# Patient Record
Sex: MALE | Race: BLACK OR AFRICAN AMERICAN | Employment: FULL TIME | URBAN - METROPOLITAN AREA
[De-identification: names, ages, dates, MRNs, and addresses within clinical notes are randomized per-mention and may not be internally consistent; named-entity substitution may affect disease eponyms.]

---

## 2018-01-18 ENCOUNTER — OFFICE VISIT (OUTPATIENT)
Dept: INTERNAL MEDICINE CLINIC | Age: 63
End: 2018-01-18

## 2018-01-18 VITALS
BODY MASS INDEX: 23.57 KG/M2 | WEIGHT: 150.2 LBS | HEART RATE: 78 BPM | HEIGHT: 67 IN | TEMPERATURE: 98.1 F | OXYGEN SATURATION: 95 % | DIASTOLIC BLOOD PRESSURE: 70 MMHG | SYSTOLIC BLOOD PRESSURE: 109 MMHG | RESPIRATION RATE: 18 BRPM

## 2018-01-18 DIAGNOSIS — E11.42 TYPE 2 DIABETES MELLITUS WITH DIABETIC POLYNEUROPATHY, WITH LONG-TERM CURRENT USE OF INSULIN (HCC): Primary | ICD-10-CM

## 2018-01-18 DIAGNOSIS — E78.5 DYSLIPIDEMIA: ICD-10-CM

## 2018-01-18 DIAGNOSIS — Z79.4 TYPE 2 DIABETES MELLITUS WITH DIABETIC POLYNEUROPATHY, WITH LONG-TERM CURRENT USE OF INSULIN (HCC): Primary | ICD-10-CM

## 2018-01-18 DIAGNOSIS — I34.1 MITRAL VALVE PROLAPSE: ICD-10-CM

## 2018-01-18 DIAGNOSIS — K42.9 UMBILICAL HERNIA WITHOUT OBSTRUCTION AND WITHOUT GANGRENE: ICD-10-CM

## 2018-01-18 RX ORDER — CLOTRIMAZOLE AND BETAMETHASONE DIPROPIONATE 10; .64 MG/G; MG/G
CREAM TOPICAL 2 TIMES DAILY
Qty: 45 G | Refills: 11 | Status: SHIPPED | OUTPATIENT
Start: 2018-01-18 | End: 2019-01-02

## 2018-01-18 RX ORDER — ATORVASTATIN CALCIUM 20 MG/1
20 TABLET, FILM COATED ORAL DAILY
Qty: 30 TAB | Refills: 10 | Status: SHIPPED | OUTPATIENT
Start: 2018-01-18 | End: 2018-02-15 | Stop reason: SDUPTHER

## 2018-01-18 RX ORDER — GLIPIZIDE 10 MG/1
10 TABLET ORAL 2 TIMES DAILY
Qty: 120 TAB | Refills: 10 | Status: SHIPPED | OUTPATIENT
Start: 2018-01-18 | End: 2018-02-15 | Stop reason: SDUPTHER

## 2018-01-18 RX ORDER — METFORMIN HYDROCHLORIDE 1000 MG/1
1000 TABLET ORAL 2 TIMES DAILY WITH MEALS
Qty: 60 TAB | Refills: 10 | Status: SHIPPED | OUTPATIENT
Start: 2018-01-18 | End: 2018-02-15 | Stop reason: SDUPTHER

## 2018-01-18 NOTE — PROGRESS NOTES
SPORTS MEDICINE AND PRIMARY CARE  Jose Manuel Almanza MD, [address] [address]  Phone:  [phone]  Fax: [phone]       Chief Complaint   Patient presents with    Diabetes     f/u   . SUBJECTIVE:    Edwar Durant is a 57 y.o. male   The patient returns today after a long absence from Dr. Brenden Cisneros. He has a known history of diabetes and hypertension. He ran out of his Levemir months ago and has not been checking his blood sugar. He continues to take Metformin and Glipizide, however. He complains of numbness of his feet, particularly the plantar surface and is seen for evaluation. Current Outpatient Prescriptions   Medication Sig Dispense Refill    insulin detemir (LEVEMIR FLEXTOUCH) 100 unit/mL (3 mL) inpn 30 Units by SubCUTAneous route nightly. 3 Pen 11    glipiZIDE (GLUCOTROL) 10 mg tablet Take 1 Tab by mouth two (2) times a day. 120 Tab 10    metFORMIN (GLUCOPHAGE) 1,000 mg tablet Take 1 Tab by mouth two (2) times daily (with meals). 60 Tab 10    atorvastatin (LIPITOR) 20 mg tablet Take 1 Tab by mouth daily. 30 Tab 10    clotrimazole-betamethasone (LOTRISONE) topical cream Apply  to affected area two (2) times a day.  45 g 11    CLICKFINE 31 gauge x 4/06\" ndle USE UP TO 3 TIMES PER DAY AS DIRECTED BY DOCTOR 100 Pen Needle 11    SOLUS V2 TEST STRIPS strip       LITE TOUCH LANCETS 33 gauge misc       LITE TOUCH LANCING DEVICE misc       GAVILYTE-N 420 gram solution        Past Medical History:   Diagnosis Date    Diabetes ([address]     Hypertension      Past Surgical History:   Procedure Laterality Date    COLONOSCOPY  1/2/2015         HX HEENT      l cararact removal     No Known Allergies      REVIEW OF SYSTEMS:  General: negative for - chills or fever  ENT: negative for - headaches, nasal congestion or tinnitus  Respiratory: negative for - cough, hemoptysis, shortness of breath or wheezing  Cardiovascular : negative for - chest pain, edema, palpitations or shortness of breath  Gastrointestinal: negative for - abdominal pain, blood in stools, heartburn or nausea/vomiting  Genito-Urinary: no dysuria, trouble voiding, or hematuria  Musculoskeletal: negative for - gait disturbance, joint pain, joint stiffness or joint swelling  Neurological: no TIA or stroke symptoms  Hematologic: no bruises, no bleeding, no swollen glands  Integument: no lumps, mole changes, nail changes or rash  Endocrine: no malaise/lethargy or unexpected weight changes      Social History     Social History    Marital status: SINGLE     Spouse name: N/A    Number of children: 0    Years of education: N/A     Occupational History    [employer]      Social History Main Topics    Smoking status: Never Smoker    Smokeless tobacco: Never Used    Alcohol use No    Drug use: No    Sexual activity: Yes     Partners: Female     Birth control/ protection: None     Other Topics Concern    None     Social History Narrative     Family History   Problem Relation Age of Onset    Diabetes Mother     Hypertension Mother        OBJECTIVE:    Visit Vitals    /70    Pulse 78    Temp 98.1 °F (36.7 °C) (Oral)    Resp 18    Ht 5' 7\" (1.702 m)    Wt 150 lb 3.2 oz (68.1 kg)    SpO2 95%    BMI 23.52 kg/m2     CONSTITUTIONAL: well , well nourished, appears age appropriate  EYES: perrla, eom intact  ENMT:moist mucous membranes, pharynx clear  NECK: supple. Thyroid normal  RESPIRATORY: Chest: clear bilaterally   CARDIOVASCULAR: Heart: regular rate and rhythm  GASTROINTESTINAL: Abdomen: soft, bowel sounds active  HEMATOLOGIC: no pathological lymph nodes palpated  MUSCULOSKELETAL: Extremities: no edema, pulse 1+   Foot exam reveals no lesions. There are white plaques in the 4th/5th webspace. Sensation is intact to fine filament. Pulses are intact. INTEGUMENT: No unusual rashes or suspicious skin lesions noted.  Nails appear normal.  NEUROLOGIC: non-focal exam   MENTAL STATUS: alert and oriented, appropriate affect           ASSESSMENT:  1. Type 2 diabetes mellitus with diabetic polyneuropathy, with long-term current use of insulin ([address]    2. Dyslipidemia    3. Umbilical hernia without obstruction and without gangrene    4. Mitral valve prolapse        The patient is completely noncompliant with regard to medical follow up and medication management. He tells me he is going to \"get back on the bandwagon\" and follow Dr. Goodwin Slider recommendations. We will re-institute the Levemir and continue his other medications. Appropriate laboratory studies have been requested. We asked him to check his blood sugar on a regular basis and bring those readings when he sees Dr. Brenden Cisneros within the next 2-3 weeks so that he can help him adjust his medications to return his blood sugars to normal. We will also ask him to see the ophthalmologist.     Appropriate laboratory studies have been requested and will be sent to him in the mail. Review of his BMI indicates he is at his ideal body weight but we encourage a heart-healthy diabetic diet and physical activity for 30 minutes 5 days a week. MedDATA/gwo         PLAN:  .  Orders Placed This Encounter    URINALYSIS W/ RFLX MICROSCOPIC    CBC WITH AUTOMATED DIFF    METABOLIC PANEL, COMPREHENSIVE    LIPID PANEL    PROSTATE SPECIFIC AG    HEMOGLOBIN A1C WITH EAG    MICROALBUMIN, UR, RAND W/ MICROALBUMIN/CREA RATIO    REFERRAL TO OPHTHALMOLOGY    insulin detemir (LEVEMIR FLEXTOUCH) 100 unit/mL (3 mL) inpn    glipiZIDE (GLUCOTROL) 10 mg tablet    metFORMIN (GLUCOPHAGE) 1,000 mg tablet    atorvastatin (LIPITOR) 20 mg tablet    clotrimazole-betamethasone (LOTRISONE) topical cream       Follow-up Disposition:  Return in about 4 weeks (around 2/15/2018). ATTENTION:   This medical record was transcribed using an electronic medical records system. Although proofread, it may and can contain electronic and spelling errors.   Other human spelling and other errors may be present. Corrections may be executed at a later time. Please feel free to contact us for any clarifications as needed.

## 2018-01-18 NOTE — MR AVS SNAPSHOT
Rachael Ingram 
 
 
 [address] [address] 
[phone] Patient: Haris Roche MRN: [id number] SGY:07/[birth year]72 Visit Information Date & Time Provider Department Dept. Phone Encounter #  
 1/18/2018 10:30 AM MD Bishop Guerrero [facility] Medicine and Primary Care [phone] [id number] Follow-up Instructions Return in about 4 weeks (around 2/15/2018). Follow-up and Disposition History Upcoming Health Maintenance Date Due Hepatitis C Screening 1955 Pneumococcal 19-64 Medium Risk (1 of 1 - PPSV23) 10/7/1972 DTaP/Tdap/Td series (1 - Tdap) 10/7/1974 MICROALBUMIN Q1 1/6/2016 FOOT EXAM Q1 3/17/2016 EYE EXAM RETINAL OR DILATED Q1 6/2/2016 HEMOGLOBIN A1C Q6M 11/24/2016 LIPID PANEL Q1 5/24/2017 Influenza Age 5 to Adult 8/1/2017 COLONOSCOPY 1/2/2025 Allergies as of 1/18/2018  Review Complete On: 1/18/2018 By: Maria Fernanda Bear MD  
 No Known Allergies Current Immunizations  Never Reviewed No immunizations on file. Not reviewed this visit You Were Diagnosed With   
  
 Codes Comments Type 2 diabetes mellitus with diabetic polyneuropathy, with long-term current use of insulin (HCC)    -  Primary ICD-10-CM: E11.42, Z79.4 ICD-9-CM: 250.60, 357.2, V58.67 Dyslipidemia     ICD-10-CM: E78.5 ICD-9-CM: 272.4 Umbilical hernia without obstruction and without gangrene     ICD-10-CM: K42.9 ICD-9-CM: 553.1 Mitral valve prolapse     ICD-10-CM: I34.1 ICD-9-CM: 424.0 Vitals BP Pulse Temp Resp Height(growth percentile) Weight(growth percentile) 109/70 78 98.1 °F (36.7 °C) (Oral) 18 5' 7\" (1.702 m) 150 lb 3.2 oz (68.1 kg) SpO2 BMI Smoking Status 95% 23.52 kg/m2 Never Smoker Vitals History BMI and BSA Data Body Mass Index Body Surface Area  
 23.52 kg/m 2 1.79 m 2 Preferred Pharmacy Pharmacy Name Phone [facility] [phone] [facility] [phone] Your Updated Medication List  
  
   
This list is accurate as of: 18 12:46 PM.  Always use your most recent med list.  
  
  
  
  
 atorvastatin 20 mg tablet Commonly known as:  LIPITOR Take 1 Tab by mouth daily. CLICKFINE 31 gauge x 3/27\" Ndle Generic drug:  Insulin Needles (Disposable) USE UP TO 3 TIMES PER DAY AS DIRECTED BY DOCTOR  
  
 clotrimazole-betamethasone topical cream  
Commonly known as:  Kenia Pilling Apply  to affected area two (2) times a day. GAVILYTE-N 420 gram solution Generic drug:  peg-electrolyte soln  
  
 glipiZIDE 10 mg tablet Commonly known as:  Denette Frock Take 1 Tab by mouth two (2) times a day. insulin detemir 100 unit/mL (3 mL) Inpn Commonly known as:  LEVEMIR FLEXTOUCH  
30 Units by SubCUTAneous route nightly. LITE TOUCH LANCETS 33 gauge Misc Generic drug:  lancets LITE TOUCH LANCING DEVICE Misc Generic drug:  Lancing Device  
  
 metFORMIN 1,000 mg tablet Commonly known as:  GLUCOPHAGE Take 1 Tab by mouth two (2) times daily (with meals). SOLUS V2 TEST STRIPS strip Generic drug:  glucose blood VI test strips Prescriptions Sent to Pharmacy Refills  
 insulin detemir (LEVEMIR FLEXTOUCH) 100 unit/mL (3 mL) inpn 11 Si Units by SubCUTAneous route nightly. Class: Normal  
 Pharmacy: [facility] [phone] [facility] Ph #: [phone] Route: SubCUTAneous  
 glipiZIDE (GLUCOTROL) 10 mg tablet 10 Sig: Take 1 Tab by mouth two (2) times a day. Class: Normal  
 Pharmacy: [facility] [phone] [facility] Ph #: [phone] Route: Oral  
 metFORMIN (GLUCOPHAGE) 1,000 mg tablet 10 Sig: Take 1 Tab by mouth two (2) times daily (with meals).   
 Class: Normal  
 [facility] [address] Ph #: [phone] Route: Oral  
 atorvastatin (LIPITOR) 20 mg tablet 10 Sig: Take 1 Tab by mouth daily. Class: Normal  
 Pharmacy: [address] Ph #: [phone] Route: Oral  
 clotrimazole-betamethasone (LOTRISONE) topical cream 11 Sig: Apply  to affected area two (2) times a day. Class: Normal  
 Pharmacy: [address] Ph #: [phone] Route: Topical  
  
We Performed the Following CBC WITH AUTOMATED DIFF [06455 CPT(R)] HEMOGLOBIN A1C WITH EAG [70851 CPT(R)] LIPID PANEL [14519 CPT(R)] METABOLIC PANEL, COMPREHENSIVE [46794 CPT(R)] MICROALBUMIN, UR, RAND W/ MICROALBUMIN/CREA RATIO [id number] CPT(R)] NC COLLECTION VENOUS BLOOD,VENIPUNCTURE [id number] CPT(R)] PSA, DIAGNOSTIC (PROSTATE SPECIFIC AG) [id number] CPT(R)] REFERRAL TO OPHTHALMOLOGY [REF57 Custom] Comments:  
 Please evaluate patient for dm. URINALYSIS W/ RFLX MICROSCOPIC [22686 CPT(R)] Follow-up Instructions Return in about 4 weeks (around 2/15/2018). To-Do List   
 01/18/2018 ECHO:  2D ECHO COMPLETE ADULT (TTE) W OR WO CONTR Referral Information Referral ID Referred By Referred To  
  
 [id number] Noah BOWIE Not Available Visits Status Start Date End Date 1 New Request 1/18/18 1/18/19 If your referral has a status of pending review or denied, additional information will be sent to support the outcome of this decision. Introducing [facility]! [facility] introduces [employer] patient portal. Now you can access parts of your medical record, email your doctor's office, and request medication refills online. 1. In your internet browser, go to [email or web address] 2. Click on the First Time User? Click Here link in the Sign In box. You will see the New Member Sign Up page. 3. Enter your [employer] [place] Access Code exactly as it appears below. You will not need to use this code after youve completed the sign-up process. If you do not sign up before the expiration date, you must request a new code. · [employer] [place] Access Code: [facility] Expires: 4/18/2018 12:46 PM 
 
4. Enter the last four digits of your Social Security Number (xxxx) and Date of Birth (mm/dd/yyyy) as indicated and click Submit. You will be taken to the next sign-up page. 5. Create a [employer] [place] ID. This will be your [employer] [place] login ID and cannot be changed, so think of one that is secure and easy to remember. 6. Create a [employer] password. You can change your password at any time. 7. Enter your Password Reset Question and Answer. This can be used at a later time if you forget your password. 8. Enter your e-mail address. You will receive e-mail notification when new information is available in [address] 9. Click Sign Up. You can now view and download portions of your medical record. 10. Click the Download Summary menu link to download a portable copy of your medical information. If you have questions, please visit the Frequently Asked Questions section of the [employer] [place] website. Remember, [employer] [place] is NOT to be used for urgent needs. For medical emergencies, dial 911. Now available from your iPhone and Android! Please provide this summary of care documentation to your next provider. Your primary care clinician is listed as Nathaly Armando. If you have any questions after today's visit, please call [phone].

## 2018-01-18 NOTE — PROGRESS NOTES
1. Have you been to the ER, urgent care clinic since your last visit? Hospitalized since your last visit? No    2. Have you seen or consulted any other health care providers outside of the [address] [place] [address] since your last visit? Include any pap smears or colon screening. No     Complaints of tingling in feet.  Needs medication refill

## 2018-01-19 LAB
ALBUMIN SERPL-MCNC: 4 G/DL (ref 3.6–4.8)
ALBUMIN/CREAT UR: 80.9 MG/G CREAT (ref 0–30)
ALBUMIN/GLOB SERPL: 1.5 {RATIO} (ref 1.2–2.2)
ALP SERPL-CCNC: 75 IU/L (ref 39–117)
ALT SERPL-CCNC: 19 IU/L (ref 0–44)
APPEARANCE UR: CLEAR
AST SERPL-CCNC: 21 IU/L (ref 0–40)
BASOPHILS # BLD AUTO: 0 X10E3/UL (ref 0–0.2)
BASOPHILS NFR BLD AUTO: 0 %
BILIRUB SERPL-MCNC: 0.3 MG/DL (ref 0–1.2)
BILIRUB UR QL STRIP: NEGATIVE
BUN SERPL-MCNC: 22 MG/DL (ref 8–27)
BUN/CREAT SERPL: 23 (ref 10–24)
CALCIUM SERPL-MCNC: 9.3 MG/DL (ref 8.6–10.2)
CHLORIDE SERPL-SCNC: 94 MMOL/L (ref 96–106)
CHOLEST SERPL-MCNC: 192 MG/DL (ref 100–199)
CO2 SERPL-SCNC: 26 MMOL/L (ref 18–29)
COLOR UR: YELLOW
CREAT SERPL-MCNC: 0.94 MG/DL (ref 0.76–1.27)
CREAT UR-MCNC: 50.9 MG/DL
EOSINOPHIL # BLD AUTO: 0 X10E3/UL (ref 0–0.4)
EOSINOPHIL NFR BLD AUTO: 1 %
ERYTHROCYTE [DISTWIDTH] IN BLOOD BY AUTOMATED COUNT: 14 % (ref 12.3–15.4)
EST. AVERAGE GLUCOSE BLD GHB EST-MCNC: >398 MG/DL
GLOBULIN SER CALC-MCNC: 2.6 G/DL (ref 1.5–4.5)
GLUCOSE SERPL-MCNC: 348 MG/DL (ref 65–99)
GLUCOSE UR QL: ABNORMAL
HBA1C MFR BLD: >15.5 % (ref 4.8–5.6)
HCT VFR BLD AUTO: 38.9 % (ref 37.5–51)
HDLC SERPL-MCNC: 63 MG/DL
HGB BLD-MCNC: 13 G/DL (ref 13–17.7)
HGB UR QL STRIP: NEGATIVE
IMM GRANULOCYTES # BLD: 0 X10E3/UL (ref 0–0.1)
IMM GRANULOCYTES NFR BLD: 0 %
KETONES UR QL STRIP: NEGATIVE
LDLC SERPL CALC-MCNC: 106 MG/DL (ref 0–99)
LEUKOCYTE ESTERASE UR QL STRIP: NEGATIVE
LYMPHOCYTES # BLD AUTO: 0.9 X10E3/UL (ref 0.7–3.1)
LYMPHOCYTES NFR BLD AUTO: 23 %
MCH RBC QN AUTO: 28.6 PG (ref 26.6–33)
MCHC RBC AUTO-ENTMCNC: 33.4 G/DL (ref 31.5–35.7)
MCV RBC AUTO: 86 FL (ref 79–97)
MICRO URNS: ABNORMAL
MICROALBUMIN UR-MCNC: 41.2 UG/ML
MONOCYTES # BLD AUTO: 0.3 X10E3/UL (ref 0.1–0.9)
MONOCYTES NFR BLD AUTO: 7 %
NEUTROPHILS # BLD AUTO: 2.6 X10E3/UL (ref 1.4–7)
NEUTROPHILS NFR BLD AUTO: 69 %
NITRITE UR QL STRIP: NEGATIVE
PH UR STRIP: 5.5 [PH] (ref 5–7.5)
PLATELET # BLD AUTO: 185 X10E3/UL (ref 150–379)
POTASSIUM SERPL-SCNC: 4.3 MMOL/L (ref 3.5–5.2)
PROT SERPL-MCNC: 6.6 G/DL (ref 6–8.5)
PROT UR QL STRIP: NEGATIVE
PSA SERPL-MCNC: 1 NG/ML (ref 0–4)
RBC # BLD AUTO: 4.55 X10E6/UL (ref 4.14–5.8)
SODIUM SERPL-SCNC: 137 MMOL/L (ref 134–144)
SP GR UR: >=1.03 (ref 1–1.03)
TRIGL SERPL-MCNC: 117 MG/DL (ref 0–149)
UROBILINOGEN UR STRIP-MCNC: 0.2 MG/DL (ref 0.2–1)
VLDLC SERPL CALC-MCNC: 23 MG/DL (ref 5–40)
WBC # BLD AUTO: 3.7 X10E3/UL (ref 3.4–10.8)

## 2018-02-06 ENCOUNTER — HOSPITAL ENCOUNTER (OUTPATIENT)
Dept: NON INVASIVE DIAGNOSTICS | Age: 63
Discharge: HOME | End: 2018-02-06
Attending: INTERNAL MEDICINE
Payer: COMMERCIAL

## 2018-02-06 DIAGNOSIS — I34.1 MITRAL VALVE PROLAPSE: ICD-10-CM

## 2018-02-06 PROCEDURE — 93306 TTE W/DOPPLER COMPLETE: CPT

## 2018-02-15 ENCOUNTER — OFFICE VISIT (OUTPATIENT)
Dept: INTERNAL MEDICINE CLINIC | Age: 63
End: 2018-02-15

## 2018-02-15 VITALS
HEART RATE: 79 BPM | RESPIRATION RATE: 18 BRPM | BODY MASS INDEX: 24.58 KG/M2 | DIASTOLIC BLOOD PRESSURE: 68 MMHG | OXYGEN SATURATION: 96 % | HEIGHT: 67 IN | WEIGHT: 156.6 LBS | TEMPERATURE: 97.7 F | SYSTOLIC BLOOD PRESSURE: 109 MMHG

## 2018-02-15 DIAGNOSIS — E11.42 DIABETIC POLYNEUROPATHY ASSOCIATED WITH TYPE 2 DIABETES MELLITUS (HCC): ICD-10-CM

## 2018-02-15 DIAGNOSIS — E78.5 DYSLIPIDEMIA: ICD-10-CM

## 2018-02-15 DIAGNOSIS — Z00.00 PHYSICAL EXAM: ICD-10-CM

## 2018-02-15 DIAGNOSIS — I34.1 MITRAL VALVE PROLAPSE: ICD-10-CM

## 2018-02-15 DIAGNOSIS — E13.319 RETINOPATHY DUE TO SECONDARY DIABETES MELLITUS (HCC): ICD-10-CM

## 2018-02-15 RX ORDER — GLIPIZIDE 10 MG/1
10 TABLET ORAL 2 TIMES DAILY
Qty: 120 TAB | Refills: 10 | Status: SHIPPED | OUTPATIENT
Start: 2018-02-15 | End: 2018-11-22

## 2018-02-15 RX ORDER — METFORMIN HYDROCHLORIDE 1000 MG/1
1000 TABLET ORAL 2 TIMES DAILY WITH MEALS
Qty: 60 TAB | Refills: 10 | Status: SHIPPED | OUTPATIENT
Start: 2018-02-15 | End: 2019-05-24 | Stop reason: SDUPTHER

## 2018-02-15 RX ORDER — ATORVASTATIN CALCIUM 20 MG/1
20 TABLET, FILM COATED ORAL DAILY
Qty: 30 TAB | Refills: 10 | Status: SHIPPED | OUTPATIENT
Start: 2018-02-15 | End: 2019-03-27 | Stop reason: SDUPTHER

## 2018-02-15 NOTE — MR AVS SNAPSHOT
[address] [place] [address]. [address] [address] 
[phone] Patient: An Crowley MRN: [id number] KWM:[birth year] Visit Information Date & Time Provider Department Dept. Phone Encounter #  
 2/15/2018  9:00 AM Oscar Canavan, MD [facility] [address] [id number] Follow-up Instructions Return in about 2 weeks (around 3/1/2018). Upcoming Health Maintenance Date Due Hepatitis C Screening 1955 FOOT EXAM Q1 3/17/2016 EYE EXAM RETINAL OR DILATED Q1 6/2/2016 HEMOGLOBIN A1C Q6M 7/18/2018 MICROALBUMIN Q1 1/18/2019 LIPID PANEL Q1 1/18/2019 COLONOSCOPY 1/2/2025 DTaP/Tdap/Td series (2 - Td) 2/15/2028 Allergies as of 2/15/2018  Review Complete On: 2/15/2018 By: Nicholas Corey No Known Allergies Current Immunizations  Never Reviewed No immunizations on file. Not reviewed this visit You Were Diagnosed With   
  
 Codes Comments Type 2 diabetes mellitus with diabetic polyneuropathy, with long-term current use of insulin (HCC)    -  Primary ICD-10-CM: E11.42, Z79.4 ICD-9-CM: 250.60, 357.2, V58.67 Dyslipidemia     ICD-10-CM: E78.5 ICD-9-CM: 272.4 Vitals BP Pulse Temp Resp Height(growth percentile) Weight(growth percentile) 109/68 (BP 1 Location: Right arm, BP Patient Position: Sitting) 79 97.7 °F (36.5 °C) (Oral) 18 5' 7\" (1.702 m) 156 lb 9.6 oz (71 kg) SpO2 BMI Smoking Status 96% 24.53 kg/m2 Never Smoker Vitals History BMI and BSA Data Body Mass Index Body Surface Area 24.53 kg/m 2 1.83 m 2 Preferred Pharmacy Pharmacy Name Phone [facility] [address] [place] [address] [phone] Your Updated Medication List  
  
   
This list is accurate as of: 2/15/18 11:04 AM.  Always use your most recent med list.  
  
  
  
  
 atorvastatin 20 mg tablet Commonly known as:  LIPITOR Take 1 Tab by mouth daily. CLICKFINE 31 gauge x 7/28\" Ndle Generic drug:  Insulin Needles (Disposable) USE UP TO 3 TIMES PER DAY AS DIRECTED BY DOCTOR  
  
 clotrimazole-betamethasone topical cream  
Commonly known as:  Jackelyn Scherer Apply  to affected area two (2) times a day. GAVILYTE-N 420 gram solution Generic drug:  peg-electrolyte soln  
  
 glipiZIDE 10 mg tablet Commonly known as:  Martine Pruitt Take 1 Tab by mouth two (2) times a day. insulin detemir U-100 100 unit/mL (3 mL) Inpn Commonly known as:  LEVEMIR FLEXTOUCH U-100 INSULN  
30 Units by SubCUTAneous route nightly. LITE TOUCH LANCETS 33 gauge Misc Generic drug:  lancets LITE TOUCH LANCING DEVICE Misc Generic drug:  Lancing Device  
  
 metFORMIN 1,000 mg tablet Commonly known as:  GLUCOPHAGE Take 1 Tab by mouth two (2) times daily (with meals). SOLUS V2 TEST STRIPS strip Generic drug:  glucose blood VI test strips Prescriptions Sent to Pharmacy Refills  
 metFORMIN (GLUCOPHAGE) 1,000 mg tablet 10 Sig: Take 1 Tab by mouth two (2) times daily (with meals). Class: Normal  
 Pharmacy: [address] Ph #: [phone] Route: Oral  
 atorvastatin (LIPITOR) 20 mg tablet 10 Sig: Take 1 Tab by mouth daily. Class: Normal  
 Pharmacy: [address] Ph #: [phone] Route: Oral  
 glipiZIDE (GLUCOTROL) 10 mg tablet 10 Sig: Take 1 Tab by mouth two (2) times a day. Class: Normal  
 Pharmacy: [address] Ph #: [phone] Route: Oral  
  
Follow-up Instructions Return in about 2 weeks (around 3/1/2018). Please provide this summary of care documentation to your next provider. Your primary care clinician is listed as Nathaly Armando. If you have any questions after today's visit, please call [phone].

## 2018-02-15 NOTE — PROGRESS NOTES
1. Have you been to the ER, urgent care clinic since your last visit? Hospitalized since your last visit? No    2. Have you seen or consulted any other health care providers outside of the [address] [place] [address] since your last visit? Include any pap smears or colon screening.  No

## 2018-02-18 PROBLEM — E11.42 TYPE 2 DIABETES MELLITUS WITH DIABETIC POLYNEUROPATHY, WITH LONG-TERM CURRENT USE OF INSULIN (HCC): Status: ACTIVE | Noted: 2018-02-18

## 2018-02-18 PROBLEM — E11.42 DIABETIC POLYNEUROPATHY ASSOCIATED WITH TYPE 2 DIABETES MELLITUS (HCC): Status: RESOLVED | Noted: 2018-02-18 | Resolved: 2018-02-18

## 2018-02-18 PROBLEM — E13.319 RETINOPATHY DUE TO SECONDARY DIABETES MELLITUS (HCC): Status: ACTIVE | Noted: 2018-02-18

## 2018-02-18 PROBLEM — Z79.4 TYPE 2 DIABETES MELLITUS WITH DIABETIC POLYNEUROPATHY, WITH LONG-TERM CURRENT USE OF INSULIN (HCC): Status: ACTIVE | Noted: 2018-02-18

## 2018-02-18 PROBLEM — E11.42 DIABETIC POLYNEUROPATHY ASSOCIATED WITH TYPE 2 DIABETES MELLITUS (HCC): Status: ACTIVE | Noted: 2018-02-18

## 2018-02-19 NOTE — ASSESSMENT & PLAN NOTE
This condition is managed by Specialist.  Key Antihyperglycemic Medications             metFORMIN (GLUCOPHAGE) 1,000 mg tablet  (Taking) Take 1 Tab by mouth two (2) times daily (with meals). glipiZIDE (GLUCOTROL) 10 mg tablet  (Taking) Take 1 Tab by mouth two (2) times a day. insulin detemir (LEVEMIR FLEXTOUCH) 100 unit/mL (3 mL) inpn  (Taking) 30 Units by SubCUTAneous route nightly. Other Key Diabetic Medications             atorvastatin (LIPITOR) 20 mg tablet  (Taking) Take 1 Tab by mouth daily.         Lab Results   Component Value Date/Time    Hemoglobin A1c >15.5 01/18/2018 12:38 PM    Glucose 348 01/18/2018 12:38 PM    Creatinine 0.94 01/18/2018 12:38 PM    Microalb/Creat ratio (ug/mg creat.) 80.9 01/18/2018 12:38 PM    Cholesterol, total 192 01/18/2018 12:38 PM    HDL Cholesterol 63 01/18/2018 12:38 PM    LDL, calculated 106 01/18/2018 12:38 PM    Triglyceride 117 01/18/2018 12:38 PM     Diabetic Foot and Eye Exam HM Status   Topic Date Due    Diabetic Foot Care  03/17/2016    Eye Exam  06/02/2016

## 2018-02-19 NOTE — ASSESSMENT & PLAN NOTE
Uncontrolled, based on history, physical exam and review of pertinent labs, studies and medications; meds reconciled; lifestyle modifications recommended, medication compliance emphasized. Key Antihyperglycemic Medications             metFORMIN (GLUCOPHAGE) 1,000 mg tablet  (Taking) Take 1 Tab by mouth two (2) times daily (with meals). glipiZIDE (GLUCOTROL) 10 mg tablet  (Taking) Take 1 Tab by mouth two (2) times a day. insulin detemir (LEVEMIR FLEXTOUCH) 100 unit/mL (3 mL) inpn  (Taking) 30 Units by SubCUTAneous route nightly. Other Key Diabetic Medications             atorvastatin (LIPITOR) 20 mg tablet  (Taking) Take 1 Tab by mouth daily.         Lab Results   Component Value Date/Time    Hemoglobin A1c >15.5 01/18/2018 12:38 PM    Glucose 348 01/18/2018 12:38 PM    Creatinine 0.94 01/18/2018 12:38 PM    Microalb/Creat ratio (ug/mg creat.) 80.9 01/18/2018 12:38 PM    Cholesterol, total 192 01/18/2018 12:38 PM    HDL Cholesterol 63 01/18/2018 12:38 PM    LDL, calculated 106 01/18/2018 12:38 PM    Triglyceride 117 01/18/2018 12:38 PM     Diabetic Foot and Eye Exam HM Status   Topic Date Due    Diabetic Foot Care  03/17/2016    Eye Exam  06/02/2016

## 2018-02-19 NOTE — ASSESSMENT & PLAN NOTE
Stable, based on history, physical exam and review of pertinent labs, studies and medications; meds reconciled; continue current treatment plan. Key Antihyperglycemic Medications             metFORMIN (GLUCOPHAGE) 1,000 mg tablet  (Taking) Take 1 Tab by mouth two (2) times daily (with meals). glipiZIDE (GLUCOTROL) 10 mg tablet  (Taking) Take 1 Tab by mouth two (2) times a day. insulin detemir (LEVEMIR FLEXTOUCH) 100 unit/mL (3 mL) inpn  (Taking) 30 Units by SubCUTAneous route nightly. Other Key Diabetic Medications             atorvastatin (LIPITOR) 20 mg tablet  (Taking) Take 1 Tab by mouth daily.         Lab Results   Component Value Date/Time    Hemoglobin A1c >15.5 01/18/2018 12:38 PM    Glucose 348 01/18/2018 12:38 PM    Creatinine 0.94 01/18/2018 12:38 PM    Microalb/Creat ratio (ug/mg creat.) 80.9 01/18/2018 12:38 PM    Cholesterol, total 192 01/18/2018 12:38 PM    HDL Cholesterol 63 01/18/2018 12:38 PM    LDL, calculated 106 01/18/2018 12:38 PM    Triglyceride 117 01/18/2018 12:38 PM     Diabetic Foot and Eye Exam HM Status   Topic Date Due    Diabetic Foot Care  03/17/2016    Eye Exam  06/02/2016

## 2018-02-19 NOTE — PROGRESS NOTES
[address] [facility]  [place] [address]  [address] [facility]  Phone:  [phone]  Fax: [phone]       Chief Complaint   Patient presents with    Hypertension    Diabetes   . SUBJECTIVE:    Siobhan Romero is a 57 y.o. male   Comes in after a long absence. He has been totally out of control the entire time I have been following him. He never follows up with a return appointments and efforts to adjust his medications have been futile. The futility comes from his poor follow up. He was called previously and unfortunately his significant other was a bit uncooperative on the phone and I do not think contact was ever made with the patient. In any event, he has diabetic retinopathy and is actively seeing an ophthalmologist and will be seeing a retinal specialist soon and he has polyarthritis of the lower extremity manifesting itself as paresthesias and numbness. He now has had diabetes for at least 14+ years and has been totally out of control the entire time. He is working at the [employer] where he has worked for the last decade plus with his work hours being approximately 11:00 AM to 7:00 PM. He eats his breakfast at about 10:00 AM, again at 1:30 and 4:30 and a snack at [employer] on his way home from work at about 9:00 PM. He has been taking Levemir, but no prandial insulin. He does not get much information from his blood sugars because he checks them sporadically. He assumes that because he does not feel bad that his diabetes is well controlled, but this is obviously not the case in view of the microvascular complications developing. I explained this to him at length. He is taking his statin on a regular basis. To date, he has not developed hypertension. Physically, he feels well and continues to work as I stated on a regular basis.      MedDATA/gwo            Current Outpatient Prescriptions   Medication Sig Dispense Refill    metFORMIN (GLUCOPHAGE) 1,000 mg tablet Take 1 Tab by mouth two (2) times daily (with meals). 60 Tab 10    atorvastatin (LIPITOR) 20 mg tablet Take 1 Tab by mouth daily. 30 Tab 10    glipiZIDE (GLUCOTROL) 10 mg tablet Take 1 Tab by mouth two (2) times a day. 120 Tab 10    insulin detemir (LEVEMIR FLEXTOUCH) 100 unit/mL (3 mL) inpn 30 Units by SubCUTAneous route nightly. 3 Pen 11    clotrimazole-betamethasone (LOTRISONE) topical cream Apply  to affected area two (2) times a day.  45 g 11    CLICKFINE 31 gauge x 8/71\" ndle USE UP TO 3 TIMES PER DAY AS DIRECTED BY DOCTOR 100 Pen Needle 11    SOLUS V2 TEST STRIPS strip       LITE TOUCH LANCETS 33 gauge misc       LITE TOUCH LANCING DEVICE misc       GAVILYTE-N 420 gram solution        Past Medical History:   Diagnosis Date    Diabetes ([facility]     Hypertension      Past Surgical History:   Procedure Laterality Date    COLONOSCOPY  1/2/2015         HX HEENT      l cararact removal     No Known Allergies      REVIEW OF SYSTEMS:  General: negative for - chills or fever  ENT: negative for - headaches, nasal congestion or tinnitus  Respiratory: negative for - cough, hemoptysis, shortness of breath or wheezing  Cardiovascular : negative for - chest pain, edema, palpitations or shortness of breath  Gastrointestinal: negative for - abdominal pain, blood in stools, heartburn or nausea/vomiting  Genito-Urinary: no dysuria, trouble voiding, or hematuria  Musculoskeletal: negative for - gait disturbance, joint pain, joint stiffness or joint swelling  Neurological: no TIA or stroke symptoms  Hematologic: no bruises, no bleeding, no swollen glands  Integument: no lumps, mole changes, nail changes or rash  Endocrine: no malaise/lethargy or unexpected weight changes      Social History     Social History    Marital status: SINGLE     Spouse name: N/A    Number of children: 0    Years of education: N/A     Occupational History    [employer]      Social History Main Topics    Smoking status: Never Smoker    Smokeless tobacco: Never Used    Alcohol use No    Drug use: No    Sexual activity: Yes     Partners: Female     Birth control/ protection: None     Other Topics Concern    None     Social History Narrative     Family History   Problem Relation Age of Onset    Diabetes Mother     Hypertension Mother        OBJECTIVE:    Visit Vitals    /68 (BP 1 Location: Right arm, BP Patient Position: Sitting)    Pulse 79    Temp 97.7 °F (36.5 °C) (Oral)    Resp 18    Ht 5' 7\" (1.702 m)    Wt 156 lb 9.6 oz (71 kg)    SpO2 96%    BMI 24.53 kg/m2     CONSTITUTIONAL: well , well nourished, appears age appropriate  EYES: perrla, eom intact  ENMT:moist mucous membranes, pharynx clear  NECK: supple. Thyroid normal  RESPIRATORY: Chest: clear to ascultation and percussion   CARDIOVASCULAR: Heart: regular rate and rhythm  GASTROINTESTINAL: Abdomen: soft, bowel sounds active  HEMATOLOGIC: no pathological lymph nodes palpated  MUSCULOSKELETAL: Extremities: no edema, pulse 1+   INTEGUMENT: No unusual rashes or suspicious skin lesions noted. Nails appear normal.  NEUROLOGIC: non-focal exam   MENTAL STATUS: alert and oriented, appropriate affect      ASSESSMENT:  1. Uncontrolled type 2 diabetes mellitus with complication, with long-term current use of insulin ([address]    2. 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 Diabetic polyneuropathy associated with type 2 diabetes mellitus ([address]    4. Dyslipidemia    5. Mitral valve prolapse    6. Physical exam      Diagnoses and all orders for this visit:    1. Uncontrolled type 2 diabetes mellitus with complication, with long-term current use of insulin (HCC)  Assessment & Plan:  Uncontrolled, based on history, physical exam and review of pertinent labs, studies and medications; meds reconciled; lifestyle modifications recommended, medication compliance emphasized.   Key Antihyperglycemic Medications             metFORMIN (GLUCOPHAGE) 1,000 mg tablet  (Taking) Take 1 Tab by mouth two (2) times daily (with meals). glipiZIDE (GLUCOTROL) 10 mg tablet  (Taking) Take 1 Tab by mouth two (2) times a day. insulin detemir (LEVEMIR FLEXTOUCH) 100 unit/mL (3 mL) inpn  (Taking) 30 Units by SubCUTAneous route nightly. Other Key Diabetic Medications             atorvastatin (LIPITOR) 20 mg tablet  (Taking) Take 1 Tab by mouth daily. Lab Results   Component Value Date/Time    Hemoglobin A1c >15.5 01/18/2018 12:38 PM    Glucose 348 01/18/2018 12:38 PM    Creatinine 0.94 01/18/2018 12:38 PM    Microalb/Creat ratio (ug/mg creat.) 80.9 01/18/2018 12:38 PM    Cholesterol, total 192 01/18/2018 12:38 PM    HDL Cholesterol 63 01/18/2018 12:38 PM    LDL, calculated 106 01/18/2018 12:38 PM    Triglyceride 117 01/18/2018 12:38 PM     Diabetic Foot and Eye Exam HM Status   Topic Date Due    Diabetic Foot Care  03/17/2016    Eye Exam  06/02/2016         2. 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 [facility])  Assessment & Plan: This condition is managed by Specialist.  Key Antihyperglycemic Medications             metFORMIN (GLUCOPHAGE) 1,000 mg tablet  (Taking) Take 1 Tab by mouth two (2) times daily (with meals). glipiZIDE (GLUCOTROL) 10 mg tablet  (Taking) Take 1 Tab by mouth two (2) times a day. insulin detemir (LEVEMIR FLEXTOUCH) 100 unit/mL (3 mL) inpn  (Taking) 30 Units by SubCUTAneous route nightly. Other Key Diabetic Medications             atorvastatin (LIPITOR) 20 mg tablet  (Taking) Take 1 Tab by mouth daily.         Lab Results   Component Value Date/Time    Hemoglobin A1c >15.5 01/18/2018 12:38 PM    Glucose 348 01/18/2018 12:38 PM    Creatinine 0.94 01/18/2018 12:38 PM    Microalb/Creat ratio (ug/mg creat.) 80.9 01/18/2018 12:38 PM    Cholesterol, total 192 01/18/2018 12:38 PM    HDL Cholesterol 63 01/18/2018 12:38 PM    LDL, calculated 106 01/18/2018 12:38 PM    Triglyceride 117 01/18/2018 12:38 PM     Diabetic Foot and Eye Exam HM Status   Topic Date Due    Diabetic Foot Care  03/17/2016    Eye Exam  06/02/2016         3. Diabetic polyneuropathy associated with type 2 diabetes mellitus ([address]    4. Dyslipidemia    5. Mitral valve prolapse    6. Physical exam    Other orders  -     metFORMIN (GLUCOPHAGE) 1,000 mg tablet; Take 1 Tab by mouth two (2) times daily (with meals). -     atorvastatin (LIPITOR) 20 mg tablet; Take 1 Tab by mouth daily.  -     glipiZIDE (GLUCOTROL) 10 mg tablet; Take 1 Tab by mouth two (2) times a day. PLAN:    1. His diabetes is totally out of control. He increased his Levemir from 30 units to 50 units at night and 10 units every morning. I will attempt to get his blood sugars down to a reasonable range before adding a GLP1 agonist. This would be better than prandial insulin because of his erratic eating habits. This would also be safer for now. Also, informed the patient that he has to really change his behavior. Unless he changes, he is going to get worse as far as his microvascular complications are concerned as well as macrovascular complications. He has to call us at least twice a week with his blood sugars. I reminded him of the importance of eating at the same time every day as well as following up consisently which he has not done to allow adjustment in medication. He will, therefore, return to the office in two weeks and I will see if he keeps this appointment. 2. He will continue statin as prescribed. Efficacy and compliance will be assessed. 3. For his diabetic retinopathy, he will follow up with his ophthalmologist.  4. His diabetic polyneuropathy is mild at this point, but this means he has two microvascular complications. 5. He does have a history of mitral valve prolapse which has been asymptomatic. The significance of this was again reiterated to the patient. MedDATA/gwo     .   Orders Placed This Encounter    metFORMIN (GLUCOPHAGE) 1,000 mg tablet    atorvastatin (LIPITOR) 20 mg tablet  glipiZIDE (GLUCOTROL) 10 mg tablet         Follow-up Disposition:  Return in about 2 weeks (around 3/1/2018).       Michaela Muller MD

## 2018-02-20 ENCOUNTER — TELEPHONE (OUTPATIENT)
Dept: INTERNAL MEDICINE CLINIC | Age: 63
End: 2018-02-20

## 2018-02-26 NOTE — TELEPHONE ENCOUNTER
Patient called in bs readings  2/21 2/22 2/23                  75             81  219         280          270  194         370                   Taking 30 units levemir qam and 40 units qpm. Per Dr. Vania Cowden patient to increase levemir to 40 unit ac breakfast and dinner. Patient to also start humalog 6 units ac lunch and dinner. Call bs every three days.

## 2018-02-28 RX ORDER — INSULIN LISPRO 100 [IU]/ML
INJECTION, SOLUTION INTRAVENOUS; SUBCUTANEOUS
Qty: 1 PACKAGE | Refills: 11 | Status: ON HOLD | OUTPATIENT
Start: 2018-02-28 | End: 2018-11-22 | Stop reason: SDUPTHER

## 2018-03-01 RX ORDER — PEN NEEDLE, DIABETIC 30 GX3/16"
NEEDLE, DISPOSABLE MISCELLANEOUS
Qty: 100 PEN NEEDLE | Refills: 11 | Status: SHIPPED | OUTPATIENT
Start: 2018-03-01 | End: 2018-11-22

## 2018-03-09 ENCOUNTER — OFFICE VISIT (OUTPATIENT)
Dept: INTERNAL MEDICINE CLINIC | Age: 63
End: 2018-03-09

## 2018-03-09 ENCOUNTER — TELEPHONE (OUTPATIENT)
Dept: INTERNAL MEDICINE CLINIC | Age: 63
End: 2018-03-09

## 2018-03-09 VITALS
TEMPERATURE: 97.6 F | HEIGHT: 67 IN | RESPIRATION RATE: 16 BRPM | SYSTOLIC BLOOD PRESSURE: 130 MMHG | WEIGHT: 162.2 LBS | HEART RATE: 71 BPM | OXYGEN SATURATION: 94 % | DIASTOLIC BLOOD PRESSURE: 82 MMHG | BODY MASS INDEX: 25.46 KG/M2

## 2018-03-09 DIAGNOSIS — E78.5 DYSLIPIDEMIA: ICD-10-CM

## 2018-03-09 DIAGNOSIS — E13.319 RETINOPATHY DUE TO SECONDARY DIABETES MELLITUS (HCC): ICD-10-CM

## 2018-03-09 NOTE — MR AVS SNAPSHOT
[address] [facility] [place] [address] [id number] 
[phone] Patient: Britta aBugh MRN: [id number] CZC:[id number] Visit Information Date & Time Provider Department Dept. Phone Encounter #  
 3/9/2018  9:15 AM [facility] [id number] [id number] Upcoming Health Maintenance Date Due Hepatitis C Screening 1955 FOOT EXAM Q1 4/9/2018* EYE EXAM RETINAL OR DILATED Q1 4/9/2018* HEMOGLOBIN A1C Q6M 7/18/2018 MICROALBUMIN Q1 1/18/2019 LIPID PANEL Q1 1/18/2019 COLONOSCOPY 1/2/2025 DTaP/Tdap/Td series (2 - Td) 2/15/2028 *Topic was postponed. The date shown is not the original due date. Allergies as of 3/9/2018  Review Complete On: 3/9/2018 By: Anni Doran No Known Allergies Current Immunizations  Never Reviewed No immunizations on file. Not reviewed this visit You Were Diagnosed With   
  
 Codes Comments Dyslipidemia    -  Primary ICD-10-CM: E78.5 ICD-9-CM: 272.4 Uncontrolled type 2 diabetes mellitus with complication, with long-term current use of insulin (HCC)     ICD-10-CM: E11.8, E11.65, Z79.4 ICD-9-CM: 250.82, V58.67 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 ([facility]     ICD-10-CM: P55.990 ICD-9-CM: 249.50, 362.01 Vitals BP Pulse Temp Resp Height(growth percentile) Weight(growth percentile) 130/82 (BP 1 Location: Right arm, BP Patient Position: Sitting) 71 97.6 °F (36.4 °C) (Oral) 16 5' 7\" (1.702 m) 162 lb 3.2 oz (73.6 kg) SpO2 BMI Smoking Status 94% 25.4 kg/m2 Never Smoker Vitals History BMI and BSA Data Body Mass Index Body Surface Area  
 25.4 kg/m 2 1.87 m 2 Preferred Pharmacy Pharmacy Name Phone [facility] [id number] [facility] [phone] Your Updated Medication List  
  
   
 This list is accurate as of 3/9/18 10:57 AM.  Always use your most recent med list.  
  
  
  
  
 atorvastatin 20 mg tablet Commonly known as:  LIPITOR Take 1 Tab by mouth daily. clotrimazole-betamethasone topical cream  
Commonly known as:  Bridget Slay Apply  to affected area two (2) times a day. GAVILYTE-N 420 gram solution Generic drug:  peg-electrolyte soln  
  
 glipiZIDE 10 mg tablet Commonly known as:  Latrelle Lose Take 1 Tab by mouth two (2) times a day. insulin detemir U-100 100 unit/mL (3 mL) Inpn Commonly known as:  LEVEMIR FLEXTOUCH U-100 INSULN  
30 Units by SubCUTAneous route nightly. insulin lispro 100 unit/mL kwikpen Commonly known as:  HUMALOG Inject 6 units before lunch and dinner Insulin Needles (Disposable) 31 gauge x 5/16\" Ndle USE TO INJECT INSULIN UP TO THREE TIMES A DAY AS DIRECTED BY DOCTOR Liraglutide 0.6 mg/0.1 mL (18 mg/3 mL) Pnij Commonly known as:  VICTOZA  
1.8 mg by SubCUTAneous route daily. LITE TOUCH LANCETS 33 gauge Misc Generic drug:  lancets LITE TOUCH LANCING DEVICE Misc Generic drug:  Lancing Device  
  
 metFORMIN 1,000 mg tablet Commonly known as:  GLUCOPHAGE Take 1 Tab by mouth two (2) times daily (with meals). SOLUS V2 TEST STRIPS strip Generic drug:  glucose blood VI test strips Prescriptions Sent to Pharmacy Refills Liraglutide (VICTOZA) 0.6 mg/0.1 mL (18 mg/3 mL) pnij 11 Si.8 mg by SubCUTAneous route daily. Class: Normal  
 Pharmacy: [facility] [id number] [facility] #: [phone] Route: SubCUTAneous We Performed the Following FRUCTOSAMINE [79992 CPT(R)] Please provide this summary of care documentation to your next provider. Your primary care clinician is listed as Nathaly Armando. If you have any questions after today's visit, please call [phone].

## 2018-03-09 NOTE — TELEPHONE ENCOUNTER
Patient called in his BS radings  3/2      3/3       3/4      3/5        3/6      3/7               142    105       65       72         68       76  190  202       210     230       200  150   187     194        79       175  235   270     204      275       285    Taking levemir 40 units qam and qhs. humalog 6 units ac lunch and dinner . Per  patient to increase humalog to 10 units ac dinner. Call in 3 more days.

## 2018-03-09 NOTE — PROGRESS NOTES
Chief Complaint   Patient presents with    Diabetes     2 week follow up      1. Have you been to the ER, urgent care clinic since your last visit? Hospitalized since your last visit? No    2. Have you seen or consulted any other health care providers outside of the [address] [place] [address] since your last visit? Include any pap smears or colon screening.  No

## 2018-03-10 LAB — FRUCTOSAMINE SERPL-SCNC: 376 UMOL/L (ref 0–285)

## 2018-03-10 NOTE — PROGRESS NOTES
Chief Complaint   Patient presents with    Diabetes     2 week follow up    . SUBECTIVE:    Kimmy Borges is a 57 y.o. male He comes in for a return visit stating that he has done better. He is following all my directions, which is great. This is the first time he has been consistent. He is taking his Levemir, which I divided up into 40 and 40 because of his elevations in his blood sugar in the daytime. He is eating pretty much at the same time every day, specifically breakfast 9:00, the second meal being at 1:00 and the third meal being at 4:00 and another one at 9:00. 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 Fortunately, he has not had any interventional hypoglycemia. Looking at the pattern of his labs, his a.c. lunch sugars are consistently elevated. I have him now on prandial insulin for his one p.m. snack and his dinner, which is at 4:00 p.m. He has significant microvascular complications consisting of proliferative diabetic retinopathy, as well as neuropathy. Current Outpatient Prescriptions   Medication Sig Dispense Refill    Liraglutide (VICTOZA) 0.6 mg/0.1 mL (18 mg/3 mL) pnij 1.8 mg by SubCUTAneous route daily. 3 Adjustable Dose Pre-filled Pen Syringe 11    Insulin Needles, Disposable, 31 gauge x 5/16\" ndle USE TO INJECT INSULIN UP TO THREE TIMES A DAY AS DIRECTED BY DOCTOR 100 Pen Needle 11    insulin lispro (HUMALOG) 100 unit/mL kwikpen Inject 6 units before lunch and dinner (Patient taking differently: Inject 6 units before lunch and 10 units at dinner) 1 Package 11    metFORMIN (GLUCOPHAGE) 1,000 mg tablet Take 1 Tab by mouth two (2) times daily (with meals). 60 Tab 10    atorvastatin (LIPITOR) 20 mg tablet Take 1 Tab by mouth daily. 30 Tab 10    glipiZIDE (GLUCOTROL) 10 mg tablet Take 1 Tab by mouth two (2) times a day. 120 Tab 10    insulin detemir (LEVEMIR FLEXTOUCH) 100 unit/mL (3 mL) inpn 30 Units by SubCUTAneous route nightly.  (Patient taking differently: 40 Units by SubCUTAneous route two (2) times a day. Inject 40 units in the morning and 40 units at bedtime.) 3 Pen 11    clotrimazole-betamethasone (LOTRISONE) topical cream Apply  to affected area two (2) times a day.  45 g 11    SOLUS V2 TEST STRIPS strip       LITE TOUCH LANCETS 33 gauge misc       LITE TOUCH LANCING DEVICE misc       GAVILYTE-N 420 gram solution        Past Medical History:   Diagnosis Date    Diabetes ([address]     Hypertension      Past Surgical History:   Procedure Laterality Date    COLONOSCOPY  1/2/2015         HX HEENT      l cararact removal     No Known Allergies    REVIEW OF SYSTEMS:  Review of Systems - Negative except   ENT ROS: negative for - headaches, hearing change, nasal congestion, oral lesions, tinnitus, visual changes or   Respiratory ROS: no cough, shortness of breath, or wheezing  Cardiovascular ROS: no chest pain or dyspnea on exertion  Gastrointestinal ROS: no abdominal pain, change in bowel habits, or black or blood  Genito-Urinary ROS: no dysuria, trouble voiding, or hematuria  Musculoskeletal ROS: negative  Neurological ROS: no TIA or stroke symptoms      Social History     Social History    Marital status: SINGLE     Spouse name: N/A    Number of children: 0    Years of education: N/A     Occupational History    [employer]      Social History Main Topics    Smoking status: Never Smoker    Smokeless tobacco: Never Used    Alcohol use No    Drug use: No    Sexual activity: Yes     Partners: Female     Birth control/ protection: None     Other Topics Concern    None     Social History Narrative   r  Family History   Problem Relation Age of Onset    Diabetes Mother     Hypertension Mother        OBJECTIVE:  Visit Vitals    /82 (BP 1 Location: Right arm, BP Patient Position: Sitting)    Pulse 71    Temp 97.6 °F (36.4 °C) (Oral)    Resp 16    Ht 5' 7\" (1.702 m)    Wt 162 lb 3.2 oz (73.6 kg)    SpO2 94%    BMI 25.4 kg/m2     ENT: perrla,  eom intact  NECK: supple. Thyroid normal, no JVD  CHEST: clear to ascultation and percussion   HEART: regular rate and rhythm  ABD: soft, bowel sounds active,   EXTREMITIES: no edema, pulse 1+  INTEGUMENT: clear      ASSESSMENT:  1. Uncontrolled type 2 diabetes mellitus with complication, with long-term current use of insulin ([facility]    2. Dyslipidemia    3. 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 ([facility]        PLAN:  1. The most practical approach now should be the following:  He will reduce his h.s. dose of insulin, because his sugars are often times low in the morning, to 30 units, which is Levemir. He will continue the 40 units every morning. I will add Victoza. He will discontinue his Glipizide and continue his Metformin, as well as his prandial insulin for now. I will resume the prandial insulin depending on his response or lack of with the GLP1 agonist.    2. He remains on his statin in view of his increased cardiovascular risks rated by his age, as well as his longstanding poorly controlled diabetes. 3. He will follow up with his ophthalmologist.  He is getting laser treatments currently. Addendum:    Blood sugars will be called in twice a week, so adjustments can indeed be made. .  Orders Placed This Encounter    FRUCTOSAMINE    Liraglutide (VICTOZA) 0.6 mg/0.1 mL (18 mg/3 mL) pnij       Follow-up Disposition:  Return in about 4 weeks (around 4/6/2018).       Ynes Bonilla MD

## 2018-03-16 ENCOUNTER — TELEPHONE (OUTPATIENT)
Dept: INTERNAL MEDICINE CLINIC | Age: 63
End: 2018-03-16

## 2018-03-16 RX ORDER — PEN NEEDLE, DIABETIC 30 GX3/16"
NEEDLE, DISPOSABLE MISCELLANEOUS
Qty: 100 PEN NEEDLE | Refills: 11 | OUTPATIENT
Start: 2018-03-16

## 2018-03-26 ENCOUNTER — TELEPHONE (OUTPATIENT)
Dept: INTERNAL MEDICINE CLINIC | Age: 63
End: 2018-03-26

## 2018-03-26 NOTE — TELEPHONE ENCOUNTER
Patient called in BS readings  3/15     3/16      3/17     3/18      3/19   3/20      3/21          78        71        68         71         78       81        87  185       193       170     191      197     193        172  212       218       216     230       214    220       211  280       290       270     243      238     269     263    2/22      3/23  91           83 patient taking levemir 30 uqam and 24 qpm  187              6 unit humalog ac lunch and dinner and his   [facility]               Insurance will cover trulicity. Patient to   247               Increase dinner insulin to 12 units.

## 2018-03-28 RX ORDER — INSULIN PUMP SYRINGE, 3 ML
EACH MISCELLANEOUS
Qty: 1 KIT | Refills: 0 | Status: ON HOLD | OUTPATIENT
Start: 2018-03-28 | End: 2019-03-12

## 2018-03-29 ENCOUNTER — TELEPHONE (OUTPATIENT)
Dept: INTERNAL MEDICINE CLINIC | Age: 63
End: 2018-03-29

## 2018-03-29 NOTE — TELEPHONE ENCOUNTER
Patient called in [address] [place] [address]  3/24     3/25      3/26       3/27      3/28  81          76        73           70        70  170      176       169         146     147  207      219       198         189      163  240      247       163         195 ----------patient started new rx of trulicity, he held his levemir, took the humalog 6 ac lunch and 12 ac dinner. He also held the metformin and glipizide.

## 2018-04-06 ENCOUNTER — OFFICE VISIT (OUTPATIENT)
Dept: INTERNAL MEDICINE CLINIC | Age: 63
End: 2018-04-06

## 2018-04-06 VITALS
TEMPERATURE: 97.6 F | HEART RATE: 69 BPM | HEIGHT: 67 IN | RESPIRATION RATE: 14 BRPM | WEIGHT: 158.2 LBS | SYSTOLIC BLOOD PRESSURE: 118 MMHG | BODY MASS INDEX: 24.83 KG/M2 | OXYGEN SATURATION: 96 % | DIASTOLIC BLOOD PRESSURE: 80 MMHG

## 2018-04-06 DIAGNOSIS — E11.42 TYPE 2 DIABETES MELLITUS WITH DIABETIC POLYNEUROPATHY, WITH LONG-TERM CURRENT USE OF INSULIN (HCC): Primary | ICD-10-CM

## 2018-04-06 DIAGNOSIS — Z79.4 TYPE 2 DIABETES MELLITUS WITH DIABETIC POLYNEUROPATHY, WITH LONG-TERM CURRENT USE OF INSULIN (HCC): Primary | ICD-10-CM

## 2018-04-06 DIAGNOSIS — E78.5 DYSLIPIDEMIA: ICD-10-CM

## 2018-04-06 NOTE — MR AVS SNAPSHOT
[address] [place] [address]. [place] [address] [address] 
[phone] Patient: Liseth Mistry MRN: [id number]:[id number] Visit Information Date & Time Provider Department Dept. Phone Encounter #  
 4/6/2018  9:15 AM Jocelyn Okeefe MD [facility] Sports Medicine and [place] [id number] Upcoming Health Maintenance Date Due Hepatitis C Screening 1955 FOOT EXAM Q1 4/9/2018* HEMOGLOBIN A1C Q6M 7/18/2018 MICROALBUMIN Q1 1/18/2019 LIPID PANEL Q1 1/18/2019 EYE EXAM RETINAL OR DILATED Q1 2/14/2019 COLONOSCOPY 1/2/2025 DTaP/Tdap/Td series (2 - Td) 2/15/2028 *Topic was postponed. The date shown is not the original due date. Allergies as of 4/6/2018  Review Complete On: 4/6/2018 By: Lulú George No Known Allergies Current Immunizations  Never Reviewed No immunizations on file. Not reviewed this visit Vitals BP Pulse Temp Resp Height(growth percentile) Weight(growth percentile) 118/80 (BP 1 Location: Left arm, BP Patient Position: Sitting) 69 97.6 °F (36.4 °C) (Oral) 14 5' 7\" (1.702 m) 158 lb 3.2 oz (71.8 kg) SpO2 BMI Smoking Status 96% 24.78 kg/m2 Never Smoker BMI and BSA Data Body Mass Index Body Surface Area 24.78 kg/m 2 1.84 m 2 Preferred Pharmacy Pharmacy Name Phone [address] [phone] Your Updated Medication List  
  
   
This list is accurate as of 4/6/18 10:06 AM.  Always use your most recent med list.  
  
  
  
  
 atorvastatin 20 mg tablet Commonly known as:  LIPITOR Take 1 Tab by mouth daily. Blood-Glucose Meter monitoring kit Commonly known as:  Ynes Horton IQ METER Use to test blood sugar three times daily  
  
 clotrimazole-betamethasone topical cream  
Commonly known as:  Ibrahima [place] Apply  to affected area two (2) times a day. dulaglutide 1.5 mg/0.5 mL sub-q pen Commonly known as:  TRULICITY  
0.5 mL by SubCUTAneous route every seven (7) days. GAVILYTE-N 420 gram solution Generic drug:  peg-electrolyte soln  
  
 glipiZIDE 10 mg tablet Commonly known as:  Aldean Roberto Take 1 Tab by mouth two (2) times a day. insulin detemir U-100 100 unit/mL (3 mL) Inpn Commonly known as:  LEVEMIR FLEXTOUCH U-100 INSULN  
30 Units by SubCUTAneous route nightly. insulin lispro 100 unit/mL kwikpen Commonly known as:  HUMALOG Inject 6 units before lunch and dinner Insulin Needles (Disposable) 31 gauge x 5/16\" Ndle USE TO INJECT INSULIN UP TO THREE TIMES A DAY AS DIRECTED BY DOCTOR  
  
 LITE TOUCH LANCETS 35 gauge Misc Generic drug:  lancets LITE TOUCH LANCING DEVICE Misc Generic drug:  Lancing Device  
  
 metFORMIN 1,000 mg tablet Commonly known as:  GLUCOPHAGE Take 1 Tab by mouth two (2) times daily (with meals). * SOLUS V2 TEST STRIPS strip Generic drug:  glucose blood VI test strips * glucose blood VI test strips strip Commonly known as:  Lorna Matthews Use to test blood sugar four times daily. Dx.e11.9 * Notice: This list has 2 medication(s) that are the same as other medications prescribed for you. Read the directions carefully, and ask your doctor or other care provider to review them with you. Please provide this summary of care documentation to your next provider. Your primary care clinician is listed as Nathaly Armando. If you have any questions after today's visit, please call [phone].

## 2018-04-06 NOTE — PROGRESS NOTES
Chief Complaint   Patient presents with    Diabetes     1 month follow up      1. Have you been to the ER, urgent care clinic since your last visit? Hospitalized since your last visit? No    2. Have you seen or consulted any other health care providers outside of the [address] [place] [address] since your last visit? Include any pap smears or colon screening.  No

## 2018-04-06 NOTE — PROGRESS NOTES
Chief Complaint   Patient presents with    Diabetes     1 month follow up    . SUBECTIVE:    Mike Martines is a 57 y.o. male He comes in for a return visit stating that his blood sugars are better. He started Trulicity and he assumed that I did not want him to take the basal insulin, which is not true. He therefore has not taken it in the last week. He has been taking his Prandial insulin twice a day before lunch and dinner. He has not had any symptomatic hypoglycemia. He has a past history of dyslipidemia. Current Outpatient Prescriptions   Medication Sig Dispense Refill    Blood-Glucose Meter (ONETOUCH VERIO IQ METER) monitoring kit Use to test blood sugar three times daily 1 Kit 0    dulaglutide (TRULICITY) 1.5 VI/6.2 mL sub-q pen 0.5 mL by SubCUTAneous route every seven (7) days. 12 Pen 3    glucose blood VI test strips (ONETOUCH VERIO) strip Use to test blood sugar four times daily. Dx.e11.9 360 Strip 3    Insulin Needles, Disposable, 31 gauge x 5/16\" ndle USE TO INJECT INSULIN UP TO THREE TIMES A DAY AS DIRECTED BY DOCTOR 100 Pen Needle 11    insulin lispro (HUMALOG) 100 unit/mL kwikpen Inject 6 units before lunch and dinner (Patient taking differently: Inject 6 units before lunch and 12 units at dinner) 1 Package 11    metFORMIN (GLUCOPHAGE) 1,000 mg tablet Take 1 Tab by mouth two (2) times daily (with meals). 60 Tab 10    atorvastatin (LIPITOR) 20 mg tablet Take 1 Tab by mouth daily. 30 Tab 10    glipiZIDE (GLUCOTROL) 10 mg tablet Take 1 Tab by mouth two (2) times a day. 120 Tab 10    clotrimazole-betamethasone (LOTRISONE) topical cream Apply  to affected area two (2) times a day. 45 g 11    SOLUS V2 TEST STRIPS strip       LITE TOUCH LANCETS 33 gauge misc       LITE TOUCH LANCING DEVICE [facility]       GAVILYTE-N 420 gram solution       insulin detemir (LEVEMIR FLEXTOUCH) 100 unit/mL (3 mL) inpn 30 Units by SubCUTAneous route nightly.  (Patient taking differently: 40 Units by SubCUTAneous route two (2) times a day. Inject 40 units in the morning and 40 units at bedtime.) 3 Pen 11     Past Medical History:   Diagnosis Date    Diabetes ([facility]     Hypertension      Past Surgical History:   Procedure Laterality Date    COLONOSCOPY  1/2/2015         HX HEENT      l cararact removal     No Known Allergies    REVIEW OF SYSTEMS:  Review of Systems - Negative except   ENT ROS: negative for - headaches, hearing change, nasal congestion, oral lesions, tinnitus, visual changes or   Respiratory ROS: no cough, shortness of breath, or wheezing  Cardiovascular ROS: no chest pain or dyspnea on exertion  Gastrointestinal ROS: no abdominal pain, change in bowel habits, or black or blood  Genito-Urinary ROS: no dysuria, trouble voiding, or hematuria  Musculoskeletal ROS: negative  Neurological ROS: no TIA or stroke symptoms      Social History     Social History    Marital status: SINGLE     Spouse name: N/A    Number of children: 0    Years of education: N/A     Occupational History    [employer]      Social History Main Topics    Smoking status: Never Smoker    Smokeless tobacco: Never Used    Alcohol use No    Drug use: No    Sexual activity: Yes     Partners: Female     Birth control/ protection: None     Other Topics Concern    None     Social History Narrative   r  Family History   Problem Relation Age of Onset    Diabetes Mother     Hypertension Mother        OBJECTIVE:  Visit Vitals    /80 (BP 1 Location: Left arm, BP Patient Position: Sitting)    Pulse 69    Temp 97.6 °F (36.4 °C) (Oral)    Resp 14    Ht 5' 7\" (1.702 m)    Wt 158 lb 3.2 oz (71.8 kg)    SpO2 96%    BMI 24.78 kg/m2     ENT: perrla,  eom intact  NECK: supple. Thyroid normal, no JVD  CHEST: clear to ascultation and percussion   HEART: regular rate and rhythm  ABD: soft, bowel sounds active,   EXTREMITIES: no edema, pulse 1+  INTEGUMENT: clear      ASSESSMENT:  1.  Type 2 diabetes mellitus with diabetic polyneuropathy, with long-term current use of insulin ([address]    2. Dyslipidemia        PLAN:    1. The patient is told to resume his basal insulin. He will take Levemir 10 units every morning and 20 units in the evening. He will continue his prandial insulin and Trulicity for now. He will need to discontinue the Glipizide, but continue the Metformin. 2. He remains on a statin in view of his increased cardiovascular risks. 3. Diabetic complications to date include peripheral neuropathy, which is reasonably stable. 4. He will return to the office in two weeks. The reason I am seeing him this frequently is because fine tuning has to now occur with his diabetes. Follow-up Disposition:  Return in about 2 weeks (around 4/20/2018).       Rimma Walters MD

## 2018-04-09 ENCOUNTER — TELEPHONE (OUTPATIENT)
Dept: INTERNAL MEDICINE CLINIC | Age: 63
End: 2018-04-09

## 2018-04-12 RX ORDER — FENOFIBRATE 145 MG/1
145 TABLET, COATED ORAL DAILY
Qty: 30 TAB | Refills: 11 | Status: SHIPPED | OUTPATIENT
Start: 2018-04-12 | End: 2018-04-20 | Stop reason: SDUPTHER

## 2018-04-20 ENCOUNTER — OFFICE VISIT (OUTPATIENT)
Dept: INTERNAL MEDICINE CLINIC | Age: 63
End: 2018-04-20

## 2018-04-20 VITALS
DIASTOLIC BLOOD PRESSURE: 83 MMHG | BODY MASS INDEX: 25.18 KG/M2 | SYSTOLIC BLOOD PRESSURE: 133 MMHG | RESPIRATION RATE: 16 BRPM | OXYGEN SATURATION: 97 % | HEART RATE: 68 BPM | HEIGHT: 67 IN | WEIGHT: 160.4 LBS | TEMPERATURE: 97.8 F

## 2018-04-20 DIAGNOSIS — R09.89 FEMORAL BRUIT: ICD-10-CM

## 2018-04-20 DIAGNOSIS — E13.319 RETINOPATHY DUE TO SECONDARY DIABETES MELLITUS (HCC): ICD-10-CM

## 2018-04-20 DIAGNOSIS — E78.5 DYSLIPIDEMIA: ICD-10-CM

## 2018-04-20 NOTE — MR AVS SNAPSHOT
[facility] 
 
 
 [address] [address] 
[phone] Patient: Paul Porras MRN: [id number] Visit Information Date & Time Provider Department Dept. Phone Encounter #  
 4/20/2018  9:30 AM Jens Cedeño MD [facility] Sports Medicine and Primary Care [phone] Your Appointments 6/1/2018  9:30 AM  
Any with Jens Cedeño MD  
[address] [place] [address] and Primary Care [address] [place] [address]) Appt Note: Nina 605 FOLLOW UP  
 [address] [facility]  
  
   
 [address] [address] Upcoming Health Maintenance Date Due Hepatitis C Screening 1955 FOOT EXAM Q1 3/17/2016 HEMOGLOBIN A1C Q6M 7/18/2018 MICROALBUMIN Q1 1/18/2019 LIPID PANEL Q1 1/18/2019 EYE EXAM RETINAL OR DILATED Q1 2/14/2019 COLONOSCOPY 1/2/2025 DTaP/Tdap/Td series (2 - Td) 2/15/2028 Allergies as of 4/20/2018  Review Complete On: 4/20/2018 By: Ana Varela No Known Allergies Current Immunizations  Never Reviewed No immunizations on file. Not reviewed this visit You Were Diagnosed With   
  
 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 9 Vitals BP Pulse Temp Resp Height(growth percentile) Weight(growth percentile) 133/83 (BP 1 Location: Right arm, BP Patient Position: Sitting) 68 97.8 °F (36.6 °C) (Oral) 16 5' 7\" (1.702 m) 160 lb 6.4 oz (72.8 kg) SpO2 BMI Smoking Status 97% 25.12 kg/m2 Never Smoker Vitals History BMI and BSA Data Body Mass Index Body Surface Area  
 25.12 kg/m 2 1.86 m 2 Preferred Pharmacy Pharmacy Name Phone Brenda Harden [address] [place] [address] [address] [phone] Your Updated Medication List  
  
   
This list is accurate as of 4/20/18 11:35 AM.  Always use your most recent med list.  
  
  
  
  
 atorvastatin 20 mg tablet Commonly known as:  LIPITOR Take 1 Tab by mouth daily. Blood-Glucose Meter monitoring kit Commonly known as:  Pati Quiroz IQ METER Use to test blood sugar three times daily  
  
 clotrimazole-betamethasone topical cream  
Commonly known as:  Payal Maidens Apply  to affected area two (2) times a day. dulaglutide 1.5 mg/0.5 mL sub-q pen Commonly known as:  TRULICITY  
0.5 mL by SubCUTAneous route every seven (7) days. fenofibrate nanocrystallized 145 mg tablet Commonly known as:  [employer] Take 1 Tab by mouth daily. GAVILYTE-N 420 gram solution Generic drug:  peg-electrolyte soln  
  
 glipiZIDE 10 mg tablet Commonly known as:  Uriah Fuel Take 1 Tab by mouth two (2) times a day. insulin detemir U-100 100 unit/mL (3 mL) Inpn Commonly known as:  LEVEMIR FLEXTOUCH U-100 INSULN  
30 Units by SubCUTAneous route nightly. insulin lispro 100 unit/mL kwikpen Commonly known as:  HUMALOG Inject 6 units before lunch and dinner Insulin Needles (Disposable) 31 gauge x 5/16\" Ndle USE TO INJECT INSULIN UP TO THREE TIMES A DAY AS DIRECTED BY DOCTOR  
  
 LITE TOUCH LANCETS 35 gauge Misc Generic drug:  lancets LITE TOUCH LANCING DEVICE Misc Generic drug:  Lancing Device  
  
 metFORMIN 1,000 mg tablet Commonly known as:  GLUCOPHAGE Take 1 Tab by mouth two (2) times daily (with meals). * SOLUS V2 TEST STRIPS strip Generic drug:  glucose blood VI test strips * glucose blood VI test strips strip Commonly known as:  Pati Quiroz  
 Use to test blood sugar four times daily. Dx.e11.9 * Notice: This list has 2 medication(s) that are the same as other medications prescribed for you. Read the directions carefully, and ask your doctor or other care provider to review them with you. We Performed the Following APOLIPOPROTEIN B [id number] CPT(R)] HEMOGLOBIN A1C WITH EAG [51442 CPT(R)] Please provide this summary of care documentation to your next provider. Your primary care clinician is listed as Nathaly Armando. If you have any questions after today's visit, please call [phone].

## 2018-04-20 NOTE — PROGRESS NOTES
Chief Complaint   Patient presents with    Diabetes     2 week follow up      1. Have you been to the ER, urgent care clinic since your last visit? Hospitalized since your last visit? No    2. Have you seen or consulted any other health care providers outside of the [address] [place] [address] since your last visit? Include any pap smears or colon screening.  No

## 2018-04-21 LAB
APO B SERPL-MCNC: 74 MG/DL (ref 52–135)
EST. AVERAGE GLUCOSE BLD GHB EST-MCNC: 235 MG/DL
HBA1C MFR BLD: 9.8 % (ref 4.8–5.6)

## 2018-04-22 RX ORDER — PEN NEEDLE, DIABETIC 31 GX3/16"
NEEDLE, DISPOSABLE MISCELLANEOUS
Qty: 150 PEN NEEDLE | Refills: 11 | Status: ON HOLD | OUTPATIENT
Start: 2018-04-22 | End: 2019-03-12

## 2018-04-22 RX ORDER — FENOFIBRATE 145 MG/1
145 TABLET, COATED ORAL DAILY
Qty: 30 TAB | Refills: 11 | Status: SHIPPED | OUTPATIENT
Start: 2018-04-22 | End: 2018-12-21

## 2018-05-01 ENCOUNTER — TELEPHONE (OUTPATIENT)
Dept: INTERNAL MEDICINE CLINIC | Age: 63
End: 2018-05-01

## 2018-05-01 NOTE — TELEPHONE ENCOUNTER
nt notified by phone and ask to increase his levemir to 20 units qam and qhs per Dr. Iesha Valenzuela.

## 2018-06-01 ENCOUNTER — OFFICE VISIT (OUTPATIENT)
Dept: INTERNAL MEDICINE CLINIC | Age: 63
End: 2018-06-01

## 2018-06-01 VITALS
TEMPERATURE: 97.9 F | OXYGEN SATURATION: 96 % | BODY MASS INDEX: 25.21 KG/M2 | SYSTOLIC BLOOD PRESSURE: 119 MMHG | HEIGHT: 67 IN | DIASTOLIC BLOOD PRESSURE: 76 MMHG | HEART RATE: 69 BPM | WEIGHT: 160.6 LBS | RESPIRATION RATE: 16 BRPM

## 2018-06-01 DIAGNOSIS — Z79.4 TYPE 2 DIABETES MELLITUS WITH DIABETIC POLYNEUROPATHY, WITH LONG-TERM CURRENT USE OF INSULIN (HCC): Primary | ICD-10-CM

## 2018-06-01 DIAGNOSIS — E78.5 DYSLIPIDEMIA: ICD-10-CM

## 2018-06-01 DIAGNOSIS — E11.42 TYPE 2 DIABETES MELLITUS WITH DIABETIC POLYNEUROPATHY, WITH LONG-TERM CURRENT USE OF INSULIN (HCC): Primary | ICD-10-CM

## 2018-06-01 DIAGNOSIS — E13.319 RETINOPATHY DUE TO SECONDARY DIABETES MELLITUS (HCC): ICD-10-CM

## 2018-06-01 NOTE — PROGRESS NOTES
[facility] and Primary Care  [place] [address]  [address] [place] [address]  Phone:  [phone]  Fax: [phone]       Chief Complaint   Patient presents with    Diabetes     6 week follow up    . SUBJECTIVE:    Johnnie Beltran is a 57 y.o. male comes in for followup. He states his blood sugars have been doing quite well. Average fasting blood sugars in the 110-120 range. Before dinner, they are generally between 160 and 140. He has not had any interventional hypoglycemia. He also does not get hungry which is great. As a complication of his diabetes, he has polyneuropathy which is reasonably stable. Additional complication is proliferative diabetic retinopathy with macular edema. He is actively followed by ophthalmology. He continues with his statin in view of his increased cardiovascular risk. He remains quite active physically. Current Outpatient Prescriptions   Medication Sig Dispense Refill    fenofibrate nanocrystallized (TRICOR) 145 mg tablet Take 1 Tab by mouth daily. 30 Tab 11    Insulin Needles, Disposable, (PEN NEEDLE) 31 gauge x 3/16\" ndle 5 times a day 150 Pen Needle 11    Blood-Glucose Meter (ONETOUCH VERIO IQ METER) monitoring kit Use to test blood sugar three times daily 1 Kit 0    dulaglutide (TRULICITY) 1.5 EZ/8.7 mL sub-q pen 0.5 mL by SubCUTAneous route every seven (7) days. 12 Pen 3    glucose blood VI test strips (ONETOUCH VERIO) strip Use to test blood sugar four times daily. Dx.e11.9 360 Strip 3    Insulin Needles, Disposable, 31 gauge x 5/16\" ndle USE TO INJECT INSULIN UP TO THREE TIMES A DAY AS DIRECTED BY DOCTOR 100 Pen Needle 11    insulin lispro (HUMALOG) 100 unit/mL kwikpen Inject 6 units before lunch and dinner (Patient taking differently: Inject 6 units before lunch and 12 units at dinner) 1 Package 11    metFORMIN (GLUCOPHAGE) 1,000 mg tablet Take 1 Tab by mouth two (2) times daily (with meals).  60 Tab 10    atorvastatin (LIPITOR) 20 mg tablet Take 1 Tab by mouth daily. 30 Tab 10    glipiZIDE (GLUCOTROL) 10 mg tablet Take 1 Tab by mouth two (2) times a day. 120 Tab 10    insulin detemir (LEVEMIR FLEXTOUCH) 100 unit/mL (3 mL) inpn 30 Units by SubCUTAneous route nightly. (Patient taking differently: 40 Units by SubCUTAneous route two (2) times a day. Inject 20 units in the morning and 20 units at bedtime.) 3 Pen 11    clotrimazole-betamethasone (LOTRISONE) topical cream Apply  to affected area two (2) times a day.  45 g 11    SOLUS V2 TEST STRIPS strip       LITE TOUCH LANCETS 33 gauge misc       LITE TOUCH LANCING DEVICE [facility]       GAVILYTE-N 420 gram solution        Past Medical History:   Diagnosis Date    Diabetes ([address]     Hypertension      Past Surgical History:   Procedure Laterality Date    COLONOSCOPY  1/2/2015         HX HEENT      l cararact removal     No Known Allergies      REVIEW OF SYSTEMS:  General: negative for - chills or fever  ENT: negative for - headaches, nasal congestion or tinnitus  Respiratory: negative for - cough, hemoptysis, shortness of breath or wheezing  Cardiovascular : negative for - chest pain, edema, palpitations or shortness of breath  Gastrointestinal: negative for - abdominal pain, blood in stools, heartburn or nausea/vomiting  Genito-Urinary: no dysuria, trouble voiding, or hematuria  Musculoskeletal: negative for - gait disturbance, joint pain, joint stiffness or joint swelling  Neurological: no TIA or stroke symptoms  Hematologic: no bruises, no bleeding, no swollen glands  Integument: no lumps, mole changes, nail changes or rash  Endocrine: no malaise/lethargy or unexpected weight changes      Social History     Social History    Marital status: SINGLE     Spouse name: N/A    Number of children: 0    Years of education: N/A     Occupational History    [employer]      Social History Main Topics    Smoking status: Never Smoker    Smokeless tobacco: Never Used    Alcohol use No    Drug use: No    Sexual activity: Yes     Partners: Female     Birth control/ protection: None     Other Topics Concern    Not on file     Social History Narrative     Family History   Problem Relation Age of Onset    Diabetes Mother     Hypertension Mother        OBJECTIVE:    Visit Vitals    /76    Pulse 69    Temp 97.9 °F (36.6 °C) (Oral)    Resp 16    Ht 5' 7\" (1.702 m)    Wt 160 lb 9.6 oz (72.8 kg)    SpO2 96%    BMI 25.15 kg/m2     CONSTITUTIONAL: well , well nourished, appears age appropriate  EYES: perrla, eom intact  ENMT:moist mucous membranes, pharynx clear  NECK: supple. Thyroid normal  RESPIRATORY: Chest: clear to ascultation and percussion   CARDIOVASCULAR: Heart: regular rate and rhythm  GASTROINTESTINAL: Abdomen: soft, bowel sounds active  HEMATOLOGIC: no pathological lymph nodes palpated  MUSCULOSKELETAL: Extremities: no edema, pulse 1+   INTEGUMENT: No unusual rashes or suspicious skin lesions noted. Nails appear normal.  NEUROLOGIC: non-focal exam   MENTAL STATUS: alert and oriented, appropriate affect      ASSESSMENT:  1. Type 2 diabetes mellitus with diabetic polyneuropathy, with long-term current use of insulin ([address]    2. Uncontrolled type 2 diabetes mellitus with complication, with long-term current use of insulin ([address]    3. Dyslipidemia    4. 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 From a diabetic perspective, he remains on his current regimen of Levemir of 20 units twice a day q.a.m. and q.h.s. along with NovoLog 6 units before breakfast and 12 units before dinner and Trulicity once a week generally on Thursdays. I will check a fructosamine level. His last hemoglobin A1c was 9.8 representing a significant improvement as compared to previously. 2. His polyneuropathy is reasonably stable. 3. He continues to followup with his ophthalmologist and remains on fenofibrate.     4. He will continue his statin as prescribed in view of his increased cardiovascular risk. .  Orders Placed This Encounter    FRUCTOSAMINE         Follow-up Disposition:  Return in about 6 weeks (around 7/13/2018).       Iesha Hannah MD

## 2018-06-01 NOTE — MR AVS SNAPSHOT
[address] [place] [address] [address] [address] [address] 
[phone] Patient: Keshav Kay MRN: [id number]:[id number] Visit Information Date & Time Provider Department Dept. Phone Encounter #  
 6/1/2018  9:30 AM Katerine Lima MD [place] [facility] Sports Medicine and [facility] 34 [id number] Follow-up Instructions Return in about 6 weeks (around 7/13/2018). Upcoming Health Maintenance Date Due Hepatitis C Screening 1955 FOOT EXAM Q1 9/1/2018* Influenza Age 5 to Adult 8/1/2018 HEMOGLOBIN A1C Q6M 10/20/2018 MICROALBUMIN Q1 1/18/2019 LIPID PANEL Q1 1/18/2019 EYE EXAM RETINAL OR DILATED Q1 3/16/2019 COLONOSCOPY 1/2/2025 DTaP/Tdap/Td series (2 - Td) 2/15/2028 *Topic was postponed. The date shown is not the original due date. Allergies as of 6/1/2018  Review Complete On: 6/1/2018 By: Ambrocio Stoddard No Known Allergies Current Immunizations  Never Reviewed No immunizations on file. Not reviewed this visit You Were Diagnosed With   
  
 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  
  
   
This list is accurate as of 6/1/18 11:30 AM.  Always use your most recent med list.  
  
  
  
  
 atorvastatin 20 mg tablet Commonly known as:  LIPITOR Take 1 Tab by mouth daily. Blood-Glucose Meter monitoring kit Commonly known as:  Louise Abts IQ METER Use to test blood sugar three times daily  
  
 clotrimazole-betamethasone topical cream  
Commonly known as:  Karin Ronna Apply  to affected area two (2) times a day. dulaglutide 1.5 mg/0.5 mL sub-q pen Commonly known as:  TRULICITY  
0.5 mL by SubCUTAneous route every seven (7) days. fenofibrate nanocrystallized 145 mg tablet Commonly known as:  [employer] Take 1 Tab by mouth daily. GAVILYTE-N 420 gram solution Generic drug:  peg-electrolyte soln  
  
 glipiZIDE 10 mg tablet Commonly known as:  Lonnie Best Take 1 Tab by mouth two (2) times a day. insulin detemir U-100 100 unit/mL (3 mL) Inpn Commonly known as:  LEVEMIR FLEXTOUCH U-100 INSULN  
30 Units by SubCUTAneous route nightly. insulin lispro 100 unit/mL kwikpen Commonly known as:  HUMALOG Inject 6 units before lunch and dinner * Insulin Needles (Disposable) 31 gauge x 5/16\" Ndle USE TO INJECT INSULIN UP TO THREE TIMES A DAY AS DIRECTED BY DOCTOR * Insulin Needles (Disposable) 31 gauge x 3/16\" Ndle Commonly known as:  PEN NEEDLE  
5 times a day LITE TOUCH LANCETS 33 gauge Misc Generic drug:  lancets LITE TOUCH LANCING DEVICE Misc Generic drug:  Lancing Device  
  
 metFORMIN 1,000 mg tablet Commonly known as:  GLUCOPHAGE Take 1 Tab by mouth two (2) times daily (with meals). * SOLUS V2 TEST STRIPS strip Generic drug:  glucose blood VI test strips * glucose blood VI test strips strip Commonly known as:  Soila Greenfield Use to test blood sugar four times daily. Dx.e11.9 * Notice: This list has 4 medication(s) that are the same as other medications prescribed for you. Read the directions carefully, and ask your doctor or other care provider to review them with you. We Performed the Following FRUCTOSAMINE [76401 CPT(R)] Follow-up Instructions Return in about 6 weeks (around 7/13/2018). Please provide this summary of care documentation to your next provider. Your primary care clinician is listed as Nathaly Armando. If you have any questions after today's visit, please call [phone].

## 2018-06-01 NOTE — PROGRESS NOTES
Verified with patient insulin dosages for both Levemir and Humalog; made appropriate changes per patient . Chief Complaint   Patient presents with    Diabetes     6 week follow up      1. Have you been to the ER, urgent care clinic since your last visit? Hospitalized since your last visit? No    2. Have you seen or consulted any other health care providers outside of the [address] [place] [address] since your last visit? Include any pap smears or colon screening.  No

## 2018-06-02 LAB — FRUCTOSAMINE SERPL-SCNC: 348 UMOL/L (ref 0–285)

## 2018-06-04 ENCOUNTER — TELEPHONE (OUTPATIENT)
Dept: INTERNAL MEDICINE CLINIC | Age: 63
End: 2018-06-04

## 2018-07-24 ENCOUNTER — OFFICE VISIT (OUTPATIENT)
Dept: INTERNAL MEDICINE CLINIC | Age: 63
End: 2018-07-24

## 2018-07-24 VITALS
HEART RATE: 63 BPM | TEMPERATURE: 98.4 F | WEIGHT: 165.7 LBS | HEIGHT: 67 IN | DIASTOLIC BLOOD PRESSURE: 84 MMHG | RESPIRATION RATE: 16 BRPM | SYSTOLIC BLOOD PRESSURE: 135 MMHG | OXYGEN SATURATION: 95 % | BODY MASS INDEX: 26.01 KG/M2

## 2018-07-24 DIAGNOSIS — E78.5 DYSLIPIDEMIA: ICD-10-CM

## 2018-07-24 DIAGNOSIS — E13.319 RETINOPATHY DUE TO SECONDARY DIABETES MELLITUS (HCC): ICD-10-CM

## 2018-07-24 DIAGNOSIS — R09.89 FEMORAL BRUIT: ICD-10-CM

## 2018-07-24 NOTE — MR AVS SNAPSHOT
[address] [place] [address] 
 
 
 Son[address] Marks [address] [id number] 
[phone] Patient: Eliu Oconnor MRN: [id number] MIKAYLA:[id number] Visit Information Date & Time Provider Department Dept. Phone Encounter #  
 7/24/2018  4:30 PM Wilfred Ellington [facility] [phone] [id number] Your Appointments 8/31/2018  9:00 AM  
Any with Milton Dominguez MD  
[address] [place] [address] and Primary Care [facility]) Appt Note: 6 week follow up  
 Sonido Marks [address] 1 [facility]  
  
   
 Sonido Marks [address] [id number] Upcoming Health Maintenance Date Due Hepatitis C Screening 1955 FOOT EXAM Q1 9/1/2018* Influenza Age 5 to Adult 8/1/2018 HEMOGLOBIN A1C Q6M 10/20/2018 MICROALBUMIN Q1 1/18/2019 LIPID PANEL Q1 1/18/2019 EYE EXAM RETINAL OR DILATED Q1 3/16/2019 COLONOSCOPY 1/2/2025 DTaP/Tdap/Td series (2 - Td) 2/15/2028 *Topic was postponed. The date shown is not the original due date. Allergies as of 7/24/2018  Review Complete On: 7/24/2018 By: Althea Robin No Known Allergies Current Immunizations  Never Reviewed No immunizations on file. Not reviewed this visit You Were Diagnosed With   
  
 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 9 Vitals BP Pulse Temp Resp Height(growth percentile) Weight(growth percentile) 135/84 63 98.4 °F (36.9 °C) (Oral) 16 5' 7\" (1.702 m) 165 lb 11.2 oz (75.2 kg) SpO2 BMI Smoking Status 95% 25.95 kg/m2 Never Smoker Vitals History BMI and BSA Data Body Mass Index Body Surface Area  
 25.95 kg/m 2 1.89 m 2 Preferred Pharmacy Pharmacy Name Phone JAQUELINE KOCH [facility] [address] [address] [facility] [phone] Your Updated Medication List  
  
   
This list is accurate as of 7/24/18  5:52 PM.  Always use your most recent med list.  
  
  
  
  
 atorvastatin 20 mg tablet Commonly known as:  LIPITOR Take 1 Tab by mouth daily. Blood-Glucose Meter monitoring kit Commonly known as:  CiiNOWp IQ METER Use to test blood sugar three times daily  
  
 clotrimazole-betamethasone topical cream  
Commonly known as:  Alphonsa Spies Apply  to affected area two (2) times a day. dulaglutide 1.5 mg/0.5 mL sub-q pen Commonly known as:  TRULICITY  
0.5 mL by SubCUTAneous route every seven (7) days. fenofibrate nanocrystallized 145 mg tablet Commonly known as:  [employer] Take 1 Tab by mouth daily. GAVILYTE-N 420 gram solution Generic drug:  peg-electrolyte soln  
  
 glipiZIDE 10 mg tablet Commonly known as:  Gurney Cadogan Take 1 Tab by mouth two (2) times a day. insulin detemir U-100 100 unit/mL (3 mL) Inpn Commonly known as:  LEVEMIR FLEXTOUCH U-100 INSULN  
30 Units by SubCUTAneous route nightly. insulin lispro 100 unit/mL kwikpen Commonly known as:  HUMALOG Inject 6 units before lunch and dinner * Insulin Needles (Disposable) 31 gauge x 5/16\" Ndle USE TO INJECT INSULIN UP TO THREE TIMES A DAY AS DIRECTED BY DOCTOR * Insulin Needles (Disposable) 31 gauge x 3/16\" Ndle Commonly known as:  PEN NEEDLE  
5 times a day LITE TOUCH LANCETS 33 gauge Misc Generic drug:  lancets LITE TOUCH LANCING DEVICE Misc Generic drug:  Lancing Device  
  
 metFORMIN 1,000 mg tablet Commonly known as:  GLUCOPHAGE Take 1 Tab by mouth two (2) times daily (with meals). * SOLUS V2 TEST STRIPS strip Generic drug:  glucose blood VI test strips * glucose blood VI test strips strip Commonly known as:  Payton Sky Use to test blood sugar four times daily. Dx.e11.9 * Notice: This list has 4 medication(s) that are the same as other medications prescribed for you. Read the directions carefully, and ask your doctor or other care provider to review them with you. We Performed the Following HEMOGLOBIN A1C WITH EAG [18798 CPT(R)] Please provide this summary of care documentation to your next provider. Your primary care clinician is listed as Nathaly Armando. If you have any questions after today's visit, please call [phone].

## 2018-07-24 NOTE — PROGRESS NOTES
Chief Complaint   Patient presents with    Diabetes     6 week follow up      1. Have you been to the ER, urgent care clinic since your last visit? Hospitalized since your last visit? No    2. Have you seen or consulted any other health care providers outside of the [address] [place] [address] since your last visit? Include any pap smears or colon screening.  No

## 2018-07-26 LAB
EST. AVERAGE GLUCOSE BLD GHB EST-MCNC: 183 MG/DL
HBA1C MFR BLD: 8 % (ref 4.8–5.6)

## 2018-07-29 NOTE — PROGRESS NOTES
[address] [facility]  [place] [address]  [address] [place] [address]  Phone:  [phone]  Fax: [phone]       Chief Complaint   Patient presents with    Diabetes     6 week follow up    . SUBJECTIVE:    David Hendrickson is a 57 y.o. male   Comes in for a return visit stating that blood sugar is doing very well. The numbers he gives me are excellent. Hopefully these are representative of presumed diabetic control. He has not had any interventional hypoglycemia. He is currently taking his basal insulin which is Levemir 26 units qam breakfast and  20 units qhs along with prandial insulin 6 units ac breakfast and 12 units ac dinner. He remains on his GLP1 agonist also. He is actively following up with his ophthalmologist for his diabetic retinopathy for which he has macular edema and getting injections for this. He has not had any laser treatment of late. He is currently not taking a statin although he should be due to increased cardiovascular risk created by his longstanding poorly controlled diabetes complicated by insulin resistance. Additionally, he has a femoral bruit indicating plaquing. He remains quite active working on a regular basis at the [employer]. Current Outpatient Prescriptions   Medication Sig Dispense Refill    fenofibrate nanocrystallized (TRICOR) 145 mg tablet Take 1 Tab by mouth daily. 30 Tab 11    Insulin Needles, Disposable, (PEN NEEDLE) 31 gauge x 3/16\" ndle 5 times a day 150 Pen Needle 11    Blood-Glucose Meter (ONETOUCH VERIO IQ METER) monitoring kit Use to test blood sugar three times daily 1 Kit 0    dulaglutide (TRULICITY) 1.5 RM/4.0 mL sub-q pen 0.5 mL by SubCUTAneous route every seven (7) days. 12 Pen 3    glucose blood VI test strips (ONETOUCH VERIO) strip Use to test blood sugar four times daily.  Dx.e11.9 360 Strip 3    Insulin Needles, Disposable, 31 gauge x 5/16\" ndle USE TO INJECT INSULIN UP TO THREE TIMES A DAY AS DIRECTED BY DOCTOR 100 Pen Needle 11    insulin lispro (HUMALOG) 100 unit/mL kwikpen Inject 6 units before lunch and dinner (Patient taking differently: Inject 6 units before lunch and 12 units at dinner) 1 Package 11    metFORMIN (GLUCOPHAGE) 1,000 mg tablet Take 1 Tab by mouth two (2) times daily (with meals). 60 Tab 10    atorvastatin (LIPITOR) 20 mg tablet Take 1 Tab by mouth daily. 30 Tab 10    glipiZIDE (GLUCOTROL) 10 mg tablet Take 1 Tab by mouth two (2) times a day. 120 Tab 10    insulin detemir (LEVEMIR FLEXTOUCH) 100 unit/mL (3 mL) inpn 30 Units by SubCUTAneous route nightly. (Patient taking differently: 40 Units by SubCUTAneous route two (2) times a day. Inject 20 units in the morning and 20 units at bedtime.) 3 Pen 11    clotrimazole-betamethasone (LOTRISONE) topical cream Apply  to affected area two (2) times a day.  45 g 11    SOLUS V2 TEST STRIPS strip       LITE TOUCH LANCETS 33 gauge misc       LITE TOUCH LANCING DEVICE misc       GAVILYTE-N 420 gram solution        Past Medical History:   Diagnosis Date    Diabetes ([facility]     Hypertension      Past Surgical History:   Procedure Laterality Date    COLONOSCOPY  1/2/2015         HX HEENT      l cararact removal     No Known Allergies      REVIEW OF SYSTEMS:  General: negative for - chills or fever  ENT: negative for - headaches, nasal congestion or tinnitus  Respiratory: negative for - cough, hemoptysis, shortness of breath or wheezing  Cardiovascular : negative for - chest pain, edema, palpitations or shortness of breath  Gastrointestinal: negative for - abdominal pain, blood in stools, heartburn or nausea/vomiting  Genito-Urinary: no dysuria, trouble voiding, or hematuria  Musculoskeletal: negative for - gait disturbance, joint pain, joint stiffness or joint swelling  Neurological: no TIA or stroke symptoms  Hematologic: no bruises, no bleeding, no swollen glands  Integument: no lumps, mole changes, nail changes or rash  Endocrine: no malaise/lethargy or unexpected weight changes      Social History     Social History    Marital status: SINGLE     Spouse name: N/A    Number of children: 0    Years of education: N/A     Occupational History    [employer]      Social History Main Topics    Smoking status: Never Smoker    Smokeless tobacco: Never Used    Alcohol use No    Drug use: No    Sexual activity: Yes     Partners: Female     Birth control/ protection: None     Other Topics Concern    None     Social History Narrative     Family History   Problem Relation Age of Onset    Diabetes Mother     Hypertension Mother        OBJECTIVE:    Visit Vitals    /84    Pulse 63    Temp 98.4 °F (36.9 °C) (Oral)    Resp 16    Ht 5' 7\" (1.702 m)    Wt 165 lb 11.2 oz (75.2 kg)    SpO2 95%    BMI 25.95 kg/m2     CONSTITUTIONAL: well , well nourished, appears age appropriate  EYES: perrla, eom intact  ENMT:moist mucous membranes, pharynx clear  NECK: supple. Thyroid normal  RESPIRATORY: Chest: clear to ascultation and percussion   CARDIOVASCULAR: Heart: regular rate and rhythm  GASTROINTESTINAL: Abdomen: soft, bowel sounds active  HEMATOLOGIC: no pathological lymph nodes palpated  MUSCULOSKELETAL: Extremities: no edema, pulse 1+   INTEGUMENT: No unusual rashes or suspicious skin lesions noted. Nails appear normal.  NEUROLOGIC: non-focal exam   MENTAL STATUS: alert and oriented, appropriate affect      ASSESSMENT:  1. Uncontrolled type 2 diabetes mellitus with complication, with long-term current use of insulin ([address]    2. 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 Dyslipidemia    4. Femoral bruit        PLAN:    1. His diabetes hopefully is doing well but I will await the results of his HbA1c.  2. He will follow up with his ophthalmologist in review of his diabetic retinopathy accompanied by macular edema.  The most important thing in this regard above and beyond treatment down the line is his condition being improved by diabetic control. 3. He needs to be on a statin. I will re-address this on the next visit. It makes no difference what his lipid values are. Support for this is the fact that he has subclinical disease manifesting itself as a femoral bruit indicating some degree of plaquing. 4. I encouraged him to remain as physically active as possible. His physical activity is very important. .  Orders Placed This Encounter    HEMOGLOBIN A1C WITH EAG         Follow-up Disposition:  Return in about 6 weeks (around 9/4/2018).       Jed Donaldson MD

## 2018-11-18 ENCOUNTER — APPOINTMENT (OUTPATIENT)
Dept: GENERAL RADIOLOGY | Age: 63
DRG: 225 | End: 2018-11-18
Attending: EMERGENCY MEDICINE
Payer: COMMERCIAL

## 2018-11-18 ENCOUNTER — HOSPITAL ENCOUNTER (INPATIENT)
Age: 63
LOS: 4 days | Discharge: HOME | DRG: 225 | End: 2018-11-22
Attending: EMERGENCY MEDICINE | Admitting: INTERNAL MEDICINE
Payer: COMMERCIAL

## 2018-11-18 DIAGNOSIS — R55 SYNCOPE AND COLLAPSE: Primary | ICD-10-CM

## 2018-11-18 DIAGNOSIS — R94.31 ABNORMAL EKG: ICD-10-CM

## 2018-11-18 DIAGNOSIS — Z79.4 TYPE 2 DIABETES MELLITUS WITH HYPERGLYCEMIA, WITH LONG-TERM CURRENT USE OF INSULIN (HCC): ICD-10-CM

## 2018-11-18 DIAGNOSIS — E11.65 TYPE 2 DIABETES MELLITUS WITH HYPERGLYCEMIA, WITH LONG-TERM CURRENT USE OF INSULIN (HCC): ICD-10-CM

## 2018-11-18 PROBLEM — R07.9 CHEST PAIN, CARDIAC: Status: ACTIVE | Noted: 2018-11-18

## 2018-11-18 PROBLEM — I49.8 BRUGADA SYNDROME: Status: ACTIVE | Noted: 2018-11-18

## 2018-11-18 LAB
ALBUMIN SERPL-MCNC: 3.6 G/DL (ref 3.5–5)
ALBUMIN/GLOB SERPL: 0.9 {RATIO} (ref 1.1–2.2)
ALP SERPL-CCNC: 92 U/L (ref 45–117)
ALT SERPL-CCNC: 34 U/L (ref 12–78)
ANION GAP SERPL CALC-SCNC: 9 MMOL/L (ref 5–15)
APTT PPP: 21.6 SEC (ref 22.1–32)
AST SERPL-CCNC: 26 U/L (ref 15–37)
BASOPHILS # BLD: 0 K/UL (ref 0–0.1)
BASOPHILS NFR BLD: 0 % (ref 0–1)
BILIRUB SERPL-MCNC: 0.5 MG/DL (ref 0.2–1)
BUN SERPL-MCNC: 24 MG/DL (ref 6–20)
BUN/CREAT SERPL: 20 (ref 12–20)
CALCIUM SERPL-MCNC: 9.3 MG/DL (ref 8.5–10.1)
CHLORIDE SERPL-SCNC: 99 MMOL/L (ref 97–108)
CK MB CFR SERPL CALC: 1.6 % (ref 0–2.5)
CK MB SERPL-MCNC: 5.2 NG/ML (ref 5–25)
CK SERPL-CCNC: 327 U/L (ref 39–308)
CO2 SERPL-SCNC: 27 MMOL/L (ref 21–32)
CREAT SERPL-MCNC: 1.22 MG/DL (ref 0.7–1.3)
DIFFERENTIAL METHOD BLD: ABNORMAL
EOSINOPHIL # BLD: 0 K/UL (ref 0–0.4)
EOSINOPHIL NFR BLD: 1 % (ref 0–7)
ERYTHROCYTE [DISTWIDTH] IN BLOOD BY AUTOMATED COUNT: 13 % (ref 11.5–14.5)
GLOBULIN SER CALC-MCNC: 4.2 G/DL (ref 2–4)
GLUCOSE BLD STRIP.AUTO-MCNC: 248 MG/DL (ref 65–100)
GLUCOSE BLD STRIP.AUTO-MCNC: 327 MG/DL (ref 65–100)
GLUCOSE BLD STRIP.AUTO-MCNC: 408 MG/DL (ref 65–100)
GLUCOSE BLD STRIP.AUTO-MCNC: 474 MG/DL (ref 65–100)
GLUCOSE SERPL-MCNC: 410 MG/DL (ref 65–100)
HCT VFR BLD AUTO: 41.1 % (ref 36.6–50.3)
HGB BLD-MCNC: 14 G/DL (ref 12.1–17)
IMM GRANULOCYTES # BLD: 0 K/UL (ref 0–0.04)
IMM GRANULOCYTES NFR BLD AUTO: 0 % (ref 0–0.5)
INR PPP: 1 (ref 0.9–1.1)
LYMPHOCYTES # BLD: 0.9 K/UL (ref 0.8–3.5)
LYMPHOCYTES NFR BLD: 15 % (ref 12–49)
MCH RBC QN AUTO: 28.2 PG (ref 26–34)
MCHC RBC AUTO-ENTMCNC: 34.1 G/DL (ref 30–36.5)
MCV RBC AUTO: 82.7 FL (ref 80–99)
MONOCYTES # BLD: 0.4 K/UL (ref 0–1)
MONOCYTES NFR BLD: 6 % (ref 5–13)
NEUTS SEG # BLD: 4.6 K/UL (ref 1.8–8)
NEUTS SEG NFR BLD: 78 % (ref 32–75)
NRBC # BLD: 0 K/UL (ref 0–0.01)
NRBC BLD-RTO: 0 PER 100 WBC
PLATELET # BLD AUTO: 179 K/UL (ref 150–400)
PMV BLD AUTO: 10.8 FL (ref 8.9–12.9)
POTASSIUM SERPL-SCNC: 3.6 MMOL/L (ref 3.5–5.1)
PROT SERPL-MCNC: 7.8 G/DL (ref 6.4–8.2)
PROTHROMBIN TIME: 9.8 SEC (ref 9–11.1)
RBC # BLD AUTO: 4.97 M/UL (ref 4.1–5.7)
SERVICE CMNT-IMP: ABNORMAL
SODIUM SERPL-SCNC: 135 MMOL/L (ref 136–145)
THERAPEUTIC RANGE,PTTT: ABNORMAL SECS (ref 58–77)
TROPONIN I BLD-MCNC: <0.04 NG/ML (ref 0–0.08)
TROPONIN I SERPL-MCNC: <0.05 NG/ML
WBC # BLD AUTO: 5.9 K/UL (ref 4.1–11.1)

## 2018-11-18 PROCEDURE — 77030004521 HC CATH ANGI DX COOK -B

## 2018-11-18 PROCEDURE — 82550 ASSAY OF CK (CPK): CPT

## 2018-11-18 PROCEDURE — 82962 GLUCOSE BLOOD TEST: CPT

## 2018-11-18 PROCEDURE — 77030015766

## 2018-11-18 PROCEDURE — 77030019698 HC SYR ANGI MDLON MRTM -A

## 2018-11-18 PROCEDURE — C1894 INTRO/SHEATH, NON-LASER: HCPCS

## 2018-11-18 PROCEDURE — 84484 ASSAY OF TROPONIN QUANT: CPT

## 2018-11-18 PROCEDURE — 74011636320 HC RX REV CODE- 636/320

## 2018-11-18 PROCEDURE — 74011250637 HC RX REV CODE- 250/637: Performed by: INTERNAL MEDICINE

## 2018-11-18 PROCEDURE — B2151ZZ FLUOROSCOPY OF LEFT HEART USING LOW OSMOLAR CONTRAST: ICD-10-PCS | Performed by: INTERNAL MEDICINE

## 2018-11-18 PROCEDURE — 74011250636 HC RX REV CODE- 250/636: Performed by: EMERGENCY MEDICINE

## 2018-11-18 PROCEDURE — 85610 PROTHROMBIN TIME: CPT

## 2018-11-18 PROCEDURE — 65660000000 HC RM CCU STEPDOWN

## 2018-11-18 PROCEDURE — 74011636637 HC RX REV CODE- 636/637: Performed by: EMERGENCY MEDICINE

## 2018-11-18 PROCEDURE — 77030019697 HC SYR ANGI INFL MRTM -B

## 2018-11-18 PROCEDURE — 77030010221 HC SPLNT WR POS TELE -B

## 2018-11-18 PROCEDURE — 80053 COMPREHEN METABOLIC PANEL: CPT

## 2018-11-18 PROCEDURE — 74011000250 HC RX REV CODE- 250

## 2018-11-18 PROCEDURE — 85025 COMPLETE CBC W/AUTO DIFF WBC: CPT

## 2018-11-18 PROCEDURE — 36415 COLL VENOUS BLD VENIPUNCTURE: CPT

## 2018-11-18 PROCEDURE — 96374 THER/PROPH/DIAG INJ IV PUSH: CPT

## 2018-11-18 PROCEDURE — 77030004549 HC CATH ANGI DX PRF MRTM -A

## 2018-11-18 PROCEDURE — 74011250636 HC RX REV CODE- 250/636: Performed by: INTERNAL MEDICINE

## 2018-11-18 PROCEDURE — 77030030195 HC CATH ANGI DX PRF4 MRTM -A

## 2018-11-18 PROCEDURE — 74011636637 HC RX REV CODE- 636/637: Performed by: INTERNAL MEDICINE

## 2018-11-18 PROCEDURE — 93458 L HRT ARTERY/VENTRICLE ANGIO: CPT

## 2018-11-18 PROCEDURE — 99285 EMERGENCY DEPT VISIT HI MDM: CPT

## 2018-11-18 PROCEDURE — 93005 ELECTROCARDIOGRAM TRACING: CPT

## 2018-11-18 PROCEDURE — 74011250636 HC RX REV CODE- 250/636

## 2018-11-18 PROCEDURE — 77030019569 HC BND COMPR RAD TERU -B

## 2018-11-18 PROCEDURE — 77030008543 HC TBNG MON PRSS MRTM -A

## 2018-11-18 PROCEDURE — C1769 GUIDE WIRE: HCPCS

## 2018-11-18 PROCEDURE — 77030028837 HC SYR ANGI PWR INJ COEU -A

## 2018-11-18 PROCEDURE — B2111ZZ FLUOROSCOPY OF MULTIPLE CORONARY ARTERIES USING LOW OSMOLAR CONTRAST: ICD-10-PCS | Performed by: INTERNAL MEDICINE

## 2018-11-18 PROCEDURE — 85730 THROMBOPLASTIN TIME PARTIAL: CPT

## 2018-11-18 PROCEDURE — 82553 CREATINE MB FRACTION: CPT

## 2018-11-18 PROCEDURE — 71045 X-RAY EXAM CHEST 1 VIEW: CPT

## 2018-11-18 PROCEDURE — 4A023N7 MEASUREMENT OF CARDIAC SAMPLING AND PRESSURE, LEFT HEART, PERCUTANEOUS APPROACH: ICD-10-PCS | Performed by: INTERNAL MEDICINE

## 2018-11-18 PROCEDURE — C1887 CATHETER, GUIDING: HCPCS

## 2018-11-18 RX ORDER — INSULIN LISPRO 100 [IU]/ML
INJECTION, SOLUTION INTRAVENOUS; SUBCUTANEOUS
Status: DISCONTINUED | OUTPATIENT
Start: 2018-11-18 | End: 2018-11-20

## 2018-11-18 RX ORDER — SODIUM CHLORIDE 900 MG/100ML
INJECTION INTRAVENOUS
Status: DISCONTINUED
Start: 2018-11-18 | End: 2018-11-22 | Stop reason: HOSPADM

## 2018-11-18 RX ORDER — FENTANYL CITRATE 50 UG/ML
25-50 INJECTION, SOLUTION INTRAMUSCULAR; INTRAVENOUS
Status: DISCONTINUED | OUTPATIENT
Start: 2018-11-18 | End: 2018-11-18

## 2018-11-18 RX ORDER — FENTANYL CITRATE 50 UG/ML
INJECTION, SOLUTION INTRAMUSCULAR; INTRAVENOUS
Status: COMPLETED
Start: 2018-11-18 | End: 2018-11-18

## 2018-11-18 RX ORDER — HEPARIN SODIUM 200 [USP'U]/100ML
INJECTION, SOLUTION INTRAVENOUS
Status: COMPLETED
Start: 2018-11-18 | End: 2018-11-18

## 2018-11-18 RX ORDER — INSULIN GLARGINE 100 [IU]/ML
20 INJECTION, SOLUTION SUBCUTANEOUS
Status: DISCONTINUED | OUTPATIENT
Start: 2018-11-18 | End: 2018-11-19

## 2018-11-18 RX ORDER — INSULIN LISPRO 100 [IU]/ML
9 INJECTION, SOLUTION INTRAVENOUS; SUBCUTANEOUS ONCE
Status: COMPLETED | OUTPATIENT
Start: 2018-11-18 | End: 2018-11-18

## 2018-11-18 RX ORDER — MIDAZOLAM HYDROCHLORIDE 1 MG/ML
INJECTION, SOLUTION INTRAMUSCULAR; INTRAVENOUS
Status: COMPLETED
Start: 2018-11-18 | End: 2018-11-18

## 2018-11-18 RX ORDER — HEPARIN SODIUM 200 [USP'U]/100ML
500 INJECTION, SOLUTION INTRAVENOUS ONCE
Status: COMPLETED | OUTPATIENT
Start: 2018-11-18 | End: 2018-11-18

## 2018-11-18 RX ORDER — BIVALIRUDIN 250 MG/5ML
INJECTION, POWDER, LYOPHILIZED, FOR SOLUTION INTRAVENOUS
Status: DISCONTINUED
Start: 2018-11-18 | End: 2018-11-18

## 2018-11-18 RX ORDER — ASPIRIN 81 MG/1
81 TABLET ORAL DAILY
Status: DISCONTINUED | OUTPATIENT
Start: 2018-11-19 | End: 2018-11-22 | Stop reason: HOSPADM

## 2018-11-18 RX ORDER — SODIUM CHLORIDE 9 MG/ML
25 INJECTION, SOLUTION INTRAVENOUS CONTINUOUS
Status: DISPENSED | OUTPATIENT
Start: 2018-11-18 | End: 2018-11-19

## 2018-11-18 RX ORDER — ATORVASTATIN CALCIUM 20 MG/1
20 TABLET, FILM COATED ORAL DAILY
Status: DISCONTINUED | OUTPATIENT
Start: 2018-11-18 | End: 2018-11-22 | Stop reason: HOSPADM

## 2018-11-18 RX ORDER — HEPARIN SODIUM 1000 [USP'U]/ML
2500 INJECTION, SOLUTION INTRAVENOUS; SUBCUTANEOUS ONCE
Status: COMPLETED | OUTPATIENT
Start: 2018-11-18 | End: 2018-11-18

## 2018-11-18 RX ORDER — SODIUM CHLORIDE 0.9 % (FLUSH) 0.9 %
5-10 SYRINGE (ML) INJECTION EVERY 8 HOURS
Status: DISCONTINUED | OUTPATIENT
Start: 2018-11-18 | End: 2018-11-22 | Stop reason: HOSPADM

## 2018-11-18 RX ORDER — ONDANSETRON 2 MG/ML
4 INJECTION INTRAMUSCULAR; INTRAVENOUS
Status: COMPLETED | OUTPATIENT
Start: 2018-11-18 | End: 2018-11-18

## 2018-11-18 RX ORDER — LIDOCAINE HYDROCHLORIDE 10 MG/ML
INJECTION, SOLUTION EPIDURAL; INFILTRATION; INTRACAUDAL; PERINEURAL
Status: COMPLETED
Start: 2018-11-18 | End: 2018-11-18

## 2018-11-18 RX ORDER — SODIUM CHLORIDE 9 MG/ML
INJECTION, SOLUTION INTRAVENOUS
Status: DISCONTINUED
Start: 2018-11-18 | End: 2018-11-22 | Stop reason: HOSPADM

## 2018-11-18 RX ORDER — SODIUM CHLORIDE 0.9 % (FLUSH) 0.9 %
5-10 SYRINGE (ML) INJECTION AS NEEDED
Status: DISCONTINUED | OUTPATIENT
Start: 2018-11-18 | End: 2018-11-22 | Stop reason: HOSPADM

## 2018-11-18 RX ORDER — MAGNESIUM SULFATE 100 %
4 CRYSTALS MISCELLANEOUS AS NEEDED
Status: DISCONTINUED | OUTPATIENT
Start: 2018-11-18 | End: 2018-11-20 | Stop reason: SDUPTHER

## 2018-11-18 RX ORDER — SODIUM CHLORIDE 9 MG/ML
100 INJECTION, SOLUTION INTRAVENOUS CONTINUOUS
Status: DISPENSED | OUTPATIENT
Start: 2018-11-18 | End: 2018-11-18

## 2018-11-18 RX ORDER — FENOFIBRATE 145 MG/1
145 TABLET, COATED ORAL DAILY
Status: DISCONTINUED | OUTPATIENT
Start: 2018-11-18 | End: 2018-11-22 | Stop reason: HOSPADM

## 2018-11-18 RX ORDER — ONDANSETRON 2 MG/ML
4 INJECTION INTRAMUSCULAR; INTRAVENOUS
Status: DISCONTINUED | OUTPATIENT
Start: 2018-11-18 | End: 2018-11-22 | Stop reason: HOSPADM

## 2018-11-18 RX ORDER — PHENYLEPHRINE HYDROCHLORIDE 10 MG/ML
INJECTION INTRAVENOUS
Status: DISCONTINUED
Start: 2018-11-18 | End: 2018-11-22 | Stop reason: HOSPADM

## 2018-11-18 RX ORDER — HEPARIN SODIUM 1000 [USP'U]/ML
INJECTION, SOLUTION INTRAVENOUS; SUBCUTANEOUS
Status: COMPLETED
Start: 2018-11-18 | End: 2018-11-18

## 2018-11-18 RX ORDER — INSULIN LISPRO 100 [IU]/ML
6 INJECTION, SOLUTION INTRAVENOUS; SUBCUTANEOUS 2 TIMES DAILY WITH MEALS
Status: DISCONTINUED | OUTPATIENT
Start: 2018-11-18 | End: 2018-11-20

## 2018-11-18 RX ORDER — VERAPAMIL HYDROCHLORIDE 2.5 MG/ML
INJECTION, SOLUTION INTRAVENOUS
Status: COMPLETED
Start: 2018-11-18 | End: 2018-11-18

## 2018-11-18 RX ORDER — MIDAZOLAM HYDROCHLORIDE 1 MG/ML
.5-2 INJECTION, SOLUTION INTRAMUSCULAR; INTRAVENOUS
Status: DISCONTINUED | OUTPATIENT
Start: 2018-11-18 | End: 2018-11-18

## 2018-11-18 RX ORDER — ACETAMINOPHEN 325 MG/1
650 TABLET ORAL
Status: DISCONTINUED | OUTPATIENT
Start: 2018-11-18 | End: 2018-11-22 | Stop reason: HOSPADM

## 2018-11-18 RX ORDER — VERAPAMIL HYDROCHLORIDE 2.5 MG/ML
2.5 INJECTION, SOLUTION INTRAVENOUS ONCE
Status: COMPLETED | OUTPATIENT
Start: 2018-11-18 | End: 2018-11-18

## 2018-11-18 RX ORDER — LIDOCAINE HYDROCHLORIDE 10 MG/ML
1-30 INJECTION, SOLUTION EPIDURAL; INFILTRATION; INTRACAUDAL; PERINEURAL
Status: DISCONTINUED | OUTPATIENT
Start: 2018-11-18 | End: 2018-11-18

## 2018-11-18 RX ORDER — DOCUSATE SODIUM 100 MG/1
100 CAPSULE, LIQUID FILLED ORAL
Status: DISCONTINUED | OUTPATIENT
Start: 2018-11-18 | End: 2018-11-22 | Stop reason: HOSPADM

## 2018-11-18 RX ORDER — DEXTROSE 50 % IN WATER (D50W) INTRAVENOUS SYRINGE
12.5-25 AS NEEDED
Status: DISCONTINUED | OUTPATIENT
Start: 2018-11-18 | End: 2018-11-20 | Stop reason: SDUPTHER

## 2018-11-18 RX ADMIN — ATORVASTATIN CALCIUM 20 MG: 20 TABLET, FILM COATED ORAL at 14:42

## 2018-11-18 RX ADMIN — FENTANYL CITRATE 25 MCG: 50 INJECTION, SOLUTION INTRAMUSCULAR; INTRAVENOUS at 08:28

## 2018-11-18 RX ADMIN — HEPARIN SODIUM IN SODIUM CHLORIDE 1000 UNITS: 200 INJECTION INTRAVENOUS at 08:51

## 2018-11-18 RX ADMIN — SODIUM CHLORIDE 1000 ML: 900 INJECTION, SOLUTION INTRAVENOUS at 08:04

## 2018-11-18 RX ADMIN — Medication 10 ML: at 22:08

## 2018-11-18 RX ADMIN — INSULIN LISPRO 3 UNITS: 100 INJECTION, SOLUTION INTRAVENOUS; SUBCUTANEOUS at 14:43

## 2018-11-18 RX ADMIN — INSULIN LISPRO 6 UNITS: 100 INJECTION, SOLUTION INTRAVENOUS; SUBCUTANEOUS at 17:51

## 2018-11-18 RX ADMIN — HEPARIN SODIUM 2500 UNITS: 1000 INJECTION, SOLUTION INTRAVENOUS; SUBCUTANEOUS at 08:50

## 2018-11-18 RX ADMIN — HEPARIN SODIUM 2500 UNITS: 1000 INJECTION INTRAVENOUS; SUBCUTANEOUS at 08:50

## 2018-11-18 RX ADMIN — MIDAZOLAM HYDROCHLORIDE 1 MG: 1 INJECTION, SOLUTION INTRAMUSCULAR; INTRAVENOUS at 08:49

## 2018-11-18 RX ADMIN — INSULIN HUMAN 10 UNITS: 100 INJECTION, SOLUTION PARENTERAL at 08:16

## 2018-11-18 RX ADMIN — LIDOCAINE HYDROCHLORIDE 2 ML: 10 INJECTION, SOLUTION EPIDURAL; INFILTRATION; INTRACAUDAL; PERINEURAL at 08:49

## 2018-11-18 RX ADMIN — MIDAZOLAM HYDROCHLORIDE 0.5 MG: 1 INJECTION, SOLUTION INTRAMUSCULAR; INTRAVENOUS at 09:46

## 2018-11-18 RX ADMIN — IOPAMIDOL 100 ML: 755 INJECTION, SOLUTION INTRAVENOUS at 09:55

## 2018-11-18 RX ADMIN — MIDAZOLAM HYDROCHLORIDE 1 MG: 1 INJECTION, SOLUTION INTRAMUSCULAR; INTRAVENOUS at 08:28

## 2018-11-18 RX ADMIN — ONDANSETRON 4 MG: 2 INJECTION INTRAMUSCULAR; INTRAVENOUS at 08:16

## 2018-11-18 RX ADMIN — HEPARIN SODIUM 1000 UNITS: 200 INJECTION, SOLUTION INTRAVENOUS at 08:50

## 2018-11-18 RX ADMIN — Medication 10 ML: at 14:43

## 2018-11-18 RX ADMIN — Medication 10 ML: at 14:44

## 2018-11-18 RX ADMIN — INSULIN GLARGINE 20 UNITS: 100 INJECTION, SOLUTION SUBCUTANEOUS at 22:07

## 2018-11-18 RX ADMIN — VERAPAMIL HYDROCHLORIDE 2.5 MG: 2.5 INJECTION, SOLUTION INTRAVENOUS at 08:51

## 2018-11-18 RX ADMIN — VERAPAMIL HYDROCHLORIDE 2.5 MG: 2.5 INJECTION INTRAVENOUS at 08:51

## 2018-11-18 RX ADMIN — SODIUM CHLORIDE 100 ML/HR: 900 INJECTION, SOLUTION INTRAVENOUS at 14:44

## 2018-11-18 RX ADMIN — SODIUM CHLORIDE 250 ML: 900 INJECTION, SOLUTION INTRAVENOUS at 08:53

## 2018-11-18 RX ADMIN — INSULIN LISPRO 9 UNITS: 100 INJECTION, SOLUTION INTRAVENOUS; SUBCUTANEOUS at 17:52

## 2018-11-18 RX ADMIN — HEPARIN SODIUM IN SODIUM CHLORIDE 1000 UNITS: 200 INJECTION INTRAVENOUS at 08:50

## 2018-11-18 RX ADMIN — NITROGLYCERIN 200 MCG: 5 INJECTION, SOLUTION INTRAVENOUS at 08:51

## 2018-11-18 RX ADMIN — FENOFIBRATE 145 MG: 145 TABLET ORAL at 14:42

## 2018-11-18 RX ADMIN — INSULIN LISPRO 7 UNITS: 100 INJECTION, SOLUTION INTRAVENOUS; SUBCUTANEOUS at 22:07

## 2018-11-18 RX ADMIN — IOPAMIDOL 24 ML: 755 INJECTION, SOLUTION INTRAVENOUS at 09:56

## 2018-11-18 NOTE — CONSULTS
CARDIOLOGY CONSULT       Date of  Admission: 11/18/2018  7:16 AM     Admission type:Emergency    Consult for:  Syncope with abnormal EKG  Consulted by:  Dr. Uribe Bur:     Demarco Armenta is a 70 y.o. male with PMHx significant for HTN, DM, presents via EMS to the ED with cc of of a sudden onset, transient syncopal episode at 56 AM with associated fatigue. Pt states he was walking out of the bathroom following urination when he experienced the onset of a warm sensation followed by LOC, resulting in him hitting the floor. Of note, pt also noted some hyperventilation prior to passing out. Upon waking he called for EMS transport. EMS reports that upon taking his EKG he had ST elevation in V2, prompting them to call to pre-alert for a possible STEMI. His blood sugar in the ED is 402. EKG reveals atypical ST elevation in one lead only, not diagnostic of STEMI.       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 ([address] 02/18/2018    Type 2 diabetes mellitus with diabetic polyneuropathy, with long-term current use of insulin ([address] 02/18/2018    Uncontrolled type 2 diabetes mellitus with complication, with long-term current use of insulin ([address] 02/18/2018    Dyslipidemia 09/25/2014    Dermatitis 85/36/2269    Umbilical hernia 57/64/8022    Femoral bruit 09/25/2014    Mitral valve prolapse 09/25/2014      Martine Sherman MD  Past Medical History:   Diagnosis Date    Diabetes ([address]     Hypertension        Social History     Socioeconomic History    Marital status: SINGLE     Spouse name: Not on file    Number of children: 0    Years of education: Not on file    Highest education level: Not on file   Social Needs    Financial resource strain: Not on file    Food insecurity - worry: Not on file    Food insecurity - inability: Not on file   [employer] needs - medical: Not on file    Transportation needs - non-medical: Not on file   Occupational History    Occupation: [employer]   Tobacco Use    Smoking status: Never Smoker    Smokeless tobacco: Never Used   Substance and Sexual Activity    Alcohol use: No    Drug use: No    Sexual activity: Yes     Partners: Female     Birth control/protection: None   Other Topics Concern    Not on file   Social History Narrative    Not on file     No Known Allergies   Family History   Problem Relation Age of Onset    Diabetes Mother     Hypertension Mother       Current Facility-Administered Medications   Medication Dose Route Frequency    fentaNYL citrate (PF) injection 25-50 mcg  25-50 mcg IntraVENous Multiple    iopamidol (ISOVUE-370) 76 % injection 0-150 mL  0-150 mL IntraVENous Multiple    iopamidol (ISOVUE-370) 76 % injection 10-50 mL  10-50 mL IntraVENous Multiple    lidocaine (PF) (XYLOCAINE) 10 mg/mL (1 %) injection 1-30 mL  1-30 mL IntraDERMal Multiple    midazolam (VERSED) injection 0.5-2 mg  0.5-2 mg IntraVENous Multiple    bivalirudin (ANGIOMAX) 250 mg injection        0.9% sodium chloride (MBP/ADV) infusion        ADDaptor        iopamidol (ISOVUE-370) 76 % injection        iopamidol (ISOVUE-370) 76 % injection             Review of Symptoms:  11 systems reviewed, negative other than as stated in HPI     Subjective:        Visit Vitals  /64   Pulse 77   Temp 97.8 °F (36.6 °C)   Resp 14   Ht 5' 7\" (1.702 m)   Wt 152 lb 1.9 oz (69 kg)   SpO2 96%   BMI 23.82 kg/m²       Physical:  Gen:  NAD  Heart: regular rhythm, no murmur, gallop or rub  Lungs: clear to auscultation bilaterally  Neck: supple, symmetrical, trachea midline, no adenopathy, thyroid: not enlarged, symmetric, no tenderness/mass/nodules, no carotid bruit and no JVD  Abdomen: soft, non-tender.  Bowel sounds normal. No masses,  no organomegaly  Extremities: extremities normal, atraumatic, no cyanosis or edema, positive femorals without bruits, normal dp pulses  Neurologic: CN, motor and speech grossly normal    Data Review:   Recent Labs     11/18/18  0728   WBC 5.9   HGB 14.0   HCT 41.1        Recent Labs     11/18/18  0728   *   K 3.6   CL 99   CO2 27   *   BUN 24*   CREA 1.22   CA 9.3   ALB 3.6   TBILI 0.5   SGOT 26   ALT 34   INR 1.0       Recent Labs     11/18/18  0728   TROIQ <0.05   CKMB 5.2*       Lab Results   Component Value Date/Time    Cholesterol, total 192 01/18/2018 12:38 PM    HDL Cholesterol 63 01/18/2018 12:38 PM    LDL, calculated 106 (H) 01/18/2018 12:38 PM    VLDL, calculated 23 01/18/2018 12:38 PM    Triglyceride 117 01/18/2018 12:38 PM         No intake or output data in the 24 hours ending 11/18/18 0931     Cardiographics    Telemetry: normal sinus rhythm    EKG:  Atypical ST elevation V2 only that could be consistent with Brugada Syndrome     Assessment:       Active Problems:    Dyslipidemia (9/25/2014)      Mitral valve prolapse (9/25/2014)      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 ([address] (2/18/2018)      Type 2 diabetes mellitus with diabetic polyneuropathy, with long-term current use of insulin ([address] (2/18/2018)      Uncontrolled type 2 diabetes mellitus with complication, with long-term current use of insulin ([address] (2/18/2018)      Syncope and collapse (11/18/2018)      Abnormal EKG (11/18/2018)         Plan:     Syncope with abnormal EKG:  · No chest pain, findings not diagnostic of STEMI  · Concern for Brugada syndrome or variant  · I have discussed the risks and benefits of cardiac cath +/- PCI- the patient expressed understanding and wishes to proceed. · Check echo  · If no explanatory cath findings, consult EP for possible Brugada syndrome, EPS, possible genetic testing    Uncontrolled type II DM, out of medications for financial reasons:  · Per internal medicine     Thanks for the consult. I appreciate the opportunity to participate in this patient's care.

## 2018-11-18 NOTE — ED NOTES
States a few seconds ago he felt that nausea feeling but it came and went.    Cath lab team here and left as pt to get stat ECho  Ed charge called for STAT echo  Portable chest xray done  Pt is still drowsy trying to text and falls asleep quickly

## 2018-11-18 NOTE — PROGRESS NOTES
TRANSFER - IN REPORT:    Verbal report received from Nel Cho (name) on Radha Power  being received from Cath Lab(unit) for routine post - op      Report consisted of patients Situation, Background, Assessment and   Recommendations(SBAR). Information from the following report(s) SBAR, Procedure Summary, Recent Results and Cardiac Rhythm NSR  was reviewed with the receiving nurse. Opportunity for questions and clarification was provided. Assessment completed upon patients arrival to unit and care assumed.

## 2018-11-18 NOTE — ED PROVIDER NOTES
EMERGENCY DEPARTMENT HISTORY AND PHYSICAL EXAM      Date: 11/18/2018  Patient Name: Alesia Arshad    History of Presenting Illness     Chief Complaint   Patient presents with    Syncope     STEMI alert, Patient had syncopal episode at home this morning. History Provided By: Patient    HPI: Alesia Arshad, 70 y.o. male with PMHx significant for HTN, DM, presents via EMS to the ED with cc of of a sudden onset, transient syncopal episode at 56 AM with associated fatigue. Pt states he was walking out of the bathroom following urination when he experienced the onset of a warm sensation followed by LOC, resulting in him hitting the floor. Of note, pt also noted some hyperventilation prior to passing out. Upon waking he called for EMS transport. EMS reports that upon taking his EKG he had ST elevation in V2, prompting them to call to pre-alert for a possible STEMI. His blood sugar in the ED is 402. Pt specifically denies CP, weakness in the extremities, a fever or chills. There are no other complaints, changes, or physical findings at this time.     PCP: Eddie León MD    Current Facility-Administered Medications   Medication Dose Route Frequency Provider Last Rate Last Dose    0.9% sodium chloride (MBP/ADV) infusion             0.9% sodium chloride infusion             PHENYLephrine (CHI-SYNEPHRINE) 10 mg/mL injection             atorvastatin (LIPITOR) tablet 20 mg  20 mg Oral DAILY Jose Mendoza MD        fenofibrate nanocrystallized (TRICOR) tablet 145 mg  145 mg Oral DAILY Jose Mendoza MD        insulin glargine (LANTUS) injection 20 Units  20 Units SubCUTAneous QHS Jose Mendoza MD        insulin lispro (HUMALOG) injection 6 Units  6 Units SubCUTAneous BID WITH MEALS Jose Mendoza MD        sodium chloride (NS) flush 5-10 mL  5-10 mL IntraVENous Q8H Jose Mendoza MD        sodium chloride (NS) flush 5-10 mL  5-10 mL IntraVENous PRN Catrina Bello MD        acetaminophen (TYLENOL) tablet 650 mg  650 mg Oral Q6H PRN Valentín Josue MD        ondansetron [facility]) injection 4 mg  4 mg IntraVENous Q6H PRN Valentín Josue MD        docusate sodium (COLACE) capsule 100 mg  100 mg Oral DAILY PRN Valentín Josue MD        0.9% sodium chloride infusion  50 mL/hr IntraVENous CONTINUOUS Genet Mendoza MD        insulin lispro (HUMALOG) injection   SubCUTAneous AC&HS Valentín Josue MD        glucose chewable tablet 16 g  4 Tab Oral PRN Valentín Josue MD        dextrose (D50W) injection syrg 12.5-25 g  12.5-25 g IntraVENous PRN Valentín Josue MD        glucagon (GLUCAGEN) injection 1 mg  1 mg IntraMUSCular PRN Valentín Josue MD        [START ON 11/19/2018] aspirin delayed-release tablet 81 mg  81 mg Oral DAILY Ken Chaney MD           Past History     Past Medical History:  Past Medical History:   Diagnosis Date    Diabetes ([facility] [address]     Hypertension        Past Surgical History:  Past Surgical History:   Procedure Laterality Date    COLONOSCOPY  1/2/2015         HX HEENT      l cararact removal       Family History:  Family History   Problem Relation Age of Onset    Diabetes Mother     Hypertension Mother        Social History:  Social History     Tobacco Use    Smoking status: Never Smoker    Smokeless tobacco: Never Used   Substance Use Topics    Alcohol use: No    Drug use: No       Allergies:  No Known Allergies      Review of Systems   Review of Systems   Constitutional: Positive for fatigue. Negative for activity change, chills and fever. HENT: Negative for congestion and sore throat. Eyes: Negative for pain and redness. Respiratory: Negative for cough, chest tightness and shortness of breath. Cardiovascular: Negative for chest pain and palpitations. Gastrointestinal: Negative for abdominal pain, diarrhea, nausea and vomiting. Genitourinary: Negative for dysuria, frequency and urgency. Musculoskeletal: Negative for back pain and neck pain. Skin: Negative for rash. Neurological: Positive for syncope. Negative for weakness, light-headedness and headaches. Psychiatric/Behavioral: Negative for confusion. All other systems reviewed and are negative. Physical Exam   Physical Exam   Constitutional: He is oriented to person, place, and time. He appears well-developed and well-nourished. No distress. HENT:   Head: Normocephalic and atraumatic. Nose: Nose normal.   Mouth/Throat: Oropharynx is clear and moist.   Eyes: Conjunctivae and EOM are normal. Pupils are equal, round, and reactive to light. No scleral icterus. Conjunctiva clear bilaterally; Neck: Normal range of motion. Neck supple. No JVD present. No tracheal deviation present. No thyromegaly present. Cardiovascular: Normal rate, regular rhythm and intact distal pulses. Exam reveals no gallop and no friction rub. No murmur heard. Pulmonary/Chest: Effort normal and breath sounds normal. No stridor. No respiratory distress. He has no wheezes. He has no rales. He exhibits no tenderness. Abdominal: Soft. Bowel sounds are normal. He exhibits no distension. There is no tenderness. There is no rebound and no guarding. Musculoskeletal: Normal range of motion. He exhibits no edema or tenderness. Neurological: He is alert and oriented to person, place, and time. He displays normal reflexes. No cranial nerve deficit. He exhibits normal muscle tone. Coordination normal.   5/5 strength throughout; no past pointing; good heel to shin; negative romberg; holds both arms extended in front of them for 10 seconds without difficulty or drift; lifts legs off of bed without difficulty; no pronator drift; good equal  strength bilaterally; motor and sensory grossly intact; no facial asymmetry;      Skin: Skin is warm and dry. No rash noted. He is not diaphoretic. No erythema. Psychiatric: He has a normal mood and affect.  His behavior is normal.   Nursing note and vitals reviewed. Diagnostic Study Results     Labs -     Recent Results (from the past 12 hour(s))   EKG, 12 LEAD, INITIAL    Collection Time: 11/18/18  7:18 AM   Result Value Ref Range    Ventricular Rate 70 BPM    Atrial Rate 70 BPM    P-R Interval 138 ms    QRS Duration 104 ms    Q-T Interval 386 ms    QTC Calculation (Bezet) 416 ms    Calculated P Axis 44 degrees    Calculated R Axis -41 degrees    Calculated T Axis 25 degrees    Diagnosis       Normal sinus rhythm  Possible Left atrial enlargement  Left axis deviation  Incomplete right bundle branch block  ST elevation, consider anterior injury or acute infarct  ** ** ACUTE MI / STEMI ** **  When compared with ECG of 12-FEB-2007 05:49,  Incomplete right bundle branch block is now present     GLUCOSE, POC    Collection Time: 11/18/18  7:23 AM   Result Value Ref Range    Glucose (POC) 408 (H) 65 - 100 mg/dL    Performed by Quinton Leventhal (PCT)    CBC WITH AUTOMATED DIFF    Collection Time: 11/18/18  7:28 AM   Result Value Ref Range    WBC 5.9 4.1 - 11.1 K/uL    RBC 4.97 4.10 - 5.70 M/uL    HGB 14.0 12.1 - 17.0 g/dL    HCT 41.1 36.6 - 50.3 %    MCV 82.7 80.0 - 99.0 FL    MCH 28.2 26.0 - 34.0 PG    MCHC 34.1 30.0 - 36.5 g/dL    RDW 13.0 11.5 - 14.5 %    PLATELET 774 698 - 364 K/uL    MPV 10.8 8.9 - 12.9 FL    NRBC 0.0 0  WBC    ABSOLUTE NRBC 0.00 0.00 - 0.01 K/uL    NEUTROPHILS 78 (H) 32 - 75 %    LYMPHOCYTES 15 12 - 49 %    MONOCYTES 6 5 - 13 %    EOSINOPHILS 1 0 - 7 %    BASOPHILS 0 0 - 1 %    IMMATURE GRANULOCYTES 0 0.0 - 0.5 %    ABS. NEUTROPHILS 4.6 1.8 - 8.0 K/UL    ABS. LYMPHOCYTES 0.9 0.8 - 3.5 K/UL    ABS. MONOCYTES 0.4 0.0 - 1.0 K/UL    ABS. EOSINOPHILS 0.0 0.0 - 0.4 K/UL    ABS. BASOPHILS 0.0 0.0 - 0.1 K/UL    ABS. IMM.  GRANS. 0.0 0.00 - 0.04 K/UL    DF AUTOMATED     METABOLIC PANEL, COMPREHENSIVE    Collection Time: 11/18/18  7:28 AM   Result Value Ref Range    Sodium 135 (L) 136 - 145 mmol/L    Potassium 3.6 3.5 - 5.1 mmol/L    Chloride 99 97 - 108 mmol/L    CO2 27 21 - 32 mmol/L    Anion gap 9 5 - 15 mmol/L    Glucose 410 (H) 65 - 100 mg/dL    BUN 24 (H) 6 - 20 MG/DL    Creatinine 1.22 0.70 - 1.30 MG/DL    BUN/Creatinine ratio 20 12 - 20      GFR est AA >60 >60 ml/min/1.73m2    GFR est non-AA 60 (L) >60 ml/min/1.73m2    Calcium 9.3 8.5 - 10.1 MG/DL    Bilirubin, total 0.5 0.2 - 1.0 MG/DL    ALT (SGPT) 34 12 - 78 U/L    AST (SGOT) 26 15 - 37 U/L    Alk. phosphatase 92 45 - 117 U/L    Protein, total 7.8 6.4 - 8.2 g/dL    Albumin 3.6 3.5 - 5.0 g/dL    Globulin 4.2 (H) 2.0 - 4.0 g/dL    A-G Ratio 0.9 (L) 1.1 - 2.2     CK W/ REFLX CKMB    Collection Time: 11/18/18  7:28 AM   Result Value Ref Range     (H) 39 - 308 U/L   TROPONIN I    Collection Time: 11/18/18  7:28 AM   Result Value Ref Range    Troponin-I, Qt. <0.05 <0.05 ng/mL   PROTHROMBIN TIME + INR    Collection Time: 11/18/18  7:28 AM   Result Value Ref Range    INR 1.0 0.9 - 1.1      Prothrombin time 9.8 9.0 - 11.1 sec   PTT    Collection Time: 11/18/18  7:28 AM   Result Value Ref Range    aPTT 21.6 (L) 22.1 - 32.0 sec    aPTT, therapeutic range     58.0 - 77.0 SECS   CK-MB,QUANT.     Collection Time: 11/18/18  7:28 AM   Result Value Ref Range    CK - MB 5.2 (H) <3.6 NG/ML    CK-MB Index 1.6 0 - 2.5     POC TROPONIN-I    Collection Time: 11/18/18  7:31 AM   Result Value Ref Range    Troponin-I (POC) <0.04 0.00 - 0.08 ng/mL   EKG, 12 LEAD, INITIAL    Collection Time: 11/18/18 10:55 AM   Result Value Ref Range    Ventricular Rate 62 BPM    Atrial Rate 62 BPM    P-R Interval 140 ms    QRS Duration 90 ms    Q-T Interval 408 ms    QTC Calculation (Bezet) 414 ms    Calculated P Axis 60 degrees    Calculated R Axis -37 degrees    Calculated T Axis -7 degrees    Diagnosis       Normal sinus rhythm  Possible Left atrial enlargement  Left axis deviation  Inferior infarct , age undetermined  When compared with ECG of 18-NOV-2018 07:18,  MANUAL COMPARISON REQUIRED, DATA IS UNCONFIRMED     GLUCOSE, POC Collection Time: 11/18/18 11:12 AM   Result Value Ref Range    Glucose (POC) 248 (H) 65 - 100 mg/dL    Performed by [facility]        Radiologic Studies -   CXR Results  (Last 48 hours)               11/18/18 0749  XR CHEST PORT Final result    Impression:  IMPRESSION: No evidence of acute cardiopulmonary process. Narrative:  INDICATION:  Chest Pain with syncope       COMPARISON: 2/10/2007       FINDINGS: Single AP portable view of the chest obtained at 742 demonstrates a   stable cardiomediastinal silhouette. The lungs are clear bilaterally. No osseous   abnormalities are seen. Medical Decision Making   I am the first provider for this patient. I reviewed the vital signs, available nursing notes, past medical history, past surgical history, family history and social history. Vital Signs-Reviewed the patient's vital signs. Patient Vitals for the past 12 hrs:   Temp Pulse Resp BP SpO2   11/18/18 1043 (P) 97.6 °F (36.4 °C) (P) 61 (P) 16 (P) 128/77 (P) 93 %   11/18/18 0815 -- 77 14 110/64 96 %   11/18/18 0745 -- 80 14 126/84 95 %   11/18/18 0730 -- 80 22 (!) 142/98 95 %   11/18/18 0717 97.8 °F (36.6 °C) 73 16 125/76 96 %       Pulse Oximetry Analysis - 96% on RA    Cardiac Monitor:   Rate: 73 bpm  Rhythm: Normal Sinus Rhythm     ED EKG interpretation: 07:18  Rhythm: normal sinus rhythm; and regular . Rate (approx.): 70; Axis: left axis deviation; NM Interval: normal; QRS interval: incomplete RBBB; ST/T wave: st elevation in v2, 4mm; no other ethel; q waves in III and AVF; possible Brugada pattern V1/V2 ; When compared to ekg on  10/2016, only V2 has changed; . This EKG was interpreted by Aida Coles MD,ED Provider. Records Reviewed: Nursing Notes and Old Medical Records    Provider Notes (Medical Decision Making):   DDx: ACS, DKA, vasovagal episode, dysrhythmia    ED Course:   Initial assessment performed.  The patients presenting problems have been discussed, and they are in agreement with the care plan formulated and outlined with them. I have encouraged them to ask questions as they arise throughout their visit. Consult Note:  7:28 AM  Hermann Johnson MD spoke with Dr. Matthew Butler,  Specialty: cardiology  Discussed pt's hx, disposition, and available diagnostic and imaging results. Reviewed care plans. Consultant agrees with plans as outlined. Dr. Matthew Butler recommend stat troponin and stat ECHO. He does not feel this is a STEMI as there is only ST elevation in 1 lead in V2. He states to call him back when istat troponin is resulted. 7:44 AM   Istat troponin is negative. Spoke with Dr. Matthew Butler who recommends to continue plan for stat ECHO and admit to hospitalist. He will see the pt later today. 8:45 AM   Sydneyjeffery called back and will take pt to cath lab; he suspects possible brugada syndrome. Also spoke with the hospitalist who will admit the pt. CONSULT NOTE:   8:44 AM  Hermann Johnson MD spoke with Dr. oRbert Figueredo,   Specialty: Hospitalist  Discussed pt's hx, disposition, and available diagnostic and imaging results. Reviewed care plans. Consultant will evaluate pt for admission. Written by Kennedi Garcia ED Scribe, as dictated by Hermann Johnson MD .    Critical Care Time:   0    Disposition:  Admit Note:  8:45 AM  Pt is being admitted by Dr. Robert Figueredo. The results of their tests and reason(s) for their admission have been discussed with pt and/or available family. They convey agreement and understanding for the need to be admitted and for admission diagnosis. PLAN:  1. Admit to hospitalist    Diagnosis     Clinical Impression:   1. Syncope and collapse    2. Abnormal EKG    3. Type 2 diabetes mellitus with hyperglycemia, with long-term current use of insulin ([facility] [address]        Attestations:     This note is prepared by Violeta Mckeon, acting as Scribe for Hermann Johnson MD.    Hermann Johnson MD: The scribe's documentation has been prepared under my direction and personally reviewed by me in its entirety. I confirm that the note above accurately reflects all work, treatment, procedures, and medical decision making performed by me.

## 2018-11-18 NOTE — H&P
Hospitalist Admission Note    NAME: Mandy Noble   :  1955   MRN:  [id number]     Date/Time:  2018 10:31 AM    Patient PCP: April Shea MD  ________________________________________________________________________    My assessment of this patient's clinical condition and my plan of care is as follows.     Assessment / Plan:  Syncope likely related to cardiac arrhythmia from suspected Brugada syndrome  Acute ST elevation in lead V2 concerning for Brugada syndrome  Management per cardiology  Patient underwent a coronary angiogram which showed no obstructive disease  Discussed plan with Dr. Ade Rankin who recommended observation in IVCU defibrillation pads and no antiarrhythmics at this time  Troponins x1 is 0.05  Cards planning on EP study tomorrow    Diabetes mellitus uncontrolled  Last hemoglobin A1c is 8  Currently sugars are around 400  He does not remember his exact dose of insulin  Based on his medication list we will start him on Lantus 20 units at bedtime and lispro 6 units twice daily along with insulin sliding scale and adjust on an as-needed basis  He will be kept n.p.o. so dose of his lispro and Lantus has been slightly decreased  Hold his home glipizide and metformin    Hypertension controlled  Does not remember his home medication  Currently blood pressure is controlled  Consult pharmacy for medication reconciliation        Code Status: full   Surrogate Decision Maker:    DVT Prophylaxis: start from tomorrow, as ep procedure is planned for tomorrow  GI Prophylaxis: not indicated    Baseline: from home tomorrow        Subjective:   CHIEF COMPLAINT: syncope    HISTORY OF PRESENT ILLNESS:     This is a 69-year-old male with past medical history of diabetes mellitus hypertension was brought to the hospital with chief complaint of syncopal episode this a.m.  Patient is currently drowsy from coronary angiogram and is not able to provide reliable information so information is obtained by talking to his friend Mrs. Jero Rodriguez to them patient had an episode of passing out about 630 this a.m. along with some generalized weakness.  He was walking out of the bathroom suddenly had a sensation of a warm feeling followed by loss of consciousness and hit the floor.  There was no associated jerking movements.  It was not a mechanical fall.  When EMS arrived patient blood pressure was normal and he was waking up. Paul Escamilla was noted to have a blood sugar of 402.  He had an EKG which showed ST elevation in lead V2 for which a code STEMI was alerted and when patient arrived to the hospital he was taken to Cath Lab and underwent a coronary angiogram which was within normal limits per Dr. Gabe Barnes But there was concern for possible Brugada syndrome for which admission was requested. We were asked to admit for work up and evaluation of the above problems. Past Medical History:   Diagnosis Date    Diabetes ([address]     Hypertension         Past Surgical History:   Procedure Laterality Date    COLONOSCOPY  1/2/2015         HX HEENT      l cararact removal       Social History     Tobacco Use    Smoking status: Never Smoker    Smokeless tobacco: Never Used   Substance Use Topics    Alcohol use: No        Family History   Problem Relation Age of Onset    Diabetes Mother     Hypertension Mother      No Known Allergies     Prior to Admission medications    Medication Sig Start Date End Date Taking? Authorizing Provider   Insulin Needles, Disposable, (PEN NEEDLE) 31 gauge x 3/16\" ndle 5 times a day 4/22/18  Yes Carlos Manley MD   Blood-Glucose Meter (ONETOUCH VERIO IQ METER) monitoring kit Use to test blood sugar three times daily 3/28/18  Yes Carlos Manley MD   glucose blood VI test strips (ONETOUCH VERIO) strip Use to test blood sugar four times daily.  Dx.e11.9 3/25/18  Yes Carlos Manley MD   Insulin Needles, Disposable, 31 gauge x 5/16\" ndle USE TO INJECT INSULIN UP TO THREE TIMES A DAY AS DIRECTED BY DOCTOR 3/1/18  Yes Darvin Zuniga MD   insulin lispro (HUMALOG) 100 unit/mL kwikpen Inject 6 units before lunch and dinner  Patient taking differently: Inject 6 units before lunch and 12 units at dinner 2/28/18  Yes French Peralta MD   metFORMIN (GLUCOPHAGE) 1,000 mg tablet Take 1 Tab by mouth two (2) times daily (with meals). 2/15/18  Yes French Peralta MD   atorvastatin (LIPITOR) 20 mg tablet Take 1 Tab by mouth daily. 2/15/18  Yes French Peralta MD   glipiZIDE (GLUCOTROL) 10 mg tablet Take 1 Tab by mouth two (2) times a day. 2/15/18  Yes French Peralta MD   insulin detemir (LEVEMIR FLEXTOUCH) 100 unit/mL (3 mL) inpn 30 Units by SubCUTAneous route nightly. Patient taking differently: 40 Units by SubCUTAneous route two (2) times a day. Inject 12 units in the morning and 30 units at bedtime. 1/18/18  Yes Darvin Zuniga MD   fenofibrate nanocrystallized (TRICOR) 145 mg tablet Take 1 Tab by mouth daily. 4/22/18   French Peralta MD   dulaglutide (TRULICITY) 1.5 CHRISTOPHER/4.7 mL sub-q pen 0.5 mL by SubCUTAneous route every seven (7) days. 3/25/18   French Peralta MD   clotrimazole-betamethasone (LOTRISONE) topical cream Apply  to affected area two (2) times a day. 1/18/18   Saturnino Echols MD   SOLUS V2 TEST STRIPS strip  12/31/14   Provider, Historical   LITE TOUCH LANCETS 33 gauge [facility]  12/31/14   Provider, Historical   LITE TOUCH LANCING DEVICE [facility]  12/31/14   Provider, Historical   GAVILYTE-N 420 gram solution  12/31/14   Provider, Historical       REVIEW OF SYSTEMS:     I am not able to complete the review of systems because:    The patient is intubated and sedated   y The patient has altered mental status due to his acute medical problems    The patient has baseline aphasia from prior stroke(s)    The patient has baseline dementia and is not reliable historian    The patient is in acute medical distress and unable to provide information           Total of 12 systems reviewed as follows:       POSITIVE= underlined text  Negative = text not underlined  General:  fever, chills, sweats, generalized weakness, weight loss/gain,      loss of appetite   Eyes:    blurred vision, eye pain, loss of vision, double vision  ENT:    rhinorrhea, pharyngitis   Respiratory:   cough, sputum production, SOB, RESENDIZ, wheezing, pleuritic pain   Cardiology:   chest pain, palpitations, orthopnea, PND, edema, syncope   Gastrointestinal:  abdominal pain , N/V, diarrhea, dysphagia, constipation, bleeding   Genitourinary:  frequency, urgency, dysuria, hematuria, incontinence   Muskuloskeletal :  arthralgia, myalgia, back pain  Hematology:  easy bruising, nose or gum bleeding, lymphadenopathy   Dermatological: rash, ulceration, pruritis, color change / jaundice  Endocrine:   hot flashes or polydipsia   Neurological:  headache, dizziness, confusion, focal weakness, paresthesia,     Speech difficulties, memory loss, gait difficulty  Psychological: Feelings of anxiety, depression, agitation    Objective:   VITALS:    Visit Vitals  /64   Pulse 77   Temp 97.8 °F (36.6 °C)   Resp 14   Ht 5' 7\" (1.702 m)   Wt 69 kg (152 lb 1.9 oz)   SpO2 96%   BMI 23.82 kg/m²       PHYSICAL EXAM:    General:    no distress, appears stated age. HEENT: Atraumatic, anicteric sclerae, pink conjunctivae     No oral ulcers, mucosa moist  Neck:  Supple, symmetrical,  thyroid: non tender  Lungs:   Clear to auscultation bilaterally. No Wheezing or Rhonchi. No rales. Chest wall:  No tenderness  No Accessory muscle use. Heart:   Regular  rhythm,  No  murmur   No edema  Abdomen:   Soft, non-tender. Not distended. Bowel sounds normal  Extremities: No cyanosis. No clubbing,      Skin turgor normal, dressing over radius + from cath   Skin:     Not pale. Not Jaundiced  No rashes   Psych:  Not anxious or agitated.   Neurologic: Drowsy but wakes to commands, confused from procedure, moving all four extremeties    _______________________________________________________________________  Care Plan discussed with:    Comments   Patient y    Family      RN y    Care Manager                    Consultant:      _______________________________________________________________________  Expected  Disposition:   Home with Family y   HH/PT/OT/RN    SNF/LTC    LEONIE    ________________________________________________________________________  TOTAL TIME:    61 Minutes    Critical Care Provided     Minutes non procedure based      Comments    y Reviewed previous records   >50% of visit spent in counseling and coordination of care y Discussion with patient and/or family and questions answered       ________________________________________________________________________  Signed: Padma Douglas MD    Procedures: see electronic medical records for all procedures/Xrays and details which were not copied into this note but were reviewed prior to creation of Plan.     LAB DATA REVIEWED:    Recent Results (from the past 24 hour(s))   EKG, 12 LEAD, INITIAL    Collection Time: 11/18/18  7:18 AM   Result Value Ref Range    Ventricular Rate 70 BPM    Atrial Rate 70 BPM    P-R Interval 138 ms    QRS Duration 104 ms    Q-T Interval 386 ms    QTC Calculation (Bezet) 416 ms    Calculated P Axis 44 degrees    Calculated R Axis -41 degrees    Calculated T Axis 25 degrees    Diagnosis       Normal sinus rhythm  Possible Left atrial enlargement  Left axis deviation  Incomplete right bundle branch block  ST elevation, consider anterior injury or acute infarct  ** ** ACUTE MI / STEMI ** **  When compared with ECG of 12-FEB-2007 05:49,  Incomplete right bundle branch block is now present     GLUCOSE, POC    Collection Time: 11/18/18  7:23 AM   Result Value Ref Range    Glucose (POC) 408 (H) 65 - 100 mg/dL    Performed by Mickie Hollingsworth (PCT)    CBC WITH AUTOMATED DIFF    Collection Time: 11/18/18  7:28 AM   Result Value Ref Range    WBC 5.9 4.1 - 11.1 K/uL    RBC 4.97 4.10 - 5.70 M/uL    HGB 14.0 12.1 - 17.0 g/dL    HCT 41.1 36.6 - 50.3 %    MCV 82.7 80.0 - 99.0 FL    MCH 28.2 26.0 - 34.0 PG    MCHC 34.1 30.0 - 36.5 g/dL    RDW 13.0 11.5 - 14.5 %    PLATELET 476 871 - 035 K/uL    MPV 10.8 8.9 - 12.9 FL    NRBC 0.0 0  WBC    ABSOLUTE NRBC 0.00 0.00 - 0.01 K/uL    NEUTROPHILS 78 (H) 32 - 75 %    LYMPHOCYTES 15 12 - 49 %    MONOCYTES 6 5 - 13 %    EOSINOPHILS 1 0 - 7 %    BASOPHILS 0 0 - 1 %    IMMATURE GRANULOCYTES 0 0.0 - 0.5 %    ABS. NEUTROPHILS 4.6 1.8 - 8.0 K/UL    ABS. LYMPHOCYTES 0.9 0.8 - 3.5 K/UL    ABS. MONOCYTES 0.4 0.0 - 1.0 K/UL    ABS. EOSINOPHILS 0.0 0.0 - 0.4 K/UL    ABS. BASOPHILS 0.0 0.0 - 0.1 K/UL    ABS. IMM. GRANS. 0.0 0.00 - 0.04 K/UL    DF AUTOMATED     METABOLIC PANEL, COMPREHENSIVE    Collection Time: 11/18/18  7:28 AM   Result Value Ref Range    Sodium 135 (L) 136 - 145 mmol/L    Potassium 3.6 3.5 - 5.1 mmol/L    Chloride 99 97 - 108 mmol/L    CO2 27 21 - 32 mmol/L    Anion gap 9 5 - 15 mmol/L    Glucose 410 (H) 65 - 100 mg/dL    BUN 24 (H) 6 - 20 MG/DL    Creatinine 1.22 0.70 - 1.30 MG/DL    BUN/Creatinine ratio 20 12 - 20      GFR est AA >60 >60 ml/min/1.73m2    GFR est non-AA 60 (L) >60 ml/min/1.73m2    Calcium 9.3 8.5 - 10.1 MG/DL    Bilirubin, total 0.5 0.2 - 1.0 MG/DL    ALT (SGPT) 34 12 - 78 U/L    AST (SGOT) 26 15 - 37 U/L    Alk.  phosphatase 92 45 - 117 U/L    Protein, total 7.8 6.4 - 8.2 g/dL    Albumin 3.6 3.5 - 5.0 g/dL    Globulin 4.2 (H) 2.0 - 4.0 g/dL    A-G Ratio 0.9 (L) 1.1 - 2.2     CK W/ REFLX CKMB    Collection Time: 11/18/18  7:28 AM   Result Value Ref Range     (H) 39 - 308 U/L   TROPONIN I    Collection Time: 11/18/18  7:28 AM   Result Value Ref Range    Troponin-I, Qt. <0.05 <0.05 ng/mL   PROTHROMBIN TIME + INR    Collection Time: 11/18/18  7:28 AM   Result Value Ref Range    INR 1.0 0.9 - 1.1      Prothrombin time 9.8 9.0 - 11.1 sec   PTT    Collection Time: 11/18/18  7:28 AM   Result Value Ref Range aPTT 21.6 (L) 22.1 - 32.0 sec    aPTT, therapeutic range     58.0 - 77.0 SECS   CK-MB,QUANT.     Collection Time: 11/18/18  7:28 AM   Result Value Ref Range    CK - MB 5.2 (H) <3.6 NG/ML    CK-MB Index 1.6 0 - 2.5     POC TROPONIN-I    Collection Time: 11/18/18  7:31 AM   Result Value Ref Range    Troponin-I (POC) <0.04 0.00 - 0.08 ng/mL

## 2018-11-18 NOTE — ACP (ADVANCE CARE PLANNING)
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 [facility]) ICD-10-CM: E13.319  ICD-9-CM: 249.50, 362.01  2/18/2018 Yes        Type 2 diabetes mellitus with diabetic polyneuropathy, with long-term current use of insulin (HCC) ICD-10-CM: E11.42, Z79.4  ICD-9-CM: 250.60, 357.2, V58.67  2/18/2018 Yes        Uncontrolled type 2 diabetes mellitus with complication, with long-term current use of insulin ([facility] [address] ICD-10-CM: E11.8, E11.65, Z79.4  ICD-9-CM: 250.82, V58.67  2/18/2018 Yes        Dyslipidemia ICD-10-CM: E78.5  ICD-9-CM: 272.4  9/25/2014 Yes        Mitral valve prolapse ICD-10-CM: I34.1  ICD-9-CM: 424.0  9/25/2014 Yes               These active diagnoses are of sufficient risk that focused discussion on advance care planning is indicated in order to allow the patient to thoughtfully consider personal goals of care, and if situations arise that prevent the ability to personally give input, to ensure appropriate representation of their personal desires for different levels and aggressiveness of care. Discussion:     Persons present and participating in discussion: Gregory Henao MD, Lauren Alvarenga. Discussion: Code status addressed and wants to be a full code for now. Time Spent:     Total time spent face-to-face in education and discussion: 16  minutes.       Rut Zhang Shabnam Craig MD  11/18/2018  10:54 AM

## 2018-11-19 ENCOUNTER — APPOINTMENT (OUTPATIENT)
Dept: MRI IMAGING | Age: 63
DRG: 225 | End: 2018-11-19
Attending: INTERNAL MEDICINE
Payer: COMMERCIAL

## 2018-11-19 LAB
ANION GAP SERPL CALC-SCNC: 5 MMOL/L (ref 5–15)
ATRIAL RATE: 62 BPM
ATRIAL RATE: 70 BPM
BASOPHILS # BLD: 0 K/UL (ref 0–0.1)
BASOPHILS NFR BLD: 0 % (ref 0–1)
BUN SERPL-MCNC: 21 MG/DL (ref 6–20)
BUN/CREAT SERPL: 23 (ref 12–20)
CALCIUM SERPL-MCNC: 8.5 MG/DL (ref 8.5–10.1)
CALCULATED P AXIS, ECG09: 44 DEGREES
CALCULATED P AXIS, ECG09: 60 DEGREES
CALCULATED R AXIS, ECG10: -37 DEGREES
CALCULATED R AXIS, ECG10: -41 DEGREES
CALCULATED T AXIS, ECG11: -7 DEGREES
CALCULATED T AXIS, ECG11: 25 DEGREES
CHLORIDE SERPL-SCNC: 106 MMOL/L (ref 97–108)
CO2 SERPL-SCNC: 29 MMOL/L (ref 21–32)
CREAT SERPL-MCNC: 0.93 MG/DL (ref 0.7–1.3)
DIAGNOSIS, 93000: NORMAL
DIAGNOSIS, 93000: NORMAL
DIFFERENTIAL METHOD BLD: ABNORMAL
EOSINOPHIL # BLD: 0.1 K/UL (ref 0–0.4)
EOSINOPHIL NFR BLD: 1 % (ref 0–7)
ERYTHROCYTE [DISTWIDTH] IN BLOOD BY AUTOMATED COUNT: 13.3 % (ref 11.5–14.5)
GLUCOSE BLD STRIP.AUTO-MCNC: 200 MG/DL (ref 65–100)
GLUCOSE BLD STRIP.AUTO-MCNC: 248 MG/DL (ref 65–100)
GLUCOSE BLD STRIP.AUTO-MCNC: 327 MG/DL (ref 65–100)
GLUCOSE BLD STRIP.AUTO-MCNC: 385 MG/DL (ref 65–100)
GLUCOSE SERPL-MCNC: 200 MG/DL (ref 65–100)
HCT VFR BLD AUTO: 35.8 % (ref 36.6–50.3)
HGB BLD-MCNC: 11.8 G/DL (ref 12.1–17)
IMM GRANULOCYTES # BLD: 0 K/UL (ref 0–0.04)
IMM GRANULOCYTES NFR BLD AUTO: 0 % (ref 0–0.5)
LYMPHOCYTES # BLD: 0.8 K/UL (ref 0.8–3.5)
LYMPHOCYTES NFR BLD: 17 % (ref 12–49)
MCH RBC QN AUTO: 28 PG (ref 26–34)
MCHC RBC AUTO-ENTMCNC: 33 G/DL (ref 30–36.5)
MCV RBC AUTO: 85 FL (ref 80–99)
MONOCYTES # BLD: 0.4 K/UL (ref 0–1)
MONOCYTES NFR BLD: 7 % (ref 5–13)
NEUTS SEG # BLD: 3.7 K/UL (ref 1.8–8)
NEUTS SEG NFR BLD: 75 % (ref 32–75)
NRBC # BLD: 0 K/UL (ref 0–0.01)
NRBC BLD-RTO: 0 PER 100 WBC
P-R INTERVAL, ECG05: 138 MS
P-R INTERVAL, ECG05: 140 MS
PLATELET # BLD AUTO: 170 K/UL (ref 150–400)
PMV BLD AUTO: 11 FL (ref 8.9–12.9)
POTASSIUM SERPL-SCNC: 4 MMOL/L (ref 3.5–5.1)
Q-T INTERVAL, ECG07: 386 MS
Q-T INTERVAL, ECG07: 408 MS
QRS DURATION, ECG06: 104 MS
QRS DURATION, ECG06: 90 MS
QTC CALCULATION (BEZET), ECG08: 414 MS
QTC CALCULATION (BEZET), ECG08: 416 MS
RBC # BLD AUTO: 4.21 M/UL (ref 4.1–5.7)
SERVICE CMNT-IMP: ABNORMAL
SODIUM SERPL-SCNC: 140 MMOL/L (ref 136–145)
VENTRICULAR RATE, ECG03: 62 BPM
VENTRICULAR RATE, ECG03: 70 BPM
WBC # BLD AUTO: 5 K/UL (ref 4.1–11.1)

## 2018-11-19 PROCEDURE — 65660000000 HC RM CCU STEPDOWN

## 2018-11-19 PROCEDURE — 80048 BASIC METABOLIC PNL TOTAL CA: CPT

## 2018-11-19 PROCEDURE — 74011250637 HC RX REV CODE- 250/637: Performed by: INTERNAL MEDICINE

## 2018-11-19 PROCEDURE — 93306 TTE W/DOPPLER COMPLETE: CPT

## 2018-11-19 PROCEDURE — 36415 COLL VENOUS BLD VENIPUNCTURE: CPT

## 2018-11-19 PROCEDURE — 82962 GLUCOSE BLOOD TEST: CPT

## 2018-11-19 PROCEDURE — 74011636637 HC RX REV CODE- 636/637: Performed by: INTERNAL MEDICINE

## 2018-11-19 PROCEDURE — 85025 COMPLETE CBC W/AUTO DIFF WBC: CPT

## 2018-11-19 RX ORDER — INSULIN GLARGINE 100 [IU]/ML
30 INJECTION, SOLUTION SUBCUTANEOUS
Status: DISCONTINUED | OUTPATIENT
Start: 2018-11-19 | End: 2018-11-21

## 2018-11-19 RX ORDER — LORAZEPAM 1 MG/1
1 TABLET ORAL
Status: DISCONTINUED | OUTPATIENT
Start: 2018-11-20 | End: 2018-11-19

## 2018-11-19 RX ORDER — METFORMIN HYDROCHLORIDE 500 MG/1
1000 TABLET, EXTENDED RELEASE ORAL 2 TIMES DAILY WITH MEALS
Status: DISCONTINUED | OUTPATIENT
Start: 2018-11-20 | End: 2018-11-22 | Stop reason: HOSPADM

## 2018-11-19 RX ORDER — INSULIN GLARGINE 100 [IU]/ML
10 INJECTION, SOLUTION SUBCUTANEOUS DAILY
Status: DISCONTINUED | OUTPATIENT
Start: 2018-11-19 | End: 2018-11-20

## 2018-11-19 RX ORDER — LORAZEPAM 2 MG/ML
1 INJECTION INTRAMUSCULAR
Status: COMPLETED | OUTPATIENT
Start: 2018-11-20 | End: 2018-11-20

## 2018-11-19 RX ADMIN — Medication 10 ML: at 21:54

## 2018-11-19 RX ADMIN — ASPIRIN 81 MG: 81 TABLET, COATED ORAL at 08:18

## 2018-11-19 RX ADMIN — INSULIN LISPRO 6 UNITS: 100 INJECTION, SOLUTION INTRAVENOUS; SUBCUTANEOUS at 17:43

## 2018-11-19 RX ADMIN — INSULIN LISPRO 7 UNITS: 100 INJECTION, SOLUTION INTRAVENOUS; SUBCUTANEOUS at 11:52

## 2018-11-19 RX ADMIN — INSULIN LISPRO 7 UNITS: 100 INJECTION, SOLUTION INTRAVENOUS; SUBCUTANEOUS at 17:43

## 2018-11-19 RX ADMIN — INSULIN GLARGINE 30 UNITS: 100 INJECTION, SOLUTION SUBCUTANEOUS at 21:55

## 2018-11-19 RX ADMIN — INSULIN GLARGINE 10 UNITS: 100 INJECTION, SOLUTION SUBCUTANEOUS at 11:22

## 2018-11-19 RX ADMIN — FENOFIBRATE 145 MG: 145 TABLET ORAL at 08:18

## 2018-11-19 RX ADMIN — INSULIN LISPRO 2 UNITS: 100 INJECTION, SOLUTION INTRAVENOUS; SUBCUTANEOUS at 21:54

## 2018-11-19 RX ADMIN — ATORVASTATIN CALCIUM 20 MG: 20 TABLET, FILM COATED ORAL at 08:18

## 2018-11-19 NOTE — CONSULTS
[address] [place] [address]  [phone]        Date of  Admission: 11/18/2018  7:16 AM     Admission type:Emergency    Consult for:  Syncope, ? Brugada Syndrome  Consult by: Dr Mery Rowan NP     Subjective:     Leigha Villa is a 70 y.o. male admitted for Chest pain, cardiac  Brugada syndrome. Pt reports a single syncopal episode post micturating yesterday morning. He began to feel warm, diaphoretic and lightheaded prior to syncope. He was brought to [facility],noted to have abnormal EKG with subsequent diagnostic cardiac cath. At this time patient denies any chest pain,pressure, tightness, dyspnea or edema.      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 ([address] 02/18/2018    Type 2 diabetes mellitus with diabetic polyneuropathy, with long-term current use of insulin ([address] 02/18/2018    Uncontrolled type 2 diabetes mellitus with complication, with long-term current use of insulin ([address] 02/18/2018    Dyslipidemia 09/25/2014    Dermatitis 18/03/5106    Umbilical hernia 55/71/5405    Femoral bruit 09/25/2014    Mitral valve prolapse 09/25/2014      French Peralta MD  Past Medical History:   Diagnosis Date    Diabetes ([address]     Hypertension       Social History     Socioeconomic History    Marital status: SINGLE     Spouse name: Not on file    Number of children: 0    Years of education: Not on file    Highest education level: Not on file   Social Needs    Financial resource strain: Not on file    Food insecurity - worry: Not on file    Food insecurity - inability: Not on file   [employer] needs - medical: Not on file   [employer] needs - non-medical: Not on file   Occupational History    Occupation: [employer]   Tobacco Use    Smoking status: Never Smoker  Smokeless tobacco: Never Used   Substance and Sexual Activity    Alcohol use: No    Drug use: No    Sexual activity: Yes     Partners: Female     Birth control/protection: None   Other Topics Concern    Not on file   Social History Narrative    Not on file     No Known Allergies   Family History   Problem Relation Age of Onset    Diabetes Mother     Hypertension Mother       Current Facility-Administered Medications   Medication Dose Route Frequency    0.9% sodium chloride (MBP/ADV) infusion        0.9% sodium chloride infusion        PHENYLephrine (CHI-SYNEPHRINE) 10 mg/mL injection        atorvastatin (LIPITOR) tablet 20 mg  20 mg Oral DAILY    fenofibrate nanocrystallized (TRICOR) tablet 145 mg  145 mg Oral DAILY    insulin glargine (LANTUS) injection 20 Units  20 Units SubCUTAneous QHS    insulin lispro (HUMALOG) injection 6 Units  6 Units SubCUTAneous BID WITH MEALS    sodium chloride (NS) flush 5-10 mL  5-10 mL IntraVENous Q8H    sodium chloride (NS) flush 5-10 mL  5-10 mL IntraVENous PRN    acetaminophen (TYLENOL) tablet 650 mg  650 mg Oral Q6H PRN    ondansetron (ZOFRAN) injection 4 mg  4 mg IntraVENous Q6H PRN    docusate sodium (COLACE) capsule 100 mg  100 mg Oral DAILY PRN    0.9% sodium chloride infusion  50 mL/hr IntraVENous CONTINUOUS    insulin lispro (HUMALOG) injection   SubCUTAneous AC&HS    glucose chewable tablet 16 g  4 Tab Oral PRN    dextrose (D50W) injection syrg 12.5-25 g  12.5-25 g IntraVENous PRN    glucagon (GLUCAGEN) injection 1 mg  1 mg IntraMUSCular PRN    sodium chloride (NS) flush 5-10 mL  5-10 mL IntraVENous Q8H    sodium chloride (NS) flush 5-10 mL  5-10 mL IntraVENous PRN    aspirin delayed-release tablet 81 mg  81 mg Oral DAILY         Review of Symptoms:  Constitutional: negative  Eyes: negative  Ears, nose, mouth, throat, and face: negative  Respiratory: negative  Cardiovascular: negative  Gastrointestinal: negative  Genitourinary:negative  Musculoskeletal:negative  Neurological: negative  Endocrine: negative     Subjective:      Visit Vitals  /79   Pulse 63   Temp 98 °F (36.7 °C)   Resp 16   Ht 5' 7\" (1.702 m)   Wt 152 lb 1.9 oz (69 kg)   SpO2 99%   BMI 23.82 kg/m²       Physical:  Heart: Regular, S1 WNL and S2 WNL. No S3 or S4, no m/S3/JVD, no carotid bruits   Lungs: clear   Abdomen: Soft, +BS, NTND   Extremities: LE jojo +DP/PT, no edema   Neurologic: Grossly normal    Data Review:   Recent Labs     11/19/18  0306 11/18/18  0728   WBC 5.0 5.9   HGB 11.8* 14.0   HCT 35.8* 41.1    179     Recent Labs     11/19/18  0306 11/18/18  0728    135*   K 4.0 3.6    99   CO2 29 27   * 410*   BUN 21* 24*   CREA 0.93 1.22   CA 8.5 9.3   ALB  --  3.6   TBILI  --  0.5   SGOT  --  26   ALT  --  34   INR  --  1.0       Recent Labs     11/18/18  0728   TROIQ <0.05   CKMB 5.2*         Intake/Output Summary (Last 24 hours) at 11/19/2018 0850  Last data filed at 11/18/2018 1500  Gross per 24 hour   Intake 876.67 ml   Output 350 ml   Net 526.67 ml        Cardiographics    Telemetry: sinus rhythm, ventricular rate 65  ECG: Sinus, old inf MI        Assessment:            Active Problems:    Dyslipidemia (9/25/2014)      Mitral valve prolapse (9/25/2014)      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 We were consulted to evaulate for Brugada Syndrome.   EKG is not typical pattern for Brugada's Syndrome. No family hx of SCD. Will get cardiac MRI today to assess for scarring. If scarring is noted, will schedule for EPS. Thank you for this interesting consultation. Analilia Hankins ANP    Patient seen and examined by me with nurse practitioner. I personally performed all components of the history, physical, and medical decision making and agree with the assessment and plan with minor modifications as noted. Micturition syncope with atypical ekg and inferior infarct. Cardiac mri schedule for 2pm. Will discuss possible eps post mri. Thank you for this consultation.     Ynes Meza MD, Therese Forbes

## 2018-11-19 NOTE — PROGRESS NOTES
Bedside and Verbal shift change report given to Liam Vail RN (oncoming nurse) by Irina Donaldson RN (offgoing nurse). Report included the following information SBAR, Kardex, ED Summary, Procedure Summary, Intake/Output, MAR, Accordion, Recent Results and Cardiac Rhythm NSR.

## 2018-11-19 NOTE — CARDIO/PULMONARY
CP Rehab Note: chart review    Admit: syncope/collapse    Mhx: DM, HTN, Brugada syndrome    Former smoker. Cardiology note today:  Luda Quiñonez is a pleasant 70year old male with syncopal episode x1, normal coronaries, normal EF with abnormal EKG with old inf infarct. We were consulted to evaulate for Brugada Syndrome. EKG is not typical pattern for Brugada's Syndrome. No family hx of SCD. Will get cardiac MRI today to assess for scarring. If scarring is noted, will schedule for EPS.

## 2018-11-19 NOTE — PROGRESS NOTES
[address] [place] [address], [address] [place] [address] Ne, [address] [facility]  [phone]      Cardiology Progress Note      11/19/2018 5:12 PM    Admit Date: 11/18/2018    Admit Diagnosis:   Chest pain, cardiac  Brugada syndrome    Subjective:     Alesia Arshad is a 70 y.o. male who was admitted with syncope/abnormal EKG    Overnight events:  -cardiac cath negative for significant CAD  -attempted cardiac MRI, but was claustrophobic  -VSS  -labs steady  -Mr. Jarad East is feeling well today. He feels embarrassed about being unable to tolerate the MRI. He denies chest pain, SOB, palpitations, dizziness/lightheadedness.       Visit Vitals  /75   Pulse 76   Temp 98.2 °F (36.8 °C) Comment: vitals taken late b/c pt was off floor   Resp 18   Ht 5' 7\" (1.702 m)   Wt 69 kg (152 lb 1.9 oz)   SpO2 95%   BMI 23.82 kg/m²       Current Facility-Administered Medications   Medication Dose Route Frequency    insulin glargine (LANTUS) injection 30 Units  30 Units SubCUTAneous QHS    insulin glargine (LANTUS) injection 10 Units  10 Units SubCUTAneous DAILY    [START ON 11/20/2018] metFORMIN ER (GLUCOPHAGE XR) tablet 1,000 mg  1,000 mg Oral BID WITH MEALS    [START ON 11/20/2018] LORazepam (ATIVAN) tablet 1 mg  1 mg Oral ONCE PRN    0.9% sodium chloride (MBP/ADV) infusion        0.9% sodium chloride infusion        PHENYLephrine (CHI-SYNEPHRINE) 10 mg/mL injection        atorvastatin (LIPITOR) tablet 20 mg  20 mg Oral DAILY    fenofibrate nanocrystallized (TRICOR) tablet 145 mg  145 mg Oral DAILY    insulin lispro (HUMALOG) injection 6 Units  6 Units SubCUTAneous BID WITH MEALS    sodium chloride (NS) flush 5-10 mL  5-10 mL IntraVENous Q8H    sodium chloride (NS) flush 5-10 mL  5-10 mL IntraVENous PRN    acetaminophen (TYLENOL) tablet 650 mg  650 mg Oral Q6H PRN    ondansetron (ZOFRAN) injection 4 mg  4 mg IntraVENous Q6H PRN    docusate sodium (COLACE) capsule 100 mg  100 mg Oral DAILY PRN    insulin lispro (HUMALOG) injection SubCUTAneous AC&HS    glucose chewable tablet 16 g  4 Tab Oral PRN    dextrose (D50W) injection syrg 12.5-25 g  12.5-25 g IntraVENous PRN    glucagon (GLUCAGEN) injection 1 mg  1 mg IntraMUSCular PRN    sodium chloride (NS) flush 5-10 mL  5-10 mL IntraVENous Q8H    sodium chloride (NS) flush 5-10 mL  5-10 mL IntraVENous PRN    aspirin delayed-release tablet 81 mg  81 mg Oral DAILY       Objective:      Physical Exam:  General Appearance:  pleasant adult AAM resting in bed in NAD  Chest:   Clear  Cardiovascular:  Regular rate and rhythm, no murmur.   Abdomen:   Soft, non-tender, bowel sounds are active.   Extremities: palpable distal pulses; no edema   Skin:  Warm and dry.     Data Review:   Recent Labs     11/19/18  0306 11/18/18  0728   WBC 5.0 5.9   HGB 11.8* 14.0   HCT 35.8* 41.1    179     Recent Labs     11/19/18 0306 11/18/18  0728    135*   K 4.0 3.6    99   CO2 29 27   * 410*   BUN 21* 24*   CREA 0.93 1.22   CA 8.5 9.3   ALB  --  3.6   TBILI  --  0.5   SGOT  --  26   ALT  --  34   INR  --  1.0       Recent Labs     11/18/18  0728   TROIQ <0.05   CKMB 5.2*       No intake or output data in the 24 hours ending 11/19/18 1712     Telemetry: SR  EKG: NSR. 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  Cardiac cath negative for obstructive CAD. · Patient attempted cardiac MRI today, but was claustrophobic. He states that he was told they will try again tomorrow with sedation. · Tele without significant ectopy. Consider event monitor at discharge pending whether patient able to tolerate MRI. · ECHO without new concerns. EF 55-60%. · EP following. Jake Sheldon NP  DNP, RN, [facility]    Patient seen and examined by me with nurse practitioner  Jake Sheldon. I personally performed all components of the history, physical, and medical decision making and agree with the assessment and plan with minor modifications as noted. Will proceed with EP evaluation as recommended, plan for outpatient event monitor when complete, refer for genetic testing for possible Brugada syndrome. I discussed with Dr. Jose M Jesus who has facilitated appointment in 1 week in her office to arrange for genetic testing. Added to discharge instructions.

## 2018-11-19 NOTE — PROGRESS NOTES
Patient is claustrophobic. He attempted to go into MRI machine twice but did not like being in it. Refused MRI. Informed Petty and she will talk to Dr. Laura Drew about getting the patient back with medications to relieve the claustrophobia.  Message also sent to Dr. Jerryl Runner Cardiologist.

## 2018-11-19 NOTE — PROGRESS NOTES
General Daily Progress Note    Admit Date: 11/18/2018    Subjective:     Patient has no complaint . Current Facility-Administered Medications   Medication Dose Route Frequency    insulin glargine (LANTUS) injection 30 Units  30 Units SubCUTAneous QHS    insulin glargine (LANTUS) injection 10 Units  10 Units SubCUTAneous DAILY    metFORMIN ER (GLUCOPHAGE XR) tablet 1,000 mg  1,000 mg Oral BID WITH MEALS    0.9% sodium chloride (MBP/ADV) infusion        0.9% sodium chloride infusion        PHENYLephrine (CHI-SYNEPHRINE) 10 mg/mL injection        atorvastatin (LIPITOR) tablet 20 mg  20 mg Oral DAILY    fenofibrate nanocrystallized (TRICOR) tablet 145 mg  145 mg Oral DAILY    insulin lispro (HUMALOG) injection 6 Units  6 Units SubCUTAneous BID WITH MEALS    sodium chloride (NS) flush 5-10 mL  5-10 mL IntraVENous Q8H    sodium chloride (NS) flush 5-10 mL  5-10 mL IntraVENous PRN    acetaminophen (TYLENOL) tablet 650 mg  650 mg Oral Q6H PRN    ondansetron (ZOFRAN) injection 4 mg  4 mg IntraVENous Q6H PRN    docusate sodium (COLACE) capsule 100 mg  100 mg Oral DAILY PRN    0.9% sodium chloride infusion  25 mL/hr IntraVENous CONTINUOUS    insulin lispro (HUMALOG) injection   SubCUTAneous AC&HS    glucose chewable tablet 16 g  4 Tab Oral PRN    dextrose (D50W) injection syrg 12.5-25 g  12.5-25 g IntraVENous PRN    glucagon (GLUCAGEN) injection 1 mg  1 mg IntraMUSCular PRN    sodium chloride (NS) flush 5-10 mL  5-10 mL IntraVENous Q8H    sodium chloride (NS) flush 5-10 mL  5-10 mL IntraVENous PRN    aspirin delayed-release tablet 81 mg  81 mg Oral DAILY        Review of Systems  A comprehensive review of systems was negative.     Objective:     Patient Vitals for the past 24 hrs:   BP Temp Pulse Resp SpO2   11/19/18 0733 132/79 98 °F (36.7 °C) 63 16 99 %   11/19/18 0300 110/67 98.7 °F (37.1 °C) (!) 58 16 96 %   11/18/18 2210 114/69 98.9 °F (37.2 °C) 62 16 95 %   11/18/18 1800 104/72 -- (!) 59 -- 94 % 11/18/18 1700 113/63 -- 60 -- 92 %   11/18/18 1600 98/65 -- 66 -- 93 %   11/18/18 1500 115/62 97.2 °F (36.2 °C) 71 16 97 %   11/18/18 1438 120/68 -- 74 -- 96 %   11/18/18 1400 120/68 -- 71 -- 95 %   11/18/18 1330 111/65 -- 73 -- 97 %   11/18/18 1315 117/73 -- 73 -- 98 %   11/18/18 1300 121/72 -- 64 -- 98 %   11/18/18 1255 121/74 -- 63 14 99 %   11/18/18 1250 130/77 -- 64 20 99 %   11/18/18 1245 126/81 -- 67 13 99 %   11/18/18 1240 132/83 -- 62 14 98 %   11/18/18 1236 (!) 135/92 -- 64 13 98 %   11/18/18 1230 143/82 -- 73 24 96 %   11/18/18 1215 140/80 -- 77 17 98 %   11/18/18 1200 161/81 -- 65 15 100 %   11/18/18 1145 (!) 151/131 -- 69 16 100 %   11/18/18 1130 171/89 -- 67 15 100 %   11/18/18 1115 (!) 156/100 -- 76 22 98 %   11/18/18 1100 123/83 -- 73 19 97 %   11/18/18 1043 128/77 97.6 °F (36.4 °C) 61 16 93 %     No intake/output data recorded. 11/17 1901 - 11/19 0700  In: 876.7 [P.O.:840; I.V.:36.7]  Out: 350 [Urine:350]    Physical Exam:   Visit Vitals  /79   Pulse 63   Temp 98 °F (36.7 °C)   Resp 16   Ht 5' 7\" (1.702 m)   Wt 152 lb 1.9 oz (69 kg)   SpO2 99%   BMI 23.82 kg/m²     General appearance: alert, cooperative, no distress, appears stated age  Neck: supple, symmetrical, trachea midline, no adenopathy, thyroid: not enlarged, symmetric, no tenderness/mass/nodules, no carotid bruit and no JVD  Lungs: clear to auscultation bilaterally  Heart: regular rate and rhythm, S1, S2 normal, no murmur, click, rub or gallop  Abdomen: soft, non-tender.  Bowel sounds normal. No masses,  no organomegaly  Extremities: extremities normal, atraumatic, no cyanosis or edema        Data Review   Recent Results (from the past 24 hour(s))   EKG, 12 LEAD, INITIAL    Collection Time: 11/18/18 10:55 AM   Result Value Ref Range    Ventricular Rate 62 BPM    Atrial Rate 62 BPM    P-R Interval 140 ms    QRS Duration 90 ms    Q-T Interval 408 ms    QTC Calculation (Bezet) 414 ms    Calculated P Axis 60 degrees    Calculated R Axis -37 degrees    Calculated T Axis -7 degrees    Diagnosis       Normal sinus rhythm  Possible Left atrial enlargement  Left axis deviation  Inferior infarct , age undetermined  When compared with ECG of 18-NOV-2018 07:18,  MANUAL COMPARISON REQUIRED, DATA IS UNCONFIRMED     GLUCOSE, POC    Collection Time: 11/18/18 11:12 AM   Result Value Ref Range    Glucose (POC) 248 (H) 65 - 100 mg/dL    Performed by Vish Russell    GLUCOSE, POC    Collection Time: 11/18/18  4:34 PM   Result Value Ref Range    Glucose (POC) 474 (H) 65 - 100 mg/dL    Performed by Vicky Galo (PCT)    GLUCOSE, POC    Collection Time: 11/18/18  9:28 PM   Result Value Ref Range    Glucose (POC) 327 (H) 65 - 100 mg/dL    Performed by Blanch Lennox [employer]    METABOLIC PANEL, BASIC    Collection Time: 11/19/18  3:06 AM   Result Value Ref Range    Sodium 140 136 - 145 mmol/L    Potassium 4.0 3.5 - 5.1 mmol/L    Chloride 106 97 - 108 mmol/L    CO2 29 21 - 32 mmol/L    Anion gap 5 5 - 15 mmol/L    Glucose 200 (H) 65 - 100 mg/dL    BUN 21 (H) 6 - 20 MG/DL    Creatinine 0.93 0.70 - 1.30 MG/DL    BUN/Creatinine ratio 23 (H) 12 - 20      GFR est AA >60 >60 ml/min/1.73m2    GFR est non-AA >60 >60 ml/min/1.73m2    Calcium 8.5 8.5 - 10.1 MG/DL   CBC WITH AUTOMATED DIFF    Collection Time: 11/19/18  3:06 AM   Result Value Ref Range    WBC 5.0 4.1 - 11.1 K/uL    RBC 4.21 4.10 - 5.70 M/uL    HGB 11.8 (L) 12.1 - 17.0 g/dL    HCT 35.8 (L) 36.6 - 50.3 %    MCV 85.0 80.0 - 99.0 FL    MCH 28.0 26.0 - 34.0 PG    MCHC 33.0 30.0 - 36.5 g/dL    RDW 13.3 11.5 - 14.5 %    PLATELET 876 209 - 679 K/uL    MPV 11.0 8.9 - 12.9 FL    NRBC 0.0 0  WBC    ABSOLUTE NRBC 0.00 0.00 - 0.01 K/uL    NEUTROPHILS 75 32 - 75 %    LYMPHOCYTES 17 12 - 49 %    MONOCYTES 7 5 - 13 %    EOSINOPHILS 1 0 - 7 %    BASOPHILS 0 0 - 1 %    IMMATURE GRANULOCYTES 0 0.0 - 0.5 %    ABS. NEUTROPHILS 3.7 1.8 - 8.0 K/UL    ABS. LYMPHOCYTES 0.8 0.8 - 3.5 K/UL    ABS.  MONOCYTES 0.4 0.0 - 1.0 K/UL ABS. EOSINOPHILS 0.1 0.0 - 0.4 K/UL    ABS. BASOPHILS 0.0 0.0 - 0.1 K/UL    ABS. IMM. GRANS. 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 Events noted regarding syncopal episode. The patient became weak and diaphoretic post voiding early in the morning. This culminated in a syncopal episode. No abnormal movements were noted and he rapidly regained consciousness. Upon presenting to the emergency room his EKG was abnormal prompting an aggressive cardiac workup. Cardiac cath revealed nonocclusive disease and presumably further EP studies might occur. 2.  Continue diabetic control. Patient never came back for follow-up after his visit in July. Since then diabetic control has been poor.

## 2018-11-19 NOTE — PROGRESS NOTES
Cm acknowledged consult for discharge planning. Failed attempt to meet with patient. Patient is providing self care.     Eden Bowers RN CM  Ext [phone]

## 2018-11-19 NOTE — PROGRESS NOTES
Problem: Falls - Risk of  Goal: *Absence of Falls  Document Trav Fall Risk and appropriate interventions in the flowsheet.   Outcome: Progressing Towards Goal  Fall Risk Interventions:            Medication Interventions: Assess postural VS orthostatic hypotension, Evaluate medications/consider consulting pharmacy, Patient to call before getting OOB, Teach patient to arise slowly

## 2018-11-19 NOTE — PROGRESS NOTES
Please have patient sent down on 11/20/2018 to MRI as close to 8:30am as possible per Dr. Luis Felipe Valentine with MRI at [phone]. Patient will need medication for claustrophobia ordered and ready per Dr. Rowe Boxer.

## 2018-11-20 ENCOUNTER — APPOINTMENT (OUTPATIENT)
Dept: MRI IMAGING | Age: 63
DRG: 225 | End: 2018-11-20
Attending: INTERNAL MEDICINE
Payer: COMMERCIAL

## 2018-11-20 LAB
GLUCOSE BLD STRIP.AUTO-MCNC: 182 MG/DL (ref 65–100)
GLUCOSE BLD STRIP.AUTO-MCNC: 245 MG/DL (ref 65–100)
GLUCOSE BLD STRIP.AUTO-MCNC: 357 MG/DL (ref 65–100)
GLUCOSE BLD STRIP.AUTO-MCNC: 479 MG/DL (ref 65–100)
SERVICE CMNT-IMP: ABNORMAL

## 2018-11-20 PROCEDURE — 82962 GLUCOSE BLOOD TEST: CPT

## 2018-11-20 PROCEDURE — 94760 N-INVAS EAR/PLS OXIMETRY 1: CPT

## 2018-11-20 PROCEDURE — 75561 CARDIAC MRI FOR MORPH W/DYE: CPT

## 2018-11-20 PROCEDURE — 74011636637 HC RX REV CODE- 636/637: Performed by: HOSPITALIST

## 2018-11-20 PROCEDURE — 74011636637 HC RX REV CODE- 636/637: Performed by: INTERNAL MEDICINE

## 2018-11-20 PROCEDURE — A9579 GAD-BASE MR CONTRAST NOS,1ML: HCPCS | Performed by: INTERNAL MEDICINE

## 2018-11-20 PROCEDURE — 74011250637 HC RX REV CODE- 250/637: Performed by: INTERNAL MEDICINE

## 2018-11-20 PROCEDURE — 74011636320 HC RX REV CODE- 636/320: Performed by: INTERNAL MEDICINE

## 2018-11-20 PROCEDURE — 74011250636 HC RX REV CODE- 250/636: Performed by: INTERNAL MEDICINE

## 2018-11-20 PROCEDURE — 65660000000 HC RM CCU STEPDOWN

## 2018-11-20 RX ORDER — INSULIN GLARGINE 100 [IU]/ML
20 INJECTION, SOLUTION SUBCUTANEOUS DAILY
Status: DISCONTINUED | OUTPATIENT
Start: 2018-11-20 | End: 2018-11-21

## 2018-11-20 RX ORDER — INSULIN LISPRO 100 [IU]/ML
12 INJECTION, SOLUTION INTRAVENOUS; SUBCUTANEOUS
Status: DISCONTINUED | OUTPATIENT
Start: 2018-11-20 | End: 2018-11-20

## 2018-11-20 RX ORDER — INSULIN LISPRO 100 [IU]/ML
INJECTION, SOLUTION INTRAVENOUS; SUBCUTANEOUS
Status: DISCONTINUED | OUTPATIENT
Start: 2018-11-21 | End: 2018-11-22 | Stop reason: HOSPADM

## 2018-11-20 RX ORDER — INSULIN LISPRO 100 [IU]/ML
10 INJECTION, SOLUTION INTRAVENOUS; SUBCUTANEOUS
Status: COMPLETED | OUTPATIENT
Start: 2018-11-20 | End: 2018-11-20

## 2018-11-20 RX ORDER — INSULIN LISPRO 100 [IU]/ML
10 INJECTION, SOLUTION INTRAVENOUS; SUBCUTANEOUS
Status: DISCONTINUED | OUTPATIENT
Start: 2018-11-20 | End: 2018-11-22 | Stop reason: HOSPADM

## 2018-11-20 RX ORDER — DEXTROSE 50 % IN WATER (D50W) INTRAVENOUS SYRINGE
12.5-25 AS NEEDED
Status: DISCONTINUED | OUTPATIENT
Start: 2018-11-20 | End: 2018-11-22 | Stop reason: HOSPADM

## 2018-11-20 RX ORDER — MAGNESIUM SULFATE 100 %
4 CRYSTALS MISCELLANEOUS AS NEEDED
Status: DISCONTINUED | OUTPATIENT
Start: 2018-11-20 | End: 2018-11-22 | Stop reason: HOSPADM

## 2018-11-20 RX ADMIN — FENOFIBRATE 145 MG: 145 TABLET ORAL at 10:50

## 2018-11-20 RX ADMIN — INSULIN LISPRO 2 UNITS: 100 INJECTION, SOLUTION INTRAVENOUS; SUBCUTANEOUS at 07:39

## 2018-11-20 RX ADMIN — ASPIRIN 81 MG: 81 TABLET, COATED ORAL at 10:50

## 2018-11-20 RX ADMIN — LORAZEPAM 1 MG: 2 INJECTION INTRAMUSCULAR; INTRAVENOUS at 07:39

## 2018-11-20 RX ADMIN — ATORVASTATIN CALCIUM 20 MG: 20 TABLET, FILM COATED ORAL at 10:50

## 2018-11-20 RX ADMIN — INSULIN LISPRO 10 UNITS: 100 INJECTION, SOLUTION INTRAVENOUS; SUBCUTANEOUS at 17:51

## 2018-11-20 RX ADMIN — INSULIN LISPRO 10 UNITS: 100 INJECTION, SOLUTION INTRAVENOUS; SUBCUTANEOUS at 13:24

## 2018-11-20 RX ADMIN — INSULIN GLARGINE 30 UNITS: 100 INJECTION, SOLUTION SUBCUTANEOUS at 22:51

## 2018-11-20 RX ADMIN — Medication 10 ML: at 22:52

## 2018-11-20 RX ADMIN — INSULIN GLARGINE 20 UNITS: 100 INJECTION, SOLUTION SUBCUTANEOUS at 09:00

## 2018-11-20 RX ADMIN — GADOPENTETATE DIMEGLUMINE 27 ML: 469.01 INJECTION INTRAVENOUS at 10:00

## 2018-11-20 RX ADMIN — INSULIN LISPRO 10 UNITS: 100 INJECTION, SOLUTION INTRAVENOUS; SUBCUTANEOUS at 22:50

## 2018-11-20 NOTE — DIABETES MGMT
DTC Progress Note    Recommendations/ Comments: Nothing at this time- insulin adjusted and blood sugars improving at this time     Attempted to see patient to assessment of home management consult- patient currently out of room. Will follow up with patient at a later time. Chart reviewed on Radha Vance. Patient is a 70 y.o. male with known diabetes. Per PTA list, patient is taking Lispro 6 units acl and acd, Trulicity, Levemir 12 units am and 30 units pm, glipizide 10 mg BID, Metformin 500 mg BID at home. A1c:   Lab Results   Component Value Date/Time    Hemoglobin A1c 8.0 (H) 07/24/2018 05:40 PM    Hemoglobin A1c 9.8 (H) 04/20/2018 11:14 AM       Recent Glucose Results:   Lab Results   Component Value Date/Time    GLUCPOC 182 (H) 11/20/2018 07:16 AM    GLUCPOC 248 (H) 11/19/2018 09:26 PM    GLUCPOC 327 (H) 11/19/2018 04:41 PM        Lab Results   Component Value Date/Time    Creatinine 0.93 11/19/2018 03:06 AM     Estimated Creatinine Clearance: 74 mL/min (based on SCr of 0.93 mg/dL). Active Orders   Diet    DIET DIABETIC CONSISTENT CARB Regular        PO intake:   Patient Vitals for the past 72 hrs:   % Diet Eaten   11/18/18 1500 100 %   11/18/18 1438 100 %       Current hospital DM medication: Lantus 20 units daily, Lantus 30 units daily, Lispro 10 units ac meals, metformin ER 1000mg BID    Will continue to follow as needed. Thank you.   Joanna Mistry, [address] [place] [address], [address]  Office:  [phone]

## 2018-11-20 NOTE — PROGRESS NOTES
General Daily Progress Note    Admit Date: 11/18/2018    Subjective:     Patient has no complaint . Current Facility-Administered Medications   Medication Dose Route Frequency    insulin glargine (LANTUS) injection 20 Units  20 Units SubCUTAneous DAILY    insulin lispro (HUMALOG) injection 10 Units  10 Units SubCUTAneous TIDAC    insulin glargine (LANTUS) injection 30 Units  30 Units SubCUTAneous QHS    metFORMIN ER (GLUCOPHAGE XR) tablet 1,000 mg  1,000 mg Oral BID WITH MEALS    0.9% sodium chloride (MBP/ADV) infusion        0.9% sodium chloride infusion        PHENYLephrine (CHI-SYNEPHRINE) 10 mg/mL injection        atorvastatin (LIPITOR) tablet 20 mg  20 mg Oral DAILY    fenofibrate nanocrystallized (TRICOR) tablet 145 mg  145 mg Oral DAILY    sodium chloride (NS) flush 5-10 mL  5-10 mL IntraVENous Q8H    sodium chloride (NS) flush 5-10 mL  5-10 mL IntraVENous PRN    acetaminophen (TYLENOL) tablet 650 mg  650 mg Oral Q6H PRN    ondansetron (ZOFRAN) injection 4 mg  4 mg IntraVENous Q6H PRN    docusate sodium (COLACE) capsule 100 mg  100 mg Oral DAILY PRN    glucose chewable tablet 16 g  4 Tab Oral PRN    dextrose (D50W) injection syrg 12.5-25 g  12.5-25 g IntraVENous PRN    glucagon (GLUCAGEN) injection 1 mg  1 mg IntraMUSCular PRN    sodium chloride (NS) flush 5-10 mL  5-10 mL IntraVENous Q8H    sodium chloride (NS) flush 5-10 mL  5-10 mL IntraVENous PRN    aspirin delayed-release tablet 81 mg  81 mg Oral DAILY        Review of Systems  A comprehensive review of systems was negative.     Objective:     Patient Vitals for the past 24 hrs:   BP Temp Pulse Resp SpO2   11/20/18 0715 123/74 97.5 °F (36.4 °C) 60 16 98 %   11/20/18 0317 105/73 97.5 °F (36.4 °C) (!) 57 15 98 %   11/19/18 2328 110/63 98.1 °F (36.7 °C) 62 14 94 %   11/19/18 1926 (!) 140/91 98.2 °F (36.8 °C) 75 18 96 %   11/19/18 1624 130/75 98.2 °F (36.8 °C) 76 18 95 %   11/19/18 1116 101/66 98 °F (36.7 °C) 65 16 93 %     No intake/output data recorded. No intake/output data recorded. Physical Exam:   Visit Vitals  /74 (BP 1 Location: Right arm, BP Patient Position: At rest)   Pulse 60   Temp 97.5 °F (36.4 °C)   Resp 16   Ht 5' 7\" (1.702 m)   Wt 152 lb 1.9 oz (69 kg)   SpO2 98%   BMI 23.82 kg/m²     General appearance: alert, cooperative, no distress, appears stated age  Neck: supple, symmetrical, trachea midline, no adenopathy, thyroid: not enlarged, symmetric, no tenderness/mass/nodules, no carotid bruit and no JVD  Lungs: clear to auscultation bilaterally  Heart: regular rate and rhythm, S1, S2 normal, no murmur, click, rub or gallop  Abdomen: soft, non-tender.  Bowel sounds normal. No masses,  no organomegaly  Extremities: extremities normal, atraumatic, no cyanosis or edema        Data Review   Recent Results (from the past 24 hour(s))   GLUCOSE, POC    Collection Time: 11/19/18 11:42 AM   Result Value Ref Range    Glucose (POC) 385 (H) 65 - 100 mg/dL    Performed by Kalpesh Luna    GLUCOSE, POC    Collection Time: 11/19/18  4:41 PM   Result Value Ref Range    Glucose (POC) 327 (H) 65 - 100 mg/dL    Performed by Kalpesh Luna    GLUCOSE, POC    Collection Time: 11/19/18  9:26 PM   Result Value Ref Range    Glucose (POC) 248 (H) 65 - 100 mg/dL    Performed by 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 Continue cardiology workup for syncope although the episode sounds vasovagal.  2.  Continue diabetic control.

## 2018-11-20 NOTE — PROGRESS NOTES
Reason for Admission:   brugada syndrome, cardiology following                  RRAT Score:     20             Do you (patient/family) have any concerns for transition/discharge? None voiced. Discussed with pt at bedside. Discussed and educated pt on AMD, he does want to complete while here and list friend Lillie Carmona. Will refer to pastoral care for completion of AMD.  He does drive but doesn't have a car. He utilizes [facility] and prefers follow up if possible on bus line. Good relationship with PCP. Plan for utilizing home health:     None at this time. Likelihood of readmission? Low, pending resolution of current issue            Transition of Care Plan:      Likely home with cardiology and PCP follow up. Preference is cardiology location on bus line, possibly close to [facility]?    JUAN Perez    Care Management Interventions  PCP Verified by CM: Yes  Last Visit to PCP: 11/20/18  Palliative Care Criteria Met (RRAT>21 & CHF Dx)?: No  Mode of Transport at Discharge: Other (see comment)(pt has friends, doesn't have a car, prefers follow up closer to [facility].   uses [facility], may need roundtrip uber for home if he can't reach friend.)  Transition of Care Consult (CM Consult): Discharge Planning  MyChart Signup: No  Physical Therapy Consult: No  Occupational Therapy Consult: No  Speech Therapy Consult: No  Current Support Network: Lives Alone  Confirm Follow Up Transport: Family  Plan discussed with Pt/Family/Caregiver: Yes  Freedom of Choice Offered: Yes  Discharge Location  Discharge Placement: Home

## 2018-11-20 NOTE — PROGRESS NOTES
[address] [place] [address]  [phone]      Cardiology Progress Note      11/20/2018 2 PM    Admit Date: 11/18/2018    Admit Diagnosis:   Chest pain, cardiac  Brugada syndrome    Subjective:     Juanis Baron is a 70 y.o. male who was admitted with syncope/abnormal EKG    Overnight events:  -cardiac MRI today without scarring   -VSS  -blood sugars uncontrolled   -Mr. Hanane Vazquez is feeling well today. He feels at his baseline. He denies chest pain, SOB, palpitations, dizziness/lightheadedness.       Visit Vitals  /81   Pulse 74   Temp 99 °F (37.2 °C)   Resp 16   Ht 5' 7\" (1.702 m)   Wt 69 kg (152 lb 1.9 oz)   SpO2 99%   BMI 23.82 kg/m²       Current Facility-Administered Medications   Medication Dose Route Frequency    insulin glargine (LANTUS) injection 20 Units  20 Units SubCUTAneous DAILY    insulin lispro (HUMALOG) injection 10 Units  10 Units SubCUTAneous TIDAC    insulin glargine (LANTUS) injection 30 Units  30 Units SubCUTAneous QHS    metFORMIN ER (GLUCOPHAGE XR) tablet 1,000 mg  1,000 mg Oral BID WITH MEALS    0.9% sodium chloride (MBP/ADV) infusion        0.9% sodium chloride infusion        PHENYLephrine (CHI-SYNEPHRINE) 10 mg/mL injection        atorvastatin (LIPITOR) tablet 20 mg  20 mg Oral DAILY    fenofibrate nanocrystallized (TRICOR) tablet 145 mg  145 mg Oral DAILY    sodium chloride (NS) flush 5-10 mL  5-10 mL IntraVENous Q8H    sodium chloride (NS) flush 5-10 mL  5-10 mL IntraVENous PRN    acetaminophen (TYLENOL) tablet 650 mg  650 mg Oral Q6H PRN    ondansetron (ZOFRAN) injection 4 mg  4 mg IntraVENous Q6H PRN    docusate sodium (COLACE) capsule 100 mg  100 mg Oral DAILY PRN    glucose chewable tablet 16 g  4 Tab Oral PRN    dextrose (D50W) injection syrg 12.5-25 g  12.5-25 g IntraVENous PRN    glucagon (GLUCAGEN) injection 1 mg  1 mg IntraMUSCular PRN    sodium chloride (NS) flush 5-10 mL  5-10 mL IntraVENous Q8H    sodium chloride (NS) flush 5-10 mL  5-10 mL IntraVENous PRN    aspirin delayed-release tablet 81 mg  81 mg Oral DAILY       Objective:      Physical Exam:  General Appearance:  pleasant adult AAM resting in bed in NAD  Chest:   Clear  Cardiovascular:  Regular rate and rhythm, no murmur.   Abdomen:   Soft, non-tender, bowel sounds are active.   Extremities: palpable distal pulses; no edema   Skin:  Warm and dry.     Data Review:   Recent Labs     11/19/18  0306 11/18/18  0728   WBC 5.0 5.9   HGB 11.8* 14.0   HCT 35.8* 41.1    179     Recent Labs     11/19/18  0306 11/18/18  0728    135*   K 4.0 3.6    99   CO2 29 27   * 410*   BUN 21* 24*   CREA 0.93 1.22   CA 8.5 9.3   ALB  --  3.6   TBILI  --  0.5   SGOT  --  26   ALT  --  34   INR  --  1.0       Recent Labs     11/18/18  0728   TROIQ <0.05   CKMB 5.2*       No intake or output data in the 24 hours ending 11/20/18 1420     Telemetry: SR  EKG: NSR. 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 Cardiac cath negative for obstructive CAD. · Cardiac MRI without signs of scarring, infarction, or infiltrative morphology. · EP proceeding with study tomorrow   · Tele without significant ectopy.     · Patient scheduled for out patient genetic testing for possible Brugada syndrome with Dr. Elsy James Alvaro Licona NP  DNP, RN, [facility]    Patient seen and examined by me with nurse practitioner Alvaro Licona. I personally performed all components of the history, physical, and medical decision making and agree with the assessment and plan with minor modifications as noted. Noted plans to proceed with EP study. Genetic testing for Brugada syndrome set up with Dr. Mago Martinez.

## 2018-11-20 NOTE — PROGRESS NOTES
Cardiology Progress Note            [id number] [address]  [phone]    11/20/2018 12:57 PM    Admit Date: 11/18/2018    Admit Diagnosis: Chest pain, cardiac;Brugada syndrome    Subjective:     Fabrizio Lincoln   denies chest pain.     Visit Vitals  /81   Pulse 74   Temp 99 °F (37.2 °C)   Resp 16   Ht 5' 7\" (1.702 m)   Wt 152 lb 1.9 oz (69 kg)   SpO2 99%   BMI 23.82 kg/m²     Current Facility-Administered Medications   Medication Dose Route Frequency    insulin glargine (LANTUS) injection 20 Units  20 Units SubCUTAneous DAILY    insulin lispro (HUMALOG) injection 10 Units  10 Units SubCUTAneous TIDAC    insulin glargine (LANTUS) injection 30 Units  30 Units SubCUTAneous QHS    metFORMIN ER (GLUCOPHAGE XR) tablet 1,000 mg  1,000 mg Oral BID WITH MEALS    0.9% sodium chloride (MBP/ADV) infusion        0.9% sodium chloride infusion        PHENYLephrine (CHI-SYNEPHRINE) 10 mg/mL injection        atorvastatin (LIPITOR) tablet 20 mg  20 mg Oral DAILY    fenofibrate nanocrystallized (TRICOR) tablet 145 mg  145 mg Oral DAILY    sodium chloride (NS) flush 5-10 mL  5-10 mL IntraVENous Q8H    sodium chloride (NS) flush 5-10 mL  5-10 mL IntraVENous PRN    acetaminophen (TYLENOL) tablet 650 mg  650 mg Oral Q6H PRN    ondansetron (ZOFRAN) injection 4 mg  4 mg IntraVENous Q6H PRN    docusate sodium (COLACE) capsule 100 mg  100 mg Oral DAILY PRN    glucose chewable tablet 16 g  4 Tab Oral PRN    dextrose (D50W) injection syrg 12.5-25 g  12.5-25 g IntraVENous PRN    glucagon (GLUCAGEN) injection 1 mg  1 mg IntraMUSCular PRN    sodium chloride (NS) flush 5-10 mL  5-10 mL IntraVENous Q8H    sodium chloride (NS) flush 5-10 mL  5-10 mL IntraVENous PRN    aspirin delayed-release tablet 81 mg  81 mg Oral DAILY         Objective:      Visit Vitals  /81   Pulse 74   Temp 99 °F (37.2 °C)   Resp 16   Ht 5' 7\" (1.702 m)   Wt 152 lb 1.9 oz (69 kg)   SpO2 99%   BMI 23.82 kg/m² Physical Exam:  Abdomen: soft, non-tender  Extremities: extremities normal  Heart: regular rate and rhythm  Lungs: clear to auscultation bilaterally  Pulses: 2+ and symmetric    Data Review:   Labs:    Recent Labs     11/19/18  0306 11/18/18  0728   WBC 5.0 5.9   HGB 11.8* 14.0   HCT 35.8* 41.1    179     Recent Labs     11/19/18  0306 11/18/18  0728    135*   K 4.0 3.6    99   CO2 29 27   * 410*   BUN 21* 24*   CREA 0.93 1.22   CA 8.5 9.3   ALB  --  3.6   TBILI  --  0.5   SGOT  --  26   ALT  --  34   INR  --  1.0       Recent Labs     11/18/18  0728   TROIQ <0.05   CKMB 5.2*       No intake or output data in the 24 hours ending 11/20/18 1257     Telemetry: nsr    Cardiac mri - no scar, lvef 60-65    Assessment:     Active Problems:    Dyslipidemia (9/25/2014)      Mitral valve prolapse (9/25/2014)      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 Cardiac mri wnl - will proceed with eps tmrw to r/o VTVF as a cause of syncope. I discussed the risks/benefits/alternatives of the procedure with the patient. Risks include (but are not limited to) bleeding, heart block, infection, cva/mi/tamponade/death. The patient understands and agrees to proceed.    Mary Cruz MD, [facility]    11/20/2018

## 2018-11-20 NOTE — PROGRESS NOTES
Problem: Falls - Risk of  Goal: *Absence of Falls  Document Trav Fall Risk and appropriate interventions in the flowsheet.   Outcome: Progressing Towards Goal  Fall Risk Interventions:            Medication Interventions: Patient to call before getting OOB

## 2018-11-20 NOTE — PROGRESS NOTES
Bedside and Verbal shift change report given to Monique Solorzano RN (oncoming nurse) by Tomi Blue RN (offgoing nurse). Report included the following information SBAR, Kardex, Procedure Summary, Intake/Output, MAR, Accordion, Recent Results and Cardiac Rhythm NSR.

## 2018-11-21 ENCOUNTER — APPOINTMENT (OUTPATIENT)
Dept: GENERAL RADIOLOGY | Age: 63
DRG: 225 | End: 2018-11-21
Attending: INTERNAL MEDICINE
Payer: COMMERCIAL

## 2018-11-21 PROBLEM — I47.29 NSVT (NONSUSTAINED VENTRICULAR TACHYCARDIA): Status: ACTIVE | Noted: 2018-11-21

## 2018-11-21 LAB
GLUCOSE BLD STRIP.AUTO-MCNC: 106 MG/DL (ref 65–100)
GLUCOSE BLD STRIP.AUTO-MCNC: 145 MG/DL (ref 65–100)
GLUCOSE BLD STRIP.AUTO-MCNC: 163 MG/DL (ref 65–100)
GLUCOSE BLD STRIP.AUTO-MCNC: 272 MG/DL (ref 65–100)
GLUCOSE BLD STRIP.AUTO-MCNC: 273 MG/DL (ref 65–100)
GLUCOSE BLD STRIP.AUTO-MCNC: 62 MG/DL (ref 65–100)
GLUCOSE BLD STRIP.AUTO-MCNC: 70 MG/DL (ref 65–100)
GLUCOSE BLD STRIP.AUTO-MCNC: 75 MG/DL (ref 65–100)
GLUCOSE BLD STRIP.AUTO-MCNC: 79 MG/DL (ref 65–100)
GLUCOSE BLD STRIP.AUTO-MCNC: 91 MG/DL (ref 65–100)
SERVICE CMNT-IMP: ABNORMAL
SERVICE CMNT-IMP: NORMAL

## 2018-11-21 PROCEDURE — 4A0234Z MEASUREMENT OF CARDIAC ELECTRICAL ACTIVITY, PERCUTANEOUS APPROACH: ICD-10-PCS | Performed by: INTERNAL MEDICINE

## 2018-11-21 PROCEDURE — 77030037713 HC CLOSR DEV INCIS ZIP STRY -B

## 2018-11-21 PROCEDURE — 02K83ZZ MAP CONDUCTION MECHANISM, PERCUTANEOUS APPROACH: ICD-10-PCS | Performed by: INTERNAL MEDICINE

## 2018-11-21 PROCEDURE — 74011000250 HC RX REV CODE- 250

## 2018-11-21 PROCEDURE — 77030018673

## 2018-11-21 PROCEDURE — C1893 INTRO/SHEATH, FIXED,NON-PEEL: HCPCS

## 2018-11-21 PROCEDURE — C1894 INTRO/SHEATH, NON-LASER: HCPCS

## 2018-11-21 PROCEDURE — 74011636637 HC RX REV CODE- 636/637: Performed by: HOSPITALIST

## 2018-11-21 PROCEDURE — 74011250637 HC RX REV CODE- 250/637: Performed by: INTERNAL MEDICINE

## 2018-11-21 PROCEDURE — 74011000250 HC RX REV CODE- 250: Performed by: HOSPITALIST

## 2018-11-21 PROCEDURE — C1777 LEAD, AICD, ENDO SINGLE COIL: HCPCS

## 2018-11-21 PROCEDURE — 99153 MOD SED SAME PHYS/QHP EA: CPT

## 2018-11-21 PROCEDURE — 74011636637 HC RX REV CODE- 636/637: Performed by: INTERNAL MEDICINE

## 2018-11-21 PROCEDURE — 74011250636 HC RX REV CODE- 250/636

## 2018-11-21 PROCEDURE — 77030018836 HC SOL IRR NACL ICUM -A

## 2018-11-21 PROCEDURE — 93622 COMP EP EVAL L VENTR PAC&REC: CPT

## 2018-11-21 PROCEDURE — 77030010880 HC CBL EP SUPRME STJU -C

## 2018-11-21 PROCEDURE — 02HK3KZ INSERTION OF DEFIBRILLATOR LEAD INTO RIGHT VENTRICLE, PERCUTANEOUS APPROACH: ICD-10-PCS | Performed by: INTERNAL MEDICINE

## 2018-11-21 PROCEDURE — C1730 CATH, EP, 19 OR FEW ELECT: HCPCS

## 2018-11-21 PROCEDURE — 71045 X-RAY EXAM CHEST 1 VIEW: CPT

## 2018-11-21 PROCEDURE — 77030015398 HC CBL EP EXT STJU -C

## 2018-11-21 PROCEDURE — 77030031139 HC SUT VCRL2 J&J -A

## 2018-11-21 PROCEDURE — 77030016894 HC CBL EP DX CATH3 STJU -B

## 2018-11-21 PROCEDURE — 5A2204Z RESTORATION OF CARDIAC RHYTHM, SINGLE: ICD-10-PCS | Performed by: INTERNAL MEDICINE

## 2018-11-21 PROCEDURE — 93620 COMP EP EVL R AT VEN PAC&REC: CPT

## 2018-11-21 PROCEDURE — C1722 AICD, SINGLE CHAMBER: HCPCS

## 2018-11-21 PROCEDURE — 77030018729 HC ELECTRD DEFIB PAD CARD -B

## 2018-11-21 PROCEDURE — 0JH608Z INSERTION OF DEFIBRILLATOR GENERATOR INTO CHEST SUBCUTANEOUS TISSUE AND FASCIA, OPEN APPROACH: ICD-10-PCS | Performed by: INTERNAL MEDICINE

## 2018-11-21 PROCEDURE — 82962 GLUCOSE BLOOD TEST: CPT

## 2018-11-21 PROCEDURE — 77030002996 HC SUT SLK J&J -A

## 2018-11-21 PROCEDURE — 65660000000 HC RM CCU STEPDOWN

## 2018-11-21 PROCEDURE — A4565 SLINGS: HCPCS

## 2018-11-21 RX ORDER — BACITRACIN 50000 [IU]/1
50000 INJECTION, POWDER, FOR SOLUTION INTRAMUSCULAR ONCE
Status: CANCELLED | OUTPATIENT
Start: 2018-11-21 | End: 2018-11-21

## 2018-11-21 RX ORDER — MIDAZOLAM HYDROCHLORIDE 1 MG/ML
INJECTION, SOLUTION INTRAMUSCULAR; INTRAVENOUS
Status: COMPLETED
Start: 2018-11-21 | End: 2018-11-21

## 2018-11-21 RX ORDER — INSULIN GLARGINE 100 [IU]/ML
50 INJECTION, SOLUTION SUBCUTANEOUS
Status: DISCONTINUED | OUTPATIENT
Start: 2018-11-21 | End: 2018-11-21

## 2018-11-21 RX ORDER — NALOXONE HYDROCHLORIDE 0.4 MG/ML
0.4 INJECTION, SOLUTION INTRAMUSCULAR; INTRAVENOUS; SUBCUTANEOUS AS NEEDED
Status: DISCONTINUED | OUTPATIENT
Start: 2018-11-21 | End: 2018-11-22 | Stop reason: HOSPADM

## 2018-11-21 RX ORDER — DOBUTAMINE HYDROCHLORIDE 200 MG/100ML
0-10 INJECTION INTRAVENOUS
Status: DISCONTINUED | OUTPATIENT
Start: 2018-11-21 | End: 2018-11-21 | Stop reason: HOSPADM

## 2018-11-21 RX ORDER — DOBUTAMINE HYDROCHLORIDE 200 MG/100ML
INJECTION INTRAVENOUS
Status: DISPENSED
Start: 2018-11-21 | End: 2018-11-21

## 2018-11-21 RX ORDER — HEPARIN SODIUM 200 [USP'U]/100ML
INJECTION, SOLUTION INTRAVENOUS
Status: COMPLETED
Start: 2018-11-21 | End: 2018-11-21

## 2018-11-21 RX ORDER — LIDOCAINE HYDROCHLORIDE 10 MG/ML
1-40 INJECTION INFILTRATION; PERINEURAL
Status: CANCELLED | OUTPATIENT
Start: 2018-11-21

## 2018-11-21 RX ORDER — FENTANYL CITRATE 50 UG/ML
12.5-5 INJECTION, SOLUTION INTRAMUSCULAR; INTRAVENOUS
Status: CANCELLED | OUTPATIENT
Start: 2018-11-21

## 2018-11-21 RX ORDER — SODIUM CHLORIDE 900 MG/100ML
INJECTION INTRAVENOUS
Status: DISCONTINUED
Start: 2018-11-21 | End: 2018-11-22 | Stop reason: HOSPADM

## 2018-11-21 RX ORDER — LIDOCAINE HYDROCHLORIDE 10 MG/ML
1-40 INJECTION INFILTRATION; PERINEURAL
Status: DISCONTINUED | OUTPATIENT
Start: 2018-11-21 | End: 2018-11-21 | Stop reason: HOSPADM

## 2018-11-21 RX ORDER — FENTANYL CITRATE 50 UG/ML
12.5-5 INJECTION, SOLUTION INTRAMUSCULAR; INTRAVENOUS
Status: DISCONTINUED | OUTPATIENT
Start: 2018-11-21 | End: 2018-11-22 | Stop reason: HOSPADM

## 2018-11-21 RX ORDER — LIDOCAINE HYDROCHLORIDE 10 MG/ML
INJECTION INFILTRATION; PERINEURAL
Status: COMPLETED
Start: 2018-11-21 | End: 2018-11-21

## 2018-11-21 RX ORDER — LIDOCAINE HYDROCHLORIDE 10 MG/ML
INJECTION, SOLUTION EPIDURAL; INFILTRATION; INTRACAUDAL; PERINEURAL
Status: COMPLETED
Start: 2018-11-21 | End: 2018-11-21

## 2018-11-21 RX ORDER — MIDAZOLAM HYDROCHLORIDE 1 MG/ML
1-5 INJECTION, SOLUTION INTRAMUSCULAR; INTRAVENOUS
Status: CANCELLED | OUTPATIENT
Start: 2018-11-21

## 2018-11-21 RX ORDER — INSULIN GLARGINE 100 [IU]/ML
30 INJECTION, SOLUTION SUBCUTANEOUS DAILY
Status: DISCONTINUED | OUTPATIENT
Start: 2018-11-21 | End: 2018-11-22 | Stop reason: HOSPADM

## 2018-11-21 RX ORDER — SODIUM CHLORIDE 0.9 % (FLUSH) 0.9 %
5-10 SYRINGE (ML) INJECTION AS NEEDED
Status: DISCONTINUED | OUTPATIENT
Start: 2018-11-21 | End: 2018-11-22 | Stop reason: HOSPADM

## 2018-11-21 RX ORDER — MIDAZOLAM HYDROCHLORIDE 1 MG/ML
1-5 INJECTION, SOLUTION INTRAMUSCULAR; INTRAVENOUS
Status: DISCONTINUED | OUTPATIENT
Start: 2018-11-21 | End: 2018-11-21 | Stop reason: HOSPADM

## 2018-11-21 RX ORDER — SODIUM CHLORIDE 0.9 % (FLUSH) 0.9 %
5-10 SYRINGE (ML) INJECTION EVERY 8 HOURS
Status: DISCONTINUED | OUTPATIENT
Start: 2018-11-21 | End: 2018-11-22 | Stop reason: HOSPADM

## 2018-11-21 RX ORDER — FENTANYL CITRATE 50 UG/ML
INJECTION, SOLUTION INTRAMUSCULAR; INTRAVENOUS
Status: COMPLETED
Start: 2018-11-21 | End: 2018-11-21

## 2018-11-21 RX ORDER — INSULIN GLARGINE 100 [IU]/ML
40 INJECTION, SOLUTION SUBCUTANEOUS
Status: DISCONTINUED | OUTPATIENT
Start: 2018-11-21 | End: 2018-11-22 | Stop reason: HOSPADM

## 2018-11-21 RX ORDER — INSULIN GLARGINE 100 [IU]/ML
40 INJECTION, SOLUTION SUBCUTANEOUS DAILY
Status: DISCONTINUED | OUTPATIENT
Start: 2018-11-21 | End: 2018-11-21

## 2018-11-21 RX ORDER — HEPARIN SODIUM 200 [USP'U]/100ML
500 INJECTION, SOLUTION INTRAVENOUS ONCE
Status: CANCELLED | OUTPATIENT
Start: 2018-11-21 | End: 2018-11-21

## 2018-11-21 RX ORDER — DOBUTAMINE HYDROCHLORIDE 200 MG/100ML
INJECTION INTRAVENOUS
Status: COMPLETED
Start: 2018-11-21 | End: 2018-11-21

## 2018-11-21 RX ORDER — BACITRACIN 50000 [IU]/1
INJECTION, POWDER, FOR SOLUTION INTRAMUSCULAR
Status: COMPLETED
Start: 2018-11-21 | End: 2018-11-21

## 2018-11-21 RX ORDER — VANCOMYCIN HYDROCHLORIDE 1 G/20ML
INJECTION, POWDER, LYOPHILIZED, FOR SOLUTION INTRAVENOUS
Status: COMPLETED
Start: 2018-11-21 | End: 2018-11-21

## 2018-11-21 RX ADMIN — FENTANYL CITRATE 50 MCG: 50 INJECTION, SOLUTION INTRAMUSCULAR; INTRAVENOUS at 11:35

## 2018-11-21 RX ADMIN — DEXTROSE MONOHYDRATE 12.5 G: 25 INJECTION, SOLUTION INTRAVENOUS at 12:23

## 2018-11-21 RX ADMIN — MIDAZOLAM HYDROCHLORIDE 2 MG: 1 INJECTION, SOLUTION INTRAMUSCULAR; INTRAVENOUS at 11:20

## 2018-11-21 RX ADMIN — ATORVASTATIN CALCIUM 20 MG: 20 TABLET, FILM COATED ORAL at 16:29

## 2018-11-21 RX ADMIN — VANCOMYCIN HYDROCHLORIDE 1000 MG: 1 INJECTION, POWDER, LYOPHILIZED, FOR SOLUTION INTRAVENOUS at 11:20

## 2018-11-21 RX ADMIN — MIDAZOLAM HYDROCHLORIDE 2 MG: 1 INJECTION, SOLUTION INTRAMUSCULAR; INTRAVENOUS at 11:30

## 2018-11-21 RX ADMIN — MIDAZOLAM HYDROCHLORIDE 2 MG: 1 INJECTION, SOLUTION INTRAMUSCULAR; INTRAVENOUS at 08:10

## 2018-11-21 RX ADMIN — MIDAZOLAM HYDROCHLORIDE 2 MG: 1 INJECTION, SOLUTION INTRAMUSCULAR; INTRAVENOUS at 08:15

## 2018-11-21 RX ADMIN — INSULIN LISPRO 3 UNITS: 100 INJECTION, SOLUTION INTRAVENOUS; SUBCUTANEOUS at 22:06

## 2018-11-21 RX ADMIN — MIDAZOLAM HYDROCHLORIDE 2 MG: 1 INJECTION, SOLUTION INTRAMUSCULAR; INTRAVENOUS at 08:00

## 2018-11-21 RX ADMIN — HEPARIN SODIUM 1000 UNITS: 200 INJECTION, SOLUTION INTRAVENOUS at 08:00

## 2018-11-21 RX ADMIN — INSULIN LISPRO 10 UNITS: 100 INJECTION, SOLUTION INTRAVENOUS; SUBCUTANEOUS at 18:17

## 2018-11-21 RX ADMIN — MIDAZOLAM HYDROCHLORIDE 2 MG: 1 INJECTION, SOLUTION INTRAMUSCULAR; INTRAVENOUS at 11:35

## 2018-11-21 RX ADMIN — LIDOCAINE HYDROCHLORIDE 10 ML: 10 INJECTION, SOLUTION EPIDURAL; INFILTRATION; INTRACAUDAL; PERINEURAL at 08:12

## 2018-11-21 RX ADMIN — ASPIRIN 81 MG: 81 TABLET, COATED ORAL at 10:21

## 2018-11-21 RX ADMIN — MIDAZOLAM HYDROCHLORIDE 1 MG: 1 INJECTION, SOLUTION INTRAMUSCULAR; INTRAVENOUS at 08:22

## 2018-11-21 RX ADMIN — INSULIN GLARGINE 40 UNITS: 100 INJECTION, SOLUTION SUBCUTANEOUS at 22:06

## 2018-11-21 RX ADMIN — ACETAMINOPHEN 650 MG: 325 TABLET ORAL at 22:07

## 2018-11-21 RX ADMIN — HEPARIN SODIUM 1000 UNITS: 200 INJECTION, SOLUTION INTRAVENOUS at 11:30

## 2018-11-21 RX ADMIN — DOBUTAMINE HYDROCHLORIDE 5 MCG/KG/MIN: 200 INJECTION INTRAVENOUS at 08:21

## 2018-11-21 RX ADMIN — Medication 10 ML: at 22:00

## 2018-11-21 RX ADMIN — LIDOCAINE HYDROCHLORIDE 30 ML: 10 INJECTION, SOLUTION INFILTRATION; PERINEURAL at 11:39

## 2018-11-21 RX ADMIN — LIDOCAINE HYDROCHLORIDE 30 ML: 10 INJECTION INFILTRATION; PERINEURAL at 11:39

## 2018-11-21 RX ADMIN — FENTANYL CITRATE 50 MCG: 50 INJECTION, SOLUTION INTRAMUSCULAR; INTRAVENOUS at 11:20

## 2018-11-21 RX ADMIN — INSULIN LISPRO 3 UNITS: 100 INJECTION, SOLUTION INTRAVENOUS; SUBCUTANEOUS at 18:18

## 2018-11-21 RX ADMIN — FENOFIBRATE 145 MG: 145 TABLET ORAL at 16:30

## 2018-11-21 RX ADMIN — BACITRACIN 50000 UNITS: 5000 INJECTION, POWDER, FOR SOLUTION INTRAMUSCULAR at 11:53

## 2018-11-21 RX ADMIN — FENTANYL CITRATE 50 MCG: 50 INJECTION, SOLUTION INTRAMUSCULAR; INTRAVENOUS at 08:00

## 2018-11-21 RX ADMIN — DOBUTAMINE IN DEXTROSE 5 MCG/KG/MIN: 200 INJECTION, SOLUTION INTRAVENOUS at 08:21

## 2018-11-21 RX ADMIN — DEXTROSE MONOHYDRATE 12.5 G: 25 INJECTION, SOLUTION INTRAVENOUS at 10:15

## 2018-11-21 RX ADMIN — MIDAZOLAM HYDROCHLORIDE 1 MG: 1 INJECTION, SOLUTION INTRAMUSCULAR; INTRAVENOUS at 11:50

## 2018-11-21 RX ADMIN — FENTANYL CITRATE 50 MCG: 50 INJECTION, SOLUTION INTRAMUSCULAR; INTRAVENOUS at 08:10

## 2018-11-21 NOTE — PROCEDURES
[address] [place] [address]  [phone]    Indications and Pre-Procedure Diagnosis:  Jessica Haji is a 70 y.o. male with syncope, brugada type ekg and positive EPS for VF is referred for single chamber defibrillator. The left ventricular ejection fraction is 60% and the patient is NYHA Class I. Post Procedure Diagnosis:  Syncope  brugada  VF    ICD Implant Procedure and Findings:  Informed consent was obtained and the patient was premedicated with vancomycin. The procedure was performed under local anesthesia. Continuous pulse oximetry and cuff pressure were monitored. During the procedure, the patient received Versed, Fentanyl and Propofol for sedation per anesthesia personnel. The left deltopectoral area was prepped and draped in the usual sterile fashion and was liberally infiltrated with 1% lidocaine. An incision was made over the left subpectoral area and a generator pocket was manually dissected. Access was achieved in the left axillary vein under fluoroscopic guidance and using the seldinger technique. Through the left axillary vein, pacing/defibrillation leads were positioned in appropriate regions in the right heart chambers where satisfactory pacing and sensing parameters were measured. Stability of the leads was assessed with deep breathing and there was no diaphragmatic pacing at 10V output. The leads were anchored using the sleeves and a pulse generator pocket fashioned using blunt dissection. The leads were then connected to the pulse generator. The pulse generator pocket was then liberally infiltrated with bacitracin solution, and the device implanted with a single silk fixation suture in the header to prevent migration. The wound was closed in layers using continuous 2-0 Vicryl and 4-0 Vicryl ending with a sub-cuticular closure. A bio-occlusive dressing were applied to the skin. Fluoroscopy and total procedure times were 3 and 30 minutes respectively.  Estimated blood loss  <10 ml. Sharp count: correct. Specimen(s) collected: none. The following procedure related complication occurred: none. The following problems were encountered: none. Findings: successful ICD placement.     Device Data Measurements:  Lead Sensing (mV) Threshold (V)Pulse Width (ms) Impedance (Ohms)      RV 6.1  0.7  0.5   447      Defibrillator Function Testing  Induction Rhythm Energy (J) Impedance Polarity Success        (Ohms)  On T  VF  30  65  RV-  Yes         Final Programmed Parameters  Bradycardia pacing rate  40 bpm  Pacing Mode    VVI  Pacing Output    3.5 V@ 0.5 ms  Fibrillation Detect Interval  210 bpm  First Shock Energy   40 J  Electrode Configuration  RV -  ATP Status    On      Supplies Summary available in the chart    Gladys Zheng MD, [facility]

## 2018-11-21 NOTE — PROGRESS NOTES
General Daily Progress Note    Admit Date: 11/18/2018    Subjective:     Patient has no complaint . Current Facility-Administered Medications   Medication Dose Route Frequency    fentaNYL citrate (PF) injection 12.5-50 mcg  12.5-50 mcg IntraVENous Multiple    lidocaine (XYLOCAINE) 10 mg/mL (1 %) injection 1-40 mL  1-40 mL SubCUTAneous Multiple    midazolam (VERSED) injection 1-5 mg  1-5 mg IntraVENous Multiple    DOBUTamine (DOBUTREX) 2000 mcg/ml 50 ml  0-10 mcg/kg/min IntraVENous TITRATE    insulin glargine (LANTUS) injection 30 Units  30 Units SubCUTAneous DAILY    insulin glargine (LANTUS) injection 40 Units  40 Units SubCUTAneous QHS    insulin lispro (HUMALOG) injection 10 Units  10 Units SubCUTAneous TIDAC    insulin lispro (HUMALOG) injection   SubCUTAneous AC&HS    glucose chewable tablet 16 g  4 Tab Oral PRN    dextrose (D50W) injection syrg 12.5-25 g  12.5-25 g IntraVENous PRN    glucagon (GLUCAGEN) injection 1 mg  1 mg IntraMUSCular PRN    metFORMIN ER (GLUCOPHAGE XR) tablet 1,000 mg  1,000 mg Oral BID WITH MEALS    0.9% sodium chloride (MBP/ADV) infusion        0.9% sodium chloride infusion        PHENYLephrine (CHI-SYNEPHRINE) 10 mg/mL injection        atorvastatin (LIPITOR) tablet 20 mg  20 mg Oral DAILY    fenofibrate nanocrystallized (TRICOR) tablet 145 mg  145 mg Oral DAILY    sodium chloride (NS) flush 5-10 mL  5-10 mL IntraVENous Q8H    sodium chloride (NS) flush 5-10 mL  5-10 mL IntraVENous PRN    acetaminophen (TYLENOL) tablet 650 mg  650 mg Oral Q6H PRN    ondansetron (ZOFRAN) injection 4 mg  4 mg IntraVENous Q6H PRN    docusate sodium (COLACE) capsule 100 mg  100 mg Oral DAILY PRN    sodium chloride (NS) flush 5-10 mL  5-10 mL IntraVENous Q8H    sodium chloride (NS) flush 5-10 mL  5-10 mL IntraVENous PRN    aspirin delayed-release tablet 81 mg  81 mg Oral DAILY        Review of Systems  A comprehensive review of systems was negative.     Objective:     Patient Vitals for the past 24 hrs:   BP Temp Pulse Resp SpO2   11/21/18 0838 104/60 97.5 °F (36.4 °C) 69 16 98 %   11/21/18 0321 135/75 98.1 °F (36.7 °C) 61 16 97 %   11/20/18 2232 104/67 97.8 °F (36.6 °C) 65 14 99 %   11/20/18 1933 115/70 98.4 °F (36.9 °C) 78 16 96 %   11/20/18 1518 116/63 98.1 °F (36.7 °C) 83 16 99 %   11/20/18 1138 128/81 99 °F (37.2 °C) 74 16 99 %     No intake/output data recorded. No intake/output data recorded. Physical Exam:   Visit Vitals  /60 (BP 1 Location: Left arm, BP Patient Position: At rest)   Pulse 69   Temp 97.5 °F (36.4 °C)   Resp 16   Ht 5' 7\" (1.702 m)   Wt 152 lb 1.9 oz (69 kg)   SpO2 98%   BMI 23.82 kg/m²     General appearance: alert, cooperative, no distress, appears stated age  Neck: supple, symmetrical, trachea midline, no adenopathy, thyroid: not enlarged, symmetric, no tenderness/mass/nodules, no carotid bruit and no JVD  Lungs: clear to auscultation bilaterally  Heart: regular rate and rhythm, S1, S2 normal, no murmur, click, rub or gallop  Abdomen: soft, non-tender.  Bowel sounds normal. No masses,  no organomegaly  Extremities: extremities normal, atraumatic, no cyanosis or edema        Data Review   Recent Results (from the past 24 hour(s))   GLUCOSE, POC    Collection Time: 11/20/18 11:37 AM   Result Value Ref Range    Glucose (POC) 245 (H) 65 - 100 mg/dL    Performed by Courtney, POC    Collection Time: 11/20/18  5:15 PM   Result Value Ref Range    Glucose (POC) 357 (H) 65 - 100 mg/dL    Performed by Corey Shelley    GLUCOSE, POC    Collection Time: 11/20/18  8:57 PM   Result Value Ref Range    Glucose (POC) 479 (H) 65 - 100 mg/dL    Performed by 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  EP studies today. 2.  Significant hyperglycemia yesterday. Not sure of reason but will accommodate. 3.  Discharge this afternoon if okay with cardiology.

## 2018-11-21 NOTE — PROGRESS NOTES
Cardiology Progress Note            [address], [address] [place] [address]  [phone]    11/21/2018 10:46 AM    Admit Date: 11/18/2018    Admit Diagnosis: Chest pain, cardiac;Brugada syndrome    Subjective:     Mandy Noble  Surprisingly had positive EPS.  Easily inducible VT/VF    Visit Vitals  /70   Pulse 74   Temp 97.5 °F (36.4 °C)   Resp 16   Ht 5' 7\" (1.702 m)   Wt 152 lb 1.9 oz (69 kg)   SpO2 97%   BMI 23.82 kg/m²     Current Facility-Administered Medications   Medication Dose Route Frequency    fentaNYL citrate (PF) injection 12.5-50 mcg  12.5-50 mcg IntraVENous Multiple    lidocaine (XYLOCAINE) 10 mg/mL (1 %) injection 1-40 mL  1-40 mL SubCUTAneous Multiple    midazolam (VERSED) injection 1-5 mg  1-5 mg IntraVENous Multiple    DOBUTamine (DOBUTREX) 2000 mcg/ml 50 ml  0-10 mcg/kg/min IntraVENous TITRATE    insulin glargine (LANTUS) injection 30 Units  30 Units SubCUTAneous DAILY    insulin glargine (LANTUS) injection 40 Units  40 Units SubCUTAneous QHS    fentaNYL citrate (PF) 50 mcg/mL injection        midazolam (VERSED) 1 mg/mL injection        iopamidol (ISOVUE-370) 76 % injection        heparinized saline 2 units/mL 1,000 unit/500 mL infusion        insulin lispro (HUMALOG) injection 10 Units  10 Units SubCUTAneous TIDAC    insulin lispro (HUMALOG) injection   SubCUTAneous AC&HS    glucose chewable tablet 16 g  4 Tab Oral PRN    dextrose (D50W) injection syrg 12.5-25 g  12.5-25 g IntraVENous PRN    glucagon (GLUCAGEN) injection 1 mg  1 mg IntraMUSCular PRN    metFORMIN ER (GLUCOPHAGE XR) tablet 1,000 mg  1,000 mg Oral BID WITH MEALS    0.9% sodium chloride (MBP/ADV) infusion        0.9% sodium chloride infusion        PHENYLephrine (CHI-SYNEPHRINE) 10 mg/mL injection        atorvastatin (LIPITOR) tablet 20 mg  20 mg Oral DAILY    fenofibrate nanocrystallized (TRICOR) tablet 145 mg  145 mg Oral DAILY    sodium chloride (NS) flush 5-10 mL  5-10 mL IntraVENous Q8H  sodium chloride (NS) flush 5-10 mL  5-10 mL IntraVENous PRN    acetaminophen (TYLENOL) tablet 650 mg  650 mg Oral Q6H PRN    ondansetron (ZOFRAN) injection 4 mg  4 mg IntraVENous Q6H PRN    docusate sodium (COLACE) capsule 100 mg  100 mg Oral DAILY PRN    sodium chloride (NS) flush 5-10 mL  5-10 mL IntraVENous Q8H    sodium chloride (NS) flush 5-10 mL  5-10 mL IntraVENous PRN    aspirin delayed-release tablet 81 mg  81 mg Oral DAILY         Objective:      Visit Vitals  /70   Pulse 74   Temp 97.5 °F (36.4 °C)   Resp 16   Ht 5' 7\" (1.702 m)   Wt 152 lb 1.9 oz (69 kg)   SpO2 97%   BMI 23.82 kg/m²       Physical Exam:  Abdomen: soft, non-tender  Extremities: extremities normal  Heart: regular rate and rhythm  Lungs: clear to auscultation bilaterally  Pulses: 2+ and symmetric    Data Review:   Labs:    Recent Labs     11/19/18  0306   WBC 5.0   HGB 11.8*   HCT 35.8*        Recent Labs     11/19/18  0306      K 4.0      CO2 29   *   BUN 21*   CREA 0.93   CA 8.5       No results for input(s): TROIQ, CPK, CKMB in the last 72 hours. 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 In light of syncope, brugada like pattern on ekg and positive eps - is a candidate for an ICD.  I discussed the risks/benefits/alternatives of the procedure with the patient and his POA (brother). Risks include (but are not limited to) bleeding, heart block, infection, cva/mi/tamponade/death. The patient and his brother [facility]) understands and agrees to proceed.        Salvador Neville MD, [facility]    11/21/2018

## 2018-11-21 NOTE — PROGRESS NOTES
Bedside and Verbal shift change report given to Joselito Bennett RN (oncoming nurse) by Nasreen Funez RN (offgoing nurse). Report included the following information SBAR, Kardex and Cardiac Rhythm NSR.

## 2018-11-21 NOTE — PROCEDURES
[address]  [phone]    Indications and Pre-Procedure Diagnosis:  Osmin Strauss is a 70 y.o. male with syncope and Brugada type pattern on ekg is referred for electr-physiologic evaluation and intervention. The left ventricular ejection fraction is 60% and the patient is NYHA Class I. Post Procedure Diagnosis  syncope  brugada  VT/VF    Electrophysiology Study Procedure  Informed consent was obtained. All vascular access sites were prepped and draped in the usual sterile fashion and the Seldinger technique was used to catherize the RFV with multi-polar electrode catheters, which were placed in the appropriate intra-cardiac sites under fluoroscopic guidance (see catheter list). Right and left atrial pacing and recording, His bundle recording, and right ventricular pacing and recording were performed. Continuous pulse oximetry and cuff BP monitoring were performed. During the procedure, the patient received Versed and Fentanyl for sedation. Straight ventricular pacing at 220 msec induced VF that was successfully resuscitated with 300J synchronized shock. Autonomic manipulation with dobutamine @ 5 mcg/min was performed during this procedure. Rapid atrial pacing, on and off dobutamine, failed to induce any arrhythmias. At the end of the procedure all catheters were removed and vascular hemostasis achieved. The patient left the laboratory in a stable condition. Fluoroscopic and total procedure times were 2 and 20 minutes respectively. Estimated blood loss: <10 ml. Sharp counts: correct. Specimen (s) collected: none. The procedure related complication occurred: none. The following problems were encountered: none. Conduction intervals (ms)    A-A A-H H-V P-R QRS Q-T R-R V-V  862 72 47 139 98 [phone]    AV beau conduction    VA Block when pacing at 470 ms    AV Wenckebach when pacing at 330 ms    Findings and Summary    This study demonstrates:  1.  Easily inducible VT/VF  2. No inducible atrial arrhythmias on dobutamine with rapid atrial pacing    Recommendation:  1. ICD    Thank you for allowing me to participate in this patients care.     Estrellita Rice MD, Avelina Solano

## 2018-11-21 NOTE — DIABETES MGMT
DTC Consult Note    Recommendations/ Comments: See medication recommendations from previous note. Current hospital DM medication:  Lispro correction - normal sensitivity  Lantus 30 units am, 40 units pm, lispro with meals 10 units. Consult received for:  [x]             Assessment of home management                []      Medication Recommendations                []             Meter/monitoring     []             Insulin instruction     []             New diagnosis     []             Outpatient education     []             Insulin pump patient     []             Insulin infusion     []             DKA/HHS    Chart reviewed and initial evaluation complete on Jayshree Hunt. Patient is a 70 y.o. male with known DM on levemir 30 units pm, 12 units am, Trulicity q 7 days (pt not sure of dose), glipizide BID (pt unsure of dose), metformin 1000 mg BID at home. Pt somewhat unclear of doses for his medications and if he takes meal-time insulin or not as pt reported \" I don't take insulin before meals\" but then shared he takes humalog (shared that he thinks he takes 12 units at night - but 12 units is also his pm Levemir dose?). Pt checks BG 2x/daily, fasting BG of 90 - 95 mg/dL and bedtime BG of 180 - 220 mg/dL at home. Pt shared that he avoids sugary beverages, may have oatmeal or egg sandwich for breakfast. Lunch or Dinner may include tuna sandwich with or without chips.      Assessed and instructed patient on the following:    - blood sugar goals, complications of diabetes mellitus, hypoglycemia prevention and treatment, illness management, nutrition and referred to Diabetes Educator  - Strongly encouraged pt to discuss medications with his MD if BG are above target, discussed that pt may require increase in meal-time insulin if BG continue to trend upward throughout the day, discussed importance of MD with DM management and medication adjustment  - Strongly encouraged pt to attend DM educt ation class, pt declined for me to schedule classes   - discussed treating low BG with CHO source such as glucose tablets or juice vs banana       Encouraged the following:   · dietary modifications: Discussed aiming for 3 CHO servings at each meal, protein with each meal, discussed examples of CHO portions of CHO containing foods, regular blood sugar monitoring: discussed checking pre-lunch BG on occasion to see how BG trends throughout the day    Provided patient with the following: [x]             Survival skills education materials               []             Insulin education materials               []             CHO counting education materials               [x]             Outpatient DTC contact number               []             Glucometer                   Discussed with patient and/or family need for follow up appointment for diabetes management after discharge. A1c:   Lab Results   Component Value Date/Time    Hemoglobin A1c 8.0 (H) 07/24/2018 05:40 PM       Recent Glucose Results:   Lab Results   Component Value Date/Time    GLUCPOC 62 (L) 11/21/2018 03:38 PM    GLUCPOC 145 (H) 11/21/2018 12:42 PM    GLUCPOC 75 11/21/2018 12:21 PM        Lab Results   Component Value Date/Time    Creatinine 0.93 11/19/2018 03:06 AM     Estimated Creatinine Clearance: 74 mL/min (based on SCr of 0.93 mg/dL). Active Orders   Diet    DIET CARDIAC Regular        PO intake: No data found. Will continue to follow as needed. Thank you.     Sofie Cruz, [address] [place] [address]    Office: [phone]

## 2018-11-21 NOTE — DIABETES MGMT
DTC Progress Note    Recommendations/ Comments: Noted pt with low BG this am. Noted increase in pm dose of Lantus from 30 untis to 40 units. Noted am lantus dose held. If appropriate,   1. Please consider decreasing pm Lantus to 20 units given pt with low BG this am. Noted increase in am dose of Lantus. Pt may require increase in prandial insulin if BG continues to trend upward throughout the day. 2. Adding CHO consistent to current diet. Sent tigertext to Dr. Stephanie Gregory regarding the above. Attempted to see Pt this afternoon for consult; however, pt extremely drowsy and unable to wake up enough for visit to discuss DM management. DTC will attempt to f/u with pt at later time to complete consult. Chart reviewed on Delvin Walls. Patient is a 70 y.o. male with known diabetes. Per PTA list, patient is taking Lispro 6 units acl and acd, Trulicity, Levemir 12 units am and 30 units pm, glipizide 10 mg BID, Metformin 500 mg BID at home. A1c:   Lab Results   Component Value Date/Time    Hemoglobin A1c 8.0 (H) 07/24/2018 05:40 PM    Hemoglobin A1c 9.8 (H) 04/20/2018 11:14 AM       Recent Glucose Results:   Lab Results   Component Value Date/Time    GLUCPOC 145 (H) 11/21/2018 12:42 PM    GLUCPOC 75 11/21/2018 12:21 PM    GLUCPOC 106 (H) 11/21/2018 10:30 AM        Lab Results   Component Value Date/Time    Creatinine 0.93 11/19/2018 03:06 AM     Estimated Creatinine Clearance: 74 mL/min (based on SCr of 0.93 mg/dL). Active Orders   Diet    DIET CARDIAC Regular        PO intake:   Patient Vitals for the past 72 hrs:   % Diet Eaten   11/18/18 1500 100 %   11/18/18 1438 100 %       Current hospital DM medication: Lantus 20 units daily, Lantus 30 units daily, Lispro 10 units ac meals, metformin ER 1000mg BID    Will continue to follow as needed. Thank you.   Ian Beltran, [address] [place] [address]  Office:  [phone]

## 2018-11-21 NOTE — PROGRESS NOTES
Patient returned from EP study, BG=70, Andreas Zuleta MD paged via [facility] primary care and sports medicine line. On hold for approx 45 minutes before being connected to Andreas Zuleta MD. Once connected Andreas Zuleta MD promptly responded and ordered to give 1/2 amp D50 and monitor at this time.

## 2018-11-22 VITALS
SYSTOLIC BLOOD PRESSURE: 111 MMHG | OXYGEN SATURATION: 99 % | TEMPERATURE: 98.3 F | RESPIRATION RATE: 16 BRPM | HEIGHT: 67 IN | WEIGHT: 152.12 LBS | HEART RATE: 75 BPM | DIASTOLIC BLOOD PRESSURE: 73 MMHG | BODY MASS INDEX: 23.88 KG/M2

## 2018-11-22 LAB
ATRIAL RATE: 66 BPM
CALCULATED P AXIS, ECG09: 53 DEGREES
CALCULATED R AXIS, ECG10: -27 DEGREES
CALCULATED T AXIS, ECG11: 12 DEGREES
DIAGNOSIS, 93000: NORMAL
GLUCOSE BLD STRIP.AUTO-MCNC: 144 MG/DL (ref 65–100)
P-R INTERVAL, ECG05: 144 MS
Q-T INTERVAL, ECG07: 386 MS
QRS DURATION, ECG06: 90 MS
QTC CALCULATION (BEZET), ECG08: 404 MS
SERVICE CMNT-IMP: ABNORMAL
VENTRICULAR RATE, ECG03: 66 BPM

## 2018-11-22 PROCEDURE — 74011636637 HC RX REV CODE- 636/637: Performed by: INTERNAL MEDICINE

## 2018-11-22 PROCEDURE — 82962 GLUCOSE BLOOD TEST: CPT

## 2018-11-22 PROCEDURE — 74011250637 HC RX REV CODE- 250/637: Performed by: INTERNAL MEDICINE

## 2018-11-22 PROCEDURE — 94760 N-INVAS EAR/PLS OXIMETRY 1: CPT

## 2018-11-22 PROCEDURE — 74011636637 HC RX REV CODE- 636/637: Performed by: HOSPITALIST

## 2018-11-22 PROCEDURE — 93005 ELECTROCARDIOGRAM TRACING: CPT

## 2018-11-22 RX ORDER — ACETAMINOPHEN 325 MG/1
650 TABLET ORAL
Qty: 60 TAB | Refills: 1 | Status: ON HOLD
Start: 2018-11-22 | End: 2019-03-12

## 2018-11-22 RX ORDER — INSULIN LISPRO 100 [IU]/ML
INJECTION, SOLUTION INTRAVENOUS; SUBCUTANEOUS
Qty: 1 ADJUSTABLE DOSE PRE-FILLED PEN SYRINGE | Refills: 11 | Status: SHIPPED
Start: 2018-11-22 | End: 2019-01-02

## 2018-11-22 RX ADMIN — METFORMIN HYDROCHLORIDE 1000 MG: 500 TABLET, EXTENDED RELEASE ORAL at 09:01

## 2018-11-22 RX ADMIN — Medication 10 ML: at 06:00

## 2018-11-22 RX ADMIN — ATORVASTATIN CALCIUM 20 MG: 20 TABLET, FILM COATED ORAL at 08:58

## 2018-11-22 RX ADMIN — INSULIN GLARGINE 30 UNITS: 100 INJECTION, SOLUTION SUBCUTANEOUS at 08:58

## 2018-11-22 RX ADMIN — INSULIN LISPRO 2 UNITS: 100 INJECTION, SOLUTION INTRAVENOUS; SUBCUTANEOUS at 08:58

## 2018-11-22 RX ADMIN — FENOFIBRATE 145 MG: 145 TABLET ORAL at 08:57

## 2018-11-22 RX ADMIN — INSULIN LISPRO 10 UNITS: 100 INJECTION, SOLUTION INTRAVENOUS; SUBCUTANEOUS at 08:59

## 2018-11-22 RX ADMIN — ASPIRIN 81 MG: 81 TABLET, COATED ORAL at 08:58

## 2018-11-22 NOTE — DISCHARGE SUMMARY
Darwin Steinberg  MR#: [id number]  : 1955  ACCOUNT #: [de-identified]   ADMIT DATE: 2018  DISCHARGE DATE: 2018    HISTORY OF PRESENT ILLNESS:  The patient is a 58-year-old gentleman who was doing well up until the morning of admission when he had a syncopal episode. He states that he went to the bathroom, voided and after voiding he began to perspire. This was followed by dizziness and blacking out. His lady friend was there and no seizure activity was noted. He rapidly regained consciousness, but was brought to the emergency room. Here, he was evaluated and his EKG suggested the potential possibility of a Brugada syndrome. Because of this, the patient was admitted. He has had a similar episode 10 years ago, which was uneventful also occurring post-voiding in the early morning. PAST MEDICAL HISTORY, SOCIAL HISTORY, REVIEW OF SYSTEMS, FAMILY HISTORY,   PHYSICAL EXAMINATION:  Is as in admitting H and P.    LABORATORY VALUES:  Initial hemoglobin 14.0, white count 5.9, MCV was 82.7, platelet count 704M with the following differential, 78 segs, 15 lymphocytes, 6 monocytes and 1 eosinophil. Urinalysis revealed 3+ glycosuria. Abnormalities on the comprehensive profile on admission with a blood sugar of 410. His cardiac enzymes showed slight elevation of the CK-MB but troponin level was negative. RADIOGRAPHS:  Initial chest x-ray negative. Cardiac MRI revealing left ventricular ejection fraction of 60% with left ventricular hypertrophy noted. Echocardiogram revealing ejection fraction of 55-60% with a comment that the wall thickness was mildly increased, contrast including the MRI suggesting it was severely increased. No valvular abnormalities were noted. CONSULTATIONS:   Two consultations obtained with cardiology with Dr. Jacques Tyler, and Dr. Stephany Sparks:  The patient was admitted and placed on telemetry.   No rhythm disturbances occurred. He, however, was taken to the cardiac cath lab in view of the abnormalities noted on his EKG. The cardiac cath report revealed a mildly calcified left main. LAD was mildly calcified with minor luminal irregularities. Circumflex tortuous with minimal luminal irregularity. Right coronary was moderately tortuous as was the PDA. Patient tolerated the procedure well. Post cath, the patient was then seen in consultation by Dr. Debbe Denver who felt that the EP studies were indeed indicated given the syncopal episode as well as the questionable existence of Brugada syndrome. He was taken to the EP studies where he had easily inducible tach/fib. Because of this, he had a defibrillator placed. Diabetic control was horrible and I adjusted his insulin accordingly. Fine tuning of his diabetes will occur at home. Hospital course was otherwise uneventful. FINAL DIAGNOSES:  1. Syncopal episode secondary to either vasovagal and/or ventricular arrhythmia. 2.  Possible Brugada syndrome. 3.  Diabetes mellitus type 2, poorly controlled. 4.  Primary hypertension. 5.  Dyslipidemia. 6.  Status post cardiac catheterization with trivial disease. 7.  Left ventricular hypertrophy. PROCEDURE THIS ADMISSION:    1. Cardiac catheterization. 2.  EP studies. 3.  Insertion of an AICD device. DISPOSITION:    1. The patient was discharged home ambulatory on an ADA diet with bedtime snack. 2.  DISCHARGE MEDICATIONS:  Include the following:  Atorvastatin 20 mg every day, Lotrisone cream p.r.n., Trulicity 1.5 mg weekly, fenofibrate 145 mg every day, Levemir 30 units every morning and 40 units at bedtime. Humalog 6 units a.c. breakfast and lunch and 10 units a.c. dinner, metformin 1000 mg b.i.d.,  acetaminophen 650 q.4 hours p.r.n.  3.  CODE STATUS:  THE PATIENT REMAINS FULL CODE. 4.  He will follow up with Dr. Debbe Denver in 1 week.   5.  I reminded the patient the importance of eating meals his meals at same time every day. 6.  He will return to the office on Tuesday. JONO Sahni MD       LAB/BN  D: 11/22/2018 10:49     T: 11/22/2018 11:18  JOB #: [id number]  CC: Dusty Rosario MD  CC: Myrtha Bernheim MD Patient

## 2018-11-22 NOTE — PROGRESS NOTES
Cardiology Progress Note            [address], [place], [address] [facility]  [phone]    11/22/2018 7:13 AM    Admit Date: 11/18/2018    Admit Diagnosis: Chest pain, cardiac;Brugada syndrome    Subjective:     Radha Vance   denies chest pain.     Visit Vitals  /73   Pulse 73   Temp 98.2 °F (36.8 °C)   Resp 16   Ht 5' 7\" (1.702 m)   Wt 152 lb 1.9 oz (69 kg)   SpO2 99%   BMI 23.82 kg/m²     Current Facility-Administered Medications   Medication Dose Route Frequency    fentaNYL citrate (PF) injection 12.5-50 mcg  12.5-50 mcg IntraVENous Multiple    insulin glargine (LANTUS) injection 30 Units  30 Units SubCUTAneous DAILY    insulin glargine (LANTUS) injection 40 Units  40 Units SubCUTAneous QHS    ADDaptor        0.9% sodium chloride (MBP/ADV) infusion        0.9% sodium chloride (MBP/ADV) infusion        sodium chloride (NS) flush 5-10 mL  5-10 mL IntraVENous Q8H    sodium chloride (NS) flush 5-10 mL  5-10 mL IntraVENous PRN    naloxone (NARCAN) injection 0.4 mg  0.4 mg IntraVENous PRN    insulin lispro (HUMALOG) injection 10 Units  10 Units SubCUTAneous TIDAC    insulin lispro (HUMALOG) injection   SubCUTAneous AC&HS    glucose chewable tablet 16 g  4 Tab Oral PRN    dextrose (D50W) injection syrg 12.5-25 g  12.5-25 g IntraVENous PRN    glucagon (GLUCAGEN) injection 1 mg  1 mg IntraMUSCular PRN    metFORMIN ER (GLUCOPHAGE XR) tablet 1,000 mg  1,000 mg Oral BID WITH MEALS    0.9% sodium chloride (MBP/ADV) infusion        0.9% sodium chloride infusion        PHENYLephrine (CHI-SYNEPHRINE) 10 mg/mL injection        atorvastatin (LIPITOR) tablet 20 mg  20 mg Oral DAILY    fenofibrate nanocrystallized (TRICOR) tablet 145 mg  145 mg Oral DAILY    sodium chloride (NS) flush 5-10 mL  5-10 mL IntraVENous Q8H    sodium chloride (NS) flush 5-10 mL  5-10 mL IntraVENous PRN    acetaminophen (TYLENOL) tablet 650 mg  650 mg Oral Q6H PRN    ondansetron (ZOFRAN) injection 4 mg  4 mg IntraVENous Q6H PRN    docusate sodium (COLACE) capsule 100 mg  100 mg Oral DAILY PRN    sodium chloride (NS) flush 5-10 mL  5-10 mL IntraVENous Q8H    sodium chloride (NS) flush 5-10 mL  5-10 mL IntraVENous PRN    aspirin delayed-release tablet 81 mg  81 mg Oral DAILY         Objective:      Visit Vitals  /73   Pulse 73   Temp 98.2 °F (36.8 °C)   Resp 16   Ht 5' 7\" (1.702 m)   Wt 152 lb 1.9 oz (69 kg)   SpO2 99%   BMI 23.82 kg/m²       Physical Exam:  Abdomen: soft, non-tender  Extremities: extremities normal  Heart: regular rate and rhythm  Lungs: clear to auscultation bilaterally  Pulses: 2+ and symmetric    Data Review:   Labs:    No results for input(s): WBC, HGB, HCT, PLT, HGBEXT, HCTEXT, PLTEXT in the last 72 hours. No results for input(s): NA, K, CL, CO2, GLU, BUN, CREA, CA, MG, PHOS, ALB, TBIL, TBILI, SGOT, ALT, INR in the last 72 hours. No lab exists for component:  BNP, INREXT    No results for input(s): TROIQ, CPK, CKMB in the last 72 hours. 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 cxr and interrogation wnl this am. [id number] Maria Luisa Leung for dc.  F/u in 1-2 weeks as outpatient    Chano Hannon MD, [facility]    11/22/2018

## 2018-11-22 NOTE — PROGRESS NOTES
Discharge instructions reviewed with patient. Allowed adequate time to ask questions, all questions answered. Printed copy of AVS given to patient. All belongings gathered, IV and tele discontinued. Transported via wheelchair to main entrance and into care of family. ICD booklet & downloader given to pt as well.

## 2018-11-22 NOTE — PROGRESS NOTES
11/21/2018 9:53 PM    Admit Date: 11/18/2018    Admit Diagnosis:   Chest pain, cardiac;Brugada syndrome    Subjective:     Jacey Hernandez denies chest pain or shortness of breath.      Current Facility-Administered Medications   Medication Dose Route Frequency    fentaNYL citrate (PF) injection 12.5-50 mcg  12.5-50 mcg IntraVENous Multiple    insulin glargine (LANTUS) injection 30 Units  30 Units SubCUTAneous DAILY    insulin glargine (LANTUS) injection 40 Units  40 Units SubCUTAneous QHS    iopamidol (ISOVUE-370) 76 % injection        ADDaptor        0.9% sodium chloride (MBP/ADV) infusion        0.9% sodium chloride (MBP/ADV) infusion        sodium chloride (NS) flush 5-10 mL  5-10 mL IntraVENous Q8H    sodium chloride (NS) flush 5-10 mL  5-10 mL IntraVENous PRN    naloxone (NARCAN) injection 0.4 mg  0.4 mg IntraVENous PRN    insulin lispro (HUMALOG) injection 10 Units  10 Units SubCUTAneous TIDAC    insulin lispro (HUMALOG) injection   SubCUTAneous AC&HS    glucose chewable tablet 16 g  4 Tab Oral PRN    dextrose (D50W) injection syrg 12.5-25 g  12.5-25 g IntraVENous PRN    glucagon (GLUCAGEN) injection 1 mg  1 mg IntraMUSCular PRN    metFORMIN ER (GLUCOPHAGE XR) tablet 1,000 mg  1,000 mg Oral BID WITH MEALS    0.9% sodium chloride (MBP/ADV) infusion        0.9% sodium chloride infusion        PHENYLephrine (CHI-SYNEPHRINE) 10 mg/mL injection        atorvastatin (LIPITOR) tablet 20 mg  20 mg Oral DAILY    fenofibrate nanocrystallized (TRICOR) tablet 145 mg  145 mg Oral DAILY    sodium chloride (NS) flush 5-10 mL  5-10 mL IntraVENous Q8H    sodium chloride (NS) flush 5-10 mL  5-10 mL IntraVENous PRN    acetaminophen (TYLENOL) tablet 650 mg  650 mg Oral Q6H PRN    ondansetron (ZOFRAN) injection 4 mg  4 mg IntraVENous Q6H PRN    docusate sodium (COLACE) capsule 100 mg  100 mg Oral DAILY PRN    sodium chloride (NS) flush 5-10 mL  5-10 mL IntraVENous Q8H    sodium chloride (NS) flush 5-10 mL  5-10 mL IntraVENous PRN    aspirin delayed-release tablet 81 mg  81 mg Oral DAILY         Objective:      Physical Exam:    Visit Vitals  /83   Pulse 76   Temp 97.6 °F (36.4 °C)   Resp 16   Ht 5' 7\" (1.702 m)   Wt 152 lb 1.9 oz (69 kg)   SpO2 99%   BMI 23.82 kg/m²     Gen:  NAD  Mental Status - Alert. General Appearance - Not in acute distress. Chest and Lung Exam   Inspection: Accessory muscles - No use of accessory muscles in breathing. Auscultation:   Breath sounds: - Normal.   Cardiovascular   Inspection: Jugular vein - Bilateral - Inspection Normal.   Palpation/Percussion:   Apical Impulse: - Normal.   Auscultation: Rhythm - Regular. Heart Sounds - S1 WNL and S2 WNL. No S3 or S4. Murmurs & Other Heart Sounds: Auscultation of the heart reveals - No Murmurs. Peripheral Vascular   Upper Extremity: Inspection - Bilateral - No Cyanotic nailbeds or Digital clubbing. Lower Extremity:   Palpation: Edema - Bilateral - No edema. Abdomen:   Soft, non-tender, bowel sounds are active. Neuro: A&O times 3, CN and motor grossly WNL    Data Review:   Recent Labs     11/19/18  0306   WBC 5.0   HGB 11.8*   HCT 35.8*        Recent Labs     11/19/18  0306      K 4.0      CO2 29   *   BUN 21*   CREA 0.93   CA 8.5       No results for input(s): TROIQ, CPK, CKMB in the last 72 hours.     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 Cardiac cath negative for obstructive CAD. · Cardiac MRI without signs of scarring, infarction, or infiltrative morphology. · EP study positive for easily inducible VT/VF. Status post ICD. · Patient scheduled for out patient genetic testing for possible Brugada syndrome with Dr. Juanito Parish. Even though the patient already has received an ICD, this testing is still important as Brugada syndrome is autosomal dominant with variable penetrance and could affect genetic testing for his family members. Follow-up with Dr. Cosmo Campbell in about 2 weeks, with me in about a month to discuss results of genetic testing. Added to discharge instructions.

## 2018-11-22 NOTE — PROGRESS NOTES
Drsg to left chest wall saturated with old dry drainage, no current bleeding or hematoma. Ok to change drsg per . Telfa & tegaderm applied.

## 2018-11-22 NOTE — DISCHARGE INSTRUCTIONS
General Discharge Instructions    Patient ID:  Stephanie Jenkins  [id number]  70 y.o.  1955    Patient Instructions:1. Eat at the same time every day which means breakfast at 8 AM, lunch at 12 noon, dinner at 5 PM, bedtime snack at 9 PM.  2.  Take Levemir 20 units every morning and 40 units at bedtime. 3.  Take Humalog 6 units before breakfast and lunch and 10 units before dinner. 4.  Diabetic medication will be Levemir, Humalog, Trulicity, and metformin. The following personal items were collected during your admission and were returned to you. Take Home Medications           What to do at Home    Recommended diet: Diabetic Diet    Recommended activity: Activity as tolerated    Follow-up with Yodit Jose MD  in 5 days. Information obtained by :  I understand that if any problems occur once I am at home I am to contact my physician. I understand and acknowledge receipt of the instructions indicated above. Physician's or R.N.'s Signature                                                                  Date/Time                                                                                                                                              Patient or Representative Signature                                                          Date/Time                                                                                      [address] [id number]        ICD INSERTION DISCHARGE INSTRUCTIONS    Patient ID:  Stephanie Jenkins  [id number]  70 y.o.  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 Disposition: home    Reference discharge instructions provided by nursing for diet and activity. Follow-up with ICD/PM clinic in 2 weeks. Call [phone] to make an appointment. Signed:  NORY Lyman  11/21/2018  11:44 AM      DISCHARGE INSTRUCTIONS FOR PATIENTS WITH ICD'S    1. Remember to call for an appointment in 2-4 weeks [phone]. 2. Medic Alert Bracelets are available from your pharmacist to wear at all times if you choose to wear one. 3. Carry your ID card for your ICD with you at all times. This card will be given to you in the hospital or mailed to you. 4. The ICD will bulge slightly under your skin. The bulge will decrease in size over the next few weeks. Please notify the doctor's office if you notice any of the following around your ICD site:   A.  A bruise that does not go away. B.  Soreness or yellow, green, or brown drainage from the site. C. Any swelling from the site. D. If you have a fever of 100 degrees or higher that lasts for a few days. INCISION CARE       1.  Leave the dressing over your site until it dissolves on its own, usually in a few weeks. 2.  You may shower after 3 days as long as your incision isnt submerged or directly sprayed upon until well healed. 3.  For comfort, wear loose fitting clothing.   4.  Report any signs of infection, fever, pain, swelling, redness, oozing, or heat at site especially if these symptoms increase after the first 3 to 4 days. ACTIVITY PRECAUTIONS     1. Avoid rough contact with the implant site. 2. No driving for 14 days. 3. Avoid lifting your arm over your head, carrying anything on the affected side, or lifting over 10 pounds for 30 days. For the first 2 days only bend your arm at the elbow. 4. Any extreme activity such as golf, weight lifting or exercise biking should be restricted for 60 days. 5. Do not carry objects by holding them against your implant site. 6.  No shooting rifles or any type of gun with the affected shoulder permanently. 7.  Welding and chainsaws are prohibited. SPECIAL PRECAUTIONS     1. You should avoid all strong magnetic fields, such as arc welding, large transformers, large motors. Some ICD devices will beep if it detects a strong magnet. If this occurs, move out of the area. 2.  You may or may not have an MRI which uses a strong magnet to take pictures. 3.  Treatments or surgery that requires diathermy or electrocautery should be discussed with your doctor before scheduled. 4.  Avoid radio frequency transmitters, including radar. 5.  Advise dentist or other medical personnel you see that you have an ICD. 6.  Cell phones and microwave oven use is okay. 7.  If you plan to move or take a trip to a new area, the doctor's office will give you a name of a doctor to contact for any problems. SPECIAL INSTRUCTIONS ON SHOCKS     1. Notify your doctor for any of the following:       A. Anytime a shock is received in a 24 hour period. An office visit is not usually required for a single shock. B.  Two or more shocks in a row. If you do not feel well, call the [facility], otherwise call your doctor. This may require an office visit. C. Two or more shocks spaced apart by several hours. This may require an office visit. 2.  Keep a record of events. Include date, time, symptoms and activity at that time. ANTIBIOTIC THERAPY    During the first 8 weeks after your ICD insertion, you may need antibiotics before any dental work or certain tests or operations. Let the dentist or doctor who is caring for you know that you have had an implanted device.

## 2018-11-26 ENCOUNTER — PATIENT OUTREACH (OUTPATIENT)
Dept: INTERNAL MEDICINE CLINIC | Age: 63
End: 2018-11-26

## 2018-11-27 NOTE — PROGRESS NOTES
[facility] -2018:  Syncope & Collapse   Hospital Discharge Follow-Up      Date/Time:  2018 1:42 PM    Patient was admitted to [facility] on  and discharged on 2018 for Syncope & Collapse. The physician discharge summary was available at the time of outreach. Patient was contacted within 4 business days of discharge. Top Challenges reviewed with the provider   DTC attending          Method of communication with provider :none  Inpatient RRAT score: 21  Was this a readmission? no   Patient stated reason for the readmission: n/a     Nurse Navigator (NN) contacted the patient by telephone to perform post hospital discharge assessment. Verified name and  with patient as identifiers. Provided introduction to self, and explanation of the Nurse Navigator role. Reviewed discharge instructions and red flags with patient who verbalized understanding. Patient given an opportunity to ask questions and does not have any further questions or concerns at this time. The patient agrees to contact the PCP office for questions related to their healthcare. NN provided contact information for future reference. Disease Specific:   Diabetes    Summary of patient's top problems:  1. Diabetes--attends DTC   2. Needs continued dietary teachings      Home Health orders at discharge: [address]: none  Date of initial visit: [facility] ordered/company: none  Durable Medical Equipment received: n/a    Barriers to care? Lack of know about the disease--however, he is attemding DTC   Advance Care Planning:   Does patient have an Advance Directive:  not on file; education provided     Medication(s):   New Medications at Discharge: none  Changed Medications at Discharge: none  Discontinued Medications at Discharge: none    Medication reconciliation was performed with patient, who verbalizes understanding of administration of home medications. There were no barriers to obtaining medications identified at this time. Referral to Pharm D needed: no     Current Outpatient Medications   Medication Sig    insulin detemir U-100 (LEVEMIR FLEXTOUCH U-100 INSULN) 100 unit/mL (3 mL) inpn 20 units every morning and 40 units at bedtime    acetaminophen (TYLENOL) 325 mg tablet Take 2 Tabs by mouth every six (6) hours as needed.  fenofibrate nanocrystallized (TRICOR) 145 mg tablet Take 1 Tab by mouth daily.  Blood-Glucose Meter (ONETOUCH VERIO IQ METER) monitoring kit Use to test blood sugar three times daily    dulaglutide (TRULICITY) 1.5 NO/2.3 mL sub-q pen 0.5 mL by SubCUTAneous route every seven (7) days.  glucose blood VI test strips (ONETOUCH VERIO) strip Use to test blood sugar four times daily. Dx.e11.9    atorvastatin (LIPITOR) 20 mg tablet Take 1 Tab by mouth daily.  clotrimazole-betamethasone (LOTRISONE) topical cream Apply  to affected area two (2) times a day.  LITE TOUCH LANCETS 33 gauge misc     LITE TOUCH LANCING DEVICE misc     insulin lispro (HUMALOG) 100 unit/mL kwikpen Inject 6 units before breakfast and lunch and 10 units before dinner    Insulin Needles, Disposable, (PEN NEEDLE) 31 gauge x 3/16\" ndle 5 times a day    metFORMIN (GLUCOPHAGE) 1,000 mg tablet Take 1 Tab by mouth two (2) times daily (with meals).  GAVILYTE-N 420 gram solution      No current facility-administered medications for this visit. There are no discontinued medications.     [facility] follow up appointment(s):   Future Appointments   Date Time Provider Chalino Alaniz   11/29/2018 11:30 AM Salazar Cervantes MD [facility]      Non-[facility] follow up appointment(s): none  [facility]:  n/a     Goals Addressed                 This Visit's Progress     Attends follow up appointments on schedule   On track     11/27/2018:  Patient states he is scheduled for DTC MWF; Patient states already seen Cardiology and is scheduled today to f/u w/ PCP ITZEL 11/29/2018. EW        Patient verbalizes understanding of self -management goals of living with Diabetes. On track     11/27/2018:  NN completed review of DTC education according to patient. Review done on avoiding heavy carbs, how to train taste buds, and what to do if glucose is elevated. Patient agreed to begin walking again--states he has not started back yet due to cardiac catherization procedure during hosp.   EW           Goal completion date:  11/29/2018

## 2018-11-29 ENCOUNTER — OFFICE VISIT (OUTPATIENT)
Dept: INTERNAL MEDICINE CLINIC | Age: 63
End: 2018-11-29

## 2018-11-29 VITALS
WEIGHT: 157 LBS | SYSTOLIC BLOOD PRESSURE: 137 MMHG | BODY MASS INDEX: 24.64 KG/M2 | DIASTOLIC BLOOD PRESSURE: 73 MMHG | TEMPERATURE: 98 F | OXYGEN SATURATION: 98 % | RESPIRATION RATE: 16 BRPM | HEART RATE: 89 BPM | HEIGHT: 67 IN

## 2018-11-29 DIAGNOSIS — E78.5 DYSLIPIDEMIA: ICD-10-CM

## 2018-11-29 DIAGNOSIS — I47.29 NSVT (NONSUSTAINED VENTRICULAR TACHYCARDIA): ICD-10-CM

## 2018-11-29 DIAGNOSIS — R11.2 NON-INTRACTABLE VOMITING WITH NAUSEA, UNSPECIFIED VOMITING TYPE: Primary | ICD-10-CM

## 2018-11-29 RX ORDER — PROCHLORPERAZINE MALEATE 10 MG
10 TABLET ORAL
Qty: 20 TAB | Refills: 0 | Status: SHIPPED | OUTPATIENT
Start: 2018-11-29 | End: 2018-12-06

## 2018-11-29 NOTE — PROGRESS NOTES
Chief Complaint   Patient presents with    Transitions Of Care     Patient admitted to [facility] on 11/18/18 for syncope. 1. Have you been to the ER, urgent care clinic since your last visit? Hospitalized since your last visit? Yes When: 11/18/18 Where: [facility]\"" Reason for visit: syncope    2. Have you seen or consulted any other health care providers outside of the [address] [place] [address] since your last visit? Include any pap smears or colon screening.  No

## 2018-11-30 LAB
BUN SERPL-MCNC: 15 MG/DL (ref 8–27)
BUN/CREAT SERPL: 16 (ref 10–24)
CALCIUM SERPL-MCNC: 10 MG/DL (ref 8.6–10.2)
CHLORIDE SERPL-SCNC: 101 MMOL/L (ref 96–106)
CO2 SERPL-SCNC: 25 MMOL/L (ref 20–29)
CREAT SERPL-MCNC: 0.95 MG/DL (ref 0.76–1.27)
GLUCOSE SERPL-MCNC: 34 MG/DL (ref 65–99)
POTASSIUM SERPL-SCNC: 4.9 MMOL/L (ref 3.5–5.2)
SODIUM SERPL-SCNC: 145 MMOL/L (ref 134–144)

## 2018-11-30 NOTE — PROGRESS NOTES
[facility]  [address] [address]  [address] [place] [address]  Phone:  [phone]  Fax: [phone]       Chief Complaint   Patient presents with    Transitions Of Care     [facility] for syncope. .      SUBJECTIVE:    Radha Vance is a 70 y.o. male who comes in for a return visit stating that in the last two days he has had intermittent nausea and vomiting. He was doing well from a GI perspective prior to this. He was most recently hospitalized for a syncopal episode, which strongly appeared to be vasovagal.  In any event, he had an abnormal EKG suggesting the possibility of Brugada syndrome. He underwent a cardiac catheterization with trivial disease. He then had studies to attempt to induce ventricular tachycardia, which were strongly positive. As a result, a defibrillator was placed. He is recuperating from this currently. His diabetes prior to admission was poorly controlled because he was lost to followup for approximately three or four months. In any event, upon being discharged home, suggested taking Levemir 40 units q.h.s. and 30 units q.a.m. along with prandial insulin. The prandial insulin he has not been taking, but has indeed been taking his Trulicity along with Metformin and basal insulin. Blood sugars have been generally on the low side, specifically in the 70s. He remains on a statin in view of his increased cardiovascular risks. Current Outpatient Medications   Medication Sig Dispense Refill    prochlorperazine (COMPAZINE) 10 mg tablet Take 1 Tab by mouth every six (6) hours as needed for Nausea for up to 7 days.  20 Tab 0    insulin detemir U-100 (LEVEMIR FLEXTOUCH U-100 INSULN) 100 unit/mL (3 mL) inpn 20 units every morning and 40 units at bedtime 3 Pen 11    insulin lispro (HUMALOG) 100 unit/mL kwikpen Inject 6 units before breakfast and lunch and 10 units before dinner 1 Adjustable Dose Pre-filled Pen Syringe 11    acetaminophen (TYLENOL) 325 mg tablet Take 2 Tabs by mouth every six (6) hours as needed. 60 Tab 1    fenofibrate nanocrystallized (TRICOR) 145 mg tablet Take 1 Tab by mouth daily. 30 Tab 11    Insulin Needles, Disposable, (PEN NEEDLE) 31 gauge x 3/16\" ndle 5 times a day 150 Pen Needle 11    Blood-Glucose Meter (ONETOUCH VERIO IQ METER) monitoring kit Use to test blood sugar three times daily 1 Kit 0    dulaglutide (TRULICITY) 1.5 XO/4.2 mL sub-q pen 0.5 mL by SubCUTAneous route every seven (7) days. 12 Pen 3    glucose blood VI test strips (ONETOUCH VERIO) strip Use to test blood sugar four times daily. Dx.e11.9 360 Strip 3    metFORMIN (GLUCOPHAGE) 1,000 mg tablet Take 1 Tab by mouth two (2) times daily (with meals). 60 Tab 10    atorvastatin (LIPITOR) 20 mg tablet Take 1 Tab by mouth daily. 30 Tab 10    clotrimazole-betamethasone (LOTRISONE) topical cream Apply  to affected area two (2) times a day.  45 g 11    LITE TOUCH LANCETS 33 gauge misc       LITE TOUCH LANCING DEVICE misc       GAVILYTE-N 420 gram solution        Past Medical History:   Diagnosis Date    Diabetes ([facility]     Hypertension     NSVT (nonsustained ventricular tachycardia) ([facility] 11/21/2018    EPS 11/21/2018 VT/VF      Past Surgical History:   Procedure Laterality Date    COLONOSCOPY  1/2/2015         HX HEENT      l cararact removal     No Known Allergies      REVIEW OF SYSTEMS:  General: negative for - chills or fever  ENT: negative for - headaches, nasal congestion or tinnitus  Respiratory: negative for - cough, hemoptysis, shortness of breath or wheezing  Cardiovascular : negative for - chest pain, edema, palpitations or shortness of breath  Gastrointestinal: negative for - abdominal pain, blood in stools, heartburn or nausea/vomiting  Genito-Urinary: no dysuria, trouble voiding, or hematuria  Musculoskeletal: negative for - gait disturbance, joint pain, joint stiffness or joint swelling  Neurological: no TIA or stroke symptoms  Hematologic: no bruises, no bleeding, no swollen glands  Integument: no lumps, mole changes, nail changes or rash  Endocrine: no malaise/lethargy or unexpected weight changes      Social History     Socioeconomic History    Marital status: SINGLE     Spouse name: Not on file    Number of children: 0    Years of education: Not on file    Highest education level: Not on file   Occupational History    Occupation: [employer]   Tobacco Use    Smoking status: Never Smoker    Smokeless tobacco: Never Used   Substance and Sexual Activity    Alcohol use: No    Drug use: No    Sexual activity: Yes     Partners: Female     Birth control/protection: None     Family History   Problem Relation Age of Onset    Diabetes Mother     Hypertension Mother        OBJECTIVE:    Visit Vitals  /73   Pulse 89   Temp 98 °F (36.7 °C) (Oral)   Resp 16   Ht 5' 7\" (1.702 m)   Wt 157 lb (71.2 kg)   SpO2 98%   BMI 24.59 kg/m²     CONSTITUTIONAL: well , well nourished, appears age appropriate  EYES: perrla, eom intact  ENMT:moist mucous membranes, pharynx clear  NECK: supple. Thyroid normal  RESPIRATORY: Chest: clear to ascultation and percussion   CARDIOVASCULAR: Heart: regular rate and rhythm  GASTROINTESTINAL: Abdomen: soft, bowel sounds active  HEMATOLOGIC: no pathological lymph nodes palpated  MUSCULOSKELETAL: Extremities: no edema, pulse 1+   INTEGUMENT: No unusual rashes or suspicious skin lesions noted. Nails appear normal.  NEUROLOGIC: non-focal exam   MENTAL STATUS: alert and oriented, appropriate affect      ASSESSMENT:  1. Non-intractable vomiting with nausea, unspecified vomiting type    2. NSVT (nonsustained ventricular tachycardia) ([address]    3. Uncontrolled type 2 diabetes mellitus with complication, with long-term current use of insulin ([address]    4.  Dyslipidemia        PLAN:    1. I am not entirely sure of the nausea and vomiting, but it could be related to the GLP-1 agonist.  This is somewhat delayed. Symptomatic treatment for now. If this persists, he will have to see a gastroenterologist.    2. He has defibrillator for his nonsustained ventricular tachycardia. He is in the process of being worked up for Brugada syndrome. 3. From a diabetic perspective, I will reduce his Levemir to 10 units in the morning and 30 units q.h.s. All other agents will be continued. 4. He will continue a statin as prescribed. 5. He will return to see me in one week and follow up going back to work the following week. .  Orders Placed This Encounter    METABOLIC PANEL, BASIC    prochlorperazine (COMPAZINE) 10 mg tablet         Follow-up Disposition:  Return in about 1 week (around 12/6/2018).       Kumar Spears MD

## 2018-12-04 ENCOUNTER — DOCUMENTATION ONLY (OUTPATIENT)
Dept: CARDIOLOGY CLINIC | Age: 63
End: 2018-12-04

## 2018-12-04 NOTE — PROGRESS NOTES
Per [facility] heart clinic, patient has no-showed for the heart clinic and has not returned their calls. Thanks!

## 2018-12-05 NOTE — PROGRESS NOTES
Kaelyn- please notify the patient that his testing at the advanced heart failure clinic is important not only for himself, but for family members because his EKG has an abnormal pattern that could be consistent with a problem that could be inherited and could cause fatal heart rhythms in other people in his family. Please encourage him to contact Dr. Jefferson Pay office to reschedule appointment for testing for Brugada syndrome. Please confirm he follows up with me at some point as well.

## 2018-12-06 ENCOUNTER — OFFICE VISIT (OUTPATIENT)
Dept: INTERNAL MEDICINE CLINIC | Age: 63
End: 2018-12-06

## 2018-12-06 VITALS
WEIGHT: 155.1 LBS | HEART RATE: 76 BPM | RESPIRATION RATE: 16 BRPM | DIASTOLIC BLOOD PRESSURE: 74 MMHG | HEIGHT: 67 IN | BODY MASS INDEX: 24.34 KG/M2 | SYSTOLIC BLOOD PRESSURE: 142 MMHG | TEMPERATURE: 97.5 F | OXYGEN SATURATION: 96 %

## 2018-12-06 DIAGNOSIS — I47.29 NSVT (NONSUSTAINED VENTRICULAR TACHYCARDIA): ICD-10-CM

## 2018-12-06 DIAGNOSIS — K29.00 OTHER ACUTE GASTRITIS WITHOUT HEMORRHAGE: Primary | ICD-10-CM

## 2018-12-06 DIAGNOSIS — E78.5 DYSLIPIDEMIA: ICD-10-CM

## 2018-12-06 PROBLEM — K29.70 GASTRITIS: Status: ACTIVE | Noted: 2018-12-06

## 2018-12-06 RX ORDER — PANTOPRAZOLE SODIUM 40 MG/1
40 TABLET, DELAYED RELEASE ORAL DAILY
Qty: 30 TAB | Refills: 11 | Status: SHIPPED | OUTPATIENT
Start: 2018-12-06 | End: 2019-01-02

## 2018-12-06 NOTE — PROGRESS NOTES
Chief Complaint   Patient presents with    Vomiting     1 week follow up    Nausea     1 week follow up      1. Have you been to the ER, urgent care clinic since your last visit? Hospitalized since your last visit? No    2. Have you seen or consulted any other health care providers outside of the [address] [place] [address] since your last visit? Include any pap smears or colon screening.  No

## 2018-12-07 NOTE — PROGRESS NOTES
[address] [facility]  [place] [address]  [address] [place] [address]  Phone:  [phone]  Fax: [phone]       Chief Complaint   Patient presents with    Vomiting     1 week follow up    Nausea     1 week follow up    . SUBJECTIVE:    Andre Gomez is a 70 y.o. male Comes in for return visit, having had one additional episode of nausea and vomiting. I gave him medication for this, but he never used it. I did not give him a PPI, however. I thought the episodes would hilario. I do not think this is related to his GLP1 agonist.     Blood sugars have been excellent and have not been as low as they previously were. He is taking Levemir 20 units in the morning and 30 units in the evening. He remains on his other antidiabetic medication with the exception of his NATH, which I discontinued. He has not had any problems with the surgical site for his defibrillator placement for his inducible V-tach. He has a past history of dyslipidemia. Current Outpatient Medications   Medication Sig Dispense Refill    pantoprazole (PROTONIX) 40 mg tablet Take 1 Tab by mouth daily. 30 Tab 11    prochlorperazine (COMPAZINE) 10 mg tablet Take 1 Tab by mouth every six (6) hours as needed for Nausea for up to 7 days. 20 Tab 0    insulin detemir U-100 (LEVEMIR FLEXTOUCH U-100 INSULN) 100 unit/mL (3 mL) inpn 20 units every morning and 40 units at bedtime 3 Pen 11    insulin lispro (HUMALOG) 100 unit/mL kwikpen Inject 6 units before breakfast and lunch and 10 units before dinner 1 Adjustable Dose Pre-filled Pen Syringe 11    acetaminophen (TYLENOL) 325 mg tablet Take 2 Tabs by mouth every six (6) hours as needed. 60 Tab 1    fenofibrate nanocrystallized (TRICOR) 145 mg tablet Take 1 Tab by mouth daily.  30 Tab 11    Insulin Needles, Disposable, (PEN NEEDLE) 31 gauge x 3/16\" ndle 5 times a day 150 Pen Needle 11    Blood-Glucose Meter (ONETOUCH VERIO IQ METER) monitoring kit Use to test blood sugar three times daily 1 Kit 0    dulaglutide (TRULICITY) 1.5 PH/4.6 mL sub-q pen 0.5 mL by SubCUTAneous route every seven (7) days. 12 Pen 3    glucose blood VI test strips (ONETOUCH VERIO) strip Use to test blood sugar four times daily. Dx.e11.9 360 Strip 3    metFORMIN (GLUCOPHAGE) 1,000 mg tablet Take 1 Tab by mouth two (2) times daily (with meals). 60 Tab 10    atorvastatin (LIPITOR) 20 mg tablet Take 1 Tab by mouth daily. 30 Tab 10    clotrimazole-betamethasone (LOTRISONE) topical cream Apply  to affected area two (2) times a day.  45 g 11    LITE TOUCH LANCETS 33 gauge misc       LITE TOUCH LANCING DEVICE misc       GAVILYTE-N 420 gram solution        Past Medical History:   Diagnosis Date    Diabetes [facility]     Hypertension     [facility]ust[facility] 11/21/2018    EPS 11/21/2018 VT/VF      Past Surgical History:   Procedure Laterality Date    COLONOSCOPY  1/2/2015         HX HEENT      l cararact removal     No Known Allergies      REVIEW OF SYSTEMS:  General: negative for - chills or fever  ENT: negative for - headaches, nasal congestion or tinnitus  Respiratory: negative for - cough, hemoptysis, shortness of breath or wheezing  Cardiovascular : negative for - chest pain, edema, palpitations or shortness of breath  Gastrointestinal: negative for - abdominal pain, blood in stools, heartburn or nausea/vomiting  Genito-Urinary: no dysuria, trouble voiding, or hematuria  Musculoskeletal: negative for - gait disturbance, joint pain, joint stiffness or joint swelling  Neurological: no TIA or stroke symptoms  Hematologic: no bruises, no bleeding, no swollen glands  Integument: no lumps, mole changes, nail changes or rash  Endocrine: no malaise/lethargy or unexpected weight changes      Social History     Socioeconomic History    Marital status: SINGLE     Spouse name: Not on file    Number of children: 0    Years of education: Not on file    Highest education level: Not on file   Occupational History    Occupation: [employer]   Tobacco Use    Smoking status: Never Smoker    Smokeless tobacco: Never Used   Substance and Sexual Activity    Alcohol use: No    Drug use: No    Sexual activity: Yes     Partners: Female     Birth control/protection: None     Family History   Problem Relation Age of Onset    Diabetes Mother     Hypertension Mother        OBJECTIVE:    Visit Vitals  /74   Pulse 76   Temp 97.5 °F (36.4 °C) (Oral)   Resp 16   Ht 5' 7\" (1.702 m)   Wt 155 lb 1.6 oz (70.4 kg)   SpO2 96%   BMI 24.29 kg/m²     CONSTITUTIONAL: well , well nourished, appears age appropriate  EYES: perrla, eom intact  ENMT:moist mucous membranes, pharynx clear  NECK: supple. Thyroid normal  RESPIRATORY: Chest: clear to ascultation and percussion   CARDIOVASCULAR: Heart: regular rate and rhythm  GASTROINTESTINAL: Abdomen: soft, bowel sounds active  HEMATOLOGIC: no pathological lymph nodes palpated  MUSCULOSKELETAL: Extremities: no edema, pulse 1+   INTEGUMENT: No unusual rashes or suspicious skin lesions noted. Nails appear normal.  NEUROLOGIC: non-focal exam   MENTAL STATUS: alert and oriented, appropriate affect      ASSESSMENT:  1. Other acute gastritis without hemorrhage    2. Uncontrolled type 2 diabetes mellitus with complication, with long-term current use of insulin ([address]    3. NSVT (nonsustained ventricular tachycardia) ([address]    4. Dyslipidemia        PLAN:    1. I will treat the patient with a PPI. I suspect he may have a gastritis. If he continues to have these episodes in spite of taking the Protonix he will have to see a gastroenterologist.  There is a remote possibility this could represent gastroparesis, but it is strange that it would happen at this point in time so abruptly. 2. From a diabetic perspective he appears to be relatively euglycemic, assuming I could believe his current values. He is not having any interventional hypoglycemia currently.   3. His inducible ventricular tachycardia is currently being protected with his AICD. He will follow up with cardiology in the next several weeks. 4. He will continue statin as prescribed in view of his increased cardiovascular risk. Follow-up Disposition:  Return in about 2 weeks (around 12/20/2018).       Tanesha Duron MD

## 2018-12-20 ENCOUNTER — OFFICE VISIT (OUTPATIENT)
Dept: INTERNAL MEDICINE CLINIC | Age: 63
End: 2018-12-20

## 2018-12-20 VITALS
RESPIRATION RATE: 16 BRPM | DIASTOLIC BLOOD PRESSURE: 91 MMHG | TEMPERATURE: 98.1 F | SYSTOLIC BLOOD PRESSURE: 151 MMHG | WEIGHT: 159.3 LBS | BODY MASS INDEX: 25 KG/M2 | HEART RATE: 79 BPM | HEIGHT: 67 IN | OXYGEN SATURATION: 97 %

## 2018-12-20 DIAGNOSIS — I49.8 BRUGADA SYNDROME: ICD-10-CM

## 2018-12-20 DIAGNOSIS — K29.00 OTHER ACUTE GASTRITIS WITHOUT HEMORRHAGE: ICD-10-CM

## 2018-12-20 DIAGNOSIS — E78.5 DYSLIPIDEMIA: ICD-10-CM

## 2018-12-20 NOTE — PROGRESS NOTES
Chief Complaint   Patient presents with    Abdominal Pain     2 week follow up      1. Have you been to the ER, urgent care clinic since your last visit? Hospitalized since your last visit? No    2. Have you seen or consulted any other health care providers outside of the [address] [place] [address] since your last visit? Include any pap smears or colon screening.  No

## 2018-12-21 ENCOUNTER — TELEPHONE (OUTPATIENT)
Dept: CARDIOLOGY CLINIC | Age: 63
End: 2018-12-21

## 2018-12-21 ENCOUNTER — DOCUMENTATION ONLY (OUTPATIENT)
Dept: CARDIOLOGY CLINIC | Age: 63
End: 2018-12-21

## 2018-12-21 ENCOUNTER — OFFICE VISIT (OUTPATIENT)
Dept: CARDIOLOGY CLINIC | Age: 63
End: 2018-12-21

## 2018-12-21 VITALS
TEMPERATURE: 98.4 F | SYSTOLIC BLOOD PRESSURE: 136 MMHG | BODY MASS INDEX: 25.11 KG/M2 | HEIGHT: 67 IN | WEIGHT: 160 LBS | RESPIRATION RATE: 16 BRPM | DIASTOLIC BLOOD PRESSURE: 90 MMHG | HEART RATE: 81 BPM | OXYGEN SATURATION: 94 %

## 2018-12-21 DIAGNOSIS — E11.42 TYPE 2 DIABETES MELLITUS WITH DIABETIC POLYNEUROPATHY, WITH LONG-TERM CURRENT USE OF INSULIN (HCC): ICD-10-CM

## 2018-12-21 DIAGNOSIS — I49.8 BRUGADA SYNDROME: ICD-10-CM

## 2018-12-21 DIAGNOSIS — I10 ESSENTIAL HYPERTENSION: ICD-10-CM

## 2018-12-21 DIAGNOSIS — Z79.4 TYPE 2 DIABETES MELLITUS WITH DIABETIC POLYNEUROPATHY, WITH LONG-TERM CURRENT USE OF INSULIN (HCC): ICD-10-CM

## 2018-12-21 DIAGNOSIS — I47.29 NSVT (NONSUSTAINED VENTRICULAR TACHYCARDIA): ICD-10-CM

## 2018-12-21 DIAGNOSIS — Z95.810 ICD (IMPLANTABLE CARDIOVERTER-DEFIBRILLATOR) IN PLACE: ICD-10-CM

## 2018-12-21 DIAGNOSIS — R06.83 SNORING: Primary | ICD-10-CM

## 2018-12-21 NOTE — PROGRESS NOTES
[facility] Consult  Note      DOS:  12/21/2018  REFERRING PROVIDER: Jignesh Pizarro MD  PRIMARY CARE PHYSICIAN: Salazar Cervantes MD  PRIMARY CARDIOLOGIST:  Jignesh Pizarro MD   EP: Dr. Beth Ricketts       IMPRESSION/PLAN  Syncope  In the setting of Brugada syndrome, inducible VT/VF s/p single lead AICD   Genetic testing through [facility]- follow up by phone    His brother has hx of syncope. If Mr. Kaelyn Schneider genetic testing is (+) family will be able to get tested at no fee    Brugada Syndrome s/p AICD   Dr. Beth Ricketts following     T2DM   Dr. Marly Cuevas     HTN- BP elevated today recheck 142/86   Follow BP at home see Dr. Lonnie Ambriz - he may want to start BP meds     STOP BANG  Score 4   Refer for MAHESH eval         Thank you for allowing me to participate in the care of this delightful  man. Please do not hesitate to call with any questions. We will see him back as needed. Rosalva Kennedy RN, [facility], [facility]         Chief Complaint   Patient presents with   [facility] follow up -no complaints    Labs         HPI: Radha Power is a 70y.o. year old male with a history of  Syncope in the setting of Brugada type EKG ( Saddle back RAMONA V2)  complicated B5SX by who presents for  [facility] genetic testing. He reports prior unexplained syncope ~ 15 years ago. He was recently hospitalized following a syncopal episode, underwent cardiac cath that showed no epicardial disease. Echo showed normal EF, grade 1DD, mild LAE, mild MR, RVSP 40 mmHG. He underwent an EP study with inducible VF/VT and had a subsequent single lead AICD. He is generally active,  denies exertional sx or HF sx. He has been observed snoring, denies daytime somnolence, and had never been evaluated for MAHESH  He has never been treated for HTN or follow his BP at home.  His BP is elevated today         CARDIAC EVALUATION  ECHO 2/6/18: EF normal, G1DD, mild LAE, RVSP 45 mmHg  ECHO 11/9/18: EF 55-60%,mild LVH, mild LAE, mild MR, RVSP 40 mmHg      CATH 18: no epicardial disease    CARDIAC MRI 18: severe LVH, EF 60%, RVEF 50%. No   areas of myocardial enhancement.      EPS 2018: Brugada; induced VT/VF  AICD 18: single lead [facility]    Social: lives with his lady friend, he is a cook at [employer], life long nonsmoker, no etoh     Family Hx: brother has unexplained syncope, father dies with Alzheimers age 76, mother 80 hx DM and bone marrow CA,  with CVA    EK/22/18; NSR saddle back RAMONA in V2     Review of Systems:     General:  Denies fatigue, fever,  chills   HEENT: Denies angioedema  Pulmonary: Denies cough, wheeze, hemoptysis   Cardiac: Denies exertional chest pain, palpitations, orthopnea, PND, edema, further syncope, AICD shocks   GI:  Denies abdominal pain, N/V, bleeding   Musculo: Denies back pain, myalgias, arthritis  Neuro: Denies TIA/CVA sx  Skin:  Denies rash or lesions   Allergies: Reviewed   Psych: Denies depression or anxiety       History:  Past Medical History:   Diagnosis Date    Diabetes ([facility]     Hypertension     ICD (implantable cardioverter-defibrillator) in place     NSVT (nonsustained ventricular tachycardia) ([facility] 2018    EPS 2018 VT/VF      Past Surgical History:   Procedure Laterality Date    COLONOSCOPY  2015         HX HEART CATHETERIZATION      HX HEENT      l cararact removal     Social History     Socioeconomic History    Marital status: SINGLE     Spouse name: Not on file    Number of children: 0    Years of education: Not on file    Highest education level: Not on file   Social Needs    Financial resource strain: Not on file    Food insecurity - worry: Not on file    Food insecurity - inability: Not on file   [employer] needs - medical: Not on file   [employer] needs - non-medical: Not on file   Occupational History    Occupation: [employer]   Tobacco Use    Smoking status: Never Smoker    Smokeless tobacco: Never Used   Substance and Sexual Activity    Alcohol use: No    Drug use: No    Sexual activity: Yes     Partners: Female     Birth control/protection: None   Other Topics Concern    Not on file   Social History Narrative    Not on file     Family History   Problem Relation Age of Onset    Diabetes Mother     Hypertension Mother     Stroke Mother        Current Medications:   Current Outpatient Medications   Medication Sig Dispense Refill    pantoprazole (PROTONIX) 40 mg tablet Take 1 Tab by mouth daily. 30 Tab 11    insulin detemir U-100 (LEVEMIR FLEXTOUCH U-100 INSULN) 100 unit/mL (3 mL) inpn 20 units every morning and 40 units at bedtime 3 Pen 11    acetaminophen (TYLENOL) 325 mg tablet Take 2 Tabs by mouth every six (6) hours as needed. 60 Tab 1    Insulin Needles, Disposable, (PEN NEEDLE) 31 gauge x 3/16\" ndle 5 times a day 150 Pen Needle 11    Blood-Glucose Meter (ONETOUCH VERIO IQ METER) monitoring kit Use to test blood sugar three times daily 1 Kit 0    dulaglutide (TRULICITY) 1.5 JX/2.8 mL sub-q pen 0.5 mL by SubCUTAneous route every seven (7) days. 12 Pen 3    glucose blood VI test strips (ONETOUCH VERIO) strip Use to test blood sugar four times daily. Dx.e11.9 360 Strip 3    metFORMIN (GLUCOPHAGE) 1,000 mg tablet Take 1 Tab by mouth two (2) times daily (with meals). 60 Tab 10    atorvastatin (LIPITOR) 20 mg tablet Take 1 Tab by mouth daily. 30 Tab 10    clotrimazole-betamethasone (LOTRISONE) topical cream Apply  to affected area two (2) times a day.  45 g 11    LITE TOUCH LANCETS 33 gauge misc       LITE TOUCH LANCING DEVICE misc       insulin lispro (HUMALOG) 100 unit/mL kwikpen Inject 6 units before breakfast and lunch and 10 units before dinner 1 Adjustable Dose Pre-filled Pen Syringe 11       Allergies: No Known Allergies        Physical Exam:       Visit Vitals  /90 (BP 1 Location: Left arm, BP Patient Position: Sitting)   Pulse 81   Temp 98.4 °F (36.9 °C) (Oral)   Resp 16   Ht 5' 7\" (1.702 m)   Wt 160 lb (72.6 kg)   SpO2 94%   BMI 25.06 kg/m²     BP recheck 142/86    General:  Well developed AA male, pleasant, cooperative, AAOx3   no acute distress. HEENT:  Atraumatic. Pink and moist.  Anicteric sclerae. Neck:   Supple, no adenopoathy. Lungs:  CTA bilaterally. No wheezing/rhonchi/rales. Heart:   PMI: nondisplaced,   AICD: :Left infraclavicular space,   Regular rhythm, S1, S2 present, no murmur, no rubs, no gallops. JVD 6 cm (-) HJR . No carotid bruits. Abdomen:  Soft, non-distended, non-tender. + Bowel sounds. No bruits. Extremities:  No edema, no clubbing, no cyanosis. No calf tenderness  Neurologic:  Grossly intact. Alert and oriented X 3. No acute neurological distress. Skin:   intact, no wounds, ecchymosis, bruising, rashes   Psych:  Good insight. Not anxious nor agitated.     Recent Labs:     Lab Results   Component Value Date/Time    WBC 5.0 11/19/2018 03:06 AM    HGB 11.8 (L) 11/19/2018 03:06 AM    HCT 35.8 (L) 11/19/2018 03:06 AM    PLATELET 101 67/82/0529 03:06 AM    MCV 85.0 11/19/2018 03:06 AM     Lab Results   Component Value Date/Time    GFR est non-AA 84 11/29/2018 04:11 PM    GFR est AA 97 11/29/2018 04:11 PM    Creatinine 0.95 11/29/2018 04:11 PM    BUN 15 11/29/2018 04:11 PM    Sodium 145 (H) 11/29/2018 04:11 PM    Potassium 4.9 11/29/2018 04:11 PM    Chloride 101 11/29/2018 04:11 PM    CO2 25 11/29/2018 04:11 PM        Lab Results   Component Value Date/Time    Sodium 145 (H) 11/29/2018 04:11 PM    Potassium 4.9 11/29/2018 04:11 PM    Chloride 101 11/29/2018 04:11 PM    CO2 25 11/29/2018 04:11 PM    Anion gap 5 11/19/2018 03:06 AM    Glucose 34 (<) 11/29/2018 04:11 PM    BUN 15 11/29/2018 04:11 PM    Creatinine 0.95 11/29/2018 04:11 PM    BUN/Creatinine ratio 16 11/29/2018 04:11 PM    GFR est AA 97 11/29/2018 04:11 PM    GFR est non-AA 84 11/29/2018 04:11 PM    Calcium 10.0 11/29/2018 04:11 PM    Bilirubin, total 0.5 11/18/2018 07:28 AM    ALT (SGPT) 34 11/18/2018 07:28 AM    AST (SGOT) 26 11/18/2018 07:28 AM    Alk. phosphatase 92 11/18/2018 07:28 AM    Protein, total 7.8 11/18/2018 07:28 AM    Albumin 3.6 11/18/2018 07:28 AM    Globulin 4.2 (H) 11/18/2018 07:28 AM    A-G Ratio 0.9 (L) 11/18/2018 07:28 AM      Lab Results   Component Value Date/Time    Hemoglobin A1c 8.0 (H) 07/24/2018 05:40 PM        I have discussed the diagnosis with  Bethany John  and the intended plan as seen in the above orders. Questions were answered concerning future plans. I have discussed medication side effects and warnings with the patient as well. Thank you for allowing me to participate in the care of this delightfully pleasant gentleman. Please do not hesitate to call with any questions. Rosalva Schneider  RN, ACNP-BC, [address] [place] [address], [address] [place] [address]  [phone]  24 hour VAD/HF Pager: [phone]

## 2018-12-21 NOTE — PROGRESS NOTES
New referral for patient faxed to Sleep Medicine at #[phone]    Office will contact patient to schedule an appointment.

## 2018-12-21 NOTE — PATIENT INSTRUCTIONS
You are doing well   Check BP twice a day and take record to see Dr. Amos Cortez- you may need BP med        CONTINUE CURRENT medications    Take twice a day medications 12 hours apart with food    Labs today [facility]      Referral for MAHESH eval     Remain active     Keep a positive attitude

## 2018-12-21 NOTE — LETTER
12/21/2018 11:33 AM 
 
Patient:  Nathaniel Kapoor YOB: 1955 Date of Visit: 12/21/2018 Dear Yaritza Leone MD 
[facility] [address] [facility] [address] VIA In Basket Yue Santana MD 
[address] [address] VIA In Basket Harry Vásquez MD 
[address] [address] VIA In Basket 
 : Thank you for referring Mr. Farzana Campoverde to me for evaluation/treatment. Below are the relevant portions of my assessment and plan of care. If you have questions, please do not hesitate to call me. I look forward to following Mr. Janet Longo along with you. Sincerely, MIREYA FreireP

## 2018-12-21 NOTE — PROGRESS NOTES
[address] [facility]  [place] [address]  [address] [address] [facility] [address] [address]  Phone:  [phone]  Fax: [phone]       Chief Complaint   Patient presents with    Abdominal Pain     2 week follow up    . SUBJECTIVE:    Jessica Haji is a 70 y.o. male who comes in for a return visit stating that his diabetes has indeed been doing quite well. He has not had any interventional hypoglycemic reactions. He is taking his Trulicity and his Lantus insulin, as well as Metformin. His Lantus dose is 2 units every morning and 30 units q.h.s. He has had no problems with his defibrillator. He has had no further episodes of vomiting, but occasional dyspeptic symptoms. He has not gotten the Protonix filled. He needs to take this for at least three months. He has a past history of dyslipidemia. He is in a primary risk prevention mode. Current Outpatient Medications   Medication Sig Dispense Refill    pantoprazole (PROTONIX) 40 mg tablet Take 1 Tab by mouth daily. 30 Tab 11    insulin detemir U-100 (LEVEMIR FLEXTOUCH U-100 INSULN) 100 unit/mL (3 mL) inpn 20 units every morning and 40 units at bedtime 3 Pen 11    insulin lispro (HUMALOG) 100 unit/mL kwikpen Inject 6 units before breakfast and lunch and 10 units before dinner 1 Adjustable Dose Pre-filled Pen Syringe 11    acetaminophen (TYLENOL) 325 mg tablet Take 2 Tabs by mouth every six (6) hours as needed. 60 Tab 1    fenofibrate nanocrystallized (TRICOR) 145 mg tablet Take 1 Tab by mouth daily. 30 Tab 11    Insulin Needles, Disposable, (PEN NEEDLE) 31 gauge x 3/16\" ndle 5 times a day 150 Pen Needle 11    Blood-Glucose Meter (ONETOUCH VERIO IQ METER) monitoring kit Use to test blood sugar three times daily 1 Kit 0    dulaglutide (TRULICITY) 1.5 BX/8.9 mL sub-q pen 0.5 mL by SubCUTAneous route every seven (7) days.  12 Pen 3    glucose blood VI test strips (ONETOUCH VERIO) strip Use to test blood sugar four times daily. Dx.e11.9 360 Strip 3    metFORMIN (GLUCOPHAGE) 1,000 mg tablet Take 1 Tab by mouth two (2) times daily (with meals). 60 Tab 10    atorvastatin (LIPITOR) 20 mg tablet Take 1 Tab by mouth daily. 30 Tab 10    clotrimazole-betamethasone (LOTRISONE) topical cream Apply  to affected area two (2) times a day.  45 g 11    LITE TOUCH LANCETS 33 gauge misc       LITE TOUCH LANCING DEVICE misc       GAVILYTE-N 420 gram solution        Past Medical History:   Diagnosis Date    [facility]     Hypertension     NSVT (nonsustained ventricular tachycardia) ([facility] 11/21/2018    EPS 11/21/2018 VT/VF      Past Surgical History:   Procedure Laterality Date    COLONOSCOPY  1/2/2015         HX HEENT      l cararact removal     No Known Allergies      REVIEW OF SYSTEMS:  General: negative for - chills or fever  ENT: negative for - headaches, nasal congestion or tinnitus  Respiratory: negative for - cough, hemoptysis, shortness of breath or wheezing  Cardiovascular : negative for - chest pain, edema, palpitations or shortness of breath  Gastrointestinal: negative for - abdominal pain, blood in stools, heartburn or nausea/vomiting  Genito-Urinary: no dysuria, trouble voiding, or hematuria  Musculoskeletal: negative for - gait disturbance, joint pain, joint stiffness or joint swelling  Neurological: no TIA or stroke symptoms  Hematologic: no bruises, no bleeding, no swollen glands  Integument: no lumps, mole changes, nail changes or rash  Endocrine: no malaise/lethargy or unexpected weight changes      Social History     Socioeconomic History    Marital status: SINGLE     Spouse name: Not on file    Number of children: 0    Years of education: Not on file    Highest education level: Not on file   Occupational History    Occupation: [employer]   Tobacco Use    Smoking status: Never Smoker    Smokeless tobacco: Never Used   Substance and Sexual Activity    Alcohol use: No    Drug use: No    Sexual activity: Yes Partners: Female     Birth control/protection: None     Family History   Problem Relation Age of Onset    Diabetes Mother     Hypertension Mother        OBJECTIVE:    Visit Vitals  BP (!) 151/91   Pulse 79   Temp 98.1 °F (36.7 °C) (Oral)   Resp 16   Ht 5' 7\" (1.702 m)   Wt 159 lb 4.8 oz (72.3 kg)   SpO2 97%   BMI 24.95 kg/m²     CONSTITUTIONAL: well , well nourished, appears age appropriate  EYES: perrla, eom intact  ENMT:moist mucous membranes, pharynx clear  NECK: supple. Thyroid normal  RESPIRATORY: Chest: clear to ascultation and percussion   CARDIOVASCULAR: Heart: regular rate and rhythm  GASTROINTESTINAL: Abdomen: soft, bowel sounds active  HEMATOLOGIC: no pathological lymph nodes palpated  MUSCULOSKELETAL: Extremities: no edema, pulse 1+   INTEGUMENT: No unusual rashes or suspicious skin lesions noted. Nails appear normal.  NEUROLOGIC: non-focal exam   MENTAL STATUS: alert and oriented, appropriate affect      ASSESSMENT:  1. Uncontrolled type 2 diabetes mellitus with complication, with long-term current use of insulin ([address]    2. Dyslipidemia    3. Other acute gastritis without hemorrhage    4. Brugada syndrome        PLAN:    1. He has a very good knowledge base for his diabetes. His blood sugar is up. He realizes why based on an item of food that he consumed that he shouldn't. I remind him of the importance of eating at the same time every day. No adjustment will be made in his insulin at this point. 2. He will continue his statin as prescribed. 3. As far as his gastritis is concerned, I strongly advise that he take his Protonix for at least three months. 4. His Brugada syndrome has been protected against with the defibrillator. He does follow up with his cardiologist.              Follow-up Disposition:  Return in about 6 weeks (around 1/31/2019).       Radha Starr MD

## 2018-12-21 NOTE — TELEPHONE ENCOUNTER
Blood sent to [facility] for Arrhythmia panel, with request for benefits investigation prior to testing.

## 2018-12-24 ENCOUNTER — TELEPHONE (OUTPATIENT)
Dept: INTERNAL MEDICINE CLINIC | Age: 63
End: 2018-12-24

## 2018-12-24 NOTE — TELEPHONE ENCOUNTER
Patient called in bp readings   12/22 12/23        141/85     166/105     166/102  167/107 and   177/107. Per Dr. Hernando Villarreal patient started on Amlodipine 5mg 1 tab daily. Olaf Ware

## 2018-12-28 NOTE — TELEPHONE ENCOUNTER
Patient called in bp readings  Sat         Nancy goes          Wed        thchad  141/85  162/105 177/107  150/104    ---------155/84  161/103 167/107  148/98   160/106 taking amlodipine 5mg 1 every day, patient ask to increase amlodipine 10mg 1 tab daily

## 2018-12-29 RX ORDER — AMLODIPINE BESYLATE 10 MG/1
10 TABLET ORAL DAILY
Qty: 30 TAB | Refills: 11 | Status: ON HOLD | OUTPATIENT
Start: 2018-12-29 | End: 2019-03-13 | Stop reason: SDUPTHER

## 2019-01-02 ENCOUNTER — OFFICE VISIT (OUTPATIENT)
Dept: CARDIOLOGY CLINIC | Age: 64
End: 2019-01-02

## 2019-01-02 VITALS
OXYGEN SATURATION: 98 % | SYSTOLIC BLOOD PRESSURE: 120 MMHG | BODY MASS INDEX: 25.72 KG/M2 | WEIGHT: 163.9 LBS | HEART RATE: 82 BPM | DIASTOLIC BLOOD PRESSURE: 70 MMHG | RESPIRATION RATE: 16 BRPM | HEIGHT: 67 IN

## 2019-01-02 DIAGNOSIS — Z95.810 ICD (IMPLANTABLE CARDIOVERTER-DEFIBRILLATOR) IN PLACE: ICD-10-CM

## 2019-01-02 DIAGNOSIS — I47.29 NSVT (NONSUSTAINED VENTRICULAR TACHYCARDIA): ICD-10-CM

## 2019-01-02 DIAGNOSIS — E11.42 TYPE 2 DIABETES MELLITUS WITH DIABETIC POLYNEUROPATHY, WITH LONG-TERM CURRENT USE OF INSULIN (HCC): ICD-10-CM

## 2019-01-02 DIAGNOSIS — Z79.4 TYPE 2 DIABETES MELLITUS WITH DIABETIC POLYNEUROPATHY, WITH LONG-TERM CURRENT USE OF INSULIN (HCC): ICD-10-CM

## 2019-01-02 DIAGNOSIS — I10 ESSENTIAL HYPERTENSION: ICD-10-CM

## 2019-01-02 DIAGNOSIS — I49.8 BRUGADA SYNDROME: Primary | ICD-10-CM

## 2019-01-02 NOTE — PROGRESS NOTES
1. Have you been to the ER, urgent care clinic since your last visit? Hospitalized since your last visit? Yes When: [address] [place] y 11/2018     2. Have you seen or consulted any other health care providers outside of the [address] [place] [address] since your last visit? Include any pap smears or colon screening. No     Chief Complaint   Patient presents with   [facility] Follow Up     s/p defibrillator  Brugada syndrome     Pt has no cardiac complaints, pt states his blood pressure had been high recently.  Normal BP values today

## 2019-01-02 NOTE — PROGRESS NOTES
[place] [address] [facility]  [phone]     Subjective:      Tasha Pinto is a 70 y.o. male is here for routine f/u. The patient denies chest pain/ shortness of breath, orthopnea, PND, LE edema, palpitations, syncope, or presyncope.        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 ([address] 02/18/2018    Type 2 diabetes mellitus with diabetic polyneuropathy, with long-term current use of insulin ([address] 02/18/2018    Uncontrolled type 2 diabetes mellitus with complication, with long-term current use of insulin ([address] 02/18/2018    Dyslipidemia 09/25/2014    Dermatitis 20/57/1090    Umbilical hernia 84/10/7988    Femoral bruit 09/25/2014    Mitral valve prolapse 09/25/2014      Lawyer Kerri MD  Past Medical History:   Diagnosis Date    Diabetes ([address]     Hypertension     ICD (implantable cardioverter-defibrillator) in place     NSVT (nonsustained ventricular tachycardia) ([address] 11/21/2018    EPS 11/21/2018 VT/VF       Past Surgical History:   Procedure Laterality Date    COLONOSCOPY  1/2/2015         HX HEART CATHETERIZATION      HX HEENT      l cararact removal     No Known Allergies   Family History   Problem Relation Age of Onset    Diabetes Mother     Hypertension Mother     Stroke Mother       Social History     Socioeconomic History    Marital status: SINGLE     Spouse name: Not on file    Number of children: 0    Years of education: Not on file    Highest education level: Not on file   Social Needs    Financial resource strain: Not on file  Food insecurity - worry: Not on file    Food insecurity - inability: Not on file    Transportation needs - medical: Not on file   [place] needs - non-medical: Not on file   Occupational History    Occupation: [employer]   Tobacco Use    Smoking status: Never Smoker    Smokeless tobacco: Never Used   Substance and Sexual Activity    Alcohol use: No    Drug use: No    Sexual activity: Yes     Partners: Female     Birth control/protection: None   Other Topics Concern    Not on file   Social History Narrative    Not on file      Current Outpatient Medications   Medication Sig    amLODIPine (NORVASC) 10 mg tablet Take 1 Tab by mouth daily.  insulin detemir U-100 (LEVEMIR FLEXTOUCH U-100 INSULN) 100 unit/mL (3 mL) inpn 20 units every morning and 40 units at bedtime (Patient taking differently: 10 units every morning and 30 units at bedtime)    acetaminophen (TYLENOL) 325 mg tablet Take 2 Tabs by mouth every six (6) hours as needed.  Insulin Needles, Disposable, (PEN NEEDLE) 31 gauge x 3/16\" ndle 5 times a day    Blood-Glucose Meter (ONETOUCH VERIO IQ METER) monitoring kit Use to test blood sugar three times daily    dulaglutide (TRULICITY) 1.5 FS/4.4 mL sub-q pen 0.5 mL by SubCUTAneous route every seven (7) days.  glucose blood VI test strips (ONETOUCH VERIO) strip Use to test blood sugar four times daily. Dx.e11.9    metFORMIN (GLUCOPHAGE) 1,000 mg tablet Take 1 Tab by mouth two (2) times daily (with meals).  atorvastatin (LIPITOR) 20 mg tablet Take 1 Tab by mouth daily.  LITE TOUCH LANCETS 33 gauge misc     LITE TOUCH LANCING DEVICE [facility]      No current facility-administered medications for this visit.           Review of Symptoms:  11 systems reviewed, negative other than as stated in the HPI    Physical ExamPhysical Exam:    Vitals:    01/02/19 1435 01/02/19 1436   BP: 130/70 120/70   Pulse: 82    Resp: 16    SpO2: 98%    Weight: 163 lb 14.4 oz (74.3 kg)    Height: 5' 7\" (1.702 m) Body mass index is 25.67 kg/m². General PE   Gen:  NAD  Mental Status - Alert. General Appearance - Not in acute distress. Chest and Lung Exam   Inspection: Accessory muscles - No use of accessory muscles in breathing. Auscultation:   Breath sounds: - Normal.   Cardiovascular   Inspection: Jugular vein - Bilateral - Inspection Normal.   Palpation/Percussion:   Apical Impulse: - Normal.   Auscultation: Rhythm - Regular. Heart Sounds - S1 WNL and S2 WNL. No S3 or S4. Murmurs & Other Heart Sounds: Auscultation of the heart reveals - No Murmurs. Peripheral Vascular   Upper Extremity: Inspection - Bilateral - No Cyanotic nailbeds or Digital clubbing. Lower Extremity:   Palpation: Edema - Bilateral - No edema. Abdomen:   Soft, non-tender, bowel sounds are active.   Neuro: A&O times 3, CN and motor grossly WNL    Labs:   Lab Results   Component Value Date/Time    Cholesterol, total 192 01/18/2018 12:38 PM    Cholesterol, total 212 (H) 05/24/2016 05:29 PM    Cholesterol, total 145 01/06/2015 04:45 PM    Cholesterol, total 258 (H) 09/25/2014 11:46 AM    HDL Cholesterol 63 01/18/2018 12:38 PM    HDL Cholesterol 62 05/24/2016 05:29 PM    HDL Cholesterol 62 01/06/2015 04:45 PM    HDL Cholesterol 68 09/25/2014 11:46 AM    LDL, calculated 106 (H) 01/18/2018 12:38 PM    LDL, calculated 120 (H) 05/24/2016 05:29 PM    LDL, calculated 70 01/06/2015 04:45 PM    LDL, calculated 154 (H) 09/25/2014 11:46 AM    Triglyceride 117 01/18/2018 12:38 PM    Triglyceride 150 (H) 05/24/2016 05:29 PM    Triglyceride 64 01/06/2015 04:45 PM    Triglyceride 178 (H) 09/25/2014 11:46 AM     No results found for: CPK, CPKX, CPX  Lab Results   Component Value Date/Time    Sodium 145 (H) 11/29/2018 04:11 PM    Potassium 4.9 11/29/2018 04:11 PM    Chloride 101 11/29/2018 04:11 PM    CO2 25 11/29/2018 04:11 PM    Anion gap 5 11/19/2018 03:06 AM    Glucose 34 (<) 11/29/2018 04:11 PM    BUN 15 11/29/2018 04:11 PM    Creatinine 0.95 11/29/2018 04:11 PM    BUN/Creatinine ratio 16 11/29/2018 04:11 PM    GFR est AA 97 11/29/2018 04:11 PM    GFR est non-AA 84 11/29/2018 04:11 PM    Calcium 10.0 11/29/2018 04:11 PM    Bilirubin, total 0.5 11/18/2018 07:28 AM    AST (SGOT) 26 11/18/2018 07:28 AM    Alk. phosphatase 92 11/18/2018 07:28 AM    Protein, total 7.8 11/18/2018 07:28 AM    Albumin 3.6 11/18/2018 07:28 AM    Globulin 4.2 (H) 11/18/2018 07:28 AM    A-G Ratio 0.9 (L) 11/18/2018 07:28 AM    ALT (SGPT) 34 11/18/2018 07:28 AM       EKG:  SR     Assessment:     Assessment:      1. Brugada syndrome    2. Essential hypertension    3. NSVT (nonsustained ventricular tachycardia) ([facility]    4. Type 2 diabetes mellitus with diabetic polyneuropathy, with long-term current use of insulin ([facility]    5. ICD (implantable cardioverter-defibrillator) in place        Orders Placed This Encounter    AMB POC EKG ROUTINE W/ 12 LEADS, INTER & REP     Order Specific Question:   Reason for Exam:     Answer:   routine        Plan:     Patient presents    Syncope  In the setting of possible Brugada syndrome, inducible VT/VF s/p single lead AICD 11/18  ECHO 11/9/18: EF 55-60%,mild LVH, mild LAE, mild MR, RVSP 40 mmHg  CATH 11/18/18: showed some mild calcification LAD, L Main  CARDIAC MRI 11/19/18: severe LVH, EF 60%, RVEF 50%. No areas of myocardial enhancement  He has been evaluated/out patient genetic testing for possible Brugada syndrome with Dr. Sis March lab result      HLD  1/18 LDL at 106. On statin. Lipids and labs followed by PCP. HTN  Controlled with Amlodipine (recently initiated by PCP)    DM  On insulin therapy    Inducible ventricular tachycardia/ventricular fibrillation:  Schedule follow-up with Dr. Samy Rocha.  Depending on results of Brugada testing and ICD checks, discuss with Dr. Samy Rocha whether more aggressive exercise is advisable. Counseled on diet and light to moderate exercise- eventual goal of 30-60 minutes 5-7 times a week as per AHA guidelines. Depending on results of Brugada testing and ICD checks, discuss with Dr. Grace Lamar whether more aggressive exercise is advisable. Continue current care and f/u in 1 year with me.     Agnela Hunt MD

## 2019-01-29 ENCOUNTER — TELEPHONE (OUTPATIENT)
Dept: CARDIOLOGY CLINIC | Age: 64
End: 2019-01-29

## 2019-01-29 NOTE — TELEPHONE ENCOUNTER
Results of [facility] noted to be POSITIVE. 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 The SCN5A gene is associated with autosomal dominant Brugada Syndrome, long QT, dilated cardiomyopathy, and afib. See scanned results in Media,   Pt cell called, no identifier on the voice mail, so no message left. He needs to notify his family for genetic testing.

## 2019-02-27 ENCOUNTER — OFFICE VISIT (OUTPATIENT)
Dept: INTERNAL MEDICINE CLINIC | Age: 64
End: 2019-02-27

## 2019-02-27 VITALS
TEMPERATURE: 100.9 F | WEIGHT: 163.8 LBS | HEART RATE: 67 BPM | HEIGHT: 67 IN | RESPIRATION RATE: 16 BRPM | DIASTOLIC BLOOD PRESSURE: 63 MMHG | OXYGEN SATURATION: 95 % | BODY MASS INDEX: 25.71 KG/M2 | SYSTOLIC BLOOD PRESSURE: 114 MMHG

## 2019-02-27 DIAGNOSIS — E78.5 DYSLIPIDEMIA: ICD-10-CM

## 2019-02-27 DIAGNOSIS — Z79.4 TYPE 2 DIABETES MELLITUS WITH DIABETIC POLYNEUROPATHY, WITH LONG-TERM CURRENT USE OF INSULIN (HCC): ICD-10-CM

## 2019-02-27 DIAGNOSIS — J06.9 UPPER RESPIRATORY TRACT INFECTION, UNSPECIFIED TYPE: Primary | ICD-10-CM

## 2019-02-27 DIAGNOSIS — I49.8 BRUGADA SYNDROME: ICD-10-CM

## 2019-02-27 DIAGNOSIS — I10 ESSENTIAL HYPERTENSION: ICD-10-CM

## 2019-02-27 DIAGNOSIS — E11.42 TYPE 2 DIABETES MELLITUS WITH DIABETIC POLYNEUROPATHY, WITH LONG-TERM CURRENT USE OF INSULIN (HCC): ICD-10-CM

## 2019-02-27 NOTE — PROGRESS NOTES
Chief Complaint   Patient presents with    Other     Patient states that he is having some issues with his tongue. He states that he feels ridges on his tongue on the right side, and that when he was eating over the weekend his food to do not tastes the was it should have. Patient also states that he has a little cough. 1. Have you been to the ER, urgent care clinic since your last visit? Hospitalized since your last visit? No    2. Have you seen or consulted any other health care providers outside of the [address] [place] [address] since your last visit? Include any pap smears or colon screening.  No

## 2019-02-28 LAB
ALBUMIN/CREAT UR: 51.1 MG/G CREAT (ref 0–30)
APO B SERPL-MCNC: 70 MG/DL
BASOPHILS # BLD AUTO: 0 X10E3/UL (ref 0–0.2)
BASOPHILS NFR BLD AUTO: 0 %
BUN SERPL-MCNC: 19 MG/DL (ref 8–27)
BUN/CREAT SERPL: 19 (ref 10–24)
CALCIUM SERPL-MCNC: 8.8 MG/DL (ref 8.6–10.2)
CHLORIDE SERPL-SCNC: 103 MMOL/L (ref 96–106)
CHOLEST SERPL-MCNC: 135 MG/DL (ref 100–199)
CO2 SERPL-SCNC: 29 MMOL/L (ref 20–29)
CREAT SERPL-MCNC: 0.98 MG/DL (ref 0.76–1.27)
CREAT UR-MCNC: 136.9 MG/DL
EOSINOPHIL # BLD AUTO: 0 X10E3/UL (ref 0–0.4)
EOSINOPHIL NFR BLD AUTO: 1 %
ERYTHROCYTE [DISTWIDTH] IN BLOOD BY AUTOMATED COUNT: 15.1 % (ref 12.3–15.4)
EST. AVERAGE GLUCOSE BLD GHB EST-MCNC: 214 MG/DL
GLUCOSE SERPL-MCNC: 95 MG/DL (ref 65–99)
HBA1C MFR BLD: 9.1 % (ref 4.8–5.6)
HCT VFR BLD AUTO: 34.1 % (ref 37.5–51)
HDLC SERPL-MCNC: 59 MG/DL
HGB BLD-MCNC: 11.3 G/DL (ref 13–17.7)
IMM GRANULOCYTES # BLD AUTO: 0 X10E3/UL (ref 0–0.1)
IMM GRANULOCYTES NFR BLD AUTO: 0 %
LDLC SERPL CALC-MCNC: 66 MG/DL (ref 0–99)
LYMPHOCYTES # BLD AUTO: 0.8 X10E3/UL (ref 0.7–3.1)
LYMPHOCYTES NFR BLD AUTO: 17 %
MCH RBC QN AUTO: 28.2 PG (ref 26.6–33)
MCHC RBC AUTO-ENTMCNC: 33.1 G/DL (ref 31.5–35.7)
MCV RBC AUTO: 85 FL (ref 79–97)
MICROALBUMIN UR-MCNC: 70 UG/ML
MONOCYTES # BLD AUTO: 0.4 X10E3/UL (ref 0.1–0.9)
MONOCYTES NFR BLD AUTO: 8 %
NEUTROPHILS # BLD AUTO: 3.8 X10E3/UL (ref 1.4–7)
NEUTROPHILS NFR BLD AUTO: 74 %
PLATELET # BLD AUTO: 191 X10E3/UL (ref 150–379)
POTASSIUM SERPL-SCNC: 4.2 MMOL/L (ref 3.5–5.2)
RBC # BLD AUTO: 4.01 X10E6/UL (ref 4.14–5.8)
SODIUM SERPL-SCNC: 144 MMOL/L (ref 134–144)
TRIGL SERPL-MCNC: 48 MG/DL (ref 0–149)
VLDLC SERPL CALC-MCNC: 10 MG/DL (ref 5–40)
WBC # BLD AUTO: 5 X10E3/UL (ref 3.4–10.8)

## 2019-02-28 NOTE — PROGRESS NOTES
[facility]  [place] [address]  [address] [place] [address]  Phone:  [phone]  Fax: [phone]       Chief Complaint   Patient presents with    Other     Patient states that he is having some issues with his tongue. He states that he feels ridges on his tongue on the right side, and that when he was eating over the weekend his food to do not tastes the was it should have. Patient also states that he has a little cough. .      SUBJECTIVE:    Raisa Fernandez is a 70 y.o. male Comes in for return visit complaining of upper respiratory symptoms for the last 3-4 days. He started out with a sore throat, which he has had for the last two days, but this is improving. This gave way to nasal congestion and coughing. The cough is nonproductive. Accompanying this has been generalized malaise and fatigue. From a diabetic perspective he is becoming noncompliant again, having missed his last appointment. The average fasting sugar is in the 130-140 range and in the late evening it is in the 180-200 range. This is much too high. He is currently taking Levemir 10 units q.a.m. and 40 units q.h.s. Additionally he is taking his GLP1 agonist along with Metformin. He has not had any interventional hypoglycemia. He has a past history of primary hypertension and dyslipidemia. Additionally, he has a defibrillator for Brugada syndrome. Fortunately there has been no activation of the defibrillator. Current Outpatient Medications   Medication Sig Dispense Refill    amLODIPine (NORVASC) 10 mg tablet Take 1 Tab by mouth daily. 30 Tab 11    insulin detemir U-100 (LEVEMIR FLEXTOUCH U-100 INSULN) 100 unit/mL (3 mL) inpn 20 units every morning and 40 units at bedtime (Patient taking differently: 10 units every morning and 30 units at bedtime) 3 Pen 11    acetaminophen (TYLENOL) 325 mg tablet Take 2 Tabs by mouth every six (6) hours as needed.  60 Tab 1    Insulin Needles, Disposable, (PEN NEEDLE) 31 gauge x 3/16\" ndle 5 times a day 150 Pen Needle 11    Blood-Glucose Meter (ONETOUCH VERIO IQ METER) monitoring kit Use to test blood sugar three times daily 1 Kit 0    dulaglutide (TRULICITY) 1.5 GT/3.0 mL sub-q pen 0.5 mL by SubCUTAneous route every seven (7) days. 12 Pen 3    glucose blood VI test strips (ONETOUCH VERIO) strip Use to test blood sugar four times daily. Dx.e11.9 360 Strip 3    metFORMIN (GLUCOPHAGE) 1,000 mg tablet Take 1 Tab by mouth two (2) times daily (with meals). 60 Tab 10    atorvastatin (LIPITOR) 20 mg tablet Take 1 Tab by mouth daily.  27 Tab 10    LITE TOUCH LANCETS 35 gauge misc       LITE TOUCH LANCING DEVICE misc        Past Medical History:   Diagnosis Date    Diabetes ([facility]     Hypertension     ICD (implantable cardioverter-defibrillator) in place     NSVT (nonsustained ventricular tachycardia) ([facility] 11/21/2018    EPS 11/21/2018 VT/VF      Past Surgical History:   Procedure Laterality Date    COLONOSCOPY  1/2/2015         HX HEART CATHETERIZATION      HX HEENT      l cararact removal     No Known Allergies      REVIEW OF SYSTEMS:  General: negative for - chills or fever  ENT: negative for - headaches, nasal congestion or tinnitus  Respiratory: negative for - cough, hemoptysis, shortness of breath or wheezing  Cardiovascular : negative for - chest pain, edema, palpitations or shortness of breath  Gastrointestinal: negative for - abdominal pain, blood in stools, heartburn or nausea/vomiting  Genito-Urinary: no dysuria, trouble voiding, or hematuria  Musculoskeletal: negative for - gait disturbance, joint pain, joint stiffness or joint swelling  Neurological: no TIA or stroke symptoms  Hematologic: no bruises, no bleeding, no swollen glands  Integument: no lumps, mole changes, nail changes or rash  Endocrine: no malaise/lethargy or unexpected weight changes      Social History     Socioeconomic History    Marital status: SINGLE     Spouse name: Not on file    Number of children: 0    Years of education: Not on file    Highest education level: Not on file   Occupational History    Occupation: [employer]   Tobacco Use    Smoking status: Never Smoker    Smokeless tobacco: Never Used   Substance and Sexual Activity    Alcohol use: No    Drug use: No    Sexual activity: Yes     Partners: Female     Birth control/protection: None     Family History   Problem Relation Age of Onset    Diabetes Mother     Hypertension Mother     Stroke Mother        OBJECTIVE:    Visit Vitals  /63   Pulse 67   Temp (!) 100.9 °F (38.3 °C) (Oral)   Resp 16   Ht 5' 7\" (1.702 m)   Wt 163 lb 12.8 oz (74.3 kg)   SpO2 95%   BMI 25.65 kg/m²     CONSTITUTIONAL: well , well nourished, appears age appropriate  EYES: perrla, eom intact  ENMT:moist mucous membranes, pharynx clear  NECK: supple. Thyroid normal  RESPIRATORY: Chest: clear to ascultation and percussion   CARDIOVASCULAR: Heart: regular rate and rhythm  GASTROINTESTINAL: Abdomen: soft, bowel sounds active  HEMATOLOGIC: no pathological lymph nodes palpated  MUSCULOSKELETAL: Extremities: no edema, pulse 1+   INTEGUMENT: No unusual rashes or suspicious skin lesions noted. Nails appear normal.  NEUROLOGIC: non-focal exam   MENTAL STATUS: alert and oriented, appropriate affect      ASSESSMENT:  1. Upper respiratory tract infection, unspecified type    2. Type 2 diabetes mellitus with diabetic polyneuropathy, with long-term current use of insulin ([address]    3. Dyslipidemia    4. Essential hypertension    5. Brugada syndrome        PLAN:    1. The patient has a URI. I do not think he has strep throat at this point. Symptomatic treatment with acetaminophen. I explained to him that his symptoms might get worse before they get better, but the sore throat should continue to improve. If he does not follow this course over the next 48 hours he will give me a call.   2. His diabetes hopefully is doing well, but I doubt this is indeed the case. I will await the results of his hemoglobin A1c. I remind him of the importance of minimizing his consumption of processed carbohydrates. 3. He will continue statin as prescribed and efficacy and compliance will be assessed. 4. His blood pressure is excellent today, no adjustments are made. 5. He has had no adverse effects from a cardiac perspective. He has Brugada syndrome. There has been no activation of the defibrillator thus far. Basically he had inducible V-tach during EP studies, prompting the defibrillator being placed. He had other stigma suggestive of this particular syndrome. .  Orders Placed This Encounter    LIPID PANEL    MICROALBUMIN, UR, RAND    HEMOGLOBIN A1C WITH EAG    CBC WITH AUTOMATED DIFF    METABOLIC PANEL, BASIC    APOLIPOPROTEIN B         Follow-up Disposition:  Return in about 3 months (around 5/27/2019).       Gladis Bailey MD

## 2019-03-01 ENCOUNTER — OFFICE VISIT (OUTPATIENT)
Dept: INTERNAL MEDICINE CLINIC | Age: 64
End: 2019-03-01

## 2019-03-01 VITALS
DIASTOLIC BLOOD PRESSURE: 82 MMHG | HEART RATE: 75 BPM | HEIGHT: 67 IN | BODY MASS INDEX: 25.03 KG/M2 | SYSTOLIC BLOOD PRESSURE: 144 MMHG | TEMPERATURE: 99.8 F | OXYGEN SATURATION: 95 % | RESPIRATION RATE: 16 BRPM | WEIGHT: 159.5 LBS

## 2019-03-01 DIAGNOSIS — E11.42 TYPE 2 DIABETES MELLITUS WITH DIABETIC POLYNEUROPATHY, WITH LONG-TERM CURRENT USE OF INSULIN (HCC): ICD-10-CM

## 2019-03-01 DIAGNOSIS — Z79.4 TYPE 2 DIABETES MELLITUS WITH DIABETIC POLYNEUROPATHY, WITH LONG-TERM CURRENT USE OF INSULIN (HCC): ICD-10-CM

## 2019-03-01 DIAGNOSIS — G51.0 BELL'S PALSY: Primary | ICD-10-CM

## 2019-03-01 NOTE — PROGRESS NOTES
Chief Complaint   Patient presents with    Swelling     Patient states that his hands and face were \"puffy\". 1. Have you been to the ER, urgent care clinic since your last visit? Hospitalized since your last visit? No    2. Have you seen or consulted any other health care providers outside of the [address] [place] [address] since your last visit? Include any pap smears or colon screening.  No

## 2019-03-03 NOTE — PROGRESS NOTES
Tell patient to increase Levemir/Lantus 10 units in the morning to 20 units in the morning. No adjustment in bedtime dose.

## 2019-03-06 ENCOUNTER — TELEPHONE (OUTPATIENT)
Dept: INTERNAL MEDICINE CLINIC | Age: 64
End: 2019-03-06

## 2019-03-12 ENCOUNTER — APPOINTMENT (OUTPATIENT)
Dept: CT IMAGING | Age: 64
End: 2019-03-12
Attending: EMERGENCY MEDICINE
Payer: COMMERCIAL

## 2019-03-12 ENCOUNTER — HOSPITAL ENCOUNTER (OUTPATIENT)
Age: 64
Setting detail: OBSERVATION
Discharge: HOME | End: 2019-03-13
Attending: INTERNAL MEDICINE | Admitting: INTERNAL MEDICINE
Payer: COMMERCIAL

## 2019-03-12 ENCOUNTER — HOSPITAL ENCOUNTER (EMERGENCY)
Age: 64
Discharge: ANOTHER HOSPITAL | End: 2019-03-12
Attending: EMERGENCY MEDICINE
Payer: COMMERCIAL

## 2019-03-12 ENCOUNTER — APPOINTMENT (OUTPATIENT)
Dept: GENERAL RADIOLOGY | Age: 64
End: 2019-03-12
Attending: EMERGENCY MEDICINE
Payer: COMMERCIAL

## 2019-03-12 VITALS
TEMPERATURE: 98.5 F | OXYGEN SATURATION: 94 % | DIASTOLIC BLOOD PRESSURE: 99 MMHG | HEART RATE: 80 BPM | RESPIRATION RATE: 18 BRPM | SYSTOLIC BLOOD PRESSURE: 172 MMHG | BODY MASS INDEX: 24.96 KG/M2 | WEIGHT: 159.39 LBS

## 2019-03-12 DIAGNOSIS — R29.810 FACIAL DROOP: Primary | ICD-10-CM

## 2019-03-12 DIAGNOSIS — G51.0 BELL'S PALSY: ICD-10-CM

## 2019-03-12 DIAGNOSIS — R29.810 FACIAL WEAKNESS: ICD-10-CM

## 2019-03-12 PROBLEM — I10 ESSENTIAL HYPERTENSION: Chronic | Status: ACTIVE | Noted: 2019-01-02

## 2019-03-12 PROBLEM — Z95.810 ICD (IMPLANTABLE CARDIOVERTER-DEFIBRILLATOR) IN PLACE: Chronic | Status: ACTIVE | Noted: 2018-12-21

## 2019-03-12 PROBLEM — I49.8 BRUGADA SYNDROME: Chronic | Status: ACTIVE | Noted: 2018-11-18

## 2019-03-12 PROBLEM — I10 HYPERTENSION: Chronic | Status: ACTIVE | Noted: 2018-12-21

## 2019-03-12 PROBLEM — I47.29 NSVT (NONSUSTAINED VENTRICULAR TACHYCARDIA): Chronic | Status: ACTIVE | Noted: 2018-11-21

## 2019-03-12 PROBLEM — E11.9 TYPE 2 DIABETES MELLITUS WITHOUT COMPLICATION (HCC): Chronic | Status: ACTIVE | Noted: 2019-03-12

## 2019-03-12 LAB
ALBUMIN SERPL-MCNC: 3.5 G/DL (ref 3.5–5)
ALBUMIN/GLOB SERPL: 0.9 {RATIO} (ref 1.1–2.2)
ALP SERPL-CCNC: 86 U/L (ref 45–117)
ALT SERPL-CCNC: 32 U/L (ref 12–78)
ANION GAP SERPL CALC-SCNC: 9 MMOL/L (ref 5–15)
APTT PPP: 23.6 SEC (ref 22.1–32)
AST SERPL-CCNC: 25 U/L (ref 15–37)
BASOPHILS # BLD: 0 K/UL (ref 0–0.1)
BASOPHILS NFR BLD: 1 % (ref 0–1)
BILIRUB SERPL-MCNC: 0.4 MG/DL (ref 0.2–1)
BUN SERPL-MCNC: 21 MG/DL (ref 6–20)
BUN/CREAT SERPL: 17 (ref 12–20)
CALCIUM SERPL-MCNC: 9 MG/DL (ref 8.5–10.1)
CHLORIDE SERPL-SCNC: 100 MMOL/L (ref 97–108)
CO2 SERPL-SCNC: 25 MMOL/L (ref 21–32)
COMMENT, HOLDF: NORMAL
CREAT SERPL-MCNC: 1.23 MG/DL (ref 0.7–1.3)
D DIMER PPP FEU-MCNC: 0.32 MG/L FEU (ref 0–0.65)
DIFFERENTIAL METHOD BLD: NORMAL
EOSINOPHIL # BLD: 0.1 K/UL (ref 0–0.4)
EOSINOPHIL NFR BLD: 2 % (ref 0–7)
ERYTHROCYTE [DISTWIDTH] IN BLOOD BY AUTOMATED COUNT: 13.1 % (ref 11.5–14.5)
GLOBULIN SER CALC-MCNC: 3.8 G/DL (ref 2–4)
GLUCOSE BLD STRIP.AUTO-MCNC: 407 MG/DL (ref 65–100)
GLUCOSE BLD STRIP.AUTO-MCNC: 90 MG/DL (ref 65–100)
GLUCOSE SERPL-MCNC: 427 MG/DL (ref 65–100)
HCT VFR BLD AUTO: 39 % (ref 36.6–50.3)
HGB BLD-MCNC: 12.6 G/DL (ref 12.1–17)
IMM GRANULOCYTES # BLD AUTO: 0 K/UL (ref 0–0.04)
IMM GRANULOCYTES NFR BLD AUTO: 0 % (ref 0–0.5)
INR PPP: 1 (ref 0.9–1.1)
LYMPHOCYTES # BLD: 1.3 K/UL (ref 0.8–3.5)
LYMPHOCYTES NFR BLD: 24 % (ref 12–49)
MCH RBC QN AUTO: 28 PG (ref 26–34)
MCHC RBC AUTO-ENTMCNC: 32.3 G/DL (ref 30–36.5)
MCV RBC AUTO: 86.7 FL (ref 80–99)
MONOCYTES # BLD: 0.4 K/UL (ref 0–1)
MONOCYTES NFR BLD: 8 % (ref 5–13)
NEUTS SEG # BLD: 3.4 K/UL (ref 1.8–8)
NEUTS SEG NFR BLD: 65 % (ref 32–75)
NRBC # BLD: 0 K/UL (ref 0–0.01)
NRBC BLD-RTO: 0 PER 100 WBC
PLATELET # BLD AUTO: 194 K/UL (ref 150–400)
PMV BLD AUTO: 9.8 FL (ref 8.9–12.9)
POTASSIUM SERPL-SCNC: 4.1 MMOL/L (ref 3.5–5.1)
PROT SERPL-MCNC: 7.3 G/DL (ref 6.4–8.2)
PROTHROMBIN TIME: 9.9 SEC (ref 9–11.1)
RBC # BLD AUTO: 4.5 M/UL (ref 4.1–5.7)
SAMPLES BEING HELD,HOLD: NORMAL
SERVICE CMNT-IMP: ABNORMAL
SERVICE CMNT-IMP: NORMAL
SODIUM SERPL-SCNC: 134 MMOL/L (ref 136–145)
THERAPEUTIC RANGE,PTTT: NORMAL SECS (ref 58–77)
TROPONIN I SERPL-MCNC: <0.05 NG/ML
WBC # BLD AUTO: 5.2 K/UL (ref 4.1–11.1)

## 2019-03-12 PROCEDURE — 96375 TX/PRO/DX INJ NEW DRUG ADDON: CPT

## 2019-03-12 PROCEDURE — 82962 GLUCOSE BLOOD TEST: CPT

## 2019-03-12 PROCEDURE — 85730 THROMBOPLASTIN TIME PARTIAL: CPT

## 2019-03-12 PROCEDURE — 36415 COLL VENOUS BLD VENIPUNCTURE: CPT

## 2019-03-12 PROCEDURE — 74011250636 HC RX REV CODE- 250/636: Performed by: EMERGENCY MEDICINE

## 2019-03-12 PROCEDURE — 84484 ASSAY OF TROPONIN QUANT: CPT

## 2019-03-12 PROCEDURE — 85379 FIBRIN DEGRADATION QUANT: CPT

## 2019-03-12 PROCEDURE — 85610 PROTHROMBIN TIME: CPT

## 2019-03-12 PROCEDURE — 99285 EMERGENCY DEPT VISIT HI MDM: CPT

## 2019-03-12 PROCEDURE — 96372 THER/PROPH/DIAG INJ SC/IM: CPT

## 2019-03-12 PROCEDURE — 96361 HYDRATE IV INFUSION ADD-ON: CPT

## 2019-03-12 PROCEDURE — 99218 HC RM OBSERVATION: CPT

## 2019-03-12 PROCEDURE — 74011636637 HC RX REV CODE- 636/637: Performed by: EMERGENCY MEDICINE

## 2019-03-12 PROCEDURE — 71045 X-RAY EXAM CHEST 1 VIEW: CPT

## 2019-03-12 PROCEDURE — 74011250636 HC RX REV CODE- 250/636: Performed by: INTERNAL MEDICINE

## 2019-03-12 PROCEDURE — 65660000000 HC RM CCU STEPDOWN

## 2019-03-12 PROCEDURE — 85025 COMPLETE CBC W/AUTO DIFF WBC: CPT

## 2019-03-12 PROCEDURE — 70450 CT HEAD/BRAIN W/O DYE: CPT

## 2019-03-12 PROCEDURE — 80053 COMPREHEN METABOLIC PANEL: CPT

## 2019-03-12 PROCEDURE — 96374 THER/PROPH/DIAG INJ IV PUSH: CPT

## 2019-03-12 RX ORDER — LABETALOL HCL 20 MG/4 ML
10 SYRINGE (ML) INTRAVENOUS
Status: DISCONTINUED | OUTPATIENT
Start: 2019-03-12 | End: 2019-03-13 | Stop reason: HOSPADM

## 2019-03-12 RX ORDER — ENOXAPARIN SODIUM 100 MG/ML
40 INJECTION SUBCUTANEOUS EVERY 24 HOURS
Status: DISCONTINUED | OUTPATIENT
Start: 2019-03-12 | End: 2019-03-13 | Stop reason: HOSPADM

## 2019-03-12 RX ORDER — SODIUM CHLORIDE 0.9 % (FLUSH) 0.9 %
5-40 SYRINGE (ML) INJECTION AS NEEDED
Status: DISCONTINUED | OUTPATIENT
Start: 2019-03-12 | End: 2019-03-13 | Stop reason: HOSPADM

## 2019-03-12 RX ORDER — LORAZEPAM 2 MG/ML
1 INJECTION INTRAMUSCULAR
Status: COMPLETED | OUTPATIENT
Start: 2019-03-12 | End: 2019-03-12

## 2019-03-12 RX ORDER — ACETAMINOPHEN 325 MG/1
650 TABLET ORAL
COMMUNITY
End: 2020-04-29 | Stop reason: ALTCHOICE

## 2019-03-12 RX ORDER — DEXTROSE 50 % IN WATER (D50W) INTRAVENOUS SYRINGE
25-50 AS NEEDED
Status: DISCONTINUED | OUTPATIENT
Start: 2019-03-12 | End: 2019-03-13 | Stop reason: HOSPADM

## 2019-03-12 RX ORDER — MAGNESIUM SULFATE 100 %
4 CRYSTALS MISCELLANEOUS AS NEEDED
Status: DISCONTINUED | OUTPATIENT
Start: 2019-03-12 | End: 2019-03-13 | Stop reason: HOSPADM

## 2019-03-12 RX ORDER — PROCHLORPERAZINE EDISYLATE 5 MG/ML
10 INJECTION INTRAMUSCULAR; INTRAVENOUS
Status: DISCONTINUED | OUTPATIENT
Start: 2019-03-12 | End: 2019-03-13 | Stop reason: HOSPADM

## 2019-03-12 RX ORDER — INSULIN LISPRO 100 [IU]/ML
INJECTION, SOLUTION INTRAVENOUS; SUBCUTANEOUS
Status: DISCONTINUED | OUTPATIENT
Start: 2019-03-12 | End: 2019-03-13 | Stop reason: HOSPADM

## 2019-03-12 RX ORDER — LORAZEPAM 2 MG/ML
1 INJECTION INTRAMUSCULAR
Status: COMPLETED | OUTPATIENT
Start: 2019-03-12 | End: 2019-03-13

## 2019-03-12 RX ORDER — SODIUM CHLORIDE 0.9 % (FLUSH) 0.9 %
5-40 SYRINGE (ML) INJECTION EVERY 8 HOURS
Status: DISCONTINUED | OUTPATIENT
Start: 2019-03-12 | End: 2019-03-13 | Stop reason: HOSPADM

## 2019-03-12 RX ORDER — OXYCODONE AND ACETAMINOPHEN 5; 325 MG/1; MG/1
1 TABLET ORAL
Status: DISCONTINUED | OUTPATIENT
Start: 2019-03-12 | End: 2019-03-13 | Stop reason: HOSPADM

## 2019-03-12 RX ORDER — ACETAMINOPHEN 325 MG/1
650 TABLET ORAL
Status: DISCONTINUED | OUTPATIENT
Start: 2019-03-12 | End: 2019-03-13 | Stop reason: HOSPADM

## 2019-03-12 RX ADMIN — HUMAN INSULIN 10 UNITS: 100 INJECTION, SOLUTION SUBCUTANEOUS at 13:21

## 2019-03-12 RX ADMIN — LORAZEPAM 1 MG: 2 INJECTION INTRAMUSCULAR; INTRAVENOUS at 13:21

## 2019-03-12 RX ADMIN — Medication 10 ML: at 23:39

## 2019-03-12 RX ADMIN — SODIUM CHLORIDE 1000 ML: 900 INJECTION, SOLUTION INTRAVENOUS at 13:22

## 2019-03-12 RX ADMIN — ENOXAPARIN SODIUM 40 MG: 40 INJECTION SUBCUTANEOUS at 21:37

## 2019-03-12 NOTE — ED TRIAGE NOTES
Triage Note: Patient arrives by EMS from work after noticing right facial droop and \"problems with my speech. \"  Patient reports last night at 1800 (3/11/19) he noticed trouble drinking from a straw on the right side of his mouth, he was able to drink from the left so he \"didn't think anything of it. \"  Patient reports today after getting up he got ready for work and went to work, but Nikko Norma thinking clearly,\" and forgot to take his Insulin, BGL: 512 by EMS. Patient arrives with profound facial droop on the right and minor sensory difference from right side of face to left.

## 2019-03-12 NOTE — PROGRESS NOTES
[facility]: MED RECONCILIATION    Comments: Patent reports last dose Amlodipine taken 9 days ago. Medications removed:  · Dulaglutide (Trulicity) 1.5 mg SC every 7 days - patient reported has not taken for 3 weeks    Information obtained from: Patient    Allergies: Patient has no known allergies. Prior to Admission Medications:     Medication Documentation Review Audit       Reviewed by Conor Aquino (Pharmacist) on 03/12/19 at 1927      Medication Sig Documenting Provider Last Dose Status Taking?   acetaminophen (TYLENOL) 325 mg tablet Take 650 mg by mouth two (2) times daily as needed (virus). Provider, Historical 3/11/2019 PM Active Yes   amLODIPine (NORVASC) 10 mg tablet Take 1 Tab by mouth daily. Cynthia Fierro MD 3/3/2019 AM Active Yes   atorvastatin (LIPITOR) 20 mg tablet Take 1 Tab by mouth daily. Cynthia Fierro MD 3/11/2019 AM Active Yes   insulin detemir U-100 (LEVEMIR U-100 INSULIN) 100 unit/mL injection 20 Units by SubCUTAneous route daily. Provider, Historical 3/11/2019 0730 Active Yes   insulin detemir U-100 (LEVEMIR U-100 INSULIN) 100 unit/mL injection 30 Units by SubCUTAneous route nightly. Provider, Historical 3/11/2019 PM Active Yes   metFORMIN (GLUCOPHAGE) 1,000 mg tablet Take 1 Tab by mouth two (2) times daily (with meals).  Cynthia Fierro MD 3/11/2019 PM Active Yes                  Nestor Barnes   Contact: [phone]

## 2019-03-12 NOTE — ED NOTES
SBAR report given to Jevon Matute at [address].   Patient going to room 503 transfer center to arrange transport

## 2019-03-12 NOTE — ED NOTES
Patient reports he stopped taking his BP medication a week ago because \"I felt great and my blood pressure was running 140's over 90's. \"  This RN provided education on importance of continuing BP medications as prescribed. Patient verbalized understanding.

## 2019-03-12 NOTE — PROGRESS NOTES
Spiritual Care Assessment/Progress Note  [facility]      NAME: Hyun Sharma      MRN: [id number]  AGE: 70 y.o. SEX: male  Oriental orthodox Affiliation: Denominational   Language: English     3/12/2019     Total Time (in minutes): 30     Spiritual Assessment begun in [address] through conversation with:         []Patient        [x] Family    [x] Friend(s)        Reason for Consult: Crisis, Stemi     Spiritual beliefs: (Please include comment if needed)     [x] Identifies with a ila tradition:         [] Supported by a ila community:            [] Claims no spiritual orientation:           [] Seeking spiritual identity:                [] Adheres to an individual form of spirituality:           [] Not able to assess:                           Identified resources for coping:      [x] Prayer                               [] Music                  [] Guided Imagery     [] Family/friends                 [] Pet visits     [] Devotional reading                         [] Unknown     [] Other:                                           Interventions offered during this visit: (See comments for more details)          Family/Friend(s):  Affirmation of emotions/emotional suffering, Coping skills reviewed/reinforced, Guided imagery, Normalization of emotional/spiritual concerns, Prayer (assurance of)     Plan of Care:     [x] Support spiritual and/or cultural needs    [] Support AMD and/or advance care planning process      [] Support grieving process   [] Coordinate Rites and/or Rituals    [] Coordination with community clergy   [] No spiritual needs identified at this time   [] Detailed Plan of Care below (See Comments)  [] Make referral to Music Therapy  [] Make referral to Pet Therapy     [] Make referral to Addiction services  [] Make referral to [facility]  [] Make referral to Spiritual Care Partner  [] No future visits requested        [x] Follow up visits as needed     Comments: Responded to Code Stemi in ED 34.  Consulted with Nurse and family, family stated that they found the patient in the restroom  unresponsive at the [place] and they are waiting to hear form the doctor at this point. Provided comfort and care to the family as patient/family shared about  Advised of  Availability. Chaplains will follow as able and/or needed. Rev. Derian Almanza.  Kev Marx Intern [address]

## 2019-03-12 NOTE — ED NOTES
[facility] at bedside for patient transfer to [place]. EMTALA signed, patient alert/oriented and VSS at time of transport. No further needs at this time.

## 2019-03-12 NOTE — H&P
[address], [place] [address]  [phone]    Admission History and Physical      NAME:  John Zhou   :   1955   MRN:  [id number]     PCP:  Jersey Ha MD     Date/Time:  3/12/2019         Subjective:     CHIEF COMPLAINT: facial droop     HISTORY OF PRESENT ILLNESS:     Mr. Julia Baker is a 70 y.o.  male who is admitted with probable Bell's palsy. Mr. [address] presented to the [facility] Emergency Department this AM complaining of facial droop, right sided, started last night, constant. In the ED at [facility], he was evaluated by Teleneurology who recommended outpatient MRI to evaluate for possible stroke, however, this could not be done at [facility] due to his ICD. I confirmed that it can be done with our lower powered MRI. History obtained from chart review and the patient. Previous records reviewed, admit November last year, diagnosed with Brugada syndrome and had ICD placed, denies recent ICD firing    Past Medical History:   Diagnosis Date    Diabetes ([address]     Hypertension     ICD (implantable cardioverter-defibrillator) in place     NSVT (nonsustained ventricular tachycardia) ([facility] [address] 2018    EPS 2018 VT/VF         Past Surgical History:   Procedure Laterality Date    COLONOSCOPY  2015         HX HEART CATHETERIZATION      HX HEENT      l cararact removal       Social History     Tobacco Use    Smoking status: Never Smoker    Smokeless tobacco: Never Used   Substance Use Topics    Alcohol use: No        Family History   Problem Relation Age of Onset    Diabetes Mother     Hypertension Mother     Stroke Mother         No Known Allergies     Prior to Admission medications    Medication Sig Start Date End Date Taking? Authorizing Provider   amLODIPine (NORVASC) 10 mg tablet Take 1 Tab by mouth daily.  18   Jersey Ha MD   insulin detemir U-100 (LEVEMIR FLEXTOUCH U-100 INSULN) 100 unit/mL (3 mL) inpn 20 units every morning and 40 units at bedtime  Patient taking differently: 10 units every morning and 30 units at bedtime 11/22/18   Eduardo Lyn MD   acetaminophen (TYLENOL) 325 mg tablet Take 2 Tabs by mouth every six (6) hours as needed. 11/22/18   Eduardo Lyn MD   Insulin Needles, Disposable, (PEN NEEDLE) 31 gauge x 3/16\" ndle 5 times a day 4/22/18   Eduardo Lyn MD   Blood-Glucose Meter [facility] VERIO IQ METER) monitoring kit Use to test blood sugar three times daily 3/28/18   Eduardo Lyn MD   dulaglutide (TRULICITY) 1.5 EU/4.5 mL sub-q pen 0.5 mL by SubCUTAneous route every seven (7) days. 3/25/18   Eduardo Lyn MD   glucose blood VI test strips (ONETOUCH VERIO) strip Use to test blood sugar four times daily. [id number] 3/25/18   Eduardo Lyn MD   metFORMIN (GLUCOPHAGE) 1,000 mg tablet Take 1 Tab by mouth two (2) times daily (with meals). 2/15/18   Eduardo Lyn MD   atorvastatin (LIPITOR) 20 mg tablet Take 1 Tab by mouth daily. 2/15/18   Eduardo Lyn MD   LITE TOUCH LANCETS 33 gauge [facility]  12/31/14   Provider, Historical   LITE TOUCH LANCING DEVICE [facility]  12/31/14   Provider, Historical         Review of Systems:  (bold if positive, if negative)    Gen:  Eyes:  ENT:  CVS:  Pulm:  GI:    :    MS:  Skin:  Psych:  Endo:    Hem:  Renal:    Neuro:  Numbnessweakness          Objective:      VITALS:    Vital signs reviewed; most recent are: There were no vitals taken for this visit.   SpO2 Readings from Last 6 Encounters:   03/12/19 97%   03/01/19 95%   02/27/19 95%   01/02/19 98%   12/21/18 94%   12/20/18 97%        No intake or output data in the 24 hours ending 03/12/19 1553         Exam:     Physical Exam:    Gen: Well-developed, well-nourished, in no acute distress  HEENT:  Pink conjunctivae, PERRL, hearing intact to voice, moist mucous membranes  Neck: Supple, without masses, thyroid non-tender  Resp: No accessory muscle use, clear breath sounds without wheezes rales or rhonchi  Card: No murmurs, normal S1, S2 without thrills, bruits or peripheral edema  Abd:  Soft, non-tender, non-distended, normoactive bowel sounds are present, no palpable organomegaly and no detectable hernias  Lymph:  No cervical or inguinal adenopathy  Musc: No cyanosis or clubbing  Skin: No rashes or ulcers, skin turgor is good  Neuro:  Cranial nerves are grossly intact except for right facial droop and decreased sensation on right, no focal motor weakness, follows commands appropriately  Psych:  Good insight, oriented to person, place and time, alert             Labs:    Recent Labs     03/12/19  1218   WBC 5.2   HGB 12.6   HCT 39.0        Recent Labs     03/12/19  1218   *   K 4.1      CO2 25   *   BUN 21*   CREA 1.23   CA 9.0   ALB 3.5   TBILI 0.4   SGOT 25   ALT 32     Lab Results   Component Value Date/Time    Glucose (POC) 407 (H) 03/12/2019 12:11 PM    Glucose (POC) 144 (H) 11/22/2018 08:00 AM     No results for input(s): PH, PCO2, PO2, HCO3, FIO2 in the last 72 hours. Recent Labs     03/12/19  1218   INR 1.0       Additional testing:  Chest X-ray: no acute process, visualized by me.   Results not reviewed with Radiologist.       Assessment/Plan:       Principal Problem:    Bell's palsy (3/12/2019)   - probable, will get MRI to make sure he has not had a stroke   - will hold off on starting steroids due to highly elevated BS   - Neurology to see    Active Problems:    Brugada syndrome (11/18/2018)/NSVT (nonsustained ventricular tachycardia) (HCC) (11/21/2018)/ICD (implantable cardioverter-defibrillator) in place (12/21/2018)   - monitor on telemetry      Hypertension (12/21/2018)   - BP okay, follow on home meds      Type 2 diabetes mellitus without complication ([facility] [address] (8/12/0458)   - BS elevated at [facility], not given any insulin there, recheck here and will give insulin if remains elevated   - follow on SSI and diet, resume home meds       Code status:   - patient is FULL CODE, no AMD on file      Total time spent on patient care: [address] discussed with: Patient, Nursing Staff and Dr. Aftab Gramajo    Discussed:  Code Status, Care Plan and D/C Planning    Prophylaxis:  Lovenox and SCD's    Probable Disposition:  Home w/Family           ___________________________________________________    Attending Physician: Desiderio Spatz, MD

## 2019-03-13 ENCOUNTER — APPOINTMENT (OUTPATIENT)
Dept: MRI IMAGING | Age: 64
End: 2019-03-13
Attending: INTERNAL MEDICINE
Payer: COMMERCIAL

## 2019-03-13 ENCOUNTER — APPOINTMENT (OUTPATIENT)
Dept: NON INVASIVE DIAGNOSTICS | Age: 64
End: 2019-03-13
Attending: NURSE PRACTITIONER
Payer: COMMERCIAL

## 2019-03-13 VITALS
WEIGHT: 159 LBS | HEART RATE: 82 BPM | HEIGHT: 67 IN | OXYGEN SATURATION: 100 % | SYSTOLIC BLOOD PRESSURE: 156 MMHG | DIASTOLIC BLOOD PRESSURE: 97 MMHG | TEMPERATURE: 97.5 F | BODY MASS INDEX: 24.96 KG/M2 | RESPIRATION RATE: 18 BRPM

## 2019-03-13 LAB
ANION GAP SERPL CALC-SCNC: 6 MMOL/L (ref 5–15)
BUN SERPL-MCNC: 16 MG/DL (ref 6–20)
BUN/CREAT SERPL: 17 (ref 12–20)
CALCIUM SERPL-MCNC: 8.7 MG/DL (ref 8.5–10.1)
CHLORIDE SERPL-SCNC: 106 MMOL/L (ref 97–108)
CHOLEST SERPL-MCNC: 165 MG/DL
CO2 SERPL-SCNC: 28 MMOL/L (ref 21–32)
CREAT SERPL-MCNC: 0.93 MG/DL (ref 0.7–1.3)
ECHO AO ASC DIAM: 3.49 CM
ECHO AO ROOT DIAM: 3.63 CM
ECHO EST RA PRESSURE: 3 MMHG
ECHO IVC SNIFF: 1.42 CM
ECHO LA AREA 4C: 11.9 CM2
ECHO LA MAJOR AXIS: 2.94 CM
ECHO LA TO AORTIC ROOT RATIO: 0.81
ECHO LA VOL 2C: 35.21 ML (ref 18–58)
ECHO LA VOL 4C: 23.01 ML (ref 18–58)
ECHO LA VOL BP: 29.6 ML (ref 18–58)
ECHO LA VOL/BSA BIPLANE: 16.14 ML/M2 (ref 16–28)
ECHO LA VOLUME INDEX A2C: 19.2 ML/M2 (ref 16–28)
ECHO LA VOLUME INDEX A4C: 12.54 ML/M2 (ref 16–28)
ECHO LV E' LATERAL VELOCITY: 5.46 CENTIMETER/SECOND
ECHO LV E' SEPTAL VELOCITY: 3.21 CENTIMETER/SECOND
ECHO LV INTERNAL DIMENSION DIASTOLIC: 4.72 CM (ref 4.2–5.9)
ECHO LV INTERNAL DIMENSION SYSTOLIC: 3.29 CM
ECHO LV IVSD: 0.93 CM (ref 0.6–1)
ECHO LV MASS 2D: 167 G (ref 88–224)
ECHO LV MASS INDEX 2D: 91 G/M2 (ref 49–115)
ECHO LV POSTERIOR WALL DIASTOLIC: 0.89 CM (ref 0.6–1)
ECHO LVOT DIAM: 1.98 CM
ECHO LVOT PEAK GRADIENT: 2.5 MMHG
ECHO LVOT PEAK VELOCITY: 79.51 CM/S
ECHO LVOT SV: 48.3 ML
ECHO LVOT VTI: 15.61 CM
ECHO MV A VELOCITY: 63.23 CM/S
ECHO MV AREA PHT: 4 CM2
ECHO MV E DECELERATION TIME (DT): 191.3 MS
ECHO MV E VELOCITY: 49.76 CM/S
ECHO MV E/A RATIO: 0.79
ECHO MV PRESSURE HALF TIME (PHT): 55.5 MS
ECHO PULMONARY ARTERY SYSTOLIC PRESSURE (PASP): 25.6 MMHG
ECHO PV MAX VELOCITY: 80.74 CM/S
ECHO PV PEAK GRADIENT: 2.6 MMHG
ECHO RIGHT VENTRICULAR SYSTOLIC PRESSURE (RVSP): 25.6 MMHG
ECHO RV INTERNAL DIMENSION: 4.39 CM
ECHO RV TAPSE: 1.65 CM (ref 1.5–2)
ECHO TV REGURGITANT MAX VELOCITY: 237.73 CM/S
ECHO TV REGURGITANT PEAK GRADIENT: 22.6 MMHG
ERYTHROCYTE [DISTWIDTH] IN BLOOD BY AUTOMATED COUNT: 13.3 % (ref 11.5–14.5)
GLUCOSE BLD STRIP.AUTO-MCNC: 109 MG/DL (ref 65–100)
GLUCOSE BLD STRIP.AUTO-MCNC: 240 MG/DL (ref 65–100)
GLUCOSE BLD STRIP.AUTO-MCNC: 457 MG/DL (ref 65–100)
GLUCOSE SERPL-MCNC: 157 MG/DL (ref 65–100)
HCT VFR BLD AUTO: 38.4 % (ref 36.6–50.3)
HDLC SERPL-MCNC: 67 MG/DL
HDLC SERPL: 2.5 {RATIO} (ref 0–5)
HGB BLD-MCNC: 12.5 G/DL (ref 12.1–17)
LDLC SERPL CALC-MCNC: 82.6 MG/DL (ref 0–100)
LIPID PROFILE,FLP: NORMAL
MAGNESIUM SERPL-MCNC: 2.4 MG/DL (ref 1.6–2.4)
MCH RBC QN AUTO: 28.3 PG (ref 26–34)
MCHC RBC AUTO-ENTMCNC: 32.6 G/DL (ref 30–36.5)
MCV RBC AUTO: 87.1 FL (ref 80–99)
NRBC # BLD: 0 K/UL (ref 0–0.01)
NRBC BLD-RTO: 0 PER 100 WBC
PHOSPHATE SERPL-MCNC: 3.8 MG/DL (ref 2.6–4.7)
PLATELET # BLD AUTO: 208 K/UL (ref 150–400)
PMV BLD AUTO: 9.6 FL (ref 8.9–12.9)
POTASSIUM SERPL-SCNC: 4 MMOL/L (ref 3.5–5.1)
RBC # BLD AUTO: 4.41 M/UL (ref 4.1–5.7)
SERVICE CMNT-IMP: ABNORMAL
SODIUM SERPL-SCNC: 140 MMOL/L (ref 136–145)
TRIGL SERPL-MCNC: 77 MG/DL (ref ?–150)
VLDLC SERPL CALC-MCNC: 15.4 MG/DL
WBC # BLD AUTO: 4.8 K/UL (ref 4.1–11.1)

## 2019-03-13 PROCEDURE — 84100 ASSAY OF PHOSPHORUS: CPT

## 2019-03-13 PROCEDURE — 93306 TTE W/DOPPLER COMPLETE: CPT

## 2019-03-13 PROCEDURE — 74011636637 HC RX REV CODE- 636/637: Performed by: NURSE PRACTITIONER

## 2019-03-13 PROCEDURE — 70544 MR ANGIOGRAPHY HEAD W/O DYE: CPT

## 2019-03-13 PROCEDURE — 80048 BASIC METABOLIC PNL TOTAL CA: CPT

## 2019-03-13 PROCEDURE — 82962 GLUCOSE BLOOD TEST: CPT

## 2019-03-13 PROCEDURE — 74011636637 HC RX REV CODE- 636/637: Performed by: INTERNAL MEDICINE

## 2019-03-13 PROCEDURE — 83735 ASSAY OF MAGNESIUM: CPT

## 2019-03-13 PROCEDURE — 70551 MRI BRAIN STEM W/O DYE: CPT

## 2019-03-13 PROCEDURE — 80061 LIPID PANEL: CPT

## 2019-03-13 PROCEDURE — 74011250636 HC RX REV CODE- 250/636: Performed by: INTERNAL MEDICINE

## 2019-03-13 PROCEDURE — 36415 COLL VENOUS BLD VENIPUNCTURE: CPT

## 2019-03-13 PROCEDURE — 85027 COMPLETE CBC AUTOMATED: CPT

## 2019-03-13 PROCEDURE — 74011250637 HC RX REV CODE- 250/637: Performed by: NURSE PRACTITIONER

## 2019-03-13 PROCEDURE — 99218 HC RM OBSERVATION: CPT

## 2019-03-13 RX ORDER — PREDNISONE 20 MG/1
60 TABLET ORAL
Status: DISCONTINUED | OUTPATIENT
Start: 2019-03-13 | End: 2019-03-13 | Stop reason: HOSPADM

## 2019-03-13 RX ORDER — METFORMIN HYDROCHLORIDE 500 MG/1
1000 TABLET ORAL 2 TIMES DAILY WITH MEALS
Status: DISCONTINUED | OUTPATIENT
Start: 2019-03-13 | End: 2019-03-13 | Stop reason: HOSPADM

## 2019-03-13 RX ORDER — ACYCLOVIR 400 MG/1
400 TABLET ORAL DAILY
Qty: 10 TAB | Refills: 0 | Status: SHIPPED | OUTPATIENT
Start: 2019-03-14 | End: 2019-03-24

## 2019-03-13 RX ORDER — INSULIN LISPRO 100 [IU]/ML
10 INJECTION, SOLUTION INTRAVENOUS; SUBCUTANEOUS ONCE
Status: COMPLETED | OUTPATIENT
Start: 2019-03-13 | End: 2019-03-13

## 2019-03-13 RX ORDER — PREDNISONE 20 MG/1
60 TABLET ORAL
Qty: 21 TAB | Refills: 0 | Status: SHIPPED | OUTPATIENT
Start: 2019-03-14 | End: 2019-03-18 | Stop reason: ALTCHOICE

## 2019-03-13 RX ORDER — GUAIFENESIN 100 MG/5ML
81 LIQUID (ML) ORAL DAILY
Status: DISCONTINUED | OUTPATIENT
Start: 2019-03-13 | End: 2019-03-13 | Stop reason: HOSPADM

## 2019-03-13 RX ORDER — ATORVASTATIN CALCIUM 20 MG/1
40 TABLET, FILM COATED ORAL
Status: DISCONTINUED | OUTPATIENT
Start: 2019-03-13 | End: 2019-03-13

## 2019-03-13 RX ORDER — ACYCLOVIR 800 MG/1
400 TABLET ORAL DAILY
Status: DISCONTINUED | OUTPATIENT
Start: 2019-03-13 | End: 2019-03-13 | Stop reason: HOSPADM

## 2019-03-13 RX ORDER — METHYLPREDNISOLONE 4 MG/1
TABLET ORAL
Qty: 1 DOSE PACK | Refills: 0 | Status: SHIPPED | OUTPATIENT
Start: 2019-03-13 | End: 2019-03-13

## 2019-03-13 RX ORDER — ATORVASTATIN CALCIUM 20 MG/1
20 TABLET, FILM COATED ORAL DAILY
Status: DISCONTINUED | OUTPATIENT
Start: 2019-03-14 | End: 2019-03-13 | Stop reason: HOSPADM

## 2019-03-13 RX ORDER — INSULIN GLARGINE 100 [IU]/ML
30 INJECTION, SOLUTION SUBCUTANEOUS
Status: DISCONTINUED | OUTPATIENT
Start: 2019-03-13 | End: 2019-03-13 | Stop reason: HOSPADM

## 2019-03-13 RX ORDER — AMLODIPINE BESYLATE 10 MG/1
10 TABLET ORAL DAILY
Qty: 30 TAB | Refills: 0 | Status: SHIPPED | OUTPATIENT
Start: 2019-03-13 | End: 2020-01-01

## 2019-03-13 RX ORDER — AMLODIPINE BESYLATE 5 MG/1
10 TABLET ORAL DAILY
Status: DISCONTINUED | OUTPATIENT
Start: 2019-03-14 | End: 2019-03-13 | Stop reason: HOSPADM

## 2019-03-13 RX ORDER — INSULIN GLARGINE 100 [IU]/ML
20 INJECTION, SOLUTION SUBCUTANEOUS DAILY
Status: DISCONTINUED | OUTPATIENT
Start: 2019-03-14 | End: 2019-03-13 | Stop reason: HOSPADM

## 2019-03-13 RX ORDER — LORAZEPAM 2 MG/ML
2 INJECTION, SOLUTION INTRAMUSCULAR; INTRAVENOUS ONCE
Status: DISCONTINUED | OUTPATIENT
Start: 2019-03-13 | End: 2019-03-13

## 2019-03-13 RX ADMIN — LORAZEPAM 1 MG: 2 INJECTION INTRAMUSCULAR; INTRAVENOUS at 11:48

## 2019-03-13 RX ADMIN — DEXTRAN 70, AND HYPROMELLOSE 2910 2 DROP: 1; 3 SOLUTION/ DROPS OPHTHALMIC at 11:04

## 2019-03-13 RX ADMIN — ACYCLOVIR 400 MG: 800 TABLET ORAL at 11:04

## 2019-03-13 RX ADMIN — INSULIN LISPRO 10 UNITS: 100 INJECTION, SOLUTION INTRAVENOUS; SUBCUTANEOUS at 17:25

## 2019-03-13 RX ADMIN — Medication 10 ML: at 06:08

## 2019-03-13 RX ADMIN — INSULIN LISPRO 3 UNITS: 100 INJECTION, SOLUTION INTRAVENOUS; SUBCUTANEOUS at 13:38

## 2019-03-13 RX ADMIN — PREDNISONE 60 MG: 20 TABLET ORAL at 11:04

## 2019-03-13 RX ADMIN — ASPIRIN 81 MG: 81 TABLET, CHEWABLE ORAL at 11:04

## 2019-03-13 RX ADMIN — DEXTRAN 70, AND HYPROMELLOSE 2910 2 DROP: 1; 3 SOLUTION/ DROPS OPHTHALMIC at 16:00

## 2019-03-13 NOTE — DISCHARGE INSTRUCTIONS
Patient Education     HOSPITALIST DISCHARGE INSTRUCTIONS  NAME: Paul Porras   :  1955   MRN:  [id number]     Date/Time:  3/13/2019 1:58 PM    ADMIT DATE: 3/12/2019     DISCHARGE DATE: 3/13/2019     DISCHARGE DIAGNOSIS:  Bell's palsy    MEDICATIONS:  · It is important that you take the medication exactly as they are prescribed. · Keep your medication in the bottles provided by the pharmacist and keep a list of the medication names, dosages, and times to be taken in your wallet. · Do not take other medications without consulting your doctor. · Check your BS 4 times a day while on prednisone. If your BS runs up, call your PCP to adjust your insulin    Pain Management: per above medications    What to do at Home    Recommended diet:  Cardiac/diabetic Diet    Recommended activity: Activity as tolerated    If you experience any of the following symptoms then please call your primary care physician or return to the emergency room if you cannot get hold of your doctor:  Fever, chills, nausea, vomiting, diarrhea, change in mentation, falling, bleeding, shortness of breath    Follow Up: Follow-up Information     Follow up With Specialties Details Why Contact Info    Joe Frye NP Nurse Practitioner In 2 days [facility] Follow up [facility]  [address]  [address] [facility]  [phone]              Information obtained by :  I understand that if any problems occur once I am at home I am to contact my physician. I understand and acknowledge receipt of the instructions indicated above.                                                                                                                                            Physician's or R.N.'s Signature                                                                  Date/Time                                                                                                                                              Patient or Representative Signature                                                          Date/Time         Bell's Palsy: Care Instructions  Your Care Instructions    Bell's palsy is paralysis or weakness of the muscles on one side of the face. Often people with Bell's palsy have a droop on one side of the mouth and have trouble completely shutting the eye on the same side. Bell's palsy can also interfere with the sense of taste. These things happen when a nerve in your face becomes inflamed. Bell's palsy is not caused by a stroke. The cause of the nerve inflammation is not known. But some experts think that a virus may cause it. Because of this, doctors sometimes prescribe antiviral medicine to treat it. You also may get medicine to reduce swelling. Bell's palsy usually gets better on its own in a few weeks or months. Follow-up care is a key part of your treatment and safety. Be sure to make and go to all appointments, and call your doctor if you are having problems. It's also a good idea to know your test results and keep a list of the medicines you take. How can you care for yourself at home? · Take your medicines exactly as prescribed. Call your doctor if you think you are having a problem with your medicine. You will get more details on the specific medicines your doctor prescribes. · Use artificial tears or ointment if your eyes are too dry. Bell's palsy can make your lower eyelid droop, causing a dry eye. · If you cannot completely close your eye, consider using an eye patch while you sleep. · Help yourself blink by using your finger to close and open your eyelid. This may help keep your eye moist.  · Wear glasses or goggles to keep dust and dirt out of your eye. · As feeling comes back to your face, massage your forehead, cheeks, and lips. Massage may make the muscles in your face stronger. · Brush and floss your teeth often to help prevent tooth decay.  Bell's palsy can dry up the spit on one side of your mouth. This increases the risk of tooth decay. When should you call for help? Call 911 anytime you think you may need emergency care. For example, call if:    · You have symptoms of a stroke. These may include:  ? Sudden numbness, tingling, weakness, or loss of movement in your face, arm, or leg, especially on only one side of your body. ? Sudden vision changes. ? Sudden trouble speaking. ? Sudden confusion or trouble understanding simple statements. ? Sudden problems with walking or balance. ? A sudden, severe headache that is different from past headaches.    Call your doctor now or seek immediate medical care if:    · You have numbness or weakness that spreads beyond one side of your face.     · You have a skin rash or eye pain or redness, or light bothers your eyes.     · You have a new or worse headache.    Watch closely for changes in your health, and be sure to contact your doctor if:    · You do not get better as expected. Where can you learn more? Go to http://delgado-bar.info/. Enter P168 in the search box to learn more about \"Bell's Palsy: Care Instructions. \"  Current as of: Geetha 3, 2018  Content Version: 11.9  © 0479-3653 [employer]. Care instructions adapted under license by [employer] (which disclaims liability or warranty for this information). If you have questions about a medical condition or this instruction, always ask your healthcare professional. [place] [address] any warranty or liability for your use of this information.

## 2019-03-13 NOTE — PROGRESS NOTES
CAMERON Note:  Reason for Admission:   Bell's palsy                  RRAT Score:     19             Do you (patient/family) have any concerns for transition/discharge? no               Plan for utilizing home health:   none      Likelihood of readmission?   low            Transition of Care Plan:        Awaiting test results. When medically stable to be transported to home by his friend. He gets his medications from [facility] on [address].   CHELSEA Obrien

## 2019-03-13 NOTE — PROGRESS NOTES
I have reviewed discharge instructions with the patient. The patient verbalized understanding. Patient wheeled out to car. Patient stable.

## 2019-03-13 NOTE — PROGRESS NOTES
Bedside and Verbal shift change report given to Li Carranza RN (oncoming nurse) by Angelia Caruso RN (offgoing nurse). Report included the following information SBAR, Kardex, Intake/Output, MAR, Recent Results and Med Rec Status.

## 2019-03-13 NOTE — PROGRESS NOTES
4480  Primary Nurse Graciela Duron RN and Mc Cordoba RN, RN performed a dual skin assessment on this patient No impairment noted  Aditya score is 21      TRANSFER - IN REPORT:    Verbal report received from Mc Cordoba RN (name) on Herminia Muñoz  being received from [facility] (unit) for routine progression of care      Report consisted of patients Situation, Background, Assessment and   Recommendations(SBAR). Information from the following report(s) SBAR, Kardex, Intake/Output, MAR, Recent Results and Med Rec Status was reviewed with the receiving nurse. Opportunity for questions and clarification was provided. Assessment completed upon patients arrival to unit and care assumed.

## 2019-03-13 NOTE — PROGRESS NOTES
Stroke Education provided to patient and the following topics were discussed    1. Patients personal risk factors for stroke are hypertension    2. Warning signs of Stroke:        * Sudden numbness or weakness of the face, arm or leg, especially on one side of          The body            * Sudden confusion, trouble speaking or understanding        * Sudden trouble seeing in one or both eyes        * Sudden trouble walking, dizziness, loss of balance or coordination        * Sudden severe headache with no known cause      3. Importance of activation Emergency Medical Services ( 9-1-1 ) immediately if experience any warning signs of stroke. 4. Be sure and schedule a follow-up appointment with your primary care doctor or any specialists as instructed. 5. You must take medicine every day to treat your risk factors for stroke. Be sure to take your medicines exactly as your doctor tells you: no more, no less. Know what your medicines are for , what they do. Anti-thrombotics /anticoagulants can help prevent strokes. You are taking the following medicine(s)  lovenox     6. Smoking and second-hand smoke greatly increase your risk of stroke, cardiovascular disease and death. Smoking history cigarettes none    7. Information provided was [facility] Stroke Education Costa Redman or Verbal Education    8. Documentation of teaching completed in Patient Education Activity and on Care Plan with teaching response noted?   yes

## 2019-03-13 NOTE — PROGRESS NOTES
[address]  [address] [facility], [address] [place] [address]  [phone]      Medical Progress Note      NAME: Giancarlo Lake   :  1955  MRM:  [id number]    Date/Time: 3/13/2019  12:32 PM         Subjective:     Chief Complaint:  Facial droop: right side, no change    ROS:  (bold if positive, if negative)                        Tolerating Diet          Objective:       Vitals:          Last 24hrs VS reviewed since prior progress note.  Most recent are:    Visit Vitals  BP (!) 156/97 (BP 1 Location: Left arm, BP Patient Position: At rest)   Pulse 67   Temp 97.5 °F (36.4 °C)   Resp 18   Ht 5' 7\" (1.702 m)   Wt 72.1 kg (159 lb)   SpO2 100%   BMI 24.90 kg/m²     SpO2 Readings from Last 6 Encounters:   19 100%   19 94%   19 95%   19 95%   19 98%   18 94%        No intake or output data in the 24 hours ending 19 1232       Exam:     Physical Exam:    Gen:  Well-developed, well-nourished, in no acute distress  HEENT:  Pink conjunctivae, PERRL, hearing intact to voice, moist mucous membranes  Neck:  Supple, without masses, thyroid non-tender  Resp:  No accessory muscle use, clear breath sounds without wheezes rales or rhonchi  Card:  No murmurs, normal S1, S2 without thrills, bruits or peripheral edema  Abd:  Soft, non-tender, non-distended, normoactive bowel sounds are present, no palpable organomegaly and no detectable hernias  Lymph:  No cervical or inguinal adenopathy  Musc:  No cyanosis or clubbing  Skin:  No rashes or ulcers, skin turgor is good  Neuro:  No change to right facial droop, no focal motor weakness, follows commands appropriately  Psych:  Good insight, oriented to person, place and time, alert    Medications Reviewed: (see below)    Lab Data Reviewed: (see below)    ______________________________________________________________________    Medications:     Current Facility-Administered Medications   Medication Dose Route Frequency  atorvastatin (LIPITOR) tablet 40 mg  40 mg Oral QHS    predniSONE (DELTASONE) tablet 60 mg  60 mg Oral DAILY WITH BREAKFAST    aspirin chewable tablet 81 mg  81 mg Oral DAILY    acyclovir (ZOVIRAX) tablet 400 mg  400 mg Oral DAILY    artificial tears (dextran 70-hypromellose) (NATURAL BALANCE) 0.1-0.3 % ophthalmic solution 2 Drop  2 Drop Both Eyes TID    labetalol (NORMODYNE;TRANDATE) 20 mg/4 mL (5 mg/mL) injection 10 mg  10 mg IntraVENous Q4H PRN    sodium chloride (NS) flush 5-40 mL  5-40 mL IntraVENous Q8H    sodium chloride (NS) flush 5-40 mL  5-40 mL IntraVENous PRN    acetaminophen (TYLENOL) tablet 650 mg  650 mg Oral Q4H PRN    oxyCODONE-acetaminophen (PERCOCET) 5-325 mg per tablet 1 Tab  1 Tab Oral Q4H PRN    prochlorperazine (COMPAZINE) injection 10 mg  10 mg IntraVENous Q6H PRN    enoxaparin (LOVENOX) injection 40 mg  40 mg SubCUTAneous Q24H    insulin lispro (HUMALOG) injection   SubCUTAneous QID WITH MEALS    glucose chewable tablet 16 g  4 Tab Oral PRN    dextrose (D50W) injection syrg 12.5-25 g  25-50 mL IntraVENous PRN    glucagon (GLUCAGEN) injection 1 mg  1 mg IntraMUSCular PRN            Lab Review:     Recent Labs     03/13/19  0323 03/12/19  1218   WBC 4.8 5.2   HGB 12.5 12.6   HCT 38.4 39.0    194     Recent Labs     03/13/19  0323 03/12/19  1218    134*   K 4.0 4.1    100   CO2 28 25   * 427*   BUN 16 21*   CREA 0.93 1.23   CA 8.7 9.0   MG 2.4  --    PHOS 3.8  --    ALB  --  3.5   TBILI  --  0.4   SGOT  --  25   ALT  --  32   INR  --  1.0     Lab Results   Component Value Date/Time    Glucose (POC) 240 (H) 03/13/2019 11:18 AM    Glucose (POC) 109 (H) 03/13/2019 07:01 AM    Glucose (POC) 90 03/12/2019 09:03 PM    Glucose (POC) 407 (H) 03/12/2019 12:11 PM    Glucose (POC) 144 (H) 11/22/2018 08:00 AM     No results for input(s): PH, PCO2, PO2, HCO3, FIO2 in the last 72 hours.   Recent Labs     03/12/19  1218   INR 1.0     No results found for: SDES  No results found for: CULT         Assessment:     Principal Problem:    Bell's palsy (3/12/2019)    Active Problems:    Brugada syndrome (11/18/2018)      NSVT (nonsustained ventricular tachycardia) ([address] (11/21/2018)      Overview: EPS 11/21/2018 VT/VF            ICD (implantable cardioverter-defibrillator) in place (12/21/2018)      Hypertension (12/21/2018)      Type 2 diabetes mellitus without complication ([address] (2/58/6368)           Plan:     Principal Problem:    Bell's palsy (3/12/2019)   - awaiting MRI, if negative, home on treatment for Bell's palsy as per Neurology   - does not need increased dose of Lipitor unless he has evidence of stroke on MRI    Active Problems:    Brugada syndrome (11/18/2018)NSVT (nonsustained ventricular tachycardia) ([address] (11/21/2018)/ICD (implantable cardioverter-defibrillator) in place (12/21/2018)   - on telemetry without events      Hypertension (12/21/2018)   - BP up some, resume amlodipine      Type 2 diabetes mellitus without complication ([address] (1/86/3110)   - BS was highly elevated yesterday at [facility] but had fallen by the time he arrived here   - will likely increase on prednisone   - patient instructed to check BS QID at home and call PCP if it rises      Total time spent in patient care: 35 minutes                  Care Plan discussed with: Patient, Care Manager and Nursing Staff    Discussed:  Code Status, Care Plan and D/C Planning    Prophylaxis:  Lovenox and SCD's    Disposition:  Home w/Family           ___________________________________________________    Attending Physician: Haydee Cohn MD

## 2019-03-13 NOTE — DIABETES MGMT
DTC Consult Note    Recommendations/ Comments:  mg/dL on arrival to hospital yesterday. Since then results have been 90 - 157 mg/dL  Noted Prednisone 60 mg each Am begun today    If appropriate  Would like more time to observe BG responses to po intake and addition of steroids    Current hospital DM medication:   Lispro normal sensitivity correction scale    Consult received for:  [x]             Assessment of home management                []      Medication Recommendations                []             Meter/monitoring     []             Insulin instruction     []             New diagnosis     []             Outpatient education     []             Insulin pump patient     []             Insulin infusion     []             DKA/HHS    Chart reviewed and initial evaluation complete on [address]. Patient is a 70 y.o. male with known DM. Pt states he has been taking Metformin 100 mg BID and Levemir 20 units each Am and 30 units each PM. In addition, he was started on Trulicity. He started taking his but then stopped due to a large deductible. He can now afford it and plans to pick it up from the pharmacy. Known to DTC from a previous admission - last seen 11/21/2018   Admitted for Bell'shawna damian    BG monitoring at home  - he reports testing BG's and that the results have been higher recently    Assessed and instructed patient on the following:   · interpretation of lab results, A1c 9.1%  ·  blood sugar goals,   · complications of diabetes mellitus,   · hypoglycemia prevention and treatment, notify MD if < 80 mg/dL  · Medication - take as prescribed.  Notify MD if unable to obtain medicaiton,   · Effect of steroid, illness on BG control,   · SMBG skills, he hs meter and supplies  · nutrition - guidelines reveiwed   · referred to Diabetes Educator    Encouraged the following:   · Take medication as prescribed  · Test BG 2x/day  · Report results to MD if < 80 mg/dL or > 200 mg/dL  · Follow meal plan  · F/U with Dr Rashad Garcia      Provided patient with the following: [x]             Survival skills education materials               []             Insulin education materials               []             CHO counting education materials               [x]             Outpatient DTC contact number               []             Glucometer               Discussed with patient and/or family need for follow up appointment for diabetes management after discharge. A1c:   Lab Results   Component Value Date/Time    Hemoglobin A1c 9.1 (H) 02/27/2019 12:14 PM       Recent Glucose Results:   Lab Results   Component Value Date/Time     (H) 03/13/2019 03:23 AM    GLUCPOC 109 (H) 03/13/2019 07:01 AM    GLUCPOC 90 03/12/2019 09:03 PM        Lab Results   Component Value Date/Time    Creatinine 0.93 03/13/2019 03:23 AM     Estimated Creatinine Clearance: 74 mL/min (based on SCr of 0.93 mg/dL). Active Orders   Diet    DIET DIABETIC WITH OPTIONS Consistent Carb 2000kcal; Regular        PO intake:   Patient Vitals for the past 72 hrs:   % Diet Eaten   03/13/19 0806 90 %       Will continue to follow as needed. Thank you.   Evgeny Russell RN, CDE  Pager [phone]      Time spent: 20 min

## 2019-03-13 NOTE — CONSULTS
[place] [facility]: [id number] [address] Neurology  [place] [address]  [phone]        Name:   Lucas Cohen record #: [id number]  Admission Date: 3/12/2019   Who Consulted: Dr. Liza Downey  Reason for Consult: Facial Droop    HISTORY OF PRESENT ILLNESS   This is a 70 y.o. male with  has a past medical history of Diabetes ([facility] [address] Hypertension, ICD (implantable cardioverter-defibrillator) in place, and NSVT (nonsustained ventricular tachycardia) ([facility] [address] (11/21/2018). He also has no past medical history of Pacemaker. who is admitted for facial droop. The Neurology Service is asked to evaluate for TIA versus stroke. Mr. Fanny Nieto presented to the [facility] Emergency Department this AM complaining of facial droop, right sided, started Monday night, he noticed that he was drooling from the right sie of his mouth. In the ED at [facility], he was evaluated by Teleneurology who recommended outpatient MRI to evaluate for possible stroke, however, this could not be done at [facility] due to his ICD. Presenting NIH stroke score:      Clinical Data  Imaging review:     CT head:  No acute process process    Current rhythm:  SR    Assessment/Plan:   1. Seymour Palsy:    · Prednisone 60mgday for seven days  · Acyclovir 400 mg daily for 10 days  · Eye droops prn    Stroke work up  · A1C:  9.1  · LDL:  82.6, atorvastatin started  · TTE:    · Follow up MRI:  · Carotid vascular imaging:    Risk factors for stroke include:  DM, HTN, CAD, HLD, Excessive ETOH, physical inactivity  · Discussed with patient    · Discussed signs/symptoms of stroke and when to call 911    3. Mobility:   · Has been OOB. · PT/OT to eval for rehab    4. Diet:    · Does not need SLP     5. VTE Prophylaxes:   · Per Primary team      Thank you for allowing the Neurology Service the pleasure of participating in the care of your patient.   This patient will be discussed with my collaborating care team physician Dr. Murel Cockayne and he may have further recommendations regarding this patient's care. Attending Attestation:   NEUROLOGY NOTE ADDENDUM:    3/13/2019      I have reviewed the documentation provided by the nurse practitioner, discussed her findings, clinical impression, and the proposed management plans with regards to this patient's encounter. I have personally performed a face to face diagnostic evaluation on this patient. My findings are as follows:      Eunice Main is a 70 y.o. male who  has a past medical history of Diabetes ([facility] [address] Hypertension, ICD (implantable cardioverter-defibrillator) in place, and NSVT (nonsustained ventricular tachycardia) ([facility] [address] (11/21/2018). He also has no past medical history of Pacemaker. who presents for evaluation of complete R facial weakness. Yesterday, noted sudden R facial weakness with tenderness behind R ear and change of taste on R lateral tongue. Patient apparently was seen at [facility] but was transferred to [facility] for MRI brain due to ICD  (+) uncontrolled DM      Exam:  Visit Vitals  /82 (BP 1 Location: Right arm, BP Patient Position: At rest)   Pulse 77   Temp 97.7 °F (36.5 °C)   Resp 17   SpO2 98%     Gen: Awake, alert, follows commands  Appropriate appearance, normal speech. Oriented to all spheres. No visual field defect on confrontation exam.  Full eyes movement, with no nystagmus, no diplopia, no ptosis. Normal gag and swallow. (+) complete R facial weakness  All remaining cranial nerves were normal  Motor function: 5/5 in all extremities  Sensory: intact to LT, PP and JPS  DTRs ++ in all extremities, (-) Babinski  Good FTN and HTS   Gait: Deferred      Assessment  R complete facial weakness ; more likely Bell's palsy    Plan  1) Awaiting MRI brain to evaluate for stroke  2) Prednisone taper  3) Acyclovir  4) Eye drops  5) Discussed needed to wear R eye patch at night    Follow up with neurology      Thank you for the consultation.       Eunice Main MD  Diplomate, American Board of Psychiatry and Neurology  Diplomate, Neuromuscular Medicine  Diplomate, American Board of Electrodiagnostic Medicine                       Review of Systems: 10 point ROS was performed. Pertinent positives listed in HPI. Negative ROS is as follows. Pt denies: angina, palpitations, paresthesias, weakness, vision loss, slurred speech, aphasia, confusion, fever, chills, falls, headache, diplopia, back pain, neck pain, prior episodes of vertigo, hallucinations, new medications or dosage changes. PHYSICAL EXAM     Visit Vitals  /82 (BP 1 Location: Right arm, BP Patient Position: At rest)   Pulse 77   Temp 97.7 °F (36.5 °C)   Resp 17   SpO2 98%      O2 Device: Room air    Temp (24hrs), Av.1 °F (36.7 °C), Min:97.5 °F (36.4 °C), Max:98.7 °F (37.1 °C)    No intake/output data recorded. No intake/output data recorded. General:  Alert, cooperative, no acute distress. Lungs:   Clear to auscultation bilaterally. No crackles/wheezes. Heart:  Abdominal:  Regular rate and rhythm, No murmur, click, rub or gallop. Soft and nondistended   Skin: Skin color, texture, turgor normal.    NEUROLOGICAL EXAM    Appearance:  Well developed, well nourished,  and is in no acute distress. Mental Status: Oriented to time, place and person. Fully attentive. No aphasia. Full fund of knowledge. Normal recent and remote memory. Cranial Nerves:   Intact visual fields. PERRL, EOM's full, no nystagmus, no ptosis. Facial sensation is normal. Facial movement is symmetric. Palate is midline. Normal sternocleidomastoid strength. Tongue is midline. Reflexes:   Deep tendon reflexes 2+/4 and symmetrical.   Sensory:   Normal to temperature and vibration. Gait:  Normal gait. Tremor:   No tremor noted. Cerebellar:  No cerebellar signs present. Neurovascular:  Normal heart sounds and regular rhythm. No carotid bruits. Motor: No pronator drift of either outstretched arm.          Deltoid Biceps Triceps Wrist Extension Finger Abduction   L 5 5 5 5 5   R 5 5 5 5 5      Hip Flexion Hip Extension Knee Flexion Knee Extension Ankle Dorsiflexion Ankle Plantarflexion   L 5 5 5 5 5 5   R 5 5 5 5 5 5        Reflexes:     Biceps Triceps Plantar Patellar Achilles   L 2 2 2 2 2   R 2 2 2 2 2      History  Past Medical History:   Diagnosis Date    Diabetes ([facility] [address]     Hypertension     ICD (implantable cardioverter-defibrillator) in place     NSVT (nonsustained ventricular tachycardia) ([facility] [address] 11/21/2018    EPS 11/21/2018 VT/VF      Past Surgical History:   Procedure Laterality Date    COLONOSCOPY  1/2/2015         HX HEART CATHETERIZATION      HX HEENT      l cararact removal     Family History   Problem Relation Age of Onset    Diabetes Mother     Hypertension Mother     Stroke Mother      Social History     Socioeconomic History    Marital status: SINGLE     Spouse name: Not on file    Number of children: 0    Years of education: Not on file    Highest education level: Not on file   Social Needs    Financial resource strain: Not on file    Food insecurity - worry: Not on file    Food insecurity - inability: Not on file   [employer] needs - medical: Not on file   [employer] needs - non-medical: Not on file   Occupational History    Occupation: [employer]   Tobacco Use    Smoking status: Never Smoker    Smokeless tobacco: Never Used   Substance and Sexual Activity    Alcohol use: No    Drug use: No    Sexual activity: Yes     Partners: Female     Birth control/protection: None   Other Topics Concern    Not on file   Social History Narrative    Not on file       Allergies   No Known Allergies    Outpatient Meds  Current Facility-Administered Medications on File Prior to Encounter   Medication Dose Route Frequency Provider Last Rate Last Dose    [COMPLETED] LORazepam (ATIVAN) injection 1 mg  1 mg IntraVENous NOW Cristiano Cramer MD   1 mg at 03/12/19 1321    [COMPLETED] sodium chloride 0.9 % bolus infusion 1,000 mL  1,000 mL IntraVENous NOW Lien Baker MD   Stopped at 03/12/19 1515    [COMPLETED] insulin regular (NOVOLIN R, HUMULIN R) injection 10 Units  10 Units IntraVENous NOW Merly Cramer MD   10 Units at 03/12/19 1321     Current Outpatient Medications on File Prior to Encounter   Medication Sig Dispense Refill    acetaminophen (TYLENOL) 325 mg tablet Take 650 mg by mouth two (2) times daily as needed (virus).  insulin detemir U-100 (LEVEMIR U-100 INSULIN) 100 unit/mL injection 20 Units by SubCUTAneous route daily.  insulin detemir U-100 (LEVEMIR U-100 INSULIN) 100 unit/mL injection 30 Units by SubCUTAneous route nightly.  amLODIPine (NORVASC) 10 mg tablet Take 1 Tab by mouth daily. 30 Tab 11    metFORMIN (GLUCOPHAGE) 1,000 mg tablet Take 1 Tab by mouth two (2) times daily (with meals). 60 Tab 10    atorvastatin (LIPITOR) 20 mg tablet Take 1 Tab by mouth daily.  30 Tab 10       Inpatient Meds    Current Facility-Administered Medications:     labetalol (NORMODYNE;TRANDATE) 20 mg/4 mL (5 mg/mL) injection 10 mg, 10 mg, IntraVENous, Q4H PRN, Jamil Alva MD    sodium chloride (NS) flush 5-40 mL, 5-40 mL, IntraVENous, Q8H, Jamil Alva MD, 10 mL at 03/13/19 0608    sodium chloride (NS) flush 5-40 mL, 5-40 mL, IntraVENous, PRN, Jamil Alva MD    acetaminophen (TYLENOL) tablet 650 mg, 650 mg, Oral, Q4H PRN, Jamil Alva MD    oxyCODONE-acetaminophen (PERCOCET) 5-325 mg per tablet 1 Tab, 1 Tab, Oral, Q4H PRN, Jamil Alva MD    prochlorperazine (COMPAZINE) injection 10 mg, 10 mg, IntraVENous, Q6H PRN, Jamil Alva MD    enoxaparin (LOVENOX) injection 40 mg, 40 mg, SubCUTAneous, Q24H, Jamil Alva MD, 40 mg at 03/12/19 6127    insulin lispro (HUMALOG) injection, , SubCUTAneous, QID WITH MEALS, Jamil Nida, MD, Stopped at 03/12/19 2200    glucose chewable tablet 16 g, 4 Tab, Oral, PRN, Jamil Alva MD    dextrose (D50W) injection syrg 12.5-25 g, 25-50 mL, IntraVENous, PRN, Kimberly Pittman MD    glucagon [facility]) injection 1 mg, 1 mg, IntraMUSCular, PRN, Kimberly Pittman MD    LORazepam (ATIVAN) injection 1 mg, 1 mg, IntraVENous, ONCE PRN, Piter Matos MD    Recent Results (from the past 24 hour(s))   GLUCOSE, POC    Collection Time: 03/12/19 12:11 PM   Result Value Ref Range    Glucose (POC) 407 (H) 65 - 100 mg/dL    Performed by Arvin Barone    Collection Time: 03/12/19 12:18 PM   Result Value Ref Range    SAMPLES BEING HELD 1RED     COMMENT        Add-on orders for these samples will be processed based on acceptable specimen integrity and analyte stability, which may vary by analyte. CBC WITH AUTOMATED DIFF    Collection Time: 03/12/19 12:18 PM   Result Value Ref Range    WBC 5.2 4.1 - 11.1 K/uL    RBC 4.50 4. 10 - 5.70 M/uL    HGB 12.6 12.1 - 17.0 g/dL    HCT 39.0 36.6 - 50.3 %    MCV 86.7 80.0 - 99.0 FL    MCH 28.0 26.0 - 34.0 PG    MCHC 32.3 30.0 - 36.5 g/dL    RDW 13.1 11.5 - 14.5 %    PLATELET 851 616 - 686 K/uL    MPV 9.8 8.9 - 12.9 FL    NRBC 0.0 0  WBC    ABSOLUTE NRBC 0.00 0.00 - 0.01 K/uL    NEUTROPHILS 65 32 - 75 %    LYMPHOCYTES 24 12 - 49 %    MONOCYTES 8 5 - 13 %    EOSINOPHILS 2 0 - 7 %    BASOPHILS 1 0 - 1 %    IMMATURE GRANULOCYTES 0 0.0 - 0.5 %    ABS. NEUTROPHILS 3.4 1.8 - 8.0 K/UL    ABS. LYMPHOCYTES 1.3 0.8 - 3.5 K/UL    ABS. MONOCYTES 0.4 0.0 - 1.0 K/UL    ABS. EOSINOPHILS 0.1 0.0 - 0.4 K/UL    ABS. BASOPHILS 0.0 0.0 - 0.1 K/UL    ABS. IMM.  GRANS. 0.0 0.00 - 0.04 K/UL    DF AUTOMATED     METABOLIC PANEL, COMPREHENSIVE    Collection Time: 03/12/19 12:18 PM   Result Value Ref Range    Sodium 134 (L) 136 - 145 mmol/L    Potassium 4.1 3.5 - 5.1 mmol/L    Chloride 100 97 - 108 mmol/L    CO2 25 21 - 32 mmol/L    Anion gap 9 5 - 15 mmol/L    Glucose 427 (H) 65 - 100 mg/dL    BUN 21 (H) 6 - 20 MG/DL    Creatinine 1.23 0.70 - 1.30 MG/DL    BUN/Creatinine ratio 17 12 - 20      GFR est AA >60 >60 ml/min/1.73m2    GFR est non-AA 59 (L) >60 ml/min/1.73m2    Calcium 9.0 8.5 - 10.1 MG/DL    Bilirubin, total 0.4 0.2 - 1.0 MG/DL    ALT (SGPT) 32 12 - 78 U/L    AST (SGOT) 25 15 - 37 U/L    Alk.  phosphatase 86 45 - 117 U/L    Protein, total 7.3 6.4 - 8.2 g/dL    Albumin 3.5 3.5 - 5.0 g/dL    Globulin 3.8 2.0 - 4.0 g/dL    A-G Ratio 0.9 (L) 1.1 - 2.2     TROPONIN I    Collection Time: 03/12/19 12:18 PM   Result Value Ref Range    Troponin-I, Qt. <0.05 <0.05 ng/mL   D DIMER    Collection Time: 03/12/19 12:18 PM   Result Value Ref Range    D-dimer 0.32 0.00 - 0.65 mg/L FEU   PTT    Collection Time: 03/12/19 12:18 PM   Result Value Ref Range    aPTT 23.6 22.1 - 32.0 sec    aPTT, therapeutic range     58.0 - 77.0 SECS   PROTHROMBIN TIME + INR    Collection Time: 03/12/19 12:18 PM   Result Value Ref Range    INR 1.0 0.9 - 1.1      Prothrombin time 9.9 9.0 - 11.1 sec   GLUCOSE, POC    Collection Time: 03/12/19  9:03 PM   Result Value Ref Range    Glucose (POC) 90 65 - 100 mg/dL    Performed by Beryle Hazard    CBC W/O DIFF    Collection Time: 03/13/19  3:23 AM   Result Value Ref Range    WBC 4.8 4.1 - 11.1 K/uL    RBC 4.41 4.10 - 5.70 M/uL    HGB 12.5 12.1 - 17.0 g/dL    HCT 38.4 36.6 - 50.3 %    MCV 87.1 80.0 - 99.0 FL    MCH 28.3 26.0 - 34.0 PG    MCHC 32.6 30.0 - 36.5 g/dL    RDW 13.3 11.5 - 14.5 %    PLATELET 676 013 - 326 K/uL    MPV 9.6 8.9 - 12.9 FL    NRBC 0.0 0  WBC    ABSOLUTE NRBC 0.00 0.00 - 0.01 K/uL   MAGNESIUM    Collection Time: 03/13/19  3:23 AM   Result Value Ref Range    Magnesium 2.4 1.6 - 2.4 mg/dL   METABOLIC PANEL, BASIC    Collection Time: 03/13/19  3:23 AM   Result Value Ref Range    Sodium 140 136 - 145 mmol/L    Potassium 4.0 3.5 - 5.1 mmol/L    Chloride 106 97 - 108 mmol/L    CO2 28 21 - 32 mmol/L    Anion gap 6 5 - 15 mmol/L    Glucose 157 (H) 65 - 100 mg/dL    BUN 16 6 - 20 MG/DL    Creatinine 0.93 0.70 - 1.30 MG/DL    BUN/Creatinine ratio 17 12 - 20      GFR est AA >60 >60 ml/min/1.73m2    GFR est non-AA >60 >60 ml/min/1.73m2    Calcium 8.7 8.5 - 10.1 MG/DL   PHOSPHORUS    Collection Time: 03/13/19  3:23 AM   Result Value Ref Range    Phosphorus 3.8 2.6 - 4.7 MG/DL   GLUCOSE, POC    Collection Time: 03/13/19  7:01 AM   Result Value Ref Range    Glucose (POC) 109 (H) 65 - 100 mg/dL    Performed by Sonido Smart 53 discussed with:  Patient x   Family    RN    Care Manager    Consultant/Specialist:       Kaylen Martinez, MIREYAP-BC

## 2019-03-14 ENCOUNTER — PATIENT OUTREACH (OUTPATIENT)
Dept: INTERNAL MEDICINE CLINIC | Age: 64
End: 2019-03-14

## 2019-03-14 NOTE — LETTER
3/14/2019 10:50 AM 
 
[phone] Dear Mr. Michelle Lantigua am the R.NISSA Nurse Navigator working with Dr. Donavan Bowser. We are glad to hear you are home and hope you are feeling much better. Part of my job is to follow up with patients who have been to the hospital to see how they are feeling, answer any questions they may have about their visit and also make sure they have a follow-up appointment to see their primary care doctor. I have been unable to reach you by telephone and wanted to make sure that you scheduled a follow-up appointment to come in and talk to Dr. Donavan Bowser about your recent visit to the hospital.  Please call our office to schedule your appointment at the number above. In the meantime, if you have any questions or concerns, please feel free to call me on my direct line at [phone]. Thank you for allowing us to provide you with excellent care. Our goal is to address all of your concerns and needs. We strive to provide excellent quality care at our Medical Practice here at [facility]. We strive to be rated as excellent, as anything less than excellent does not meet our expectations. Thank you again for choosing us for your continued health care needs. Sincerely, Cain Underwood RN  
RN Nurse Navigator

## 2019-03-15 ENCOUNTER — TELEPHONE (OUTPATIENT)
Dept: INTERNAL MEDICINE CLINIC | Age: 64
End: 2019-03-15

## 2019-03-15 NOTE — TELEPHONE ENCOUNTER
Patient called stating that his sugar is elevated because of prednisone given to him by the er for alfredo damian. Per Dr. Bharathi Jones patient ask to D/C THE PREDNISONE AND COME IN.

## 2019-03-18 ENCOUNTER — OFFICE VISIT (OUTPATIENT)
Dept: INTERNAL MEDICINE CLINIC | Age: 64
End: 2019-03-18

## 2019-03-18 VITALS
OXYGEN SATURATION: 95 % | BODY MASS INDEX: 24.25 KG/M2 | RESPIRATION RATE: 16 BRPM | TEMPERATURE: 98.3 F | DIASTOLIC BLOOD PRESSURE: 84 MMHG | HEIGHT: 67 IN | HEART RATE: 91 BPM | SYSTOLIC BLOOD PRESSURE: 140 MMHG | WEIGHT: 154.5 LBS

## 2019-03-18 DIAGNOSIS — E11.42 TYPE 2 DIABETES MELLITUS WITH DIABETIC POLYNEUROPATHY, WITH LONG-TERM CURRENT USE OF INSULIN (HCC): ICD-10-CM

## 2019-03-18 DIAGNOSIS — I49.8 BRUGADA SYNDROME: Chronic | ICD-10-CM

## 2019-03-18 DIAGNOSIS — E78.5 DYSLIPIDEMIA: ICD-10-CM

## 2019-03-18 DIAGNOSIS — I10 ESSENTIAL HYPERTENSION: Chronic | ICD-10-CM

## 2019-03-18 DIAGNOSIS — G51.0 RIGHT-SIDED BELL'S PALSY: Primary | ICD-10-CM

## 2019-03-18 DIAGNOSIS — Z79.4 TYPE 2 DIABETES MELLITUS WITH DIABETIC POLYNEUROPATHY, WITH LONG-TERM CURRENT USE OF INSULIN (HCC): ICD-10-CM

## 2019-03-18 NOTE — PROGRESS NOTES
[address] [place] [address] and Primary Care  [place] [address]  [address] [place] [address]  Phone:  [phone]  Fax: [phone]       Chief Complaint   Patient presents with    Transitions Of Care     Patient admitted to [facility] on 3/12/19 for Bell's Palsy. .      SUBJECTIVE:    Flaquito Rincon is a 70 y.o. male Comes in for return visit, having gone to the ER because of right facial weakness. He has a Bell's palsy and was given prednisone and Acyclovir. The prednisone was discontinued at my request primarily because of the severe exacerbation of his diabetes with this. He has had the Bell's palsy less than a week and is reasonably stable. His diabetes has indeed been doing reasonably well with his Levemir 30 units at h.s., and 20 units q.a.m., along with Trulicity. He has a past history of primary hypertension and dyslipidemia. Current Outpatient Medications   Medication Sig Dispense Refill    amLODIPine (NORVASC) 10 mg tablet Take 1 Tab by mouth daily. 30 Tab 0    acyclovir (ZOVIRAX) 400 mg tablet Take 1 Tab by mouth daily for 10 days. 10 Tab 0    acetaminophen (TYLENOL) 325 mg tablet Take 650 mg by mouth two (2) times daily as needed (virus).  insulin detemir U-100 (LEVEMIR U-100 INSULIN) 100 unit/mL injection 20 Units by SubCUTAneous route daily.  insulin detemir U-100 (LEVEMIR U-100 INSULIN) 100 unit/mL injection 30 Units by SubCUTAneous route nightly.  metFORMIN (GLUCOPHAGE) 1,000 mg tablet Take 1 Tab by mouth two (2) times daily (with meals). 60 Tab 10    atorvastatin (LIPITOR) 20 mg tablet Take 1 Tab by mouth daily.  27 Tab 10     Past Medical History:   Diagnosis Date    Diabetes ([address]     Hypertension     ICD (implantable cardioverter-defibrillator) in place     NSVT (nonsustained ventricular tachycardia) ([facility] [address] 11/21/2018    EPS 11/21/2018 VT/VF      Past Surgical History:   Procedure Laterality Date    COLONOSCOPY  1/2/2015         HX HEART CATHETERIZATION  HX HEENT      l cararact removal     No Known Allergies      REVIEW OF SYSTEMS:  General: negative for - chills or fever  ENT: negative for - headaches, nasal congestion or tinnitus  Respiratory: negative for - cough, hemoptysis, shortness of breath or wheezing  Cardiovascular : negative for - chest pain, edema, palpitations or shortness of breath  Gastrointestinal: negative for - abdominal pain, blood in stools, heartburn or nausea/vomiting  Genito-Urinary: no dysuria, trouble voiding, or hematuria  Musculoskeletal: negative for - gait disturbance, joint pain, joint stiffness or joint swelling  Neurological: See HPI  Hematologic: no bruises, no bleeding, no swollen glands  Integument: no lumps, mole changes, nail changes or rash  Endocrine: no malaise/lethargy or unexpected weight changes      Social History     Socioeconomic History    Marital status: SINGLE     Spouse name: Not on file    Number of children: 0    Years of education: Not on file    Highest education level: Not on file   Occupational History    Occupation: [employer]   Tobacco Use    Smoking status: Never Smoker    Smokeless tobacco: Never Used   Substance and Sexual Activity    Alcohol use: No    Drug use: No    Sexual activity: Yes     Partners: Female     Birth control/protection: None     Family History   Problem Relation Age of Onset    Diabetes Mother     Hypertension Mother     Stroke Mother        OBJECTIVE:    Visit Vitals  /84   Pulse 91   Temp 98.3 °F (36.8 °C) (Oral)   Resp 16   Ht 5' 7\" (1.702 m)   Wt 154 lb 8 oz (70.1 kg)   SpO2 95%   BMI 24.20 kg/m²     CONSTITUTIONAL: well , well nourished, appears age appropriate  EYES: perrla, eom intact  ENMT:moist mucous membranes, pharynx clear  NECK: supple.  Thyroid normal  RESPIRATORY: Chest: clear to ascultation and percussion   CARDIOVASCULAR: Heart: regular rate and rhythm  GASTROINTESTINAL: Abdomen: soft, bowel sounds active  HEMATOLOGIC: no pathological lymph nodes palpated  MUSCULOSKELETAL: Extremities: no edema, pulse 1+   INTEGUMENT: No unusual rashes or suspicious skin lesions noted. Nails appear normal.  NEUROLOGIC: Right side facial weakness  MENTAL STATUS: alert and oriented, appropriate affect      ASSESSMENT:  1. Right-sided Bell's palsy    2. Type 2 diabetes mellitus with diabetic polyneuropathy, with long-term current use of insulin ([address]    3. Essential hypertension    4. Dyslipidemia    5. Brugada syndrome        PLAN:    1. His Bell's palsy appears to be reasonably stable, but I explained to him that it may take up to two to three months for this to completely resolve. He will continue the Acyclovir for now. I suggested using tape to close his eye nocturnally in view of the inability to close his lids currently. 2. His diabetes appears to be doing reasonably well and no adjustments will be made for now. 3. His blood pressure is excellent, no adjustments are made. 4. He will continue statin as prescribed in view of his increased cardiovascular risk. 5. His FMLA form regarding his Bell's palsy will be completed. Follow-up and Dispositions    · Return in about 3 weeks (around 4/8/2019).            Nasim Cole MD

## 2019-03-18 NOTE — PROGRESS NOTES
Chief Complaint   Patient presents with    Transitions Of Care     Patient admitted to [facility] on 3/12/19 for Bell's Palsy. 1. Have you been to the ER, urgent care clinic since your last visit? Hospitalized since your last visit? Yes When: 3/12/19 Where: [facility] Reason for visit: Bell's palsy    2. Have you seen or consulted any other health care providers outside of the [address] [place] [address] since your last visit? Include any pap smears or colon screening.  No

## 2019-04-15 ENCOUNTER — OFFICE VISIT (OUTPATIENT)
Dept: INTERNAL MEDICINE CLINIC | Age: 64
End: 2019-04-15

## 2019-04-15 VITALS
BODY MASS INDEX: 25 KG/M2 | HEIGHT: 67 IN | HEART RATE: 72 BPM | WEIGHT: 159.3 LBS | RESPIRATION RATE: 16 BRPM | TEMPERATURE: 98.4 F | DIASTOLIC BLOOD PRESSURE: 84 MMHG | OXYGEN SATURATION: 97 % | SYSTOLIC BLOOD PRESSURE: 125 MMHG

## 2019-04-15 DIAGNOSIS — E11.42 TYPE 2 DIABETES MELLITUS WITH DIABETIC POLYNEUROPATHY, WITH LONG-TERM CURRENT USE OF INSULIN (HCC): ICD-10-CM

## 2019-04-15 DIAGNOSIS — E78.5 DYSLIPIDEMIA: ICD-10-CM

## 2019-04-15 DIAGNOSIS — I10 ESSENTIAL HYPERTENSION: Chronic | ICD-10-CM

## 2019-04-15 DIAGNOSIS — G51.0 BELL'S PALSY: Primary | ICD-10-CM

## 2019-04-15 DIAGNOSIS — Z79.4 TYPE 2 DIABETES MELLITUS WITH DIABETIC POLYNEUROPATHY, WITH LONG-TERM CURRENT USE OF INSULIN (HCC): ICD-10-CM

## 2019-04-15 NOTE — LETTER
NOTIFICATION RETURN TO WORK / SCHOOL 
 
4/15/2019 11:07 AM 
 
Mr. Cecil [facility] [phone] To Whom It May Concern: 
 
Cecil Mendez is currently under the care of [facility]. 04- to 04- He will return to work/school on: 04- Patient must wear protective eye glasses while working. If there are questions or concerns please have the patient contact our office. Sincerely, Jatinder Deng MD

## 2019-04-15 NOTE — PROGRESS NOTES
Chief Complaint   Patient presents with    Facial Droop     1 month follow up      1. Have you been to the ER, urgent care clinic since your last visit? Hospitalized since your last visit? No    2. Have you seen or consulted any other health care providers outside of the [address] [place] [address] since your last visit? Include any pap smears or colon screening.  No

## 2019-04-18 ENCOUNTER — CLINICAL SUPPORT (OUTPATIENT)
Dept: CARDIOLOGY CLINIC | Age: 64
End: 2019-04-18

## 2019-04-18 ENCOUNTER — OFFICE VISIT (OUTPATIENT)
Dept: CARDIOLOGY CLINIC | Age: 64
End: 2019-04-18

## 2019-04-18 VITALS
HEIGHT: 67 IN | DIASTOLIC BLOOD PRESSURE: 70 MMHG | SYSTOLIC BLOOD PRESSURE: 116 MMHG | HEART RATE: 70 BPM | RESPIRATION RATE: 18 BRPM | BODY MASS INDEX: 24.92 KG/M2 | OXYGEN SATURATION: 96 % | WEIGHT: 158.8 LBS

## 2019-04-18 DIAGNOSIS — Z95.810 PRESENCE OF AUTOMATIC CARDIOVERTER/DEFIBRILLATOR (AICD): Primary | ICD-10-CM

## 2019-04-18 DIAGNOSIS — Z95.810 ICD (IMPLANTABLE CARDIOVERTER-DEFIBRILLATOR) IN PLACE: ICD-10-CM

## 2019-04-18 DIAGNOSIS — E11.9 TYPE 2 DIABETES MELLITUS WITHOUT COMPLICATION, UNSPECIFIED WHETHER LONG TERM INSULIN USE (HCC): ICD-10-CM

## 2019-04-18 DIAGNOSIS — I47.29 NSVT (NONSUSTAINED VENTRICULAR TACHYCARDIA): ICD-10-CM

## 2019-04-18 DIAGNOSIS — Z95.0 PACEMAKER: Primary | ICD-10-CM

## 2019-04-18 DIAGNOSIS — I47.29 NSVT (NONSUSTAINED VENTRICULAR TACHYCARDIA): Chronic | ICD-10-CM

## 2019-04-18 DIAGNOSIS — I49.8 BRUGADA SYNDROME: ICD-10-CM

## 2019-04-18 NOTE — PROGRESS NOTES
1. Have you been to the ER, urgent care clinic since your last visit? Hospitalized since your last visit? ED [facility] admission 3-12-19, [place] Palsy. 2. Have you seen or consulted any other health care providers outside of the [address] [place] [address] since your last visit? Include any pap smears or colon screening. No    Chief Complaint   Patient presents with    Pacemaker Check     3 mo appt. Denied cardiac symptoms.

## 2019-04-18 NOTE — PROGRESS NOTES
Subjective:      Alden Bauer is a 70 y.o. male is here for follow up s/p ICD placement in 11/2018 for Brugada syndrome. The patient denies chest pain/ shortness of breath, orthopnea, PND, LE edema, palpitations, syncope, presyncope or fatigue.        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 ([address] 02/18/2018    Type 2 diabetes mellitus with diabetic polyneuropathy, with long-term current use of insulin ([address] 02/18/2018    Uncontrolled type 2 diabetes mellitus with complication, with long-term current use of insulin ([address] 02/18/2018    Dyslipidemia 09/25/2014    Dermatitis 88/39/1923    Umbilical hernia 84/00/5328    Femoral bruit 09/25/2014    Mitral valve prolapse 09/25/2014      Alber Adan MD  Past Medical History:   Diagnosis Date    Diabetes [facility])     Hypertension     ICD (implantable cardioverter-defibrillator) in place     NSVT (nonsustained ventricular tachycardia) ([address] 11/21/2018    EPS 11/21/2018 VT/VF       Past Surgical History:   Procedure Laterality Date    COLONOSCOPY  1/2/2015         HX HEART CATHETERIZATION      HX HEENT      l cararact removal     No Known Allergies   Family History   Problem Relation Age of Onset    Diabetes Mother     Hypertension Mother     Stroke Mother     negative for cardiac disease  Social History     Socioeconomic History    Marital status: SINGLE     Spouse name: Not on file    Number of children: 0    Years of education: Not on file  Highest education level: Not on file   Occupational History    Occupation: [employer]   Tobacco Use    Smoking status: Never Smoker    Smokeless tobacco: Never Used   Substance and Sexual Activity    Alcohol use: No    Drug use: No    Sexual activity: Yes     Partners: Female     Birth control/protection: None     Current Outpatient Medications   Medication Sig    atorvastatin (LIPITOR) 20 mg tablet TAKE ONE TABLET BY MOUTH DAILY    amLODIPine (NORVASC) 10 mg tablet Take 1 Tab by mouth daily.  acetaminophen (TYLENOL) 325 mg tablet Take 650 mg by mouth two (2) times daily as needed (virus).  insulin detemir U-100 (LEVEMIR U-100 INSULIN) 100 unit/mL injection 20 Units by SubCUTAneous route daily.  insulin detemir U-100 (LEVEMIR U-100 INSULIN) 100 unit/mL injection 30 Units by SubCUTAneous route nightly.  metFORMIN (GLUCOPHAGE) 1,000 mg tablet Take 1 Tab by mouth two (2) times daily (with meals). No current facility-administered medications for this visit. Vitals:    04/18/19 0845   BP: 116/70   Pulse: 70   Resp: 18   SpO2: 96%   Weight: 158 lb 12.8 oz (72 kg)   Height: 5' 7\" (1.702 m)       I have reviewed the nurses notes, vitals, problem list, allergy list, medical history, family, social history and medications. Review of Symptoms:    General: Pt denies excessive weight gain or loss. Pt is able to conduct ADL's  HEENT: Denies blurred vision, headaches, epistaxis and difficulty swallowing. Respiratory: Denies shortness of breath, RESENDIZ, wheezing or stridor. Cardiovascular: Denies precordial pain, palpitations, edema or PND  Gastrointestinal: Denies poor appetite, indigestion, abdominal pain or blood in stool  Urinary: Denies dysuria, pyuria  Musculoskeletal: Denies pain or swelling from muscles or joints  Neurologic: Denies tremor, paresthesias, or sensory motor disturbance  Skin: Denies rash, itching or texture change.   Psych: Denies depression      Physical Exam:      General: Well developed, in no acute distress. HEENT: Eyes - PERRL, no jvd  Heart:  Normal S1/S2 negative S3 or S4. Regular, no murmur, gallop or rub.   Respiratory: Clear bilaterally x 4, no wheezing or rales  Abdomen:   Soft, non-tender, bowel sounds are active.   Extremities:  No edema, normal cap refill, no cyanosis. Musculoskeletal: No clubbing  Neuro: A&Ox3, speech clear, gait stable. Skin: Skin color is normal. No rashes or lesions.  Non diaphoretic  Vascular: 2+ pulses symmetric in all extremities    Cardiographics    Ekg: sinus rhythm     Results for orders placed or performed during the hospital encounter of 11/18/18   EKG, 12 LEAD, INITIAL   Result Value Ref Range    Ventricular Rate 66 BPM    Atrial Rate 66 BPM    P-R Interval 144 ms    QRS Duration 90 ms    Q-T Interval 386 ms    QTC Calculation (Bezet) 404 ms    Calculated P Axis 53 degrees    Calculated R Axis -27 degrees    Calculated T Axis 12 degrees    Diagnosis       Normal sinus rhythm  Inferior infarct (cited on or before 18-NOV-2018)  When compared with ECG of 18-NOV-2018 10:55,  ST no longer elevated in Anterior leads  T wave amplitude has decreased in Anterolateral leads  Confirmed by Aren Sarkar ([id number]) on 11/22/2018 3:09:04 PM           Lab Results   Component Value Date/Time    WBC 4.8 03/13/2019 03:23 AM    HGB 12.5 03/13/2019 03:23 AM    HCT 38.4 03/13/2019 03:23 AM    PLATELET 011 11/71/2017 03:23 AM    MCV 87.1 03/13/2019 03:23 AM      Lab Results   Component Value Date/Time    Sodium 140 03/13/2019 03:23 AM    Potassium 4.0 03/13/2019 03:23 AM    Chloride 106 03/13/2019 03:23 AM    CO2 28 03/13/2019 03:23 AM    Anion gap 6 03/13/2019 03:23 AM    Glucose 157 (H) 03/13/2019 03:23 AM    BUN 16 03/13/2019 03:23 AM    Creatinine 0.93 03/13/2019 03:23 AM    BUN/Creatinine ratio 17 03/13/2019 03:23 AM    GFR est AA >60 03/13/2019 03:23 AM    GFR est non-AA >60 03/13/2019 03:23 AM    Calcium 8.7 03/13/2019 03:23 AM    Bilirubin, total 0.4 03/12/2019 12:18 PM    AST (SGOT) 25 03/12/2019 12:18 PM    Alk. phosphatase 86 03/12/2019 12:18 PM    Protein, total 7.3 03/12/2019 12:18 PM    Albumin 3.5 03/12/2019 12:18 PM    Globulin 3.8 03/12/2019 12:18 PM    A-G Ratio 0.9 (L) 03/12/2019 12:18 PM    ALT (SGPT) 32 03/12/2019 12:18 PM         Assessment:     Assessment:        ICD-10-CM ICD-9-CM    1. Pacemaker Z95.0 V45.01 AMB POC EKG ROUTINE W/ 12 LEADS, INTER & REP   2. ICD (implantable cardioverter-defibrillator) in place Z95.810 V45.02    3. Brugada syndrome I49.8 746.89    4. NSVT (nonsustained ventricular tachycardia) ([facility]) I47.2 427.1      Orders Placed This Encounter    AMB POC EKG ROUTINE W/ 12 LEADS, INTER & REP     Order Specific Question:   Reason for Exam:     Answer:   routine        Plan:   Mr. Oneyda Burdick is here for long follow up s/p ICD 11/2018 for Brugada syndrome. His incision has healed well and he has no cardiac complaints. ICD interrogation demonstrates normal functioning with <1% RVP. Continue current medical therapy and follow up in one year. Continue medical management for DM, Brugada. Thank you for allowing me to participate in Shona Healy 's care.     Sadie Grimaldo NP

## 2019-04-24 ENCOUNTER — TELEPHONE (OUTPATIENT)
Dept: CARDIOLOGY CLINIC | Age: 64
End: 2019-04-24

## 2019-04-25 ENCOUNTER — TELEPHONE (OUTPATIENT)
Dept: CARDIOLOGY CLINIC | Age: 64
End: 2019-04-25

## 2019-04-25 NOTE — TELEPHONE ENCOUNTER
Called patient to make an appointment. Pt said he just went back to work so he would have to call back to see when he can take off again.

## 2019-04-26 NOTE — TELEPHONE ENCOUNTER
I called patient, advised him why we need to make appt to review lab results that were drawn in December, he is agreeable to coming in-will have him make appt

## 2019-04-28 PROBLEM — G51.0 BELL'S PALSY: Status: ACTIVE | Noted: 2019-04-28

## 2019-04-28 PROBLEM — E11.9 TYPE 2 DIABETES MELLITUS WITHOUT COMPLICATION (HCC): Chronic | Status: RESOLVED | Noted: 2019-03-12 | Resolved: 2019-04-28

## 2019-04-28 PROBLEM — G51.0 RIGHT-SIDED BELL'S PALSY: Status: RESOLVED | Noted: 2019-03-12 | Resolved: 2019-04-28

## 2019-04-28 NOTE — PROGRESS NOTES
Chief Complaint   Patient presents with    Swelling     Patient states that his hands and face were \"puffy\". .      SUBECTIVE:    Tyshawn Francis is a 70 y.o. male Comes in concerned because of asymmetry of his face. He has developed Bell's palsy. He went to the ER and they gave him an antifungal medication, as well as systemic steroids. I explained to him he needs to discontinue steroids because it is going to lead to significant worsening of his diabetes. The efficacy of this in the context of Bell's palsy does not warrant this exacerbation. I talked to him at length about disease entity. He has a tendency to water out of the right side of his mouth when he is attempting to drink something, particularly with a straw. His right lid does not close, particularly when he sleeps or even during the day. From a diabetic perspective his diabetes has been doing quite well. He has a past history of primary hypertension and dyslipidemia. Current Outpatient Medications   Medication Sig Dispense Refill    metFORMIN (GLUCOPHAGE) 1,000 mg tablet Take 1 Tab by mouth two (2) times daily (with meals). 60 Tab 10    atorvastatin (LIPITOR) 20 mg tablet TAKE ONE TABLET BY MOUTH DAILY 90 Tab 3    amLODIPine (NORVASC) 10 mg tablet Take 1 Tab by mouth daily. 30 Tab 0    acetaminophen (TYLENOL) 325 mg tablet Take 650 mg by mouth two (2) times daily as needed (virus).  insulin detemir U-100 (LEVEMIR U-100 INSULIN) 100 unit/mL injection 20 Units by SubCUTAneous route daily.  insulin detemir U-100 (LEVEMIR U-100 INSULIN) 100 unit/mL injection 30 Units by SubCUTAneous route nightly.        Past Medical History:   Diagnosis Date    Diabetes ([address]     Hypertension     ICD (implantable cardioverter-defibrillator) in place     NSVT (nonsustained ventricular tachycardia) ([address] 11/21/2018    EPS 11/21/2018 VT/VF      Past Surgical History:   Procedure Laterality Date    COLONOSCOPY  1/2/2015         HX HEART CATHETERIZATION      HX HEENT      l cararact removal     No Known Allergies    REVIEW OF SYSTEMS:  Review of Systems - Negative except   ENT ROS: negative for - headaches, hearing change, nasal congestion, oral lesions, tinnitus, visual changes or   Respiratory ROS: no cough, shortness of breath, or wheezing  Cardiovascular ROS: no chest pain or dyspnea on exertion  Gastrointestinal ROS: no abdominal pain, change in bowel habits, or black or blood  Genito-Urinary ROS: no dysuria, trouble voiding, or hematuria  Musculoskeletal ROS: negative  Neurological ROS: no TIA or stroke symptoms      Social History     Socioeconomic History    Marital status: SINGLE     Spouse name: Not on file    Number of children: 0    Years of education: Not on file    Highest education level: Not on file   Occupational History    Occupation: [employer]   Tobacco Use    Smoking status: Never Smoker    Smokeless tobacco: Never Used   Substance and Sexual Activity    Alcohol use: No    Drug use: No    Sexual activity: Yes     Partners: Female     Birth control/protection: None   r  Family History   Problem Relation Age of Onset    Diabetes Mother     Hypertension Mother     Stroke Mother        OBJECTIVE:  Visit Vitals  /82   Pulse 75   Temp 99.8 °F (37.7 °C) (Oral)   Resp 16   Ht 5' 7\" (1.702 m)   Wt 159 lb 8 oz (72.3 kg)   SpO2 95%   BMI 24.98 kg/m²     ENT: perrla,  eom intact  NECK: supple. Thyroid normal, no JVD  CHEST: clear to ascultation and percussion   HEART: regular rate and rhythm  ABD: soft, bowel sounds active,   EXTREMITIES: no edema, pulse 1+  INTEGUMENT: clear      ASSESSMENT:  1. Bell's palsy    2. Type 2 diabetes mellitus with diabetic polyneuropathy, with long-term current use of insulin ([facility]        PLAN:    1. The patient has rather severe Bell's palsy. I suggest using tape on his lid at night to allow the eye to remain moisturized. He may take this off during the day.   He will refrain from work for now. 2. His diabetes hopefully is doing well and I remind him of the importance of following all of the recommendations that I have previously made to him. Follow-up and Dispositions    · Return in about 2 weeks (around 3/15/2019).            Dontrell Al MD

## 2019-04-28 NOTE — PROGRESS NOTES
[address] [place] [address] and Primary Care  [place] [address]  [address] [place] [address]  Phone:  [phone]  Fax: [phone]       Chief Complaint   Patient presents with    Facial Droop     1 month follow up    . SUBJECTIVE:    Payton Jensen is a 70 y.o. male Comes in for return visit for follow up of his Bell's palsy. This is gradually improving. He continues to have a significant physical defect. He has not returned to work yet, but plans to do so within the next week or so. He continues to close the eye with tape at night while sleeping, in view of his inability to close this at sleep time. His diabetes appears to be doing quite well. Blood sugars are reasonable based on his comments. He has a past history of primary hypertension and dyslipidemia. Current Outpatient Medications   Medication Sig Dispense Refill    atorvastatin (LIPITOR) 20 mg tablet TAKE ONE TABLET BY MOUTH DAILY 90 Tab 3    amLODIPine (NORVASC) 10 mg tablet Take 1 Tab by mouth daily. 30 Tab 0    acetaminophen (TYLENOL) 325 mg tablet Take 650 mg by mouth two (2) times daily as needed (virus).  insulin detemir U-100 (LEVEMIR U-100 INSULIN) 100 unit/mL injection 20 Units by SubCUTAneous route daily.  insulin detemir U-100 (LEVEMIR U-100 INSULIN) 100 unit/mL injection 30 Units by SubCUTAneous route nightly.  metFORMIN (GLUCOPHAGE) 1,000 mg tablet Take 1 Tab by mouth two (2) times daily (with meals).  61 Tab 10     Past Medical History:   Diagnosis Date    [facility]     Hypertension     ICD (implantable cardioverter-defibrillator) in place     NSVT (nonsustained ventricular tachycardia) ([facility] 11/21/2018    EPS 11/21/2018 VT/VF      Past Surgical History:   Procedure Laterality Date    COLONOSCOPY  1/2/2015         HX HEART CATHETERIZATION      HX HEENT      l cararact removal     No Known Allergies      REVIEW OF SYSTEMS:  General: negative for - chills or fever  ENT: negative for - headaches, nasal congestion or tinnitus  Respiratory: negative for - cough, hemoptysis, shortness of breath or wheezing  Cardiovascular : negative for - chest pain, edema, palpitations or shortness of breath  Gastrointestinal: negative for - abdominal pain, blood in stools, heartburn or nausea/vomiting  Genito-Urinary: no dysuria, trouble voiding, or hematuria  Musculoskeletal: negative for - gait disturbance, joint pain, joint stiffness or joint swelling  Neurological: no TIA or stroke symptoms  Hematologic: no bruises, no bleeding, no swollen glands  Integument: no lumps, mole changes, nail changes or rash  Endocrine: no malaise/lethargy or unexpected weight changes      Social History     Socioeconomic History    Marital status: SINGLE     Spouse name: Not on file    Number of children: 0    Years of education: Not on file    Highest education level: Not on file   Occupational History    Occupation: [employer]   Tobacco Use    Smoking status: Never Smoker    Smokeless tobacco: Never Used   Substance and Sexual Activity    Alcohol use: No    Drug use: No    Sexual activity: Yes     Partners: Female     Birth control/protection: None     Family History   Problem Relation Age of Onset    Diabetes Mother     Hypertension Mother     Stroke Mother        OBJECTIVE:    Visit Vitals  /84   Pulse 72   Temp 98.4 °F (36.9 °C) (Oral)   Resp 16   Ht 5' 7\" (1.702 m)   Wt 159 lb 4.8 oz (72.3 kg)   SpO2 97%   BMI 24.95 kg/m²     CONSTITUTIONAL: well , well nourished, appears age appropriate  EYES: perrla, eom intact  ENMT:moist mucous membranes, pharynx clear  NECK: supple. Thyroid normal  RESPIRATORY: Chest: clear to ascultation and percussion   CARDIOVASCULAR: Heart: regular rate and rhythm  GASTROINTESTINAL: Abdomen: soft, bowel sounds active  HEMATOLOGIC: no pathological lymph nodes palpated  MUSCULOSKELETAL: Extremities: no edema, pulse 1+   INTEGUMENT: No unusual rashes or suspicious skin lesions noted.  Nails appear normal.  NEUROLOGIC: Peripheral right-sided facial palsy  MENTAL STATUS: alert and oriented, appropriate affect      ASSESSMENT:  1. Bell's palsy    2. Type 2 diabetes mellitus with diabetic polyneuropathy, with long-term current use of insulin ([address]    3. Essential hypertension    4. Dyslipidemia        PLAN:    1. The patient's Bell's palsy is gradually improving. Hopefully this will indeed continue. He will return to work in the next week or so. 2. From a diabetic perspective, I remind him of the importance of adhering to his diet and eating in a timely fashion. He also has to follow up on a consistent basis. The patient is taking all of his diabetic medications, including Metformin 500 mg b.i.d. His dose of basal insulin is 20 units q.a.m., 30 units q.h.s.  3. Blood pressure is excellent today, no adjustments are made. 4. He will continue statin as prescribed. Follow-up and Dispositions    · Return in about 1 month (around 5/13/2019).            Judy Hoang MD

## 2019-04-29 ENCOUNTER — TELEPHONE (OUTPATIENT)
Dept: CARDIOLOGY CLINIC | Age: 64
End: 2019-04-29

## 2019-06-21 RX ORDER — PEN NEEDLE, DIABETIC 30 GX3/16"
NEEDLE, DISPOSABLE MISCELLANEOUS
Qty: 100 PEN NEEDLE | Refills: 11 | Status: SHIPPED | OUTPATIENT
Start: 2019-06-21 | End: 2020-07-29 | Stop reason: SDUPTHER

## 2019-07-19 ENCOUNTER — OFFICE VISIT (OUTPATIENT)
Dept: CARDIOLOGY CLINIC | Age: 64
End: 2019-07-19

## 2019-07-19 DIAGNOSIS — Z95.810 ICD (IMPLANTABLE CARDIOVERTER-DEFIBRILLATOR) IN PLACE: Primary | ICD-10-CM

## 2019-07-19 DIAGNOSIS — I47.29 NSVT (NONSUSTAINED VENTRICULAR TACHYCARDIA): ICD-10-CM

## 2019-09-01 ENCOUNTER — PATIENT OUTREACH (OUTPATIENT)
Dept: INTERNAL MEDICINE CLINIC | Age: 64
End: 2019-09-01

## 2019-09-02 NOTE — PROGRESS NOTES
[facility] 3/12-3/13/2019:  Bell's Palsy -case closed    Patient listed on [facility] Case Load Report. Patient has seen Cardiology & PCP as scheduled; Patient/family has the ability to self-manage-ICD implanted. .    No further nurse navigator follow up scheduled.   Episode Resolved  Name from Team List  Goals closed/completed  Case closed

## 2019-10-18 ENCOUNTER — OFFICE VISIT (OUTPATIENT)
Dept: CARDIOLOGY CLINIC | Age: 64
End: 2019-10-18

## 2019-10-18 DIAGNOSIS — I47.29 NSVT (NONSUSTAINED VENTRICULAR TACHYCARDIA): ICD-10-CM

## 2019-10-18 DIAGNOSIS — Z95.810 ICD (IMPLANTABLE CARDIOVERTER-DEFIBRILLATOR) IN PLACE: Primary | ICD-10-CM

## 2020-01-03 NOTE — PROGRESS NOTES
[facility] and Primary Care  [place] [address]  [address] [place] [address]  Phone:  [phone]  Fax: [phone]       Chief Complaint   Patient presents with    Diabetes     2 week follow up    . SUBJECTIVE:    Herminia Muñoz is a 57 y.o. male comes in for return visit stating that he is doing well. He states that his average fasting sugars are in the 100 range and ac dinner is 150 but he eats his dinner three hours after eating lunch. He has been using his prandial insulin 6 units ac lunch and 12 units ac dinner along with the doses of Trulicity and his basal insulin of 20 qhs and 10 units q am.  He has not had any symptomatic hypoglycemia. He also has proliferative diabetic retinopathy and I started him on fenofibrate for this. He remains on his statin in view of his increased cardiovascular risk. He has subclinical disease with carotid plaquing. His weight has been reasonably stable. Current Outpatient Prescriptions   Medication Sig Dispense Refill    fenofibrate nanocrystallized (TRICOR) 145 mg tablet Take 1 Tab by mouth daily. 30 Tab 11    Blood-Glucose Meter (ONETOUCH VERIO IQ METER) monitoring kit Use to test blood sugar three times daily 1 Kit 0    dulaglutide (TRULICITY) 1.5 QL/5.8 mL sub-q pen 0.5 mL by SubCUTAneous route every seven (7) days. 12 Pen 3    glucose blood VI test strips (ONETOUCH VERIO) strip Use to test blood sugar four times daily. Dx.e11.9 360 Strip 3    Insulin Needles, Disposable, 31 gauge x 5/16\" ndle USE TO INJECT INSULIN UP TO THREE TIMES A DAY AS DIRECTED BY DOCTOR 100 Pen Needle 11    insulin lispro (HUMALOG) 100 unit/mL kwikpen Inject 6 units before lunch and dinner (Patient taking differently: Inject 6 units before lunch and 12 units at dinner) 1 Package 11    metFORMIN (GLUCOPHAGE) 1,000 mg tablet Take 1 Tab by mouth two (2) times daily (with meals).  60 Tab 10    atorvastatin (LIPITOR) 20 mg tablet Take 1 Tab by mouth daily. 30 Tab 10    glipiZIDE (GLUCOTROL) 10 mg tablet Take 1 Tab by mouth two (2) times a day. 120 Tab 10    insulin detemir (LEVEMIR FLEXTOUCH) 100 unit/mL (3 mL) inpn 30 Units by SubCUTAneous route nightly. (Patient taking differently: 40 Units by SubCUTAneous route two (2) times a day. Inject 40 units in the morning and 40 units at bedtime.) 3 Pen 11    clotrimazole-betamethasone (LOTRISONE) topical cream Apply  to affected area two (2) times a day.  45 g 11    SOLUS V2 TEST STRIPS strip       LITE TOUCH LANCETS 33 gauge misc       LITE TOUCH LANCING DEVICE misc       GAVILYTE-N 420 gram solution        Past Medical History:   Diagnosis Date    Diabetes ([facility]     Hypertension      Past Surgical History:   Procedure Laterality Date    COLONOSCOPY  1/2/2015         HX HEENT      l cararact removal     No Known Allergies      REVIEW OF SYSTEMS:  General: negative for - chills or fever  ENT: negative for - headaches, nasal congestion or tinnitus  Respiratory: negative for - cough, hemoptysis, shortness of breath or wheezing  Cardiovascular : negative for - chest pain, edema, palpitations or shortness of breath  Gastrointestinal: negative for - abdominal pain, blood in stools, heartburn or nausea/vomiting  Genito-Urinary: no dysuria, trouble voiding, or hematuria  Musculoskeletal: negative for - gait disturbance, joint pain, joint stiffness or joint swelling  Neurological: no TIA or stroke symptoms  Hematologic: no bruises, no bleeding, no swollen glands  Integument: no lumps, mole changes, nail changes or rash  Endocrine: no malaise/lethargy or unexpected weight changes      Social History     Social History    Marital status: SINGLE     Spouse name: N/A    Number of children: 0    Years of education: N/A     Occupational History    [employer]      Social History Main Topics    Smoking status: Never Smoker    Smokeless tobacco: Never Used    Alcohol use No    Drug use: No    Sexual activity: Yes     Partners: Female     Birth control/ protection: None     Other Topics Concern    None     Social History Narrative     Family History   Problem Relation Age of Onset    Diabetes Mother     Hypertension Mother        OBJECTIVE:    Visit Vitals    /83 (BP 1 Location: Right arm, BP Patient Position: Sitting)    Pulse 68    Temp 97.8 °F (36.6 °C) (Oral)    Resp 16    Ht 5' 7\" (1.702 m)    Wt 160 lb 6.4 oz (72.8 kg)    SpO2 97%    BMI 25.12 kg/m2     CONSTITUTIONAL: well , well nourished, appears age appropriate  EYES: perrla, eom intact  ENMT:moist mucous membranes, pharynx clear  NECK: supple. Thyroid normal  RESPIRATORY: Chest: clear to ascultation and percussion   CARDIOVASCULAR: Heart: regular rate and rhythm  GASTROINTESTINAL: Abdomen: soft, bowel sounds active  HEMATOLOGIC: no pathological lymph nodes palpated  MUSCULOSKELETAL: Extremities: no edema, pulse 1+   INTEGUMENT: No unusual rashes or suspicious skin lesions noted. Nails appear normal.  NEUROLOGIC: non-focal exam   MENTAL STATUS: alert and oriented, appropriate affect      ASSESSMENT:  1. Uncontrolled type 2 diabetes mellitus with complication, with long-term current use of insulin ([address]    2. 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 Dyslipidemia    4. Femoral bruit        PLAN:    1. Hopefully his diabetes is well controlled but I will await the results of the hemoglobin A1c. I remind him to eat in the same fashion he is currently eating and take all his medications as prescribed. 2. For his diabetic retinopathy, I will add fenofibrate. 3. He will continue statin as prescribed. 4. His weight is excellent. I congratulated him on this and reminded him of the importance of eating processed carbohydrates and eating meals. .  Orders Placed This Encounter    HEMOGLOBIN A1C WITH EAG    APOLIPOPROTEIN B         Follow-up Disposition:  Return in about 6 weeks (around 6/1/2018).       Elisha Moritz, MD Ketoconazole Pregnancy And Lactation Text: This medication is Pregnancy Category C and it isn't know if it is safe during pregnancy. It is also excreted in breast milk and breast feeding isn't recommended.

## 2020-01-11 NOTE — TELEPHONE ENCOUNTER
Called patient to schedule a follow up appointment to discuss labs from December.     Patient has been scheduled with Dr. Malaika Monk on May 14th at 9:15am.
3

## 2020-01-17 ENCOUNTER — OFFICE VISIT (OUTPATIENT)
Dept: CARDIOLOGY CLINIC | Age: 65
End: 2020-01-17

## 2020-01-17 DIAGNOSIS — I47.29 NSVT (NONSUSTAINED VENTRICULAR TACHYCARDIA): ICD-10-CM

## 2020-01-17 DIAGNOSIS — Z95.810 ICD (IMPLANTABLE CARDIOVERTER-DEFIBRILLATOR) IN PLACE: Primary | ICD-10-CM

## 2020-04-20 ENCOUNTER — OFFICE VISIT (OUTPATIENT)
Dept: CARDIOLOGY CLINIC | Age: 65
End: 2020-04-20

## 2020-04-20 DIAGNOSIS — Z95.810 ICD (IMPLANTABLE CARDIOVERTER-DEFIBRILLATOR) IN PLACE: Primary | ICD-10-CM

## 2020-04-20 DIAGNOSIS — I47.29 NSVT (NONSUSTAINED VENTRICULAR TACHYCARDIA): ICD-10-CM

## 2020-04-29 ENCOUNTER — VIRTUAL VISIT (OUTPATIENT)
Dept: INTERNAL MEDICINE CLINIC | Age: 65
End: 2020-04-29

## 2020-04-29 DIAGNOSIS — I10 ESSENTIAL HYPERTENSION: Chronic | ICD-10-CM

## 2020-04-29 DIAGNOSIS — I47.29 NSVT (NONSUSTAINED VENTRICULAR TACHYCARDIA): Chronic | ICD-10-CM

## 2020-04-29 DIAGNOSIS — G51.0 BELL'S PALSY: ICD-10-CM

## 2020-04-29 DIAGNOSIS — E13.319 RETINOPATHY DUE TO SECONDARY DIABETES MELLITUS (HCC): ICD-10-CM

## 2020-04-29 DIAGNOSIS — E78.5 DYSLIPIDEMIA: ICD-10-CM

## 2020-04-29 RX ORDER — DULAGLUTIDE 1.5 MG/.5ML
1.5 INJECTION, SOLUTION SUBCUTANEOUS
Qty: 4 SYRINGE | Refills: 11 | Status: SHIPPED | OUTPATIENT
Start: 2020-04-29 | End: 2021-01-15 | Stop reason: SDUPTHER

## 2020-04-29 RX ORDER — ATORVASTATIN CALCIUM 20 MG/1
TABLET, FILM COATED ORAL
Qty: 90 TAB | Refills: 3 | Status: ON HOLD | OUTPATIENT
Start: 2020-04-29 | End: 2021-01-06 | Stop reason: SDUPTHER

## 2020-04-29 RX ORDER — ASPIRIN 81 MG/1
81 TABLET ORAL DAILY
COMMUNITY
End: 2022-03-20

## 2020-04-29 NOTE — PROGRESS NOTES
Nino Short is a 75 y.o. male who was seen by synchronous (real-time) audio-video technology on 4/29/2020. Consent: Nino Short, who was seen by synchronous (real-time) audio-video technology, and/or his healthcare decision maker, is aware that this patient-initiated, Telehealth encounter on 4/29/2020 is a billable service, with coverage as determined by his insurance carrier. He is aware that he may receive a bill and has provided verbal consent to proceed: Yes. Assessment & Plan:   Diagnoses and all orders for this visit:    1. Uncontrolled type 2 diabetes mellitus with complication, with long-term current use of insulin ([facility])  -     insulin detemir U-100 (Levemir U-100 Insulin) 100 unit/mL injection; 30 units every morning and at bedtime  -     glucose blood VI test strips (ASCENSIA AUTODISC VI, ONE TOUCH ULTRA TEST VI) strip; 3 times daily before meals  -     dulaglutide (Trulicity) 1.5 WL/4.6 mL sub-q pen; 0.5 mL by SubCUTAneous route every seven (7) days. 2. 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 ([facility] [address]    3. Dyslipidemia  -     atorvastatin (LIPITOR) 20 mg tablet; TAKE ONE TABLET BY MOUTH DAILY    4. NSVT (nonsustained ventricular tachycardia) ([facility])    5. Bell's palsy    6. Essential hypertension    1. The patient's diabetes is totally out of control. He is only taking 20 units of his insulin twice a day when in reality he should have been on 20 units in the morning and 30 units at bedtime but he never return for further titration. He stopped taking his Trulicity because of cost reasons but never followed up. 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 He most recently had an intraocular bleed in the left eye is gradually improving. I explained to him this is directly related to his noncompliance.   This is evident because last time he was in the office was well over a year ago. 2.  He has a significant increased cardiovascular risk and remains on a statin. 3.  For his nonsustained V. tach he has an AICD. This is been doing quite well fortunately he has kept his appointments for pacemaker assessment. 4.  His Bell's palsy is completely resolved. 5.  Finally he is taking his antihypertensive medication as prescribed. I cannot coarberate this because I cannot check his blood pressure and he has not had his pressure checked since I last saw him. I spent at least 40 minutes on this visit with this established patient. ([id number])    Subjective:   Curt Barnett is a 75 y.o. male who was seen for Annual Exam  Comes in stating that he has had eye problems with an apparent intraocular bleed. He has proliferative diabetic retinopathy complicated by macular edema. He has been getting macular injections. His visual acuity gradually improving. I explained to him that this is directly related to his diabetic noncompliance taken well over a decade to get to this point. He is not followed up for his medical problems specifically diabetes hypertension and dyslipidemia and well over a year. He is taken only a fraction of the insulin that I suggested never return for further titration. He is not taking his GLP-1 agonist for cost reasons. There is a tremendous disconnect between his understanding of the potential for microvascular complications from his diabetes specifically retinopathy and poorly controlled diabetes. He has a past history of primary hypertension and dyslipidemia. He follows up with his cardiologist for his defibrillator placement. Prior to Admission medications    Medication Sig Start Date End Date Taking? Authorizing Provider   aspirin delayed-release 81 mg tablet Take 81 mg by mouth daily.    Yes Provider, Historical   insulin detemir U-100 (Levemir U-100 Insulin) 100 unit/mL injection 30 units every morning and at bedtime 4/29/20  Yes Eris Delcid MD   atorvastatin (LIPITOR) 20 mg tablet TAKE ONE TABLET BY MOUTH DAILY 4/29/20  Yes Abbie Hudson MD   glucose blood VI test strips (ASCENSIA AUTODISC VI, ONE TOUCH ULTRA TEST VI) strip 3 times daily before meals 4/29/20  Yes Abbie Hudson MD   dulaglutide (Trulicity) 1.5 IM/4.8 mL sub-q pen 0.5 mL by SubCUTAneous route every seven (7) days. 4/29/20  Yes Abbie Hudson MD   amLODIPine (NORVASC) 10 mg tablet TAKE ONE TABLET BY MOUTH DAILY 1/1/20  Yes Abbie Hudson MD   Insulin Needles, Disposable, 31 gauge x 1/4\" ndle USE WITH INSULIN PENS 6/21/19  Yes Abbie Hudson MD   Insulin Needles, Disposable, 31 gauge x 5/16\" ndle USE TO INJECT INSULIN TWICE A DAY. DX.E11.9 6/21/19  Yes Abbie Hudson MD   metFORMIN (GLUCOPHAGE) 1,000 mg tablet TAKE ONE TABLET BY MOUTH TWICE A DAY WITH MEALS 5/25/19  Yes Abbie Hudson MD     No Known Allergies    Current Outpatient Medications   Medication Sig Dispense Refill    aspirin delayed-release 81 mg tablet Take 81 mg by mouth daily.  insulin detemir U-100 (Levemir U-100 Insulin) 100 unit/mL injection 30 units every morning and at bedtime 6 Vial 3    atorvastatin (LIPITOR) 20 mg tablet TAKE ONE TABLET BY MOUTH DAILY 90 Tab 3    glucose blood VI test strips (ASCENSIA AUTODISC VI, ONE TOUCH ULTRA TEST VI) strip 3 times daily before meals 300 Strip 3    dulaglutide (Trulicity) 1.5 QX/6.4 mL sub-q pen 0.5 mL by SubCUTAneous route every seven (7) days. 4 Syringe 11    amLODIPine (NORVASC) 10 mg tablet TAKE ONE TABLET BY MOUTH DAILY 90 Tab 3    Insulin Needles, Disposable, 31 gauge x 1/4\" ndle USE WITH INSULIN PENS 100 Pen Needle 11    Insulin Needles, Disposable, 31 gauge x 5/16\" ndle USE TO INJECT INSULIN TWICE A DAY.  DX.E11.9 100 Pen Needle 11    metFORMIN (GLUCOPHAGE) 1,000 mg tablet TAKE ONE TABLET BY MOUTH TWICE A DAY WITH MEALS 180 Tab 3     No Known Allergies  Past Medical History:   Diagnosis Date    Hypertension     ICD (implantable cardioverter-defibrillator) in place     NSVT (nonsustained ventricular tachycardia) ([facility] [address] 11/21/2018    EPS 11/21/2018 VT/VF      Past Surgical History:   Procedure Laterality Date    COLONOSCOPY  1/2/2015         HX HEART CATHETERIZATION      HX HEENT      l cararact removal     Family History   Problem Relation Age of Onset    Diabetes Mother     Hypertension Mother     Stroke Mother        ROS:14 point ROS neg. Objective:   Vital Signs: (As obtained by patient/caregiver at home)  There were no vitals taken for this visit.      [INSTRUCTIONS:  \"[x]\" Indicates a positive item  \"[]\" Indicates a negative item  -- DELETE ALL ITEMS NOT EXAMINED]    Constitutional: [x] Appears well-developed and well-nourished [x] No apparent distress      [] Abnormal -     Mental status: [x] Alert and awake  [x] Oriented to person/place/time [x] Able to follow commands    [] Abnormal -     Eyes:   EOM    [x]  Normal    [] Abnormal -   Sclera  [x]  Normal    [] Abnormal -          Discharge [x]  None visible   [] Abnormal -     HENT: [x] Normocephalic, atraumatic  [] Abnormal -   [x] Mouth/Throat: Mucous membranes are moist    External Ears [x] Normal  [] Abnormal -    Neck: [x] No visualized mass [] Abnormal -     Pulmonary/Chest: [x] Respiratory effort normal   [x] No visualized signs of difficulty breathing or respiratory distress        [] Abnormal -      Musculoskeletal:   [x] Normal gait with no signs of ataxia         [x] Normal range of motion of neck        [] Abnormal -     Neurological:        [x] No Facial Asymmetry (Cranial nerve 7 motor function) (limited exam due to video visit)          [x] No gaze palsy        [] Abnormal -          Skin:        [x] No significant exanthematous lesions or discoloration noted on facial skin         [] Abnormal -            Psychiatric:       [x] Normal Affect [] Abnormal -        [x] No Hallucinations    Other pertinent observable physical exam findings:-        We discussed the expected course, resolution and complications of the diagnosis(es) in detail. Medication risks, benefits, costs, interactions, and alternatives were discussed as indicated. I advised him to contact the office if his condition worsens, changes or fails to improve as anticipated. He expressed understanding with the diagnosis(es) and plan. Adrienne Mcgovern is a 75 y.o. male who was evaluated by a video visit encounter for concerns as above. Patient identification was verified prior to start of the visit. A caregiver was present when appropriate. Due to this being a TeleHealth encounter (During [facility] public [facility] emergency), evaluation of the following organ systems was limited: Vitals/Constitutional/EENT/Resp/CV/GI//MS/Neuro/Skin/Heme-Lymph-Imm. Pursuant to the emergency declaration under the [facility], [facility] waiver authority and the [employer] Act, this Virtual  Visit was conducted, with patient's (and/or legal guardian's) consent, to reduce the patient's risk of exposure to COVID-19 and provide necessary medical care. Services were provided through a video synchronous discussion virtually to substitute for in-person clinic visit. Patient and provider were located at their individual homes. Please note that this dictation was completed with [employer], the computer voice recognition software. Quite often unanticipated grammatical, syntax, homophones, and other interpretive errors are inadvertently transcribed by the computer software. Please disregard these errors. Please excuse any errors that have escaped final proofreading. Thank you.     Andre Estrada MD

## 2020-04-29 NOTE — PROGRESS NOTES
Chief Complaint   Patient presents with    Annual Exam       1. Have you been to the ER, urgent care clinic since your last visit? Hospitalized since your last visit? No    2. Have you seen or consulted any other health care providers outside of the [address] [place] [address] since your last visit? Include any pap smears or colon screening.  No

## 2020-05-14 ENCOUNTER — VIRTUAL VISIT (OUTPATIENT)
Dept: CARDIOLOGY CLINIC | Age: 65
End: 2020-05-14

## 2020-05-14 VITALS — SYSTOLIC BLOOD PRESSURE: 140 MMHG | BODY MASS INDEX: 23.81 KG/M2 | WEIGHT: 152 LBS | DIASTOLIC BLOOD PRESSURE: 110 MMHG

## 2020-05-14 DIAGNOSIS — I49.8 BRUGADA SYNDROME: ICD-10-CM

## 2020-05-14 DIAGNOSIS — I10 ESSENTIAL HYPERTENSION: ICD-10-CM

## 2020-05-14 DIAGNOSIS — I47.29 NSVT (NONSUSTAINED VENTRICULAR TACHYCARDIA): Primary | ICD-10-CM

## 2020-05-14 DIAGNOSIS — Z95.810 ICD (IMPLANTABLE CARDIOVERTER-DEFIBRILLATOR) IN PLACE: ICD-10-CM

## 2020-05-14 NOTE — PROGRESS NOTES
[facility]                                                                                  Analilia Yeager [facility]    Cardiology Telephone Encounter                                                         Pursuant to the emergency declaration under the [facility], [facility] waiver authority and the [employer] Act, this phone visit was conducted, with patient's consent, to reduce the patient's risk of exposure to COVID-19 and provide continuity of care for an established patient. He and/or health care decision maker is aware that that he may receive a bill for this telephone service, depending on his insurance coverage, and has provided verbal consent to proceed. This is a Patient Initiated Episode with an Established Patient who has not had a related appointment within my department in the past 7 days or scheduled within the next 24 hours. HISTORY OF PRESENTING ILLNESS      Jonny Delgado is a 75 y.o. male evaluated via telephone on 5/14/2020 due to COVID 19 restrictions. Patient unable to participate in Virtual Visit with synchronous audio/visual technology. Jonny Delgado  denies dyspnea, lower extremity edema, palpitations or syncope. One episode of tightness (a mo ago) in his chest during this pandemic. Rested and sx resolved within 10 min. He was unloading groceries at the time.       PCP Provider  Cielo Finnegan MD  Past Medical History:   Diagnosis Date    Hypertension     ICD (implantable cardioverter-defibrillator) in place     NSVT (nonsustained ventricular tachycardia) ([facility] [address] 11/21/2018    EPS 11/21/2018 VT/VF       Past Surgical History:   Procedure Laterality Date    COLONOSCOPY  1/2/2015         HX HEART CATHETERIZATION      HX HEENT      l cararact removal     No Known Allergies   Family History   Problem Relation Age of Onset    Diabetes Mother     Hypertension Mother     Stroke Mother       Current Outpatient Medications   Medication Sig    aspirin delayed-release 81 mg tablet Take 81 mg by mouth daily.  insulin detemir U-100 (Levemir U-100 Insulin) 100 unit/mL injection 30 units every morning and at bedtime    atorvastatin (LIPITOR) 20 mg tablet TAKE ONE TABLET BY MOUTH DAILY    glucose blood VI test strips (ASCENSIA AUTODISC VI, ONE TOUCH ULTRA TEST VI) strip 3 times daily before meals    dulaglutide (Trulicity) 1.5 ZV/9.5 mL sub-q pen 0.5 mL by SubCUTAneous route every seven (7) days.  amLODIPine (NORVASC) 10 mg tablet TAKE ONE TABLET BY MOUTH DAILY    Insulin Needles, Disposable, 31 gauge x 1/4\" ndle USE WITH INSULIN PENS    Insulin Needles, Disposable, 31 gauge x 5/16\" ndle USE TO INJECT INSULIN TWICE A DAY. DX.E11.9    metFORMIN (GLUCOPHAGE) 1,000 mg tablet TAKE ONE TABLET BY MOUTH TWICE A DAY WITH MEALS     No current facility-administered medications for this visit.        Vitals:    05/14/20 0912   BP: (!) 140/110   Weight: 152 lb (68.9 kg)     Social History     Socioeconomic History    Marital status: SINGLE     Spouse name: Not on file    Number of children: 0    Years of education: Not on file    Highest education level: Not on file   Occupational History    Occupation: [employer]   Social Needs    Financial resource strain: Not on file    Food insecurity     Worry: Not on file     Inability: Not on file   [employer] needs     Medical: Not on file     Non-medical: Not on file   Tobacco Use    Smoking status: Never Smoker    Smokeless tobacco: Never Used   Substance and Sexual Activity    Alcohol use: No    Drug use: No    Sexual activity: Yes     Partners: Female     Birth control/protection: None   Lifestyle    Physical activity     Days per week: Not on file     Minutes per session: Not on file    Stress: Not on file   Relationships    Social connections     Talks on phone: Not on file     Gets together: Not on file     Attends [employer] service: Not on file     Active member of club or organization: Not on file     Attends meetings of clubs or organizations: Not on file     Relationship status: Not on file    Intimate partner violence     Fear of current or ex partner: Not on file     Emotionally abused: Not on file     Physically abused: Not on file     Forced sexual activity: Not on file   Other Topics Concern    Not on file   Social History Narrative    Not on file       MEDICATIONS     Current Outpatient Medications   Medication Sig    aspirin delayed-release 81 mg tablet Take 81 mg by mouth daily.  insulin detemir U-100 (Levemir U-100 Insulin) 100 unit/mL injection 30 units every morning and at bedtime    atorvastatin (LIPITOR) 20 mg tablet TAKE ONE TABLET BY MOUTH DAILY    glucose blood VI test strips (ASCENSIA AUTODISC VI, ONE TOUCH ULTRA TEST VI) strip 3 times daily before meals    dulaglutide (Trulicity) 1.5 OY/4.2 mL sub-q pen 0.5 mL by SubCUTAneous route every seven (7) days.  amLODIPine (NORVASC) 10 mg tablet TAKE ONE TABLET BY MOUTH DAILY    Insulin Needles, Disposable, 31 gauge x 1/4\" ndle USE WITH INSULIN PENS    Insulin Needles, Disposable, 31 gauge x 5/16\" ndle USE TO INJECT INSULIN TWICE A DAY. DX.E11.9    metFORMIN (GLUCOPHAGE) 1,000 mg tablet TAKE ONE TABLET BY MOUTH TWICE A DAY WITH MEALS     No current facility-administered medications for this visit. I have reviewed the nurses notes, vitals, problem list, allergy list, medical history, family, social history and medications. REVIEW OF SYMPTOMS     General: Pt denies excessive weight gain or loss. Pt is able to conduct ADL's  HEENT: Denies blurred vision, headaches, hearing loss, epistaxis and difficulty swallowing. Respiratory: Denies cough, congestion, shortness of breath, RESENDIZ, wheezing or stridor.   Cardiovascular: Denies precordial pain, palpitations, edema or PND  Gastrointestinal: Denies poor appetite, indigestion, abdominal pain or blood in stool  Genitourinary: Denies hematuria, dysuria, increased urinary frequency  Musculoskeletal: Denies joint pain or swelling from muscles or joints  Neurologic: Denies tremor, paresthesias, headache, or sensory motor disturbance  Psychiatric: Denies confusion, insomnia, depression  Integumentray: Denies rash, itching or ulcers. Hematologic: Denies easy bruising, bleeding       PHYSICAL EXAM       Due to this being a telephone encounter a very limited exam was performed  Neurological: A&Ox3, no slurred speech, answering questions appropriately  Respiratory: Non labored, talking in complete sentences, no audible wheeze over the phone        ASSESSMENT        Diagnoses and all orders for this visit:    1. NSVT (nonsustained ventricular tachycardia) ([facility] [address]    2. Essential hypertension    3. Brugada syndrome    4. ICD (implantable cardioverter-defibrillator) in place        ICD-10-CM ICD-9-CM    1. NSVT (nonsustained ventricular tachycardia) ([facility]) I47.2 427.1    2. Essential hypertension I10 401.9    3. Brugada syndrome I49.8 746.89    4. ICD (implantable cardioverter-defibrillator) in place Z95.810 V45.02      No orders of the defined types were placed in this encounter. PLAN     Mr Kindra Wilson is seen in follow up s/p ICD 11/2018 for Brugada syndrome. He has no cardiac complaints. ICD remote interrogation demonstrates normal functioning with <1% RVP; no events.    One episode of tightness (a mo ago) in his chest during this pandemic. Rested and sx resolved within 10 min. He was unloading groceries at the time. No episodes prior or after. Encouraged to call back if he experiences any other episodes with known calcification of LAD. Ate watermelon with salt last night. Will keep an eye on his sodium/bp now. Continue medical management for DM, Brugada. TIME (15 Minutes) SPENT RELATED TO THIS PHONE ENCOUNTER    We discussed the expected course, resolution and complications of the diagnosis(es) in detail. Medication risks, benefits, costs, interactions, and alternatives were discussed as indicated. I advised him to contact the office if his condition worsens, changes or fails to improve as anticipated. He expressed understanding with the diagnosis(es) and plan     FOLLOW-UP       Patient was made aware and verbalized understanding that an appointment will be scheduled for them for a virtual visit and/or office visit within the above time frame. Patient understanding his/her responsibility to call and change time/date if he/she so chooses. Thank you, German Bridges MD for allowing me to participate in the care of this extraordinarily pleasant male. Please do not hesitate to contact me for further questions/concerns.      Analilia Johnson, ANP

## 2020-06-09 ENCOUNTER — OFFICE VISIT (OUTPATIENT)
Dept: INTERNAL MEDICINE CLINIC | Age: 65
End: 2020-06-09

## 2020-06-09 VITALS
OXYGEN SATURATION: 94 % | RESPIRATION RATE: 16 BRPM | TEMPERATURE: 98.9 F | DIASTOLIC BLOOD PRESSURE: 81 MMHG | HEIGHT: 67 IN | SYSTOLIC BLOOD PRESSURE: 120 MMHG | WEIGHT: 158.6 LBS | HEART RATE: 91 BPM | BODY MASS INDEX: 24.89 KG/M2

## 2020-06-09 DIAGNOSIS — L30.9 ECZEMA, UNSPECIFIED TYPE: ICD-10-CM

## 2020-06-09 DIAGNOSIS — I10 ESSENTIAL HYPERTENSION: Chronic | ICD-10-CM

## 2020-06-09 DIAGNOSIS — E78.5 DYSLIPIDEMIA: ICD-10-CM

## 2020-06-09 DIAGNOSIS — I47.29 NSVT (NONSUSTAINED VENTRICULAR TACHYCARDIA): Chronic | ICD-10-CM

## 2020-06-09 NOTE — PROGRESS NOTES
Chief Complaint   Patient presents with    Diabetes     6 week follow up      1. Have you been to the ER, urgent care clinic since your last visit? Hospitalized since your last visit? No    2. Have you seen or consulted any other health care providers outside of the [address] [place] [address] since your last visit? Include any pap smears or colon screening.  No

## 2020-06-09 NOTE — PROGRESS NOTES
[address] [facility]  [place] [address]  [address] [place] [address]  Phone:  [phone]  Fax: [phone]       Chief Complaint   Patient presents with    Diabetes     6 week follow up    . SUBJECTIVE:    Mora Stringer is a 75 y.o. male Comes in for return visit stating that he is doing reasonably well. Blood sugars have been much improved compared to previously. Average fasting blood sugar is in the 100 range, but a.c. dinner sugars are generally in the 150-180 range. He is currently taking Levemir 30 units a.c. q.a.m. and q.h.s., along with Trulicity, which is once a week. He has not had any interventional hypoglycemia. He is getting his proliferative diabetic retinopathy addressed with injections into the macula. He has a past history of primary hypertension and dyslipidemia. He is now much more compliant as compared to before. Current Outpatient Medications   Medication Sig Dispense Refill    aspirin delayed-release 81 mg tablet Take 81 mg by mouth daily.  insulin detemir U-100 (Levemir U-100 Insulin) 100 unit/mL injection 30 units every morning and at bedtime 6 Vial 3    atorvastatin (LIPITOR) 20 mg tablet TAKE ONE TABLET BY MOUTH DAILY 90 Tab 3    glucose blood VI test strips (ASCENSIA AUTODISC VI, ONE TOUCH ULTRA TEST VI) strip 3 times daily before meals 300 Strip 3    dulaglutide (Trulicity) 1.5 TW/6.9 mL sub-q pen 0.5 mL by SubCUTAneous route every seven (7) days. 4 Syringe 11    amLODIPine (NORVASC) 10 mg tablet TAKE ONE TABLET BY MOUTH DAILY 90 Tab 3    Insulin Needles, Disposable, 31 gauge x 1/4\" ndle USE WITH INSULIN PENS 100 Pen Needle 11    Insulin Needles, Disposable, 31 gauge x 5/16\" ndle USE TO INJECT INSULIN TWICE A DAY.  DX.E11.9 100 Pen Needle 11    metFORMIN (GLUCOPHAGE) 1,000 mg tablet TAKE ONE TABLET BY MOUTH TWICE A DAY WITH MEALS 180 Tab 3     Past Medical History:   Diagnosis Date    Hypertension     ICD (implantable cardioverter-defibrillator) in place     NSVT (nonsustained ventricular tachycardia) ([facility] [address] 11/21/2018    EPS 11/21/2018 VT/VF      Past Surgical History:   Procedure Laterality Date    COLONOSCOPY  1/2/2015         HX HEART CATHETERIZATION      HX HEENT      l cararact removal     No Known Allergies      REVIEW OF SYSTEMS:  General: negative for - chills or fever  ENT: negative for - headaches, nasal congestion or tinnitus  Respiratory: negative for - cough, hemoptysis, shortness of breath or wheezing  Cardiovascular : negative for - chest pain, edema, palpitations or shortness of breath  Gastrointestinal: negative for - abdominal pain, blood in stools, heartburn or nausea/vomiting  Genito-Urinary: no dysuria, trouble voiding, or hematuria  Musculoskeletal: negative for - gait disturbance, joint pain, joint stiffness or joint swelling  Neurological: no TIA or stroke symptoms  Hematologic: no bruises, no bleeding, no swollen glands  Integument: no lumps, mole changes, nail changes or rash  Endocrine: no malaise/lethargy or unexpected weight changes      Social History     Socioeconomic History    Marital status: SINGLE     Spouse name: Not on file    Number of children: 0    Years of education: Not on file    Highest education level: Not on file   Occupational History    Occupation: [employer]   Tobacco Use    Smoking status: Never Smoker    Smokeless tobacco: Never Used   Substance and Sexual Activity    Alcohol use: No    Drug use: No    Sexual activity: Yes     Partners: Female     Birth control/protection: None     Family History   Problem Relation Age of Onset    Diabetes Mother     Hypertension Mother     Stroke Mother        OBJECTIVE:    Visit Vitals  /81   Pulse 91   Temp 98.9 °F (37.2 °C) (Oral)   Resp 16   Ht 5' 7\" (1.702 m)   Wt 158 lb 9.6 oz (71.9 kg)   SpO2 94%   BMI 24.84 kg/m²     CONSTITUTIONAL: well , well nourished, appears age appropriate  EYES: perrla, eom intact  ENMT:moist mucous membranes, pharynx clear  NECK: supple. Thyroid normal  RESPIRATORY: Chest: clear to ascultation and percussion   CARDIOVASCULAR: Heart: regular rate and rhythm  GASTROINTESTINAL: Abdomen: soft, bowel sounds active  HEMATOLOGIC: no pathological lymph nodes palpated  MUSCULOSKELETAL: Extremities: no edema, pulse 1+   INTEGUMENT: No unusual rashes or suspicious skin lesions noted. Nails appear normal.  NEUROLOGIC: non-focal exam   MENTAL STATUS: alert and oriented, appropriate affect      ASSESSMENT:  1. Uncontrolled type 2 diabetes mellitus with complication, with long-term current use of insulin ([address]    2. Dyslipidemia    3. Eczema, unspecified type    4. Essential hypertension    5. NSVT (nonsustained ventricular tachycardia) ([facility])        PLAN:    1. Hopefully his diabetes is doing well. I will await the results of his hemoglobin A1c. I remind him of the importance of eating at the same time every day and eliminating his consumption of processed carbohydrates, which he really does not do based on maintenance of his weight. 2. He will continue statin as prescribed and efficacy and compliance will be assessed. 3. He does have eczema and nothing need be done at this point. I suspect this is just the time of the year where it is not a major problem as compared to winter months. 4. BP is excellent, no adjustments are made. 5. He does have his AICD placement for his non sustained V-tach. Follow-up and Dispositions    · Return in about 3 months (around 9/9/2020).            Augustus Denny MD

## 2020-06-11 LAB
ALBUMIN/CREAT UR: 68 MG/G CREAT (ref 0–29)
CHOLEST SERPL-MCNC: 151 MG/DL (ref 100–199)
CREAT UR-MCNC: 226.5 MG/DL
EST. AVERAGE GLUCOSE BLD GHB EST-MCNC: 278 MG/DL
HBA1C MFR BLD: 11.3 % (ref 4.8–5.6)
HDLC SERPL-MCNC: 63 MG/DL
LDLC SERPL CALC-MCNC: 74 MG/DL (ref 0–99)
MICROALBUMIN UR-MCNC: 153.8 UG/ML
TRIGL SERPL-MCNC: 70 MG/DL (ref 0–149)
VLDLC SERPL CALC-MCNC: 14 MG/DL (ref 5–40)

## 2020-06-14 NOTE — PROGRESS NOTES
See me ASAP regarding this patient's diabetes-----find out exactly how much insulin he is taking daily.

## 2020-06-16 NOTE — PROGRESS NOTES
Patient advised to to change Levemir -40 units q am and 30 units q pm per Dr. Gadiel Pro. Pt verbalized understanding.

## 2020-07-14 ENCOUNTER — APPOINTMENT (OUTPATIENT)
Dept: GENERAL RADIOLOGY | Age: 65
End: 2020-07-14
Attending: EMERGENCY MEDICINE
Payer: COMMERCIAL

## 2020-07-14 ENCOUNTER — HOSPITAL ENCOUNTER (EMERGENCY)
Age: 65
Discharge: HOME | End: 2020-07-14
Attending: EMERGENCY MEDICINE
Payer: COMMERCIAL

## 2020-07-14 VITALS
DIASTOLIC BLOOD PRESSURE: 70 MMHG | HEIGHT: 67 IN | WEIGHT: 153 LBS | TEMPERATURE: 98.3 F | OXYGEN SATURATION: 98 % | HEART RATE: 68 BPM | SYSTOLIC BLOOD PRESSURE: 128 MMHG | RESPIRATION RATE: 12 BRPM | BODY MASS INDEX: 24.01 KG/M2

## 2020-07-14 DIAGNOSIS — R55 SYNCOPE AND COLLAPSE: Primary | ICD-10-CM

## 2020-07-14 DIAGNOSIS — R11.0 NAUSEA WITHOUT VOMITING: ICD-10-CM

## 2020-07-14 LAB
ALBUMIN SERPL-MCNC: 3.7 G/DL (ref 3.5–5)
ALBUMIN/GLOB SERPL: 1 {RATIO} (ref 1.1–2.2)
ALP SERPL-CCNC: 74 U/L (ref 45–117)
ALT SERPL-CCNC: 36 U/L (ref 12–78)
ANION GAP SERPL CALC-SCNC: 6 MMOL/L (ref 5–15)
APPEARANCE UR: CLEAR
AST SERPL-CCNC: 28 U/L (ref 15–37)
BACTERIA URNS QL MICRO: NEGATIVE /HPF
BASOPHILS # BLD: 0 K/UL (ref 0–0.1)
BASOPHILS NFR BLD: 1 % (ref 0–1)
BILIRUB SERPL-MCNC: 0.6 MG/DL (ref 0.2–1)
BILIRUB UR QL: NEGATIVE
BUN SERPL-MCNC: 29 MG/DL (ref 6–20)
BUN/CREAT SERPL: 26 (ref 12–20)
CALCIUM SERPL-MCNC: 8.9 MG/DL (ref 8.5–10.1)
CHLORIDE SERPL-SCNC: 106 MMOL/L (ref 97–108)
CK MB CFR SERPL CALC: 1.1 % (ref 0–2.5)
CK MB SERPL-MCNC: 4.4 NG/ML (ref 5–25)
CK SERPL-CCNC: 386 U/L (ref 39–308)
CO2 SERPL-SCNC: 29 MMOL/L (ref 21–32)
COLOR UR: ABNORMAL
CREAT SERPL-MCNC: 1.12 MG/DL (ref 0.7–1.3)
DIFFERENTIAL METHOD BLD: ABNORMAL
EOSINOPHIL # BLD: 0.2 K/UL (ref 0–0.4)
EOSINOPHIL NFR BLD: 3 % (ref 0–7)
EPITH CASTS URNS QL MICRO: ABNORMAL /LPF
ERYTHROCYTE [DISTWIDTH] IN BLOOD BY AUTOMATED COUNT: 13.2 % (ref 11.5–14.5)
GLOBULIN SER CALC-MCNC: 3.7 G/DL (ref 2–4)
GLUCOSE SERPL-MCNC: 146 MG/DL (ref 65–100)
GLUCOSE UR STRIP.AUTO-MCNC: NEGATIVE MG/DL
HCT VFR BLD AUTO: 36.4 % (ref 36.6–50.3)
HGB BLD-MCNC: 12.2 G/DL (ref 12.1–17)
HGB UR QL STRIP: NEGATIVE
HYALINE CASTS URNS QL MICRO: ABNORMAL /LPF (ref 0–5)
IMM GRANULOCYTES # BLD AUTO: 0 K/UL (ref 0–0.04)
IMM GRANULOCYTES NFR BLD AUTO: 0 % (ref 0–0.5)
KETONES UR QL STRIP.AUTO: NEGATIVE MG/DL
LEUKOCYTE ESTERASE UR QL STRIP.AUTO: NEGATIVE
LYMPHOCYTES # BLD: 1.2 K/UL (ref 0.8–3.5)
LYMPHOCYTES NFR BLD: 20 % (ref 12–49)
MAGNESIUM SERPL-MCNC: 1.8 MG/DL (ref 1.6–2.4)
MCH RBC QN AUTO: 27.6 PG (ref 26–34)
MCHC RBC AUTO-ENTMCNC: 33.5 G/DL (ref 30–36.5)
MCV RBC AUTO: 82.4 FL (ref 80–99)
MONOCYTES # BLD: 0.4 K/UL (ref 0–1)
MONOCYTES NFR BLD: 8 % (ref 5–13)
NEUTS SEG # BLD: 4 K/UL (ref 1.8–8)
NEUTS SEG NFR BLD: 68 % (ref 32–75)
NITRITE UR QL STRIP.AUTO: NEGATIVE
NRBC # BLD: 0 K/UL (ref 0–0.01)
NRBC BLD-RTO: 0 PER 100 WBC
PH UR STRIP: 6 [PH] (ref 5–8)
PLATELET # BLD AUTO: 208 K/UL (ref 150–400)
PMV BLD AUTO: 10.1 FL (ref 8.9–12.9)
POTASSIUM SERPL-SCNC: 3.9 MMOL/L (ref 3.5–5.1)
PROT SERPL-MCNC: 7.4 G/DL (ref 6.4–8.2)
PROT UR STRIP-MCNC: 30 MG/DL
RBC # BLD AUTO: 4.42 M/UL (ref 4.1–5.7)
RBC #/AREA URNS HPF: ABNORMAL /HPF (ref 0–5)
SODIUM SERPL-SCNC: 141 MMOL/L (ref 136–145)
SP GR UR REFRACTOMETRY: 1.01 (ref 1–1.03)
TROPONIN I SERPL-MCNC: <0.05 NG/ML
UROBILINOGEN UR QL STRIP.AUTO: 0.2 EU/DL (ref 0.2–1)
WBC # BLD AUTO: 5.9 K/UL (ref 4.1–11.1)
WBC URNS QL MICRO: ABNORMAL /HPF (ref 0–4)

## 2020-07-14 PROCEDURE — 84484 ASSAY OF TROPONIN QUANT: CPT

## 2020-07-14 PROCEDURE — 85025 COMPLETE CBC W/AUTO DIFF WBC: CPT

## 2020-07-14 PROCEDURE — 99284 EMERGENCY DEPT VISIT MOD MDM: CPT

## 2020-07-14 PROCEDURE — 71045 X-RAY EXAM CHEST 1 VIEW: CPT

## 2020-07-14 PROCEDURE — 83735 ASSAY OF MAGNESIUM: CPT

## 2020-07-14 PROCEDURE — 36415 COLL VENOUS BLD VENIPUNCTURE: CPT

## 2020-07-14 PROCEDURE — 96374 THER/PROPH/DIAG INJ IV PUSH: CPT

## 2020-07-14 PROCEDURE — 82553 CREATINE MB FRACTION: CPT

## 2020-07-14 PROCEDURE — 74011250636 HC RX REV CODE- 250/636: Performed by: EMERGENCY MEDICINE

## 2020-07-14 PROCEDURE — 80053 COMPREHEN METABOLIC PANEL: CPT

## 2020-07-14 PROCEDURE — 82550 ASSAY OF CK (CPK): CPT

## 2020-07-14 PROCEDURE — 93005 ELECTROCARDIOGRAM TRACING: CPT

## 2020-07-14 PROCEDURE — 81001 URINALYSIS AUTO W/SCOPE: CPT

## 2020-07-14 RX ORDER — METOCLOPRAMIDE HYDROCHLORIDE 5 MG/ML
5 INJECTION INTRAMUSCULAR; INTRAVENOUS
Status: COMPLETED | OUTPATIENT
Start: 2020-07-14 | End: 2020-07-14

## 2020-07-14 RX ORDER — METOCLOPRAMIDE 5 MG/1
5 TABLET ORAL
Qty: 12 TAB | Refills: 0 | Status: SHIPPED | OUTPATIENT
Start: 2020-07-14 | End: 2020-07-24

## 2020-07-14 RX ADMIN — SODIUM CHLORIDE 500 ML: 900 INJECTION, SOLUTION INTRAVENOUS at 09:38

## 2020-07-14 RX ADMIN — METOCLOPRAMIDE 5 MG: 5 INJECTION, SOLUTION INTRAMUSCULAR; INTRAVENOUS at 08:13

## 2020-07-14 RX ADMIN — METOCLOPRAMIDE 5 MG: 5 INJECTION, SOLUTION INTRAMUSCULAR; INTRAVENOUS at 09:38

## 2020-07-14 NOTE — ED PROVIDER NOTES
EMERGENCY DEPARTMENT HISTORY AND PHYSICAL EXAM      Date: 7/14/2020  Patient Name: Taz Daniels    History of Presenting Illness     Chief Complaint   Patient presents with    Syncope       History Provided By: Patient and EMS    HPI: Taz Daniels, 75 y.o. male presents to the ED with cc of syncope. Patient states that he went to the bathroom this morning. Initially, he had to urinate, and then he noticed that he needed to have a bowel movement. He does not recall passing out. His girlfriend called for him and he did not respond. She is the one who told him that he had passed out. He did not injure himself. He did strain when he had his bowel movement but not significantly. The last time he passed out he had to have an ICD placed. He states that he has been eating sufficiently, but maybe not drinking as much as he needs to. He denies vomiting, but states he had nausea at least once a week over the last 3 weeks. His nausea had subsided, but he states it started to come back. He denies chest pain, shortness of breath, cough, fever or chills. He also denies abdominal pain or headache. There are no other complaints, changes, or physical findings at this time. PCP: Ericka Prescott MD    No current facility-administered medications on file prior to encounter. Current Outpatient Medications on File Prior to Encounter   Medication Sig Dispense Refill    aspirin delayed-release 81 mg tablet Take 81 mg by mouth daily.  insulin detemir U-100 (Levemir U-100 Insulin) 100 unit/mL injection 30 units every morning and at bedtime 6 Vial 3    atorvastatin (LIPITOR) 20 mg tablet TAKE ONE TABLET BY MOUTH DAILY 90 Tab 3    glucose blood VI test strips (ASCENSIA AUTODISC VI, ONE TOUCH ULTRA TEST VI) strip 3 times daily before meals 300 Strip 3    dulaglutide (Trulicity) 1.5 CE/5.5 mL sub-q pen 0.5 mL by SubCUTAneous route every seven (7) days.  4 Syringe 11    amLODIPine (NORVASC) 10 mg tablet TAKE ONE TABLET BY MOUTH DAILY 90 Tab 3    Insulin Needles, Disposable, 31 gauge x 1/4\" ndle USE WITH INSULIN PENS 100 Pen Needle 11    Insulin Needles, Disposable, 31 gauge x 5/16\" ndle USE TO INJECT INSULIN TWICE A DAY. DX.E11.9 100 Pen Needle 11    metFORMIN (GLUCOPHAGE) 1,000 mg tablet TAKE ONE TABLET BY MOUTH TWICE A DAY WITH MEALS 180 Tab 3       Past History     Past Medical History:  Past Medical History:   Diagnosis Date    Hypertension     ICD (implantable cardioverter-defibrillator) in place     NSVT (nonsustained ventricular tachycardia) ([facility] [address] 11/21/2018    EPS 11/21/2018 VT/VF        Past Surgical History:  Past Surgical History:   Procedure Laterality Date    COLONOSCOPY  1/2/2015         HX HEART CATHETERIZATION      HX HEENT      l cararact removal       Family History:  Family History   Problem Relation Age of Onset    Diabetes Mother     Hypertension Mother     Stroke Mother        Social History:  Social History     Tobacco Use    Smoking status: Never Smoker    Smokeless tobacco: Never Used   Substance Use Topics    Alcohol use: No    Drug use: No       Allergies:  No Known Allergies      Review of Systems   Review of Systems   Constitutional: Negative for fever. HENT: Negative for congestion. Eyes: Negative. Respiratory: Negative for shortness of breath. Cardiovascular: Negative for chest pain. Gastrointestinal: Positive for nausea. Negative for abdominal pain. Endocrine: Negative for heat intolerance. Genitourinary: Negative for dysuria. Musculoskeletal: Negative for back pain. Skin: Negative for rash. Allergic/Immunologic: Negative for immunocompromised state. Neurological: Positive for syncope. Hematological: Does not bruise/bleed easily. Psychiatric/Behavioral: Negative. All other systems reviewed and are negative. Physical Exam   Physical Exam  Vitals signs and nursing note reviewed.    Constitutional:       General: He is not in acute distress. Appearance: He is well-developed. Comments: Drowsy   HENT:      Head: Normocephalic and atraumatic. Neck:      Musculoskeletal: Normal range of motion. Cardiovascular:      Rate and Rhythm: Normal rate and regular rhythm. Heart sounds: Normal heart sounds. Pulmonary:      Effort: Pulmonary effort is normal.      Breath sounds: Normal breath sounds. Abdominal:      General: Bowel sounds are normal.      Palpations: Abdomen is soft. Musculoskeletal: Normal range of motion. Skin:     General: Skin is warm and dry. Neurological:      General: No focal deficit present. Mental Status: He is alert and oriented to person, place, and time. Coordination: Coordination normal.   Psychiatric:         Mood and Affect: Mood normal.         Behavior: Behavior normal.         Diagnostic Study Results     Labs -     Recent Results (from the past 12 hour(s))   CBC WITH AUTOMATED DIFF    Collection Time: 07/14/20  7:51 AM   Result Value Ref Range    WBC 5.9 4.1 - 11.1 K/uL    RBC 4.42 4.10 - 5.70 M/uL    HGB 12.2 12.1 - 17.0 g/dL    HCT 36.4 (L) 36.6 - 50.3 %    MCV 82.4 80.0 - 99.0 FL    MCH 27.6 26.0 - 34.0 PG    MCHC 33.5 30.0 - 36.5 g/dL    RDW 13.2 11.5 - 14.5 %    PLATELET 734 401 - 055 K/uL    MPV 10.1 8.9 - 12.9 FL    NRBC 0.0 0  WBC    ABSOLUTE NRBC 0.00 0.00 - 0.01 K/uL    NEUTROPHILS 68 32 - 75 %    LYMPHOCYTES 20 12 - 49 %    MONOCYTES 8 5 - 13 %    EOSINOPHILS 3 0 - 7 %    BASOPHILS 1 0 - 1 %    IMMATURE GRANULOCYTES 0 0.0 - 0.5 %    ABS. NEUTROPHILS 4.0 1.8 - 8.0 K/UL    ABS. LYMPHOCYTES 1.2 0.8 - 3.5 K/UL    ABS. MONOCYTES 0.4 0.0 - 1.0 K/UL    ABS. EOSINOPHILS 0.2 0.0 - 0.4 K/UL    ABS. BASOPHILS 0.0 0.0 - 0.1 K/UL    ABS. IMM.  GRANS. 0.0 0.00 - 0.04 K/UL    DF AUTOMATED     METABOLIC PANEL, COMPREHENSIVE    Collection Time: 07/14/20  7:51 AM   Result Value Ref Range    Sodium 141 136 - 145 mmol/L    Potassium 3.9 3.5 - 5.1 mmol/L    Chloride 106 97 - 108 mmol/L CO2 29 21 - 32 mmol/L    Anion gap 6 5 - 15 mmol/L    Glucose 146 (H) 65 - 100 mg/dL    BUN 29 (H) 6 - 20 MG/DL    Creatinine 1.12 0.70 - 1.30 MG/DL    BUN/Creatinine ratio 26 (H) 12 - 20      GFR est AA >60 >60 ml/min/1.73m2    GFR est non-AA >60 >60 ml/min/1.73m2    Calcium 8.9 8.5 - 10.1 MG/DL    Bilirubin, total 0.6 0.2 - 1.0 MG/DL    ALT (SGPT) 36 12 - 78 U/L    AST (SGOT) 28 15 - 37 U/L    Alk. phosphatase 74 45 - 117 U/L    Protein, total 7.4 6.4 - 8.2 g/dL    Albumin 3.7 3.5 - 5.0 g/dL    Globulin 3.7 2.0 - 4.0 g/dL    A-G Ratio 1.0 (L) 1.1 - 2.2     CK W/ REFLX CKMB    Collection Time: 07/14/20  7:51 AM   Result Value Ref Range     (H) 39 - 308 U/L   TROPONIN I    Collection Time: 07/14/20  7:51 AM   Result Value Ref Range    Troponin-I, Qt. <0.05 <0.05 ng/mL   MAGNESIUM    Collection Time: 07/14/20  7:51 AM   Result Value Ref Range    Magnesium 1.8 1.6 - 2.4 mg/dL   CK-MB,QUANT. Collection Time: 07/14/20  7:51 AM   Result Value Ref Range    CK - MB 4.4 (H) <3.6 NG/ML    CK-MB Index 1.1 0.0 - 2.5     URINALYSIS W/ RFLX MICROSCOPIC    Collection Time: 07/14/20 10:27 AM   Result Value Ref Range    Color YELLOW/STRAW      Appearance CLEAR CLEAR      Specific gravity 1.014 1.003 - 1.030      pH (UA) 6.0 5.0 - 8.0      Protein 30 (A) NEG mg/dL    Glucose Negative NEG mg/dL    Ketone Negative NEG mg/dL    Bilirubin Negative NEG      Blood Negative NEG      Urobilinogen 0.2 0.2 - 1.0 EU/dL    Nitrites Negative NEG      Leukocyte Esterase Negative NEG      WBC 0-4 0 - 4 /hpf    RBC 0-5 0 - 5 /hpf    Epithelial cells FEW FEW /lpf    Bacteria Negative NEG /hpf    Hyaline cast 0-2 0 - 5 /lpf       Radiologic Studies -   XR CHEST PORT   Final Result   IMPRESSION:   1. No acute process        CT Results  (Last 48 hours)    None        CXR Results  (Last 48 hours)    None          Medical Decision Making   I am the first provider for this patient.     I reviewed the vital signs, available nursing notes, past medical history, past surgical history, family history and social history. Vital Signs-Reviewed the patient's vital signs. Patient Vitals for the past 12 hrs:   Temp Pulse BP SpO2   07/14/20 0744 98.3 °F (36.8 °C) 75 126/80 97 %       EKG interpretation: (Preliminary)  Rhythm: normal sinus rhythm; and regular . Rate (approx.): 76; Axis: left axis deviation; MO interval: normal; QRS interval: normal ; ST/T wave: non-specific changes; Other findings: unchanged from previous ekg. Records Reviewed: Nursing Notes, Old Medical Records, Previous electrocardiograms, Ambulance Run Sheet, Previous Radiology Studies and Previous Laboratory Studies    Provider Notes (Medical Decision Making):   Arrhythmia, syncope, anemia, electrolyte abnormality, dehydration,    ED Course:   Initial assessment performed. The patients presenting problems have been discussed, and they are in agreement with the care plan formulated and outlined with them. I have encouraged them to ask questions as they arise throughout their visit. Progress note:    Patient's interrogation of his ICD was unremarkable. The patient is feeling better. He is advised to follow-up and return to ER if worse       Critical Care Time:   0    Disposition:  home    DISCHARGE PLAN:  1. Discharge Medication List as of 7/14/2020 11:07 AM      START taking these medications    Details   metoclopramide HCl (REGLAN) 5 mg tablet Take 1 Tab by mouth every six (6) hours as needed for Nausea for up to 10 days. , Normal, Disp-12 Tab,R-0         CONTINUE these medications which have NOT CHANGED    Details   aspirin delayed-release 81 mg tablet Take 81 mg by mouth daily. , Historical Med      insulin detemir U-100 (Levemir U-100 Insulin) 100 unit/mL injection 30 units every morning and at bedtime, Normal, Disp-6 Vial, R-3      atorvastatin (LIPITOR) 20 mg tablet TAKE ONE TABLET BY MOUTH DAILY, Normal, Disp-90 Tab, R-3      glucose blood VI test strips (ASCENSIA AUTODISC VI, ONE TOUCH ULTRA TEST VI) strip 3 times daily before meals, Normal, Disp-300 Strip, R-3      dulaglutide (Trulicity) 1.5 BB/6.9 mL sub-q pen 0.5 mL by SubCUTAneous route every seven (7) days. , Normal, Disp-4 Syringe, R-11      amLODIPine (NORVASC) 10 mg tablet TAKE ONE TABLET BY MOUTH DAILY, Normal, Disp-90 Tab, R-3      Insulin Needles, Disposable, 31 gauge x 1/4\" ndle USE WITH INSULIN PENS, Normal, Disp-100 Pen Needle, R-11      Insulin Needles, Disposable, 31 gauge x 5/16\" ndle USE TO INJECT INSULIN TWICE A DAY. DX.E11.9, Normal, Disp-100 Pen Needle, R-11      metFORMIN (GLUCOPHAGE) 1,000 mg tablet TAKE ONE TABLET BY MOUTH TWICE A DAY WITH MEALS, Normal, Disp-180 Tab, R-3           2. Follow-up Information     Follow up With Specialties Details Why Contact Info    Harlin Habermann, MD Internal Medicine In 2 days As needed [address] [place] [address] [address]  [place] [address] [phone]      [facility] EMERGENCY DEPT Emergency Medicine  If symptoms worsen [address] [facility] [address]   [address] [address] [phone]        3. Return to ED if worse     Diagnosis     Clinical Impression:   1. Syncope and collapse    2. Nausea without vomiting        Attestations:    Salvador Fan MD    Please note that this dictation was completed with [employer], the computer voice recognition software. Quite often unanticipated grammatical, syntax, homophones, and other interpretive errors are inadvertently transcribed by the computer software. Please disregard these errors. Please excuse any errors that have escaped final proofreading. Thank you.

## 2020-07-14 NOTE — ED NOTES
Discharge instruction reviewed by provider. Patient verbalized understanding. Opportunity for questions provided. Patient discharged with his brother via wheelchair.

## 2020-07-14 NOTE — ED NOTES
Issue with [facility]. On the phone with Panchito Marx from [facility] who is investigating the issue. Waiting for a call back.

## 2020-07-14 NOTE — ED TRIAGE NOTES
Arrived by EMS for syncopal episode on the toilet. Denies chest pain. Associated with nausea. [id number]   Pacemaker check normal per [facility]. Dr. Nain Camejo notified.

## 2020-07-14 NOTE — DISCHARGE INSTRUCTIONS
Patient Education        Fainting: Care Instructions  Your Care Instructions     When you faint, or pass out, you lose consciousness for a short time. A brief drop in blood flow to the brain often causes it. When you fall or lie down, more blood flows to your brain and you regain consciousness. Emotional stress, pain, or overheating--especially if you have been standing--can make you faint. In these cases, fainting is usually not serious. But fainting can be a sign of a more serious problem. Your doctor may want you to have more tests to rule out other causes. The treatment you need depends on the reason why you fainted. The doctor has checked you carefully, but problems can develop later. If you notice any problems or new symptoms, get medical treatment right away. Follow-up care is a key part of your treatment and safety. Be sure to make and go to all appointments, and call your doctor if you are having problems. It's also a good idea to know your test results and keep a list of the medicines you take. How can you care for yourself at home? · Drink plenty of fluids to prevent dehydration. If you have kidney, heart, or liver disease and have to limit fluids, talk with your doctor before you increase your fluid intake. When should you call for help? [id number] anytime you think you may need emergency care. For example, call if:  · You have symptoms of a heart problem. These may include:  ? Chest pain or pressure. ? Severe trouble breathing. ? A fast or irregular heartbeat. ? Lightheadedness or sudden weakness. ? Coughing up pink, foamy mucus. ? Passing out. After you call 911, the  may tell you to chew 1 adult-strength or 2 to 4 low-dose aspirin. Wait for an ambulance. Do not try to drive yourself. · You have symptoms of a stroke. These may include:  ? Sudden numbness, tingling, weakness, or loss of movement in your face, arm, or leg, especially on only one side of your body.   ? Sudden vision changes. ? Sudden trouble speaking. ? Sudden confusion or trouble understanding simple statements. ? Sudden problems with walking or balance. ? A sudden, severe headache that is different from past headaches. · You passed out (lost consciousness) again. Watch closely for changes in your health, and be sure to contact your doctor if:  · You do not get better as expected. Where can you learn more? Go to http://delgado-bar.info/  Enter A848 in the search box to learn more about \"Fainting: Care Instructions. \"  Current as of: June 26, 2019               Content Version: 12.5  © 8480-5131 [employer]. Care instructions adapted under license by [employer] (which disclaims liability or warranty for this information). If you have questions about a medical condition or this instruction, always ask your healthcare professional. [address] any warranty or liability for your use of this information. Patient Education        Nausea and Vomiting: Care Instructions  Your Care Instructions     When you are nauseated, you may feel weak and sweaty and notice a lot of saliva in your mouth. Nausea often leads to vomiting. Most of the time you do not need to worry about nausea and vomiting, but they can be signs of other illnesses. Two common causes of nausea and vomiting are stomach flu and food poisoning. Nausea and vomiting from viral stomach flu will usually start to improve within 24 hours. Nausea and vomiting from food poisoning may last from 12 to 48 hours. The doctor has checked you carefully, but problems can develop later. If you notice any problems or new symptoms, get medical treatment right away. Follow-up care is a key part of your treatment and safety. Be sure to make and go to all appointments, and call your doctor if you are having problems.  It's also a good idea to know your test results and keep a list of the medicines you take.  How can you care for yourself at home? · To prevent dehydration, drink plenty of fluids, enough so that your urine is light yellow or clear like water. Choose water and other caffeine-free clear liquids until you feel better. If you have kidney, heart, or liver disease and have to limit fluids, talk with your doctor before you increase the amount of fluids you drink. · Rest in bed until you feel better. · When you are able to eat, try clear soups, mild foods, and liquids until all symptoms are gone for 12 to 48 hours. Other good choices include dry toast, crackers, cooked cereal, and gelatin dessert, such as Jell-O. When should you call for help? [id number] anytime you think you may need emergency care. For example, call if:  · You passed out (lost consciousness). Call your doctor now or seek immediate medical care if:  · You have symptoms of dehydration, such as:  ? Dry eyes and a dry mouth. ? Passing only a little dark urine. ? Feeling thirstier than usual.  · You have new or worsening belly pain. · You have a new or higher fever. · You vomit blood or what looks like coffee grounds. Watch closely for changes in your health, and be sure to contact your doctor if:  · You have ongoing nausea and vomiting. · Your vomiting is getting worse. · Your vomiting lasts longer than 2 days. · You are not getting better as expected. Where can you learn more? Go to http://delgado-bar.info/  Enter H591 in the search box to learn more about \"Nausea and Vomiting: Care Instructions. \"  Current as of: June 26, 2019               Content Version: 12.5  © 7268-9789 [employer]. Care instructions adapted under license by [employer] (which disclaims liability or warranty for this information).  If you have questions about a medical condition or this instruction, always ask your healthcare professional. [place] [address] any warranty or liability for your use of this information.

## 2020-07-15 ENCOUNTER — PATIENT OUTREACH (OUTPATIENT)
Dept: CASE MANAGEMENT | Age: 65
End: 2020-07-15

## 2020-07-15 LAB
ATRIAL RATE: 76 BPM
CALCULATED P AXIS, ECG09: 44 DEGREES
CALCULATED R AXIS, ECG10: -34 DEGREES
CALCULATED T AXIS, ECG11: 11 DEGREES
DIAGNOSIS, 93000: NORMAL
P-R INTERVAL, ECG05: 144 MS
Q-T INTERVAL, ECG07: 354 MS
QRS DURATION, ECG06: 94 MS
QTC CALCULATION (BEZET), ECG08: 398 MS
VENTRICULAR RATE, ECG03: 76 BPM

## 2020-07-15 NOTE — PROGRESS NOTES
Patient contacted regarding recent discharge and COVID-19 risk. Discussed COVID-19 related testing which was not done at this time. Test results were not done. Patient informed of results, if available? no    Care Transition Nurse/ Ambulatory Care Manager contacted the patient by telephone to perform post discharge assessment. Verified name and  with patient as identifiers. Patient has following risk factors of: heart failure. CTN/ACM reviewed discharge instructions, medical action plan and red flags related to discharge diagnosis. Reviewed and educated them on any new and changed medications related to discharge diagnosis. Advised obtaining a 90-day supply of all daily and as-needed medications. Education provided regarding infection prevention, and signs and symptoms of COVID-19 and when to seek medical attention with patient who verbalized understanding. Discussed exposure protocols and quarantine from [address] you at higher risk for severe illness  and given an opportunity for questions and concerns. The patient agrees to contact the COVID-19 hotline [phone] or PCP office for questions related to their healthcare. CTN/ACM provided contact information for future reference. From CDC: Are you at higher risk for severe illness?  Wash your hands often.  Avoid close contact (6 feet, which is about two arm lengths) with people who are sick.  Put distance between yourself and other people if COVID-19 is spreading in your community.  Clean and disinfect frequently touched surfaces.  Avoid all cruise travel and non-essential air travel.  Call your healthcare professional if you have concerns about COVID-19 and your underlying condition or if you are sick.     For more information on steps you can take to protect yourself, see CDC's How to Protect Yourself      Patient/family/caregiver given information for [facility] and agrees to enroll no  Patient's preferred e-mail: n/a  Patient's preferred phone number: n/a  Based on Loop alert triggers, patient will be contacted by nurse care manager for worsening symptoms. Plan for follow-up call in 7-14 days based on severity of symptoms and risk factors.

## 2020-07-21 ENCOUNTER — OFFICE VISIT (OUTPATIENT)
Dept: INTERNAL MEDICINE CLINIC | Age: 65
End: 2020-07-21

## 2020-07-21 VITALS
DIASTOLIC BLOOD PRESSURE: 78 MMHG | BODY MASS INDEX: 25.22 KG/M2 | RESPIRATION RATE: 16 BRPM | OXYGEN SATURATION: 96 % | HEIGHT: 67 IN | TEMPERATURE: 98.2 F | HEART RATE: 75 BPM | SYSTOLIC BLOOD PRESSURE: 120 MMHG | WEIGHT: 160.7 LBS

## 2020-07-21 DIAGNOSIS — I49.8 BRUGADA SYNDROME: Chronic | ICD-10-CM

## 2020-07-21 DIAGNOSIS — E11.42 TYPE 2 DIABETES MELLITUS WITH DIABETIC POLYNEUROPATHY, WITH LONG-TERM CURRENT USE OF INSULIN (HCC): Primary | ICD-10-CM

## 2020-07-21 DIAGNOSIS — E78.5 DYSLIPIDEMIA: ICD-10-CM

## 2020-07-21 DIAGNOSIS — I10 ESSENTIAL HYPERTENSION: Chronic | ICD-10-CM

## 2020-07-21 DIAGNOSIS — Z79.4 TYPE 2 DIABETES MELLITUS WITH DIABETIC POLYNEUROPATHY, WITH LONG-TERM CURRENT USE OF INSULIN (HCC): Primary | ICD-10-CM

## 2020-07-21 DIAGNOSIS — R55 VASOVAGAL SYNCOPE: ICD-10-CM

## 2020-07-21 NOTE — PROGRESS NOTES
[address] [facility]  [place] [address]  [address] [place] [address]  Phone:  [phone]  Fax: [phone]       Chief Complaint   Patient presents with   [facility] ED Follow-up     Patient seen at [facility] ED on 7/14/2020 for syncope. .      SUBJECTIVE:    Patricia Vicente is a 75 y.o. male Subjective:  Comes in for return visit stating that he was seen in the emergency room several weeks ago after having an apparent syncopal episode. He states that he was at home in the bathroom, voided, but went back to the bathroom to have a bowel movement. While sitting on the stool, he began to sweat profusely and the next thing he remembers is waking up on the floor. Apparently, he vomited and his lady friend called the rescue squad. By the time the rescue squad came, he was back to baseline. He went to the emergency room, where workup was essentially negative. He had a pacemaker placed and they analyzed the device and no rhythm disturbances occurred. He denies any similar symptoms in the past.    From a diabetic standpoint, he remains on his Levemir 30 units in the evening, 40 units in the morning, along with his Trulicity once a week, presumably 1.5 mg. Average fasting blood sugar is in the 130 range, a.c. dinner sugars are in the 70 range and h.s. sugars in the 190 range. He has a past history of primary hypertension and dyslipidemia. 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 Current Outpatient Medications   Medication Sig Dispense Refill    metoclopramide HCl (REGLAN) 5 mg tablet Take 1 Tab by mouth every six (6) hours as needed for Nausea for up to 10 days. 12 Tab 0    aspirin delayed-release 81 mg tablet Take 81 mg by mouth daily.       insulin detemir U-100 (Levemir U-100 Insulin) 100 unit/mL injection 30 units every morning and at bedtime 6 Vial 3    atorvastatin (LIPITOR) 20 mg tablet TAKE ONE TABLET BY MOUTH DAILY 90 Tab 3    glucose blood VI test strips (ASCENSIA AUTODISC VI, ONE TOUCH ULTRA TEST VI) strip 3 times daily before meals 300 Strip 3    dulaglutide (Trulicity) 1.5 ZC/2.0 mL sub-q pen 0.5 mL by SubCUTAneous route every seven (7) days. 4 Syringe 11    amLODIPine (NORVASC) 10 mg tablet TAKE ONE TABLET BY MOUTH DAILY 90 Tab 3    Insulin Needles, Disposable, 31 gauge x 1/4\" ndle USE WITH INSULIN PENS 100 Pen Needle 11    Insulin Needles, Disposable, 31 gauge x 5/16\" ndle USE TO INJECT INSULIN TWICE A DAY.  DX.E11.9 100 Pen Needle 11    metFORMIN (GLUCOPHAGE) 1,000 mg tablet TAKE ONE TABLET BY MOUTH TWICE A DAY WITH MEALS 180 Tab 3     Past Medical History:   Diagnosis Date    Hypertension     ICD (implantable cardioverter-defibrillator) in place     NSVT (nonsustained ventricular tachycardia) ([facility] [address] 11/21/2018    EPS 11/21/2018 VT/VF      Past Surgical History:   Procedure Laterality Date    COLONOSCOPY  1/2/2015         HX HEART CATHETERIZATION      HX HEENT      l cararact removal     No Known Allergies      REVIEW OF SYSTEMS:  General: negative for - chills or fever  ENT: negative for - headaches, nasal congestion or tinnitus  Respiratory: negative for - cough, hemoptysis, shortness of breath or wheezing  Cardiovascular : negative for - chest pain, edema, palpitations or shortness of breath  Gastrointestinal: negative for - abdominal pain, blood in stools, heartburn or nausea/vomiting  Genito-Urinary: no dysuria, trouble voiding, or hematuria  Musculoskeletal: negative for - gait disturbance, joint pain, joint stiffness or joint swelling  Neurological: no TIA or stroke symptoms  Hematologic: no bruises, no bleeding, no swollen glands  Integument: no lumps, mole changes, nail changes or rash  Endocrine: no malaise/lethargy or unexpected weight changes      Social History     Socioeconomic History    Marital status: SINGLE     Spouse name: Not on file    Number of children: 0    Years of education: Not on file    Highest education level: Not on file   Occupational History    Occupation: [employer]   Tobacco Use    Smoking status: Never Smoker    Smokeless tobacco: Never Used   Substance and Sexual Activity    Alcohol use: No    Drug use: No    Sexual activity: Yes     Partners: Female     Birth control/protection: None     Family History   Problem Relation Age of Onset    Diabetes Mother     Hypertension Mother     Stroke Mother        OBJECTIVE:    Visit Vitals  /78   Pulse 75   Temp 98.2 °F (36.8 °C) (Oral)   Resp 16   Ht 5' 7\" (1.702 m)   Wt 160 lb 11.2 oz (72.9 kg)   SpO2 96%   BMI 25.17 kg/m²     CONSTITUTIONAL: well , well nourished, appears age appropriate  EYES: perrla, eom intact  ENMT:moist mucous membranes, pharynx clear  NECK: supple. Thyroid normal  RESPIRATORY: Chest: clear to ascultation and percussion   CARDIOVASCULAR: Heart: regular rate and rhythm  GASTROINTESTINAL: Abdomen: soft, bowel sounds active  HEMATOLOGIC: no pathological lymph nodes palpated  MUSCULOSKELETAL: Extremities: no edema, pulse 1+   INTEGUMENT: No unusual rashes or suspicious skin lesions noted. Nails appear normal.  NEUROLOGIC: non-focal exam   MENTAL STATUS: alert and oriented, appropriate affect      ASSESSMENT:  1. Type 2 diabetes mellitus with diabetic polyneuropathy, with long-term current use of insulin ([address]    2. Dyslipidemia    3. Essential hypertension    4. Vasovagal syncope    5. Brugada syndrome        PLAN:    1. From a diabetic perspective, hopefully he is doing reasonably well. I will await the results of his fructosamine level. On his return visit in six weeks, he will have a hemoglobin A1c.  2. He will continue a statin in view of his increased cardiovascular risk. He is in a primary risk prevention mode. 3. Blood pressure is excellent today and there are no orthostatic changes noted.   Systolic while sitting is 130 and when he stands the systolic drops to 396, which is obviously not hemodynamically significant. 4. I think the patient had a vasovagal syncope. This was uncomplicated and with no sequelae. I explained the entity to the patient. 5. His Brugada syndrome is not an issue as far as his syncopal episode was concerned. This was well documented via analyzing his defibrillator. .  Orders Placed This Encounter    FRUCTOSAMINE         Follow-up and Dispositions    · Return in about 6 weeks (around 9/1/2020).            Riya Cha MD

## 2020-07-21 NOTE — PROGRESS NOTES
Chief Complaint   Patient presents with   [facility] ED Follow-up     Patient seen at [facility] ED on 7/14/2020 for syncope. 1. Have you been to the ER, urgent care clinic since your last visit? Hospitalized since your last visit? Yes When: 7/14/2020 Where: [facility] ED Reason for visit: syncope    2. Have you seen or consulted any other health care providers outside of the [address] [place] [address] since your last visit? Include any pap smears or colon screening.  No V-Y Plasty Text: The defect edges were debeveled with a #15 scalpel blade.  Given the location of the defect, shape of the defect and the proximity to free margins an V-Y advancement flap was deemed most appropriate.  Using a sterile surgical marker, an appropriate advancement flap was drawn incorporating the defect and placing the expected incisions within the relaxed skin tension lines where possible.    The area thus outlined was incised deep to adipose tissue with a #15 scalpel blade.  The skin margins were undermined to an appropriate distance in all directions utilizing iris scissors.

## 2020-07-22 LAB — FRUCTOSAMINE SERPL-SCNC: 347 UMOL/L (ref 0–285)

## 2020-07-24 RX ORDER — METFORMIN HYDROCHLORIDE 1000 MG/1
1000 TABLET ORAL 2 TIMES DAILY WITH MEALS
Qty: 60 TAB | Refills: 0 | Status: SHIPPED | OUTPATIENT
Start: 2020-07-24 | End: 2020-08-31 | Stop reason: SDUPTHER

## 2020-07-29 ENCOUNTER — PATIENT OUTREACH (OUTPATIENT)
Dept: CASE MANAGEMENT | Age: 65
End: 2020-07-29

## 2020-07-29 RX ORDER — PEN NEEDLE, DIABETIC 30 GX3/16"
NEEDLE, DISPOSABLE MISCELLANEOUS
Qty: 100 PEN NEEDLE | Refills: 11 | Status: SHIPPED | OUTPATIENT
Start: 2020-07-29 | End: 2021-01-11

## 2020-07-29 NOTE — PROGRESS NOTES
Patient resolved from Transition of Care episode on 7/29/20  Discussed COVID-19 related testing which was not done at this time. Test results were not done. Patient informed of results, if available? no     Patient/family has been provided the following resources and education related to COVID-19:                         Signs, symptoms and red flags related to COVID-19            CDC exposure and quarantine guidelines            [facility] exposure contact - [phone]            Contact for their local Department of Health                 Patient currently reports that the following symptoms have improved:  no new symptoms. No further outreach scheduled with this CTN/ACM/LPN/HC/ MA. Episode of Care resolved. Patient has this CTN/ACM/LPN/HC/MA contact information if future needs arise.

## 2020-07-31 ENCOUNTER — DOCUMENTATION ONLY (OUTPATIENT)
Dept: INTERNAL MEDICINE CLINIC | Age: 65
End: 2020-07-31

## 2020-08-31 RX ORDER — METFORMIN HYDROCHLORIDE 1000 MG/1
1000 TABLET ORAL 2 TIMES DAILY WITH MEALS
Qty: 180 TAB | Refills: 3 | Status: SHIPPED | OUTPATIENT
Start: 2020-08-31 | End: 2020-08-31 | Stop reason: SDUPTHER

## 2020-08-31 RX ORDER — METFORMIN HYDROCHLORIDE 1000 MG/1
1000 TABLET ORAL 2 TIMES DAILY WITH MEALS
Qty: 180 TAB | Refills: 3 | Status: ON HOLD | OUTPATIENT
Start: 2020-08-31 | End: 2021-01-04 | Stop reason: SDUPTHER

## 2020-10-26 ENCOUNTER — OFFICE VISIT (OUTPATIENT)
Dept: CARDIOLOGY CLINIC | Age: 65
End: 2020-10-26
Payer: COMMERCIAL

## 2020-10-26 DIAGNOSIS — I47.29 NSVT (NONSUSTAINED VENTRICULAR TACHYCARDIA): ICD-10-CM

## 2020-10-26 DIAGNOSIS — Z95.810 ICD (IMPLANTABLE CARDIOVERTER-DEFIBRILLATOR) IN PLACE: Primary | ICD-10-CM

## 2020-10-26 PROCEDURE — 93295 DEV INTERROG REMOTE 1/2/MLT: CPT | Performed by: INTERNAL MEDICINE

## 2020-10-26 PROCEDURE — 93296 REM INTERROG EVL PM/IDS: CPT | Performed by: INTERNAL MEDICINE

## 2020-12-03 ENCOUNTER — OFFICE VISIT (OUTPATIENT)
Dept: INTERNAL MEDICINE CLINIC | Age: 65
End: 2020-12-03
Payer: COMMERCIAL

## 2020-12-03 VITALS
RESPIRATION RATE: 16 BRPM | HEART RATE: 80 BPM | BODY MASS INDEX: 24.89 KG/M2 | WEIGHT: 158.6 LBS | SYSTOLIC BLOOD PRESSURE: 168 MMHG | DIASTOLIC BLOOD PRESSURE: 91 MMHG | TEMPERATURE: 99.1 F | HEIGHT: 67 IN | OXYGEN SATURATION: 95 %

## 2020-12-03 DIAGNOSIS — I10 ESSENTIAL HYPERTENSION: Chronic | ICD-10-CM

## 2020-12-03 DIAGNOSIS — K40.30 NON-RECURRENT UNILATERAL INGUINAL HERNIA WITH OBSTRUCTION WITHOUT GANGRENE: Primary | ICD-10-CM

## 2020-12-03 DIAGNOSIS — E78.5 DYSLIPIDEMIA: ICD-10-CM

## 2020-12-03 PROCEDURE — 99214 OFFICE O/P EST MOD 30 MIN: CPT | Performed by: INTERNAL MEDICINE

## 2020-12-03 RX ORDER — INSULIN DETEMIR 100 [IU]/ML
INJECTION, SOLUTION SUBCUTANEOUS
Qty: 8 ADJUSTABLE DOSE PRE-FILLED PEN SYRINGE | Refills: 11 | Status: ON HOLD | OUTPATIENT
Start: 2020-12-03 | End: 2021-01-04 | Stop reason: SDUPTHER

## 2020-12-03 NOTE — PROGRESS NOTES
Chief Complaint   Patient presents with   [facility]     Patient states that he noticed a \"lump\" in the groin area (right side). He states that he can really feel it when sitting down, he states that it feels \"tight\". 1. Have you been to the ER, urgent care clinic since your last visit? Hospitalized since your last visit? No    2. Have you seen or consulted any other health care providers outside of the [address] [place] [address] since your last visit? Include any pap smears or colon screening.  No

## 2020-12-04 LAB
APO B SERPL-MCNC: 85 MG/DL
BASOPHILS # BLD AUTO: 0 X10E3/UL (ref 0–0.2)
BASOPHILS NFR BLD AUTO: 0 %
BUN SERPL-MCNC: 25 MG/DL (ref 8–27)
BUN/CREAT SERPL: 26 (ref 10–24)
CALCIUM SERPL-MCNC: 9.2 MG/DL (ref 8.6–10.2)
CHLORIDE SERPL-SCNC: 100 MMOL/L (ref 96–106)
CO2 SERPL-SCNC: 23 MMOL/L (ref 20–29)
CREAT SERPL-MCNC: 0.97 MG/DL (ref 0.76–1.27)
EOSINOPHIL # BLD AUTO: 0.1 X10E3/UL (ref 0–0.4)
EOSINOPHIL NFR BLD AUTO: 1 %
ERYTHROCYTE [DISTWIDTH] IN BLOOD BY AUTOMATED COUNT: 12.8 % (ref 11.6–15.4)
EST. AVERAGE GLUCOSE BLD GHB EST-MCNC: 355 MG/DL
GLUCOSE SERPL-MCNC: 270 MG/DL (ref 65–99)
HBA1C MFR BLD: 14 % (ref 4.8–5.6)
HCT VFR BLD AUTO: 36.5 % (ref 37.5–51)
HGB BLD-MCNC: 11.9 G/DL (ref 13–17.7)
IMM GRANULOCYTES # BLD AUTO: 0 X10E3/UL (ref 0–0.1)
IMM GRANULOCYTES NFR BLD AUTO: 0 %
LYMPHOCYTES # BLD AUTO: 0.8 X10E3/UL (ref 0.7–3.1)
LYMPHOCYTES NFR BLD AUTO: 8 %
MCH RBC QN AUTO: 27.7 PG (ref 26.6–33)
MCHC RBC AUTO-ENTMCNC: 32.6 G/DL (ref 31.5–35.7)
MCV RBC AUTO: 85 FL (ref 79–97)
MONOCYTES # BLD AUTO: 0.8 X10E3/UL (ref 0.1–0.9)
MONOCYTES NFR BLD AUTO: 8 %
NEUTROPHILS # BLD AUTO: 8.2 X10E3/UL (ref 1.4–7)
NEUTROPHILS NFR BLD AUTO: 83 %
PLATELET # BLD AUTO: 200 X10E3/UL (ref 150–450)
POTASSIUM SERPL-SCNC: 4.6 MMOL/L (ref 3.5–5.2)
RBC # BLD AUTO: 4.29 X10E6/UL (ref 4.14–5.8)
SODIUM SERPL-SCNC: 140 MMOL/L (ref 134–144)
WBC # BLD AUTO: 9.9 X10E3/UL (ref 3.4–10.8)

## 2020-12-05 NOTE — PROGRESS NOTES
[address] [facility]  [facility]  [address] [place] [address]  Phone:  [phone]  Fax: [phone]       Chief Complaint   Patient presents with   [facility]     Patient states that he noticed a \"lump\" in the groin area (right side). He states that he can really feel it when sitting down, he states that it feels \"tight\". .      SUBJECTIVE:    Jose Cantu is a 77 y.o. male Comes in stating that he was doing well up until yesterday, when he inadvertently lifted or pushed a heavy object. He did not feel anything at that particular time, but the following morning he began having pain in his right inguinal area, which has continued. He has had no nausea or vomiting or similar pain in the past.  He has noted a slight protrusion in that area also. He states that his diabetes has been doing reasonably well, although he is running out of his insulin and needs a refill as soon as possible. As a result, he has reduced the amount of insulin he is taking inappropriately. He has a past history of primary hypertension and dyslipidemia. He continues to work on a regular basis. Current Outpatient Medications   Medication Sig Dispense Refill    insulin detemir U-100 (Levemir FlexTouch U-100 Insuln) 100 unit/mL (3 mL) inpn INJECT 30 UNITS AT BEDTIME AND 40 UNITS BEFORE BREAKFAST 8 Adjustable Dose Pre-filled Pen Syringe 11    metFORMIN (GLUCOPHAGE) 1,000 mg tablet Take 1 Tab by mouth two (2) times daily (with meals). 180 Tab 3    Insulin Needles, Disposable, 31 gauge x 1/4\" ndle USE WITH INSULIN PENS THREE TIMES PER  Pen Needle 11    Insulin Needles, Disposable, 31 gauge x 5/16\" ndle USE TO INJECT INSULIN TWICE A DAY. DX.E11.9 100 Pen Needle 11    aspirin delayed-release 81 mg tablet Take 81 mg by mouth daily.       atorvastatin (LIPITOR) 20 mg tablet TAKE ONE TABLET BY MOUTH DAILY 90 Tab 3    glucose blood VI test strips (ASCENSIA AUTODISC VI, ONE TOUCH ULTRA TEST VI) strip 3 times daily before meals 300 Strip 3    dulaglutide (Trulicity) 1.5 HH/7.7 mL sub-q pen 0.5 mL by SubCUTAneous route every seven (7) days.  4 Syringe 11    amLODIPine (NORVASC) 10 mg tablet TAKE ONE TABLET BY MOUTH DAILY 90 Tab 3     Past Medical History:   Diagnosis Date    Hypertension     ICD (implantable cardioverter-defibrillator) in place     NSVT (nonsustained ventricular tachycardia) ([facility] [address] 11/21/2018    EPS 11/21/2018 VT/VF      Past Surgical History:   Procedure Laterality Date    COLONOSCOPY  1/2/2015         HX HEART CATHETERIZATION      HX HEENT      l cararact removal     No Known Allergies      REVIEW OF SYSTEMS:  General: negative for - chills or fever  ENT: negative for - headaches, nasal congestion or tinnitus  Respiratory: negative for - cough, hemoptysis, shortness of breath or wheezing  Cardiovascular : negative for - chest pain, edema, palpitations or shortness of breath  Gastrointestinal: negative for - abdominal pain, blood in stools, heartburn or nausea/vomiting  Genito-Urinary: no dysuria, trouble voiding, or hematuria  Musculoskeletal: negative for - gait disturbance, joint pain, joint stiffness or joint swelling  Neurological: no TIA or stroke symptoms  Hematologic: no bruises, no bleeding, no swollen glands  Integument: no lumps, mole changes, nail changes or rash  Endocrine: no malaise/lethargy or unexpected weight changes      Social History     Socioeconomic History    Marital status: SINGLE     Spouse name: Not on file    Number of children: 0    Years of education: Not on file    Highest education level: Not on file   Occupational History    Occupation: [employer]   Tobacco Use    Smoking status: Never Smoker    Smokeless tobacco: Never Used   Substance and Sexual Activity    Alcohol use: No    Drug use: No    Sexual activity: Yes     Partners: Female     Birth control/protection: None     Family History   Problem Relation Age of Onset    Diabetes Mother  Hypertension Mother     Stroke Mother        OBJECTIVE:    Visit Vitals  BP (!) 168/91   Pulse 80   Temp 99.1 °F (37.3 °C) (Oral)   Resp 16   Ht 5' 7\" (1.702 m)   Wt 158 lb 9.6 oz (71.9 kg)   SpO2 95%   BMI 24.84 kg/m²     CONSTITUTIONAL: well , well nourished, appears age appropriate  EYES: perrla, eom intact  ENMT:moist mucous membranes, pharynx clear  NECK: supple. Thyroid normal  RESPIRATORY: Chest: clear to ascultation and percussion   CARDIOVASCULAR: Heart: regular rate and rhythm  GASTROINTESTINAL: Abdomen: soft, bowel sounds active  HEMATOLOGIC: no pathological lymph nodes palpated  MUSCULOSKELETAL: Extremities: no edema, pulse 1+   INTEGUMENT: No unusual rashes or suspicious skin lesions noted. Nails appear normal.  NEUROLOGIC: non-focal exam   MENTAL STATUS: alert and oriented, appropriate affect  Genitalia: Protruding right inguinal hernia which is not reducible      ASSESSMENT:  1. Non-recurrent unilateral inguinal hernia with obstruction without gangrene    2. Uncontrolled type 2 diabetes mellitus with complication, with long-term current use of insulin ([address]    3. Dyslipidemia    4. Essential hypertension        PLAN:    1. The patient appears to have an incarcerated right inguinal hernia and needs to be seen immediately. He is otherwise asymptomatic. He will go home and if he develops any acute symptoms worse than he has now, he will go to the emergency room immediately. He will see general surgery in the next several days. Interestingly, the lesion that is protruding is extending a little more to the lateral aspect than I would expect and has had the suggestion of a mass effect. The onset given the activity that he engaged in prior to this strongly suggests that this is an inguinal hernia. 2. His diabetes is not as well controlled, but his Levemir will be sent in.   He will continue the 40 units in the morning and 30 units in the evening for now, along with his other antidiabetic medication. 3. He will continue statin as prescribed. He is in a primary risk prevention mode. 4. His blood pressure is reasonable and no adjustment is made for now. It is slightly elevated, but I think that is related more to the anxiety created by his current problem. .  Orders Placed This Encounter    METABOLIC PANEL, BASIC    HEMOGLOBIN A1C WITH EAG    APOLIPOPROTEIN B    CBC WITH AUTOMATED DIFF    [facility]    insulin detemir U-100 (Levemir FlexTouch U-100 Insuln) 100 unit/mL (3 mL) inpn         Follow-up and Dispositions    · Return in about 6 weeks (around 1/14/2021).            Jessika Jama MD

## 2020-12-07 ENCOUNTER — OFFICE VISIT (OUTPATIENT)
Dept: SURGERY | Age: 65
End: 2020-12-07
Payer: COMMERCIAL

## 2020-12-07 DIAGNOSIS — R10.31 RIGHT GROIN PAIN: Primary | ICD-10-CM

## 2020-12-07 PROCEDURE — 99203 OFFICE O/P NEW LOW 30 MIN: CPT | Performed by: SURGERY

## 2020-12-07 NOTE — PROGRESS NOTES
Becca Rivas is a 77 y.o. male who is referred by Dr. Anny Samson for further evaluation of right groin pain and a possible right inguinal hernia. Mr. Walter Epstein tells me that he recently began experiencing discomfort in his right groin. The pain is worse with coughing, sneezing or exertion. Furthermore, he has noted a bulge in his right groin. No left groin pain or an associated left groin bulge. Found to have a right inguinal hernia. He has otherwise been in his usual state of health. Past Medical History:   Diagnosis Date    Hypertension     ICD (implantable cardioverter-defibrillator) in place     NSVT (nonsustained ventricular tachycardia) ([facility] [address] 11/21/2018    EPS 11/21/2018 VT/VF     Right groin pain 12/7/2020     Past Surgical History:   Procedure Laterality Date    COLONOSCOPY  1/2/2015         HX HEART CATHETERIZATION      HX HEENT      l cararact removal     Family History   Problem Relation Age of Onset    Diabetes Mother     Hypertension Mother     Stroke Mother      Social History     Socioeconomic History    Marital status: SINGLE     Spouse name: Not on file    Number of children: 0    Years of education: Not on file    Highest education level: Not on file   Occupational History    Occupation: [employer]   Tobacco Use    Smoking status: Never Smoker    Smokeless tobacco: Never Used   Substance and Sexual Activity    Alcohol use: No    Drug use: No    Sexual activity: Yes     Partners: Female     Birth control/protection: None     Review of systems negative except as noted. Review of Systems   Constitutional: Negative for chills and fever. Gastrointestinal: Positive for abdominal pain (Right groin. ). Negative for constipation, nausea and vomiting. Physical Exam  Vitals signs reviewed. Constitutional:       General: He is not in acute distress. Appearance: Normal appearance. He is normal weight. HENT:      Head: Normocephalic and atraumatic.    Eyes:      General: No scleral icterus. Neck:      Musculoskeletal: Neck supple. Cardiovascular:      Rate and Rhythm: Normal rate and regular rhythm. Pulmonary:      Effort: Pulmonary effort is normal.      Breath sounds: Normal breath sounds. Abdominal:      General: There is no distension. Palpations: Abdomen is soft. Tenderness: There is no abdominal tenderness. There is no guarding or rebound. Hernia: No hernia is present. There is no hernia in the left inguinal area or right inguinal area. Comments: There is a mass in the right groin - right inguinal hernia versus enlarged lymph nodes. Musculoskeletal: Normal range of motion. Lymphadenopathy:      Cervical: No cervical adenopathy. Neurological:      General: No focal deficit present. Mental Status: He is alert. ASSESSMENT and PLAN  Explained to Mr. Miles Godinez that it is not clear whether or not he has a right inguinal hernia versus enlarged lymph nodes. Will check a right groin ultrasound and will see him following that to review findings with him. Activity as tolerated for now. Tylenol as needed for pain. Told Mr. Miles Godinez that if his pain becomes severe or if he develops nausea or vomitting of if he has any concerns to please go to the ER for evaluation. Follow up with Dr. Mariela Serrano as scheduled. He is agreeable to this plan and is most certainly free to contact the office should any questions or concerns arise.        CC: Juan Puga MD

## 2020-12-09 ENCOUNTER — HOSPITAL ENCOUNTER (OUTPATIENT)
Dept: CT IMAGING | Age: 65
Discharge: HOME | End: 2020-12-09
Attending: SURGERY
Payer: COMMERCIAL

## 2020-12-09 ENCOUNTER — TELEPHONE (OUTPATIENT)
Dept: SURGERY | Age: 65
End: 2020-12-09

## 2020-12-09 ENCOUNTER — HOSPITAL ENCOUNTER (OUTPATIENT)
Dept: ULTRASOUND IMAGING | Age: 65
Discharge: HOME | End: 2020-12-09
Attending: SURGERY
Payer: COMMERCIAL

## 2020-12-09 DIAGNOSIS — R10.31 RIGHT GROIN PAIN: ICD-10-CM

## 2020-12-09 DIAGNOSIS — R10.31 RIGHT GROIN PAIN: Primary | ICD-10-CM

## 2020-12-09 PROCEDURE — 74011000636 HC RX REV CODE- 636: Performed by: SURGERY

## 2020-12-09 PROCEDURE — 74177 CT ABD & PELVIS W/CONTRAST: CPT

## 2020-12-09 PROCEDURE — 76705 ECHO EXAM OF ABDOMEN: CPT

## 2020-12-09 RX ORDER — SODIUM CHLORIDE 0.9 % (FLUSH) 0.9 %
10 SYRINGE (ML) INJECTION
Status: DISPENSED | OUTPATIENT
Start: 2020-12-09 | End: 2020-12-09

## 2020-12-09 RX ADMIN — IOHEXOL 50 ML: 240 INJECTION, SOLUTION INTRATHECAL; INTRAVASCULAR; INTRAVENOUS; ORAL at 13:28

## 2020-12-09 RX ADMIN — IOPAMIDOL 100 ML: 755 INJECTION, SOLUTION INTRAVENOUS at 13:29

## 2020-12-09 NOTE — TELEPHONE ENCOUNTER
Dr Alto Nissen called Maude Navarro back and spoke with her and he has ordered a ct scan to be done today and has also made Dr Carrie Avila aware as well should he need to be seen today.

## 2020-12-10 ENCOUNTER — TELEPHONE (OUTPATIENT)
Dept: SURGERY | Age: 65
End: 2020-12-10

## 2020-12-10 ENCOUNTER — APPOINTMENT (OUTPATIENT)
Dept: VASCULAR SURGERY | Age: 65
DRG: 857 | End: 2020-12-10
Attending: EMERGENCY MEDICINE
Payer: COMMERCIAL

## 2020-12-10 ENCOUNTER — HOSPITAL ENCOUNTER (INPATIENT)
Age: 65
LOS: 32 days | Discharge: HOME HEALTH | DRG: 857 | End: 2021-01-11
Attending: EMERGENCY MEDICINE | Admitting: HOSPITALIST
Payer: COMMERCIAL

## 2020-12-10 DIAGNOSIS — R10.813 RIGHT LOWER QUADRANT ABDOMINAL TENDERNESS WITHOUT REBOUND TENDERNESS: Primary | ICD-10-CM

## 2020-12-10 DIAGNOSIS — I49.8 BRUGADA SYNDROME: Chronic | ICD-10-CM

## 2020-12-10 DIAGNOSIS — E78.5 DYSLIPIDEMIA: ICD-10-CM

## 2020-12-10 DIAGNOSIS — I10 ESSENTIAL HYPERTENSION: Chronic | ICD-10-CM

## 2020-12-10 DIAGNOSIS — L03.311 CELLULITIS OF ABDOMINAL WALL: ICD-10-CM

## 2020-12-10 DIAGNOSIS — R10.31 RIGHT LOWER QUADRANT ABDOMINAL PAIN: ICD-10-CM

## 2020-12-10 PROBLEM — R10.9 ABDOMINAL PAIN: Status: ACTIVE | Noted: 2020-12-10

## 2020-12-10 LAB
ALBUMIN SERPL-MCNC: 2.3 G/DL (ref 3.5–5)
ALBUMIN/GLOB SERPL: 0.4 {RATIO} (ref 1.1–2.2)
ALP SERPL-CCNC: 108 U/L (ref 45–117)
ALT SERPL-CCNC: 16 U/L (ref 12–78)
ANION GAP SERPL CALC-SCNC: 5 MMOL/L (ref 5–15)
AST SERPL-CCNC: 16 U/L (ref 15–37)
BASOPHILS # BLD: 0 K/UL (ref 0–0.1)
BASOPHILS NFR BLD: 0 % (ref 0–1)
BILIRUB SERPL-MCNC: 0.4 MG/DL (ref 0.2–1)
BUN SERPL-MCNC: 25 MG/DL (ref 6–20)
BUN/CREAT SERPL: 19 (ref 12–20)
CALCIUM SERPL-MCNC: 9.6 MG/DL (ref 8.5–10.1)
CHLORIDE SERPL-SCNC: 98 MMOL/L (ref 97–108)
CO2 SERPL-SCNC: 30 MMOL/L (ref 21–32)
COMMENT, HOLDF: NORMAL
CREAT SERPL-MCNC: 1.31 MG/DL (ref 0.7–1.3)
DIFFERENTIAL METHOD BLD: ABNORMAL
EOSINOPHIL # BLD: 0 K/UL (ref 0–0.4)
EOSINOPHIL NFR BLD: 0 % (ref 0–7)
ERYTHROCYTE [DISTWIDTH] IN BLOOD BY AUTOMATED COUNT: 13.1 % (ref 11.5–14.5)
GLOBULIN SER CALC-MCNC: 5.7 G/DL (ref 2–4)
GLUCOSE BLD STRIP.AUTO-MCNC: 181 MG/DL (ref 65–100)
GLUCOSE BLD STRIP.AUTO-MCNC: 183 MG/DL (ref 65–100)
GLUCOSE SERPL-MCNC: 197 MG/DL (ref 65–100)
HCT VFR BLD AUTO: 34 % (ref 36.6–50.3)
HGB BLD-MCNC: 10.9 G/DL (ref 12.1–17)
IMM GRANULOCYTES # BLD AUTO: 0.1 K/UL (ref 0–0.04)
IMM GRANULOCYTES NFR BLD AUTO: 1 % (ref 0–0.5)
LACTATE SERPL-SCNC: 0.9 MMOL/L (ref 0.4–2)
LYMPHOCYTES # BLD: 0.6 K/UL (ref 0.8–3.5)
LYMPHOCYTES NFR BLD: 4 % (ref 12–49)
MCH RBC QN AUTO: 27.7 PG (ref 26–34)
MCHC RBC AUTO-ENTMCNC: 32.1 G/DL (ref 30–36.5)
MCV RBC AUTO: 86.3 FL (ref 80–99)
MONOCYTES # BLD: 0.9 K/UL (ref 0–1)
MONOCYTES NFR BLD: 6 % (ref 5–13)
NEUTS SEG # BLD: 13.1 K/UL (ref 1.8–8)
NEUTS SEG NFR BLD: 89 % (ref 32–75)
NRBC # BLD: 0 K/UL (ref 0–0.01)
NRBC BLD-RTO: 0 PER 100 WBC
PLATELET # BLD AUTO: 305 K/UL (ref 150–400)
PMV BLD AUTO: 9.7 FL (ref 8.9–12.9)
POTASSIUM SERPL-SCNC: 4.2 MMOL/L (ref 3.5–5.1)
PROT SERPL-MCNC: 8 G/DL (ref 6.4–8.2)
RBC # BLD AUTO: 3.94 M/UL (ref 4.1–5.7)
RBC MORPH BLD: ABNORMAL
SAMPLES BEING HELD,HOLD: NORMAL
SERVICE CMNT-IMP: ABNORMAL
SERVICE CMNT-IMP: ABNORMAL
SODIUM SERPL-SCNC: 133 MMOL/L (ref 136–145)
WBC # BLD AUTO: 14.7 K/UL (ref 4.1–11.1)

## 2020-12-10 PROCEDURE — 74011000258 HC RX REV CODE- 258: Performed by: HOSPITALIST

## 2020-12-10 PROCEDURE — 80053 COMPREHEN METABOLIC PANEL: CPT

## 2020-12-10 PROCEDURE — 99284 EMERGENCY DEPT VISIT MOD MDM: CPT

## 2020-12-10 PROCEDURE — 96366 THER/PROPH/DIAG IV INF ADDON: CPT

## 2020-12-10 PROCEDURE — 74011250637 HC RX REV CODE- 250/637: Performed by: HOSPITALIST

## 2020-12-10 PROCEDURE — 74011250636 HC RX REV CODE- 250/636: Performed by: HOSPITALIST

## 2020-12-10 PROCEDURE — 87040 BLOOD CULTURE FOR BACTERIA: CPT

## 2020-12-10 PROCEDURE — 83605 ASSAY OF LACTIC ACID: CPT

## 2020-12-10 PROCEDURE — 82962 GLUCOSE BLOOD TEST: CPT

## 2020-12-10 PROCEDURE — 85025 COMPLETE CBC W/AUTO DIFF WBC: CPT

## 2020-12-10 PROCEDURE — 93971 EXTREMITY STUDY: CPT

## 2020-12-10 PROCEDURE — 96365 THER/PROPH/DIAG IV INF INIT: CPT

## 2020-12-10 PROCEDURE — 74011250636 HC RX REV CODE- 250/636: Performed by: PHYSICIAN ASSISTANT

## 2020-12-10 PROCEDURE — 65270000032 HC RM SEMIPRIVATE

## 2020-12-10 PROCEDURE — 74011636637 HC RX REV CODE- 636/637: Performed by: HOSPITALIST

## 2020-12-10 PROCEDURE — 36415 COLL VENOUS BLD VENIPUNCTURE: CPT

## 2020-12-10 RX ORDER — ONDANSETRON 2 MG/ML
4 INJECTION INTRAMUSCULAR; INTRAVENOUS
Status: DISCONTINUED | OUTPATIENT
Start: 2020-12-10 | End: 2021-01-11 | Stop reason: HOSPADM

## 2020-12-10 RX ORDER — HYDRALAZINE HYDROCHLORIDE 20 MG/ML
20 INJECTION INTRAMUSCULAR; INTRAVENOUS
Status: DISCONTINUED | OUTPATIENT
Start: 2020-12-10 | End: 2020-12-14

## 2020-12-10 RX ORDER — SODIUM CHLORIDE 0.9 % (FLUSH) 0.9 %
5-40 SYRINGE (ML) INJECTION AS NEEDED
Status: DISCONTINUED | OUTPATIENT
Start: 2020-12-10 | End: 2021-01-11 | Stop reason: HOSPADM

## 2020-12-10 RX ORDER — POLYETHYLENE GLYCOL 3350 17 G/17G
17 POWDER, FOR SOLUTION ORAL DAILY PRN
Status: DISCONTINUED | OUTPATIENT
Start: 2020-12-10 | End: 2021-01-11 | Stop reason: HOSPADM

## 2020-12-10 RX ORDER — DEXTROSE 50 % IN WATER (D50W) INTRAVENOUS SYRINGE
12.5-25 AS NEEDED
Status: DISCONTINUED | OUTPATIENT
Start: 2020-12-10 | End: 2020-12-10

## 2020-12-10 RX ORDER — INSULIN LISPRO 100 [IU]/ML
INJECTION, SOLUTION INTRAVENOUS; SUBCUTANEOUS
Status: DISCONTINUED | OUTPATIENT
Start: 2020-12-10 | End: 2020-12-16

## 2020-12-10 RX ORDER — AMLODIPINE BESYLATE 5 MG/1
10 TABLET ORAL DAILY
Status: DISCONTINUED | OUTPATIENT
Start: 2020-12-11 | End: 2021-01-11 | Stop reason: HOSPADM

## 2020-12-10 RX ORDER — ACETAMINOPHEN 650 MG/1
650 SUPPOSITORY RECTAL
Status: DISCONTINUED | OUTPATIENT
Start: 2020-12-10 | End: 2021-01-11 | Stop reason: HOSPADM

## 2020-12-10 RX ORDER — ACETAMINOPHEN 325 MG/1
650 TABLET ORAL
Status: DISCONTINUED | OUTPATIENT
Start: 2020-12-10 | End: 2021-01-11 | Stop reason: HOSPADM

## 2020-12-10 RX ORDER — SODIUM CHLORIDE 9 MG/ML
75 INJECTION, SOLUTION INTRAVENOUS CONTINUOUS
Status: DISPENSED | OUTPATIENT
Start: 2020-12-10 | End: 2020-12-11

## 2020-12-10 RX ORDER — MAGNESIUM SULFATE 100 %
4 CRYSTALS MISCELLANEOUS AS NEEDED
Status: DISCONTINUED | OUTPATIENT
Start: 2020-12-10 | End: 2021-01-11 | Stop reason: HOSPADM

## 2020-12-10 RX ORDER — SODIUM CHLORIDE 0.9 % (FLUSH) 0.9 %
5-40 SYRINGE (ML) INJECTION EVERY 8 HOURS
Status: DISCONTINUED | OUTPATIENT
Start: 2020-12-10 | End: 2021-01-11 | Stop reason: HOSPADM

## 2020-12-10 RX ORDER — ATORVASTATIN CALCIUM 10 MG/1
20 TABLET, FILM COATED ORAL
Status: DISCONTINUED | OUTPATIENT
Start: 2020-12-10 | End: 2020-12-14

## 2020-12-10 RX ORDER — ASPIRIN 81 MG/1
81 TABLET ORAL DAILY
Status: DISCONTINUED | OUTPATIENT
Start: 2020-12-11 | End: 2021-01-11 | Stop reason: HOSPADM

## 2020-12-10 RX ORDER — CLINDAMYCIN PHOSPHATE 600 MG/50ML
600 INJECTION INTRAVENOUS
Status: COMPLETED | OUTPATIENT
Start: 2020-12-10 | End: 2020-12-10

## 2020-12-10 RX ORDER — INSULIN GLARGINE 100 [IU]/ML
30 INJECTION, SOLUTION SUBCUTANEOUS 2 TIMES DAILY
Status: DISCONTINUED | OUTPATIENT
Start: 2020-12-10 | End: 2020-12-11

## 2020-12-10 RX ORDER — VANCOMYCIN/0.9 % SOD CHLORIDE 1.5G/250ML
1500 PLASTIC BAG, INJECTION (ML) INTRAVENOUS ONCE
Status: COMPLETED | OUTPATIENT
Start: 2020-12-10 | End: 2020-12-10

## 2020-12-10 RX ORDER — PROMETHAZINE HYDROCHLORIDE 25 MG/1
12.5 TABLET ORAL
Status: DISCONTINUED | OUTPATIENT
Start: 2020-12-10 | End: 2021-01-11 | Stop reason: HOSPADM

## 2020-12-10 RX ORDER — VANCOMYCIN HYDROCHLORIDE
1250 EVERY 24 HOURS
Status: DISCONTINUED | OUTPATIENT
Start: 2020-12-10 | End: 2020-12-10 | Stop reason: DRUGHIGH

## 2020-12-10 RX ADMIN — ATORVASTATIN CALCIUM 20 MG: 10 TABLET, FILM COATED ORAL at 21:56

## 2020-12-10 RX ADMIN — VANCOMYCIN HYDROCHLORIDE 1500 MG: 10 INJECTION, POWDER, LYOPHILIZED, FOR SOLUTION INTRAVENOUS at 16:42

## 2020-12-10 RX ADMIN — ACETAMINOPHEN 650 MG: 325 TABLET ORAL at 17:46

## 2020-12-10 RX ADMIN — Medication 10 ML: at 21:57

## 2020-12-10 RX ADMIN — INSULIN LISPRO 2 UNITS: 100 INJECTION, SOLUTION INTRAVENOUS; SUBCUTANEOUS at 16:54

## 2020-12-10 RX ADMIN — SODIUM CHLORIDE 75 ML/HR: 900 INJECTION, SOLUTION INTRAVENOUS at 16:54

## 2020-12-10 RX ADMIN — PIPERACILLIN AND TAZOBACTAM 3.38 G: 3; .375 INJECTION, POWDER, LYOPHILIZED, FOR SOLUTION INTRAVENOUS at 23:00

## 2020-12-10 RX ADMIN — INSULIN GLARGINE 30 UNITS: 100 INJECTION, SOLUTION SUBCUTANEOUS at 21:55

## 2020-12-10 RX ADMIN — PIPERACILLIN AND TAZOBACTAM 3.38 G: 3; .375 INJECTION, POWDER, LYOPHILIZED, FOR SOLUTION INTRAVENOUS at 14:58

## 2020-12-10 RX ADMIN — Medication 10 ML: at 16:47

## 2020-12-10 RX ADMIN — CLINDAMYCIN PHOSPHATE 600 MG: 600 INJECTION, SOLUTION INTRAVENOUS at 12:17

## 2020-12-10 NOTE — ROUTINE PROCESS
TRANSFER - OUT REPORT:    Verbal report given to Selene Martinez RN(name) on Mitra Quiros  being transferred to [facility](unit) for routine progression of care       Report consisted of patients Situation, Background, Assessment and   Recommendations(SBAR). Information from the following report(s) SBAR and Kardex was reviewed with the receiving nurse. Lines:   Peripheral IV 12/10/20 Right Forearm (Active)        Opportunity for questions and clarification was provided.       Patient transported with:   [employer]

## 2020-12-10 NOTE — H&P
History & Physical    Primary Care Provider: Amie Rodas MD  Source of Information: Patient    History of Presenting Illness:   Champ Gudino is a 77 y.o. male who presents with right lower quadrant abdominal pain which started 1 week back worsen since Monday radiating towards the groin denies any fever chills nausea vomiting diarrhea or any other associated symptoms. States this started about 1 week ago, initially saw PCP Dr. Bella Whitaker  with worsening since Friday. Saw Dr. Dion Mercado on Monday who ordered outpatient CT scan and abdominal ultrasound    The patient denies any fever, chills, chest pain, cough, congestion, recent illness, palpitations, or dysuria. Review of Systems:  Pertinent items are noted in the History of Present Illness. Past Medical History:   Diagnosis Date    Hypertension     ICD (implantable cardioverter-defibrillator) in place     NSVT (nonsustained ventricular tachycardia) ([facility] [address] 11/21/2018    EPS 11/21/2018 VT/VF     Right groin pain 12/7/2020      Past Surgical History:   Procedure Laterality Date    COLONOSCOPY  1/2/2015         HX HEART CATHETERIZATION      HX HEENT      l cararact removal     Prior to Admission medications    Medication Sig Start Date End Date Taking? Authorizing Provider   insulin detemir U-100 (Levemir FlexTouch U-100 Insuln) 100 unit/mL (3 mL) inpn INJECT 30 UNITS AT BEDTIME AND 40 UNITS BEFORE BREAKFAST 12/3/20   Amie Rodas MD   metFORMIN (GLUCOPHAGE) 1,000 mg tablet Take 1 Tab by mouth two (2) times daily (with meals). 8/31/20   Amie Rodas MD   Insulin Needles, Disposable, 31 gauge x 1/4\" ndle USE WITH INSULIN PENS THREE TIMES PER DAY 7/30/20   Amie Rodas MD   Insulin Needles, Disposable, 31 gauge x 5/16\" ndle USE TO INJECT INSULIN TWICE A DAY. DX.[id number] 7/29/20   Amie Rodas MD   aspirin delayed-release 81 mg tablet Take 81 mg by mouth daily.     Provider, Historical   atorvastatin (LIPITOR) 20 mg tablet TAKE ONE TABLET BY MOUTH DAILY 4/29/20   Bel Sanders MD   glucose blood VI test strips (ASCENSIA AUTODISC VI, ONE TOUCH ULTRA TEST VI) strip 3 times daily before meals 4/29/20   Bel Sanders MD   dulaglutide (Trulicity) 1.5 ZN/7.5 mL sub-q pen 0.5 mL by SubCUTAneous route every seven (7) days. 4/29/20   Bel Sanders MD   amLODIPine (NORVASC) 10 mg tablet TAKE ONE TABLET BY MOUTH DAILY 1/1/20   Bel Sanders MD     No Known Allergies   Family History   Problem Relation Age of Onset    Diabetes Mother     Hypertension Mother     Stroke Mother         SOCIAL HISTORY:  Patient resides:  Independently    Assisted Living    SNF    With family care x      Smoking history:   None x   Former    Chronic      Alcohol history:   None x   Social    Chronic      Ambulates:   Independently x   w/cane    w/walker    w/wc    CODE STATUS:  DNR    Full x   Other      Objective:     Physical Exam:     Visit Vitals  BP (!) 164/83 (BP 1 Location: Right arm)   Pulse 93   Temp 100 °F (37.8 °C)   Resp 16   Wt 71.7 kg (158 lb) Comment: as of last week per chart review   SpO2 96%   BMI 24.75 kg/m²           General:  Alert, cooperative, no distress, appears stated age. Head:  Normocephalic, without obvious abnormality, atraumatic. Eyes:  Conjunctivae/corneas clear. PERRL, EOMs intact. Nose: Nares normal. Septum midline. Mucosa normal. No drainage or sinus tenderness. Throat: Lips, mucosa, and tongue normal. Teeth and gums normal.   Neck: Supple, symmetrical, trachea midline, no adenopathy, thyroid: no enlargement/tenderness/nodules, no carotid bruit and no JVD. Lungs:   Clear to auscultation bilaterally. Heart:  Regular rate and rhythm, S1, S2 normal, no murmur, click, rub or gallop.    Abdomen:    Abdomen soft bowel sounds positive right lower quadrant there is a hardened masslike swelling surface is red no swelling in the groin   Extremities: Extremities normal, atraumatic, no cyanosis or edema.   Pulses: 2+ and symmetric all extremities. Skin: Skin color, texture, turgor normal. No rashes or lesions   Neurologic: CNII-XII intact. EKG:  normal sinus rhythm. Data Review:     Recent Days:  Recent Labs     12/10/20  1016   WBC 14.7*   HGB 10.9*   HCT 34.0*        Recent Labs     12/10/20  1016   *   K 4.2   CL 98   CO2 30   *   BUN 25*   CREA 1.31*   CA 9.6   ALB 2.3*   TBILI 0.4   ALT 16     No results for input(s): PH, PCO2, PO2, HCO3, FIO2 in the last 72 hours. 24 Hour Results:  Recent Results (from the past 24 hour(s))   CBC WITH AUTOMATED DIFF    Collection Time: 12/10/20 10:16 AM   Result Value Ref Range    WBC 14.7 (H) 4.1 - 11.1 K/uL    RBC 3.94 (L) 4.10 - 5.70 M/uL    HGB 10.9 (L) 12.1 - 17.0 g/dL    HCT 34.0 (L) 36.6 - 50.3 %    MCV 86.3 80.0 - 99.0 FL    MCH 27.7 26.0 - 34.0 PG    MCHC 32.1 30.0 - 36.5 g/dL    RDW 13.1 11.5 - 14.5 %    PLATELET 243 386 - 524 K/uL    MPV 9.7 8.9 - 12.9 FL    NRBC 0.0 0  WBC    ABSOLUTE NRBC 0.00 0.00 - 0.01 K/uL    NEUTROPHILS 89 (H) 32 - 75 %    LYMPHOCYTES 4 (L) 12 - 49 %    MONOCYTES 6 5 - 13 %    EOSINOPHILS 0 0 - 7 %    BASOPHILS 0 0 - 1 %    IMMATURE GRANULOCYTES 1 (H) 0.0 - 0.5 %    ABS. NEUTROPHILS 13.1 (H) 1.8 - 8.0 K/UL    ABS. LYMPHOCYTES 0.6 (L) 0.8 - 3.5 K/UL    ABS. MONOCYTES 0.9 0.0 - 1.0 K/UL    ABS. EOSINOPHILS 0.0 0.0 - 0.4 K/UL    ABS. BASOPHILS 0.0 0.0 - 0.1 K/UL    ABS. IMM.  GRANS. 0.1 (H) 0.00 - 0.04 K/UL    DF SMEAR SCANNED      RBC COMMENTS NORMOCYTIC, NORMOCHROMIC     METABOLIC PANEL, COMPREHENSIVE    Collection Time: 12/10/20 10:16 AM   Result Value Ref Range    Sodium 133 (L) 136 - 145 mmol/L    Potassium 4.2 3.5 - 5.1 mmol/L    Chloride 98 97 - 108 mmol/L    CO2 30 21 - 32 mmol/L    Anion gap 5 5 - 15 mmol/L    Glucose 197 (H) 65 - 100 mg/dL    BUN 25 (H) 6 - 20 MG/DL    Creatinine 1.31 (H) 0.70 - 1.30 MG/DL    BUN/Creatinine ratio 19 12 - 20      GFR est AA >60 >60 ml/min/1.73m2    GFR est non-AA 55 (L) >60 ml/min/1.73m2    Calcium 9.6 8.5 - 10.1 MG/DL    Bilirubin, total 0.4 0.2 - 1.0 MG/DL    ALT (SGPT) 16 12 - 78 U/L    AST (SGOT) 16 15 - 37 U/L    Alk. phosphatase 108 45 - 117 U/L    Protein, total 8.0 6.4 - 8.2 g/dL    Albumin 2.3 (L) 3.5 - 5.0 g/dL    Globulin 5.7 (H) 2.0 - 4.0 g/dL    A-G Ratio 0.4 (L) 1.1 - 2.2     SAMPLES BEING HELD    Collection Time: 12/10/20 10:16 AM   Result Value Ref Range    SAMPLES BEING HELD 1LAV,1RED,1BLU     COMMENT        Add-on orders for these samples will be processed based on acceptable specimen integrity and analyte stability, which may vary by analyte. LACTIC ACID    Collection Time: 12/10/20 10:16 AM   Result Value Ref Range    Lactic acid 0.9 0.4 - 2.0 MMOL/L         Imaging:     CT abdomen with contrast done on 12/9/2020  IMPRESSION:     No evidence for right inguinal hernia. A few prominent right inguinal lymph  nodes may be reactive.     There is inflammatory change surrounding the right common femoral artery and  vein. Recommend duplex vascular ultrasound to evaluate for arterial  pseudoaneurysm and/or focal deep venous thrombosis.      There is enlargement and edema involving the right abdominal oblique muscles  representing nonspecific inflammation/myositis. No evidence for any abscess. There is overlying subcutaneous edema which extends down to involve the  subcutaneous fat overlying the right hip and thigh. USG abdomen  12/9/20    IMPRESSION: Poorly visualized large abnormality with shadowing in the area of  clinical concern in the right lower abdomen concerning for a large incarcerated  ventral hernia    Doppler LE: No evidence of right lower extremity vein thrombosis.             Assessment:     Active Problems:    Abdominal pain (12/10/2020)           Plan:     1.   Abdominal pain with swelling  -Possible abdominal wall cellulitis with abscess unlikely hernia  -CT abdomen from yesterday noted consult surgery  -Blood culture and start antibiotics vancomycin and Zosyn  -Duplex vascular ultrasound to evaluate for arterial pseudoaneurysm or deep vein thrombosis as recommended    2. Uncontrolled diabetes type 2 last A1c 14  -Patient is on Trulicity q. 7 days  -Patient is on long-acting insulin twice daily will restart 30 units twice daily with sliding scale insulin  -Get A1c    3. Uncontrolled hypertension  -Blood pressure uncontrolled probably did not have medication this morning  -Resume home medication  -Patient is not on ACE inhibitor consider adding ACE inhibitor in a diabetic patient    4. Hyperlipidemia  -Continue statin get lipid panel    5. APARNA on ? CKD   -Baseline unknown   -We will continue with gentle IV fluid antibiotic dosing as per renal clearance    6. Ambulates at baseline lives with family    9.   DVT prophylaxis SCD for now if I&D is required       Signed By: Coreen Cary MD     December 10, 2020

## 2020-12-10 NOTE — PROGRESS NOTES
Pharmacist Note - Vancomycin Dosing    Consult provided for this 77 y.o. male for indication of possible abdominal wall cellulitis with abscess. Antibiotic regimen(s): vanc/pip-tazo  Patient on vancomycin PTA? NO     Recent Labs     12/10/20  1016   WBC 14.7*   CREA 1.31*   BUN 25*     Frequency of BMP: daily  Height: 170 cm  Weight: 71.7 kg  Est CrCl: 50-55 ml/min; UO: -- ml/kg/hr  Temp (24hrs), Av.1 °F (37.8 °C), Min:100 °F (37.8 °C), Max:100.1 °F (37.8 °C)    Cultures:  12/10 blood: in process    Goal trough = 10 - 15 mcg/mL    Therapy will be initiated with a loading dose of 1.5g IV x 1 to be followed by a maintenance dose of 1g IV every 18 hours. Pharmacy to follow patient daily and order levels / make dose adjustments as appropriate.

## 2020-12-10 NOTE — ED PROVIDER NOTES
Date of Service:  12/10/2020    Patient:  Irina Arango    Chief Complaint:  Groin Pain and Flank Pain       HPI:  Irina Arango is a 77 y.o.  male who presents for evaluation of right sided abdominal pain/groin pain, with worsening swelling and tightness, tender to palpation. States this started about 1 week ago, initially saw PCP Dr. Mahin Jimenez  with worsening since Friday. Saw Dr. Lissa Wooten on Monday who ordered outpatient CT scan and abdominal ultrasound. Denies fever/chills, chest pain, sob, n/v/d, dysuria, numbness/tingling/weakness.      PMH: DM, HTN, HC                 Past Medical History:   Diagnosis Date    Hypertension     ICD (implantable cardioverter-defibrillator) in place     NSVT (nonsustained ventricular tachycardia) ([facility] [address] 11/21/2018    EPS 11/21/2018 VT/VF     Right groin pain 12/7/2020       Past Surgical History:   Procedure Laterality Date    COLONOSCOPY  1/2/2015         HX HEART CATHETERIZATION      HX HEENT      l cararact removal         Family History:   Problem Relation Age of Onset    Diabetes Mother     Hypertension Mother     Stroke Mother        Social History     Socioeconomic History    Marital status: SINGLE     Spouse name: Not on file    Number of children: 0    Years of education: Not on file    Highest education level: Not on file   Occupational History    Occupation: [employer]   Social Needs    Financial resource strain: Not on file    Food insecurity     Worry: Not on file     Inability: Not on file   [employer] needs     Medical: Not on file     Non-medical: Not on file   Tobacco Use    Smoking status: Never Smoker    Smokeless tobacco: Never Used   Substance and Sexual Activity    Alcohol use: No    Drug use: No    Sexual activity: Yes     Partners: Female     Birth control/protection: None   Lifestyle    Physical activity     Days per week: Not on file     Minutes per session: Not on file    Stress: Not on file   Relationships    Social connections Talks on phone: Not on file     Gets together: Not on file     Attends [employer] service: Not on file     Active member of club or organization: Not on file     Attends meetings of clubs or organizations: Not on file     Relationship status: Not on file    Intimate partner violence     Fear of current or ex partner: Not on file     Emotionally abused: Not on file     Physically abused: Not on file     Forced sexual activity: Not on file   Other Topics Concern    Not on file   Social History Narrative    Not on file         ALLERGIES: Patient has no known allergies. Review of Systems   Constitutional: Negative for chills and fever. HENT: Negative for trouble swallowing. Eyes: Negative for redness. Respiratory: Negative for shortness of breath. Cardiovascular: Negative for chest pain. Gastrointestinal: Positive for abdominal pain. Negative for diarrhea, nausea and vomiting. Genitourinary: Negative for dysuria. Musculoskeletal: Negative for gait problem. Skin: Positive for color change. Negative for rash and wound. Neurological: Negative for syncope. Vitals:    12/10/20 0942   BP: (!) 164/83   Pulse: 93   Resp: 16   Temp: 100 °F (37.8 °C)   SpO2: 96%            Physical Exam  Vitals signs and nursing note reviewed. Constitutional:       Appearance: Normal appearance. HENT:      Head: Normocephalic and atraumatic. Nose: Nose normal.   Eyes:      Extraocular Movements: Extraocular movements intact. Conjunctiva/sclera: Conjunctivae normal.      Pupils: Pupils are equal, round, and reactive to light. Neck:      Musculoskeletal: Normal range of motion and neck supple. Cardiovascular:      Rate and Rhythm: Normal rate and regular rhythm. Pulses: Normal pulses. Radial pulses are 2+ on the right side and 2+ on the left side. Posterior tibial pulses are 2+ on the right side and 2+ on the left side. Heart sounds: Normal heart sounds.    Pulmonary: Effort: Pulmonary effort is normal.      Breath sounds: Normal breath sounds. Abdominal:      General: Abdomen is flat. Bowel sounds are normal.      Palpations: Abdomen is soft. Tenderness: There is abdominal tenderness. There is no guarding or rebound. Comments: Right lower abdominal TTP, swelling, Tenderness, warmth, right inguinal lymphadenopathy noted   Musculoskeletal: Normal range of motion. Skin:     General: Skin is warm and dry. Neurological:      General: No focal deficit present. Mental Status: He is alert and oriented to person, place, and time. Mental status is at baseline. Psychiatric:         Mood and Affect: Mood normal.         Behavior: Behavior normal.         Thought Content: Thought content normal.          MDM  Number of Diagnoses or Management Options  Right lower quadrant abdominal tenderness without rebound tenderness:   Diagnosis management comments: 3:30 PM  Patient is being admitted to the hospital.  The results of their tests and reasons for their admission have been discussed with them and/or available family. They convey agreement and understanding for the need to be admitted and for their admission diagnosis. Consultation has been made with the inpatient physician specialist for hospitalization.     LABORATORY TESTS:  Recent Results (from the past 12 hour(s))  -CBC WITH AUTOMATED DIFF  Collection Time: 12/10/20 10:16 AM       Result                      Value             Ref Range           WBC                         14.7 (H)          4.1 - 11.1 K*       RBC                         3.94 (L)          4.10 - 5.70 *       HGB                         10.9 (L)          12.1 - 17.0 *       HCT                         34.0 (L)          36.6 - 50.3 %       MCV                         86.3              80.0 - 99.0 *       MCH                         27.7              26.0 - 34.0 *       MCHC                        32.1              30.0 - 36.5 *       RDW 13.1              11.5 - 14.5 %       PLATELET                    305               150 - 400 K/*       MPV                         9.7               8.9 - 12.9 FL       NRBC                        0.0               0  WBC       ABSOLUTE NRBC               0.00              0.00 - 0.01 *       NEUTROPHILS                 89 (H)            32 - 75 %           LYMPHOCYTES                 4 (L)             12 - 49 %           MONOCYTES                   6                 5 - 13 %            EOSINOPHILS                 0                 0 - 7 %             BASOPHILS                   0                 0 - 1 %             IMMATURE GRANULOCYTES       1 (H)             0.0 - 0.5 %         ABS. NEUTROPHILS            13.1 (H)          1.8 - 8.0 K/*       ABS. LYMPHOCYTES            0.6 (L)           0.8 - 3.5 K/*       ABS. MONOCYTES              0.9               0.0 - 1.0 K/*       ABS. EOSINOPHILS            0.0               0.0 - 0.4 K/*       ABS. BASOPHILS              0.0               0.0 - 0.1 K/*       ABS. IMM.  GRANS.            0.1 (H)           0.00 - 0.04 *       DF                          SMEAR SCANNED                         RBC COMMENTS                                                  NORMOCYTIC, NORMOCHROMIC  -METABOLIC PANEL, COMPREHENSIVE  Collection Time: 12/10/20 10:16 AM       Result                      Value             Ref Range           Sodium                      133 (L)           136 - 145 mm*       Potassium                   4.2               3.5 - 5.1 mm*       Chloride                    98                97 - 108 mmo*       CO2                         30                21 - 32 mmol*       Anion gap                   5                 5 - 15 mmol/L       Glucose                     197 (H)           65 - 100 mg/*       BUN                         25 (H)            6 - 20 MG/DL        Creatinine                  1.31 (H)          0.70 - 1.30 *       BUN/Creatinine ratio 19                12 - 20             GFR est AA                  >60               >60 ml/min/1*       GFR est non-AA              55 (L)            >60 ml/min/1*       Calcium                     9.6               8.5 - 10.1 M*       Bilirubin, total            0.4               0.2 - 1.0 MG*       ALT (SGPT)                  16                12 - 78 U/L         AST (SGOT)                  16                15 - 37 U/L         Alk. phosphatase            108               45 - 117 U/L        Protein, total              8.0               6.4 - 8.2 g/*       Albumin                     2.3 (L)           3.5 - 5.0 g/*       Globulin                    5.7 (H)           2.0 - 4.0 g/*       A-G Ratio                   0.4 (L)           1.1 - 2.2      -SAMPLES BEING HELD  Collection Time: 12/10/20 10:16 AM       Result                      Value             Ref Range           SAMPLES BEING HELD                                            1LAV,1RED,1BLU       COMMENT                                                       Add-on orders for these samples will be processed based on acceptable specimen integrity and analyte stability, which may vary by analyte.   -LACTIC ACID  Collection Time: 12/10/20 10:16 AM       Result                      Value             Ref Range           Lactic acid                 0.9               0.4 - 2.0 MM*    MEDICATIONS GIVEN:  Medications  sodium chloride (NS) flush 5-40 mL (has no administration in time range)  sodium chloride (NS) flush 5-40 mL (has no administration in time range)  acetaminophen (TYLENOL) tablet 650 mg (has no administration in time range)    Or  acetaminophen (TYLENOL) suppository 650 mg (has no administration in time range)  polyethylene glycol (MIRALAX) packet 17 g (has no administration in time range)  promethazine (PHENERGAN) tablet 12.5 mg (has no administration in time range)    Or  ondansetron (ZOFRAN) injection 4 mg (has no administration in time range)  piperacillin-tazobactam (ZOSYN) 3.375 g in 0.9% sodium chloride (MBP/ADV) 100 mL MBP (3.375 g IntraVENous New Bag 12/10/20 1458)  amLODIPine (NORVASC) tablet 10 mg (has no administration in time range)  aspirin delayed-release tablet 81 mg (has no administration in time range)  atorvastatin (LIPITOR) tablet 20 mg (has no administration in time range)  insulin glargine (LANTUS) injection 30 Units (has no administration in time range)  insulin lispro (HUMALOG) injection (has no administration in time range)  glucose chewable tablet 16 g (has no administration in time range)  glucagon (GLUCAGEN) injection 1 mg (has no administration in time range)  hydrALAZINE (APRESOLINE) 20 mg/mL injection 20 mg (has no administration in time range)  0.9% sodium chloride infusion (has no administration in time range)  vancomycin (VANCOCIN) 1500 mg in  ml infusion (has no administration in time range)  vancomycin - pharmacy to dose (has no administration in time range)  vancomycin (VANCOCIN) 1,000 mg in 0.9% sodium chloride 250 mL (VIAL-MATE) (has no administration in time range)  dextrose 10 % infusion 125-250 mL (has no administration in time range)  clindamycin (CLEOCIN) 600mg D5W 50mL IVPB (premix) (600 mg IntraVENous New Bag 12/10/20 1217)    IMPRESSION:  Right lower quadrant abdominal tenderness without rebound tenderness  (primary encounter diagnosis)    PLAN:  1. Admit to hospitalist         Amount and/or Complexity of Data Reviewed  Decide to obtain previous medical records or to obtain history from someone other than the patient: yes      ED Course as of Dec 10 1248   Thu Dec 10, 2020   1242 Spoke to Dr. Robin Lee, who stated this was not an incarcerated ventral hernia,     [EK]      ED Course User Index  [EK] Nurys Love PA-C       Procedures      Perfect Serve Consult for Admission  12:54 PM    ED Room Number: R32/R32  Patient Name and age:  Janelle Hopkins 77 y.o.  male  Working Diagnosis:   1.  Right lower quadrant abdominal tenderness without rebound tenderness        COVID-19 Suspicion:  no  Sepsis present:  no  Reassessment needed: no  Code Status:  Full Code  Readmission: no  Isolation Requirements:  no  Recommended Level of Care:  med/surg  Department:[facility] Adult ED - 21   Other: See 7year-old male with past medical history of hypertension, hypercholesterol, diabetes presents today with 1 week of worsening abdominal/groin tenderness, swelling, tightness. Initially saw PCP, Dr. Gadiel Pro who referred him to surgery, Dr. Thomas Adams. Dr. Thomas Adams ordered ultrasound and CT of the abdomen. Ultrasound showed poorly visualized large abnormality in the right lower abdomen concerning for incarcerated ventral hernia. CT showed edema of right abdominal oblique muscles representing nonspecific inflammation, myositis. No evidence for abscess. No evidence for right inguinal hernia. Prominent right inguinal lymph nodes. Spoke to surgery, Dr. Pebbles Gamboa who stated no evidence of incarcerated ventral hernia. Temp: 100 F, WBC: 14.7. Right lower abdomen erythematous, swollen, warm, TTP, with inguinal lymphadenopathy noted. Patient has been seen and evaluated by attending physician as well.

## 2020-12-10 NOTE — TELEPHONE ENCOUNTER
Returned call to patient. Two patient identifiers used. Patient stated he is having 9/10 pain in his groin/abdominal area. Told patient per my conversation with Jacey Morocho NP that he is to come to [facility] ED for work up. Patient stated he was waiting for a ride as he was coming to the ED because he is in a lot of pain.

## 2020-12-10 NOTE — ED NOTES
C/o right flank pain and right groin pain that's progressed over night. \"I can't even touch my side with a feather. \"  Pt had outpatient CT scan and US per Dr. Jose Morrissey yesterday. Please see result review for results.

## 2020-12-11 PROBLEM — L03.90 CELLULITIS: Status: ACTIVE | Noted: 2020-12-11

## 2020-12-11 LAB
ANION GAP SERPL CALC-SCNC: 7 MMOL/L (ref 5–15)
BUN SERPL-MCNC: 20 MG/DL (ref 6–20)
BUN/CREAT SERPL: 19 (ref 12–20)
CALCIUM SERPL-MCNC: 8.4 MG/DL (ref 8.5–10.1)
CHLORIDE SERPL-SCNC: 102 MMOL/L (ref 97–108)
CO2 SERPL-SCNC: 29 MMOL/L (ref 21–32)
CREAT SERPL-MCNC: 1.08 MG/DL (ref 0.7–1.3)
ERYTHROCYTE [DISTWIDTH] IN BLOOD BY AUTOMATED COUNT: 13.1 % (ref 11.5–14.5)
GLUCOSE BLD STRIP.AUTO-MCNC: 226 MG/DL (ref 65–100)
GLUCOSE BLD STRIP.AUTO-MCNC: 249 MG/DL (ref 65–100)
GLUCOSE BLD STRIP.AUTO-MCNC: 282 MG/DL (ref 65–100)
GLUCOSE BLD STRIP.AUTO-MCNC: 80 MG/DL (ref 65–100)
GLUCOSE SERPL-MCNC: 81 MG/DL (ref 65–100)
HCT VFR BLD AUTO: 29.9 % (ref 36.6–50.3)
HGB BLD-MCNC: 9.6 G/DL (ref 12.1–17)
LACTATE SERPL-SCNC: 0.7 MMOL/L (ref 0.4–2)
MCH RBC QN AUTO: 27.1 PG (ref 26–34)
MCHC RBC AUTO-ENTMCNC: 32.1 G/DL (ref 30–36.5)
MCV RBC AUTO: 84.5 FL (ref 80–99)
NRBC # BLD: 0 K/UL (ref 0–0.01)
NRBC BLD-RTO: 0 PER 100 WBC
PLATELET # BLD AUTO: 277 K/UL (ref 150–400)
PMV BLD AUTO: 9.6 FL (ref 8.9–12.9)
POTASSIUM SERPL-SCNC: 3.8 MMOL/L (ref 3.5–5.1)
RBC # BLD AUTO: 3.54 M/UL (ref 4.1–5.7)
SERVICE CMNT-IMP: ABNORMAL
SERVICE CMNT-IMP: NORMAL
SODIUM SERPL-SCNC: 138 MMOL/L (ref 136–145)
WBC # BLD AUTO: 12.7 K/UL (ref 4.1–11.1)

## 2020-12-11 PROCEDURE — 82962 GLUCOSE BLOOD TEST: CPT

## 2020-12-11 PROCEDURE — 74011000258 HC RX REV CODE- 258: Performed by: HOSPITALIST

## 2020-12-11 PROCEDURE — 85027 COMPLETE CBC AUTOMATED: CPT

## 2020-12-11 PROCEDURE — 74011636637 HC RX REV CODE- 636/637: Performed by: HOSPITALIST

## 2020-12-11 PROCEDURE — 74011250637 HC RX REV CODE- 250/637: Performed by: HOSPITALIST

## 2020-12-11 PROCEDURE — 74011250636 HC RX REV CODE- 250/636: Performed by: HOSPITALIST

## 2020-12-11 PROCEDURE — 80048 BASIC METABOLIC PNL TOTAL CA: CPT

## 2020-12-11 PROCEDURE — 74011636637 HC RX REV CODE- 636/637: Performed by: INTERNAL MEDICINE

## 2020-12-11 PROCEDURE — 83605 ASSAY OF LACTIC ACID: CPT

## 2020-12-11 PROCEDURE — 36415 COLL VENOUS BLD VENIPUNCTURE: CPT

## 2020-12-11 PROCEDURE — 65270000032 HC RM SEMIPRIVATE

## 2020-12-11 PROCEDURE — 51798 US URINE CAPACITY MEASURE: CPT

## 2020-12-11 PROCEDURE — 99232 SBSQ HOSP IP/OBS MODERATE 35: CPT | Performed by: INTERNAL MEDICINE

## 2020-12-11 RX ORDER — INSULIN GLARGINE 100 [IU]/ML
30 INJECTION, SOLUTION SUBCUTANEOUS
Status: DISCONTINUED | OUTPATIENT
Start: 2020-12-11 | End: 2020-12-14

## 2020-12-11 RX ADMIN — SODIUM CHLORIDE 75 ML/HR: 900 INJECTION, SOLUTION INTRAVENOUS at 14:13

## 2020-12-11 RX ADMIN — INSULIN LISPRO 3 UNITS: 100 INJECTION, SOLUTION INTRAVENOUS; SUBCUTANEOUS at 11:57

## 2020-12-11 RX ADMIN — PIPERACILLIN AND TAZOBACTAM 3.38 G: 3; .375 INJECTION, POWDER, LYOPHILIZED, FOR SOLUTION INTRAVENOUS at 17:28

## 2020-12-11 RX ADMIN — INSULIN LISPRO 2 UNITS: 100 INJECTION, SOLUTION INTRAVENOUS; SUBCUTANEOUS at 22:04

## 2020-12-11 RX ADMIN — Medication 10 ML: at 07:10

## 2020-12-11 RX ADMIN — ACETAMINOPHEN 650 MG: 325 TABLET ORAL at 14:16

## 2020-12-11 RX ADMIN — ACETAMINOPHEN 650 MG: 325 TABLET ORAL at 01:25

## 2020-12-11 RX ADMIN — INSULIN GLARGINE 30 UNITS: 100 INJECTION, SOLUTION SUBCUTANEOUS at 22:03

## 2020-12-11 RX ADMIN — INSULIN LISPRO 5 UNITS: 100 INJECTION, SOLUTION INTRAVENOUS; SUBCUTANEOUS at 17:25

## 2020-12-11 RX ADMIN — ACETAMINOPHEN 650 MG: 325 TABLET ORAL at 19:09

## 2020-12-11 RX ADMIN — AMLODIPINE BESYLATE 10 MG: 5 TABLET ORAL at 10:27

## 2020-12-11 RX ADMIN — PIPERACILLIN AND TAZOBACTAM 3.38 G: 3; .375 INJECTION, POWDER, LYOPHILIZED, FOR SOLUTION INTRAVENOUS at 07:11

## 2020-12-11 RX ADMIN — PIPERACILLIN AND TAZOBACTAM 3.38 G: 3; .375 INJECTION, POWDER, LYOPHILIZED, FOR SOLUTION INTRAVENOUS at 22:06

## 2020-12-11 RX ADMIN — ASPIRIN 81 MG: 81 TABLET, COATED ORAL at 10:27

## 2020-12-11 RX ADMIN — ATORVASTATIN CALCIUM 20 MG: 10 TABLET, FILM COATED ORAL at 22:05

## 2020-12-11 NOTE — PROGRESS NOTES
Hospital Progress Note    NAME:  Florinda Garcia   :   1955   MRN:  [id number]     Date/Time:  2020 9:19 AM    Plan:   1. Continue Zosyn and vancomycin pending cultures  2. Warm compresses abdominal wall  3. Controlled diabetes  Risk of Deterioration: Low  []           Moderate  [x]           High  []                 Assessment:   Principal Problem:    Cellulitis (2020)      Large abdominal wall abscess encompassing the right lower quadrant and extending to the upper thigh on the right. Continue IV antibiotics with vancomycin and Zosyn    Active Problems:    Dyslipidemia (2014)      Continue Lipitor      Uncontrolled type 2 diabetes mellitus with complication, with long-term current use of insulin ([address] (2018)     Titrate insulin for goal control      ICD (implantable cardioverter-defibrillator) in place (2018)      Hypertension (2018)     Home meds      Abdominal pain (12/10/2020)       Admitting notes: 77 y.o. male who presents with right lower quadrant abdominal pain which started 1 week back worsen since Monday radiating towards the groin denies any fever chills nausea vomiting diarrhea or any other associated symptoms. States this started about 1 week ago, initially saw PCP Dr. Jonh Denney worsening since Friday.  Saw Dr. Sanjiv Espinoza on Monday who ordered outpatient CT scan and abdominal ultrasound    Subjective:     Feeling better still notes swelling and discomfort in the right lower quadrant  11 Point Review of Systems:   Negative except no chills or fever    []            Unable to obtain ROS due to:       []            mental status change []            sedated []            intubated     Social History     Tobacco Use    Smoking status: Never Smoker    Smokeless tobacco: Never Used   Substance Use Topics    Alcohol use: No     Medications reviewed:  Current Facility-Administered Medications   Medication Dose Route Frequency    sodium chloride (NS) flush 5-40 mL 5-40 mL IntraVENous Q8H    sodium chloride (NS) flush 5-40 mL  5-40 mL IntraVENous PRN    acetaminophen (TYLENOL) tablet 650 mg  650 mg Oral Q6H PRN    Or    acetaminophen (TYLENOL) suppository 650 mg  650 mg Rectal Q6H PRN    polyethylene glycol (MIRALAX) packet 17 g  17 g Oral DAILY PRN    promethazine (PHENERGAN) tablet 12.5 mg  12.5 mg Oral Q6H PRN    Or    ondansetron (ZOFRAN) injection 4 mg  4 mg IntraVENous Q6H PRN    piperacillin-tazobactam (ZOSYN) 3.375 g in 0.9% sodium chloride (MBP/ADV) 100 mL MBP  3.375 g IntraVENous Q8H    amLODIPine (NORVASC) tablet 10 mg  10 mg Oral DAILY    aspirin delayed-release tablet 81 mg  81 mg Oral DAILY    atorvastatin (LIPITOR) tablet 20 mg  20 mg Oral QHS    insulin glargine (LANTUS) injection 30 Units  30 Units SubCUTAneous BID    insulin lispro (HUMALOG) injection   SubCUTAneous AC&HS    glucose chewable tablet 16 g  4 Tab Oral PRN    glucagon (GLUCAGEN) injection 1 mg  1 mg IntraMUSCular PRN    hydrALAZINE (APRESOLINE) 20 mg/mL injection 20 mg  20 mg IntraVENous Q6H PRN    0.9% sodium chloride infusion  75 mL/hr IntraVENous CONTINUOUS    vancomycin - pharmacy to dose   Other Rx Dosing/Monitoring    vancomycin (VANCOCIN) 1,000 mg in 0.9% sodium chloride 250 mL (VIAL-MATE)  1,000 mg IntraVENous Q18H    dextrose 10 % infusion 125-250 mL  125-250 mL IntraVENous PRN        Objective:   Vitals:  Visit Vitals  BP (!) 147/83 (BP 1 Location: Left arm, BP Patient Position: At rest)   Pulse 88   Temp 98.7 °F (37.1 °C)   Resp 16   Wt 158 lb (71.7 kg) Comment: as of last week per chart review   SpO2 94%   BMI 24.75 kg/m²     Temp (24hrs), Av.2 °F (37.3 °C), Min:98.1 °F (36.7 °C), Max:100.1 °F (37.8 °C)      O2 Device: Room air    Last 24hr Input/Output:    Intake/Output Summary (Last 24 hours) at 2020 09  Last data filed at 2020 0915  Gross per 24 hour   Intake 1057.5 ml   Output 1875 ml   Net -817.5 ml        PHYSICAL EXAM:  General:    Alert, cooperative, no distress, appears stated age. Head:   Normocephalic, without obvious abnormality, atraumatic. Eyes:   Conjunctivae/corneas clear. PERRLA  Nose:  Nares normal. No drainage or sinus tenderness. Throat:    Lips, mucosa, and tongue normal.  No Thrush  Neck:  Supple, symmetrical,  no adenopathy, thyroid: non tender    no carotid bruit and no JVD. Back:    Symmetric,  No CVA tenderness. Lungs:   Clear to auscultation bilaterally. No Wheezing or Rhonchi. No rales. Chest wall:  No tenderness or deformity. No Accessory muscle use. Heart:   Regular rate and rhythm,  no murmur, rub or gallop. Abdomen:   Soft, non-tender. Not distended. Bowel sounds normal.  Large area of induration with tenderness encompassing the right lower quadrant         Lab Data Reviewed:    Recent Labs     12/11/20  0507 12/10/20  1016   WBC 12.7* 14.7*   HGB 9.6* 10.9*   HCT 29.9* 34.0*    305     Recent Labs     12/11/20  0507 12/10/20  1016    133*   K 3.8 4.2    98   CO2 29 30   GLU 81 197*   BUN 20 25*   CREA 1.08 1.31*   CA 8.4* 9.6   ALB  --  2.3*   TBILI  --  0.4   ALT  --  16     Lab Results   Component Value Date/Time    Glucose (POC) 80 12/11/2020 06:22 AM    Glucose (POC) 183 (H) 12/10/2020 09:29 PM    Glucose (POC) 181 (H) 12/10/2020 04:46 PM    Glucose (POC) 457 (H) 03/13/2019 04:40 PM    Glucose (POC) 240 (H) 03/13/2019 11:18 AM     No results for input(s): PH, PCO2, PO2, HCO3, FIO2 in the last 72 hours. No results for input(s): INR, INREXT in the last 72 hours.   ___________________________________________________  ___________________________________________________    Attending Physician: Ghazala Menjivar MD

## 2020-12-11 NOTE — PROGRESS NOTES
Day #2 of Vancomycin  Indication: Large abdominal wall abscess encompassing the right lower quadrant and extending to the upper thigh on the right  Current regimen:  1000 mg Q 18hrs  Abx regimen:  Vancomycin and Zosyn  ID Following ?: NO  Concomitant nephrotoxic drugs (requires more frequent monitoring): None  Frequency of BMP?: Daily through   Recent Labs     20  0507 12/10/20  1016   WBC 12.7* 14.7*   CREA 1.08 1.31*   BUN 20 25*   Est CrCl: 60-65 ml/min; UO: >1 ml/kg/hr  Temp (24hrs), Av.1 °F (37.3 °C), Min:98.1 °F (36.7 °C), Max:100.1 °F (37.8 °C)    Cultures:   12/10 Blood, paired: - NGTD - pending    Goal trough = 10 - 15 mcg/mL    Recent trough history (date/time/level/dose/action taken):  None thus far    Plan:  Significant improvement in renal function; will adjust maintenane regimen. Change to 1000 mg IV Q 16hrs. Pharmacy will continue to monitor this patient daily for changes in clinical status andv renal function.     Irvin Mario, PharmD  Clinical Pharmacist, Orthopedics and Med/Surg () [address] [facility] ()

## 2020-12-11 NOTE — PROGRESS NOTES
ITZEL:    RUR 12%    IV abx    Patient lives at home with his girlfriend, Petra Coppola. His brother will take him home at discharge. Care Management Interventions  PCP Verified by CM: Yes  Mode of Transport at Discharge: (Brother will transport home.)  MyChart Signup: Yes  Discharge Durable Medical Equipment: No  Physical Therapy Consult: No  Occupational Therapy Consult: No  Speech Therapy Consult: No  Current Support Network: Other(Lives with girlfriend, Petra Coppola.)  Confirm Follow Up Transport: Family  Discharge Location  Discharge Placement: Home with family assistance     Reason for Admission:right lower quadrant abdominal pain                      RUR Score:   12%                  Plan for utilizing home health:     No orders     PCP: First and Last name:  Ashley Roberts   Name of Practice:    Are you a current patient: Yes/No:  Yes   Approximate date of last visit: 12/3   Can you participate in a virtual visit with your PCP: Yes                    Current Advanced Directive/Advance Care Plan:                     none     Transition of Care Plan:                    CM met with patient to introduce CM role, verify demographics and begin discharge planning. Patient lives at home with his girlfriend, Petra Coppola. He uses public transportation to go to work and to appointments as necessary. Patient's brother also provides support. Patient does not use any DME. He has never used HH and has never gone to rehab. Patient gets prescriptions filled at [facility] on [facility] [address]. Insurance verified: [facility] primary, [place] Medicare secondary.     Carl Kern RN/CRM

## 2020-12-11 NOTE — PROGRESS NOTES
Bedside and Verbal shift change report given to Rico Anderson (oncoming nurse) by Saturnino Morales (offgoing nurse). Report included the following information SBAR.

## 2020-12-11 NOTE — CONSULTS
Chart reviewed  Pt has had Rght side flank and groin pain. Suspicion was for right inguinal hernia prior to CT scan. CT scan shows that the patient has myositis and no right inguinal hernia. The ultrasound that he has is irrelevant as the CT scan shows definitively that there is no right inguinal hernia.   No need for operative intervention at this time  Call if needed

## 2020-12-12 PROBLEM — N17.9 ARF (ACUTE RENAL FAILURE) (HCC): Status: ACTIVE | Noted: 2020-12-12

## 2020-12-12 LAB
ANION GAP SERPL CALC-SCNC: 6 MMOL/L (ref 5–15)
BUN SERPL-MCNC: 18 MG/DL (ref 6–20)
BUN/CREAT SERPL: 15 (ref 12–20)
CALCIUM SERPL-MCNC: 8 MG/DL (ref 8.5–10.1)
CHLORIDE SERPL-SCNC: 104 MMOL/L (ref 97–108)
CO2 SERPL-SCNC: 28 MMOL/L (ref 21–32)
COMMENT, HOLDF: NORMAL
CREAT SERPL-MCNC: 1.17 MG/DL (ref 0.7–1.3)
DATE LAST DOSE: ABNORMAL
GLUCOSE BLD STRIP.AUTO-MCNC: 124 MG/DL (ref 65–100)
GLUCOSE BLD STRIP.AUTO-MCNC: 132 MG/DL (ref 65–100)
GLUCOSE BLD STRIP.AUTO-MCNC: 207 MG/DL (ref 65–100)
GLUCOSE BLD STRIP.AUTO-MCNC: 309 MG/DL (ref 65–100)
GLUCOSE BLD STRIP.AUTO-MCNC: 56 MG/DL (ref 65–100)
GLUCOSE BLD STRIP.AUTO-MCNC: 59 MG/DL (ref 65–100)
GLUCOSE SERPL-MCNC: 104 MG/DL (ref 65–100)
POTASSIUM SERPL-SCNC: 3.8 MMOL/L (ref 3.5–5.1)
REPORTED DOSE,DOSE: ABNORMAL UNITS
REPORTED DOSE/TIME,TMG: ABNORMAL
SAMPLES BEING HELD,HOLD: NORMAL
SERVICE CMNT-IMP: ABNORMAL
SODIUM SERPL-SCNC: 138 MMOL/L (ref 136–145)
VANCOMYCIN TROUGH SERPL-MCNC: 12.6 UG/ML (ref 5–10)

## 2020-12-12 PROCEDURE — 65270000032 HC RM SEMIPRIVATE

## 2020-12-12 PROCEDURE — 80048 BASIC METABOLIC PNL TOTAL CA: CPT

## 2020-12-12 PROCEDURE — 82962 GLUCOSE BLOOD TEST: CPT

## 2020-12-12 PROCEDURE — 74011000258 HC RX REV CODE- 258: Performed by: HOSPITALIST

## 2020-12-12 PROCEDURE — 74011250637 HC RX REV CODE- 250/637: Performed by: HOSPITALIST

## 2020-12-12 PROCEDURE — 74011250636 HC RX REV CODE- 250/636: Performed by: INTERNAL MEDICINE

## 2020-12-12 PROCEDURE — 74011250637 HC RX REV CODE- 250/637: Performed by: INTERNAL MEDICINE

## 2020-12-12 PROCEDURE — 99232 SBSQ HOSP IP/OBS MODERATE 35: CPT | Performed by: INTERNAL MEDICINE

## 2020-12-12 PROCEDURE — 74011636637 HC RX REV CODE- 636/637: Performed by: HOSPITALIST

## 2020-12-12 PROCEDURE — 74011636637 HC RX REV CODE- 636/637: Performed by: INTERNAL MEDICINE

## 2020-12-12 PROCEDURE — 80202 ASSAY OF VANCOMYCIN: CPT

## 2020-12-12 PROCEDURE — 36415 COLL VENOUS BLD VENIPUNCTURE: CPT

## 2020-12-12 PROCEDURE — 74011250636 HC RX REV CODE- 250/636: Performed by: HOSPITALIST

## 2020-12-12 RX ORDER — ENOXAPARIN SODIUM 100 MG/ML
40 INJECTION SUBCUTANEOUS EVERY 24 HOURS
Status: DISCONTINUED | OUTPATIENT
Start: 2020-12-12 | End: 2021-01-11 | Stop reason: HOSPADM

## 2020-12-12 RX ORDER — BISACODYL 5 MG
10 TABLET, DELAYED RELEASE (ENTERIC COATED) ORAL DAILY PRN
Status: DISCONTINUED | OUTPATIENT
Start: 2020-12-12 | End: 2021-01-11 | Stop reason: HOSPADM

## 2020-12-12 RX ORDER — POLYETHYLENE GLYCOL 3350 17 G/17G
17 POWDER, FOR SOLUTION ORAL 2 TIMES DAILY
Status: DISCONTINUED | OUTPATIENT
Start: 2020-12-12 | End: 2021-01-11 | Stop reason: HOSPADM

## 2020-12-12 RX ADMIN — INSULIN LISPRO 3 UNITS: 100 INJECTION, SOLUTION INTRAVENOUS; SUBCUTANEOUS at 16:49

## 2020-12-12 RX ADMIN — ACETAMINOPHEN 650 MG: 325 TABLET ORAL at 18:30

## 2020-12-12 RX ADMIN — ATORVASTATIN CALCIUM 20 MG: 10 TABLET, FILM COATED ORAL at 22:19

## 2020-12-12 RX ADMIN — ASPIRIN 81 MG: 81 TABLET, COATED ORAL at 08:25

## 2020-12-12 RX ADMIN — POLYETHYLENE GLYCOL 3350 17 G: 17 POWDER, FOR SOLUTION ORAL at 22:18

## 2020-12-12 RX ADMIN — PIPERACILLIN AND TAZOBACTAM 3.38 G: 3; .375 INJECTION, POWDER, LYOPHILIZED, FOR SOLUTION INTRAVENOUS at 16:52

## 2020-12-12 RX ADMIN — INSULIN LISPRO 7 UNITS: 100 INJECTION, SOLUTION INTRAVENOUS; SUBCUTANEOUS at 13:07

## 2020-12-12 RX ADMIN — ACETAMINOPHEN 650 MG: 325 TABLET ORAL at 00:49

## 2020-12-12 RX ADMIN — POLYETHYLENE GLYCOL 3350 17 G: 17 POWDER, FOR SOLUTION ORAL at 13:12

## 2020-12-12 RX ADMIN — PIPERACILLIN AND TAZOBACTAM 3.38 G: 3; .375 INJECTION, POWDER, LYOPHILIZED, FOR SOLUTION INTRAVENOUS at 22:19

## 2020-12-12 RX ADMIN — INSULIN GLARGINE 30 UNITS: 100 INJECTION, SOLUTION SUBCUTANEOUS at 22:19

## 2020-12-12 RX ADMIN — VANCOMYCIN HYDROCHLORIDE 1000 MG: 1 INJECTION, POWDER, LYOPHILIZED, FOR SOLUTION INTRAVENOUS at 16:41

## 2020-12-12 RX ADMIN — PIPERACILLIN AND TAZOBACTAM 3.38 G: 3; .375 INJECTION, POWDER, LYOPHILIZED, FOR SOLUTION INTRAVENOUS at 07:03

## 2020-12-12 RX ADMIN — VANCOMYCIN HYDROCHLORIDE 1000 MG: 1 INJECTION, POWDER, LYOPHILIZED, FOR SOLUTION INTRAVENOUS at 02:54

## 2020-12-12 RX ADMIN — Medication 10 ML: at 18:21

## 2020-12-12 RX ADMIN — ENOXAPARIN SODIUM 40 MG: 40 INJECTION SUBCUTANEOUS at 18:19

## 2020-12-12 RX ADMIN — AMLODIPINE BESYLATE 10 MG: 5 TABLET ORAL at 08:25

## 2020-12-12 NOTE — PROGRESS NOTES
Bedside shift change report given to Leonardo Newsome RN (oncoming nurse) by Saeed Mosher (offgoing nurse). Report included the following information SBAR and Kardex.

## 2020-12-12 NOTE — PROGRESS NOTES
Hospital Progress Note    NAME:  Adrienne Shaw   :   1955   MRN:  [id number]     Date/Time:  2020 9:19 AM    Plan:   1. Continue Zosyn and vancomycin pending cultures  2. Warm compresses abdominal wall  3. Controlled diabetes  Risk of Deterioration: Low  []           Moderate  [x]           High  []                 Assessment:   Principal Problem:    Cellulitis (2020)      Large abdominal wall area of enduration encompassing the right lower quadrant and extending      to the upper thigh on the right. Continue IV antibiotics with vancomycin and Zosyn     Due to nonsterile insulin administration     Due to uncontrolled diabetes    Active Problems:    Dyslipidemia (2014)      Continue Lipitor      Uncontrolled type 2 diabetes mellitus with complication, with long-term current use of insulin ([address] (2018)     Titrate insulin for goal control      ICD (implantable cardioverter-defibrillator) in place (2018)      Hypertension (2018)     Home meds       Acute renal failure (POA)      Resolved      Abdominal pain (12/10/2020)       Admitting notes: 77 y.o. male who presents with right lower quadrant abdominal pain which started 1 week back worsen since Monday radiating towards the groin denies any fever chills nausea vomiting diarrhea or any other associated symptoms. States this started about 1 week ago, initially saw PCP Dr. Alberto Atkins worsening since Friday. Saw Dr. Anu Dillon on Monday who ordered outpatient CT scan and abdominal ultrasound    Subjective:     Feeling better still notes swelling and discomfort in the right lower quadrant  11 Point Review of Systems:   Negative except no chills or fever    []            Unable to obtain ROS due to:       []            mental status change []            sedated []            intubated     Social History     Tobacco Use    Smoking status: Never Smoker    Smokeless tobacco: Never Used   Substance Use Topics    Alcohol use:  No Medications reviewed:  Current Facility-Administered Medications   Medication Dose Route Frequency    [START ON 2020] Vancomycin Trough before 11am dose   Other ONCE    insulin glargine (LANTUS) injection 30 Units  30 Units SubCUTAneous QHS    vancomycin (VANCOCIN) 1,000 mg in 0.9% sodium chloride 250 mL (VIAL-MATE)  1,000 mg IntraVENous Q16H    sodium chloride (NS) flush 5-40 mL  5-40 mL IntraVENous Q8H    sodium chloride (NS) flush 5-40 mL  5-40 mL IntraVENous PRN    acetaminophen (TYLENOL) tablet 650 mg  650 mg Oral Q6H PRN    Or    acetaminophen (TYLENOL) suppository 650 mg  650 mg Rectal Q6H PRN    polyethylene glycol (MIRALAX) packet 17 g  17 g Oral DAILY PRN    promethazine (PHENERGAN) tablet 12.5 mg  12.5 mg Oral Q6H PRN    Or    ondansetron (ZOFRAN) injection 4 mg  4 mg IntraVENous Q6H PRN    piperacillin-tazobactam (ZOSYN) 3.375 g in 0.9% sodium chloride (MBP/ADV) 100 mL MBP  3.375 g IntraVENous Q8H    amLODIPine (NORVASC) tablet 10 mg  10 mg Oral DAILY    aspirin delayed-release tablet 81 mg  81 mg Oral DAILY    atorvastatin (LIPITOR) tablet 20 mg  20 mg Oral QHS    insulin lispro (HUMALOG) injection   SubCUTAneous AC&HS    glucose chewable tablet 16 g  4 Tab Oral PRN    glucagon (GLUCAGEN) injection 1 mg  1 mg IntraMUSCular PRN    hydrALAZINE (APRESOLINE) 20 mg/mL injection 20 mg  20 mg IntraVENous Q6H PRN    vancomycin - pharmacy to dose   Other Rx Dosing/Monitoring    dextrose 10 % infusion 125-250 mL  125-250 mL IntraVENous PRN        Objective:   Vitals:  Visit Vitals  /68 (BP 1 Location: Left arm, BP Patient Position: At rest)   Pulse 83   Temp 99 °F (37.2 °C)   Resp 16   Wt 158 lb (71.7 kg) Comment: as of last week per chart review   SpO2 95%   BMI 24.75 kg/m²     Temp (24hrs), Av °F (37.2 °C), Min:98.4 °F (36.9 °C), Max:99.4 °F (37.4 °C)      O2 Device: Room air    Last 24hr Input/Output:    Intake/Output Summary (Last 24 hours) at 2020 1038  Last data filed at 12/12/2020 0710  Gross per 24 hour   Intake --   Output 1975 ml   Net -1975 ml        PHYSICAL EXAM:  General:    Alert, cooperative, no distress, appears stated age. Head:   Normocephalic, without obvious abnormality, atraumatic. Eyes:   Conjunctivae/corneas clear. PERRLA  Nose:  Nares normal. No drainage or sinus tenderness. Throat:    Lips, mucosa, and tongue normal.  No Thrush  Neck:  Supple, symmetrical,  no adenopathy, thyroid: non tender    no carotid bruit and no JVD. Back:    Symmetric,  No CVA tenderness. Lungs:   Clear to auscultation bilaterally. No Wheezing or Rhonchi. No rales. Chest wall:  No tenderness or deformity. No Accessory muscle use. Heart:   Regular rate and rhythm,  no murmur, rub or gallop. Abdomen:   Soft, non-tender. Not distended. Bowel sounds normal.  Large area of induration with tenderness encompassing the right lower quadrant         Lab Data Reviewed:    Recent Labs     12/11/20  0507 12/10/20  1016   WBC 12.7* 14.7*   HGB 9.6* 10.9*   HCT 29.9* 34.0*    305     Recent Labs     12/12/20  0315 12/11/20  0507 12/10/20  1016    138 133*   K 3.8 3.8 4.2    102 98   CO2 28 29 30   * 81 197*   BUN 18 20 25*   CREA 1.17 1.08 1.31*   CA 8.0* 8.4* 9.6   ALB  --   --  2.3*   TBILI  --   --  0.4   ALT  --   --  16     Lab Results   Component Value Date/Time    Glucose (POC) 124 (H) 12/12/2020 07:01 AM    Glucose (POC) 56 (L) 12/12/2020 06:27 AM    Glucose (POC) 59 (L) 12/12/2020 06:25 AM    Glucose (POC) 226 (H) 12/11/2020 09:40 PM    Glucose (POC) 282 (H) 12/11/2020 04:08 PM     No results for input(s): PH, PCO2, PO2, HCO3, FIO2 in the last 72 hours. No results for input(s): INR, INREXT, INREXT in the last 72 hours.   ___________________________________________________  ___________________________________________________    Attending Physician: Gabrielle Gutierrez MD

## 2020-12-13 LAB
ANION GAP SERPL CALC-SCNC: 4 MMOL/L (ref 5–15)
BUN SERPL-MCNC: 15 MG/DL (ref 6–20)
BUN/CREAT SERPL: 14 (ref 12–20)
CALCIUM SERPL-MCNC: 8.2 MG/DL (ref 8.5–10.1)
CHLORIDE SERPL-SCNC: 103 MMOL/L (ref 97–108)
CO2 SERPL-SCNC: 29 MMOL/L (ref 21–32)
COMMENT, HOLDF: NORMAL
CREAT SERPL-MCNC: 1.11 MG/DL (ref 0.7–1.3)
GLUCOSE BLD STRIP.AUTO-MCNC: 123 MG/DL (ref 65–100)
GLUCOSE BLD STRIP.AUTO-MCNC: 221 MG/DL (ref 65–100)
GLUCOSE BLD STRIP.AUTO-MCNC: 242 MG/DL (ref 65–100)
GLUCOSE BLD STRIP.AUTO-MCNC: 336 MG/DL (ref 65–100)
GLUCOSE SERPL-MCNC: 164 MG/DL (ref 65–100)
POTASSIUM SERPL-SCNC: 4.1 MMOL/L (ref 3.5–5.1)
SAMPLES BEING HELD,HOLD: NORMAL
SERVICE CMNT-IMP: ABNORMAL
SODIUM SERPL-SCNC: 136 MMOL/L (ref 136–145)

## 2020-12-13 PROCEDURE — 99232 SBSQ HOSP IP/OBS MODERATE 35: CPT | Performed by: INTERNAL MEDICINE

## 2020-12-13 PROCEDURE — 74011250637 HC RX REV CODE- 250/637: Performed by: INTERNAL MEDICINE

## 2020-12-13 PROCEDURE — 82962 GLUCOSE BLOOD TEST: CPT

## 2020-12-13 PROCEDURE — 65270000032 HC RM SEMIPRIVATE

## 2020-12-13 PROCEDURE — 80048 BASIC METABOLIC PNL TOTAL CA: CPT

## 2020-12-13 PROCEDURE — 74011250636 HC RX REV CODE- 250/636: Performed by: HOSPITALIST

## 2020-12-13 PROCEDURE — 36415 COLL VENOUS BLD VENIPUNCTURE: CPT

## 2020-12-13 PROCEDURE — 74011000258 HC RX REV CODE- 258: Performed by: HOSPITALIST

## 2020-12-13 PROCEDURE — 74011636637 HC RX REV CODE- 636/637: Performed by: INTERNAL MEDICINE

## 2020-12-13 PROCEDURE — 74011250637 HC RX REV CODE- 250/637: Performed by: HOSPITALIST

## 2020-12-13 PROCEDURE — 74011636637 HC RX REV CODE- 636/637: Performed by: HOSPITALIST

## 2020-12-13 PROCEDURE — 74011250636 HC RX REV CODE- 250/636: Performed by: INTERNAL MEDICINE

## 2020-12-13 RX ORDER — LOSARTAN POTASSIUM 50 MG/1
50 TABLET ORAL DAILY
Status: DISCONTINUED | OUTPATIENT
Start: 2020-12-13 | End: 2020-12-14

## 2020-12-13 RX ADMIN — ASPIRIN 81 MG: 81 TABLET, COATED ORAL at 10:40

## 2020-12-13 RX ADMIN — Medication 10 ML: at 13:39

## 2020-12-13 RX ADMIN — POLYETHYLENE GLYCOL 3350 17 G: 17 POWDER, FOR SOLUTION ORAL at 10:40

## 2020-12-13 RX ADMIN — POLYETHYLENE GLYCOL 3350 17 G: 17 POWDER, FOR SOLUTION ORAL at 21:17

## 2020-12-13 RX ADMIN — ACETAMINOPHEN 650 MG: 325 TABLET ORAL at 02:19

## 2020-12-13 RX ADMIN — INSULIN LISPRO 3 UNITS: 100 INJECTION, SOLUTION INTRAVENOUS; SUBCUTANEOUS at 13:38

## 2020-12-13 RX ADMIN — VANCOMYCIN HYDROCHLORIDE 1000 MG: 1 INJECTION, POWDER, LYOPHILIZED, FOR SOLUTION INTRAVENOUS at 02:34

## 2020-12-13 RX ADMIN — PIPERACILLIN AND TAZOBACTAM 3.38 G: 3; .375 INJECTION, POWDER, LYOPHILIZED, FOR SOLUTION INTRAVENOUS at 06:52

## 2020-12-13 RX ADMIN — VANCOMYCIN HYDROCHLORIDE 1000 MG: 1 INJECTION, POWDER, LYOPHILIZED, FOR SOLUTION INTRAVENOUS at 16:51

## 2020-12-13 RX ADMIN — INSULIN LISPRO 3 UNITS: 100 INJECTION, SOLUTION INTRAVENOUS; SUBCUTANEOUS at 17:40

## 2020-12-13 RX ADMIN — LOSARTAN POTASSIUM 50 MG: 50 TABLET, FILM COATED ORAL at 17:00

## 2020-12-13 RX ADMIN — PIPERACILLIN AND TAZOBACTAM 3.38 G: 3; .375 INJECTION, POWDER, LYOPHILIZED, FOR SOLUTION INTRAVENOUS at 16:51

## 2020-12-13 RX ADMIN — ACETAMINOPHEN 650 MG: 325 TABLET ORAL at 21:17

## 2020-12-13 RX ADMIN — ENOXAPARIN SODIUM 40 MG: 40 INJECTION SUBCUTANEOUS at 17:41

## 2020-12-13 RX ADMIN — INSULIN GLARGINE 30 UNITS: 100 INJECTION, SOLUTION SUBCUTANEOUS at 23:55

## 2020-12-13 RX ADMIN — ATORVASTATIN CALCIUM 20 MG: 10 TABLET, FILM COATED ORAL at 21:17

## 2020-12-13 RX ADMIN — AMLODIPINE BESYLATE 10 MG: 5 TABLET ORAL at 10:40

## 2020-12-13 RX ADMIN — INSULIN LISPRO 4 UNITS: 100 INJECTION, SOLUTION INTRAVENOUS; SUBCUTANEOUS at 23:55

## 2020-12-13 RX ADMIN — Medication 1 CAPSULE: at 10:40

## 2020-12-13 NOTE — PROGRESS NOTES
Hospital Progress Note    NAME:  Loly Sprague   :   1955   MRN:  [id number]     Date/Time:  2020 9:19 AM    Plan:   1. Continue Zosyn and vancomycin pending cultures  2. Warm compresses abdominal wall  3. Controlled diabetes  Risk of Deterioration: Low  []           Moderate  [x]           High  []                 Assessment:   Principal Problem:    Cellulitis (2020)      Large abdominal wall area of enduration encompassing the right lower quadrant and extending      to the upper thigh on the right. Continue IV antibiotics with vancomycin and Zosyn     Due to nonsterile insulin administration     Due to uncontrolled diabetes     Developed edema of the scrotal sac and right lower extremity yesterday afternoon when seen suggested elevation of the scrotum and right lower extremity with improvement of the scrotal edema and right lower extremity edema. Is felt to be related to the compression area of induration on venous return. Active Problems:    Dyslipidemia (2014)      Continue Lipitor      Uncontrolled type 2 diabetes mellitus with complication, with long-term current use of insulin ([address] (2018)     Titrate insulin for goal control      ICD (implantable cardioverter-defibrillator) in place (2018)      Hypertension (2018)     Home meds       Acute renal failure (POA)      Resolved      Abdominal pain (12/10/2020)       Admitting notes: 77 y.o. male who presents with right lower quadrant abdominal pain which started 1 week back worsen since Monday radiating towards the groin denies any fever chills nausea vomiting diarrhea or any other associated symptoms. States this started about 1 week ago, initially saw PCP Dr. Chere Kocher worsening since Friday.  Saw Dr. Edith Larkin on Monday who ordered outpatient CT scan and abdominal ultrasound    Subjective:     Feeling better still notes swelling and discomfort in the right lower quadrant,less scrotal swelling and rle edema  11 Point Review of Systems:   Negative except no chills or fever    []            Unable to obtain ROS due to:       []            mental status change []            sedated []            intubated     Social History     Tobacco Use    Smoking status: Never Smoker    Smokeless tobacco: Never Used   Substance Use Topics    Alcohol use: No     Medications reviewed:  Current Facility-Administered Medications   Medication Dose Route Frequency    polyethylene glycol (MIRALAX) packet 17 g  17 g Oral BID    bisacodyL (DULCOLAX) tablet 10 mg  10 mg Oral DAILY PRN    lactobac ac& pc-s.therm-b.anim (FESTUS Q/RISAQUAD)  1 Cap Oral DAILY    vancomycin (VANCOCIN) 1,000 mg in 0.9% sodium chloride 250 mL (VIAL-MATE)  1,000 mg IntraVENous Q12H    enoxaparin (LOVENOX) injection 40 mg  40 mg SubCUTAneous Q24H    insulin glargine (LANTUS) injection 30 Units  30 Units SubCUTAneous QHS    sodium chloride (NS) flush 5-40 mL  5-40 mL IntraVENous Q8H    sodium chloride (NS) flush 5-40 mL  5-40 mL IntraVENous PRN    acetaminophen (TYLENOL) tablet 650 mg  650 mg Oral Q6H PRN    Or    acetaminophen (TYLENOL) suppository 650 mg  650 mg Rectal Q6H PRN    polyethylene glycol (MIRALAX) packet 17 g  17 g Oral DAILY PRN    promethazine (PHENERGAN) tablet 12.5 mg  12.5 mg Oral Q6H PRN    Or    ondansetron (ZOFRAN) injection 4 mg  4 mg IntraVENous Q6H PRN    piperacillin-tazobactam (ZOSYN) 3.375 g in 0.9% sodium chloride (MBP/ADV) 100 mL MBP  3.375 g IntraVENous Q8H    amLODIPine (NORVASC) tablet 10 mg  10 mg Oral DAILY    aspirin delayed-release tablet 81 mg  81 mg Oral DAILY    atorvastatin (LIPITOR) tablet 20 mg  20 mg Oral QHS    insulin lispro (HUMALOG) injection   SubCUTAneous AC&HS    glucose chewable tablet 16 g  4 Tab Oral PRN    glucagon (GLUCAGEN) injection 1 mg  1 mg IntraMUSCular PRN    hydrALAZINE (APRESOLINE) 20 mg/mL injection 20 mg  20 mg IntraVENous Q6H PRN    vancomycin - pharmacy to dose   Other Rx Dosing/Monitoring    dextrose 10 % infusion 125-250 mL  125-250 mL IntraVENous PRN        Objective:   Vitals:  Visit Vitals  BP (!) 145/68   Pulse 83   Temp 99.4 °F (37.4 °C)   Resp 18   Wt 158 lb (71.7 kg) Comment: as of last week per chart review   SpO2 96%   BMI 24.75 kg/m²     Temp (24hrs), Av.4 °F (37.4 °C), Min:99 °F (37.2 °C), Max:99.7 °F (37.6 °C)      O2 Device: Room air    Last 24hr Input/Output:    Intake/Output Summary (Last 24 hours) at 2020 0650  Last data filed at 2020 2145  Gross per 24 hour   Intake --   Output 2400 ml   Net -2400 ml        PHYSICAL EXAM:  General:    Alert, cooperative, no distress, appears stated age. Head:   Normocephalic, without obvious abnormality, atraumatic. Eyes:   Conjunctivae/corneas clear. PERRLA  Nose:  Nares normal. No drainage or sinus tenderness. Throat:    Lips, mucosa, and tongue normal.  No Thrush  Neck:  Supple, symmetrical,  no adenopathy, thyroid: non tender    no carotid bruit and no JVD. Back:    Symmetric,  No CVA tenderness. Lungs:   Clear to auscultation bilaterally. No Wheezing or Rhonchi. No rales. Chest wall:  No tenderness or deformity. No Accessory muscle use. Heart:   Regular rate and rhythm,  no murmur, rub or gallop. Abdomen:   Soft, non-tender. Not distended.   Bowel sounds normal.  Large area of induration with tenderness encompassing the right lower quadrant         Lab Data Reviewed:    Recent Labs     20  0507 12/10/20  1016   WBC 12.7* 14.7*   HGB 9.6* 10.9*   HCT 29.9* 34.0*    305     Recent Labs     20  0238 20  0315 20  0507 12/10/20  1016    138 138 133*   K 4.1 3.8 3.8 4.2    104 102 98   CO2 29 28 29 30   * 104* 81 197*   BUN 15 18 20 25*   CREA 1.11 1.17 1.08 1.31*   CA 8.2* 8.0* 8.4* 9.6   ALB  --   --   --  2.3*   TBILI  --   --   --  0.4   ALT  --   --   --  16     Lab Results   Component Value Date/Time    Glucose (POC) 123 (H) 2020 06:41 AM Glucose (POC) 132 (H) 12/12/2020 10:23 PM    Glucose (POC) 207 (H) 12/12/2020 04:16 PM    Glucose (POC) 309 (H) 12/12/2020 12:59 PM    Glucose (POC) 124 (H) 12/12/2020 07:01 AM     No results for input(s): PH, PCO2, PO2, HCO3, FIO2 in the last 72 hours. No results for input(s): INR, INREXT, INREXT in the last 72 hours.   ___________________________________________________  ___________________________________________________    Attending Physician: Daryle Forester, MD

## 2020-12-13 NOTE — PROGRESS NOTES
Bedside shift change report given to Jabari Llanos RN (oncoming nurse) by Aguilar Cavazos (offgoing nurse). Report included the following information SBAR and Kardex.

## 2020-12-13 NOTE — PROGRESS NOTES
Pt does not normally take losartan at home and states that Dr. Nathalie Ramos usually tells him when he adds or changes his medications. Call placed to Dr Nathalie Ramos to clarify order.      0 -  Spoke with MD and he will speak with pt in the AM.

## 2020-12-13 NOTE — PROGRESS NOTES
Day # 4 of Vancomycin  Indication: Large abdominal wall abscess encompassing the right lower quadrant and extending to the upper thigh on the right  -2/2 nonsterile insulin administration  Current regimen:  1000 mg Q 12 hrs  Abx regimen:  Vancomycin and Zosyn  ID Following ?: NO  Concomitant nephrotoxic drugs (requires more frequent monitoring): None  Frequency of BMP?: Daily through     Recent Labs     20  0238 20  0315 20  0507   WBC  --   --  12.7*   CREA 1.11 1.17 1.08   BUN 15 18 20     Est CrCl: 60-65 ml/min; UO: >1 ml/kg/hr  Temp (24hrs), Av.4 °F (37.4 °C), Min:99.2 °F (37.3 °C), Max:99.7 °F (37.6 °C)    Cultures:   12/10 Blood, paired: - NG x3 days; prelim     Goal trough = 10 - 15 mcg/mL    Recent trough history (date/time/level/dose/action taken):  : vanc random = 8.5 mcg/ml; increased to 1g q12h    Plan: Continue current regimen. Will assess dosing with PM dose tomorrow. No cultures other than single blood culture to follow. No WBC to trend.      Reese Pinto, [facility]

## 2020-12-13 NOTE — ROUTINE PROCESS
Bedside shift change report given to 935 Tim Reardon and Leah RN (oncoming nurse) by Uyen Martínez RN (offgoing nurse). Report included the following information SBAR, Kardex, Intake/Output and MAR.

## 2020-12-14 LAB
ANION GAP SERPL CALC-SCNC: 6 MMOL/L (ref 5–15)
BASOPHILS # BLD: 0.1 K/UL (ref 0–0.1)
BASOPHILS NFR BLD: 0 % (ref 0–1)
BNP SERPL-MCNC: 164 PG/ML
BUN SERPL-MCNC: 17 MG/DL (ref 6–20)
BUN/CREAT SERPL: 13 (ref 12–20)
CALCIUM SERPL-MCNC: 8.4 MG/DL (ref 8.5–10.1)
CHLORIDE SERPL-SCNC: 103 MMOL/L (ref 97–108)
CK SERPL-CCNC: 75 U/L (ref 39–308)
CO2 SERPL-SCNC: 29 MMOL/L (ref 21–32)
COMMENT, HOLDF: NORMAL
CREAT SERPL-MCNC: 1.32 MG/DL (ref 0.7–1.3)
DIFFERENTIAL METHOD BLD: ABNORMAL
EOSINOPHIL # BLD: 0.1 K/UL (ref 0–0.4)
EOSINOPHIL NFR BLD: 1 % (ref 0–7)
ERYTHROCYTE [DISTWIDTH] IN BLOOD BY AUTOMATED COUNT: 13.2 % (ref 11.5–14.5)
GLUCOSE BLD STRIP.AUTO-MCNC: 128 MG/DL (ref 65–100)
GLUCOSE BLD STRIP.AUTO-MCNC: 157 MG/DL (ref 65–100)
GLUCOSE BLD STRIP.AUTO-MCNC: 157 MG/DL (ref 65–100)
GLUCOSE BLD STRIP.AUTO-MCNC: 192 MG/DL (ref 65–100)
GLUCOSE SERPL-MCNC: 174 MG/DL (ref 65–100)
HCT VFR BLD AUTO: 28.4 % (ref 36.6–50.3)
HGB BLD-MCNC: 9 G/DL (ref 12.1–17)
IMM GRANULOCYTES # BLD AUTO: 0.3 K/UL (ref 0–0.04)
IMM GRANULOCYTES NFR BLD AUTO: 2 % (ref 0–0.5)
LYMPHOCYTES # BLD: 1 K/UL (ref 0.8–3.5)
LYMPHOCYTES NFR BLD: 7 % (ref 12–49)
MCH RBC QN AUTO: 27.2 PG (ref 26–34)
MCHC RBC AUTO-ENTMCNC: 31.7 G/DL (ref 30–36.5)
MCV RBC AUTO: 85.8 FL (ref 80–99)
MONOCYTES # BLD: 0.9 K/UL (ref 0–1)
MONOCYTES NFR BLD: 6 % (ref 5–13)
NEUTS SEG # BLD: 12.8 K/UL (ref 1.8–8)
NEUTS SEG NFR BLD: 84 % (ref 32–75)
NRBC # BLD: 0 K/UL (ref 0–0.01)
NRBC BLD-RTO: 0 PER 100 WBC
PLATELET # BLD AUTO: 377 K/UL (ref 150–400)
PMV BLD AUTO: 9.3 FL (ref 8.9–12.9)
POTASSIUM SERPL-SCNC: 3.9 MMOL/L (ref 3.5–5.1)
RBC # BLD AUTO: 3.31 M/UL (ref 4.1–5.7)
SAMPLES BEING HELD,HOLD: NORMAL
SERVICE CMNT-IMP: ABNORMAL
SODIUM SERPL-SCNC: 138 MMOL/L (ref 136–145)
WBC # BLD AUTO: 15.2 K/UL (ref 4.1–11.1)

## 2020-12-14 PROCEDURE — 80048 BASIC METABOLIC PNL TOTAL CA: CPT

## 2020-12-14 PROCEDURE — 82550 ASSAY OF CK (CPK): CPT

## 2020-12-14 PROCEDURE — 83880 ASSAY OF NATRIURETIC PEPTIDE: CPT

## 2020-12-14 PROCEDURE — 74011636637 HC RX REV CODE- 636/637: Performed by: HOSPITALIST

## 2020-12-14 PROCEDURE — 85025 COMPLETE CBC W/AUTO DIFF WBC: CPT

## 2020-12-14 PROCEDURE — 74011250636 HC RX REV CODE- 250/636: Performed by: INTERNAL MEDICINE

## 2020-12-14 PROCEDURE — 74011636637 HC RX REV CODE- 636/637: Performed by: INTERNAL MEDICINE

## 2020-12-14 PROCEDURE — 74011250637 HC RX REV CODE- 250/637: Performed by: HOSPITALIST

## 2020-12-14 PROCEDURE — 74011000258 HC RX REV CODE- 258: Performed by: HOSPITALIST

## 2020-12-14 PROCEDURE — 82962 GLUCOSE BLOOD TEST: CPT

## 2020-12-14 PROCEDURE — 74011250636 HC RX REV CODE- 250/636: Performed by: HOSPITALIST

## 2020-12-14 PROCEDURE — 99232 SBSQ HOSP IP/OBS MODERATE 35: CPT | Performed by: INTERNAL MEDICINE

## 2020-12-14 PROCEDURE — 74011000258 HC RX REV CODE- 258: Performed by: INTERNAL MEDICINE

## 2020-12-14 PROCEDURE — 36415 COLL VENOUS BLD VENIPUNCTURE: CPT

## 2020-12-14 PROCEDURE — 65270000032 HC RM SEMIPRIVATE

## 2020-12-14 PROCEDURE — 74011250637 HC RX REV CODE- 250/637: Performed by: INTERNAL MEDICINE

## 2020-12-14 RX ORDER — INSULIN GLARGINE 100 [IU]/ML
34 INJECTION, SOLUTION SUBCUTANEOUS
Status: DISCONTINUED | OUTPATIENT
Start: 2020-12-14 | End: 2020-12-16

## 2020-12-14 RX ORDER — SODIUM CHLORIDE 9 MG/ML
60 INJECTION, SOLUTION INTRAVENOUS CONTINUOUS
Status: DISCONTINUED | OUTPATIENT
Start: 2020-12-14 | End: 2020-12-19

## 2020-12-14 RX ORDER — METFORMIN HYDROCHLORIDE 500 MG/1
1000 TABLET, EXTENDED RELEASE ORAL
Status: DISCONTINUED | OUTPATIENT
Start: 2020-12-14 | End: 2020-12-15

## 2020-12-14 RX ORDER — FACIAL-BODY WIPES
10 EACH TOPICAL DAILY PRN
Status: DISCONTINUED | OUTPATIENT
Start: 2020-12-14 | End: 2021-01-11 | Stop reason: HOSPADM

## 2020-12-14 RX ADMIN — PIPERACILLIN AND TAZOBACTAM 3.38 G: 3; .375 INJECTION, POWDER, LYOPHILIZED, FOR SOLUTION INTRAVENOUS at 00:19

## 2020-12-14 RX ADMIN — VANCOMYCIN HYDROCHLORIDE 1000 MG: 1 INJECTION, POWDER, LYOPHILIZED, FOR SOLUTION INTRAVENOUS at 03:40

## 2020-12-14 RX ADMIN — Medication 10 ML: at 13:25

## 2020-12-14 RX ADMIN — INSULIN LISPRO 2 UNITS: 100 INJECTION, SOLUTION INTRAVENOUS; SUBCUTANEOUS at 07:13

## 2020-12-14 RX ADMIN — VANCOMYCIN HYDROCHLORIDE 1000 MG: 1 INJECTION, POWDER, LYOPHILIZED, FOR SOLUTION INTRAVENOUS at 17:09

## 2020-12-14 RX ADMIN — METFORMIN HYDROCHLORIDE 1000 MG: 500 TABLET, EXTENDED RELEASE ORAL at 17:09

## 2020-12-14 RX ADMIN — INSULIN GLARGINE 34 UNITS: 100 INJECTION, SOLUTION SUBCUTANEOUS at 22:58

## 2020-12-14 RX ADMIN — PIPERACILLIN AND TAZOBACTAM 3.38 G: 3; .375 INJECTION, POWDER, LYOPHILIZED, FOR SOLUTION INTRAVENOUS at 15:27

## 2020-12-14 RX ADMIN — INSULIN LISPRO 2 UNITS: 100 INJECTION, SOLUTION INTRAVENOUS; SUBCUTANEOUS at 17:07

## 2020-12-14 RX ADMIN — POLYETHYLENE GLYCOL 3350 17 G: 17 POWDER, FOR SOLUTION ORAL at 08:17

## 2020-12-14 RX ADMIN — ENOXAPARIN SODIUM 40 MG: 40 INJECTION SUBCUTANEOUS at 17:08

## 2020-12-14 RX ADMIN — SODIUM CHLORIDE 75 ML/HR: 900 INJECTION, SOLUTION INTRAVENOUS at 16:00

## 2020-12-14 RX ADMIN — Medication 1 CAPSULE: at 08:17

## 2020-12-14 RX ADMIN — AMLODIPINE BESYLATE 10 MG: 5 TABLET ORAL at 08:17

## 2020-12-14 RX ADMIN — ASPIRIN 81 MG: 81 TABLET, COATED ORAL at 08:17

## 2020-12-14 RX ADMIN — DAPTOMYCIN 400 MG: 500 INJECTION, POWDER, LYOPHILIZED, FOR SOLUTION INTRAVENOUS at 17:00

## 2020-12-14 RX ADMIN — Medication 10 ML: at 00:21

## 2020-12-14 RX ADMIN — PIPERACILLIN AND TAZOBACTAM 3.38 G: 3; .375 INJECTION, POWDER, LYOPHILIZED, FOR SOLUTION INTRAVENOUS at 07:13

## 2020-12-14 RX ADMIN — PIPERACILLIN AND TAZOBACTAM 3.38 G: 3; .375 INJECTION, POWDER, LYOPHILIZED, FOR SOLUTION INTRAVENOUS at 23:00

## 2020-12-14 RX ADMIN — ACETAMINOPHEN 650 MG: 325 TABLET ORAL at 15:28

## 2020-12-14 RX ADMIN — LOSARTAN POTASSIUM 50 MG: 50 TABLET, FILM COATED ORAL at 08:17

## 2020-12-14 NOTE — ROUTINE PROCESS
Bedside and Verbal shift change report given to Nguyễn Rubio (oncoming nurse) by Padmini Adler (offgoing nurse). Report included the following information SBAR, Kardex, MAR and Recent Results.

## 2020-12-14 NOTE — CONSULTS
ID Consult Note  NAME:  Irina Arango   :   1955   MRN:   [id number]   Date/Time:  2020 3:10 PM  Subjective:   REASON FOR CONSULT: Abdominal pain  Anabella Boo is a 77 y.o. with a history of syncope and ICD placement. He also has a history of hypertension. About 10 days ago, he developed swelling in his abdominal area. 3 days later he developed pain in the right side of his abdomen. It was minor at first.  It radiated to his right groin. Over the next days, the pain became more severe. He did not have any history of trauma. He did not have any fevers or redness there. He did not have any nausea or vomiting. He did not have any diarrhea. He came to the hospital where a CT scan revealed enlarged, edematous appearance of the right oblique abdominal muscles with overlying soft tissue edema. The edema extends down to the the region of the right groin. There is no focal fluid collection. He has been given vancomycin and Zosyn. There has really been no improvement in his symptoms. White blood cell count is now around 15,000. We are being asked to see him in consult. Past Medical History:   Diagnosis Date    Hypertension     ICD (implantable cardioverter-defibrillator) in place     NSVT (nonsustained ventricular tachycardia) ([facility] [address] 2018    EPS 2018 VT/VF     Right groin pain 2020      Past Surgical History:   Procedure Laterality Date    COLONOSCOPY  2015         HX HEART CATHETERIZATION      HX HEENT      l cararact removal     Social History     Tobacco Use    Smoking status: Never Smoker    Smokeless tobacco: Never Used   Substance Use Topics    Alcohol use: No   No drug use    Family History   Problem Relation Age of Onset    Diabetes Mother     Hypertension Mother     Stroke Mother       No Known Allergies   Home Medications:  Prior to Admission Medications   Prescriptions Last Dose Informant Patient Reported? Taking?    Insulin Needles, Disposable, 31 gauge x 1/4\" ndle   No No   Sig: USE WITH INSULIN PENS THREE TIMES PER DAY   Insulin Needles, Disposable, 31 gauge x /\" ndle   No No   Sig: USE TO INJECT INSULIN TWICE A DAY. [id number]   amLODIPine (NORVASC) 10 mg tablet   No No   Sig: TAKE ONE TABLET BY MOUTH DAILY   aspirin delayed-release 81 mg tablet   Yes No   Sig: Take 81 mg by mouth daily. atorvastatin (LIPITOR) 20 mg tablet   No No   Sig: TAKE ONE TABLET BY MOUTH DAILY   dulaglutide (Trulicity) 1.5 YG/1.7 mL sub-q pen   No No   Si.5 mL by SubCUTAneous route every seven (7) days. glucose blood VI test strips (ASCENSIA AUTODISC VI, ONE TOUCH ULTRA TEST VI) strip   No No   Sig: 3 times daily before meals   insulin detemir U-100 (Levemir FlexTouch U-100 Insuln) 100 unit/mL (3 mL) inpn   No No   Sig: INJECT 30 UNITS AT BEDTIME AND 40 UNITS BEFORE BREAKFAST   metFORMIN (GLUCOPHAGE) 1,000 mg tablet   No No   Sig: Take 1 Tab by mouth two (2) times daily (with meals).       Facility-Administered Medications: None     Hospital medications:  Current Facility-Administered Medications   Medication Dose Route Frequency    bisacodyL (DULCOLAX) suppository 10 mg  10 mg Rectal DAILY PRN    metFORMIN ER (GLUCOPHAGE XR) tablet 1,000 mg  1,000 mg Oral DAILY WITH DINNER    insulin glargine (LANTUS) injection 34 Units  34 Units SubCUTAneous QHS    vancomycin trough level  @ 1500 prior to dose due at same time   Other ONCE    polyethylene glycol (MIRALAX) packet 17 g  17 g Oral BID    bisacodyL (DULCOLAX) tablet 10 mg  10 mg Oral DAILY PRN    lactobac ac& pc-s.therm-b.anim (FESTUS Q/RISAQUAD)  1 Cap Oral DAILY    vancomycin (VANCOCIN) 1,000 mg in 0.9% sodium chloride 250 mL (VIAL-MATE)  1,000 mg IntraVENous Q12H    enoxaparin (LOVENOX) injection 40 mg  40 mg SubCUTAneous Q24H    sodium chloride (NS) flush 5-40 mL  5-40 mL IntraVENous Q8H    sodium chloride (NS) flush 5-40 mL  5-40 mL IntraVENous PRN    acetaminophen (TYLENOL) tablet 650 mg  650 mg Oral Q6H PRN    Or    acetaminophen (TYLENOL) suppository 650 mg  650 mg Rectal Q6H PRN    polyethylene glycol (MIRALAX) packet 17 g  17 g Oral DAILY PRN    promethazine (PHENERGAN) tablet 12.5 mg  12.5 mg Oral Q6H PRN    Or    ondansetron (ZOFRAN) injection 4 mg  4 mg IntraVENous Q6H PRN    piperacillin-tazobactam (ZOSYN) 3.375 g in 0.9% sodium chloride (MBP/ADV) 100 mL MBP  3.375 g IntraVENous Q8H    amLODIPine (NORVASC) tablet 10 mg  10 mg Oral DAILY    aspirin delayed-release tablet 81 mg  81 mg Oral DAILY    atorvastatin (LIPITOR) tablet 20 mg  20 mg Oral QHS    insulin lispro (HUMALOG) injection   SubCUTAneous AC&HS    glucose chewable tablet 16 g  4 Tab Oral PRN    glucagon (GLUCAGEN) injection 1 mg  1 mg IntraMUSCular PRN    vancomycin - pharmacy to dose   Other Rx Dosing/Monitoring    dextrose 10 % infusion 125-250 mL  125-250 mL IntraVENous PRN     REVIEW OF SYSTEMS:        Const:   negative weight loss  Eyes:   negative diplopia or visual changes, negative eye pain  ENT:   negative coryza, negative sore throat  Resp:   negative cough, hemoptysis, dyspnea  Cards:  negative for chest pain, palpitations  :  negative for frequency, dysuria and hematuria  GI:   Negative for hematemesis and hematochezia  Skin:   negative for rash and pruritus  Heme:   negative for easy bruising and gum/nose bleeding  MS:  negative for myalgias, arthralgias, back pain and muscle weakness  Neurolo:  negative for headaches, dizziness, vertigo, memory problems   Psych:  negative for hallucinations        Objective:   VITALS:    Visit Vitals  /79 (BP 1 Location: Left arm, BP Patient Position: Sitting)   Pulse 91   Temp 100.1 °F (37.8 °C)   Resp 14   Wt 71.7 kg (158 lb) Comment: as of last week per chart review   SpO2 92%   BMI 24.75 kg/m²     Temp (24hrs), Av.1 °F (37.3 °C), Min:97.5 °F (36.4 °C), Max:100.1 °F (37.8 °C)    PHYSICAL EXAM:   General:    Alert, cooperative, no distress, appears stated age.      Head: Normocephalic, without obvious abnormality, atraumatic. Eyes:   Conjunctivae clear, anicteric sclerae. Nose:  Nares normal.   Throat:    Lips and tongue normal.  No Thrush  Neck:  Supple, symmetrical    no carotid bruit and no JVD. :    No CVA tenderness, no lang catheter. The scrotum is swollen but it is not tender or indurated. Lungs:   Clear to auscultation bilaterally. No Wheezing or Rhonchi. No rales. Heart:   Regular rate and rhythm,  no murmur, rub or gallop. AICD site is nontender. There is no erythema. Abdomen: The abdomen is distended. There is some induration on the right flank area. Some tenderness in the right flank  Extremities: Knees, ankles, not tender. No pedal edema  Skin:     No rashes or lesions.   Not Jaundiced  Lymph: Cervical normal  Neurologic: Full use of extraocular muscles, no facial asymmetry, tongue midline muscle strength 5 out of 5    LAB DATA REVIEWED:    Recent Results (from the past 48 hour(s))   GLUCOSE, POC    Collection Time: 12/12/20  4:16 PM   Result Value Ref Range    Glucose (POC) 207 (H) 65 - 100 mg/dL    Performed by Caryl Escobedo, POC    Collection Time: 12/12/20 10:23 PM   Result Value Ref Range    Glucose (POC) 132 (H) 65 - 100 mg/dL    Performed by Charmaine Altamirano    METABOLIC PANEL, BASIC    Collection Time: 12/13/20  2:38 AM   Result Value Ref Range    Sodium 136 136 - 145 mmol/L    Potassium 4.1 3.5 - 5.1 mmol/L    Chloride 103 97 - 108 mmol/L    CO2 29 21 - 32 mmol/L    Anion gap 4 (L) 5 - 15 mmol/L    Glucose 164 (H) 65 - 100 mg/dL    BUN 15 6 - 20 MG/DL    Creatinine 1.11 0.70 - 1.30 MG/DL    BUN/Creatinine ratio 14 12 - 20      GFR est AA >60 >60 ml/min/1.73m2    GFR est non-AA >60 >60 ml/min/1.73m2    Calcium 8.2 (L) 8.5 - 10.1 MG/DL   SAMPLES BEING HELD    Collection Time: 12/13/20  2:38 AM   Result Value Ref Range    SAMPLES BEING HELD 1LAV     COMMENT        Add-on orders for these samples will be processed based on acceptable specimen integrity and analyte stability, which may vary by analyte. GLUCOSE, POC    Collection Time: 12/13/20  6:41 AM   Result Value Ref Range    Glucose (POC) 123 (H) 65 - 100 mg/dL    Performed by YINA ALBERT [facility]  PCT    GLUCOSE, POC    Collection Time: 12/13/20 11:57 AM   Result Value Ref Range    Glucose (POC) 242 (H) 65 - 100 mg/dL    Performed by Caryl Escobedo, POC    Collection Time: 12/13/20  4:54 PM   Result Value Ref Range    Glucose (POC) 221 (H) 65 - 100 mg/dL    Performed by Lynda Mulligan    GLUCOSE, POC    Collection Time: 12/13/20 10:31 PM   Result Value Ref Range    Glucose (POC) 336 (H) 65 - 100 mg/dL    Performed by [address] [place] [address], [facility]    Collection Time: 12/14/20  4:20 AM   Result Value Ref Range    Sodium 138 136 - 145 mmol/L    Potassium 3.9 3.5 - 5.1 mmol/L    Chloride 103 97 - 108 mmol/L    CO2 29 21 - 32 mmol/L    Anion gap 6 5 - 15 mmol/L    Glucose 174 (H) 65 - 100 mg/dL    BUN 17 6 - 20 MG/DL    Creatinine 1.32 (H) 0.70 - 1.30 MG/DL    BUN/Creatinine ratio 13 12 - 20      GFR est AA >60 >60 ml/min/1.73m2    GFR est non-AA 54 (L) >60 ml/min/1.73m2    Calcium 8.4 (L) 8.5 - 10.1 MG/DL   CBC WITH AUTOMATED DIFF    Collection Time: 12/14/20  4:20 AM   Result Value Ref Range    WBC 15.2 (H) 4.1 - 11.1 K/uL    RBC 3.31 (L) 4.10 - 5.70 M/uL    HGB 9.0 (L) 12.1 - 17.0 g/dL    HCT 28.4 (L) 36.6 - 50.3 %    MCV 85.8 80.0 - 99.0 FL    MCH 27.2 26.0 - 34.0 PG    MCHC 31.7 30.0 - 36.5 g/dL    RDW 13.2 11.5 - 14.5 %    PLATELET 406 625 - 558 K/uL    MPV 9.3 8.9 - 12.9 FL    NRBC 0.0 0  WBC    ABSOLUTE NRBC 0.00 0.00 - 0.01 K/uL    NEUTROPHILS 84 (H) 32 - 75 %    LYMPHOCYTES 7 (L) 12 - 49 %    MONOCYTES 6 5 - 13 %    EOSINOPHILS 1 0 - 7 %    BASOPHILS 0 0 - 1 %    IMMATURE GRANULOCYTES 2 (H) 0.0 - 0.5 %    ABS. NEUTROPHILS 12.8 (H) 1.8 - 8.0 K/UL    ABS. LYMPHOCYTES 1.0 0.8 - 3.5 K/UL    ABS. MONOCYTES 0.9 0.0 - 1.0 K/UL    ABS.  EOSINOPHILS 0.1 0.0 - 0.4 K/UL    ABS. BASOPHILS 0.1 0.0 - 0.1 K/UL    ABS. IMM. GRANS. 0.3 (H) 0.00 - 0.04 K/UL    DF AUTOMATED     GLUCOSE, POC    Collection Time: 12/14/20  6:27 AM   Result Value Ref Range    Glucose (POC) 157 (H) 65 - 100 mg/dL    Performed by Robinson Parkinson    GLUCOSE, POC    Collection Time: 12/14/20 12:15 PM   Result Value Ref Range    Glucose (POC) 128 (H) 65 - 100 mg/dL    Performed by Wilbert Meade    #1 abdominal distention and right flank induration    #2 mild renal insufficiency    #3 syncope status post ICD placement (due to inducible VT/VF)      PLAN    I would like to repeat the CT scan to see if there is any development of a drainable collection. I would prefer for this to be with IV contrast.  He does have some mild renal insufficiency now. Based on his cardiology notes and my discussion with Dr. Barbara Blackman, he does not seem to have heart failure. His previous echoes have shown normal ejection fraction. I will thus hydrate him with 75 cc of saline an hour. We should repeat the creatinine tomorrow and then perform a CT of the abdomen and pelvis with IV and p.o. contrast.    I will change vancomycin to daptomycin because of the renal insufficiency. We should take him off atorvastatin temporarily.                    ___________________________________________________  ID: Colton Agosto MD

## 2020-12-14 NOTE — PROGRESS NOTES
Hospital Progress Note    NAME:  Adrienne Shaw   :   1955   MRN:  [id number]     Date/Time:  2020 9:19 AM    Plan:   1. Continue Zosyn and vancomycin pending cultures  2. Warm compresses abdominal wall  3. Controlled diabetes  4. ID opinion  Risk of Deterioration: Low  []           Moderate  [x]           High  []                 Assessment:   Principal Problem:    Cellulitis (2020)      Large abdominal wall area of enduration encompassing the right lower quadrant and extending      to the upper thigh on the right. Continue IV antibiotics with vancomycin and Zosyn     Due to nonsterile insulin administration     Due to uncontrolled diabetes     Developed edema of the scrotal sac and right lower extremity yesterday afternoon when seen suggested elevation of the scrotum and right lower extremity with improvement of the scrotal edema and right lower extremity edema. Is felt to be related to the compression area of induration on venous return. Active Problems:    Dyslipidemia (2014)      Continue Lipitor      Uncontrolled type 2 diabetes mellitus with complication, with long-term current use of insulin ([address] (2018)     Titrate insulin for goal control      ICD (implantable cardioverter-defibrillator) in place (2018)      Hypertension (2018)     Home meds       Acute renal failure (POA)      Resolved      Abdominal pain (12/10/2020)       Admitting notes: 77 y.o. male who presents with right lower quadrant abdominal pain which started 1 week back worsen since Monday radiating towards the groin denies any fever chills nausea vomiting diarrhea or any other associated symptoms. States this started about 1 week ago, initially saw PCP Dr. Alberto Atkins worsening since Friday.  Saw Dr. Anu Dillon on Monday who ordered outpatient CT scan and abdominal ultrasound    Subjective:     Feeling better complains of constipation  11 Point Review of Systems:   Negative except no chills or fever noted sweats last night    []            Unable to obtain ROS due to:       []            mental status change []            sedated []            intubated     Social History     Tobacco Use    Smoking status: Never Smoker    Smokeless tobacco: Never Used   Substance Use Topics    Alcohol use: No     Medications reviewed:  Current Facility-Administered Medications   Medication Dose Route Frequency    losartan (COZAAR) tablet 50 mg  50 mg Oral DAILY    vancomycin trough level 12/14 @ 1500 prior to dose due at same time   Other ONCE    polyethylene glycol (MIRALAX) packet 17 g  17 g Oral BID    bisacodyL (DULCOLAX) tablet 10 mg  10 mg Oral DAILY PRN    lactobac ac& pc-s.therm-b.anim (FESTUS Q/RISAQUAD)  1 Cap Oral DAILY    vancomycin (VANCOCIN) 1,000 mg in 0.9% sodium chloride 250 mL (VIAL-MATE)  1,000 mg IntraVENous Q12H    enoxaparin (LOVENOX) injection 40 mg  40 mg SubCUTAneous Q24H    insulin glargine (LANTUS) injection 30 Units  30 Units SubCUTAneous QHS    sodium chloride (NS) flush 5-40 mL  5-40 mL IntraVENous Q8H    sodium chloride (NS) flush 5-40 mL  5-40 mL IntraVENous PRN    acetaminophen (TYLENOL) tablet 650 mg  650 mg Oral Q6H PRN    Or    acetaminophen (TYLENOL) suppository 650 mg  650 mg Rectal Q6H PRN    polyethylene glycol (MIRALAX) packet 17 g  17 g Oral DAILY PRN    promethazine (PHENERGAN) tablet 12.5 mg  12.5 mg Oral Q6H PRN    Or    ondansetron (ZOFRAN) injection 4 mg  4 mg IntraVENous Q6H PRN    piperacillin-tazobactam (ZOSYN) 3.375 g in 0.9% sodium chloride (MBP/ADV) 100 mL MBP  3.375 g IntraVENous Q8H    amLODIPine (NORVASC) tablet 10 mg  10 mg Oral DAILY    aspirin delayed-release tablet 81 mg  81 mg Oral DAILY    atorvastatin (LIPITOR) tablet 20 mg  20 mg Oral QHS    insulin lispro (HUMALOG) injection   SubCUTAneous AC&HS    glucose chewable tablet 16 g  4 Tab Oral PRN    glucagon (GLUCAGEN) injection 1 mg  1 mg IntraMUSCular PRN    hydrALAZINE (APRESOLINE) 20 mg/mL injection 20 mg  20 mg IntraVENous Q6H PRN    vancomycin - pharmacy to dose   Other Rx Dosing/Monitoring    dextrose 10 % infusion 125-250 mL  125-250 mL IntraVENous PRN        Objective:   Vitals:  Visit Vitals  BP (!) 148/78   Pulse 83   Temp 99.2 °F (37.3 °C)   Resp 14   Wt 158 lb (71.7 kg) Comment: as of last week per chart review   SpO2 96%   BMI 24.75 kg/m²     Temp (24hrs), Av.9 °F (37.2 °C), Min:97.5 °F (36.4 °C), Max:100.1 °F (37.8 °C)      O2 Device: Room air    Last 24hr Input/Output:    Intake/Output Summary (Last 24 hours) at 2020 0900  Last data filed at 2020 0423  Gross per 24 hour   Intake 470 ml   Output 1175 ml   Net -705 ml        PHYSICAL EXAM:  General:    Alert, cooperative, no distress, appears stated age. Head:   Normocephalic, without obvious abnormality, atraumatic. Eyes:   Conjunctivae/corneas clear. PERRLA  Nose:  Nares normal. No drainage or sinus tenderness. Throat:    Lips, mucosa, and tongue normal.  No Thrush  Neck:  Supple, symmetrical,  no adenopathy, thyroid: non tender    no carotid bruit and no JVD. Back:    Symmetric,  No CVA tenderness. Lungs:   Clear to auscultation bilaterally. No Wheezing or Rhonchi. No rales. Chest wall:  No tenderness or deformity. No Accessory muscle use. Heart:   Regular rate and rhythm,  no murmur, rub or gallop. Abdomen:   Soft, non-tender. Not distended.   Bowel sounds normal.  Large area of induration with tenderness encompassing the right lower quadrant         Lab Data Reviewed:    Recent Labs     20   WBC 15.2*   HGB 9.0*   HCT 28.4*        Recent Labs     20  04220  0238 20  0315    136 138   K 3.9 4.1 3.8    103 104   CO2 29 29 28   * 164* 104*   BUN 17 15 18   CREA 1.32* 1.11 1.17   CA 8.4* 8.2* 8.0*     Lab Results   Component Value Date/Time    Glucose (POC) 157 (H) 2020 06:27 AM    Glucose (POC) 336 (H) 2020 10:31 PM    Glucose (POC) 221 (H) 12/13/2020 04:54 PM    Glucose (POC) 242 (H) 12/13/2020 11:57 AM    Glucose (POC) 123 (H) 12/13/2020 06:41 AM     No results for input(s): PH, PCO2, PO2, HCO3, FIO2 in the last 72 hours. No results for input(s): INR, INREXT, INREXT in the last 72 hours.   ___________________________________________________  ___________________________________________________    Attending Physician: Courtney Jaime MD

## 2020-12-14 NOTE — PROGRESS NOTES
Transitions of Care plan: Home with significant other when stable    -RUR: 16%  -CM continuing to follow for discharge planning needs.   -ID has been consulted at this time  -Discharge needs are unknown    HEATHER WOODRUFF, ACM-SW

## 2020-12-14 NOTE — PROGRESS NOTES
Bedside shift change report given to Kimberly Garza (oncoming nurse) by Mitchel Savage RN  (offgoing nurse). Report included the following information SBAR, Kardex, Intake/Output, MAR, Recent Results, Med Rec Status and Quality Measures.

## 2020-12-15 LAB
ANION GAP SERPL CALC-SCNC: 7 MMOL/L (ref 5–15)
BACTERIA SPEC CULT: NORMAL
BASOPHILS # BLD: 0 K/UL (ref 0–0.1)
BASOPHILS NFR BLD: 0 % (ref 0–1)
BUN SERPL-MCNC: 16 MG/DL (ref 6–20)
BUN/CREAT SERPL: 13 (ref 12–20)
CALCIUM SERPL-MCNC: 8.6 MG/DL (ref 8.5–10.1)
CHLORIDE SERPL-SCNC: 105 MMOL/L (ref 97–108)
CK SERPL-CCNC: 71 U/L (ref 39–308)
CO2 SERPL-SCNC: 29 MMOL/L (ref 21–32)
CREAT SERPL-MCNC: 1.19 MG/DL (ref 0.7–1.3)
DIFFERENTIAL METHOD BLD: ABNORMAL
EOSINOPHIL # BLD: 0.1 K/UL (ref 0–0.4)
EOSINOPHIL NFR BLD: 1 % (ref 0–7)
ERYTHROCYTE [DISTWIDTH] IN BLOOD BY AUTOMATED COUNT: 13.2 % (ref 11.5–14.5)
GLUCOSE BLD STRIP.AUTO-MCNC: 113 MG/DL (ref 65–100)
GLUCOSE BLD STRIP.AUTO-MCNC: 114 MG/DL (ref 65–100)
GLUCOSE BLD STRIP.AUTO-MCNC: 64 MG/DL (ref 65–100)
GLUCOSE BLD STRIP.AUTO-MCNC: 71 MG/DL (ref 65–100)
GLUCOSE BLD STRIP.AUTO-MCNC: 92 MG/DL (ref 65–100)
GLUCOSE SERPL-MCNC: 73 MG/DL (ref 65–100)
HCT VFR BLD AUTO: 29.9 % (ref 36.6–50.3)
HGB BLD-MCNC: 9.6 G/DL (ref 12.1–17)
IMM GRANULOCYTES # BLD AUTO: 0.1 K/UL (ref 0–0.04)
IMM GRANULOCYTES NFR BLD AUTO: 1 % (ref 0–0.5)
LYMPHOCYTES # BLD: 0.9 K/UL (ref 0.8–3.5)
LYMPHOCYTES NFR BLD: 7 % (ref 12–49)
MCH RBC QN AUTO: 27.4 PG (ref 26–34)
MCHC RBC AUTO-ENTMCNC: 32.1 G/DL (ref 30–36.5)
MCV RBC AUTO: 85.2 FL (ref 80–99)
MONOCYTES # BLD: 1 K/UL (ref 0–1)
MONOCYTES NFR BLD: 7 % (ref 5–13)
NEUTS SEG # BLD: 11.7 K/UL (ref 1.8–8)
NEUTS SEG NFR BLD: 84 % (ref 32–75)
NRBC # BLD: 0 K/UL (ref 0–0.01)
NRBC BLD-RTO: 0 PER 100 WBC
PLATELET # BLD AUTO: 381 K/UL (ref 150–400)
PMV BLD AUTO: 9.3 FL (ref 8.9–12.9)
POTASSIUM SERPL-SCNC: 3.6 MMOL/L (ref 3.5–5.1)
RBC # BLD AUTO: 3.51 M/UL (ref 4.1–5.7)
SERVICE CMNT-IMP: ABNORMAL
SERVICE CMNT-IMP: NORMAL
SODIUM SERPL-SCNC: 141 MMOL/L (ref 136–145)
WBC # BLD AUTO: 13.9 K/UL (ref 4.1–11.1)

## 2020-12-15 PROCEDURE — 74011636637 HC RX REV CODE- 636/637: Performed by: INTERNAL MEDICINE

## 2020-12-15 PROCEDURE — 82962 GLUCOSE BLOOD TEST: CPT

## 2020-12-15 PROCEDURE — 82550 ASSAY OF CK (CPK): CPT

## 2020-12-15 PROCEDURE — 74011250637 HC RX REV CODE- 250/637: Performed by: INTERNAL MEDICINE

## 2020-12-15 PROCEDURE — 74011250636 HC RX REV CODE- 250/636: Performed by: INTERNAL MEDICINE

## 2020-12-15 PROCEDURE — 85025 COMPLETE CBC W/AUTO DIFF WBC: CPT

## 2020-12-15 PROCEDURE — 74011250637 HC RX REV CODE- 250/637: Performed by: HOSPITALIST

## 2020-12-15 PROCEDURE — 74011000258 HC RX REV CODE- 258: Performed by: INTERNAL MEDICINE

## 2020-12-15 PROCEDURE — 65270000032 HC RM SEMIPRIVATE

## 2020-12-15 PROCEDURE — 74011000258 HC RX REV CODE- 258: Performed by: HOSPITALIST

## 2020-12-15 PROCEDURE — 74011250636 HC RX REV CODE- 250/636: Performed by: HOSPITALIST

## 2020-12-15 PROCEDURE — 80048 BASIC METABOLIC PNL TOTAL CA: CPT

## 2020-12-15 PROCEDURE — 36415 COLL VENOUS BLD VENIPUNCTURE: CPT

## 2020-12-15 PROCEDURE — 99232 SBSQ HOSP IP/OBS MODERATE 35: CPT | Performed by: INTERNAL MEDICINE

## 2020-12-15 RX ADMIN — PIPERACILLIN AND TAZOBACTAM 3.38 G: 3; .375 INJECTION, POWDER, LYOPHILIZED, FOR SOLUTION INTRAVENOUS at 23:07

## 2020-12-15 RX ADMIN — PIPERACILLIN AND TAZOBACTAM 3.38 G: 3; .375 INJECTION, POWDER, LYOPHILIZED, FOR SOLUTION INTRAVENOUS at 07:00

## 2020-12-15 RX ADMIN — INSULIN GLARGINE 34 UNITS: 100 INJECTION, SOLUTION SUBCUTANEOUS at 23:06

## 2020-12-15 RX ADMIN — ENOXAPARIN SODIUM 40 MG: 40 INJECTION SUBCUTANEOUS at 17:57

## 2020-12-15 RX ADMIN — DAPTOMYCIN 400 MG: 500 INJECTION, POWDER, LYOPHILIZED, FOR SOLUTION INTRAVENOUS at 16:00

## 2020-12-15 RX ADMIN — PIPERACILLIN AND TAZOBACTAM 3.38 G: 3; .375 INJECTION, POWDER, LYOPHILIZED, FOR SOLUTION INTRAVENOUS at 16:00

## 2020-12-15 RX ADMIN — ASPIRIN 81 MG: 81 TABLET, COATED ORAL at 09:42

## 2020-12-15 RX ADMIN — AMLODIPINE BESYLATE 10 MG: 5 TABLET ORAL at 09:42

## 2020-12-15 RX ADMIN — SODIUM CHLORIDE 75 ML/HR: 900 INJECTION, SOLUTION INTRAVENOUS at 10:12

## 2020-12-15 RX ADMIN — Medication 1 CAPSULE: at 09:42

## 2020-12-15 RX ADMIN — Medication 10 ML: at 23:08

## 2020-12-15 NOTE — ROUTINE PROCESS
Bedside shift change report given to Michael Baker and Richmond Starks RN (oncoming nurse) by Ebonie Cisneros (offgoing nurse). Report included the following information SBAR, Kardex, Intake/Output, MAR and Recent Results.

## 2020-12-15 NOTE — PROGRESS NOTES
Hospital Progress Note    NAME:  Mariaelena Jaimes   :   1955   MRN:  [id number]     Date/Time:  12/15/2020 9:19 AM    Plan:   1. Continue Zosyn and daptomycin  2. Warm compresses abdominal wall  3. Controlled diabetes  4. ID opinion appreciated. Risk of Deterioration: Low  []           Moderate  [x]           High  []                 Assessment:   Principal Problem:    Cellulitis (2020)      Large abdominal wall area of enduration encompassing the right lower quadrant and extending      to the upper thigh on the right. Continue IV antibiotics with vancomycin and Zosyn     Due to nonsterile insulin administration     Due to uncontrolled diabetes     Developed edema of the scrotal sac and right lower extremity yesterday afternoon when seen suggested elevation of the scrotum and right lower extremity with improvement of the scrotal edema and right lower extremity edema. Is felt to be related to the compression area of induration on venous return. Active Problems:    Dyslipidemia (2014)      Continue Lipitor      Uncontrolled type 2 diabetes mellitus with complication, with long-term current use of insulin ([address] (2018)     Titrate insulin for goal control      ICD (implantable cardioverter-defibrillator) in place (2018)      Hypertension (2018)     Home meds       Acute renal failure (POA)      Resolved      Abdominal pain (12/10/2020)       Admitting notes: 77 y.o. male who presents with right lower quadrant abdominal pain which started 1 week back worsen since Monday radiating towards the groin denies any fever chills nausea vomiting diarrhea or any other associated symptoms. States this started about 1 week ago, initially saw PCP Dr. Alyssa Mcdaniel worsening since Friday.  Saw Dr. Graham Wall on Monday who ordered outpatient CT scan and abdominal ultrasound    Subjective:     No new complaints  11 Point Review of Systems:   Negative except no chills or fever noted sweats last night    []            Unable to obtain ROS due to:       []            mental status change []            sedated []            intubated     Social History     Tobacco Use    Smoking status: Never Smoker    Smokeless tobacco: Never Used   Substance Use Topics    Alcohol use: No     Medications reviewed:  Current Facility-Administered Medications   Medication Dose Route Frequency    bisacodyL (DULCOLAX) suppository 10 mg  10 mg Rectal DAILY PRN    insulin glargine (LANTUS) injection 34 Units  34 Units SubCUTAneous QHS    0.9% sodium chloride infusion  75 mL/hr IntraVENous CONTINUOUS    DAPTOmycin (CUBICIN) 400 mg in 0.9% sodium chloride 50 mL IVPB RF formulation  400 mg IntraVENous Q24H    polyethylene glycol (MIRALAX) packet 17 g  17 g Oral BID    bisacodyL (DULCOLAX) tablet 10 mg  10 mg Oral DAILY PRN    lactobac ac& pc-s.therm-b.anim (FESTUS Q/RISAQUAD)  1 Cap Oral DAILY    enoxaparin (LOVENOX) injection 40 mg  40 mg SubCUTAneous Q24H    sodium chloride (NS) flush 5-40 mL  5-40 mL IntraVENous Q8H    sodium chloride (NS) flush 5-40 mL  5-40 mL IntraVENous PRN    acetaminophen (TYLENOL) tablet 650 mg  650 mg Oral Q6H PRN    Or    acetaminophen (TYLENOL) suppository 650 mg  650 mg Rectal Q6H PRN    polyethylene glycol (MIRALAX) packet 17 g  17 g Oral DAILY PRN    promethazine (PHENERGAN) tablet 12.5 mg  12.5 mg Oral Q6H PRN    Or    ondansetron (ZOFRAN) injection 4 mg  4 mg IntraVENous Q6H PRN    piperacillin-tazobactam (ZOSYN) 3.375 g in 0.9% sodium chloride (MBP/ADV) 100 mL MBP  3.375 g IntraVENous Q8H    amLODIPine (NORVASC) tablet 10 mg  10 mg Oral DAILY    aspirin delayed-release tablet 81 mg  81 mg Oral DAILY    insulin lispro (HUMALOG) injection   SubCUTAneous AC&HS    glucose chewable tablet 16 g  4 Tab Oral PRN    glucagon (GLUCAGEN) injection 1 mg  1 mg IntraMUSCular PRN    dextrose 10 % infusion 125-250 mL  125-250 mL IntraVENous PRN        Objective:   Vitals:  Visit Vitals  BP (!) 161/84   Pulse 79   Temp 98.9 °F (37.2 °C)   Resp 16   Wt 158 lb (71.7 kg) Comment: as of last week per chart review   SpO2 95%   BMI 24.75 kg/m²     Temp (24hrs), Av.1 °F (37.3 °C), Min:98.4 °F (36.9 °C), Max:100.1 °F (37.8 °C)      O2 Device: Room air    Last 24hr Input/Output:    Intake/Output Summary (Last 24 hours) at 12/15/2020 0900  Last data filed at 12/15/2020 0310  Gross per 24 hour   Intake --   Output 800 ml   Net -800 ml        PHYSICAL EXAM:  General:    Alert, cooperative, no distress, appears stated age. Head:   Normocephalic, without obvious abnormality, atraumatic. Eyes:   Conjunctivae/corneas clear. PERRLA  Nose:  Nares normal. No drainage or sinus tenderness. Throat:    Lips, mucosa, and tongue normal.  No Thrush  Neck:  Supple, symmetrical,  no adenopathy, thyroid: non tender    no carotid bruit and no JVD. Back:    Symmetric,  No CVA tenderness. Lungs:   Clear to auscultation bilaterally. No Wheezing or Rhonchi. No rales. Chest wall:  No tenderness or deformity. No Accessory muscle use. Heart:   Regular rate and rhythm,  no murmur, rub or gallop. Abdomen:   Soft, non-tender. Not distended. Bowel sounds normal.  Large area of induration with tenderness encompassing the right lower quadrant         Lab Data Reviewed:    Recent Labs     12/15/20  0404 20  0420   WBC 13.9* 15.2*   HGB 9.6* 9.0*   HCT 29.9* 28.4*    377     Recent Labs     12/15/20  0404 20  0420 20  0238    138 136   K 3.6 3.9 4.1    103 103   CO2 29 29 29   GLU 73 174* 164*   BUN 16 17 15   CREA 1.19 1.32* 1.11   CA 8.6 8.4* 8.2*     Lab Results   Component Value Date/Time    Glucose (POC) 113 (H) 12/15/2020 07:04 AM    Glucose (POC) 64 (L) 12/15/2020 06:41 AM    Glucose (POC) 192 (H) 2020 09:29 PM    Glucose (POC) 157 (H) 2020 04:09 PM    Glucose (POC) 128 (H) 2020 12:15 PM     No results for input(s): PH, PCO2, PO2, HCO3, FIO2 in the last 72 hours.   No results for input(s): INR, INREXT, INREXT in the last 72 hours.   ___________________________________________________  ___________________________________________________    Attending Physician: Darrel Rojas MD

## 2020-12-15 NOTE — PROGRESS NOTES
Bedside shift change report given to Alwin Riedel (oncoming nurse) by Мария Velázquez (offgoing nurse). Report included the following information SBAR.

## 2020-12-16 ENCOUNTER — APPOINTMENT (OUTPATIENT)
Dept: CT IMAGING | Age: 65
DRG: 857 | End: 2020-12-16
Attending: INTERNAL MEDICINE
Payer: COMMERCIAL

## 2020-12-16 LAB
ALBUMIN SERPL-MCNC: 1.6 G/DL (ref 3.5–5)
ANION GAP SERPL CALC-SCNC: 6 MMOL/L (ref 5–15)
BASOPHILS # BLD: 0 K/UL (ref 0–0.1)
BASOPHILS NFR BLD: 0 % (ref 0–1)
BUN SERPL-MCNC: 13 MG/DL (ref 6–20)
BUN/CREAT SERPL: 12 (ref 12–20)
CALCIUM SERPL-MCNC: 8.6 MG/DL (ref 8.5–10.1)
CHLORIDE SERPL-SCNC: 107 MMOL/L (ref 97–108)
CO2 SERPL-SCNC: 28 MMOL/L (ref 21–32)
CREAT SERPL-MCNC: 1.08 MG/DL (ref 0.7–1.3)
DIFFERENTIAL METHOD BLD: ABNORMAL
EOSINOPHIL # BLD: 0.1 K/UL (ref 0–0.4)
EOSINOPHIL NFR BLD: 1 % (ref 0–7)
ERYTHROCYTE [DISTWIDTH] IN BLOOD BY AUTOMATED COUNT: 13 % (ref 11.5–14.5)
GLUCOSE BLD STRIP.AUTO-MCNC: 111 MG/DL (ref 65–100)
GLUCOSE BLD STRIP.AUTO-MCNC: 134 MG/DL (ref 65–100)
GLUCOSE BLD STRIP.AUTO-MCNC: 146 MG/DL (ref 65–100)
GLUCOSE BLD STRIP.AUTO-MCNC: 173 MG/DL (ref 65–100)
GLUCOSE BLD STRIP.AUTO-MCNC: 61 MG/DL (ref 65–100)
GLUCOSE SERPL-MCNC: 52 MG/DL (ref 65–100)
HCT VFR BLD AUTO: 27.6 % (ref 36.6–50.3)
HGB BLD-MCNC: 8.8 G/DL (ref 12.1–17)
IMM GRANULOCYTES # BLD AUTO: 0.1 K/UL (ref 0–0.04)
IMM GRANULOCYTES NFR BLD AUTO: 1 % (ref 0–0.5)
LYMPHOCYTES # BLD: 1 K/UL (ref 0.8–3.5)
LYMPHOCYTES NFR BLD: 8 % (ref 12–49)
MCH RBC QN AUTO: 27.3 PG (ref 26–34)
MCHC RBC AUTO-ENTMCNC: 31.9 G/DL (ref 30–36.5)
MCV RBC AUTO: 85.7 FL (ref 80–99)
MONOCYTES # BLD: 0.9 K/UL (ref 0–1)
MONOCYTES NFR BLD: 7 % (ref 5–13)
NEUTS SEG # BLD: 10.4 K/UL (ref 1.8–8)
NEUTS SEG NFR BLD: 83 % (ref 32–75)
NRBC # BLD: 0 K/UL (ref 0–0.01)
NRBC BLD-RTO: 0 PER 100 WBC
PHOSPHATE SERPL-MCNC: 3.7 MG/DL (ref 2.6–4.7)
PLATELET # BLD AUTO: 409 K/UL (ref 150–400)
PMV BLD AUTO: 9 FL (ref 8.9–12.9)
POTASSIUM SERPL-SCNC: 3.6 MMOL/L (ref 3.5–5.1)
RBC # BLD AUTO: 3.22 M/UL (ref 4.1–5.7)
SERVICE CMNT-IMP: ABNORMAL
SODIUM SERPL-SCNC: 141 MMOL/L (ref 136–145)
WBC # BLD AUTO: 12.6 K/UL (ref 4.1–11.1)

## 2020-12-16 PROCEDURE — 74011000258 HC RX REV CODE- 258: Performed by: HOSPITALIST

## 2020-12-16 PROCEDURE — 74011000258 HC RX REV CODE- 258: Performed by: INTERNAL MEDICINE

## 2020-12-16 PROCEDURE — 82962 GLUCOSE BLOOD TEST: CPT

## 2020-12-16 PROCEDURE — 74011250637 HC RX REV CODE- 250/637: Performed by: INTERNAL MEDICINE

## 2020-12-16 PROCEDURE — 74011250636 HC RX REV CODE- 250/636: Performed by: INTERNAL MEDICINE

## 2020-12-16 PROCEDURE — 74011636637 HC RX REV CODE- 636/637: Performed by: INTERNAL MEDICINE

## 2020-12-16 PROCEDURE — 80069 RENAL FUNCTION PANEL: CPT

## 2020-12-16 PROCEDURE — 74011250636 HC RX REV CODE- 250/636: Performed by: HOSPITALIST

## 2020-12-16 PROCEDURE — 65270000032 HC RM SEMIPRIVATE

## 2020-12-16 PROCEDURE — 36415 COLL VENOUS BLD VENIPUNCTURE: CPT

## 2020-12-16 PROCEDURE — 99232 SBSQ HOSP IP/OBS MODERATE 35: CPT | Performed by: INTERNAL MEDICINE

## 2020-12-16 PROCEDURE — 74011250637 HC RX REV CODE- 250/637: Performed by: HOSPITALIST

## 2020-12-16 PROCEDURE — 74011000636 HC RX REV CODE- 636: Performed by: INTERNAL MEDICINE

## 2020-12-16 PROCEDURE — 85025 COMPLETE CBC W/AUTO DIFF WBC: CPT

## 2020-12-16 PROCEDURE — 74178 CT ABD&PLV WO CNTR FLWD CNTR: CPT

## 2020-12-16 RX ORDER — INSULIN GLARGINE 100 [IU]/ML
20 INJECTION, SOLUTION SUBCUTANEOUS
Status: DISCONTINUED | OUTPATIENT
Start: 2020-12-16 | End: 2020-12-17

## 2020-12-16 RX ORDER — LOSARTAN POTASSIUM 50 MG/1
50 TABLET ORAL DAILY
Status: DISCONTINUED | OUTPATIENT
Start: 2020-12-16 | End: 2020-12-19

## 2020-12-16 RX ORDER — INSULIN GLARGINE 100 [IU]/ML
26 INJECTION, SOLUTION SUBCUTANEOUS
Status: DISCONTINUED | OUTPATIENT
Start: 2020-12-16 | End: 2020-12-16

## 2020-12-16 RX ORDER — SODIUM CHLORIDE 0.9 % (FLUSH) 0.9 %
10 SYRINGE (ML) INJECTION
Status: COMPLETED | OUTPATIENT
Start: 2020-12-16 | End: 2020-12-16

## 2020-12-16 RX ORDER — INSULIN LISPRO 100 [IU]/ML
INJECTION, SOLUTION INTRAVENOUS; SUBCUTANEOUS
Status: DISCONTINUED | OUTPATIENT
Start: 2020-12-16 | End: 2021-01-11 | Stop reason: HOSPADM

## 2020-12-16 RX ORDER — DEXTROSE 50 % IN WATER (D50W) INTRAVENOUS SYRINGE
12.5-25 AS NEEDED
Status: DISCONTINUED | OUTPATIENT
Start: 2020-12-16 | End: 2021-01-11 | Stop reason: HOSPADM

## 2020-12-16 RX ADMIN — ENOXAPARIN SODIUM 40 MG: 40 INJECTION SUBCUTANEOUS at 17:26

## 2020-12-16 RX ADMIN — ACETAMINOPHEN 650 MG: 325 TABLET ORAL at 22:56

## 2020-12-16 RX ADMIN — DEXTROSE MONOHYDRATE 125 ML: 10 INJECTION, SOLUTION INTRAVENOUS at 06:25

## 2020-12-16 RX ADMIN — IOPAMIDOL 100 ML: 755 INJECTION, SOLUTION INTRAVENOUS at 12:17

## 2020-12-16 RX ADMIN — LOSARTAN POTASSIUM 50 MG: 50 TABLET, FILM COATED ORAL at 09:07

## 2020-12-16 RX ADMIN — PIPERACILLIN AND TAZOBACTAM 3.38 G: 3; .375 INJECTION, POWDER, LYOPHILIZED, FOR SOLUTION INTRAVENOUS at 14:31

## 2020-12-16 RX ADMIN — PIPERACILLIN AND TAZOBACTAM 3.38 G: 3; .375 INJECTION, POWDER, LYOPHILIZED, FOR SOLUTION INTRAVENOUS at 22:49

## 2020-12-16 RX ADMIN — Medication 10 ML: at 12:17

## 2020-12-16 RX ADMIN — PIPERACILLIN AND TAZOBACTAM 3.38 G: 3; .375 INJECTION, POWDER, LYOPHILIZED, FOR SOLUTION INTRAVENOUS at 06:53

## 2020-12-16 RX ADMIN — ASPIRIN 81 MG: 81 TABLET, COATED ORAL at 09:07

## 2020-12-16 RX ADMIN — Medication 10 ML: at 06:00

## 2020-12-16 RX ADMIN — SODIUM CHLORIDE 100 ML: 900 INJECTION, SOLUTION INTRAVENOUS at 12:17

## 2020-12-16 RX ADMIN — DAPTOMYCIN 400 MG: 500 INJECTION, POWDER, LYOPHILIZED, FOR SOLUTION INTRAVENOUS at 17:26

## 2020-12-16 RX ADMIN — Medication 1 CAPSULE: at 09:07

## 2020-12-16 RX ADMIN — Medication 10 ML: at 22:49

## 2020-12-16 RX ADMIN — Medication 10 ML: at 14:32

## 2020-12-16 RX ADMIN — INSULIN GLARGINE 20 UNITS: 100 INJECTION, SOLUTION SUBCUTANEOUS at 22:48

## 2020-12-16 RX ADMIN — AMLODIPINE BESYLATE 10 MG: 5 TABLET ORAL at 09:07

## 2020-12-16 NOTE — PROGRESS NOTES
Hospital Progress Note    NAME:  Abhi Ng   :   1955   MRN:  [id number]     Date/Time:  2020 9:19 AM    Plan:   1. Continue Zosyn and daptomycin  2. Warm compresses abdominal wall  3. Controlled diabetes  4. CT abdomen and pelvis today  Risk of Deterioration: Low  []           Moderate  [x]           High  []                 Assessment:   Principal Problem:    Cellulitis (2020)      Large abdominal wall area of enduration encompassing the right lower quadrant and extending      to the upper thigh on the right. Continue IV antibiotics with vancomycin and Zosyn     Due to nonsterile insulin administration     Due to uncontrolled diabetes     Developed edema of the scrotal sac and right lower extremity yesterday afternoon when seen suggested elevation of the scrotum and right lower extremity with improvement of the scrotal edema and right lower extremity edema. Is felt to be related to the compression area of induration on venous return. Active Problems:    Dyslipidemia (2014)      Continue Lipitor      Uncontrolled type 2 diabetes mellitus with complication, with long-term current use of insulin ([address] (2018)     Titrate insulin for goal control      ICD (implantable cardioverter-defibrillator) in place (2018)      Hypertension (2018)     Home meds     Add losartan       Acute renal failure (POA)      Resolved      Abdominal pain (12/10/2020)       Admitting notes: 77 y.o. male who presents with right lower quadrant abdominal pain which started 1 week back worsen since Monday radiating towards the groin denies any fever chills nausea vomiting diarrhea or any other associated symptoms. States this started about 1 week ago, initially saw PCP Dr. Shayy Gonzales worsening since Friday.  Saw Dr. Neris Gómez on Monday who ordered outpatient CT scan and abdominal ultrasound    Subjective:     No new complaints  11 Point Review of Systems:   Negative except no chills or fever noted sweats last night    []            Unable to obtain ROS due to:       []            mental status change []            sedated []            intubated     Social History     Tobacco Use    Smoking status: Never Smoker    Smokeless tobacco: Never Used   Substance Use Topics    Alcohol use: No     Medications reviewed:  Current Facility-Administered Medications   Medication Dose Route Frequency    bisacodyL (DULCOLAX) suppository 10 mg  10 mg Rectal DAILY PRN    insulin glargine (LANTUS) injection 34 Units  34 Units SubCUTAneous QHS    0.9% sodium chloride infusion  75 mL/hr IntraVENous CONTINUOUS    DAPTOmycin (CUBICIN) 400 mg in 0.9% sodium chloride 50 mL IVPB RF formulation  400 mg IntraVENous Q24H    polyethylene glycol (MIRALAX) packet 17 g  17 g Oral BID    bisacodyL (DULCOLAX) tablet 10 mg  10 mg Oral DAILY PRN    lactobac ac& pc-s.therm-b.anim (FESTUS Q/RISAQUAD)  1 Cap Oral DAILY    enoxaparin (LOVENOX) injection 40 mg  40 mg SubCUTAneous Q24H    sodium chloride (NS) flush 5-40 mL  5-40 mL IntraVENous Q8H    sodium chloride (NS) flush 5-40 mL  5-40 mL IntraVENous PRN    acetaminophen (TYLENOL) tablet 650 mg  650 mg Oral Q6H PRN    Or    acetaminophen (TYLENOL) suppository 650 mg  650 mg Rectal Q6H PRN    polyethylene glycol (MIRALAX) packet 17 g  17 g Oral DAILY PRN    promethazine (PHENERGAN) tablet 12.5 mg  12.5 mg Oral Q6H PRN    Or    ondansetron (ZOFRAN) injection 4 mg  4 mg IntraVENous Q6H PRN    piperacillin-tazobactam (ZOSYN) 3.375 g in 0.9% sodium chloride (MBP/ADV) 100 mL MBP  3.375 g IntraVENous Q8H    amLODIPine (NORVASC) tablet 10 mg  10 mg Oral DAILY    aspirin delayed-release tablet 81 mg  81 mg Oral DAILY    insulin lispro (HUMALOG) injection   SubCUTAneous AC&HS    glucose chewable tablet 16 g  4 Tab Oral PRN    glucagon (GLUCAGEN) injection 1 mg  1 mg IntraMUSCular PRN    dextrose 10 % infusion 125-250 mL  125-250 mL IntraVENous PRN        Objective: Vitals:  Visit Vitals  BP (!) 161/82   Pulse 84   Temp 98.5 °F (36.9 °C)   Resp 18   Wt 158 lb (71.7 kg) Comment: as of last week per chart review   SpO2 94%   BMI 24.75 kg/m²     Temp (24hrs), Av.9 °F (37.2 °C), Min:98.5 °F (36.9 °C), Max:99.2 °F (37.3 °C)      O2 Device: Room air    Last 24hr Input/Output:    Intake/Output Summary (Last 24 hours) at 2020 0854  Last data filed at 12/15/2020 2307  Gross per 24 hour   Intake 1098.75 ml   Output 950 ml   Net 148.75 ml        PHYSICAL EXAM:  General:    Alert, cooperative, no distress, appears stated age. Head:   Normocephalic, without obvious abnormality, atraumatic. Eyes:   Conjunctivae/corneas clear. PERRLA  Nose:  Nares normal. No drainage or sinus tenderness. Throat:    Lips, mucosa, and tongue normal.  No Thrush  Neck:  Supple, symmetrical,  no adenopathy, thyroid: non tender    no carotid bruit and no JVD. Back:    Symmetric,  No CVA tenderness. Lungs:   Clear to auscultation bilaterally. No Wheezing or Rhonchi. No rales. Chest wall:  No tenderness or deformity. No Accessory muscle use. Heart:   Regular rate and rhythm,  no murmur, rub or gallop. Abdomen:   Soft, non-tender. Not distended.   Bowel sounds normal.  Large area of induration with tenderness encompassing the right lower quadrant         Lab Data Reviewed:    Recent Labs     20  0554 12/15/20  0404 20  0420   WBC 12.6* 13.9* 15.2*   HGB 8.8* 9.6* 9.0*   HCT 27.6* 29.9* 28.4*   * 381 377     Recent Labs     20  0554 12/15/20  0404 20  0420    141 138   K 3.6 3.6 3.9    105 103   CO2 28 29 29   GLU 52* 73 174*   BUN PENDING 16 17   CREA 1.08 1.19 1.32*   CA 8.6 8.6 8.4*   PHOS 3.7  --   --    ALB 1.6*  --   --      Lab Results   Component Value Date/Time    Glucose (POC) 173 (H) 2020 06:42 AM    Glucose (POC) 61 (L) 2020 06:19 AM    Glucose (POC) 114 (H) 12/15/2020 10:32 PM    Glucose (POC) 71 12/15/2020 04:38 PM    Glucose (POC) 92 12/15/2020 11:01 AM     No results for input(s): PH, PCO2, PO2, HCO3, FIO2 in the last 72 hours. No results for input(s): INR, INREXT, INREXT in the last 72 hours.   ___________________________________________________  ___________________________________________________    Attending Physician: Aiwlda Hernandez MD

## 2020-12-16 NOTE — PROGRESS NOTES
ID Progress Note  2020    Subjective:   Afebrile. No dyspnea, headache or sore throat. Still has some right sided abdominal pain. ROS: No anaphylaxis, seizures, syncope, hematemesis, hematochezia     Objective:     Vitals:   Visit Vitals  BP (!) 165/90   Pulse 76   Temp 99 °F (37.2 °C)   Resp 18   Wt 71.7 kg (158 lb) Comment: as of last week per chart review   SpO2 90%   BMI 24.75 kg/m²        Tmax:  Temp (24hrs), Av.8 °F (37.1 °C), Min:98.5 °F (36.9 °C), Max:99.2 °F (37.3 °C)      Exam:    Not in distress  Pink conjunctivae, anicteric sclerae  No cervical lymphdenopathy   Lung clear, no rales, wheezes or rhonchi   Heart: s1, s2, RRR, no murmurs rubs or clicks  Abdomen: distended, indurated right flank   Knees not warm or tender  Speech fluent     Labs:   Lab Results   Component Value Date/Time    WBC 12.6 (H) 2020 05:54 AM    HGB 8.8 (L) 2020 05:54 AM    HCT 27.6 (L) 2020 05:54 AM    PLATELET 170 (H)  05:54 AM    MCV 85.7 2020 05:54 AM     Lab Results   Component Value Date/Time    Sodium 141 2020 05:54 AM    Potassium 3.6 2020 05:54 AM    Chloride 107 2020 05:54 AM    CO2 28 2020 05:54 AM    Anion gap 6 2020 05:54 AM    Glucose 52 (L) 2020 05:54 AM    BUN 13 2020 05:54 AM    Creatinine 1.08 2020 05:54 AM    BUN/Creatinine ratio 12 2020 05:54 AM    GFR est AA >60 2020 05:54 AM    GFR est non-AA >60 2020 05:54 AM    Calcium 8.6 2020 05:54 AM    Bilirubin, total 0.4 12/10/2020 10:16 AM    Alk.  phosphatase 108 12/10/2020 10:16 AM    Protein, total 8.0 12/10/2020 10:16 AM    Albumin 1.6 (L) 2020 05:54 AM    Globulin 5.7 (H) 12/10/2020 10:16 AM    A-G Ratio 0.4 (L) 12/10/2020 10:16 AM    ALT (SGPT) 16 12/10/2020 10:16 AM           Assessment:     #1 abdominal distention and right flank induration     #2 mild renal insufficiency     #3 syncope status post ICD placement (due to inducible VT/VF)            Recommendations:     Continue dapto and zosyn     Cr has normalized.     Ct done but it has not been read yet             Kristen Tong MD

## 2020-12-17 LAB
ALBUMIN SERPL-MCNC: 1.5 G/DL (ref 3.5–5)
ANION GAP SERPL CALC-SCNC: 6 MMOL/L (ref 5–15)
BASOPHILS # BLD: 0 K/UL (ref 0–0.1)
BASOPHILS NFR BLD: 0 % (ref 0–1)
BUN SERPL-MCNC: 13 MG/DL (ref 6–20)
BUN/CREAT SERPL: 11 (ref 12–20)
CALCIUM SERPL-MCNC: 8.2 MG/DL (ref 8.5–10.1)
CHLORIDE SERPL-SCNC: 108 MMOL/L (ref 97–108)
CO2 SERPL-SCNC: 28 MMOL/L (ref 21–32)
CREAT SERPL-MCNC: 1.15 MG/DL (ref 0.7–1.3)
DIFFERENTIAL METHOD BLD: ABNORMAL
EOSINOPHIL # BLD: 0.1 K/UL (ref 0–0.4)
EOSINOPHIL NFR BLD: 1 % (ref 0–7)
ERYTHROCYTE [DISTWIDTH] IN BLOOD BY AUTOMATED COUNT: 13 % (ref 11.5–14.5)
GLUCOSE BLD STRIP.AUTO-MCNC: 110 MG/DL (ref 65–100)
GLUCOSE BLD STRIP.AUTO-MCNC: 114 MG/DL (ref 65–100)
GLUCOSE BLD STRIP.AUTO-MCNC: 132 MG/DL (ref 65–100)
GLUCOSE BLD STRIP.AUTO-MCNC: 166 MG/DL (ref 65–100)
GLUCOSE BLD STRIP.AUTO-MCNC: 168 MG/DL (ref 65–100)
GLUCOSE SERPL-MCNC: 59 MG/DL (ref 65–100)
HCT VFR BLD AUTO: 29 % (ref 36.6–50.3)
HGB BLD-MCNC: 9.1 G/DL (ref 12.1–17)
IMM GRANULOCYTES # BLD AUTO: 0.1 K/UL (ref 0–0.04)
IMM GRANULOCYTES NFR BLD AUTO: 1 % (ref 0–0.5)
LYMPHOCYTES # BLD: 1 K/UL (ref 0.8–3.5)
LYMPHOCYTES NFR BLD: 10 % (ref 12–49)
MCH RBC QN AUTO: 27 PG (ref 26–34)
MCHC RBC AUTO-ENTMCNC: 31.4 G/DL (ref 30–36.5)
MCV RBC AUTO: 86.1 FL (ref 80–99)
MONOCYTES # BLD: 0.8 K/UL (ref 0–1)
MONOCYTES NFR BLD: 8 % (ref 5–13)
NEUTS SEG # BLD: 8.4 K/UL (ref 1.8–8)
NEUTS SEG NFR BLD: 80 % (ref 32–75)
NRBC # BLD: 0 K/UL (ref 0–0.01)
NRBC BLD-RTO: 0 PER 100 WBC
PHOSPHATE SERPL-MCNC: 3.7 MG/DL (ref 2.6–4.7)
PLATELET # BLD AUTO: 411 K/UL (ref 150–400)
PMV BLD AUTO: 8.9 FL (ref 8.9–12.9)
POTASSIUM SERPL-SCNC: 3.6 MMOL/L (ref 3.5–5.1)
RBC # BLD AUTO: 3.37 M/UL (ref 4.1–5.7)
SERVICE CMNT-IMP: ABNORMAL
SODIUM SERPL-SCNC: 142 MMOL/L (ref 136–145)
WBC # BLD AUTO: 10.5 K/UL (ref 4.1–11.1)

## 2020-12-17 PROCEDURE — 74011250637 HC RX REV CODE- 250/637: Performed by: HOSPITALIST

## 2020-12-17 PROCEDURE — 74011000250 HC RX REV CODE- 250: Performed by: HOSPITALIST

## 2020-12-17 PROCEDURE — 80069 RENAL FUNCTION PANEL: CPT

## 2020-12-17 PROCEDURE — 74011250637 HC RX REV CODE- 250/637: Performed by: INTERNAL MEDICINE

## 2020-12-17 PROCEDURE — 36415 COLL VENOUS BLD VENIPUNCTURE: CPT

## 2020-12-17 PROCEDURE — 74011250636 HC RX REV CODE- 250/636: Performed by: INTERNAL MEDICINE

## 2020-12-17 PROCEDURE — 65270000032 HC RM SEMIPRIVATE

## 2020-12-17 PROCEDURE — 99232 SBSQ HOSP IP/OBS MODERATE 35: CPT | Performed by: INTERNAL MEDICINE

## 2020-12-17 PROCEDURE — 85025 COMPLETE CBC W/AUTO DIFF WBC: CPT

## 2020-12-17 PROCEDURE — 74011000258 HC RX REV CODE- 258: Performed by: INTERNAL MEDICINE

## 2020-12-17 PROCEDURE — 74011636637 HC RX REV CODE- 636/637: Performed by: INTERNAL MEDICINE

## 2020-12-17 PROCEDURE — 82962 GLUCOSE BLOOD TEST: CPT

## 2020-12-17 PROCEDURE — 99222 1ST HOSP IP/OBS MODERATE 55: CPT | Performed by: SPECIALIST

## 2020-12-17 RX ORDER — INSULIN GLARGINE 100 [IU]/ML
15 INJECTION, SOLUTION SUBCUTANEOUS
Status: DISCONTINUED | OUTPATIENT
Start: 2020-12-17 | End: 2020-12-18

## 2020-12-17 RX ADMIN — INSULIN GLARGINE 15 UNITS: 100 INJECTION, SOLUTION SUBCUTANEOUS at 22:37

## 2020-12-17 RX ADMIN — LOSARTAN POTASSIUM 50 MG: 50 TABLET, FILM COATED ORAL at 09:49

## 2020-12-17 RX ADMIN — AMLODIPINE BESYLATE 10 MG: 5 TABLET ORAL at 09:49

## 2020-12-17 RX ADMIN — Medication 1 CAPSULE: at 09:49

## 2020-12-17 RX ADMIN — INSULIN LISPRO 2 UNITS: 100 INJECTION, SOLUTION INTRAVENOUS; SUBCUTANEOUS at 16:30

## 2020-12-17 RX ADMIN — DAPTOMYCIN 400 MG: 500 INJECTION, POWDER, LYOPHILIZED, FOR SOLUTION INTRAVENOUS at 16:51

## 2020-12-17 RX ADMIN — DEXTROSE MONOHYDRATE 25 G: 25 INJECTION, SOLUTION INTRAVENOUS at 05:02

## 2020-12-17 RX ADMIN — Medication 10 ML: at 22:00

## 2020-12-17 RX ADMIN — Medication 10 ML: at 14:54

## 2020-12-17 RX ADMIN — PIPERACILLIN AND TAZOBACTAM 3.38 G: 3; .375 INJECTION, POWDER, LYOPHILIZED, FOR SOLUTION INTRAVENOUS at 14:53

## 2020-12-17 RX ADMIN — PIPERACILLIN AND TAZOBACTAM 3.38 G: 3; .375 INJECTION, POWDER, LYOPHILIZED, FOR SOLUTION INTRAVENOUS at 06:45

## 2020-12-17 RX ADMIN — Medication 10 ML: at 06:00

## 2020-12-17 RX ADMIN — ENOXAPARIN SODIUM 40 MG: 40 INJECTION SUBCUTANEOUS at 17:53

## 2020-12-17 RX ADMIN — ACETAMINOPHEN 650 MG: 325 TABLET ORAL at 21:31

## 2020-12-17 RX ADMIN — ASPIRIN 81 MG: 81 TABLET, COATED ORAL at 09:49

## 2020-12-17 RX ADMIN — PIPERACILLIN AND TAZOBACTAM 3.38 G: 3; .375 INJECTION, POWDER, LYOPHILIZED, FOR SOLUTION INTRAVENOUS at 22:36

## 2020-12-17 NOTE — PROGRESS NOTES
ID Progress Note  2020    Subjective:   Afebrile. No dyspnea, headache or sore throat. Still has some right sided abdominal pain.     ROS: No anaphylaxis, seizures, syncope, hematemesis, hematochezia     Objective:     Vitals:   Visit Vitals  BP (!) 163/83   Pulse 79   Temp 98.5 °F (36.9 °C)   Resp 18   Ht 5' 7\" (1.702 m)   Wt 71.7 kg (158 lb) Comment: as of last week per chart review   SpO2 92%   BMI 24.75 kg/m²        Tmax:  Temp (24hrs), Av.7 °F (37.1 °C), Min:98.2 °F (36.8 °C), Max:99 °F (37.2 °C)      Exam:    Not in distress  Pink conjunctivae, anicteric sclerae  No cervical lymphdenopathy   Lung clear, no rales, wheezes or rhonchi   Heart: s1, s2, RRR, no murmurs rubs or clicks  Abdomen: distended, indurated right flank   Knees not warm or tender  Speech fluent     Labs:   Lab Results   Component Value Date/Time    WBC 10.5 2020 02:55 AM    HGB 9.1 (L) 2020 02:55 AM    HCT 29.0 (L) 2020 02:55 AM    PLATELET 411 (H) 2020 02:55 AM    MCV 86.1 2020 02:55 AM     Lab Results   Component Value Date/Time    Sodium 142 2020 02:55 AM    Potassium 3.6 2020 02:55 AM    Chloride 108 2020 02:55 AM    CO2 28 2020 02:55 AM    Anion gap 6 2020 02:55 AM    Glucose 59 (L) 2020 02:55 AM    BUN 13 2020 02:55 AM    Creatinine 1.15 2020 02:55 AM    BUN/Creatinine ratio 11 (L) 2020 02:55 AM    GFR est AA >60 2020 02:55 AM    GFR est non-AA >60 2020 02:55 AM    Calcium 8.2 (L) 2020 02:55 AM    Bilirubin, total 0.4 12/10/2020 10:16 AM    Alk. phosphatase 108 12/10/2020 10:16 AM    Protein, total 8.0 12/10/2020 10:16 AM    Albumin 1.5 (L) 2020 02:55 AM    Globulin 5.7 (H) 12/10/2020 10:16 AM    A-G Ratio 0.4 (L) 12/10/2020 10:16 AM    ALT (SGPT) 16 12/10/2020 10:16 AM           Assessment:     #1 abdominal distention and right flank induration     #2 mild renal insufficiency     #3 syncope status post ICD placement  (due to inducible VT/VF)            Recommendations:     Continue dapto and zosyn     Cr has normalized. CT shows oblong collection.  Will have perc drain tomorrow.            Francisca Brown MD

## 2020-12-17 NOTE — PROGRESS NOTES
Comprehensive Nutrition Assessment    Type and Reason for Visit: RD nutrition re-screen/LOS    Nutrition Recommendations/Plan:    1. Follow BG trends with insulin adjustment per [facility] recommendations    2. Measured weight - no weight since admit and was from previous encounter    Nutrition Assessment:    Pt admitted for cellulitis. PMHx: HTN, NSVT, DM. Abd pain for 1 week PTA with N/VD. Questions if cellulitis related to nonsterile insulin administration practices noted. Seen by surgery today with recommendations for IR US guided drainage of abd fluid collection. ID following for abx. A1c 14% noted. BG fluctuating daliy with multiple low BG values in the morning noted with BG above 200mg/dl in the evening. Got D50W this morning with D10 given the day before. Insulin dose being adjusted - consult IP Diabetes Consult  12/17 - 15 units (to be given tonight)  12/16 - 20 units  12/15 - 34 units  12/14 - 34 units + 4 correction  12/13 - 30 units + 10 correction  12/12 - 30 units + 10 correction  12/11 - 30 units +10 correction  Prior to admit on trulicity, metfomin, and Levemir Pen (30 units at dinner, 40 unit w/ breakfast.     Pt visited today. Appears well nourished. Admits to some non-compliance with diet recommendations but seems to be more regarding cardiac recommendations vs excessive CHO. Snacks mostly on unsalted peanuts for HS snack. Snack added today. Malnutrition Assessment:  Malnutrition Status:  No malnutrition      Nutritionally Significant Medications: daptomycin, lantus (15 units daily), SSI, Nay-Q, zosyn, miralax, NS @ 60ml/hr    Estimated Daily Nutrient Needs:  Energy (kcal): 1740-1885kcal(MSJx 1.2-1.3); Weight Used for Energy Requirements: (71.66kg)  Protein (g): 72-79g (1-1.1g/kg);  Weight Used for Protein Requirements: (71.66kg)  Fluid (ml/day): 1885; Method Used for Fluid Requirements: 1 ml/kcal    Nutrition Related Findings:       BM: 12/16  Edema: 1+ genital 1+ RLE  Wounds:  None       Current Nutrition Therapies:   Diet: 1800kcal, consistent CHO, cardiac  Supplements: none  Meal intake:   Patient Vitals for the past 168 hrs:   % Diet Eaten   12/16/20 0800 50 %   12/15/20 0940 100 %     Anthropometric Measures:  · Height:  5' 7\" (170.2 cm)  · Current Body Wt:  71.7 kg (157 lb 15.7 oz)   · Admission Body Wt:      · Usual Body Wt:      155#  · Ideal Body Wt:   :  106.7 %   Wt Readings from Last 10 Encounters:   12/10/20 71.7 kg (158 lb)   12/03/20 71.9 kg (158 lb 9.6 oz)   07/21/20 72.9 kg (160 lb 11.2 oz)   07/14/20 69.4 kg (153 lb)   06/09/20 71.9 kg (158 lb 9.6 oz)   05/14/20 68.9 kg (152 lb)   04/18/19 72 kg (158 lb 12.8 oz)   04/15/19 72.3 kg (159 lb 4.8 oz)   03/18/19 70.1 kg (154 lb 8 oz)   03/13/19 72.1 kg (159 lb)     Nutrition Diagnosis:   · Altered nutrition-related lab values related to endocrine dysfunction as evidenced by lab values(fluctuating BG, A1c 14%)     Nutrition Interventions:   Food and/or Nutrient Delivery: Snacks (specify)  Nutrition Education and Counseling: No recommendations at this time  Coordination of Nutrition Care: Continue to monitor while inpatient, Interdisciplinary rounds, Coordination of community care    Goals:  Continued consumption of at least 75% meals in 5-7 days       Nutrition Monitoring and Evaluation:   Behavioral-Environmental Outcomes: Beliefs and attitudes, Knowledge or skill  Food/Nutrient Intake Outcomes: Food and nutrient intake  Physical Signs/Symptoms Outcomes: Biochemical data, Weight    Discharge Planning:    Recommend pursue outpatient diabetes education, Continue oral nutrition supplement     Krunal Jhaveri, RD  [address] , Pager #[phone] or via [employer]

## 2020-12-17 NOTE — PROGRESS NOTES
Hospital Progress Note    NAME:  Mandy Nuñez   :   1955   MRN:  [id number]     Date/Time:  2020 9:19 AM    Plan:   1. Continue Zosyn and daptomycin  2. Warm compresses abdominal wall  3. Controlled diabetes  4. CT abdomen and pelvis reviewed - we will request surgical opinion  Risk of Deterioration: Low  []           Moderate  [x]           High  []                 Assessment:   Principal Problem:    Cellulitis (2020)      Large abdominal wall area of enduration encompassing the right lower quadrant and extending      to the upper thigh on the right. Continue IV antibiotics with vancomycin and Zosyn     Due to nonsterile insulin administration     Due to uncontrolled diabetes     Developed edema of the scrotal sac and right lower extremity yesterday afternoon when seen suggested elevation of the scrotum and right lower extremity with improvement of the scrotal edema and right lower extremity edema. Is felt to be related to the compression area of induration on venous return. Active Problems:    Dyslipidemia (2014)      Continue Lipitor      Uncontrolled type 2 diabetes mellitus with complication, with long-term current use of insulin ([address] (2018)     Titrate insulin for goal control      ICD (implantable cardioverter-defibrillator) in place (2018)      Hypertension (2018)     Home meds     Add losartan       Acute renal failure (POA)      Resolved      Abdominal pain (12/10/2020)       Admitting notes: 77 y.o. male who presents with right lower quadrant abdominal pain which started 1 week back worsen since Monday radiating towards the groin denies any fever chills nausea vomiting diarrhea or any other associated symptoms. States this started about 1 week ago, initially saw PCP Dr. Joy Cintron worsening since Friday.  Saw Dr. Bridget Cohn on Monday who ordered outpatient CT scan and abdominal ultrasound    Subjective:     No new complaints  11 Point Review of Systems: Negative except no chills or fever     []            Unable to obtain ROS due to:       []            mental status change []            sedated []            intubated     Social History     Tobacco Use    Smoking status: Never Smoker    Smokeless tobacco: Never Used   Substance Use Topics    Alcohol use: No     Medications reviewed:  Current Facility-Administered Medications   Medication Dose Route Frequency    insulin glargine (LANTUS) injection 15 Units  15 Units SubCUTAneous QHS    losartan (COZAAR) tablet 50 mg  50 mg Oral DAILY    insulin lispro (HUMALOG) injection   SubCUTAneous TIDAC    dextrose (D50W) injection syrg 12.5-25 g  12.5-25 g IntraVENous PRN    bisacodyL (DULCOLAX) suppository 10 mg  10 mg Rectal DAILY PRN    0.9% sodium chloride infusion  60 mL/hr IntraVENous CONTINUOUS    DAPTOmycin (CUBICIN) 400 mg in 0.9% sodium chloride 50 mL IVPB RF formulation  400 mg IntraVENous Q24H    polyethylene glycol (MIRALAX) packet 17 g  17 g Oral BID    bisacodyL (DULCOLAX) tablet 10 mg  10 mg Oral DAILY PRN    lactobac ac& pc-s.therm-b.anim (FESTUS Q/RISAQUAD)  1 Cap Oral DAILY    enoxaparin (LOVENOX) injection 40 mg  40 mg SubCUTAneous Q24H    sodium chloride (NS) flush 5-40 mL  5-40 mL IntraVENous Q8H    sodium chloride (NS) flush 5-40 mL  5-40 mL IntraVENous PRN    acetaminophen (TYLENOL) tablet 650 mg  650 mg Oral Q6H PRN    Or    acetaminophen (TYLENOL) suppository 650 mg  650 mg Rectal Q6H PRN    polyethylene glycol (MIRALAX) packet 17 g  17 g Oral DAILY PRN    promethazine (PHENERGAN) tablet 12.5 mg  12.5 mg Oral Q6H PRN    Or    ondansetron (ZOFRAN) injection 4 mg  4 mg IntraVENous Q6H PRN    piperacillin-tazobactam (ZOSYN) 3.375 g in 0.9% sodium chloride (MBP/ADV) 100 mL MBP  3.375 g IntraVENous Q8H    amLODIPine (NORVASC) tablet 10 mg  10 mg Oral DAILY    aspirin delayed-release tablet 81 mg  81 mg Oral DAILY    glucose chewable tablet 16 g  4 Tab Oral PRN    glucagon (GLUCAGEN) injection 1 mg  1 mg IntraMUSCular PRN        Objective:   Vitals:  Visit Vitals  BP (!) 163/78   Pulse 77   Temp 98.2 °F (36.8 °C)   Resp 16   Wt 158 lb (71.7 kg) Comment: as of last week per chart review   SpO2 92%   BMI 24.75 kg/m²     Temp (24hrs), Av.8 °F (37.1 °C), Min:98.2 °F (36.8 °C), Max:99 °F (37.2 °C)      O2 Device: Room air    Last 24hr Input/Output:    Intake/Output Summary (Last 24 hours) at 2020 0914  Last data filed at 2020 0749  Gross per 24 hour   Intake --   Output 1550 ml   Net -1550 ml        PHYSICAL EXAM:  General:    Alert, cooperative, no distress, appears stated age. Head:   Normocephalic, without obvious abnormality, atraumatic. Eyes:   Conjunctivae/corneas clear. PERRLA  Nose:  Nares normal. No drainage or sinus tenderness. Throat:    Lips, mucosa, and tongue normal.  No Thrush  Neck:  Supple, symmetrical,  no adenopathy, thyroid: non tender    no carotid bruit and no JVD. Back:    Symmetric,  No CVA tenderness. Lungs:   Clear to auscultation bilaterally. No Wheezing or Rhonchi. No rales. Chest wall:  No tenderness or deformity. No Accessory muscle use. Heart:   Regular rate and rhythm,  no murmur, rub or gallop. Abdomen:   Soft, non-tender. Not distended.   Bowel sounds normal.  Large area of induration with tenderness encompassing the right lower quadrant         Lab Data Reviewed:    Recent Labs     20  0255 20  0554 12/15/20  0404   WBC 10.5 12.6* 13.9*   HGB 9.1* 8.8* 9.6*   HCT 29.0* 27.6* 29.9*   * 409* 381     Recent Labs     20  0255 20  0554 12/15/20  0404    141 141   K 3.6 3.6 3.6    107 105   CO2 28 28 29   GLU 59* 52* 73   BUN 13 13 16   CREA 1.15 1.08 1.19   CA 8.2* 8.6 8.6   PHOS 3.7 3.7  --    ALB 1.5* 1.6*  --      Lab Results   Component Value Date/Time    Glucose (POC) 114 (H) 2020 06:05 AM    Glucose (POC) 132 (H) 2020 05:29 AM    Glucose (POC) 146 (H) 2020 09:43 PM Glucose (POC) 111 (H) 12/16/2020 04:29 PM    Glucose (POC) 134 (H) 12/16/2020 11:41 AM     No results for input(s): PH, PCO2, PO2, HCO3, FIO2 in the last 72 hours. No results for input(s): INR, INREXT, INREXT in the last 72 hours.   ___________________________________________________  ___________________________________________________    Attending Physician: Mahin Griffin MD

## 2020-12-17 NOTE — CONSULTS
Cardiology Consult Note      Patient Name: Mitra Quiros  : 1955 MRN: [id number]  Date: 2020  Time: 10:32 AM    Admit Diagnosis: Abdominal pain [R10.9]  Abdominal pain [R10.9]    Primary Cardiologist: Dr. Campbell Lau Cardiologist: Jessica Morgan MD    Reason for Consult: Brugada syndrome    Requesting MD: Dr. Navi Jacobson    HPI:  Mitra Quiros is a 77 y.o. male admitted on 12/10/2020  for Abdominal pain [R10.9]  Abdominal pain [R10.9]. has a past medical history of Hypertension, ICD (implantable cardioverter-defibrillator) in place, NSVT (nonsustained ventricular tachycardia) ([facility] [address] (2018), and Right groin pain (2020). He also has no past medical history of Pacemaker. Presented 12/10 for exquisite right abdomen pain. Extending to his flank. Initially thought to be inguinal hernia. CT r/o. But did confirm large abdominal wall induration, from RLQ to upper right thigh. Felt to be secondary to non sterile injection of insulin. On IV Abx. ID following. H/o Brugada syndrome, genetic testing confirmed. ICD implanted and patient follows up with Dr. Linda Bennett.  He has had no syncopal episodes, no CP or SOB. Subjective:  Still with RLQ TTP. Denies CP, SOB or palpitations      Assessment and Plan     1. Brugada Syndrome s/p AICD (2019)   - Results of [employer] noted to be POSITIVE. 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 - Follows up with Dr. Linda Bennett.   - ICD Check 10/28/20 - No events. Less than 1% RVP   - Cardiac cath 2019 with mild luminal irregularities. 2. RLQ pain   - Cellulitis 2/2 non sterile insulin injection   - ID following   - Gen Surg to re-see  3.  HLD   Cholesterol, total 151 2020 04:19 PM   HDL Cholesterol 63 2020 04:19 PM   LDL, calculated 74 06/09/2020 04:19 PM   VLDL, calculated 14 06/09/2020 04:19 PM   Triglyceride 70 06/09/2020 04:19 PM   CHOL/HDL Ratio 2.5 03/13/2019 03:23 AM    - Lipitor PTA  4. DM II   - per Primary team  5. HTN   - Losartan, Norvasc    Brugada syndrome. Genetic testing positive. ICD in place and no events since placement. Denies CP, SOB or syncope. No h/o HF and cath in 2019 with normal coronaries. He needs no further cardiac testing. Stable from a cardiac standpoint. Will see again as needed. Patient seen on the day of progress note and examined  and agree with Advance Practice Provider (CATIA, NP,PA)  assessment and plans as amended. Peri Gonzalezs  77 y.o. The pt has cellulitis and noted hx of Brugada with AICD after presenting with syncope  He has not had a firing of his device and previous LV fx is normal  He denies CV symptoms  If he has surgery with cautery we can check device thresholds after, will see back PRN, glad to assist if goes to OR with AICD   03/12/19   ECHO ADULT COMPLETE 03/13/2019 3/13/2019    Narrative · Right ventricular global systolic function is mildly reduced. Pacer/ICD   present. · Estimated left ventricular ejection fraction is 56 - 60%. Mild (grade 1)   left ventricular diastolic dysfunction. · Bubble study with late bubbles within the left atrium with Valsalva,   which is consistent with shunting at the pulmonary level.         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 ([facility] [address] E13.319    Type 2 diabetes mellitus with diabetic polyneuropathy, with long-term current use of insulin (HCC) E11.42, Z79.4    Uncontrolled type 2 diabetes mellitus with complication, with long-term current use of insulin (HCC) E11.8, E11.65, Z79.4    Syncope and collapse R55    Abnormal EKG R94.31    Chest pain, cardiac R07.9    Brugada syndrome I49.8    NSVT (nonsustained ventricular tachycardia) ([facility]) I47.2    Gastritis K29.70    ICD (implantable cardioverter-defibrillator) in place Z95.810    Hypertension I10    Bell's palsy G51.0    Vasovagal syncope R55    Right groin pain R10.31    Abdominal pain R10.9    Cellulitis L03.90    ARF (acute renal failure) ([facility]) N17.9     SJM SC ICD, DOI 11/21/18, Not MRi Conditional, on remote      ECHO 2/6/18: EF normal, G1DD, mild LAE, RVSP 45 mmHg  ECHO 11/9/18: EF 55-60%,mild LVH, mild LAE, mild MR, RVSP 40 mmHg  CATH 11/18/18: no epicardial disease  CARDIAC MRI 11/19/18: severe LVH, EF 60%, RVEF 50%. No   areas of myocardial enhancement. EPS 11/21/2018: Brugada; induced VT/VF      Review of Systems:    [] Patient unable to provide secondary to condition    [x] All systems negative, except as checked below.   Constitutional:    []Weight Change  []Fever   []Chills   []Night Sweats  []Fatigue  []Malaise  []____  ENT/Mouth:     []Hearing Changes  []Ear Pain  []Nasal Congestion   []Sinus Pain  []Hoarseness   []Sore throat  []Rhinorrhea  []Swallowing Difficulty  []____  Eyes:    []Eye Pain  []Swelling  []Redness  []Foreign Body  []Discharge  []Vision Changes  []____  Cardiovascular:    []Chest Pain  []SOB  []PND  []RESENDIZ  []Orthopnea  []Claudication  []Edema   []Palpitations  []____  Respiratory:    []Cough  []Sputum  []Wheezing,  []SOB  []Hemoptysis  []____  Gastrointestinal:    []Nausea  []Vomiting  []Diarrhea  []Constipation  [x]Pain  []Heartburn  []Anorexia  []Dysphagia  []Hematochezia  []Melena,  []Jaundice  []____  Genitourinary:    []Dysuria  []Urinary Frequency  []Hematuria  []Urinary Incontinence  []Urgency  []Flank Pain  []Hesitancy  []____  Musculoskeletal:    []Arthralgias  []Myalgias  []Joint Swelling  []Joint Stiffness  []Back Pain  []Neck Pain  []____  Skin:    []Skin Lesions  []Pruritis  []Hair Changes  []Skin rashes  []____  Neuro:    []Weakness  []Numbness  []Paresthesias  []Loss of Consciousness  []Syncope   []Dizziness  []Headache  []Coordination Changes  []Recent Falls  []____  Psych:    []Anxiety/Depression  []Insomnia  []Memory Changes  []Violence/Abuse Hx.  []____  Heme/Lymph:    []Bruising  []Bleeding  []Lymphadenopathy  []____  Endocrine:    []Polyuria  []Polydipsia  []Temperature Intolerance  []____         Previous treatment/evaluation includes   Cardiac catheterization, Echocardiogram - TTE/GRACIELA , ICD implant  Cardiac risk factors:   dyslipidemia, diabetes mellitus, male gender, hypertension.     Past Medical History:   Diagnosis Date    Hypertension     ICD (implantable cardioverter-defibrillator) in place     NSVT (nonsustained ventricular tachycardia) ([facility] [address] 11/21/2018    EPS 11/21/2018 VT/VF     Right groin pain 12/7/2020     Past Surgical History:   Procedure Laterality Date    COLONOSCOPY  1/2/2015         HX HEART CATHETERIZATION      HX HEENT      l cararact removal     Current Facility-Administered Medications   Medication Dose Route Frequency    insulin glargine (LANTUS) injection 15 Units  15 Units SubCUTAneous QHS    losartan (COZAAR) tablet 50 mg  50 mg Oral DAILY    insulin lispro (HUMALOG) injection   SubCUTAneous TIDAC    dextrose (D50W) injection syrg 12.5-25 g  12.5-25 g IntraVENous PRN    bisacodyL (DULCOLAX) suppository 10 mg  10 mg Rectal DAILY PRN    0.9% sodium chloride infusion  60 mL/hr IntraVENous CONTINUOUS    DAPTOmycin (CUBICIN) 400 mg in 0.9% sodium chloride 50 mL IVPB RF formulation  400 mg IntraVENous Q24H    polyethylene glycol (MIRALAX) packet 17 g  17 g Oral BID    bisacodyL (DULCOLAX) tablet 10 mg  10 mg Oral DAILY PRN    lactobac ac& pc-s.therm-b.anim (FESTUS Q/RISAQUAD)  1 Cap Oral DAILY    enoxaparin (LOVENOX) injection 40 mg  40 mg SubCUTAneous Q24H    sodium chloride (NS) flush 5-40 mL  5-40 mL IntraVENous Q8H    sodium chloride (NS) flush 5-40 mL  5-40 mL IntraVENous PRN    acetaminophen (TYLENOL) tablet 650 mg  650 mg Oral Q6H PRN    Or    acetaminophen (TYLENOL) suppository 650 mg  650 mg Rectal Q6H PRN    polyethylene glycol (MIRALAX) packet 17 g  17 g Oral DAILY PRN    promethazine (PHENERGAN) tablet 12.5 mg  12.5 mg Oral Q6H PRN    Or    ondansetron (ZOFRAN) injection 4 mg  4 mg IntraVENous Q6H PRN    piperacillin-tazobactam (ZOSYN) 3.375 g in 0.9% sodium chloride (MBP/ADV) 100 mL MBP  3.375 g IntraVENous Q8H    amLODIPine (NORVASC) tablet 10 mg  10 mg Oral DAILY    aspirin delayed-release tablet 81 mg  81 mg Oral DAILY    glucose chewable tablet 16 g  4 Tab Oral PRN    glucagon (GLUCAGEN) injection 1 mg  1 mg IntraMUSCular PRN       No Known Allergies   Family History   Problem Relation Age of Onset    Diabetes Mother     Hypertension Mother     Stroke Mother       Social History     Socioeconomic History    Marital status: SINGLE     Spouse name: Not on file    Number of children: 0    Years of education: Not on file    Highest education level: Not on file   Occupational History    Occupation: [employer]   Tobacco Use    Smoking status: Never Smoker    Smokeless tobacco: Never Used   Substance and Sexual Activity    Alcohol use: No    Drug use: No    Sexual activity: Yes     Partners: Female     Birth control/protection: None       Objective:    Physical Exam    Vitals:   Vitals:    12/16/20 1507 12/16/20 2011 12/16/20 2257 12/17/20 0809   BP: (!) 165/90 (!) 151/81 134/86 (!) 163/78   Pulse: 76 79 78 77   Resp: 18 18 16 16   Temp: 99 °F (37.2 °C) 99 °F (37.2 °C) 99 °F (37.2 °C) 98.2 °F (36.8 °C)   SpO2: 90% 95% 93% 92%   Weight:           General:    Alert, cooperative, no distress, appears stated age. Neck:   Supple, no carotid bruit and no JVD. Back:     Symmetric, normal curvature. Lungs:     clear to auscultation bilaterally.    Heart[de-identified]    Regular rate and rhythm, S1, S2 normal, no murmur, click, rub or gallop. Abdomen:     Soft, RLQ very TTP. Bowel sounds normal.    Extremities:   Extremities normal, atraumatic, no cyanosis or edema. Vascular:   Pulses - 2+ radials   Skin:   Skin color normal. No rashes or lesions   Neurologic:   CN II-XII grossly intact. Telemetry:     ECG:   EKG Results     None            Data Review:     Radiology:   XR Results (most recent):  Results from Hospital Encounter encounter on 07/14/20   XR CHEST PORT    Narrative EXAM: XR CHEST PORT    INDICATION: chest pain    COMPARISON: 3/12/2019    FINDINGS: A portable AP radiograph of the chest was obtained at 0800 hours. The  patient is on a cardiac monitor. The ICD remains in place. There is mild  cardiomegaly. The lungs demonstrate low lung volumes. The lungs are clear. Osseous structures are unremarkable. Impression IMPRESSION:  1. No acute process         Recent Labs     12/15/20  0404   CPK 71     Recent Labs     12/17/20  0255 12/16/20  0554    141   K 3.6 3.6    107   CO2 28 28   BUN 13 13   CREA 1.15 1.08   GLU 59* 52*   PHOS 3.7 3.7   CA 8.2* 8.6     Recent Labs     12/17/20  0255 12/16/20  0554   WBC 10.5 12.6*   HGB 9.1* 8.8*   HCT 29.0* 27.6*   * 409*     No results for input(s): PTP, INR, AP, INREXT in the last 72 hours. No lab exists for component: PTTP, GPT, SGOT  No results for input(s): CHOL, LDLC in the last 72 hours. No lab exists for component: TGL, HDLC,  HBA1C  No results for input(s): CRP, TSH, TSHEXT in the last 72 hours. No lab exists for component: ESR    Norris Baeza. Say Delgado MD         [facility] [address] [place] [address] [address]     [address]     [phone]    CC: Ioana Carr MD

## 2020-12-17 NOTE — PROGRESS NOTES
Transitions of Care plan: Home when stable, may need IV ABX    -RUR:16%  -Cm continuing to follow for discharge planing needs  -Patient continues on IV ABX  -General surgery, cardiology and ID are all following patient  -Disposition needs are unknown at this time. Patient is being treated for cellulitis, may need home health/home ABX. Will follow.     Bridget WOODRUFF, ACM-SW

## 2020-12-17 NOTE — PROGRESS NOTES
Progress Note    Patient: Henry Brown MRN: [id number]  SSN: [id number]    YOB: 1955  Age: 77 y.o. Sex: male      Admit Date: 12/10/2020    Right iliac/abdominal wall fluid collection    Subjective:     Pt seen and examined. Pt with with right sided cellulitis. CT scan showed right suprailiac fluid collection. leukocytosis has resolved    Objective:     Visit Vitals  BP (!) 163/78   Pulse 77   Temp 98.2 °F (36.8 °C)   Resp 16   Wt 158 lb (71.7 kg)   SpO2 92%   BMI 24.75 kg/m²       Temp (24hrs), Av.8 °F (37.1 °C), Min:98.2 °F (36.8 °C), Max:99 °F (37.2 °C)      Physical Exam:    Gen:  NAD  Pulm:  Unlabored  Abd:  S/protuberant/right side abdomen with induration and mild erythema. Mild TTP without guarding or rebound    Recent Results (from the past 24 hour(s))   GLUCOSE, POC    Collection Time: 20 11:41 AM   Result Value Ref Range    Glucose (POC) 134 (H) 65 - 100 mg/dL    Performed by [employer], POC    Collection Time: 20  4:29 PM   Result Value Ref Range    Glucose (POC) 111 (H) 65 - 100 mg/dL    Performed by [employer], POC    Collection Time: 20  9:43 PM   Result Value Ref Range    Glucose (POC) 146 (H) 65 - 100 mg/dL    Performed by Manjit Pereira    CBC WITH AUTOMATED DIFF    Collection Time: 20  2:55 AM   Result Value Ref Range    WBC 10.5 4.1 - 11.1 K/uL    RBC 3.37 (L) 4.10 - 5.70 M/uL    HGB 9.1 (L) 12.1 - 17.0 g/dL    HCT 29.0 (L) 36.6 - 50.3 %    MCV 86.1 80.0 - 99.0 FL    MCH 27.0 26.0 - 34.0 PG    MCHC 31.4 30.0 - 36.5 g/dL    RDW 13.0 11.5 - 14.5 %    PLATELET 944 (H) 757 - 400 K/uL    MPV 8.9 8.9 - 12.9 FL    NRBC 0.0 0  WBC    ABSOLUTE NRBC 0.00 0.00 - 0.01 K/uL    NEUTROPHILS 80 (H) 32 - 75 %    LYMPHOCYTES 10 (L) 12 - 49 %    MONOCYTES 8 5 - 13 %    EOSINOPHILS 1 0 - 7 %    BASOPHILS 0 0 - 1 %    IMMATURE GRANULOCYTES 1 (H) 0.0 - 0.5 %    ABS. NEUTROPHILS 8.4 (H) 1.8 - 8.0 K/UL    ABS.  LYMPHOCYTES 1.0 0.8 - 3.5 K/UL    ABS. MONOCYTES 0.8 0.0 - 1.0 K/UL    ABS. EOSINOPHILS 0.1 0.0 - 0.4 K/UL    ABS. BASOPHILS 0.0 0.0 - 0.1 K/UL    ABS. IMM. GRANS. 0.1 (H) 0.00 - 0.04 K/UL    DF AUTOMATED     RENAL FUNCTION PANEL    Collection Time: 12/17/20  2:55 AM   Result Value Ref Range    Sodium 142 136 - 145 mmol/L    Potassium 3.6 3.5 - 5.1 mmol/L    Chloride 108 97 - 108 mmol/L    CO2 28 21 - 32 mmol/L    Anion gap 6 5 - 15 mmol/L    Glucose 59 (L) 65 - 100 mg/dL    BUN 13 6 - 20 MG/DL    Creatinine 1.15 0.70 - 1.30 MG/DL    BUN/Creatinine ratio 11 (L) 12 - 20      GFR est AA >60 >60 ml/min/1.73m2    GFR est non-AA >60 >60 ml/min/1.73m2    Calcium 8.2 (L) 8.5 - 10.1 MG/DL    Phosphorus 3.7 2.6 - 4.7 MG/DL    Albumin 1.5 (L) 3.5 - 5.0 g/dL   GLUCOSE, POC    Collection Time: 12/17/20  5:29 AM   Result Value Ref Range    Glucose (POC) 132 (H) 65 - 100 mg/dL    Performed by Shweta Prieto    GLUCOSE, POC    Collection Time: 12/17/20  6:05 AM   Result Value Ref Range    Glucose (POC) 114 (H) 65 - 100 mg/dL    Performed by Shweta Prieto    GLUCOSE, POC    Collection Time: 12/17/20 11:07 AM   Result Value Ref Range    Glucose (POC) 110 (H) 65 - 100 mg/dL    Performed by Deric Steinberg          Assessment:     Hospital Problems  Date Reviewed: 12/8/2020          Codes Class Noted POA    ARF (acute renal failure) (HCC) ICD-10-CM: N17.9  ICD-9-CM: 584.9  12/12/2020 Yes        * (Principal) Cellulitis ICD-10-CM: L03.90  ICD-9-CM: 682.9  12/11/2020 Yes        Abdominal pain ICD-10-CM: R10.9  ICD-9-CM: 789.00  12/10/2020 Yes        ICD (implantable cardioverter-defibrillator) in place (Chronic) ICD-10-CM: Z95.810  ICD-9-CM: V45.02  12/21/2018 Yes        Hypertension (Chronic) ICD-10-CM: I10  ICD-9-CM: 401.9  12/21/2018 Yes        Brugada syndrome (Chronic) ICD-10-CM: I49.8  ICD-9-CM: 746.89  11/18/2018 Yes        Uncontrolled type 2 diabetes mellitus with complication, with long-term current use of insulin (HCC) ICD-10-CM: E11.8, E11.65,  Z79.4  ICD-9-CM: 250.82, V58.67  2/18/2018 Yes        Dyslipidemia ICD-10-CM: E78.5  ICD-9-CM: 272.4  9/25/2014 Yes              Plan/Recommendations/Medical Decision Making:     - Right suprailiac fluid collection:  No acute indication for operation  - Recommend IR US guided drainage of fluid collection  - Continue antibiotic therapy

## 2020-12-17 NOTE — PROGRESS NOTES
Bedside and Verbal shift change report given to [employer], RN (oncoming nurse) by Nicole Parkinson RN (offgoing nurse). Report included the following information SBAR, Kardex, Intake/Output and MAR.

## 2020-12-17 NOTE — CONSULTS
[facility] SPECIALIST CONSULT NOTE    Presentation   Terry Clinton is a 77 y.o. male admitted on 12/10/20 with right lower abdominal pain. Upon initial evaluation cellulitis of abdomen determined. This was possibly due to non-sterile insulin administration. HX:   Past Medical History:   Diagnosis Date    Hypertension     ICD (implantable cardioverter-defibrillator) in place     NSVT (nonsustained ventricular tachycardia) ([facility] [address] 11/21/2018    EPS 11/21/2018 VT/VF     Right groin pain 12/7/2020   DM2    DX: CT scan    TX: abx. General surgery consulted and seen: not surgical candidate. Current clinical course has been complicated by fluctuating hyper and hypoglycemia. Diabetes: Patient has known Type 2 diabetes, treated with Metfomin/Detemir/and Trulicity PTA. Family history unknown for diabetes. Admission  and A1c 14%  indicate poor  diabetes control. Consulted by guru Farris for advanced diabetes nursing assessment and care, specifically related to   [] Transitioning off [place]   [x] Inpatient management strategy  [x] Home management assessment  [] Survival skill education    Diabetes-related medical history  Acute complications  Fluctuating hyper and hypoglycemia  Neurological complications  Peripheral neuropathy  Microvascular disease-minor  denies  Macrovascular disease  denies  Other associated conditions     CAD-ICD/HTN    Diabetes medication history  Drug class Currently in use Discontinued Never used   Biguanide Metformin 2000mg daily     DDP-4 inhibitor       Sulfonylurea      Thiazolidinedione      GLP-1 RA Trulicity 1.5 mg weekly     SGLT-2 inhibitors      Basal insulin Detemir 40units AM  30units PM     Bolus insulin      Fixed Dose  Combinations        Subjective   Mr. Vanessa Hamilton admits to not cleaning his injection sites before he injects his insulin. He states, \"I get lazy at night\".  I also shared that he has not been giving himself his insulin consistently or checking his BG as he should. He stated that he doesn't have enough strips to last until the end of the month and can't afford his strips. STrips cost him $80, for 90 strips that go with his glucometer. Patient reports the following home diabetes self-care practices:  Eating pattern-He works for [employer] as a cook and \"nibbles\" on the food there throughout the day, but states the majority of the food is veggies and meats. He also admits to stopping at fast food restaurants on the way home or to work and eats that consistently. Physical activity pattern-no limitations, does not actively exercise    Monitoring pattern-Checks BG every other day due to conserving meter strips  []  Taking medications pattern   [x] In-Consistent administration (stretching out insulin due to cost)  [x] Not Affordable (glucometer supplies)    Social determinants of health impacting diabetes self-management practices   Concerned that you need to know more about how to stay healthy with diabetes     Objective   Physical exam  General Alert, oriented and in no acute distress. Conversant and cooperative. Vital Signs   Visit Vitals  BP (!) 163/83   Pulse 79   Temp 98.5 °F (36.9 °C)   Resp 18   Ht 5' 7\" (1.702 m)   Wt 71.7 kg (158 lb)   SpO2 92%   BMI 24.75 kg/m²     Skin  Warm and dry. Abdomen taught and distended; right side with notable redness, warm to touch  Heart   Regular rate and rhythm. No murmurs, rubs or gallops  Lungs  Clear to auscultation without rales or rhonchi  Extremities No foot wounds    Diabetic foot exam:    Left Foot     Visual Exam: callous - lateral aspect and heel   Pulse DP: 2+ (normal)   Filament test: reduced sensation    Vibratory sensation: normal  Right Foot   Visual Exam: callous - lateral aspect and heel   Pulse DP: 2+ (normal)   Filament test: normal sensation    Vibratory sensation: normal DP & PT pulses +2.      Laboratory  BMP:   Lab Results Component Value Date/Time     12/17/2020 02:55 AM    K 3.6 12/17/2020 02:55 AM     12/17/2020 02:55 AM    CO2 28 12/17/2020 02:55 AM    AGAP 6 12/17/2020 02:55 AM    GLU 59 (L) 12/17/2020 02:55 AM    BUN 13 12/17/2020 02:55 AM    CREA 1.15 12/17/2020 02:55 AM    GFRAA >60 12/17/2020 02:55 AM    GFRNA >60 12/17/2020 02:55 AM      Factors affecting BG management  Factor Dose Comments   Nutrition:  Carb-controlled meals     60 grams/meal      Pain abdomen    Infection Daptomycin/Zosyn      Blood glucose pattern-12/10/20-12/13/20      BG pattern 12/14/20-12/17/20    Assessment and Plan   Nursing Diagnosis Risk for unstable blood glucose pattern   Nursing Intervention Domain 0822 Decision-making Support   Nursing Interventions Examined current inpatient diabetes control   Explored factors facilitating and impeding inpatient management  Identified self-management practices impeding diabetes control  Explored corrective strategies with patient and responsible inpatient provider   Informed patient of rational for insulin strategy while hospitalized  Instructed patient in:   -ways to save money with purchasing glucometer supplies at [facility],   -importance of daily sterile insulin  Injections, and importance of checking BG 3 times a day. -Also discussed his diet and the modifications he needs to [facility] him 'healthy plate' and portion control.     -Recommended him to see a podiatrist.    Discussed Long term effects of uncontrolled diabetes (amputations, non healing wounds, stroke, MI etc)     Evaluation   Mr. Félix Roman,  with Type 2 diabetes, did not achieve diabetes control prior to admission (PTA), as evidenced by admission BG of 197 and A1c of 14%.  Based on assessment of diabetes self-care practices, interventions that warrant action while hospitalized include:   [x] Healthy Eating   [x] Problem Solving  [] Being Active   [] Healthy Coping  [x] Monitoring   [x] Reducing Risks  [x] Taking Medications  The patient would also benefit from diabetes self-management education and support [facility]) after discharge. During this hospitalization, the patient has not  achieved inpatient blood glucose target of 100-180mg/dl. Factors that have played a role include:  [] Critical nature of illness state  [x]  Meal/tube feeding disruption  [x] Compromised insulin absorption or delivery  [] Glucocorticoid use  []  Kidney dysfunction  []  Liver disease    To optimize BG control and support a positive health outcome, would utilize the Subcutaneous Insulin Order set (4330). BG trends since admission fluctuating. Suspect too much insulin -Since he is on a controlled diet while hospitalized, he is not consuming the amount of food/carbs as he would normally, and there fore, has not needed as much insulin as he would require if he were at home. Also meal disruptions for procedures/tests also played a role. Since admission basal insulin needs have steadily been reduced based off of trending BG <100 in AM.  He has went from 34units daily to 15units daily. Recommendations   Recommend:    [] Use of Subcutaneous Insulin Order set (5527)  Insulin Use Options   CONTINUE Basal                           (Based on weight, BMI & GFR) Addresses basic metabolic needs 28DPNRC daily, IF AMBG trends to >200, adjust insulin dose back up to 20units daily. Corrective                                       (Offset gaps in dosing) Useful in adjusting insulin dosing [x] Normal sensitivity           [x] Referral to  [x] Diabetes Self-Management Training through [facility] (Phone [phone] to schedule appointment)    Billing Code(s)   I personally reviewed medical record, including notes, data and current medications, and examined the patient at bedside before making care recommendations.        [x] 50248 Prolonged Services - 55 minutes    Yesy Arevalo CNS  Diabetes Clinical Nurse Specialist  [facility]  Access via [address] [place] [address]  [phone]

## 2020-12-17 NOTE — PROGRESS NOTES
Bedside and Verbal shift change report given to Elkin Becker RN (oncoming nurse) by Richmond Starks RN   (offgoing nurse). Report included the following information SBAR, MAR and Recent Results.

## 2020-12-18 ENCOUNTER — APPOINTMENT (OUTPATIENT)
Dept: ULTRASOUND IMAGING | Age: 65
DRG: 857 | End: 2020-12-18
Attending: SURGERY
Payer: COMMERCIAL

## 2020-12-18 LAB
COMMENT, HOLDF: NORMAL
COMMENT, HOLDF: NORMAL
ERYTHROCYTE [DISTWIDTH] IN BLOOD BY AUTOMATED COUNT: 13.1 % (ref 11.5–14.5)
GLUCOSE BLD STRIP.AUTO-MCNC: 134 MG/DL (ref 65–100)
GLUCOSE BLD STRIP.AUTO-MCNC: 140 MG/DL (ref 65–100)
GLUCOSE BLD STRIP.AUTO-MCNC: 59 MG/DL (ref 65–100)
GLUCOSE BLD STRIP.AUTO-MCNC: 64 MG/DL (ref 65–100)
GLUCOSE BLD STRIP.AUTO-MCNC: 72 MG/DL (ref 65–100)
GLUCOSE BLD STRIP.AUTO-MCNC: 72 MG/DL (ref 65–100)
HCT VFR BLD AUTO: 27.4 % (ref 36.6–50.3)
HGB BLD-MCNC: 8.5 G/DL (ref 12.1–17)
INR PPP: 1 (ref 0.9–1.1)
MCH RBC QN AUTO: 26.5 PG (ref 26–34)
MCHC RBC AUTO-ENTMCNC: 31 G/DL (ref 30–36.5)
MCV RBC AUTO: 85.4 FL (ref 80–99)
NRBC # BLD: 0 K/UL (ref 0–0.01)
NRBC BLD-RTO: 0 PER 100 WBC
PLATELET # BLD AUTO: 397 K/UL (ref 150–400)
PMV BLD AUTO: 8.8 FL (ref 8.9–12.9)
PROTHROMBIN TIME: 10.5 SEC (ref 9–11.1)
RBC # BLD AUTO: 3.21 M/UL (ref 4.1–5.7)
SAMPLES BEING HELD,HOLD: NORMAL
SAMPLES BEING HELD,HOLD: NORMAL
SERVICE CMNT-IMP: ABNORMAL
SERVICE CMNT-IMP: NORMAL
SERVICE CMNT-IMP: NORMAL
WBC # BLD AUTO: 9 K/UL (ref 4.1–11.1)

## 2020-12-18 PROCEDURE — 36415 COLL VENOUS BLD VENIPUNCTURE: CPT

## 2020-12-18 PROCEDURE — 74011250636 HC RX REV CODE- 250/636: Performed by: INTERNAL MEDICINE

## 2020-12-18 PROCEDURE — 87077 CULTURE AEROBIC IDENTIFY: CPT

## 2020-12-18 PROCEDURE — 85610 PROTHROMBIN TIME: CPT

## 2020-12-18 PROCEDURE — 87147 CULTURE TYPE IMMUNOLOGIC: CPT

## 2020-12-18 PROCEDURE — 82962 GLUCOSE BLOOD TEST: CPT

## 2020-12-18 PROCEDURE — 74011250637 HC RX REV CODE- 250/637: Performed by: HOSPITALIST

## 2020-12-18 PROCEDURE — 49423 EXCHANGE DRAINAGE CATHETER: CPT

## 2020-12-18 PROCEDURE — 85027 COMPLETE CBC AUTOMATED: CPT

## 2020-12-18 PROCEDURE — 74011000258 HC RX REV CODE- 258: Performed by: INTERNAL MEDICINE

## 2020-12-18 PROCEDURE — 74011250637 HC RX REV CODE- 250/637: Performed by: INTERNAL MEDICINE

## 2020-12-18 PROCEDURE — 99231 SBSQ HOSP IP/OBS SF/LOW 25: CPT | Performed by: SURGERY

## 2020-12-18 PROCEDURE — 74011636637 HC RX REV CODE- 636/637: Performed by: INTERNAL MEDICINE

## 2020-12-18 PROCEDURE — 65270000032 HC RM SEMIPRIVATE

## 2020-12-18 PROCEDURE — 87070 CULTURE OTHR SPECIMN AEROBIC: CPT

## 2020-12-18 PROCEDURE — 74011000250 HC RX REV CODE- 250: Performed by: HOSPITALIST

## 2020-12-18 PROCEDURE — 87186 SC STD MICRODIL/AGAR DIL: CPT

## 2020-12-18 PROCEDURE — 87075 CULTR BACTERIA EXCEPT BLOOD: CPT

## 2020-12-18 PROCEDURE — 0Y953ZZ DRAINAGE OF RIGHT INGUINAL REGION, PERCUTANEOUS APPROACH: ICD-10-PCS | Performed by: RADIOLOGY

## 2020-12-18 PROCEDURE — 99232 SBSQ HOSP IP/OBS MODERATE 35: CPT | Performed by: INTERNAL MEDICINE

## 2020-12-18 RX ORDER — INSULIN GLARGINE 100 [IU]/ML
12 INJECTION, SOLUTION SUBCUTANEOUS
Status: DISCONTINUED | OUTPATIENT
Start: 2020-12-18 | End: 2020-12-19

## 2020-12-18 RX ORDER — LIDOCAINE HYDROCHLORIDE 10 MG/ML
4 INJECTION, SOLUTION EPIDURAL; INFILTRATION; INTRACAUDAL; PERINEURAL
Status: COMPLETED | OUTPATIENT
Start: 2020-12-18 | End: 2020-12-18

## 2020-12-18 RX ADMIN — Medication 10 ML: at 14:00

## 2020-12-18 RX ADMIN — AMLODIPINE BESYLATE 10 MG: 5 TABLET ORAL at 09:34

## 2020-12-18 RX ADMIN — PIPERACILLIN AND TAZOBACTAM 3.38 G: 3; .375 INJECTION, POWDER, LYOPHILIZED, FOR SOLUTION INTRAVENOUS at 22:08

## 2020-12-18 RX ADMIN — SODIUM CHLORIDE 60 ML/HR: 900 INJECTION, SOLUTION INTRAVENOUS at 00:22

## 2020-12-18 RX ADMIN — Medication 10 ML: at 22:09

## 2020-12-18 RX ADMIN — PIPERACILLIN AND TAZOBACTAM 3.38 G: 3; .375 INJECTION, POWDER, LYOPHILIZED, FOR SOLUTION INTRAVENOUS at 06:30

## 2020-12-18 RX ADMIN — LOSARTAN POTASSIUM 50 MG: 50 TABLET, FILM COATED ORAL at 09:34

## 2020-12-18 RX ADMIN — ENOXAPARIN SODIUM 40 MG: 40 INJECTION SUBCUTANEOUS at 17:25

## 2020-12-18 RX ADMIN — Medication 10 ML: at 06:00

## 2020-12-18 RX ADMIN — Medication 1 CAPSULE: at 09:34

## 2020-12-18 RX ADMIN — PIPERACILLIN AND TAZOBACTAM 3.38 G: 3; .375 INJECTION, POWDER, LYOPHILIZED, FOR SOLUTION INTRAVENOUS at 16:16

## 2020-12-18 RX ADMIN — DAPTOMYCIN 400 MG: 500 INJECTION, POWDER, LYOPHILIZED, FOR SOLUTION INTRAVENOUS at 17:25

## 2020-12-18 RX ADMIN — LIDOCAINE HYDROCHLORIDE 4 ML: 10 INJECTION, SOLUTION EPIDURAL; INFILTRATION; INTRACAUDAL; PERINEURAL at 14:47

## 2020-12-18 RX ADMIN — SODIUM CHLORIDE 60 ML/HR: 900 INJECTION, SOLUTION INTRAVENOUS at 22:09

## 2020-12-18 RX ADMIN — ASPIRIN 81 MG: 81 TABLET, COATED ORAL at 09:34

## 2020-12-18 RX ADMIN — DEXTROSE MONOHYDRATE 25 G: 25 INJECTION, SOLUTION INTRAVENOUS at 12:34

## 2020-12-18 RX ADMIN — INSULIN GLARGINE 12 UNITS: 100 INJECTION, SOLUTION SUBCUTANEOUS at 22:06

## 2020-12-18 NOTE — PROGRESS NOTES
ID Progress Note  2020    Subjective:   Afebrile. Had collection drained today. Objective:     Vitals:   Visit Vitals  BP (!) 152/70 (BP 1 Location: Right arm, BP Patient Position: Sitting)   Pulse 93   Temp 98.5 °F (36.9 °C)   Resp 18   Ht 5' 7\" (1.702 m)   Wt 71.7 kg (158 lb) Comment: as of last week per chart review   SpO2 93%   BMI 24.75 kg/m²        Tmax:  Temp (24hrs), Av.2 °F (36.8 °C), Min:97.6 °F (36.4 °C), Max:98.5 °F (36.9 °C)      Exam:    Not in distress  Lung clear, no rales, wheezes or rhonchi   Heart: s1, s2, RRR, no murmurs rubs or clicks  Abdomen: distended, indurated right flank   Speech fluent     Labs:   Lab Results   Component Value Date/Time    WBC 9.0 2020 03:10 AM    HGB 8.5 (L) 2020 03:10 AM    HCT 27.4 (L) 2020 03:10 AM    PLATELET 281  03:10 AM    MCV 85.4 2020 03:10 AM     Lab Results   Component Value Date/Time    Sodium 142 2020 02:55 AM    Potassium 3.6 2020 02:55 AM    Chloride 108 2020 02:55 AM    CO2 28 2020 02:55 AM    Anion gap 6 2020 02:55 AM    Glucose 59 (L) 2020 02:55 AM    BUN 13 2020 02:55 AM    Creatinine 1.15 2020 02:55 AM    BUN/Creatinine ratio 11 (L) 2020 02:55 AM    GFR est AA >60 2020 02:55 AM    GFR est non-AA >60 2020 02:55 AM    Calcium 8.2 (L) 2020 02:55 AM    Bilirubin, total 0.4 12/10/2020 10:16 AM    Alk. phosphatase 108 12/10/2020 10:16 AM    Protein, total 8.0 12/10/2020 10:16 AM    Albumin 1.5 (L) 2020 02:55 AM    Globulin 5.7 (H) 12/10/2020 10:16 AM    A-G Ratio 0.4 (L) 12/10/2020 10:16 AM    ALT (SGPT) 16 12/10/2020 10:16 AM           Assessment:     #1 abdominal wall abscess      #2 mild renal insufficiency     #3 syncope status post ICD placement (due to inducible VT/VF)            Recommendations:     Continue dapto and zosyn     20 cc of pus aspirated. Will follow cultures over the weekend, please call with questions.                  Leonel ZUNIGA MD

## 2020-12-18 NOTE — PROGRESS NOTES
Mr. Tomas Stewart is feeling better today. Tm 98.5 HR: 78 BP: 169/80 Resp Rate: 18 90% sat on room air. Intake/Output Summary (Last 24 hours) at 12/18/2020 0947  Last data filed at 12/18/2020 1123  Gross per 24 hour   Intake --   Output 2375 ml   Net -2375 ml   Exam: Cor: RRR. Lungs: Bilateral breath sounds. Clear to auscultation. Abd: Soft. Distended. Non tender. No associated guarding or rebound. The right flank is indurated. Labs:   Recent Results (from the past 12 hour(s))   GLUCOSE, POC    Collection Time: 12/17/20 10:01 PM   Result Value Ref Range    Glucose (POC) 166 (H) 65 - 100 mg/dL    Performed by Malcom Campos    CBC W/O DIFF    Collection Time: 12/18/20  3:10 AM   Result Value Ref Range    WBC 9.0 4.1 - 11.1 K/uL    RBC 3.21 (L) 4.10 - 5.70 M/uL    HGB 8.5 (L) 12.1 - 17.0 g/dL    HCT 27.4 (L) 36.6 - 50.3 %    MCV 85.4 80.0 - 99.0 FL    MCH 26.5 26.0 - 34.0 PG    MCHC 31.0 30.0 - 36.5 g/dL    RDW 13.1 11.5 - 14.5 %    PLATELET 832 602 - 190 K/uL    MPV 8.8 (L) 8.9 - 12.9 FL    NRBC 0.0 0  WBC    ABSOLUTE NRBC 0.00 0.00 - 0.01 K/uL   PROTHROMBIN TIME + INR    Collection Time: 12/18/20  3:10 AM   Result Value Ref Range    INR 1.0 0.9 - 1.1      Prothrombin time 10.5 9.0 - 11.1 sec   SAMPLES BEING HELD    Collection Time: 12/18/20  3:10 AM   Result Value Ref Range    SAMPLES BEING HELD  1 PST     COMMENT        Add-on orders for these samples will be processed based on acceptable specimen integrity and analyte stability, which may vary by analyte. GLUCOSE, POC    Collection Time: 12/18/20  6:30 AM   Result Value Ref Range    Glucose (POC) 72 65 - 100 mg/dL    Performed by Juliette Bowen    Reviewed recent CT scan. Percutaneous drainage of abdominal wall collection. IV abx - Zosyn and Daptomycin per Dr. Su Dumont. Cardiology input - noted. Diet as tolerated after percutaneous drainage. Pain medication and anti-emetics as needed.    OOB, Ambulate. Plans per Dr. Jacob Lorenzo.

## 2020-12-18 NOTE — PROGRESS NOTES
ITZEL:  CRM notes that patient remains on IVAB and has been seen by Diabetic educator. Patient remains on IVAB and may beneift post discharge with home health skilled nursing to re-inforce diabetes education. CRM will follow for transition of care needs.

## 2020-12-18 NOTE — CONSULTS
[facility] SPECIALIST CONSULT NOTE    Presentation   Jeannette Severino is a 77 y.o. male admitted on 12/10/20 with right lower abdominal pain. Upon initial evaluation cellulitis of abdomen determined. This was possibly due to non-sterile insulin administration. HX:   Past Medical History:   Diagnosis Date    Hypertension     ICD (implantable cardioverter-defibrillator) in place     NSVT (nonsustained ventricular tachycardia) ([facility] [address] 11/21/2018    EPS 11/21/2018 VT/VF     Right groin pain 12/7/2020   DM2    DX: CT scan    TX: abx. General surgery consulted and seen: not surgical candidate. Current clinical course has been complicated by fluctuating hyper and hypoglycemia. Diabetes: Patient has known Type 2 diabetes, treated with Metfomin/Detemir/and Trulicity PTA. Family history unknown for diabetes. Admission  and A1c 14%  indicate poor  diabetes control. Consulted by Carlos Eduardo Muhammad dietabhinav for advanced diabetes nursing assessment and care, specifically related to   [] Transitioning off Deena Andino   [x] Inpatient management strategy  [x] Home management assessment  [] Survival skill education    Diabetes-related medical history  Acute complications  Fluctuating hyper and hypoglycemia  Neurological complications  Peripheral neuropathy  Microvascular disease-minor  denies  Macrovascular disease  denies  Other associated conditions     CAD-ICD/HTN    Diabetes medication history  Drug class Currently in use Discontinued Never used   Biguanide Metformin 2000mg daily     DDP-4 inhibitor       Sulfonylurea      Thiazolidinedione      GLP-1 RA Trulicity 1.5 mg weekly     SGLT-2 inhibitors      Basal insulin Detemir 40units AM  30units PM     Bolus insulin      Fixed Dose  Combinations        Subjective   Drainage of abdomen completed today.   Had episode of hypoglycemia @1200, most likely due to NPO status last evening and no meals during procedure today. Physical activity pattern-no limitations, does not actively exercise    Monitoring pattern-Checks BG every other day due to conserving meter strips  []  Taking medications pattern   [x] In-Consistent administration (stretching out insulin due to cost)  [x] Not Affordable (glucometer supplies)    Social determinants of health impacting diabetes self-management practices   Concerned that you need to know more about how to stay healthy with diabetes     Objective   Physical exam  General Alert, oriented and in no acute distress. Conversant and cooperative. Vital Signs   Visit Vitals  BP (!) 152/70 (BP 1 Location: Right arm, BP Patient Position: Sitting)   Pulse 93   Temp 98.5 °F (36.9 °C)   Resp 18   Ht 5' 7\" (1.702 m)   Wt 71.7 kg (158 lb)   SpO2 93%   BMI 24.75 kg/m²     Skin  Warm and dry. Abdomen less distended today after procedure. Heart   Regular rate and rhythm. No murmurs, rubs or gallops  Lungs  Clear to auscultation without rales or rhonchi  Extremities No foot wounds    Diabetic foot exam:    Left Foot     Visual Exam: callous - lateral aspect and heel   Pulse DP: 2+ (normal)   Filament test: reduced sensation    Vibratory sensation: normal  Right Foot   Visual Exam: callous - lateral aspect and heel   Pulse DP: 2+ (normal)   Filament test: normal sensation    Vibratory sensation: normal DP & PT pulses +2.      Laboratory  BMP:   No results found for: NA, K, CL, CO2, AGAP, GLU, BUN, CREA, GFRAA, GFRNA   Factors affecting BG management  Factor Dose Comments   Nutrition:  Carb-controlled meals     60 grams/meal      Pain abdomen    Infection Daptomycin/Zosyn      Blood glucose pattern-12/10/20-12/13/20          Assessment and Plan   Nursing Diagnosis Risk for unstable blood glucose pattern   Nursing Intervention Domain 2245 Decision-making Support   Nursing Interventions Examined current inpatient diabetes control   Explored factors facilitating and impeding inpatient management  Identified self-management practices impeding diabetes control  Explored corrective strategies with patient and responsible inpatient provider   Informed patient of rational for insulin strategy while hospitalized  Instructed patient in:   -ways to save money with purchasing glucometer supplies at [facility],   -importance of daily sterile insulin  Injections, and importance of checking BG 3 times a day. -Also discussed his diet and the modifications he needs to [facility] him 'healthy plate' and portion control.     -Recommended him to see a podiatrist.    Discussed Long term effects of uncontrolled diabetes (amputations, non healing wounds, stroke, MI etc)     Evaluation   Mr. Jude Ceja,  with Type 2 diabetes, did not achieve diabetes control prior to admission (PTA), as evidenced by admission BG of 197 and A1c of 14%. Based on assessment of diabetes self-care practices, interventions that warrant action while hospitalized include:   [x] Healthy Eating   [x] Problem Solving  [] Being Active   [] Healthy Coping  [x] Monitoring   [x] Reducing Risks  [x] Taking Medications  The patient would also benefit from diabetes self-management education and support ([facility]) after discharge. During this hospitalization, the patient has not  achieved inpatient blood glucose target of 100-180mg/dl. Factors that have played a role include:  [] Critical nature of illness state  [x]  Meal/tube feeding disruption  [x] Compromised insulin absorption or delivery  [] Glucocorticoid use  []  Kidney dysfunction  []  Liver disease    To optimize BG control and support a positive health outcome, would utilize the Subcutaneous Insulin Order set (1594). BG trends since admission fluctuating. Suspect too much insulin -Since he is on a controlled diet while hospitalized, he is not consuming the amount of food/carbs as he would normally, and there fore, has not needed as much insulin as he would require if he were at home.   Also meal disruptions for procedures/tests also played a role. Since admission basal insulin needs have steadily been reduced based off of trending BG <100 in AM.  He has went from 34units daily to 15units daily. BG trends today below target,  with one episode of hypoglycemia. Conferred with Dr. Quinton Guillaume and recommended 20% reduction in basal insulin dose and he agreed. Recommendations   Recommend:    [] Use of Subcutaneous Insulin Order set (1088)  Insulin Use Options   REDUCE Basal  Dose                        (Based on weight, BMI & GFR) Addresses basic metabolic needs 35YMODE daily, IF AMBG trends <100mg/dl, reduce basal by another 20%. Corrective                                       (Offset gaps in dosing) Useful in adjusting insulin dosing [x] Normal sensitivity           [x] Referral to  [x] Diabetes Self-Management Training through [facility] (Phone [phone] to schedule appointment)  Discharge Planning   TBD  Billing Code(s)   I personally reviewed medical record, including notes, data and current medications, and examined the patient at bedside before making care recommendations.        [x] 16429 Prolonged Services - 15 minutes    MARTINE Strickland  Diabetes Clinical Nurse Specialist  [facility]  Access via [address] [place] [address]  [phone]

## 2020-12-18 NOTE — PROGRESS NOTES
7:59 PM: Patient sat up in chair for all meals. Voiding appropriately in urinal. Made aware of NPO status at midnight, stated understanding. Patient ambulated on lap around unit with PCT this evening, tolerated activity well. Bedside and Verbal shift change report given to Anais Kruse RN (oncoming nurse) by Amanda Garcia RN (offgoing nurse). Report included the following information SBAR, Kardex, Intake/Output, MAR and Recent Results.

## 2020-12-18 NOTE — PROGRESS NOTES
11:55 AM: Patient IV infiltrated, new IV inserted at this time. 12:15 PM: Patient BG 59. MD notified at this time. 12:35 PM: D50W 50ml injection given at this time d/t low BG.    1:30 PM: Patient . Patient down for scheduled ultrasound at this time. 8:40 PM:  Bedside and Verbal shift change report given to Cyril Matta RN (oncoming nurse) by Anna Quintero RN (offgoing nurse). Report included the following information SBAR, Kardex, Intake/Output, MAR and Recent Results.

## 2020-12-18 NOTE — PROGRESS NOTES
Hospital Progress Note    NAME:  Refugio Zamora   :   1955   MRN:  [id number]     Date/Time:  2020 9:19 AM    Plan:   1. Continue Zosyn and daptomycin  2. Warm compresses abdominal wall  3. Controlled diabetes  4. Ultrasound percutaneous drainage today  Risk of Deterioration: Low  []           Moderate  [x]           High  []                 Assessment:   Principal Problem:    Cellulitis (2020)      Large abdominal wall area of enduration encompassing the right lower quadrant and extending      to the upper thigh on the right. Continue IV antibiotics with vancomycin and Zosyn     Due to nonsterile insulin administration     Due to uncontrolled diabetes     Developed edema of the scrotal sac and right lower extremity yesterday afternoon when seen suggested elevation of the scrotum and right lower extremity with improvement of the scrotal edema and right lower extremity edema. Is felt to be related to the compression area of induration on venous return. Active Problems:    Dyslipidemia (2014)      Continue Lipitor      Uncontrolled type 2 diabetes mellitus with complication, with long-term current use of insulin ([address] (2018)     Titrate insulin for goal control      ICD (implantable cardioverter-defibrillator) in place (2018)      Hypertension (2018)     Home meds     Add losartan       Acute renal failure (POA)      Resolved      Abdominal pain (12/10/2020)       Admitting notes: 77 y.o. male who presents with right lower quadrant abdominal pain which started 1 week back worsen since Monday radiating towards the groin denies any fever chills nausea vomiting diarrhea or any other associated symptoms. States this started about 1 week ago, initially saw PCP Dr. Mallory Peralta worsening since Friday.  Saw Dr. Javad Stewart on Monday who ordered outpatient CT scan and abdominal ultrasound    Subjective:     No new complaints -discussed plans for the day  questions answered  11 Point Review of Systems:   Negative except no chills or fever     []            Unable to obtain ROS due to:       []            mental status change []            sedated []            intubated     Social History     Tobacco Use    Smoking status: Never Smoker    Smokeless tobacco: Never Used   Substance Use Topics    Alcohol use: No     Medications reviewed:  Current Facility-Administered Medications   Medication Dose Route Frequency    insulin glargine (LANTUS) injection 15 Units  15 Units SubCUTAneous QHS    losartan (COZAAR) tablet 50 mg  50 mg Oral DAILY    insulin lispro (HUMALOG) injection   SubCUTAneous TIDAC    dextrose (D50W) injection syrg 12.5-25 g  12.5-25 g IntraVENous PRN    bisacodyL (DULCOLAX) suppository 10 mg  10 mg Rectal DAILY PRN    0.9% sodium chloride infusion  60 mL/hr IntraVENous CONTINUOUS    DAPTOmycin (CUBICIN) 400 mg in 0.9% sodium chloride 50 mL IVPB RF formulation  400 mg IntraVENous Q24H    polyethylene glycol (MIRALAX) packet 17 g  17 g Oral BID    bisacodyL (DULCOLAX) tablet 10 mg  10 mg Oral DAILY PRN    lactobac ac& pc-s.therm-b.anim (FESTUS Q/RISAQUAD)  1 Cap Oral DAILY    enoxaparin (LOVENOX) injection 40 mg  40 mg SubCUTAneous Q24H    sodium chloride (NS) flush 5-40 mL  5-40 mL IntraVENous Q8H    sodium chloride (NS) flush 5-40 mL  5-40 mL IntraVENous PRN    acetaminophen (TYLENOL) tablet 650 mg  650 mg Oral Q6H PRN    Or    acetaminophen (TYLENOL) suppository 650 mg  650 mg Rectal Q6H PRN    polyethylene glycol (MIRALAX) packet 17 g  17 g Oral DAILY PRN    promethazine (PHENERGAN) tablet 12.5 mg  12.5 mg Oral Q6H PRN    Or    ondansetron (ZOFRAN) injection 4 mg  4 mg IntraVENous Q6H PRN    piperacillin-tazobactam (ZOSYN) 3.375 g in 0.9% sodium chloride (MBP/ADV) 100 mL MBP  3.375 g IntraVENous Q8H    amLODIPine (NORVASC) tablet 10 mg  10 mg Oral DAILY    aspirin delayed-release tablet 81 mg  81 mg Oral DAILY    glucose chewable tablet 16 g  4 Tab Oral PRN  glucagon (GLUCAGEN) injection 1 mg  1 mg IntraMUSCular PRN        Objective:   Vitals:  Visit Vitals  /81 (BP 1 Location: Left arm, BP Patient Position: At rest)   Pulse 63   Temp 97.6 °F (36.4 °C)   Resp 18   Ht 5' 7\" (1.702 m)   Wt 158 lb (71.7 kg) Comment: as of last week per chart review   SpO2 95%   BMI 24.75 kg/m²     Temp (24hrs), Av.1 °F (36.7 °C), Min:97.6 °F (36.4 °C), Max:98.5 °F (36.9 °C)      O2 Device: Room air    Last 24hr Input/Output:    Intake/Output Summary (Last 24 hours) at 2020 0839  Last data filed at 2020 3650  Gross per 24 hour   Intake --   Output 1875 ml   Net -1875 ml        PHYSICAL EXAM:  General:    Alert, cooperative, no distress, appears stated age. Head:   Normocephalic, without obvious abnormality, atraumatic. Eyes:   Conjunctivae/corneas clear. PERRLA  Nose:  Nares normal. No drainage or sinus tenderness. Throat:    Lips, mucosa, and tongue normal.  No Thrush  Neck:  Supple, symmetrical,  no adenopathy, thyroid: non tender    no carotid bruit and no JVD. Back:    Symmetric,  No CVA tenderness. Lungs:   Clear to auscultation bilaterally. No Wheezing or Rhonchi. No rales. .  Heart:   Regular rate and rhythm,  no murmur, rub or gallop. Abdomen:   Soft, non-tender. Not distended.   Bowel sounds normal.  Large area of induration with tenderness encompassing the right lower quadrant         Lab Data Reviewed:    Recent Labs     20  0310 20  0255 20  0554   WBC 9.0 10.5 12.6*   HGB 8.5* 9.1* 8.8*   HCT 27.4* 29.0* 27.6*    411* 409*     Recent Labs     20  0310 20  0255 20  0554   NA  --  142 141   K  --  3.6 3.6   CL  --  108 107   CO2  --  28 28   GLU  --  59* 52*   BUN  --  13 13   CREA  --  1.15 1.08   CA  --  8.2* 8.6   PHOS  --  3.7 3.7   ALB  --  1.5* 1.6*   INR 1.0  --   --      Lab Results   Component Value Date/Time    Glucose (POC) 72 2020 06:30 AM    Glucose (POC) 166 (H) 2020 10:01 PM Glucose (POC) 168 (H) 12/17/2020 04:15 PM    Glucose (POC) 110 (H) 12/17/2020 11:07 AM    Glucose (POC) 114 (H) 12/17/2020 06:05 AM     No results for input(s): PH, PCO2, PO2, HCO3, FIO2 in the last 72 hours.   Recent Labs     12/18/20  0310   INR 1.0     ___________________________________________________  ___________________________________________________    Attending Physician: Maritza Regan MD

## 2020-12-19 LAB
GLUCOSE BLD STRIP.AUTO-MCNC: 104 MG/DL (ref 65–100)
GLUCOSE BLD STRIP.AUTO-MCNC: 212 MG/DL (ref 65–100)
GLUCOSE BLD STRIP.AUTO-MCNC: 243 MG/DL (ref 65–100)
GLUCOSE BLD STRIP.AUTO-MCNC: 275 MG/DL (ref 65–100)
GLUCOSE BLD STRIP.AUTO-MCNC: 65 MG/DL (ref 65–100)
SERVICE CMNT-IMP: ABNORMAL
SERVICE CMNT-IMP: NORMAL

## 2020-12-19 PROCEDURE — 74011636637 HC RX REV CODE- 636/637: Performed by: INTERNAL MEDICINE

## 2020-12-19 PROCEDURE — 74011000258 HC RX REV CODE- 258: Performed by: INTERNAL MEDICINE

## 2020-12-19 PROCEDURE — 74011250636 HC RX REV CODE- 250/636: Performed by: INTERNAL MEDICINE

## 2020-12-19 PROCEDURE — 82962 GLUCOSE BLOOD TEST: CPT

## 2020-12-19 PROCEDURE — 74011250637 HC RX REV CODE- 250/637: Performed by: HOSPITALIST

## 2020-12-19 PROCEDURE — 65270000032 HC RM SEMIPRIVATE

## 2020-12-19 PROCEDURE — 74011250637 HC RX REV CODE- 250/637: Performed by: INTERNAL MEDICINE

## 2020-12-19 PROCEDURE — 99231 SBSQ HOSP IP/OBS SF/LOW 25: CPT | Performed by: INTERNAL MEDICINE

## 2020-12-19 RX ORDER — INSULIN GLARGINE 100 [IU]/ML
8 INJECTION, SOLUTION SUBCUTANEOUS
Status: DISCONTINUED | OUTPATIENT
Start: 2020-12-19 | End: 2020-12-26

## 2020-12-19 RX ORDER — LOSARTAN POTASSIUM 50 MG/1
100 TABLET ORAL DAILY
Status: DISCONTINUED | OUTPATIENT
Start: 2020-12-20 | End: 2020-12-29

## 2020-12-19 RX ADMIN — DAPTOMYCIN 400 MG: 500 INJECTION, POWDER, LYOPHILIZED, FOR SOLUTION INTRAVENOUS at 17:20

## 2020-12-19 RX ADMIN — INSULIN GLARGINE 8 UNITS: 100 INJECTION, SOLUTION SUBCUTANEOUS at 22:25

## 2020-12-19 RX ADMIN — ASPIRIN 81 MG: 81 TABLET, COATED ORAL at 08:08

## 2020-12-19 RX ADMIN — INSULIN LISPRO 2 UNITS: 100 INJECTION, SOLUTION INTRAVENOUS; SUBCUTANEOUS at 17:15

## 2020-12-19 RX ADMIN — Medication 10 ML: at 22:26

## 2020-12-19 RX ADMIN — AMLODIPINE BESYLATE 10 MG: 5 TABLET ORAL at 08:08

## 2020-12-19 RX ADMIN — PIPERACILLIN AND TAZOBACTAM 3.38 G: 3; .375 INJECTION, POWDER, LYOPHILIZED, FOR SOLUTION INTRAVENOUS at 06:59

## 2020-12-19 RX ADMIN — Medication 10 ML: at 07:00

## 2020-12-19 RX ADMIN — Medication 10 ML: at 13:16

## 2020-12-19 RX ADMIN — INSULIN LISPRO 5 UNITS: 100 INJECTION, SOLUTION INTRAVENOUS; SUBCUTANEOUS at 11:25

## 2020-12-19 RX ADMIN — ENOXAPARIN SODIUM 40 MG: 40 INJECTION SUBCUTANEOUS at 17:16

## 2020-12-19 RX ADMIN — LOSARTAN POTASSIUM 50 MG: 50 TABLET, FILM COATED ORAL at 08:08

## 2020-12-19 RX ADMIN — PIPERACILLIN AND TAZOBACTAM 3.38 G: 3; .375 INJECTION, POWDER, LYOPHILIZED, FOR SOLUTION INTRAVENOUS at 22:26

## 2020-12-19 RX ADMIN — Medication 1 CAPSULE: at 08:08

## 2020-12-19 RX ADMIN — PIPERACILLIN AND TAZOBACTAM 3.38 G: 3; .375 INJECTION, POWDER, LYOPHILIZED, FOR SOLUTION INTRAVENOUS at 15:00

## 2020-12-19 NOTE — PROGRESS NOTES
Progress Note    Patient: Curt Barnett MRN: [id number]  SSN: [id number]    YOB: 1955  Age: 77 y.o. Sex: male      Admit Date: 12/10/2020    * No surgery found *    Procedure:      Subjective:     Patient feeling a little better after aspiration yesterday. A drain was not left in place. Objective:     Visit Vitals  BP (!) 142/86 (BP 1 Location: Right arm, BP Patient Position: At rest)   Pulse 76   Temp 98.5 °F (36.9 °C)   Resp 16   Ht 5' 7\" (1.702 m)   Wt 158 lb (71.7 kg)   SpO2 93%   BMI 24.75 kg/m²       Temp (24hrs), Av.8 °F (37.1 °C), Min:98.4 °F (36.9 °C), Max:100 °F (37.8 °C)      Physical Exam:    gen- Alert in NAD  Abd- S/NT/ND  Mild tenderness right inguinal region. Data Review: images and reports reviewed    Lab Review: All lab results for the last 24 hours reviewed. Assessment:     Hospital Problems  Date Reviewed: 2020          Codes Class Noted POA    ARF (acute renal failure) ([facility] [address] ICD-10-CM: N17.9  ICD-9-CM: 584.9  2020 Yes        * (Principal) Cellulitis ICD-10-CM: L03.90  ICD-9-CM: 682.9  2020 Yes        Abdominal pain ICD-10-CM: R10.9  ICD-9-CM: 789.00  12/10/2020 Yes        ICD (implantable cardioverter-defibrillator) in place (Chronic) ICD-10-CM: Z95.810  ICD-9-CM: V45.02  2018 Yes        Hypertension (Chronic) ICD-10-CM: I10  ICD-9-CM: 401.9  2018 Yes        Brugada syndrome (Chronic) ICD-10-CM: I49.8  ICD-9-CM: 746.89  2018 Yes        Uncontrolled type 2 diabetes mellitus with complication, with long-term current use of insulin (HCC) ICD-10-CM: E11.8, E11.65, Z79.4  ICD-9-CM: 250.82, V58.67  2018 Yes        Dyslipidemia ICD-10-CM: E78.5  ICD-9-CM: 272.4  2014 Yes              Plan/Recommendations/Medical Decision Making:   Doing better after needle aspiration. Await cultures. Continue IV abx  No current role for operative intervention.   Will follow from the periphery

## 2020-12-19 NOTE — PROGRESS NOTES
Bedside and Verbal shift change report given to Elizabeth Wilde RN (oncoming nurse) by Aidee Smith RN (offgoing nurse). Report included the following information SBAR, Kardex, ED Summary, Intake/Output, MAR and Recent Results.

## 2020-12-19 NOTE — PROGRESS NOTES
Hospital Progress Note    NAME:  Saad Boyle   :   1955   MRN:  [id number]     Date/Time:  2020 9:19 AM    Plan:   1. Continue Zosyn and daptomycin  2. Warm compresses abdominal wall  3. Controlled diabetes  Risk of Deterioration: Low  []           Moderate  [x]           High  []                 Assessment:   Principal Problem:    Cellulitis (2020)      Large abdominal wall area of enduration encompassing the right lower quadrant and extending      to the upper thigh on the right.     Continue IV antibiotics with vancomycin and Zosyn     Due to nonsterile insulin administration     Due to uncontrolled diabetes     Developed edema of the scrotal sac and right lower extremity yesterday afternoon when seen suggested elevation of the scrotum and right lower extremity with improvement of the scrotal edema and right lower extremity edema.  Is felt to be related to the compression area of induration on venous return.       20 ultrasound guidance, a 6 [place] catheter was positioned into                      the dominant collection       20 cc of pus was evacuated. The vast majority of the          fluid was aspirated..     Active Problems:    Dyslipidemia (2014)      Continue Lipitor      Uncontrolled type 2 diabetes mellitus with complication, with long-term current use of insulin (HCC) (2018)     Titrate insulin for goal control      ICD (implantable cardioverter-defibrillator) in place (2018)      Hypertension (2018)     Home meds     Add losartan       Acute renal failure (POA)      Resolved      Abdominal pain (12/10/2020)       Brugada Syndrome s/p AICD (2019)              - Results of [facility] noted to be POSITIVE.                   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  SCN5A gene is associated with autosomal                   dominant                    Brugada Syndrome, long QT, dilated cardiomyopathy, and afib.                     - Follows up with   Shawn.              - ICD Check 10/28/20 - No events. Less than 1% RVP              - Cardiac cath 2019 with mild luminal irregularities       Admitting notes: 77 y.o. male who presents with right lower quadrant abdominal pain which started 1 week back worsen since Monday radiating towards the groin denies any fever chills nausea vomiting diarrhea or any other associated symptoms. States this started about 1 week ago, initially saw PCP Dr. Elvira Perry worsening since Friday. Saw Dr. Favian Grace on Monday who ordered outpatient CT scan and abdominal ultrasound    Subjective:     No new complaints feeling better since the drainage of fluid yesterday has no new complaints.   11 Point Review of Systems:   Negative except no chills or fever     []            Unable to obtain ROS due to:       []            mental status change []            sedated []            intubated     Social History     Tobacco Use    Smoking status: Never Smoker    Smokeless tobacco: Never Used   Substance Use Topics    Alcohol use: No     Medications reviewed:  Current Facility-Administered Medications   Medication Dose Route Frequency    insulin glargine (LANTUS) injection 12 Units  12 Units SubCUTAneous QHS    influenza vaccine 2020-21 (6 mos+)(PF) (FLUARIX/FLULAVAL/FLUZONE QUAD) injection 0.5 mL  0.5 mL IntraMUSCular PRIOR TO DISCHARGE    losartan (COZAAR) tablet 50 mg  50 mg Oral DAILY    insulin lispro (HUMALOG) injection   SubCUTAneous TIDAC    dextrose (D50W) injection syrg 12.5-25 g  12.5-25 g IntraVENous PRN    bisacodyL (DULCOLAX) suppository 10 mg  10 mg Rectal DAILY PRN    0.9% sodium chloride infusion  60 mL/hr IntraVENous CONTINUOUS    DAPTOmycin (CUBICIN) 400 mg in 0.9% sodium chloride 50 mL IVPB RF formulation  400 mg IntraVENous Q24H    polyethylene glycol (MIRALAX) packet 17 g  17 g Oral BID    bisacodyL (DULCOLAX) tablet 10 mg  10 mg Oral DAILY PRN    lactobac ac& pc-s.therm-b.anim (FESTUS Q/RISAQUAD)  1 Cap Oral DAILY    enoxaparin (LOVENOX) injection 40 mg  40 mg SubCUTAneous Q24H    sodium chloride (NS) flush 5-40 mL  5-40 mL IntraVENous Q8H    sodium chloride (NS) flush 5-40 mL  5-40 mL IntraVENous PRN    acetaminophen (TYLENOL) tablet 650 mg  650 mg Oral Q6H PRN    Or    acetaminophen (TYLENOL) suppository 650 mg  650 mg Rectal Q6H PRN    polyethylene glycol (MIRALAX) packet 17 g  17 g Oral DAILY PRN    promethazine (PHENERGAN) tablet 12.5 mg  12.5 mg Oral Q6H PRN    Or    ondansetron (ZOFRAN) injection 4 mg  4 mg IntraVENous Q6H PRN    piperacillin-tazobactam (ZOSYN) 3.375 g in 0.9% sodium chloride (MBP/ADV) 100 mL MBP  3.375 g IntraVENous Q8H    amLODIPine (NORVASC) tablet 10 mg  10 mg Oral DAILY    aspirin delayed-release tablet 81 mg  81 mg Oral DAILY    glucose chewable tablet 16 g  4 Tab Oral PRN    glucagon (GLUCAGEN) injection 1 mg  1 mg IntraMUSCular PRN        Objective:   Vitals:  Visit Vitals  BP (!) 142/86 (BP 1 Location: Right arm, BP Patient Position: At rest)   Pulse 76   Temp 98.5 °F (36.9 °C)   Resp 16   Ht 5' 7\" (1.702 m)   Wt 158 lb (71.7 kg) Comment: as of last week per chart review   SpO2 93%   BMI 24.75 kg/m²     Temp (24hrs), Av.8 °F (37.1 °C), Min:98.4 °F (36.9 °C), Max:100 °F (37.8 °C)      O2 Device: Room air    Last 24hr Input/Output:    Intake/Output Summary (Last 24 hours) at 2020 0968  Last data filed at 2020 0527  Gross per 24 hour   Intake --   Output 1100 ml   Net -1100 ml        PHYSICAL EXAM:  General:    Alert, cooperative, no distress, appears stated age. Head:   Normocephalic, without obvious abnormality, atraumatic. Eyes:   Conjunctivae/corneas clear. PERRLA  Nose:  Nares normal. No drainage or sinus tenderness. Throat:    Lips, mucosa, and tongue normal.  No Thrush  Neck:  Supple, symmetrical,  no adenopathy, thyroid: non tender    no carotid bruit and no JVD. Back:    Symmetric,  No CVA tenderness. Lungs:   Clear to auscultation bilaterally. No Wheezing or Rhonchi. No rales. .  Heart:   Regular rate and rhythm,  no murmur, rub or gallop. Abdomen:   Soft, non-tender. Not distended. Bowel sounds normal.  Large area of induration with tenderness encompassing the right lower quadrant         Lab Data Reviewed:    Recent Labs     12/18/20 0310 12/17/20 0255   WBC 9.0 10.5   HGB 8.5* 9.1*   HCT 27.4* 29.0*    411*     Recent Labs     12/18/20 0310 12/17/20 0255   NA  --  142   K  --  3.6   CL  --  108   CO2  --  28   GLU  --  59*   BUN  --  13   CREA  --  1.15   CA  --  8.2*   PHOS  --  3.7   ALB  --  1.5*   INR 1.0  --      Lab Results   Component Value Date/Time    Glucose (POC) 104 (H) 12/19/2020 07:00 AM    Glucose (POC) 65 12/19/2020 06:23 AM    Glucose (POC) 134 (H) 12/18/2020 09:19 PM    Glucose (POC) 72 12/18/2020 05:24 PM    Glucose (POC) 140 (H) 12/18/2020 01:11 PM     No results for input(s): PH, PCO2, PO2, HCO3, FIO2 in the last 72 hours.   Recent Labs     12/18/20 0310   INR 1.0     ___________________________________________________  ___________________________________________________    Attending Physician: Mariama Sebastian MD

## 2020-12-20 PROBLEM — E44.0 MODERATE PROTEIN-CALORIE MALNUTRITION (HCC): Status: ACTIVE | Noted: 2020-12-20

## 2020-12-20 LAB
ALBUMIN SERPL-MCNC: 1.8 G/DL (ref 3.5–5)
ANION GAP SERPL CALC-SCNC: 6 MMOL/L (ref 5–15)
BASOPHILS # BLD: 0 K/UL (ref 0–0.1)
BASOPHILS NFR BLD: 0 % (ref 0–1)
BUN SERPL-MCNC: 13 MG/DL (ref 6–20)
BUN/CREAT SERPL: 11 (ref 12–20)
CALCIUM SERPL-MCNC: 8.4 MG/DL (ref 8.5–10.1)
CHLORIDE SERPL-SCNC: 106 MMOL/L (ref 97–108)
CO2 SERPL-SCNC: 31 MMOL/L (ref 21–32)
CREAT SERPL-MCNC: 1.15 MG/DL (ref 0.7–1.3)
DIFFERENTIAL METHOD BLD: ABNORMAL
EOSINOPHIL # BLD: 0.1 K/UL (ref 0–0.4)
EOSINOPHIL NFR BLD: 1 % (ref 0–7)
ERYTHROCYTE [DISTWIDTH] IN BLOOD BY AUTOMATED COUNT: 12.9 % (ref 11.5–14.5)
GLUCOSE BLD STRIP.AUTO-MCNC: 101 MG/DL (ref 65–100)
GLUCOSE BLD STRIP.AUTO-MCNC: 131 MG/DL (ref 65–100)
GLUCOSE BLD STRIP.AUTO-MCNC: 180 MG/DL (ref 65–100)
GLUCOSE BLD STRIP.AUTO-MCNC: 263 MG/DL (ref 65–100)
GLUCOSE SERPL-MCNC: 138 MG/DL (ref 65–100)
HCT VFR BLD AUTO: 28.3 % (ref 36.6–50.3)
HGB BLD-MCNC: 8.9 G/DL (ref 12.1–17)
IMM GRANULOCYTES # BLD AUTO: 0.1 K/UL (ref 0–0.04)
IMM GRANULOCYTES NFR BLD AUTO: 1 % (ref 0–0.5)
LYMPHOCYTES # BLD: 0.6 K/UL (ref 0.8–3.5)
LYMPHOCYTES NFR BLD: 7 % (ref 12–49)
MCH RBC QN AUTO: 26.8 PG (ref 26–34)
MCHC RBC AUTO-ENTMCNC: 31.4 G/DL (ref 30–36.5)
MCV RBC AUTO: 85.2 FL (ref 80–99)
MONOCYTES # BLD: 0.4 K/UL (ref 0–1)
MONOCYTES NFR BLD: 5 % (ref 5–13)
NEUTS SEG # BLD: 7.2 K/UL (ref 1.8–8)
NEUTS SEG NFR BLD: 86 % (ref 32–75)
NRBC # BLD: 0 K/UL (ref 0–0.01)
NRBC BLD-RTO: 0 PER 100 WBC
PHOSPHATE SERPL-MCNC: 3.3 MG/DL (ref 2.6–4.7)
PLATELET # BLD AUTO: 415 K/UL (ref 150–400)
PMV BLD AUTO: 8.7 FL (ref 8.9–12.9)
POTASSIUM SERPL-SCNC: 3.5 MMOL/L (ref 3.5–5.1)
PREALB SERPL-MCNC: 12.9 MG/DL (ref 20–40)
RBC # BLD AUTO: 3.32 M/UL (ref 4.1–5.7)
RBC MORPH BLD: ABNORMAL
SERVICE CMNT-IMP: ABNORMAL
SODIUM SERPL-SCNC: 143 MMOL/L (ref 136–145)
WBC # BLD AUTO: 8.4 K/UL (ref 4.1–11.1)

## 2020-12-20 PROCEDURE — 74011000258 HC RX REV CODE- 258: Performed by: INTERNAL MEDICINE

## 2020-12-20 PROCEDURE — 74011250637 HC RX REV CODE- 250/637: Performed by: INTERNAL MEDICINE

## 2020-12-20 PROCEDURE — 82962 GLUCOSE BLOOD TEST: CPT

## 2020-12-20 PROCEDURE — 74011250636 HC RX REV CODE- 250/636: Performed by: INTERNAL MEDICINE

## 2020-12-20 PROCEDURE — 80069 RENAL FUNCTION PANEL: CPT

## 2020-12-20 PROCEDURE — 36415 COLL VENOUS BLD VENIPUNCTURE: CPT

## 2020-12-20 PROCEDURE — 74011250637 HC RX REV CODE- 250/637: Performed by: HOSPITALIST

## 2020-12-20 PROCEDURE — 85025 COMPLETE CBC W/AUTO DIFF WBC: CPT

## 2020-12-20 PROCEDURE — 99232 SBSQ HOSP IP/OBS MODERATE 35: CPT | Performed by: INTERNAL MEDICINE

## 2020-12-20 PROCEDURE — 84134 ASSAY OF PREALBUMIN: CPT

## 2020-12-20 PROCEDURE — 74011636637 HC RX REV CODE- 636/637: Performed by: INTERNAL MEDICINE

## 2020-12-20 PROCEDURE — 65270000032 HC RM SEMIPRIVATE

## 2020-12-20 RX ADMIN — DAPTOMYCIN 400 MG: 500 INJECTION, POWDER, LYOPHILIZED, FOR SOLUTION INTRAVENOUS at 16:36

## 2020-12-20 RX ADMIN — INSULIN LISPRO 2 UNITS: 100 INJECTION, SOLUTION INTRAVENOUS; SUBCUTANEOUS at 16:35

## 2020-12-20 RX ADMIN — PIPERACILLIN AND TAZOBACTAM 3.38 G: 3; .375 INJECTION, POWDER, LYOPHILIZED, FOR SOLUTION INTRAVENOUS at 14:58

## 2020-12-20 RX ADMIN — PIPERACILLIN AND TAZOBACTAM 3.38 G: 3; .375 INJECTION, POWDER, LYOPHILIZED, FOR SOLUTION INTRAVENOUS at 07:07

## 2020-12-20 RX ADMIN — INSULIN GLARGINE 8 UNITS: 100 INJECTION, SOLUTION SUBCUTANEOUS at 22:44

## 2020-12-20 RX ADMIN — AMLODIPINE BESYLATE 10 MG: 5 TABLET ORAL at 08:33

## 2020-12-20 RX ADMIN — LOSARTAN POTASSIUM 100 MG: 50 TABLET, FILM COATED ORAL at 08:33

## 2020-12-20 RX ADMIN — PIPERACILLIN AND TAZOBACTAM 3.38 G: 3; .375 INJECTION, POWDER, LYOPHILIZED, FOR SOLUTION INTRAVENOUS at 22:44

## 2020-12-20 RX ADMIN — ENOXAPARIN SODIUM 40 MG: 40 INJECTION SUBCUTANEOUS at 17:39

## 2020-12-20 RX ADMIN — Medication 10 ML: at 14:55

## 2020-12-20 RX ADMIN — ASPIRIN 81 MG: 81 TABLET, COATED ORAL at 08:33

## 2020-12-20 RX ADMIN — Medication 10 ML: at 22:44

## 2020-12-20 RX ADMIN — Medication 1 CAPSULE: at 08:33

## 2020-12-20 RX ADMIN — Medication 10 ML: at 12:15

## 2020-12-20 NOTE — PROGRESS NOTES
Bedside and Verbal shift change report given to Adrián Kelley RN (oncoming nurse) by Riri Sharp RN (offgoing nurse). Report included the following information SBAR, Intake/Output, MAR and Recent Results.

## 2020-12-20 NOTE — PROGRESS NOTES
Bedside and Verbal shift change report given to Radha Machado RN (oncoming nurse) by Matilda Rocha RN (offgoing nurse). Report included the following information SBAR, Kardex, ED Summary, Intake/Output, MAR and Recent Results.

## 2020-12-20 NOTE — PROGRESS NOTES
Hospital Progress Note    NAME:  Chris Cardenas   :   1955   MRN:  [id number]     Date/Time:  2020 9:19 AM    Plan:   1. Continue Zosyn and daptomycin  2. Warm compresses abdominal wall  3. Controlled diabetes  4. Nutritional consult  Risk of Deterioration: Low  []           Moderate  [x]           High  []                 Assessment:   Principal Problem:    Cellulitis (2020)      Large abdominal wall area of enduration encompassing the right lower quadrant and extending      to the upper thigh on the right. Continue IV antibiotics with vancomycin and Zosyn     Due to nonsterile insulin administration     Due to uncontrolled diabetes     Developed edema of the scrotal sac and right lower extremity yesterday afternoon when seen suggested elevation of the scrotum and right lower extremity with improvement of the scrotal edema and right lower extremity edema. Is felt to be related to the compression area of induration on venous return. 20 ultrasound guidance, a 6 [place] catheter was positioned into                      the dominant collection       20 cc of pus was evacuated. The vast majority of the          fluid was aspirated. .     Active Problems:    Dyslipidemia (2014)      Continue Lipitor      Uncontrolled type 2 diabetes mellitus with complication, with long-term current use of insulin ([address] (2018)     Titrate insulin for goal control      ICD (implantable cardioverter-defibrillator) in place (2018)      Hypertension (2018)     Home meds     Add losartan       Acute renal failure (POA)      Resolved      Abdominal pain (12/10/2020)       Brugada Syndrome s/p AICD (2019)              - Results of [facility] noted to be POSITIVE. 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 SCN5A gene is associated with autosomal                   dominant                    Brugada Syndrome, long QT, dilated cardiomyopathy, and afib.                      - Follows up with Dr. Isabela Salgado.              - ICD Check 10/28/20 - No events. Less than 1% RVP              - Cardiac cath 2019 with mild luminal irregularities        Protein caloric malnutrition -moderated       Nutritional consult        Admitting notes: 77 y.o. male who presents with right lower quadrant abdominal pain which started 1 week back worsen since Monday radiating towards the groin denies any fever chills nausea vomiting diarrhea or any other associated symptoms. States this started about 1 week ago, initially saw PCP Dr. Manohar Madrigal worsening since Friday.  Saw Dr. Lissa Wooten on Monday who ordered outpatient CT scan and abdominal ultrasound    Subjective:     No new complaints feeling better    11 Point Review of Systems:   Negative except no chills or fever     []            Unable to obtain ROS due to:       []            mental status change []            sedated []            intubated     Social History     Tobacco Use    Smoking status: Never Smoker    Smokeless tobacco: Never Used   Substance Use Topics    Alcohol use: No     Medications reviewed:  Current Facility-Administered Medications   Medication Dose Route Frequency    losartan (COZAAR) tablet 100 mg  100 mg Oral DAILY    insulin glargine (LANTUS) injection 8 Units  8 Units SubCUTAneous QHS    influenza vaccine 2020-21 (6 mos+)(PF) (FLUARIX/FLULAVAL/FLUZONE QUAD) injection 0.5 mL  0.5 mL IntraMUSCular PRIOR TO DISCHARGE    insulin lispro (HUMALOG) injection   SubCUTAneous TIDAC    dextrose (D50W) injection syrg 12.5-25 g  12.5-25 g IntraVENous PRN    bisacodyL (DULCOLAX) suppository 10 mg  10 mg Rectal DAILY PRN    DAPTOmycin (CUBICIN) 400 mg in 0.9% sodium chloride 50 mL IVPB RF formulation  400 mg IntraVENous Q24H    polyethylene glycol (MIRALAX) packet 17 g  17 g Oral BID    bisacodyL (DULCOLAX) tablet 10 mg  10 mg Oral DAILY PRN    lactobac ac& pc-s.therm-b.anim (FESTUS Q/RISAQUAD)  1 Cap Oral DAILY    enoxaparin (LOVENOX) injection 40 mg  40 mg SubCUTAneous Q24H    sodium chloride (NS) flush 5-40 mL  5-40 mL IntraVENous Q8H    sodium chloride (NS) flush 5-40 mL  5-40 mL IntraVENous PRN    acetaminophen (TYLENOL) tablet 650 mg  650 mg Oral Q6H PRN    Or    acetaminophen (TYLENOL) suppository 650 mg  650 mg Rectal Q6H PRN    polyethylene glycol (MIRALAX) packet 17 g  17 g Oral DAILY PRN    promethazine (PHENERGAN) tablet 12.5 mg  12.5 mg Oral Q6H PRN    Or    ondansetron (ZOFRAN) injection 4 mg  4 mg IntraVENous Q6H PRN    piperacillin-tazobactam (ZOSYN) 3.375 g in 0.9% sodium chloride (MBP/ADV) 100 mL MBP  3.375 g IntraVENous Q8H    amLODIPine (NORVASC) tablet 10 mg  10 mg Oral DAILY    aspirin delayed-release tablet 81 mg  81 mg Oral DAILY    glucose chewable tablet 16 g  4 Tab Oral PRN    glucagon (GLUCAGEN) injection 1 mg  1 mg IntraMUSCular PRN        Objective:   Vitals:  Visit Vitals  BP (!) 145/74   Pulse 72   Temp 98.4 °F (36.9 °C)   Resp 16   Ht 5' 7\" (1.702 m)   Wt 158 lb (71.7 kg) Comment: as of last week per chart review   SpO2 96%   BMI 24.75 kg/m²     Temp (24hrs), Av.3 °F (36.8 °C), Min:98.1 °F (36.7 °C), Max:98.4 °F (36.9 °C)      O2 Device: Room air    Last 24hr Input/Output:    Intake/Output Summary (Last 24 hours) at 2020 0752  Last data filed at 2020 0127  Gross per 24 hour   Intake 291 ml   Output 1500 ml   Net -1209 ml        PHYSICAL EXAM:  General:    Alert, cooperative, no distress, appears stated age. Head:   Normocephalic, without obvious abnormality, atraumatic. Eyes:   Conjunctivae/corneas clear. PERRLA  Nose:  Nares normal. No drainage or sinus tenderness. Throat:    Lips, mucosa, and tongue normal.  No Thrush  Neck:  Supple, symmetrical,  no adenopathy, thyroid: non tender    no carotid bruit and no JVD. Back:    Symmetric,  No CVA tenderness. Lungs:   Clear to auscultation bilaterally. No Wheezing or Rhonchi. No rales. .  Heart:   Regular rate and rhythm,  no murmur, rub or gallop. Abdomen:   Soft, non-tender. Not distended. Bowel sounds normal.  Large area of induration with tenderness encompassing the right lower quadrant         Lab Data Reviewed:    Recent Labs     12/20/20 0215 12/18/20 0310   WBC 8.4 9.0   HGB 8.9* 8.5*   HCT 28.3* 27.4*   * 397     Recent Labs     12/20/20 0215 12/18/20 0310     --    K 3.5  --      --    CO2 31  --    *  --    BUN 13  --    CREA 1.15  --    CA 8.4*  --    PHOS 3.3  --    ALB 1.8*  --    INR  --  1.0     Lab Results   Component Value Date/Time    Glucose (POC) 101 (H) 12/20/2020 06:22 AM    Glucose (POC) 212 (H) 12/19/2020 09:20 PM    Glucose (POC) 243 (H) 12/19/2020 04:16 PM    Glucose (POC) 275 (H) 12/19/2020 11:06 AM    Glucose (POC) 104 (H) 12/19/2020 07:00 AM     No results for input(s): PH, PCO2, PO2, HCO3, FIO2 in the last 72 hours.   Recent Labs     12/18/20 0310   INR 1.0     ___________________________________________________  ___________________________________________________    Attending Physician: Arianna Bhatti MD

## 2020-12-20 NOTE — PROGRESS NOTES
Spiritual Care Assessment/Progress Note  [facility]      NAME: Adrienne Mcgovern      MRN: [id number]  AGE: 77 y.o.  SEX: male  [employer] Affiliation: Scientology   Language: English     12/20/2020     Total Time (in minutes): 19     Spiritual Assessment begun in [facility] through conversation with:         [x]Patient        [] Family    [] Friend(s)        Reason for Consult: Initial/Spiritual assessment, patient floor     Spiritual beliefs: (Please include comment if needed)     [x] Identifies with a ila tradition: Scientology         [] Supported by a ila community:            [] Claims no spiritual orientation:           [] Seeking spiritual identity:                [] Adheres to an individual form of spirituality:           [] Not able to assess:                           Identified resources for coping:      [] Prayer                               [] Music                  [] Guided Imagery     [x] Family/friends                 [] Pet visits     [] Devotional reading                         [] Unknown     [] Other:                                              Interventions offered during this visit: (See comments for more details)    Patient Interventions: Affirmation of emotions/emotional suffering, Affirmation of ila, Catharsis/review of pertinent events in supportive environment, Coping skills reviewed/reinforced, Iconic (affirming the presence of God/Higher Power)           Plan of Care:     [] Support spiritual and/or cultural needs    [] Support AMD and/or advance care planning process      [] Support grieving process   [] Coordinate Rites and/or Rituals    [] Coordination with community clergy   [] No spiritual needs identified at this time   [] Detailed Plan of Care below (See Comments)  [] Make referral to Music Therapy  [] Make referral to Pet Therapy     [] Make referral to Addiction services  [] Make referral to [facility]  [] Make referral to Spiritual Care Partner  [] No future visits requested        [x] Follow up upon further referrals     Comments:  visit for initial spiritual assessment. Patient reclining in bed resting and watching television. Good eye contact, friendly, smiling. Says he is feeling much better than when he arrived. Provided spiritual presence and listening as he spoke of his current thoughts, feelings, and concerns. Spoke of his health and events leading to this hospitalization. Hope is to recover his health and return home as soon as able. Patient appeared comforted and encouraged as a result of this visit and expressed gratitude for this visit. Informed patient of availability of  and pastoral care services. Rev.  Diana Higginbotham MDiv, [facility], [facility]   paging service: [phone])

## 2020-12-21 LAB
CK SERPL-CCNC: 65 U/L (ref 39–308)
GLUCOSE BLD STRIP.AUTO-MCNC: 123 MG/DL (ref 65–100)
GLUCOSE BLD STRIP.AUTO-MCNC: 166 MG/DL (ref 65–100)
GLUCOSE BLD STRIP.AUTO-MCNC: 229 MG/DL (ref 65–100)
GLUCOSE BLD STRIP.AUTO-MCNC: 302 MG/DL (ref 65–100)
SERVICE CMNT-IMP: ABNORMAL

## 2020-12-21 PROCEDURE — 36415 COLL VENOUS BLD VENIPUNCTURE: CPT

## 2020-12-21 PROCEDURE — 82962 GLUCOSE BLOOD TEST: CPT

## 2020-12-21 PROCEDURE — 74011636637 HC RX REV CODE- 636/637: Performed by: INTERNAL MEDICINE

## 2020-12-21 PROCEDURE — 82550 ASSAY OF CK (CPK): CPT

## 2020-12-21 PROCEDURE — 74011000258 HC RX REV CODE- 258: Performed by: INTERNAL MEDICINE

## 2020-12-21 PROCEDURE — 99232 SBSQ HOSP IP/OBS MODERATE 35: CPT | Performed by: INTERNAL MEDICINE

## 2020-12-21 PROCEDURE — 74011250637 HC RX REV CODE- 250/637: Performed by: INTERNAL MEDICINE

## 2020-12-21 PROCEDURE — 74011250636 HC RX REV CODE- 250/636: Performed by: INTERNAL MEDICINE

## 2020-12-21 PROCEDURE — 74011250637 HC RX REV CODE- 250/637: Performed by: HOSPITALIST

## 2020-12-21 PROCEDURE — 65270000032 HC RM SEMIPRIVATE

## 2020-12-21 RX ORDER — SODIUM CHLORIDE 9 MG/ML
75 INJECTION, SOLUTION INTRAVENOUS CONTINUOUS
Status: DISCONTINUED | OUTPATIENT
Start: 2020-12-21 | End: 2020-12-23

## 2020-12-21 RX ADMIN — ENOXAPARIN SODIUM 40 MG: 40 INJECTION SUBCUTANEOUS at 19:08

## 2020-12-21 RX ADMIN — Medication 10 ML: at 06:46

## 2020-12-21 RX ADMIN — DAPTOMYCIN 400 MG: 500 INJECTION, POWDER, LYOPHILIZED, FOR SOLUTION INTRAVENOUS at 19:08

## 2020-12-21 RX ADMIN — Medication 10 ML: at 16:15

## 2020-12-21 RX ADMIN — Medication 1 CAPSULE: at 08:56

## 2020-12-21 RX ADMIN — ASPIRIN 81 MG: 81 TABLET, COATED ORAL at 08:56

## 2020-12-21 RX ADMIN — INSULIN LISPRO 2 UNITS: 100 INJECTION, SOLUTION INTRAVENOUS; SUBCUTANEOUS at 11:58

## 2020-12-21 RX ADMIN — INSULIN GLARGINE 8 UNITS: 100 INJECTION, SOLUTION SUBCUTANEOUS at 21:50

## 2020-12-21 RX ADMIN — PIPERACILLIN AND TAZOBACTAM 3.38 G: 3; .375 INJECTION, POWDER, LYOPHILIZED, FOR SOLUTION INTRAVENOUS at 06:46

## 2020-12-21 RX ADMIN — LOSARTAN POTASSIUM 100 MG: 50 TABLET, FILM COATED ORAL at 08:59

## 2020-12-21 RX ADMIN — SODIUM CHLORIDE 75 ML/HR: 9 INJECTION, SOLUTION INTRAVENOUS at 20:23

## 2020-12-21 RX ADMIN — PIPERACILLIN AND TAZOBACTAM 3.38 G: 3; .375 INJECTION, POWDER, LYOPHILIZED, FOR SOLUTION INTRAVENOUS at 16:15

## 2020-12-21 RX ADMIN — INSULIN LISPRO 3 UNITS: 100 INJECTION, SOLUTION INTRAVENOUS; SUBCUTANEOUS at 16:34

## 2020-12-21 RX ADMIN — PIPERACILLIN AND TAZOBACTAM 3.38 G: 3; .375 INJECTION, POWDER, LYOPHILIZED, FOR SOLUTION INTRAVENOUS at 23:18

## 2020-12-21 RX ADMIN — AMLODIPINE BESYLATE 10 MG: 5 TABLET ORAL at 08:55

## 2020-12-21 NOTE — CONSULTS
Comprehensive Nutrition Assessment    Type and Reason for Visit: Consult    Nutrition Recommendations/Plan:    1. Imodium PRN with loose stools while on abx  2. Measured weight - no weight since admit and was from previous encounter    Nutrition Assessment:    Pt admitted for cellulitis. PMHx: HTN, NSVT, DM. Abd pain for 1 week PTA with N/VD. Questions if cellulitis related to nonsterile insulin administration practices noted. IR US guided drainage of abd fluid collection on 12/18. ID following for abx. A1c 14% noted. Seen by [facility] with good BG control since. Pt visited last week. Appears well nourished. Consult received again but would not agree with malnutrition dx. Low PAB alone does not indicate malnutrition status, especially with inflammatory process/infection. Appetite good this admit. Will add Ensure High Protein for added protein for healing (160kcal, 16 g protein), high protein snacks also in place from visit last week. Some loose stools noted, ?related to abx. Nay-Q already in place. Consider imodium PRN. Malnutrition Assessment:  Malnutrition Status:  No malnutrition      Nutritionally Significant Medications: daptomycin, lantus (8 units daily), SSI, Nay-Q, zosyn, miralax, NS @ 75ml/hr    Estimated Daily Nutrient Needs:  Energy (kcal): 1740-1885kcal(MSJx 1.2-1.3); Weight Used for Energy Requirements: (71.66kg)  Protein (g): 72-79g (1-1.1g/kg);  Weight Used for Protein Requirements: (71.66kg)  Fluid (ml/day): 1885; Method Used for Fluid Requirements: 1 ml/kcal    Nutrition Related Findings:       BM: 12/20 - loose  Edema: 1+ genital   Wounds:  None       Current Nutrition Therapies:   Diet: consistent CHO + snack  Supplements: none  Meal intake:   Patient Vitals for the past 168 hrs:   % Diet Eaten   12/20/20 0835 100 %   12/19/20 0811 100 %   12/16/20 0800 50 %   12/15/20 0940 100 %     Anthropometric Measures:  · Height:  5' 7\" (170.2 cm)  · Current Body Wt:  71.7 kg (157 lb 15.7 oz)   · Admission Body Wt:      · Usual Body Wt:      155#  · Ideal Body Wt:   :  106.7 %   Wt Readings from Last 10 Encounters:   12/10/20 71.7 kg (158 lb)   12/03/20 71.9 kg (158 lb 9.6 oz)   07/21/20 72.9 kg (160 lb 11.2 oz)   07/14/20 69.4 kg (153 lb)   06/09/20 71.9 kg (158 lb 9.6 oz)   05/14/20 68.9 kg (152 lb)   04/18/19 72 kg (158 lb 12.8 oz)   04/15/19 72.3 kg (159 lb 4.8 oz)   03/18/19 70.1 kg (154 lb 8 oz)   03/13/19 72.1 kg (159 lb)     Nutrition Diagnosis:   · Altered nutrition-related lab values(resolved) related to endocrine dysfunction as evidenced by (BG WNL, insulin adjusted)     Nutrition Interventions:   Food and/or Nutrient Delivery: Start oral nutrition supplement  Nutrition Education and Counseling: No recommendations at this time  Coordination of Nutrition Care: Continue to monitor while inpatient, Interdisciplinary rounds    Goals:  Continued consumption of at least 75% meals in 5-7 days       Nutrition Monitoring and Evaluation:   Behavioral-Environmental Outcomes: Beliefs and attitudes, Knowledge or skill  Food/Nutrient Intake Outcomes: Food and nutrient intake  Physical Signs/Symptoms Outcomes: Biochemical data, Weight    Discharge Planning:    Continue oral nutrition supplement, Continue current diet     Jeremi Gatica, RD  [address] , Pager #[phone] or via [employer]

## 2020-12-21 NOTE — PROGRESS NOTES
Transitions of Care plan: Home, may need home health    -RUR: 18%  -Cm continuing to follow for discharge planning needs.   -Patient continues on IV ABX- Daptomycin and Zosyn  -He is s/p an aspiration and plans for repeat CT tomorrow  -Dispo needs are unknown at this time; will follow and assist as needed.     Meryle Ores Helton BSW, ACM-SW

## 2020-12-21 NOTE — PROGRESS NOTES
ID Progress Note  2020    Subjective:   Afebrile. Had collection drained last week. Right flank still feels indurated. Objective:     Vitals:   Visit Vitals  BP (!) 140/76   Pulse 78   Temp 98.6 °F (37 °C)   Resp 18   Ht 5' 7\" (1.702 m)   Wt 71.7 kg (158 lb) Comment: as of last week per chart review   SpO2 97%   BMI 24.75 kg/m²        Tmax:  Temp (24hrs), Av.3 °F (36.8 °C), Min:97.8 °F (36.6 °C), Max:98.6 °F (37 °C)      Exam:    Not in distress  Lung clear, no rales, wheezes or rhonchi   Heart: s1, s2, RRR, no murmurs rubs or clicks  Abdomen: distended, indurated right flank   Speech fluent     Labs:   Lab Results   Component Value Date/Time    WBC 8.4 2020 02:15 AM    HGB 8.9 (L) 2020 02:15 AM    HCT 28.3 (L) 2020 02:15 AM    PLATELET 294 (H)  02:15 AM    MCV 85.2 2020 02:15 AM     Lab Results   Component Value Date/Time    Sodium 143 2020 02:15 AM    Potassium 3.5 2020 02:15 AM    Chloride 106 2020 02:15 AM    CO2 31 2020 02:15 AM    Anion gap 6 2020 02:15 AM    Glucose 138 (H) 2020 02:15 AM    BUN 13 2020 02:15 AM    Creatinine 1.15 2020 02:15 AM    BUN/Creatinine ratio 11 (L) 2020 02:15 AM    GFR est AA >60 2020 02:15 AM    GFR est non-AA >60 2020 02:15 AM    Calcium 8.4 (L) 2020 02:15 AM    Bilirubin, total 0.4 12/10/2020 10:16 AM    Alk. phosphatase 108 12/10/2020 10:16 AM    Protein, total 8.0 12/10/2020 10:16 AM    Albumin 1.8 (L) 2020 02:15 AM    Globulin 5.7 (H) 12/10/2020 10:16 AM    A-G Ratio 0.4 (L) 12/10/2020 10:16 AM    ALT (SGPT) 16 12/10/2020 10:16 AM           Assessment:     #1 abdominal wall abscess      #2 mild renal insufficiency     #3 syncope status post ICD placement (due to inducible VT/VF)            Recommendations:     Continue dapto and zosyn. Ck acceptable. 20 cc of pus aspirated. His right flank still is indurated despite aspiration.  Would repeat ct tomorrow.  I will hydrate him.                      Jason Gilbert MD

## 2020-12-21 NOTE — PROGRESS NOTES
Hospital Progress Note    NAME:  Peri Ortega   :   1955   MRN:  [id number]     Date/Time:  2020 9:19 AM    Plan:   1. Continue Zosyn and daptomycin  2. Warm compresses abdominal wall  3. Controlled diabetes  4. Nutritional consult  Risk of Deterioration: Low  []           Moderate  [x]           High  []                 Assessment:   Principal Problem:    Cellulitis (2020)      Large abdominal wall area of enduration encompassing the right lower quadrant and extending      to the upper thigh on the right. Continue IV antibiotics with vancomycin and Zosyn     Due to nonsterile insulin administration     Due to uncontrolled diabetes     Developed edema of the scrotal sac and right lower extremity yesterday afternoon when seen suggested elevation of the scrotum and right lower extremity with improvement of the scrotal edema and right lower extremity edema. Is felt to be related to the compression area of induration on venous return. 20 ultrasound guidance, a 6 Brazilian catheter was positioned into                      the dominant collection       20 cc of pus was evacuated. The vast majority of the          fluid was aspirated. .         Surgery and ID following    Active Problems:    Dyslipidemia (2014)      Continue Lipitor      Uncontrolled type 2 diabetes mellitus with complication, with long-term current use of insulin ([address] (2018)     Titrate insulin for goal control      ICD (implantable cardioverter-defibrillator) in place (2018)      Hypertension (2018)     Home meds     Add losartan       Acute renal failure (POA)      Resolved      Abdominal pain (12/10/2020)       Brugada Syndrome s/p AICD (2019)              - Results of [facility] noted to be POSITIVE. 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   SCN5A gene is associated with autosomal                   dominant                    Brugada Syndrome, long QT, dilated cardiomyopathy, and afib.                     - Follows up with Dr. Danell Dance.              - ICD Check 10/28/20 - No events. Less than 1% RVP              - Cardiac cath 2019 with mild luminal irregularities        Protein caloric malnutrition -moderated       Nutritional consult        Admitting notes: 77 y.o. male who presents with right lower quadrant abdominal pain which started 1 week back worsen since Monday radiating towards the groin denies any fever chills nausea vomiting diarrhea or any other associated symptoms. States this started about 1 week ago, initially saw PCP Dr. Elijah Ortiz worsening since Friday.  Saw Dr. Nusrat Foley on Monday who ordered outpatient CT scan and abdominal ultrasound    Subjective:     No new complaints feeling better    11 Point Review of Systems:   Negative except no chills or fever     []            Unable to obtain ROS due to:       []            mental status change []            sedated []            intubated     Social History     Tobacco Use    Smoking status: Never Smoker    Smokeless tobacco: Never Used   Substance Use Topics    Alcohol use: No     Medications reviewed:  Current Facility-Administered Medications   Medication Dose Route Frequency    losartan (COZAAR) tablet 100 mg  100 mg Oral DAILY    insulin glargine (LANTUS) injection 8 Units  8 Units SubCUTAneous QHS    influenza vaccine 2020-21 (6 mos+)(PF) (FLUARIX/FLULAVAL/FLUZONE QUAD) injection 0.5 mL  0.5 mL IntraMUSCular PRIOR TO DISCHARGE    insulin lispro (HUMALOG) injection   SubCUTAneous TIDAC    dextrose (D50W) injection syrg 12.5-25 g  12.5-25 g IntraVENous PRN    bisacodyL (DULCOLAX) suppository 10 mg  10 mg Rectal DAILY PRN    DAPTOmycin (CUBICIN) 400 mg in 0.9% sodium chloride 50 mL IVPB RF formulation  400 mg IntraVENous Q24H    polyethylene glycol (MIRALAX) packet 17 g  17 g Oral BID    bisacodyL (DULCOLAX) tablet 10 mg  10 mg Oral DAILY PRN    lactobac ac& pc-s.therm-b.anim (FESTUS Q/RISAQUAD)  1 Cap Oral DAILY  enoxaparin (LOVENOX) injection 40 mg  40 mg SubCUTAneous Q24H    sodium chloride (NS) flush 5-40 mL  5-40 mL IntraVENous Q8H    sodium chloride (NS) flush 5-40 mL  5-40 mL IntraVENous PRN    acetaminophen (TYLENOL) tablet 650 mg  650 mg Oral Q6H PRN    Or    acetaminophen (TYLENOL) suppository 650 mg  650 mg Rectal Q6H PRN    polyethylene glycol (MIRALAX) packet 17 g  17 g Oral DAILY PRN    promethazine (PHENERGAN) tablet 12.5 mg  12.5 mg Oral Q6H PRN    Or    ondansetron (ZOFRAN) injection 4 mg  4 mg IntraVENous Q6H PRN    piperacillin-tazobactam (ZOSYN) 3.375 g in 0.9% sodium chloride (MBP/ADV) 100 mL MBP  3.375 g IntraVENous Q8H    amLODIPine (NORVASC) tablet 10 mg  10 mg Oral DAILY    aspirin delayed-release tablet 81 mg  81 mg Oral DAILY    glucose chewable tablet 16 g  4 Tab Oral PRN    glucagon (GLUCAGEN) injection 1 mg  1 mg IntraMUSCular PRN        Objective:   Vitals:  Visit Vitals  BP (!) 140/76   Pulse 78   Temp 98.6 °F (37 °C)   Resp 18   Ht 5' 7\" (1.702 m)   Wt 158 lb (71.7 kg) Comment: as of last week per chart review   SpO2 97%   BMI 24.75 kg/m²     Temp (24hrs), Av.3 °F (36.8 °C), Min:97.8 °F (36.6 °C), Max:98.6 °F (37 °C)      O2 Device: Room air    Last 24hr Input/Output:    Intake/Output Summary (Last 24 hours) at 2020 0857  Last data filed at 2020 4949  Gross per 24 hour   Intake 150 ml   Output 3225 ml   Net -3075 ml        PHYSICAL EXAM:  General:    Alert, cooperative, no distress, appears stated age. Head:   Normocephalic, without obvious abnormality, atraumatic. Eyes:   Conjunctivae/corneas clear. PERRLA  Nose:  Nares normal. No drainage or sinus tenderness. Throat:    Lips, mucosa, and tongue normal.  No Thrush  Neck:  Supple, symmetrical,  no adenopathy, thyroid: non tender    no carotid bruit and no JVD. Back:    Symmetric,  No CVA tenderness. Lungs:   Clear to auscultation bilaterally. No Wheezing or Rhonchi. No rales. .  Heart:   Regular rate and rhythm,  no murmur, rub or gallop.  Abdomen:   Soft, non-tender. Not distended.  Bowel sounds normal.  Large area of induration with tenderness encompassing the right lower quadrant         Lab Data Reviewed:    Recent Labs     12/20/20 0215   WBC 8.4   HGB 8.9*   HCT 28.3*   *     Recent Labs     12/20/20 0215      K 3.5      CO2 31   *   BUN 13   CREA 1.15   CA 8.4*   PHOS 3.3   ALB 1.8*     Lab Results   Component Value Date/Time    Glucose (POC) 123 (H) 12/21/2020 06:29 AM    Glucose (POC) 263 (H) 12/20/2020 09:22 PM    Glucose (POC) 180 (H) 12/20/2020 04:09 PM    Glucose (POC) 131 (H) 12/20/2020 11:28 AM    Glucose (POC) 101 (H) 12/20/2020 06:22 AM     No results for input(s): PH, PCO2, PO2, HCO3, FIO2 in the last 72 hours.  No results for input(s): INR, INREXT, INREXT in the last 72 hours.  ___________________________________________________  ___________________________________________________    Attending Physician: Estuardo Echols MD

## 2020-12-21 NOTE — PROGRESS NOTES
Bedside and Verbal shift change report given to Sarah Walls RN (oncoming nurse) by Carlito Muñoz RN (offgoing nurse). Report included the following information SBAR, Kardex, ED Summary, Intake/Output, MAR and Recent Results.

## 2020-12-22 ENCOUNTER — APPOINTMENT (OUTPATIENT)
Dept: CT IMAGING | Age: 65
DRG: 857 | End: 2020-12-22
Attending: INTERNAL MEDICINE
Payer: COMMERCIAL

## 2020-12-22 ENCOUNTER — APPOINTMENT (OUTPATIENT)
Dept: ULTRASOUND IMAGING | Age: 65
DRG: 857 | End: 2020-12-22
Attending: INTERNAL MEDICINE
Payer: COMMERCIAL

## 2020-12-22 LAB
ALBUMIN SERPL-MCNC: 2 G/DL (ref 3.5–5)
ANION GAP SERPL CALC-SCNC: 5 MMOL/L (ref 5–15)
BACTERIA SPEC CULT: NORMAL
BASOPHILS # BLD: 0 K/UL (ref 0–0.1)
BASOPHILS NFR BLD: 0 % (ref 0–1)
BUN SERPL-MCNC: 17 MG/DL (ref 6–20)
BUN/CREAT SERPL: 12 (ref 12–20)
CALCIUM SERPL-MCNC: 9.2 MG/DL (ref 8.5–10.1)
CHLORIDE SERPL-SCNC: 103 MMOL/L (ref 97–108)
CK SERPL-CCNC: 65 U/L (ref 39–308)
CO2 SERPL-SCNC: 31 MMOL/L (ref 21–32)
COMMENT, HOLDF: NORMAL
CREAT SERPL-MCNC: 1.37 MG/DL (ref 0.7–1.3)
DIFFERENTIAL METHOD BLD: ABNORMAL
EOSINOPHIL # BLD: 0.1 K/UL (ref 0–0.4)
EOSINOPHIL NFR BLD: 1 % (ref 0–7)
ERYTHROCYTE [DISTWIDTH] IN BLOOD BY AUTOMATED COUNT: 12.9 % (ref 11.5–14.5)
GLUCOSE BLD STRIP.AUTO-MCNC: 115 MG/DL (ref 65–100)
GLUCOSE BLD STRIP.AUTO-MCNC: 128 MG/DL (ref 65–100)
GLUCOSE BLD STRIP.AUTO-MCNC: 244 MG/DL (ref 65–100)
GLUCOSE BLD STRIP.AUTO-MCNC: 262 MG/DL (ref 65–100)
GLUCOSE SERPL-MCNC: 201 MG/DL (ref 65–100)
GRAM STN SPEC: NORMAL
GRAM STN SPEC: NORMAL
HCT VFR BLD AUTO: 30.6 % (ref 36.6–50.3)
HGB BLD-MCNC: 9.6 G/DL (ref 12.1–17)
IMM GRANULOCYTES # BLD AUTO: 0 K/UL (ref 0–0.04)
IMM GRANULOCYTES NFR BLD AUTO: 0 % (ref 0–0.5)
LYMPHOCYTES # BLD: 1.1 K/UL (ref 0.8–3.5)
LYMPHOCYTES NFR BLD: 11 % (ref 12–49)
MCH RBC QN AUTO: 26.9 PG (ref 26–34)
MCHC RBC AUTO-ENTMCNC: 31.4 G/DL (ref 30–36.5)
MCV RBC AUTO: 85.7 FL (ref 80–99)
MONOCYTES # BLD: 0.6 K/UL (ref 0–1)
MONOCYTES NFR BLD: 7 % (ref 5–13)
NEUTS SEG # BLD: 7.5 K/UL (ref 1.8–8)
NEUTS SEG NFR BLD: 81 % (ref 32–75)
NRBC # BLD: 0 K/UL (ref 0–0.01)
NRBC BLD-RTO: 0 PER 100 WBC
PHOSPHATE SERPL-MCNC: 3.3 MG/DL (ref 2.6–4.7)
PLATELET # BLD AUTO: 432 K/UL (ref 150–400)
PMV BLD AUTO: 8.7 FL (ref 8.9–12.9)
POTASSIUM SERPL-SCNC: 3.8 MMOL/L (ref 3.5–5.1)
RBC # BLD AUTO: 3.57 M/UL (ref 4.1–5.7)
SAMPLES BEING HELD,HOLD: NORMAL
SERVICE CMNT-IMP: ABNORMAL
SERVICE CMNT-IMP: NORMAL
SODIUM SERPL-SCNC: 139 MMOL/L (ref 136–145)
WBC # BLD AUTO: 9.4 K/UL (ref 4.1–11.1)

## 2020-12-22 PROCEDURE — 82962 GLUCOSE BLOOD TEST: CPT

## 2020-12-22 PROCEDURE — 74011000258 HC RX REV CODE- 258: Performed by: INTERNAL MEDICINE

## 2020-12-22 PROCEDURE — 87075 CULTR BACTERIA EXCEPT BLOOD: CPT

## 2020-12-22 PROCEDURE — 74011636637 HC RX REV CODE- 636/637: Performed by: INTERNAL MEDICINE

## 2020-12-22 PROCEDURE — 82550 ASSAY OF CK (CPK): CPT

## 2020-12-22 PROCEDURE — 0W9F3ZZ DRAINAGE OF ABDOMINAL WALL, PERCUTANEOUS APPROACH: ICD-10-PCS | Performed by: RADIOLOGY

## 2020-12-22 PROCEDURE — 77030003462 HC NDL BIOP BRST MDT -B

## 2020-12-22 PROCEDURE — 74011000250 HC RX REV CODE- 250: Performed by: RADIOLOGY

## 2020-12-22 PROCEDURE — 74011250636 HC RX REV CODE- 250/636: Performed by: INTERNAL MEDICINE

## 2020-12-22 PROCEDURE — 99232 SBSQ HOSP IP/OBS MODERATE 35: CPT | Performed by: INTERNAL MEDICINE

## 2020-12-22 PROCEDURE — 87205 SMEAR GRAM STAIN: CPT

## 2020-12-22 PROCEDURE — C1769 GUIDE WIRE: HCPCS

## 2020-12-22 PROCEDURE — 74011000636 HC RX REV CODE- 636: Performed by: INTERNAL MEDICINE

## 2020-12-22 PROCEDURE — 65270000032 HC RM SEMIPRIVATE

## 2020-12-22 PROCEDURE — 74177 CT ABD & PELVIS W/CONTRAST: CPT

## 2020-12-22 PROCEDURE — 77030014115

## 2020-12-22 PROCEDURE — 36415 COLL VENOUS BLD VENIPUNCTURE: CPT

## 2020-12-22 PROCEDURE — 10030 IMG GID FLU COLL DRG SFT TIS: CPT

## 2020-12-22 PROCEDURE — C1729 CATH, DRAINAGE: HCPCS

## 2020-12-22 PROCEDURE — 85025 COMPLETE CBC W/AUTO DIFF WBC: CPT

## 2020-12-22 PROCEDURE — 74011250637 HC RX REV CODE- 250/637: Performed by: HOSPITALIST

## 2020-12-22 PROCEDURE — 80069 RENAL FUNCTION PANEL: CPT

## 2020-12-22 PROCEDURE — 77030010546 HC BG URIN DRNG URES -B

## 2020-12-22 PROCEDURE — 74011250637 HC RX REV CODE- 250/637: Performed by: INTERNAL MEDICINE

## 2020-12-22 PROCEDURE — 2709999900 HC NON-CHARGEABLE SUPPLY

## 2020-12-22 RX ORDER — SODIUM BICARBONATE 42 MG/ML
2 INJECTION, SOLUTION INTRAVENOUS
Status: COMPLETED | OUTPATIENT
Start: 2020-12-22 | End: 2020-12-22

## 2020-12-22 RX ORDER — LIDOCAINE HYDROCHLORIDE 10 MG/ML
10 INJECTION, SOLUTION EPIDURAL; INFILTRATION; INTRACAUDAL; PERINEURAL
Status: COMPLETED | OUTPATIENT
Start: 2020-12-22 | End: 2020-12-22

## 2020-12-22 RX ADMIN — INSULIN LISPRO 3 UNITS: 100 INJECTION, SOLUTION INTRAVENOUS; SUBCUTANEOUS at 17:46

## 2020-12-22 RX ADMIN — PIPERACILLIN AND TAZOBACTAM 3.38 G: 3; .375 INJECTION, POWDER, LYOPHILIZED, FOR SOLUTION INTRAVENOUS at 07:00

## 2020-12-22 RX ADMIN — ASPIRIN 81 MG: 81 TABLET, COATED ORAL at 10:21

## 2020-12-22 RX ADMIN — SODIUM BICARBONATE 84 MG: 42 INJECTION, SOLUTION INTRAVENOUS at 16:32

## 2020-12-22 RX ADMIN — LOSARTAN POTASSIUM 100 MG: 50 TABLET, FILM COATED ORAL at 10:21

## 2020-12-22 RX ADMIN — LIDOCAINE HYDROCHLORIDE 5 ML: 10 INJECTION, SOLUTION EPIDURAL; INFILTRATION; INTRACAUDAL; PERINEURAL at 16:31

## 2020-12-22 RX ADMIN — ACETAMINOPHEN 650 MG: 325 TABLET ORAL at 18:38

## 2020-12-22 RX ADMIN — Medication 1 CAPSULE: at 10:22

## 2020-12-22 RX ADMIN — Medication 10 ML: at 17:48

## 2020-12-22 RX ADMIN — ENOXAPARIN SODIUM 40 MG: 40 INJECTION SUBCUTANEOUS at 17:46

## 2020-12-22 RX ADMIN — IOPAMIDOL 100 ML: 755 INJECTION, SOLUTION INTRAVENOUS at 09:09

## 2020-12-22 RX ADMIN — INSULIN GLARGINE 8 UNITS: 100 INJECTION, SOLUTION SUBCUTANEOUS at 22:50

## 2020-12-22 RX ADMIN — AMLODIPINE BESYLATE 10 MG: 5 TABLET ORAL at 10:21

## 2020-12-22 RX ADMIN — SODIUM CHLORIDE 75 ML/HR: 9 INJECTION, SOLUTION INTRAVENOUS at 17:48

## 2020-12-22 RX ADMIN — DAPTOMYCIN 400 MG: 500 INJECTION, POWDER, LYOPHILIZED, FOR SOLUTION INTRAVENOUS at 17:46

## 2020-12-22 RX ADMIN — POLYETHYLENE GLYCOL 3350 17 G: 17 POWDER, FOR SOLUTION ORAL at 10:21

## 2020-12-22 RX ADMIN — PIPERACILLIN AND TAZOBACTAM 3.38 G: 3; .375 INJECTION, POWDER, LYOPHILIZED, FOR SOLUTION INTRAVENOUS at 17:46

## 2020-12-22 NOTE — ROUTINE PROCESS
Bedside and Verbal shift change report given to Jordan Garcia (oncoming nurse) by Kaden bolanos (offgoing nurse). Report included the following information SBAR, Kardex, MAR and Recent Results.

## 2020-12-22 NOTE — PROGRESS NOTES
Bedside shift change report given to Reina N Patricia Armando (oncoming nurse) by Caity Hidalgo RN (offgoing nurse). Report included the following information SBAR, ED Summary, OR Summary, Procedure Summary, Intake/Output, MAR, Accordion, Recent Results and Med Rec Status.

## 2020-12-22 NOTE — CONSULTS
This is a 38-year-old -American male with a history of type 2 diabetes mellitus x17 years most recently on Levemir insulin twice daily, Trulicity, and Metformin with an A1c of 14% who was admitted 12/7 for right lower abdominal/inguinal cellulitis requiring drainage and antibiotic therapy. The gentleman tells me that he thinks he had done reasonably well on a regimen of Levemir plus Trulicity plus Metformin but has not been consistent with his medications for quite some time. He works as a cook at the [employer] and with [employer] of COVID-19, his diet has become progressively worse and adherence to his medication progressively worse. A1c in June was 11% and now in December is 14%. He tells me part of the problem has been that on his bus ride from his work to home late in the evening, he stops between buses and gets fast food including [employer] etc.  He also eats sweets in the form of pies. He does not check blood sugars very often. He denies regular sodas. Since being in the hospital, blood sugars have improved significantly with glargine insulin. Doses have been significantly reduced from 30 units to a current dosing of 8 units with infection control and correction insulin. Examination  This is a very pleasant male  Pressure 144/90  Pulse 80   afebrile    Recent labs largely unremarkable    Impression type 2 diabetes mellitus with a recent A1c of 14% admitted with abdominal cellulitis now status post drainage. Review of his medical chart indicates elevated hemoglobin A1c's for several years. Recommendation, based on his weight of 72 kg and his response to insulin since hospitalization, he may well require no more than 14 units of basal insulin daily along with Metformin. His GFR is certainly adequate to support the Metformin therapy. I did spend considerable time today talking to him about his diet and he seems motivated to improving it.   I also focused on monitoring his blood sugar which he has not been doing previously. He would benefit from diabetes self-management training. We continue to follow with you.

## 2020-12-22 NOTE — PROGRESS NOTES
ID Progress Note  2020    Subjective:   Afebrile. Repeat ct still shows a right abdominal wall collection. Had collection drained earlier today . Objective:     Vitals:   Visit Vitals  BP (!) 144/90   Pulse 80   Temp 98.1 °F (36.7 °C)   Resp 18   Ht 5' 7\" (1.702 m)   Wt 71.7 kg (158 lb) Comment: as of last week per chart review   SpO2 96%   BMI 24.75 kg/m²        Tmax:  Temp (24hrs), Av.2 °F (36.8 °C), Min:98.1 °F (36.7 °C), Max:98.4 °F (36.9 °C)      Exam:    Not in distress  Lung clear, no rales, wheezes or rhonchi   Heart: s1, s2, RRR, no murmurs rubs or clicks  Abdomen: distended, indurated right flank   Speech fluent     Labs:   Lab Results   Component Value Date/Time    WBC 9.4 2020 02:56 AM    HGB 9.6 (L) 2020 02:56 AM    HCT 30.6 (L) 2020 02:56 AM    PLATELET 376 (H)  02:56 AM    MCV 85.7 2020 02:56 AM     Lab Results   Component Value Date/Time    Sodium 139 2020 02:56 AM    Potassium 3.8 2020 02:56 AM    Chloride 103 2020 02:56 AM    CO2 31 2020 02:56 AM    Anion gap 5 2020 02:56 AM    Glucose 201 (H) 2020 02:56 AM    BUN 17 2020 02:56 AM    Creatinine 1.37 (H) 2020 02:56 AM    BUN/Creatinine ratio 12 2020 02:56 AM    GFR est AA >60 2020 02:56 AM    GFR est non-AA 52 (L) 2020 02:56 AM    Calcium 9.2 2020 02:56 AM    Bilirubin, total 0.4 12/10/2020 10:16 AM    Alk. phosphatase 108 12/10/2020 10:16 AM    Protein, total 8.0 12/10/2020 10:16 AM    Albumin 2.0 (L) 2020 02:56 AM    Globulin 5.7 (H) 12/10/2020 10:16 AM    A-G Ratio 0.4 (L) 12/10/2020 10:16 AM    ALT (SGPT) 16 12/10/2020 10:16 AM           Assessment:     #1 abdominal wall abscess      #2 mild renal insufficiency     #3 syncope status post ICD placement (due to inducible VT/VF)            Recommendations:      cultures growing staph aureus. Continue dapto,.  Discontinue zosyn     Repeat Ct shows the presence of an abdominal collection.  This has been drained again.                    Brooks Pacheco MD

## 2020-12-22 NOTE — PROGRESS NOTES
Hospitalist Progress Note      Hospital summary: Terry Clinton is a 77 y.o. male who presents with right lower quadrant abdominal pain which started 1 week back worsen since Monday radiating towards the groin denies any fever chills nausea vomiting diarrhea or any other associated symptoms. States this started about 1 week ago, initially saw PCP Dr. Luca Taylor worsening since Friday. Saw Dr. Geovanna Reynoso on Monday who ordered outpatient CT scan and abdominal ultrasound 12/10/2020      Assessment/Plan:  Abdominal wall abscess  -CT abdomen 12/16: Right lateral abdominal wall musculature thickening is again noted. More discrete somewhat oblong fluid collection projects from the suprailiac region into the right inguinal region without evidence of gas. Increasing right flank and right chest wall subcutaneous edema.  - s/p I&D on 12/19- 20 cc of pus aspirated. - -Blood culture: NGTD  - drain cx: prelim - staph species   - ID following  - c/w IV dapto and zosyn  - rpt CT abd today ; subcutaneous edema in the right abdominal wall. There is a fluid collection with peripheral enhancement consistent with an abscess in the right inferior abdominal wall which extends from the pelvis into  the mid abdomen laterally and is slightly larger than the prior study  - IR consulted for I&D again      Uncontrolled diabetes type 2   -  A1c 14  - appreciate Endocrinology input   - c/w lantus 8U, ISS  - DM diet     Hypertension - BP elevated  - c/w amlodipine, losartan      Hyperlipidemia - statin on hold as pt on Dapto      APARNA - resolved     Brugada Syndrome s/p AICD   - Follows with cardiology  Dr. Nathanael Razo.    Code status: Full  DVT prophylaxis: Lovenox  Disposition: TBD. Home when ready   ----------------------------------------------    CC: Abdominal wall pain and swelling     S: Patient is seen and examined this AM, sitting in chair. Pain controlled. Has rpt CT abd this morning. Discussed with nursing. Review of Systems:  A comprehensive review of systems was negative. O:  Visit Vitals  BP (!) 144/90   Pulse 80   Temp 98.1 °F (36.7 °C)   Resp 18   Ht 5' 7\" (1.702 m)   Wt 71.7 kg (158 lb) Comment: as of last week per chart review   SpO2 96%   BMI 24.75 kg/m²       PHYSICAL EXAM:  Gen: NAD  HEENT: anicteric sclerae, normal conjunctiva, oropharynx clear, MM moist  Neck: supple, trachea midline, no adenopathy  Heart: RRR, no MRG, no JVD, no peripheral edema  Lungs: CTA b/l, non-labored respirations  Abd: soft, NT, ND, BS+, no organomegaly  Extr: warm  Skin: dry.  Large area of induration with tenderness encompassing the right lower quadrant  Neuro: CN II-XII grossly intact, normal speech, moves all extremities  Psych: normal mood, appropriate affect      Intake/Output Summary (Last 24 hours) at 12/22/2020 1254  Last data filed at 12/22/2020 5964  Gross per 24 hour   Intake --   Output 1100 ml   Net -1100 ml        Recent labs & imaging reviewed:  Recent Results (from the past 24 hour(s))   GLUCOSE, POC    Collection Time: 12/21/20  4:16 PM   Result Value Ref Range    Glucose (POC) 229 (H) 65 - 100 mg/dL    Performed by Negar Yo, POC    Collection Time: 12/21/20  9:37 PM   Result Value Ref Range    Glucose (POC) 302 (H) 65 - 100 mg/dL    Performed by Tracy Jackson  PCT    CK    Collection Time: 12/22/20  2:56 AM   Result Value Ref Range    CK 65 39 - 308 U/L   RENAL FUNCTION PANEL    Collection Time: 12/22/20  2:56 AM   Result Value Ref Range    Sodium 139 136 - 145 mmol/L    Potassium 3.8 3.5 - 5.1 mmol/L    Chloride 103 97 - 108 mmol/L    CO2 31 21 - 32 mmol/L    Anion gap 5 5 - 15 mmol/L    Glucose 201 (H) 65 - 100 mg/dL    BUN 17 6 - 20 MG/DL    Creatinine 1.37 (H) 0.70 - 1.30 MG/DL    BUN/Creatinine ratio 12 12 - 20      GFR est AA >60 >60 ml/min/1.73m2    GFR est non-AA 52 (L) >60 ml/min/1.73m2    Calcium 9.2 8.5 - 10.1 MG/DL    Phosphorus 3.3 2.6 - 4.7 MG/DL    Albumin 2.0 (L) 3.5 - 5.0 g/dL   CBC WITH AUTOMATED DIFF    Collection Time: 12/22/20  2:56 AM   Result Value Ref Range    WBC 9.4 4.1 - 11.1 K/uL    RBC 3.57 (L) 4.10 - 5.70 M/uL    HGB 9.6 (L) 12.1 - 17.0 g/dL    HCT 30.6 (L) 36.6 - 50.3 %    MCV 85.7 80.0 - 99.0 FL    MCH 26.9 26.0 - 34.0 PG    MCHC 31.4 30.0 - 36.5 g/dL    RDW 12.9 11.5 - 14.5 %    PLATELET 432 (H) 150 - 400 K/uL    MPV 8.7 (L) 8.9 - 12.9 FL    NRBC 0.0 0  WBC    ABSOLUTE NRBC 0.00 0.00 - 0.01 K/uL    NEUTROPHILS 81 (H) 32 - 75 %    LYMPHOCYTES 11 (L) 12 - 49 %    MONOCYTES 7 5 - 13 %    EOSINOPHILS 1 0 - 7 %    BASOPHILS 0 0 - 1 %    IMMATURE GRANULOCYTES 0 0.0 - 0.5 %    ABS. NEUTROPHILS 7.5 1.8 - 8.0 K/UL    ABS. LYMPHOCYTES 1.1 0.8 - 3.5 K/UL    ABS. MONOCYTES 0.6 0.0 - 1.0 K/UL    ABS. EOSINOPHILS 0.1 0.0 - 0.4 K/UL    ABS. BASOPHILS 0.0 0.0 - 0.1 K/UL    ABS. IMM. GRANS. 0.0 0.00 - 0.04 K/UL    DF AUTOMATED     GLUCOSE, POC    Collection Time: 12/22/20  6:22 AM   Result Value Ref Range    Glucose (POC) 128 (H) 65 - 100 mg/dL    Performed by Sheila Morfin    GLUCOSE, POC    Collection Time: 12/22/20 11:05 AM   Result Value Ref Range    Glucose (POC) 115 (H) 65 - 100 mg/dL    Performed by Severson Andrew      Recent Labs     12/22/20  0256 12/20/20 0215   WBC 9.4 8.4   HGB 9.6* 8.9*   HCT 30.6* 28.3*   * 415*     Recent Labs     12/22/20  0256 12/20/20  0215    143   K 3.8 3.5    106   CO2 31 31   BUN 17 13   CREA 1.37* 1.15   * 138*   CA 9.2 8.4*   PHOS 3.3 3.3     Recent Labs     12/22/20  0256 12/20/20  0215   ALB 2.0* 1.8*     No results for input(s): INR, PTP, APTT, INREXT in the last 72 hours.   No results for input(s): FE, TIBC, PSAT, FERR in the last 72 hours.   No results found for: FOL, RBCF   No results for input(s): PH, PCO2, PO2 in the last 72 hours.  Recent Labs     12/22/20  0256 12/21/20  0154   CPK 65 65     Lab Results   Component Value Date/Time    Cholesterol, total 151 06/09/2020 04:19 PM    HDL Cholesterol 63  06/09/2020 04:19 PM    LDL, calculated 74 06/09/2020 04:19 PM    Triglyceride 70 06/09/2020 04:19 PM    CHOL/HDL Ratio 2.5 03/13/2019 03:23 AM     Lab Results   Component Value Date/Time    Glucose (POC) 115 (H) 12/22/2020 11:05 AM    Glucose (POC) 128 (H) 12/22/2020 06:22 AM    Glucose (POC) 302 (H) 12/21/2020 09:37 PM    Glucose (POC) 229 (H) 12/21/2020 04:16 PM    Glucose (POC) 166 (H) 12/21/2020 11:35 AM     Lab Results   Component Value Date/Time    Color YELLOW/STRAW 07/14/2020 10:27 AM    Appearance CLEAR 07/14/2020 10:27 AM    Specific gravity 1.014 07/14/2020 10:27 AM    pH (UA) 6.0 07/14/2020 10:27 AM    Protein 30 (A) 07/14/2020 10:27 AM    Glucose Negative 07/14/2020 10:27 AM    Ketone Negative 07/14/2020 10:27 AM    Bilirubin Negative 07/14/2020 10:27 AM    Urobilinogen 0.2 07/14/2020 10:27 AM    Nitrites Negative 07/14/2020 10:27 AM    Leukocyte Esterase Negative 07/14/2020 10:27 AM    Epithelial cells FEW 07/14/2020 10:27 AM    Bacteria Negative 07/14/2020 10:27 AM    WBC 0-4 07/14/2020 10:27 AM    RBC 0-5 07/14/2020 10:27 AM       Med list reviewed  Current Facility-Administered Medications   Medication Dose Route Frequency    0.9% sodium chloride infusion  75 mL/hr IntraVENous CONTINUOUS    losartan (COZAAR) tablet 100 mg  100 mg Oral DAILY    insulin glargine (LANTUS) injection 8 Units  8 Units SubCUTAneous QHS    influenza vaccine 2020-21 (6 mos+)(PF) (FLUARIX/FLULAVAL/FLUZONE QUAD) injection 0.5 mL  0.5 mL IntraMUSCular PRIOR TO DISCHARGE    insulin lispro (HUMALOG) injection   SubCUTAneous TIDAC    dextrose (D50W) injection syrg 12.5-25 g  12.5-25 g IntraVENous PRN    bisacodyL (DULCOLAX) suppository 10 mg  10 mg Rectal DAILY PRN    DAPTOmycin (CUBICIN) 400 mg in 0.9% sodium chloride 50 mL IVPB RF formulation  400 mg IntraVENous Q24H    polyethylene glycol (MIRALAX) packet 17 g  17 g Oral BID    bisacodyL (DULCOLAX) tablet 10 mg  10 mg Oral DAILY PRN    lactobac alpa& pc-s.therm-b.anim (FESTUS Q/RISAQUAD)  1 Cap Oral DAILY    enoxaparin (LOVENOX) injection 40 mg  40 mg SubCUTAneous Q24H    sodium chloride (NS) flush 5-40 mL  5-40 mL IntraVENous Q8H    sodium chloride (NS) flush 5-40 mL  5-40 mL IntraVENous PRN    acetaminophen (TYLENOL) tablet 650 mg  650 mg Oral Q6H PRN    Or    acetaminophen (TYLENOL) suppository 650 mg  650 mg Rectal Q6H PRN    polyethylene glycol (MIRALAX) packet 17 g  17 g Oral DAILY PRN    promethazine (PHENERGAN) tablet 12.5 mg  12.5 mg Oral Q6H PRN    Or    ondansetron (ZOFRAN) injection 4 mg  4 mg IntraVENous Q6H PRN    piperacillin-tazobactam (ZOSYN) 3.375 g in 0.9% sodium chloride (MBP/ADV) 100 mL MBP  3.375 g IntraVENous Q8H    amLODIPine (NORVASC) tablet 10 mg  10 mg Oral DAILY    aspirin delayed-release tablet 81 mg  81 mg Oral DAILY    glucose chewable tablet 16 g  4 Tab Oral PRN    glucagon (GLUCAGEN) injection 1 mg  1 mg IntraMUSCular PRN       Care Plan discussed with:  Patient/Family and Nurse    Jason Heck MD  Internal Medicine  Date of Service: 12/22/2020

## 2020-12-23 LAB
ANION GAP SERPL CALC-SCNC: 5 MMOL/L (ref 5–15)
BASOPHILS # BLD: 0 K/UL (ref 0–0.1)
BASOPHILS NFR BLD: 1 % (ref 0–1)
BUN SERPL-MCNC: 16 MG/DL (ref 6–20)
BUN/CREAT SERPL: 12 (ref 12–20)
CALCIUM SERPL-MCNC: 9.2 MG/DL (ref 8.5–10.1)
CHLORIDE SERPL-SCNC: 104 MMOL/L (ref 97–108)
CO2 SERPL-SCNC: 31 MMOL/L (ref 21–32)
CREAT SERPL-MCNC: 1.29 MG/DL (ref 0.7–1.3)
DIFFERENTIAL METHOD BLD: ABNORMAL
EOSINOPHIL # BLD: 0.1 K/UL (ref 0–0.4)
EOSINOPHIL NFR BLD: 2 % (ref 0–7)
ERYTHROCYTE [DISTWIDTH] IN BLOOD BY AUTOMATED COUNT: 13.4 % (ref 11.5–14.5)
GLUCOSE BLD STRIP.AUTO-MCNC: 163 MG/DL (ref 65–100)
GLUCOSE BLD STRIP.AUTO-MCNC: 164 MG/DL (ref 65–100)
GLUCOSE BLD STRIP.AUTO-MCNC: 273 MG/DL (ref 65–100)
GLUCOSE BLD STRIP.AUTO-MCNC: 86 MG/DL (ref 65–100)
GLUCOSE SERPL-MCNC: 126 MG/DL (ref 65–100)
HCT VFR BLD AUTO: 33.3 % (ref 36.6–50.3)
HGB BLD-MCNC: 10.2 G/DL (ref 12.1–17)
IMM GRANULOCYTES # BLD AUTO: 0.1 K/UL (ref 0–0.04)
IMM GRANULOCYTES NFR BLD AUTO: 1 % (ref 0–0.5)
LYMPHOCYTES # BLD: 0.8 K/UL (ref 0.8–3.5)
LYMPHOCYTES NFR BLD: 11 % (ref 12–49)
MCH RBC QN AUTO: 26.4 PG (ref 26–34)
MCHC RBC AUTO-ENTMCNC: 30.6 G/DL (ref 30–36.5)
MCV RBC AUTO: 86.3 FL (ref 80–99)
MONOCYTES # BLD: 0.5 K/UL (ref 0–1)
MONOCYTES NFR BLD: 6 % (ref 5–13)
NEUTS SEG # BLD: 6.1 K/UL (ref 1.8–8)
NEUTS SEG NFR BLD: 79 % (ref 32–75)
NRBC # BLD: 0 K/UL (ref 0–0.01)
NRBC BLD-RTO: 0 PER 100 WBC
PLATELET # BLD AUTO: 413 K/UL (ref 150–400)
PMV BLD AUTO: 8.9 FL (ref 8.9–12.9)
POTASSIUM SERPL-SCNC: 3.7 MMOL/L (ref 3.5–5.1)
RBC # BLD AUTO: 3.86 M/UL (ref 4.1–5.7)
SERVICE CMNT-IMP: ABNORMAL
SERVICE CMNT-IMP: NORMAL
SODIUM SERPL-SCNC: 140 MMOL/L (ref 136–145)
WBC # BLD AUTO: 7.7 K/UL (ref 4.1–11.1)

## 2020-12-23 PROCEDURE — 74011000258 HC RX REV CODE- 258: Performed by: INTERNAL MEDICINE

## 2020-12-23 PROCEDURE — 74011250637 HC RX REV CODE- 250/637: Performed by: INTERNAL MEDICINE

## 2020-12-23 PROCEDURE — 36415 COLL VENOUS BLD VENIPUNCTURE: CPT

## 2020-12-23 PROCEDURE — 74011250636 HC RX REV CODE- 250/636: Performed by: INTERNAL MEDICINE

## 2020-12-23 PROCEDURE — 74011636637 HC RX REV CODE- 636/637: Performed by: INTERNAL MEDICINE

## 2020-12-23 PROCEDURE — 80048 BASIC METABOLIC PNL TOTAL CA: CPT

## 2020-12-23 PROCEDURE — 74011250637 HC RX REV CODE- 250/637: Performed by: HOSPITALIST

## 2020-12-23 PROCEDURE — 65270000032 HC RM SEMIPRIVATE

## 2020-12-23 PROCEDURE — 82962 GLUCOSE BLOOD TEST: CPT

## 2020-12-23 PROCEDURE — 85025 COMPLETE CBC W/AUTO DIFF WBC: CPT

## 2020-12-23 RX ORDER — HYDRALAZINE HYDROCHLORIDE 20 MG/ML
10 INJECTION INTRAMUSCULAR; INTRAVENOUS
Status: DISCONTINUED | OUTPATIENT
Start: 2020-12-23 | End: 2021-01-11 | Stop reason: HOSPADM

## 2020-12-23 RX ADMIN — PIPERACILLIN AND TAZOBACTAM 3.38 G: 3; .375 INJECTION, POWDER, LYOPHILIZED, FOR SOLUTION INTRAVENOUS at 04:11

## 2020-12-23 RX ADMIN — Medication 10 ML: at 12:50

## 2020-12-23 RX ADMIN — INSULIN LISPRO 5 UNITS: 100 INJECTION, SOLUTION INTRAVENOUS; SUBCUTANEOUS at 13:10

## 2020-12-23 RX ADMIN — INSULIN GLARGINE 8 UNITS: 100 INJECTION, SOLUTION SUBCUTANEOUS at 21:25

## 2020-12-23 RX ADMIN — Medication 5 ML: at 22:00

## 2020-12-23 RX ADMIN — DAPTOMYCIN 400 MG: 500 INJECTION, POWDER, LYOPHILIZED, FOR SOLUTION INTRAVENOUS at 17:32

## 2020-12-23 RX ADMIN — LOSARTAN POTASSIUM 100 MG: 50 TABLET, FILM COATED ORAL at 08:58

## 2020-12-23 RX ADMIN — Medication 1 CAPSULE: at 08:58

## 2020-12-23 RX ADMIN — AMLODIPINE BESYLATE 10 MG: 5 TABLET ORAL at 08:59

## 2020-12-23 RX ADMIN — INSULIN LISPRO 2 UNITS: 100 INJECTION, SOLUTION INTRAVENOUS; SUBCUTANEOUS at 17:32

## 2020-12-23 RX ADMIN — ENOXAPARIN SODIUM 40 MG: 40 INJECTION SUBCUTANEOUS at 17:32

## 2020-12-23 RX ADMIN — ASPIRIN 81 MG: 81 TABLET, COATED ORAL at 08:58

## 2020-12-23 NOTE — DIABETES MGMT
[facility] SPECIALIST CONSULT NOTE    Presentation   Janelle Hopkins is a 77 y.o. male admitted on 12/10/20 with right lower abdominal pain. Upon initial evaluation cellulitis of abdomen determined. This was possibly due to non-sterile insulin administration. HX:   Past Medical History:   Diagnosis Date    Hypertension     ICD (implantable cardioverter-defibrillator) in place     NSVT (nonsustained ventricular tachycardia) ([facility] [address] 11/21/2018    EPS 11/21/2018 VT/VF     Right groin pain 12/7/2020   DM2    DX: CT scan    TX: abx. General surgery consulted and seen: not surgical candidate. Current clinical course has been complicated by fluctuating hyper and hypoglycemia. Diabetes: Patient has known Type 2 diabetes, treated with Metfomin/Detemir/and Trulicity PTA. Family history unknown for diabetes. Admission  and A1c 14%  indicate poor  diabetes control. Consulted by guru Crowley for advanced diabetes nursing assessment and care, specifically related to   [] Transitioning off Joe Heck   [x] Inpatient management strategy  [x] Home management assessment  [] Survival skill education    Diabetes-related medical history  Acute complications  Fluctuating hyper and hypoglycemia  Neurological complications  Peripheral neuropathy  Microvascular disease-minor  denies  Macrovascular disease  denies  Other associated conditions     CAD-ICD/HTN    Diabetes medication history  Drug class Currently in use Discontinued Never used   Biguanide Metformin 2000mg daily     DDP-4 inhibitor       Sulfonylurea      Thiazolidinedione      GLP-1 RA Trulicity 1.5 mg weekly     SGLT-2 inhibitors      Basal insulin Detemir 40units AM  30units PM     Bolus insulin      Fixed Dose  Combinations        Subjective   S/p I&D today of abdomen, pigtail catheter placed. Brother visiting.         Physical activity pattern-no limitations, does not actively exercise    Monitoring pattern-Checks BG every other day due to conserving meter strips  []  Taking medications pattern   [x] In-Consistent administration (stretching out insulin due to cost)  [x] Not Affordable (glucometer supplies)    Social determinants of health impacting diabetes self-management practices   Concerned that you need to know more about how to stay healthy with diabetes     Objective   Physical exam  General Alert, oriented and in no acute distress. Conversant and cooperative. Vital Signs   Visit Vitals  BP (!) 160/83   Pulse 85   Temp 98.2 °F (36.8 °C)   Resp 18   Ht 5' 7\" (1.702 m)   Wt 71.7 kg (158 lb)   SpO2 96%   BMI 24.75 kg/m²     Skin  Warm and dry. Abdomen less distended today after procedure. Heart   Regular rate and rhythm. No murmurs, rubs or gallops  Lungs  Clear to auscultation without rales or rhonchi  Extremities No foot wounds    Diabetic foot exam:    Left Foot     Visual Exam: callous - lateral aspect and heel   Pulse DP: 2+ (normal)   Filament test: reduced sensation    Vibratory sensation: normal  Right Foot   Visual Exam: callous - lateral aspect and heel   Pulse DP: 2+ (normal)   Filament test: normal sensation    Vibratory sensation: normal DP & PT pulses +2.      Laboratory  BMP:   Lab Results   Component Value Date/Time     12/23/2020 04:08 AM    K 3.7 12/23/2020 04:08 AM     12/23/2020 04:08 AM    CO2 31 12/23/2020 04:08 AM    AGAP 5 12/23/2020 04:08 AM     (H) 12/23/2020 04:08 AM    BUN 16 12/23/2020 04:08 AM    CREA 1.29 12/23/2020 04:08 AM    GFRAA >60 12/23/2020 04:08 AM    GFRNA 56 (L) 12/23/2020 04:08 AM      Factors affecting BG management  Factor Dose Comments   Nutrition:  Carb-controlled meals     60 grams/meal      Pain abdomen    Infection Daptomycin/Zosyn      Blood glucose pattern-          Assessment and Plan   Nursing Diagnosis Risk for unstable blood glucose pattern   Nursing Intervention Domain 4768 Decision-making Support   Nursing Interventions Examined current inpatient diabetes control   Explored factors facilitating and impeding inpatient management  Identified self-management practices impeding diabetes control  Explored corrective strategies with patient and responsible inpatient provider   Informed patient of rational for insulin strategy while hospitalized  Instructed patient in:   -ways to save money with purchasing glucometer supplies at [facility],   -importance of daily sterile insulin  Injections, and importance of checking BG 3 times a day. -Also discussed his diet and the modifications he needs to [facility] him 'healthy plate' and portion control.     -Recommended him to see a podiatrist.    Discussed Long term effects of uncontrolled diabetes (amputations, non healing wounds, stroke, MI etc)     Evaluation   Mr. Ramiro Nagy,  with Type 2 diabetes, did not achieve diabetes control prior to admission (PTA), as evidenced by admission BG of 197 and A1c of 14%. Based on assessment of diabetes self-care practices, interventions that warrant action while hospitalized include:   [x] Healthy Eating   [x] Problem Solving  [] Being Active   [] Healthy Coping  [x] Monitoring   [x] Reducing Risks  [x] Taking Medications  The patient would also benefit from diabetes self-management education and support (DSMES) after discharge. During this hospitalization, the patient has not  achieved inpatient blood glucose target of 100-180mg/dl. Factors that have played a role include:  [] Critical nature of illness state  [x]  Meal/tube feeding disruption  [x] Compromised insulin absorption or delivery  [] Glucocorticoid use  []  Kidney dysfunction  []  Liver disease    To optimize BG control and support a positive health outcome, would utilize the Subcutaneous Insulin Order set (8868). BG trends since admission fluctuating.  Suspect too much insulin -Since he is on a controlled diet while hospitalized, he is not consuming the amount of food/carbs as he would normally, and there fore, has not needed as much insulin as he would require if he were at home. Also meal disruptions for procedures/tests also played a role. AM BG in target 128, 86. Noted pre-prandial hyperglycemia throughout the day, requiring minimal correctional insulin. Recommendations   Recommend:    [] Use of Subcutaneous Insulin Order set (7093)  Insulin Use Options   CONTINUE  Basal  Dose                        (Based on weight, BMI & GFR) Addresses basic metabolic needs 8 units daily, IF AMBG trends <100mg/dl, reduce basal by another 20%. Corrective                                       (Offset gaps in dosing) Useful in adjusting insulin dosing [x] Normal sensitivity           [x] Referral to  [x] Diabetes Self-Management Training through [facility] (Phone [phone] to schedule appointment)  Discharge Planning   1. Since A1C now above goal, 14%, he will continue to need insulin. He is requiring much less doses of insulin during his hospitalization compared to PTA doses. 2. Re-start PO diabetic meds as PTA. 3. Continue to follow up with PCP, Dr. Felisha Gupta for diabetes management. Billing Code(s)   I personally reviewed medical record, including notes, data and current medications, and examined the patient at bedside before making care recommendations.        [x] 01307 Prolonged Services - 15 minutes    MARTINE Jaramillo  Diabetes Clinical Nurse Specialist  [facility]  Access via [employer]  [phone]

## 2020-12-23 NOTE — PROGRESS NOTES
Hospitalist Progress Note      Hospital summary: Irina Arango is a 77 y.o. male who presents with right lower quadrant abdominal pain which started 1 week back worsen since Monday radiating towards the groin denies any fever chills nausea vomiting diarrhea or any other associated symptoms. States this started about 1 week ago, initially saw PCP Dr. Manohar Madrigal worsening since Friday. Saw Dr. Lissa Wooten on Monday who ordered outpatient CT scan and abdominal ultrasound 12/10/2020      Assessment/Plan:  Abdominal wall abscess  -CT abdomen 12/16: Right lateral abdominal wall musculature thickening is again noted. More discrete somewhat oblong fluid collection projects from the suprailiac region into the right inguinal region without evidence of gas. Increasing right flank and right chest wall subcutaneous edema.  - s/p I&D on 12/19- 20 cc of pus aspirated. - -Blood culture: NGTD  - drain cx: prelim - MRSA   - ID following  - rpt CT abd 12/23; subcutaneous edema in the right abdominal wall. There is a fluid collection with peripheral enhancement consistent with an abscess in the right inferior abdominal wall which extends from the pelvis into  the mid abdomen laterally and is slightly larger than the prior study  - s/p I&D 12/23 - 12 cc of pus was aspirated and sent for cultures. Pigtail catheter placed - 10cc so far  -  c/w IV daptomycin     Uncontrolled diabetes type 2   -  A1c 14  - appreciate Endocrinology input   - c/w lantus 8U, ISS  - DM diet     Hypertension   - c/w amlodipine, losartan      Hyperlipidemia - statin on hold as pt on Dapto      APARNA - resolved     Brugada Syndrome s/p AICD   - Follows with cardiology  Dr. Isabela Salgado.    Code status: Full  DVT prophylaxis: Lovenox  Disposition: TBD. Home when ready   ----------------------------------------------    CC: Abdominal wall pain and swelling     S: Patient is seen and examined this AM, sitting in chair. Pain controlled.  Has I&D again yesterday with drain placed - not much fluid present. Discussed with nursing. Discussed with ID Dr. Garrison Tirado- may need another CT abd     Review of Systems:  A comprehensive review of systems was negative. O:  Visit Vitals  BP (!) 147/80   Pulse 61   Temp 97.8 °F (36.6 °C)   Resp 16   Ht 5' 7\" (1.702 m)   Wt 71.7 kg (158 lb) Comment: as of last week per chart review   SpO2 97%   BMI 24.75 kg/m²       PHYSICAL EXAM:  Gen: NAD  HEENT: anicteric sclerae, normal conjunctiva, oropharynx clear, MM moist  Neck: supple, trachea midline, no adenopathy  Heart: RRR, no MRG, no JVD, no peripheral edema  Lungs: CTA b/l, non-labored respirations  Abd: soft, NT, ND, BS+, no organomegaly  Extr: warm  Skin: dry. Large area of induration with tenderness encompassing the right lower quadrant  Neuro: CN II-XII grossly intact, normal speech, moves all extremities  Psych: normal mood, appropriate affect      Intake/Output Summary (Last 24 hours) at 12/23/2020 1141  Last data filed at 12/23/2020 0751  Gross per 24 hour   Intake --   Output 1460 ml   Net -1460 ml        Recent labs & imaging reviewed:  Recent Results (from the past 24 hour(s))   SAMPLES BEING HELD    Collection Time: 12/22/20  4:05 PM   Result Value Ref Range    SAMPLES BEING HELD 1SWAB     COMMENT        Add-on orders for these samples will be processed based on acceptable specimen integrity and analyte stability, which may vary by analyte. CULTURE, BODY FLUID Sydell Vandana STAIN    Collection Time: 12/22/20  4:30 PM    Specimen: Abscess;  Body Fluid   Result Value Ref Range    Special Requests: NO SPECIAL REQUESTS      GRAM STAIN FEW WBCS SEEN      GRAM STAIN NO ORGANISMS SEEN      Culture result: PENDING    GLUCOSE, POC    Collection Time: 12/22/20  5:20 PM   Result Value Ref Range    Glucose (POC) 244 (H) 65 - 100 mg/dL    Performed by [address], POC    Collection Time: 12/22/20 10:44 PM   Result Value Ref Range    Glucose (POC) 262 (H) 65 - 100 mg/dL    Performed by Michael Gómez    CBC WITH AUTOMATED DIFF    Collection Time: 12/23/20  4:08 AM   Result Value Ref Range    WBC 7.7 4.1 - 11.1 K/uL    RBC 3.86 (L) 4.10 - 5.70 M/uL    HGB 10.2 (L) 12.1 - 17.0 g/dL    HCT 33.3 (L) 36.6 - 50.3 %    MCV 86.3 80.0 - 99.0 FL    MCH 26.4 26.0 - 34.0 PG    MCHC 30.6 30.0 - 36.5 g/dL    RDW 13.4 11.5 - 14.5 %    PLATELET 035 (H) 053 - 400 K/uL    MPV 8.9 8.9 - 12.9 FL    NRBC 0.0 0  WBC    ABSOLUTE NRBC 0.00 0.00 - 0.01 K/uL    NEUTROPHILS 79 (H) 32 - 75 %    LYMPHOCYTES 11 (L) 12 - 49 %    MONOCYTES 6 5 - 13 %    EOSINOPHILS 2 0 - 7 %    BASOPHILS 1 0 - 1 %    IMMATURE GRANULOCYTES 1 (H) 0.0 - 0.5 %    ABS. NEUTROPHILS 6.1 1.8 - 8.0 K/UL    ABS. LYMPHOCYTES 0.8 0.8 - 3.5 K/UL    ABS. MONOCYTES 0.5 0.0 - 1.0 K/UL    ABS. EOSINOPHILS 0.1 0.0 - 0.4 K/UL    ABS. BASOPHILS 0.0 0.0 - 0.1 K/UL    ABS. IMM.  GRANS. 0.1 (H) 0.00 - 0.04 K/UL    DF AUTOMATED     METABOLIC PANEL, BASIC    Collection Time: 12/23/20  4:08 AM   Result Value Ref Range    Sodium 140 136 - 145 mmol/L    Potassium 3.7 3.5 - 5.1 mmol/L    Chloride 104 97 - 108 mmol/L    CO2 31 21 - 32 mmol/L    Anion gap 5 5 - 15 mmol/L    Glucose 126 (H) 65 - 100 mg/dL    BUN 16 6 - 20 MG/DL    Creatinine 1.29 0.70 - 1.30 MG/DL    BUN/Creatinine ratio 12 12 - 20      GFR est AA >60 >60 ml/min/1.73m2    GFR est non-AA 56 (L) >60 ml/min/1.73m2    Calcium 9.2 8.5 - 10.1 MG/DL   GLUCOSE, POC    Collection Time: 12/23/20  7:32 AM   Result Value Ref Range    Glucose (POC) 86 65 - 100 mg/dL    Performed by Candelario Becker    GLUCOSE, POC    Collection Time: 12/23/20 11:34 AM   Result Value Ref Range    Glucose (POC) 273 (H) 65 - 100 mg/dL    Performed by Olivia Lakhani      Recent Labs     12/23/20  0408 12/22/20  0256   WBC 7.7 9.4   HGB 10.2* 9.6*   HCT 33.3* 30.6*   * 432*     Recent Labs     12/23/20  0408 12/22/20  0256    139   K 3.7 3.8    103   CO2 31 31   BUN 16 17   CREA 1.29 1.37* * 201*   CA 9.2 9.2   PHOS  --  3.3     Recent Labs     12/22/20  0256   ALB 2.0*     No results for input(s): INR, PTP, APTT, INREXT, INREXT in the last 72 hours. No results for input(s): FE, TIBC, PSAT, FERR in the last 72 hours. No results found for: FOL, RBCF   No results for input(s): PH, PCO2, PO2 in the last 72 hours.   Recent Labs     12/22/20  0256 12/21/20  0154   CPK 65 65     Lab Results   Component Value Date/Time    Cholesterol, total 151 06/09/2020 04:19 PM    HDL Cholesterol 63 06/09/2020 04:19 PM    LDL, calculated 74 06/09/2020 04:19 PM    Triglyceride 70 06/09/2020 04:19 PM    CHOL/HDL Ratio 2.5 03/13/2019 03:23 AM     Lab Results   Component Value Date/Time    Glucose (POC) 273 (H) 12/23/2020 11:34 AM    Glucose (POC) 86 12/23/2020 07:32 AM    Glucose (POC) 262 (H) 12/22/2020 10:44 PM    Glucose (POC) 244 (H) 12/22/2020 05:20 PM    Glucose (POC) 115 (H) 12/22/2020 11:05 AM     Lab Results   Component Value Date/Time    Color YELLOW/STRAW 07/14/2020 10:27 AM    Appearance CLEAR 07/14/2020 10:27 AM    Specific gravity 1.014 07/14/2020 10:27 AM    pH (UA) 6.0 07/14/2020 10:27 AM    Protein 30 (A) 07/14/2020 10:27 AM    Glucose Negative 07/14/2020 10:27 AM    Ketone Negative 07/14/2020 10:27 AM    Bilirubin Negative 07/14/2020 10:27 AM    Urobilinogen 0.2 07/14/2020 10:27 AM    Nitrites Negative 07/14/2020 10:27 AM    Leukocyte Esterase Negative 07/14/2020 10:27 AM    Epithelial cells FEW 07/14/2020 10:27 AM    Bacteria Negative 07/14/2020 10:27 AM    WBC 0-4 07/14/2020 10:27 AM    RBC 0-5 07/14/2020 10:27 AM       Med list reviewed  Current Facility-Administered Medications   Medication Dose Route Frequency    losartan (COZAAR) tablet 100 mg  100 mg Oral DAILY    insulin glargine (LANTUS) injection 8 Units  8 Units SubCUTAneous QHS    influenza vaccine 2020-21 (6 mos+)(PF) (FLUARIX/FLULAVAL/FLUZONE QUAD) injection 0.5 mL  0.5 mL IntraMUSCular PRIOR TO DISCHARGE    insulin lispro (HUMALOG) injection   SubCUTAneous TIDAC    dextrose (D50W) injection syrg 12.5-25 g  12.5-25 g IntraVENous PRN    bisacodyL (DULCOLAX) suppository 10 mg  10 mg Rectal DAILY PRN    DAPTOmycin (CUBICIN) 400 mg in 0.9% sodium chloride 50 mL IVPB RF formulation  400 mg IntraVENous Q24H    polyethylene glycol (MIRALAX) packet 17 g  17 g Oral BID    bisacodyL (DULCOLAX) tablet 10 mg  10 mg Oral DAILY PRN    lactobac ac& pc-s.therm-b.anim (FESTUS Q/RISAQUAD)  1 Cap Oral DAILY    enoxaparin (LOVENOX) injection 40 mg  40 mg SubCUTAneous Q24H    sodium chloride (NS) flush 5-40 mL  5-40 mL IntraVENous Q8H    sodium chloride (NS) flush 5-40 mL  5-40 mL IntraVENous PRN    acetaminophen (TYLENOL) tablet 650 mg  650 mg Oral Q6H PRN    Or    acetaminophen (TYLENOL) suppository 650 mg  650 mg Rectal Q6H PRN    polyethylene glycol (MIRALAX) packet 17 g  17 g Oral DAILY PRN    promethazine (PHENERGAN) tablet 12.5 mg  12.5 mg Oral Q6H PRN    Or    ondansetron (ZOFRAN) injection 4 mg  4 mg IntraVENous Q6H PRN    amLODIPine (NORVASC) tablet 10 mg  10 mg Oral DAILY    aspirin delayed-release tablet 81 mg  81 mg Oral DAILY    glucose chewable tablet 16 g  4 Tab Oral PRN    glucagon (GLUCAGEN) injection 1 mg  1 mg IntraMUSCular PRN       Care Plan discussed with:  Patient/Family and Nurse    Castillo Montes MD  Internal Medicine  Date of Service: 12/23/2020

## 2020-12-23 NOTE — PROGRESS NOTES
Transitions of Care plan: Home, may need home health     -RUR: 16%  -Cm continuing to follow for discharge planning needs.   -Patient now on just Daptomycin  -Pigtail catheter placed after I&D   -Dispo needs are unknown at this time; will follow and assist as needed.     Jus WOODRUFF, ACM-SW

## 2020-12-24 LAB
ANION GAP SERPL CALC-SCNC: 4 MMOL/L (ref 5–15)
BACTERIA SPEC CULT: ABNORMAL
BACTERIA SPEC CULT: ABNORMAL
BASOPHILS # BLD: 0 K/UL (ref 0–0.1)
BASOPHILS NFR BLD: 1 % (ref 0–1)
BUN SERPL-MCNC: 19 MG/DL (ref 6–20)
BUN/CREAT SERPL: 17 (ref 12–20)
CALCIUM SERPL-MCNC: 9 MG/DL (ref 8.5–10.1)
CHLORIDE SERPL-SCNC: 106 MMOL/L (ref 97–108)
CO2 SERPL-SCNC: 30 MMOL/L (ref 21–32)
CREAT SERPL-MCNC: 1.12 MG/DL (ref 0.7–1.3)
DIFFERENTIAL METHOD BLD: ABNORMAL
EOSINOPHIL # BLD: 0.1 K/UL (ref 0–0.4)
EOSINOPHIL NFR BLD: 2 % (ref 0–7)
ERYTHROCYTE [DISTWIDTH] IN BLOOD BY AUTOMATED COUNT: 13.4 % (ref 11.5–14.5)
GLUCOSE BLD STRIP.AUTO-MCNC: 187 MG/DL (ref 65–100)
GLUCOSE BLD STRIP.AUTO-MCNC: 228 MG/DL (ref 65–100)
GLUCOSE BLD STRIP.AUTO-MCNC: 260 MG/DL (ref 65–100)
GLUCOSE BLD STRIP.AUTO-MCNC: 92 MG/DL (ref 65–100)
GLUCOSE SERPL-MCNC: 87 MG/DL (ref 65–100)
HCT VFR BLD AUTO: 27.9 % (ref 36.6–50.3)
HGB BLD-MCNC: 8.7 G/DL (ref 12.1–17)
IMM GRANULOCYTES # BLD AUTO: 0 K/UL (ref 0–0.04)
IMM GRANULOCYTES NFR BLD AUTO: 0 % (ref 0–0.5)
LYMPHOCYTES # BLD: 1 K/UL (ref 0.8–3.5)
LYMPHOCYTES NFR BLD: 16 % (ref 12–49)
MCH RBC QN AUTO: 27.1 PG (ref 26–34)
MCHC RBC AUTO-ENTMCNC: 31.2 G/DL (ref 30–36.5)
MCV RBC AUTO: 86.9 FL (ref 80–99)
MONOCYTES # BLD: 0.5 K/UL (ref 0–1)
MONOCYTES NFR BLD: 8 % (ref 5–13)
NEUTS SEG # BLD: 4.4 K/UL (ref 1.8–8)
NEUTS SEG NFR BLD: 73 % (ref 32–75)
NRBC # BLD: 0 K/UL (ref 0–0.01)
NRBC BLD-RTO: 0 PER 100 WBC
PLATELET # BLD AUTO: 376 K/UL (ref 150–400)
PMV BLD AUTO: 8.9 FL (ref 8.9–12.9)
POTASSIUM SERPL-SCNC: 4 MMOL/L (ref 3.5–5.1)
RBC # BLD AUTO: 3.21 M/UL (ref 4.1–5.7)
SERVICE CMNT-IMP: ABNORMAL
SERVICE CMNT-IMP: NORMAL
SODIUM SERPL-SCNC: 140 MMOL/L (ref 136–145)
WBC # BLD AUTO: 6.1 K/UL (ref 4.1–11.1)

## 2020-12-24 PROCEDURE — 74011250636 HC RX REV CODE- 250/636: Performed by: INTERNAL MEDICINE

## 2020-12-24 PROCEDURE — 74011000258 HC RX REV CODE- 258: Performed by: INTERNAL MEDICINE

## 2020-12-24 PROCEDURE — 74011250637 HC RX REV CODE- 250/637: Performed by: INTERNAL MEDICINE

## 2020-12-24 PROCEDURE — 74011250637 HC RX REV CODE- 250/637: Performed by: HOSPITALIST

## 2020-12-24 PROCEDURE — 85025 COMPLETE CBC W/AUTO DIFF WBC: CPT

## 2020-12-24 PROCEDURE — 74011636637 HC RX REV CODE- 636/637: Performed by: INTERNAL MEDICINE

## 2020-12-24 PROCEDURE — 82962 GLUCOSE BLOOD TEST: CPT

## 2020-12-24 PROCEDURE — 65270000032 HC RM SEMIPRIVATE

## 2020-12-24 PROCEDURE — 36415 COLL VENOUS BLD VENIPUNCTURE: CPT

## 2020-12-24 PROCEDURE — 80048 BASIC METABOLIC PNL TOTAL CA: CPT

## 2020-12-24 RX ADMIN — ENOXAPARIN SODIUM 40 MG: 40 INJECTION SUBCUTANEOUS at 18:38

## 2020-12-24 RX ADMIN — Medication 1 CAPSULE: at 09:48

## 2020-12-24 RX ADMIN — INSULIN LISPRO 2 UNITS: 100 INJECTION, SOLUTION INTRAVENOUS; SUBCUTANEOUS at 12:09

## 2020-12-24 RX ADMIN — DAPTOMYCIN 400 MG: 500 INJECTION, POWDER, LYOPHILIZED, FOR SOLUTION INTRAVENOUS at 18:38

## 2020-12-24 RX ADMIN — AMLODIPINE BESYLATE 10 MG: 5 TABLET ORAL at 09:48

## 2020-12-24 RX ADMIN — INSULIN GLARGINE 8 UNITS: 100 INJECTION, SOLUTION SUBCUTANEOUS at 21:42

## 2020-12-24 RX ADMIN — ASPIRIN 81 MG: 81 TABLET, COATED ORAL at 09:48

## 2020-12-24 RX ADMIN — LOSARTAN POTASSIUM 100 MG: 50 TABLET, FILM COATED ORAL at 09:48

## 2020-12-24 RX ADMIN — INSULIN LISPRO 5 UNITS: 100 INJECTION, SOLUTION INTRAVENOUS; SUBCUTANEOUS at 16:52

## 2020-12-24 NOTE — DIABETES MGMT
[facility] SPECIALIST CONSULT NOTE    Presentation   Mitra Quiros is a 77 y.o. male admitted on 12/10/20 with right lower abdominal pain. Upon initial evaluation cellulitis of abdomen determined. This was possibly due to non-sterile insulin administration. HX:   Past Medical History:   Diagnosis Date    Hypertension     ICD (implantable cardioverter-defibrillator) in place     NSVT (nonsustained ventricular tachycardia) ([facility] [address] 11/21/2018    EPS 11/21/2018 VT/VF     Right groin pain 12/7/2020   DM2    DX: CT scan    TX: abx. General surgery consulted and seen: not surgical candidate. Current clinical course has been complicated by fluctuating hyper and hypoglycemia. Diabetes: Patient has known Type 2 diabetes, treated with Metfomin/Detemir/and Trulicity PTA. Family history unknown for diabetes. Admission  and A1c 14%  indicate poor  diabetes control. Consulted by guru An for advanced diabetes nursing assessment and care, specifically related to   [] Transitioning off Ca    [x] Inpatient management strategy  [x] Home management assessment  [] Survival skill education    Diabetes-related medical history  Acute complications  Fluctuating hyper and hypoglycemia  Neurological complications  Peripheral neuropathy  Microvascular disease-minor  denies  Macrovascular disease  denies  Other associated conditions     CAD-ICD/HTN    Diabetes medication history  Drug class Currently in use Discontinued Never used   Biguanide Metformin 2000mg daily     DDP-4 inhibitor       Sulfonylurea      Thiazolidinedione      GLP-1 RA Trulicity 1.5 mg weekly     SGLT-2 inhibitors      Basal insulin Detemir 40units AM  30units PM     Bolus insulin      Fixed Dose  Combinations        Subjective   AM BG trends <100 for past few days.   Pre prandial hyperglycemia noted throughout day-recieivng correctional insulin- minimal amounts to correct. On isolation precautions: MRSA +    Pig tail drain in place, another CT scan recommended as abdomen remains indurated. Physical activity pattern-no limitations, does not actively exercise    Monitoring pattern-Checks BG every other day due to conserving meter strips  []  Taking medications pattern   [x] In-Consistent administration (stretching out insulin due to cost)  [x] Not Affordable (glucometer supplies)    Social determinants of health impacting diabetes self-management practices   Concerned that you need to know more about how to stay healthy with diabetes     Objective   Physical exam  General Alert, oriented and in no acute distress. Conversant and cooperative. Vital Signs   Visit Vitals  BP (!) 155/84   Pulse 69   Temp 98.4 °F (36.9 °C)   Resp 18   Ht 5' 7\" (1.702 m)   Wt 71.7 kg (158 lb)   SpO2 94%   BMI 24.75 kg/m²     Skin  Warm and dry. Abdomen less distended today after procedure. Heart   Regular rate and rhythm. No murmurs, rubs or gallops  Lungs  Clear to auscultation without rales or rhonchi  Extremities No foot wounds    Diabetic foot exam:    Left Foot     Visual Exam: callous - lateral aspect and heel   Pulse DP: 2+ (normal)   Filament test: reduced sensation    Vibratory sensation: normal  Right Foot   Visual Exam: callous - lateral aspect and heel   Pulse DP: 2+ (normal)   Filament test: normal sensation    Vibratory sensation: normal DP & PT pulses +2.      Laboratory  BMP:   Lab Results   Component Value Date/Time     12/24/2020 06:30 AM    K 4.0 12/24/2020 06:30 AM     12/24/2020 06:30 AM    CO2 30 12/24/2020 06:30 AM    AGAP 4 (L) 12/24/2020 06:30 AM    GLU 87 12/24/2020 06:30 AM    BUN 19 12/24/2020 06:30 AM    CREA 1.12 12/24/2020 06:30 AM    GFRAA >60 12/24/2020 06:30 AM    GFRNA >60 12/24/2020 06:30 AM      Factors affecting BG management  Factor Dose Comments   Nutrition:  Carb-controlled meals     60 grams/meal      Pain abdomen    Infection Daptomycin/Zosyn      Blood glucose pattern-        Assessment and Plan   Nursing Diagnosis Risk for unstable blood glucose pattern   Nursing Intervention Domain 4229 Decision-making Support   Nursing Interventions Examined current inpatient diabetes control   Explored factors facilitating and impeding inpatient management  Identified self-management practices impeding diabetes control  Explored corrective strategies with patient and responsible inpatient provider   Informed patient of rational for insulin strategy while hospitalized  Instructed patient in:   -ways to save money with purchasing glucometer supplies at [facility],   -importance of daily sterile insulin  Injections, and importance of checking BG 3 times a day. -Also discussed his diet and the modifications he needs to [facility] him 'healthy plate' and portion control.     -Recommended him to see a podiatrist.    Discussed Long term effects of uncontrolled diabetes (amputations, non healing wounds, stroke, MI etc)     Evaluation   Mr. Selma Farah,  with Type 2 diabetes, did not achieve diabetes control prior to admission (PTA), as evidenced by admission BG of 197 and A1c of 14%. Based on assessment of diabetes self-care practices, interventions that warrant action while hospitalized include:   [x] Healthy Eating   [x] Problem Solving  [] Being Active   [] Healthy Coping  [x] Monitoring   [x] Reducing Risks  [x] Taking Medications  The patient would also benefit from diabetes self-management education and support (DSMES) after discharge. During this hospitalization, the patient has not  achieved inpatient blood glucose target of 100-180mg/dl.  Factors that have played a role include:  [] Critical nature of illness state  [x]  Meal/tube feeding disruption  [x] Compromised insulin absorption or delivery  [] Glucocorticoid use  []  Kidney dysfunction  []  Liver disease    To optimize BG control and support a positive health outcome, would utilize the Subcutaneous Insulin Order set (8108). BG trends since admission fluctuating. Suspect too much insulin -Since he is on a controlled diet while hospitalized, he is not consuming the amount of food/carbs as he would normally, and there fore, has not needed as much insulin as he would require if he were at home. Also meal disruptions for procedures/tests also played a role. AM BG in target 128, 86. Noted pre-prandial hyperglycemia throughout the day, requiring minimal correctional insulin. Recommendations   Recommend:    [] Use of Subcutaneous Insulin Order set (4295)  Insulin Use Options   CONTINUE  Basal  Dose                        (Based on weight, BMI & GFR) Addresses basic metabolic needs 8 units daily, IF AMBG trends <100mg/dl, reduce basal by another 20%. Corrective                                       (Offset gaps in dosing) Useful in adjusting insulin dosing [x] Normal sensitivity         [x] Referral to  [x] Diabetes Self-Management Training through [facility] (Phone [phone] to schedule appointment)  Discharge Planning   1. Since A1C now above goal, 14%, he will continue to need insulin. He is requiring much less doses of insulin during this hospitalization compared to PTA doses. 2. Re-start PO diabetic meds as PTA. 3. Continue to follow up with PCP, Dr. Katelin Phan for diabetes management. Billing Code(s)   I personally reviewed medical record, including notes, data and current medications, and examined the patient at bedside before making care recommendations.        [x] [address] Prolonged Services - 15 minutes    MARTINE Boykin  Diabetes Clinical Nurse Specialist  [facility]  Access via [facility]  [phone]

## 2020-12-24 NOTE — PROGRESS NOTES
Hospitalist Progress Note      Hospital summary: Chris Cardenas is a 77 y.o. male who presents with right lower quadrant abdominal pain which started 1 week back worsen since Monday radiating towards the groin denies any fever chills nausea vomiting diarrhea or any other associated symptoms. States this started about 1 week ago, initially saw PCP Dr. Elijah Ortiz worsening since Friday. Saw Dr. Nusrat Foley on Monday who ordered outpatient CT scan and abdominal ultrasound 12/10/2020     INTERVAL HISTORY AND SUBJECTIVE:     Seen at bedside 12/24/2020 :   Pt w/o new complaints  For f/u CT in a couple of days  Cont w wound becki JJ's note noted      Assessment/Plan:  Abdominal wall abscess  -CT abdomen 12/16: Right lateral abdominal wall musculature thickening is again noted. More discrete somewhat oblong fluid collection projects from the suprailiac region into the right inguinal region without evidence of gas. Increasing right flank and right chest wall subcutaneous edema.  - s/p I&D on 12/19- 20 cc of pus aspirated. - -Blood culture: NGTD  - drain cx: prelim - MRSA   - ID following  - rpt CT abd 12/23; subcutaneous edema in the right abdominal wall. There is a fluid collection with peripheral enhancement consistent with an abscess in the right inferior abdominal wall which extends from the pelvis into  the mid abdomen laterally and is slightly larger than the prior study  - s/p I&D 12/23 - 12 cc of pus was aspirated and sent for cultures.  Pigtail catheter placed - 10cc so far  -  c/w IV daptomycin- as above, for CT in a couple of days from today 12/24/2020        Uncontrolled diabetes type 2   -  A1c 14  - appreciate Endocrinology input   - c/w lantus 8U, ISS  - DM diet   Lab Results   Component Value Date/Time    Glucose 87 12/24/2020 06:30 AM    Glucose (POC) 92 12/24/2020 07:02 AM        Hypertension   - c/w amlodipine, losartan   BP Readings from Last 1 Encounters:   12/24/20 (!) 155/84         Hyperlipidemia - statin on hold as pt on Dapto      APARNA - resolved     Brugada Syndrome s/p AICD   - Follows with cardiology  Dr. Chayo Templeton.    Code status: Full  DVT prophylaxis: Lovenox  Disposition: TBD. Home when ready   ----------------------------------------------    CC: Abdominal wall pain and swelling     S: as above       Review of Systems:  A comprehensive review of systems was negative.     O:  Visit Vitals  BP (!) 155/84   Pulse 69   Temp 98.4 °F (36.9 °C)   Resp 18   Ht 5' 7\" (1.702 m)   Wt 71.7 kg (158 lb) Comment: as of last week per chart review   SpO2 94%   BMI 24.75 kg/m²       PHYSICAL EXAM:  GEN:   No apparent distress; pleasant cooperative     HEENT:  Atraumatic;no nasal discharge; MM moist;non icteric sclarea  PUL:   Un labored breathing; no externally audible wheeze; no retractions    ABD:   Has wound drain in place Non distended; Soft; non tender  EXT:   No gross edema; muscle mass fair;   NEURO:  Non focal; oriented x 3  PSYCH:  Non agitated; not anxious   SKIN:   Non icteric; dry,        Intake/Output Summary (Last 24 hours) at 12/24/2020 1042  Last data filed at 12/23/2020 2100  Gross per 24 hour   Intake --   Output 780 ml   Net -780 ml        Recent labs & imaging reviewed:  Recent Results (from the past 24 hour(s))   GLUCOSE, POC    Collection Time: 12/23/20 11:34 AM   Result Value Ref Range    Glucose (POC) 273 (H) 65 - 100 mg/dL    Performed by Mirtha Garcia    GLUCOSE, POC    Collection Time: 12/23/20  4:14 PM   Result Value Ref Range    Glucose (POC) 163 (H) 65 - 100 mg/dL    Performed by Mirtha Garcia    GLUCOSE, POC    Collection Time: 12/23/20  9:07 PM   Result Value Ref Range    Glucose (POC) 164 (H) 65 - 100 mg/dL    Performed by Raphael Licona    CBC WITH AUTOMATED DIFF    Collection Time: 12/24/20  6:30 AM   Result Value Ref Range    WBC 6.1 4.1 - 11.1 K/uL    RBC 3.21 (L) 4.10 - 5.70 M/uL    HGB 8.7 (L) 12.1 - 17.0 g/dL    HCT 27.9 (L) 36.6 - 50.3 %    MCV 86.9 80.0 - 99.0 FL    MCH 27.1 26.0 - 34.0 PG    MCHC 31.2 30.0 - 36.5 g/dL    RDW 13.4 11.5 - 14.5 %    PLATELET 347 067 - 535 K/uL    MPV 8.9 8.9 - 12.9 FL    NRBC 0.0 0  WBC    ABSOLUTE NRBC 0.00 0.00 - 0.01 K/uL    NEUTROPHILS 73 32 - 75 %    LYMPHOCYTES 16 12 - 49 %    MONOCYTES 8 5 - 13 %    EOSINOPHILS 2 0 - 7 %    BASOPHILS 1 0 - 1 %    IMMATURE GRANULOCYTES 0 0.0 - 0.5 %    ABS. NEUTROPHILS 4.4 1.8 - 8.0 K/UL    ABS. LYMPHOCYTES 1.0 0.8 - 3.5 K/UL    ABS. MONOCYTES 0.5 0.0 - 1.0 K/UL    ABS. EOSINOPHILS 0.1 0.0 - 0.4 K/UL    ABS. BASOPHILS 0.0 0.0 - 0.1 K/UL    ABS. IMM. GRANS. 0.0 0.00 - 0.04 K/UL    DF AUTOMATED     METABOLIC PANEL, BASIC    Collection Time: 12/24/20  6:30 AM   Result Value Ref Range    Sodium 140 136 - 145 mmol/L    Potassium 4.0 3.5 - 5.1 mmol/L    Chloride 106 97 - 108 mmol/L    CO2 30 21 - 32 mmol/L    Anion gap 4 (L) 5 - 15 mmol/L    Glucose 87 65 - 100 mg/dL    BUN 19 6 - 20 MG/DL    Creatinine 1.12 0.70 - 1.30 MG/DL    BUN/Creatinine ratio 17 12 - 20      GFR est AA >60 >60 ml/min/1.73m2    GFR est non-AA >60 >60 ml/min/1.73m2    Calcium 9.0 8.5 - 10.1 MG/DL   GLUCOSE, POC    Collection Time: 12/24/20  7:02 AM   Result Value Ref Range    Glucose (POC) 92 65 - 100 mg/dL    Performed by Iker Dill      Recent Labs     12/24/20 0630 12/23/20  0408   WBC 6.1 7.7   HGB 8.7* 10.2*   HCT 27.9* 33.3*    413*     Recent Labs     12/24/20 0630 12/23/20  0408 12/22/20  0256    140 139   K 4.0 3.7 3.8    104 103   CO2 30 31 31   BUN 19 16 17   CREA 1.12 1.29 1.37*   GLU 87 126* 201*   CA 9.0 9.2 9.2   PHOS  --   --  3.3     Recent Labs     12/22/20  0256   ALB 2.0*     No results for input(s): INR, PTP, APTT, INREXT, INREXT in the last 72 hours. No results for input(s): FE, TIBC, PSAT, FERR in the last 72 hours. No results found for: FOL, RBCF   No results for input(s): PH, PCO2, PO2 in the last 72 hours.   Recent Labs     12/22/20 0256   CPK 65     Lab Results Component Value Date/Time    Cholesterol, total 151 06/09/2020 04:19 PM    HDL Cholesterol 63 06/09/2020 04:19 PM    LDL, calculated 74 06/09/2020 04:19 PM    Triglyceride 70 06/09/2020 04:19 PM    CHOL/HDL Ratio 2.5 03/13/2019 03:23 AM     Lab Results   Component Value Date/Time    Glucose (POC) 92 12/24/2020 07:02 AM    Glucose (POC) 164 (H) 12/23/2020 09:07 PM    Glucose (POC) 163 (H) 12/23/2020 04:14 PM    Glucose (POC) 273 (H) 12/23/2020 11:34 AM    Glucose (POC) 86 12/23/2020 07:32 AM     Lab Results   Component Value Date/Time    Color YELLOW/STRAW 07/14/2020 10:27 AM    Appearance CLEAR 07/14/2020 10:27 AM    Specific gravity 1.014 07/14/2020 10:27 AM    pH (UA) 6.0 07/14/2020 10:27 AM    Protein 30 (A) 07/14/2020 10:27 AM    Glucose Negative 07/14/2020 10:27 AM    Ketone Negative 07/14/2020 10:27 AM    Bilirubin Negative 07/14/2020 10:27 AM    Urobilinogen 0.2 07/14/2020 10:27 AM    Nitrites Negative 07/14/2020 10:27 AM    Leukocyte Esterase Negative 07/14/2020 10:27 AM    Epithelial cells FEW 07/14/2020 10:27 AM    Bacteria Negative 07/14/2020 10:27 AM    WBC 0-4 07/14/2020 10:27 AM    RBC 0-5 07/14/2020 10:27 AM       Med list reviewed  Current Facility-Administered Medications   Medication Dose Route Frequency    hydrALAZINE (APRESOLINE) 20 mg/mL injection 10 mg  10 mg IntraVENous Q6H PRN    losartan (COZAAR) tablet 100 mg  100 mg Oral DAILY    insulin glargine (LANTUS) injection 8 Units  8 Units SubCUTAneous QHS    influenza vaccine 2020-21 (6 mos+)(PF) (FLUARIX/FLULAVAL/FLUZONE QUAD) injection 0.5 mL  0.5 mL IntraMUSCular PRIOR TO DISCHARGE    insulin lispro (HUMALOG) injection   SubCUTAneous TIDAC    dextrose (D50W) injection syrg 12.5-25 g  12.5-25 g IntraVENous PRN    bisacodyL (DULCOLAX) suppository 10 mg  10 mg Rectal DAILY PRN    DAPTOmycin (CUBICIN) 400 mg in 0.9% sodium chloride 50 mL IVPB RF formulation  400 mg IntraVENous Q24H    polyethylene glycol (MIRALAX) packet 17 g  17 g Oral BID    bisacodyL (DULCOLAX) tablet 10 mg  10 mg Oral DAILY PRN    lactobac ac& pc-s.therm-b.anim (FESTUS Q/RISAQUAD)  1 Cap Oral DAILY    enoxaparin (LOVENOX) injection 40 mg  40 mg SubCUTAneous Q24H    sodium chloride (NS) flush 5-40 mL  5-40 mL IntraVENous Q8H    sodium chloride (NS) flush 5-40 mL  5-40 mL IntraVENous PRN    acetaminophen (TYLENOL) tablet 650 mg  650 mg Oral Q6H PRN    Or    acetaminophen (TYLENOL) suppository 650 mg  650 mg Rectal Q6H PRN    polyethylene glycol (MIRALAX) packet 17 g  17 g Oral DAILY PRN    promethazine (PHENERGAN) tablet 12.5 mg  12.5 mg Oral Q6H PRN    Or    ondansetron (ZOFRAN) injection 4 mg  4 mg IntraVENous Q6H PRN    amLODIPine (NORVASC) tablet 10 mg  10 mg Oral DAILY    aspirin delayed-release tablet 81 mg  81 mg Oral DAILY    glucose chewable tablet 16 g  4 Tab Oral PRN    glucagon (GLUCAGEN) injection 1 mg  1 mg IntraMUSCular PRN       Care Plan discussed with:  Patient/Family and Nurse    Lorene Davis MD  Internal Medicine  Date of Service: 12/24/2020

## 2020-12-24 NOTE — PROGRESS NOTES
ID Progress Note  2020    Subjective:   Afebrile. Right flank still indurated. Objective:     Vitals:   Visit Vitals  BP (!) 155/84   Pulse 69   Temp 98.4 °F (36.9 °C)   Resp 18   Ht 5' 7\" (1.702 m)   Wt 71.7 kg (158 lb) Comment: as of last week per chart review   SpO2 94%   BMI 24.75 kg/m²        Tmax:  Temp (24hrs), Av.4 °F (36.9 °C), Min:98 °F (36.7 °C), Max:98.7 °F (37.1 °C)      Exam:    Not in distress  Lung clear, no rales, wheezes or rhonchi   Heart: s1, s2, RRR, no murmurs rubs or clicks  Abdomen:  indurated right flank   Speech fluent     Labs:   Lab Results   Component Value Date/Time    WBC 6.1 2020 06:30 AM    HGB 8.7 (L) 2020 06:30 AM    HCT 27.9 (L) 2020 06:30 AM    PLATELET 651  06:30 AM    MCV 86.9 2020 06:30 AM     Lab Results   Component Value Date/Time    Sodium 140 2020 06:30 AM    Potassium 4.0 2020 06:30 AM    Chloride 106 2020 06:30 AM    CO2 30 2020 06:30 AM    Anion gap 4 (L) 2020 06:30 AM    Glucose 87 2020 06:30 AM    BUN 19 2020 06:30 AM    Creatinine 1.12 2020 06:30 AM    BUN/Creatinine ratio 17 2020 06:30 AM    GFR est AA >60 2020 06:30 AM    GFR est non-AA >60 2020 06:30 AM    Calcium 9.0 2020 06:30 AM    Bilirubin, total 0.4 12/10/2020 10:16 AM    Alk. phosphatase 108 12/10/2020 10:16 AM    Protein, total 8.0 12/10/2020 10:16 AM    Albumin 2.0 (L) 2020 02:56 AM    Globulin 5.7 (H) 12/10/2020 10:16 AM    A-G Ratio 0.4 (L) 12/10/2020 10:16 AM    ALT (SGPT) 16 12/10/2020 10:16 AM           Assessment:     #1 abdominal wall abscess      #2 mild renal insufficiency     #3 syncope status post ICD placement (due to inducible VT/VF)            Recommendations:      cultures growing staph aureus. Continue dapto/     Will need repeat CT to evaluate collection. I think there is still some remaining as the right flank is still indurated. Would do this is a few days. Team available over the holidays if needed.             Granville Bosworth, MD

## 2020-12-24 NOTE — PROGRESS NOTES
Bedside shift change report given to [address] [place] [address] (oncoming nurse) by Piter Ricardo (offgoing nurse). Report included the following information SBAR, Kardex and MAR.

## 2020-12-25 LAB
GLUCOSE BLD STRIP.AUTO-MCNC: 102 MG/DL (ref 65–100)
GLUCOSE BLD STRIP.AUTO-MCNC: 146 MG/DL (ref 65–100)
GLUCOSE BLD STRIP.AUTO-MCNC: 181 MG/DL (ref 65–100)
GLUCOSE BLD STRIP.AUTO-MCNC: 232 MG/DL (ref 65–100)
SERVICE CMNT-IMP: ABNORMAL

## 2020-12-25 PROCEDURE — 74011250637 HC RX REV CODE- 250/637: Performed by: INTERNAL MEDICINE

## 2020-12-25 PROCEDURE — 74011250636 HC RX REV CODE- 250/636: Performed by: INTERNAL MEDICINE

## 2020-12-25 PROCEDURE — 74011250637 HC RX REV CODE- 250/637: Performed by: HOSPITALIST

## 2020-12-25 PROCEDURE — 82962 GLUCOSE BLOOD TEST: CPT

## 2020-12-25 PROCEDURE — 65270000032 HC RM SEMIPRIVATE

## 2020-12-25 PROCEDURE — 74011636637 HC RX REV CODE- 636/637: Performed by: INTERNAL MEDICINE

## 2020-12-25 PROCEDURE — 74011000258 HC RX REV CODE- 258: Performed by: INTERNAL MEDICINE

## 2020-12-25 RX ORDER — METFORMIN HYDROCHLORIDE 500 MG/1
1000 TABLET ORAL 2 TIMES DAILY WITH MEALS
Status: DISCONTINUED | OUTPATIENT
Start: 2020-12-25 | End: 2020-12-29

## 2020-12-25 RX ADMIN — INSULIN GLARGINE 8 UNITS: 100 INJECTION, SOLUTION SUBCUTANEOUS at 22:22

## 2020-12-25 RX ADMIN — METFORMIN HYDROCHLORIDE 1000 MG: 500 TABLET ORAL at 17:09

## 2020-12-25 RX ADMIN — INSULIN LISPRO 2 UNITS: 100 INJECTION, SOLUTION INTRAVENOUS; SUBCUTANEOUS at 17:09

## 2020-12-25 RX ADMIN — Medication 1 CAPSULE: at 09:58

## 2020-12-25 RX ADMIN — AMLODIPINE BESYLATE 10 MG: 5 TABLET ORAL at 09:58

## 2020-12-25 RX ADMIN — ENOXAPARIN SODIUM 40 MG: 40 INJECTION SUBCUTANEOUS at 17:09

## 2020-12-25 RX ADMIN — DAPTOMYCIN 400 MG: 500 INJECTION, POWDER, LYOPHILIZED, FOR SOLUTION INTRAVENOUS at 18:03

## 2020-12-25 RX ADMIN — ASPIRIN 81 MG: 81 TABLET, COATED ORAL at 09:58

## 2020-12-25 RX ADMIN — Medication 5 ML: at 06:00

## 2020-12-25 RX ADMIN — LOSARTAN POTASSIUM 100 MG: 50 TABLET, FILM COATED ORAL at 09:58

## 2020-12-25 RX ADMIN — Medication 10 ML: at 18:05

## 2020-12-25 RX ADMIN — INSULIN LISPRO 3 UNITS: 100 INJECTION, SOLUTION INTRAVENOUS; SUBCUTANEOUS at 11:58

## 2020-12-26 LAB
BACTERIA SPEC CULT: NORMAL
GLUCOSE BLD STRIP.AUTO-MCNC: 118 MG/DL (ref 65–100)
GLUCOSE BLD STRIP.AUTO-MCNC: 137 MG/DL (ref 65–100)
GLUCOSE BLD STRIP.AUTO-MCNC: 142 MG/DL (ref 65–100)
GLUCOSE BLD STRIP.AUTO-MCNC: 186 MG/DL (ref 65–100)
GRAM STN SPEC: NORMAL
GRAM STN SPEC: NORMAL
SERVICE CMNT-IMP: ABNORMAL
SERVICE CMNT-IMP: NORMAL
SERVICE CMNT-IMP: NORMAL

## 2020-12-26 PROCEDURE — 74011636637 HC RX REV CODE- 636/637: Performed by: INTERNAL MEDICINE

## 2020-12-26 PROCEDURE — 65270000032 HC RM SEMIPRIVATE

## 2020-12-26 PROCEDURE — 74011250637 HC RX REV CODE- 250/637: Performed by: HOSPITALIST

## 2020-12-26 PROCEDURE — 74011000258 HC RX REV CODE- 258: Performed by: INTERNAL MEDICINE

## 2020-12-26 PROCEDURE — 74011250636 HC RX REV CODE- 250/636: Performed by: INTERNAL MEDICINE

## 2020-12-26 PROCEDURE — 74011250637 HC RX REV CODE- 250/637: Performed by: INTERNAL MEDICINE

## 2020-12-26 PROCEDURE — 82962 GLUCOSE BLOOD TEST: CPT

## 2020-12-26 RX ORDER — INSULIN GLARGINE 100 [IU]/ML
12 INJECTION, SOLUTION SUBCUTANEOUS
Status: DISCONTINUED | OUTPATIENT
Start: 2020-12-26 | End: 2021-01-03

## 2020-12-26 RX ADMIN — Medication 1 CAPSULE: at 10:45

## 2020-12-26 RX ADMIN — ENOXAPARIN SODIUM 40 MG: 40 INJECTION SUBCUTANEOUS at 17:52

## 2020-12-26 RX ADMIN — INSULIN GLARGINE 12 UNITS: 100 INJECTION, SOLUTION SUBCUTANEOUS at 21:51

## 2020-12-26 RX ADMIN — ASPIRIN 81 MG: 81 TABLET, COATED ORAL at 10:45

## 2020-12-26 RX ADMIN — METFORMIN HYDROCHLORIDE 1000 MG: 500 TABLET ORAL at 10:51

## 2020-12-26 RX ADMIN — DAPTOMYCIN 400 MG: 500 INJECTION, POWDER, LYOPHILIZED, FOR SOLUTION INTRAVENOUS at 17:58

## 2020-12-26 RX ADMIN — LOSARTAN POTASSIUM 100 MG: 50 TABLET, FILM COATED ORAL at 10:45

## 2020-12-26 RX ADMIN — INSULIN LISPRO 2 UNITS: 100 INJECTION, SOLUTION INTRAVENOUS; SUBCUTANEOUS at 17:53

## 2020-12-26 RX ADMIN — METFORMIN HYDROCHLORIDE 1000 MG: 500 TABLET ORAL at 17:53

## 2020-12-26 RX ADMIN — Medication 10 ML: at 21:52

## 2020-12-26 RX ADMIN — AMLODIPINE BESYLATE 10 MG: 5 TABLET ORAL at 10:45

## 2020-12-26 NOTE — PROGRESS NOTES
Hospitalist Progress Note      Hospital summary: Loly Sprague is a 77 y.o. male who presents with right lower quadrant abdominal pain which started 1 week back worsen since Monday radiating towards the groin denies any fever chills nausea vomiting diarrhea or any other associated symptoms. States this started about 1 week ago, initially saw PCP Dr. Chere Kocher worsening since Friday. Saw Dr. Edith Larkin on Monday who ordered outpatient CT scan and abdominal ultrasound 12/10/2020     INTERVAL HISTORY AND SUBJECTIVE:     Seen at bedside 12/25/2020 :   Brother present  Answered all questions  Cont current   For f/u CT abd on Monday     Assessment/Plan:  Abdominal wall abscess  -CT abdomen 12/16: Right lateral abdominal wall musculature thickening is again noted. More discrete somewhat oblong fluid collection projects from the suprailiac region into the right inguinal region without evidence of gas. Increasing right flank and right chest wall subcutaneous edema.  - s/p I&D on 12/19- 20 cc of pus aspirated. - -Blood culture: NGTD  - drain cx: prelim - MRSA   - ID following  - rpt CT abd 12/23; subcutaneous edema in the right abdominal wall. There is a fluid collection with peripheral enhancement consistent with an abscess in the right inferior abdominal wall which extends from the pelvis into  the mid abdomen laterally and is slightly larger than the prior study  - s/p I&D 12/23 - 12 cc of pus was aspirated and sent for cultures.  Pigtail catheter placed - 10cc so far  -  c/w IV daptomycin- as above, for CT in a couple of days from today 12/24/2020        Uncontrolled diabetes type 2   -  A1c 14  - appreciate Endocrinology input   - c/w lantus 8U, ISS  - DM diet   Lab Results   Component Value Date/Time    Glucose 87 12/24/2020 06:30 AM    Glucose (POC) 181 (H) 12/25/2020 09:38 PM        Hypertension   - c/w amlodipine, losartan   BP Readings from Last 1 Encounters:   12/25/20 110/74    Hyperlipidemia - statin on hold as pt on Dapto      APARNA - resolved     Brugada Syndrome s/p AICD   - Follows with cardiology  Dr. José Kathleen.    Code status: Full  DVT prophylaxis: Lovenox  Disposition: TBD. Home when ready   ----------------------------------------------    CC: Abdominal wall pain and swelling     S: as above       Review of Systems:  A comprehensive review of systems was negative.     O:  Visit Vitals  /74   Pulse 76   Temp 98.2 °F (36.8 °C)   Resp 16   Ht 5' 7\" (1.702 m)   Wt 71.7 kg (158 lb) Comment: as of last week per chart review   SpO2 100%   BMI 24.75 kg/m²       PHYSICAL EXAM:  GEN:   No apparent distress; pleasant cooperative     HEENT:  Atraumatic;no nasal discharge; MM moist;non icteric sclarea  PUL:   Un labored breathing; no externally audible wheeze; no retractions    ABD:   Has wound drain in place Non distended; Soft; non tender  EXT:   No gross edema; muscle mass fair;   NEURO:  Non focal; oriented x 3  PSYCH:  Non agitated; not anxious   SKIN:   Non icteric; dry,        Intake/Output Summary (Last 24 hours) at 12/25/2020 2146  Last data filed at 12/25/2020 1806  Gross per 24 hour   Intake --   Output 1725 ml   Net -1725 ml        Recent labs & imaging reviewed:  Recent Results (from the past 24 hour(s))   GLUCOSE, POC    Collection Time: 12/25/20  6:04 AM   Result Value Ref Range    Glucose (POC) 102 (H) 65 - 100 mg/dL    Performed by Isrrael Parker    GLUCOSE, POC    Collection Time: 12/25/20 11:12 AM   Result Value Ref Range    Glucose (POC) 232 (H) 65 - 100 mg/dL    Performed by Caryl Escobedo, POC    Collection Time: 12/25/20  4:38 PM   Result Value Ref Range    Glucose (POC) 146 (H) 65 - 100 mg/dL    Performed by Caryl Escobedo, POC    Collection Time: 12/25/20  9:38 PM   Result Value Ref Range    Glucose (POC) 181 (H) 65 - 100 mg/dL    Performed by Yaima Guallpa      Recent Labs     12/24/20  0630 12/23/20  0408   WBC 6.1 7.7   HGB 8.7* 10.2*   HCT 27.9* 33.3*    413*     Recent Labs     12/24/20  0630 12/23/20  0408    140   K 4.0 3.7    104   CO2 30 31   BUN 19 16   CREA 1.12 1.29   GLU 87 126*   CA 9.0 9.2     No results for input(s): ALT, AP, TBIL, TBILI, TP, ALB, GLOB, GGT, AML, LPSE in the last 72 hours. No lab exists for component: SGOT, GPT, AMYP, HLPSE  No results for input(s): INR, PTP, APTT, INREXT, INREXT in the last 72 hours. No results for input(s): FE, TIBC, PSAT, FERR in the last 72 hours. No results found for: FOL, RBCF   No results for input(s): PH, PCO2, PO2 in the last 72 hours. No results for input(s): CPK, CKNDX, TROIQ in the last 72 hours.     No lab exists for component: CPKMB  Lab Results   Component Value Date/Time    Cholesterol, total 151 06/09/2020 04:19 PM    HDL Cholesterol 63 06/09/2020 04:19 PM    LDL, calculated 74 06/09/2020 04:19 PM    Triglyceride 70 06/09/2020 04:19 PM    CHOL/HDL Ratio 2.5 03/13/2019 03:23 AM     Lab Results   Component Value Date/Time    Glucose (POC) 181 (H) 12/25/2020 09:38 PM    Glucose (POC) 146 (H) 12/25/2020 04:38 PM    Glucose (POC) 232 (H) 12/25/2020 11:12 AM    Glucose (POC) 102 (H) 12/25/2020 06:04 AM    Glucose (POC) 228 (H) 12/24/2020 09:01 PM     Lab Results   Component Value Date/Time    Color YELLOW/STRAW 07/14/2020 10:27 AM    Appearance CLEAR 07/14/2020 10:27 AM    Specific gravity 1.014 07/14/2020 10:27 AM    pH (UA) 6.0 07/14/2020 10:27 AM    Protein 30 (A) 07/14/2020 10:27 AM    Glucose Negative 07/14/2020 10:27 AM    Ketone Negative 07/14/2020 10:27 AM    Bilirubin Negative 07/14/2020 10:27 AM    Urobilinogen 0.2 07/14/2020 10:27 AM    Nitrites Negative 07/14/2020 10:27 AM    Leukocyte Esterase Negative 07/14/2020 10:27 AM    Epithelial cells FEW 07/14/2020 10:27 AM    Bacteria Negative 07/14/2020 10:27 AM    WBC 0-4 07/14/2020 10:27 AM    RBC 0-5 07/14/2020 10:27 AM       Med list reviewed  Current Facility-Administered Medications   Medication Dose Route Frequency    metFORMIN (GLUCOPHAGE) tablet 1,000 mg  1,000 mg Oral BID WITH MEALS    hydrALAZINE (APRESOLINE) 20 mg/mL injection 10 mg  10 mg IntraVENous Q6H PRN    losartan (COZAAR) tablet 100 mg  100 mg Oral DAILY    insulin glargine (LANTUS) injection 8 Units  8 Units SubCUTAneous QHS    influenza vaccine 2020-21 (6 mos+)(PF) (FLUARIX/FLULAVAL/FLUZONE QUAD) injection 0.5 mL  0.5 mL IntraMUSCular PRIOR TO DISCHARGE    insulin lispro (HUMALOG) injection   SubCUTAneous TIDAC    dextrose (D50W) injection syrg 12.5-25 g  12.5-25 g IntraVENous PRN    bisacodyL (DULCOLAX) suppository 10 mg  10 mg Rectal DAILY PRN    DAPTOmycin (CUBICIN) 400 mg in 0.9% sodium chloride 50 mL IVPB RF formulation  400 mg IntraVENous Q24H    polyethylene glycol (MIRALAX) packet 17 g  17 g Oral BID    bisacodyL (DULCOLAX) tablet 10 mg  10 mg Oral DAILY PRN    lactobac ac& pc-s.therm-b.anim (FESTUS Q/RISAQUAD)  1 Cap Oral DAILY    enoxaparin (LOVENOX) injection 40 mg  40 mg SubCUTAneous Q24H    sodium chloride (NS) flush 5-40 mL  5-40 mL IntraVENous Q8H    sodium chloride (NS) flush 5-40 mL  5-40 mL IntraVENous PRN    acetaminophen (TYLENOL) tablet 650 mg  650 mg Oral Q6H PRN    Or    acetaminophen (TYLENOL) suppository 650 mg  650 mg Rectal Q6H PRN    polyethylene glycol (MIRALAX) packet 17 g  17 g Oral DAILY PRN    promethazine (PHENERGAN) tablet 12.5 mg  12.5 mg Oral Q6H PRN    Or    ondansetron (ZOFRAN) injection 4 mg  4 mg IntraVENous Q6H PRN    amLODIPine (NORVASC) tablet 10 mg  10 mg Oral DAILY    aspirin delayed-release tablet 81 mg  81 mg Oral DAILY    glucose chewable tablet 16 g  4 Tab Oral PRN    glucagon (GLUCAGEN) injection 1 mg  1 mg IntraMUSCular PRN       Care Plan discussed with:  Patient/Family and Nurse    Hilda León MD  Internal Medicine  Date of Service: 12/25/2020

## 2020-12-26 NOTE — PROGRESS NOTES
Hospitalist Progress Note      Hospital summary: Champ Gudino is a 77 y.o. male who presents with right lower quadrant abdominal pain which started 1 week back worsen since Monday radiating towards the groin denies any fever chills nausea vomiting diarrhea or any other associated symptoms. States this started about 1 week ago, initially saw PCP Dr. Timm Kussmaul worsening since Friday. Saw Dr. Dion Mercado on Monday who ordered outpatient CT scan and abdominal ultrasound 12/10/2020     INTERVAL HISTORY AND SUBJECTIVE:     Seen at bedside 12/26/2020 :   bs 's running slightly high, added 4 additional units of lantus to regimen  Pt w/o new complaints. Assessment/Plan:  Abdominal wall abscess  -CT abdomen 12/16: Right lateral abdominal wall musculature thickening is again noted. More discrete somewhat oblong fluid collection projects from the suprailiac region into the right inguinal region without evidence of gas. Increasing right flank and right chest wall subcutaneous edema.  - s/p I&D on 12/19- 20 cc of pus aspirated. - -Blood culture: NGTD  - drain cx: prelim - MRSA   - ID following  - rpt CT abd 12/23; subcutaneous edema in the right abdominal wall. There is a fluid collection with peripheral enhancement consistent with an abscess in the right inferior abdominal wall which extends from the pelvis into  the mid abdomen laterally and is slightly larger than the prior study  - s/p I&D 12/23 - 12 cc of pus was aspirated and sent for cultures.  Pigtail catheter placed - 10cc so far  -  c/w IV daptomycin- as above, for CT in a couple of days from today 12/24/2020        Uncontrolled diabetes type 2   -  A1c 14  - appreciate Endocrinology input   - c/w lantus 8U, ISS  - DM diet   Lab Results   Component Value Date/Time    Glucose 87 12/24/2020 06:30 AM    Glucose (POC) 118 (H) 12/26/2020 06:23 AM        Hypertension   - c/w amlodipine, losartan   BP Readings from Last 1 Encounters:   12/26/20 118/68         Hyperlipidemia - statin on hold as pt on Dapto      APARNA - resolved     Brugada Syndrome s/p AICD   - Follows with cardiology  Dr. Escobar.    Code status: Full  DVT prophylaxis: Lovenox  Disposition: TBD. Home when ready   ----------------------------------------------    CC: Abdominal wall pain and swelling     S: as above       Review of Systems:  A comprehensive review of systems was negative.    O:  Visit Vitals  /68 (BP 1 Location: Left arm, BP Patient Position: Sitting)   Pulse 90   Temp 98.6 °F (37 °C)   Resp 18   Ht 5' 7\" (1.702 m)   Wt 71.7 kg (158 lb) Comment: as of last week per chart review   SpO2 90%   BMI 24.75 kg/m²       PHYSICAL EXAM:  GEN:   No apparent distress; pleasant cooperative     HEENT:  Atraumatic;no nasal discharge; MM moist;non icteric sclarea  PUL:   Un labored breathing; no externally audible wheeze; no retractions    ABD:   Has wound drain in place Non distended; Soft; non tender  EXT:   No gross edema; muscle mass fair;   NEURO:  Non focal; oriented x 3  PSYCH:  Non agitated; not anxious   SKIN:   Non icteric; dry,        Intake/Output Summary (Last 24 hours) at 12/26/2020 1137  Last data filed at 12/25/2020 2228  Gross per 24 hour   Intake --   Output 900 ml   Net -900 ml        Recent labs & imaging reviewed:  Recent Results (from the past 24 hour(s))   GLUCOSE, POC    Collection Time: 12/25/20  4:38 PM   Result Value Ref Range    Glucose (POC) 146 (H) 65 - 100 mg/dL    Performed by JARRET WEI    GLUCOSE, POC    Collection Time: 12/25/20  9:38 PM   Result Value Ref Range    Glucose (POC) 181 (H) 65 - 100 mg/dL    Performed by UZIEL Mancilla    GLUCOSE, POC    Collection Time: 12/26/20  6:23 AM   Result Value Ref Range    Glucose (POC) 118 (H) 65 - 100 mg/dL    Performed by Bozena Arevalo      Recent Labs     12/24/20  0630   WBC 6.1   HGB 8.7*   HCT 27.9*        Recent Labs     12/24/20  0630      K 4.0      CO2 30   BUN 19   CREA  1. 12   GLU 87   CA 9.0     No results for input(s): ALT, AP, TBIL, TBILI, TP, ALB, GLOB, GGT, AML, LPSE in the last 72 hours. No lab exists for component: SGOT, GPT, AMYP, HLPSE  No results for input(s): INR, PTP, APTT, INREXT, INREXT in the last 72 hours. No results for input(s): FE, TIBC, PSAT, FERR in the last 72 hours. No results found for: FOL, RBCF   No results for input(s): PH, PCO2, PO2 in the last 72 hours. No results for input(s): CPK, CKNDX, TROIQ in the last 72 hours.     No lab exists for component: CPKMB  Lab Results   Component Value Date/Time    Cholesterol, total 151 06/09/2020 04:19 PM    HDL Cholesterol 63 06/09/2020 04:19 PM    LDL, calculated 74 06/09/2020 04:19 PM    Triglyceride 70 06/09/2020 04:19 PM    CHOL/HDL Ratio 2.5 03/13/2019 03:23 AM     Lab Results   Component Value Date/Time    Glucose (POC) 118 (H) 12/26/2020 06:23 AM    Glucose (POC) 181 (H) 12/25/2020 09:38 PM    Glucose (POC) 146 (H) 12/25/2020 04:38 PM    Glucose (POC) 232 (H) 12/25/2020 11:12 AM    Glucose (POC) 102 (H) 12/25/2020 06:04 AM     Lab Results   Component Value Date/Time    Color YELLOW/STRAW 07/14/2020 10:27 AM    Appearance CLEAR 07/14/2020 10:27 AM    Specific gravity 1.014 07/14/2020 10:27 AM    pH (UA) 6.0 07/14/2020 10:27 AM    Protein 30 (A) 07/14/2020 10:27 AM    Glucose Negative 07/14/2020 10:27 AM    Ketone Negative 07/14/2020 10:27 AM    Bilirubin Negative 07/14/2020 10:27 AM    Urobilinogen 0.2 07/14/2020 10:27 AM    Nitrites Negative 07/14/2020 10:27 AM    Leukocyte Esterase Negative 07/14/2020 10:27 AM    Epithelial cells FEW 07/14/2020 10:27 AM    Bacteria Negative 07/14/2020 10:27 AM    WBC 0-4 07/14/2020 10:27 AM    RBC 0-5 07/14/2020 10:27 AM       Med list reviewed  Current Facility-Administered Medications   Medication Dose Route Frequency    insulin glargine (LANTUS) injection 12 Units  12 Units SubCUTAneous QHS    metFORMIN (GLUCOPHAGE) tablet 1,000 mg  1,000 mg Oral BID WITH MEALS  hydrALAZINE (APRESOLINE) 20 mg/mL injection 10 mg  10 mg IntraVENous Q6H PRN    losartan (COZAAR) tablet 100 mg  100 mg Oral DAILY    influenza vaccine 2020-21 (6 mos+)(PF) (FLUARIX/FLULAVAL/FLUZONE QUAD) injection 0.5 mL  0.5 mL IntraMUSCular PRIOR TO DISCHARGE    insulin lispro (HUMALOG) injection   SubCUTAneous TIDAC    dextrose (D50W) injection syrg 12.5-25 g  12.5-25 g IntraVENous PRN    bisacodyL (DULCOLAX) suppository 10 mg  10 mg Rectal DAILY PRN    DAPTOmycin (CUBICIN) 400 mg in 0.9% sodium chloride 50 mL IVPB RF formulation  400 mg IntraVENous Q24H    polyethylene glycol (MIRALAX) packet 17 g  17 g Oral BID    bisacodyL (DULCOLAX) tablet 10 mg  10 mg Oral DAILY PRN    lactobac ac& pc-s.therm-b.anim (FESTUS Q/RISAQUAD)  1 Cap Oral DAILY    enoxaparin (LOVENOX) injection 40 mg  40 mg SubCUTAneous Q24H    sodium chloride (NS) flush 5-40 mL  5-40 mL IntraVENous Q8H    sodium chloride (NS) flush 5-40 mL  5-40 mL IntraVENous PRN    acetaminophen (TYLENOL) tablet 650 mg  650 mg Oral Q6H PRN    Or    acetaminophen (TYLENOL) suppository 650 mg  650 mg Rectal Q6H PRN    polyethylene glycol (MIRALAX) packet 17 g  17 g Oral DAILY PRN    promethazine (PHENERGAN) tablet 12.5 mg  12.5 mg Oral Q6H PRN    Or    ondansetron (ZOFRAN) injection 4 mg  4 mg IntraVENous Q6H PRN    amLODIPine (NORVASC) tablet 10 mg  10 mg Oral DAILY    aspirin delayed-release tablet 81 mg  81 mg Oral DAILY    glucose chewable tablet 16 g  4 Tab Oral PRN    glucagon (GLUCAGEN) injection 1 mg  1 mg IntraMUSCular PRN       Care Plan discussed with:  Patient/Family and Nurse    Favian Briones MD  Internal Medicine  Date of Service: 12/26/2020

## 2020-12-27 LAB
GLUCOSE BLD STRIP.AUTO-MCNC: 176 MG/DL (ref 65–100)
GLUCOSE BLD STRIP.AUTO-MCNC: 284 MG/DL (ref 65–100)
GLUCOSE BLD STRIP.AUTO-MCNC: 302 MG/DL (ref 65–100)
GLUCOSE BLD STRIP.AUTO-MCNC: 68 MG/DL (ref 65–100)
GLUCOSE BLD STRIP.AUTO-MCNC: 77 MG/DL (ref 65–100)
GLUCOSE BLD STRIP.AUTO-MCNC: 91 MG/DL (ref 65–100)
SERVICE CMNT-IMP: ABNORMAL
SERVICE CMNT-IMP: NORMAL

## 2020-12-27 PROCEDURE — 65270000032 HC RM SEMIPRIVATE

## 2020-12-27 PROCEDURE — 74011636637 HC RX REV CODE- 636/637: Performed by: INTERNAL MEDICINE

## 2020-12-27 PROCEDURE — 74011250636 HC RX REV CODE- 250/636: Performed by: INTERNAL MEDICINE

## 2020-12-27 PROCEDURE — 74011250637 HC RX REV CODE- 250/637: Performed by: INTERNAL MEDICINE

## 2020-12-27 PROCEDURE — 74011250637 HC RX REV CODE- 250/637: Performed by: HOSPITALIST

## 2020-12-27 PROCEDURE — 74011000258 HC RX REV CODE- 258: Performed by: INTERNAL MEDICINE

## 2020-12-27 PROCEDURE — 82962 GLUCOSE BLOOD TEST: CPT

## 2020-12-27 RX ADMIN — Medication 10 ML: at 06:55

## 2020-12-27 RX ADMIN — Medication 10 ML: at 22:23

## 2020-12-27 RX ADMIN — ENOXAPARIN SODIUM 40 MG: 40 INJECTION SUBCUTANEOUS at 17:02

## 2020-12-27 RX ADMIN — AMLODIPINE BESYLATE 10 MG: 5 TABLET ORAL at 09:08

## 2020-12-27 RX ADMIN — DAPTOMYCIN 400 MG: 500 INJECTION, POWDER, LYOPHILIZED, FOR SOLUTION INTRAVENOUS at 17:02

## 2020-12-27 RX ADMIN — INSULIN LISPRO 2 UNITS: 100 INJECTION, SOLUTION INTRAVENOUS; SUBCUTANEOUS at 12:42

## 2020-12-27 RX ADMIN — Medication 1 CAPSULE: at 09:08

## 2020-12-27 RX ADMIN — ASPIRIN 81 MG: 81 TABLET, COATED ORAL at 09:08

## 2020-12-27 RX ADMIN — LOSARTAN POTASSIUM 100 MG: 50 TABLET, FILM COATED ORAL at 09:09

## 2020-12-27 RX ADMIN — INSULIN GLARGINE 12 UNITS: 100 INJECTION, SOLUTION SUBCUTANEOUS at 22:21

## 2020-12-27 NOTE — PROGRESS NOTES
Bedside and Verbal shift change report given to Rica Fraire RN (oncoming nurse) by [employer], RN (offgoing nurse). Report included the following information SBAR, Kardex, ED Summary, Intake/Output, MAR and Recent Results.

## 2020-12-27 NOTE — PROGRESS NOTES
Hospitalist Progress Note      Hospital summary: Dwain Ruano is a 77 y.o. male who presents with right lower quadrant abdominal pain which started 1 week back worsen since Monday radiating towards the groin denies any fever chills nausea vomiting diarrhea or any other associated symptoms. States this started about 1 week ago, initially saw PCP Dr. Yolis Romero worsening since Friday. Saw Dr. Elvira Tomlinson on Monday who ordered outpatient CT scan and abdominal ultrasound 12/10/2020     INTERVAL HISTORY AND SUBJECTIVE:   No new issues  For CT in am  Cont on abx     Assessment/Plan:  Abdominal wall abscess  -CT abdomen 12/16: Right lateral abdominal wall musculature thickening is again noted. More discrete somewhat oblong fluid collection projects from the suprailiac region into the right inguinal region without evidence of gas. Increasing right flank and right chest wall subcutaneous edema.  - s/p I&D on 12/19- 20 cc of pus aspirated. - -Blood culture: NGTD  - drain cx: prelim - MRSA   - ID following  - rpt CT abd 12/23; subcutaneous edema in the right abdominal wall. There is a fluid collection with peripheral enhancement consistent with an abscess in the right inferior abdominal wall which extends from the pelvis into  the mid abdomen laterally and is slightly larger than the prior study  - s/p I&D 12/23 - 12 cc of pus was aspirated and sent for cultures.  Pigtail catheter placed - 10cc so far  -  c/w IV daptomycin- as above, for CT in a couple of days from today 12/24/2020 -scheduled for 12/28 am        Uncontrolled diabetes type 2   -  A1c 14  - appreciate Endocrinology input   - c/w lantus 8U, ISS  - DM diet   Lab Results   Component Value Date/Time    Glucose 87 12/24/2020 06:30 AM    Glucose (POC) 176 (H) 12/27/2020 11:13 AM        Hypertension   - c/w amlodipine, losartan   BP Readings from Last 1 Encounters:   12/27/20 124/75         Hyperlipidemia - statin on hold as pt on Dapto    APARNA - resolved     Brugada Syndrome s/p AICD   - Follows with cardiology  Dr. Bernadette Castillo.    Code status: Full  DVT prophylaxis: Lovenox  Disposition: TBD. Home when ready   ----------------------------------------------    CC: Abdominal wall pain and swelling     S: as above       Review of Systems:  A comprehensive review of systems was negative.     O:  Visit Vitals  /75   Pulse 68   Temp 97.3 °F (36.3 °C)   Resp 18   Ht 5' 7\" (1.702 m)   Wt 71.7 kg (158 lb) Comment: as of last week per chart review   SpO2 95%   BMI 24.75 kg/m²       PHYSICAL EXAM:  GEN:   No apparent distress; pleasant cooperative     HEENT:  Atraumatic;no nasal discharge; MM moist;non icteric sclarea  PUL:   Un labored breathing; no externally audible wheeze; no retractions    ABD:   Has wound drain in place Non distended; Soft; non tender  EXT:   No gross edema; muscle mass fair;   NEURO:  Non focal; oriented x 3  PSYCH:  Non agitated; not anxious   SKIN:   Non icteric; dry,        Intake/Output Summary (Last 24 hours) at 12/27/2020 1336  Last data filed at 12/27/2020 0845  Gross per 24 hour   Intake 700 ml   Output 30 ml   Net 670 ml        Recent labs & imaging reviewed:  Recent Results (from the past 24 hour(s))   GLUCOSE, POC    Collection Time: 12/26/20  4:01 PM   Result Value Ref Range    Glucose (POC) 186 (H) 65 - 100 mg/dL    Performed by Dangelo Conde    GLUCOSE, POC    Collection Time: 12/26/20  9:31 PM   Result Value Ref Range    Glucose (POC) 142 (H) 65 - 100 mg/dL    Performed by YINA ALBERT JR. [facility]  PCT    GLUCOSE, POC    Collection Time: 12/27/20  6:08 AM   Result Value Ref Range    Glucose (POC) 68 65 - 100 mg/dL    Performed by YINA ALBERT JR. [facility]  PCT    GLUCOSE, POC    Collection Time: 12/27/20  6:25 AM   Result Value Ref Range    Glucose (POC) 91 65 - 100 mg/dL    Performed by YINA ALBERT JR. [facility]  PCT    GLUCOSE, POC    Collection Time: 12/27/20 11:13 AM   Result Value Ref Range    Glucose (POC) 176 (H) 65 - 100 mg/dL    Performed by Francisco Ortiz      No results for input(s): WBC, HGB, HCT, PLT, HGBEXT, HCTEXT, PLTEXT, HGBEXT, HCTEXT, PLTEXT in the last 72 hours. No results for input(s): NA, K, CL, CO2, BUN, CREA, GLU, CA, MG, PHOS, URICA in the last 72 hours. No results for input(s): ALT, AP, TBIL, TBILI, TP, ALB, GLOB, GGT, AML, LPSE in the last 72 hours. No lab exists for component: SGOT, GPT, AMYP, HLPSE  No results for input(s): INR, PTP, APTT, INREXT, INREXT in the last 72 hours. No results for input(s): FE, TIBC, PSAT, FERR in the last 72 hours. No results found for: FOL, RBCF   No results for input(s): PH, PCO2, PO2 in the last 72 hours. No results for input(s): CPK, CKNDX, TROIQ in the last 72 hours.     No lab exists for component: CPKMB  Lab Results   Component Value Date/Time    Cholesterol, total 151 06/09/2020 04:19 PM    HDL Cholesterol 63 06/09/2020 04:19 PM    LDL, calculated 74 06/09/2020 04:19 PM    Triglyceride 70 06/09/2020 04:19 PM    CHOL/HDL Ratio 2.5 03/13/2019 03:23 AM     Lab Results   Component Value Date/Time    Glucose (POC) 176 (H) 12/27/2020 11:13 AM    Glucose (POC) 91 12/27/2020 06:25 AM    Glucose (POC) 68 12/27/2020 06:08 AM    Glucose (POC) 142 (H) 12/26/2020 09:31 PM    Glucose (POC) 186 (H) 12/26/2020 04:01 PM     Lab Results   Component Value Date/Time    Color YELLOW/STRAW 07/14/2020 10:27 AM    Appearance CLEAR 07/14/2020 10:27 AM    Specific gravity 1.014 07/14/2020 10:27 AM    pH (UA) 6.0 07/14/2020 10:27 AM    Protein 30 (A) 07/14/2020 10:27 AM    Glucose Negative 07/14/2020 10:27 AM    Ketone Negative 07/14/2020 10:27 AM    Bilirubin Negative 07/14/2020 10:27 AM    Urobilinogen 0.2 07/14/2020 10:27 AM    Nitrites Negative 07/14/2020 10:27 AM    Leukocyte Esterase Negative 07/14/2020 10:27 AM    Epithelial cells FEW 07/14/2020 10:27 AM    Bacteria Negative 07/14/2020 10:27 AM    WBC 0-4 07/14/2020 10:27 AM    RBC 0-5 07/14/2020 10:27 AM       Med list reviewed  Current Facility-Administered Medications   Medication Dose Route Frequency   • insulin glargine (LANTUS) injection 12 Units  12 Units SubCUTAneous QHS   • metFORMIN (GLUCOPHAGE) tablet 1,000 mg  1,000 mg Oral BID WITH MEALS   • hydrALAZINE (APRESOLINE) 20 mg/mL injection 10 mg  10 mg IntraVENous Q6H PRN   • losartan (COZAAR) tablet 100 mg  100 mg Oral DAILY   • influenza vaccine 2020-21 (6 mos+)(PF) (FLUARIX/FLULAVAL/FLUZONE QUAD) injection 0.5 mL  0.5 mL IntraMUSCular PRIOR TO DISCHARGE   • insulin lispro (HUMALOG) injection   SubCUTAneous TIDAC   • dextrose (D50W) injection syrg 12.5-25 g  12.5-25 g IntraVENous PRN   • bisacodyL (DULCOLAX) suppository 10 mg  10 mg Rectal DAILY PRN   • DAPTOmycin (CUBICIN) 400 mg in 0.9% sodium chloride 50 mL IVPB RF formulation  400 mg IntraVENous Q24H   • polyethylene glycol (MIRALAX) packet 17 g  17 g Oral BID   • bisacodyL (DULCOLAX) tablet 10 mg  10 mg Oral DAILY PRN   • lactobac ac& pc-s.therm-b.anim (FESTUS Q/RISAQUAD)  1 Cap Oral DAILY   • enoxaparin (LOVENOX) injection 40 mg  40 mg SubCUTAneous Q24H   • sodium chloride (NS) flush 5-40 mL  5-40 mL IntraVENous Q8H   • sodium chloride (NS) flush 5-40 mL  5-40 mL IntraVENous PRN   • acetaminophen (TYLENOL) tablet 650 mg  650 mg Oral Q6H PRN    Or   • acetaminophen (TYLENOL) suppository 650 mg  650 mg Rectal Q6H PRN   • polyethylene glycol (MIRALAX) packet 17 g  17 g Oral DAILY PRN   • promethazine (PHENERGAN) tablet 12.5 mg  12.5 mg Oral Q6H PRN    Or   • ondansetron (ZOFRAN) injection 4 mg  4 mg IntraVENous Q6H PRN   • amLODIPine (NORVASC) tablet 10 mg  10 mg Oral DAILY   • aspirin delayed-release tablet 81 mg  81 mg Oral DAILY   • glucose chewable tablet 16 g  4 Tab Oral PRN   • glucagon (GLUCAGEN) injection 1 mg  1 mg IntraMUSCular PRN       Care Plan discussed with:  Patient/Family and Nurse    Wilberto Ortega MD  Internal Medicine  Date of Service: 12/27/2020

## 2020-12-28 ENCOUNTER — APPOINTMENT (OUTPATIENT)
Dept: CT IMAGING | Age: 65
DRG: 857 | End: 2020-12-28
Attending: INTERNAL MEDICINE
Payer: COMMERCIAL

## 2020-12-28 LAB
ALBUMIN SERPL-MCNC: 2.5 G/DL (ref 3.5–5)
ALBUMIN/GLOB SERPL: 0.4 {RATIO} (ref 1.1–2.2)
ALP SERPL-CCNC: 99 U/L (ref 45–117)
ALT SERPL-CCNC: 22 U/L (ref 12–78)
ANION GAP SERPL CALC-SCNC: 5 MMOL/L (ref 5–15)
AST SERPL-CCNC: 19 U/L (ref 15–37)
BASOPHILS # BLD: 0.1 K/UL (ref 0–0.1)
BASOPHILS NFR BLD: 1 % (ref 0–1)
BILIRUB SERPL-MCNC: 0.2 MG/DL (ref 0.2–1)
BUN SERPL-MCNC: 25 MG/DL (ref 6–20)
BUN/CREAT SERPL: 18 (ref 12–20)
CALCIUM SERPL-MCNC: 9.5 MG/DL (ref 8.5–10.1)
CHLORIDE SERPL-SCNC: 103 MMOL/L (ref 97–108)
CO2 SERPL-SCNC: 30 MMOL/L (ref 21–32)
CREAT SERPL-MCNC: 1.4 MG/DL (ref 0.7–1.3)
DIFFERENTIAL METHOD BLD: ABNORMAL
EOSINOPHIL # BLD: 0.1 K/UL (ref 0–0.4)
EOSINOPHIL NFR BLD: 2 % (ref 0–7)
ERYTHROCYTE [DISTWIDTH] IN BLOOD BY AUTOMATED COUNT: 13.5 % (ref 11.5–14.5)
GLOBULIN SER CALC-MCNC: 5.6 G/DL (ref 2–4)
GLUCOSE BLD STRIP.AUTO-MCNC: 125 MG/DL (ref 65–100)
GLUCOSE BLD STRIP.AUTO-MCNC: 168 MG/DL (ref 65–100)
GLUCOSE BLD STRIP.AUTO-MCNC: 73 MG/DL (ref 65–100)
GLUCOSE BLD STRIP.AUTO-MCNC: 90 MG/DL (ref 65–100)
GLUCOSE SERPL-MCNC: 180 MG/DL (ref 65–100)
HCT VFR BLD AUTO: 32.1 % (ref 36.6–50.3)
HGB BLD-MCNC: 10 G/DL (ref 12.1–17)
IMM GRANULOCYTES # BLD AUTO: 0 K/UL (ref 0–0.04)
IMM GRANULOCYTES NFR BLD AUTO: 0 % (ref 0–0.5)
LYMPHOCYTES # BLD: 1 K/UL (ref 0.8–3.5)
LYMPHOCYTES NFR BLD: 17 % (ref 12–49)
MCH RBC QN AUTO: 26.3 PG (ref 26–34)
MCHC RBC AUTO-ENTMCNC: 31.2 G/DL (ref 30–36.5)
MCV RBC AUTO: 84.5 FL (ref 80–99)
MONOCYTES # BLD: 0.3 K/UL (ref 0–1)
MONOCYTES NFR BLD: 5 % (ref 5–13)
NEUTS SEG # BLD: 4.5 K/UL (ref 1.8–8)
NEUTS SEG NFR BLD: 75 % (ref 32–75)
NRBC # BLD: 0 K/UL (ref 0–0.01)
NRBC BLD-RTO: 0 PER 100 WBC
PLATELET # BLD AUTO: 332 K/UL (ref 150–400)
PMV BLD AUTO: 8.9 FL (ref 8.9–12.9)
POTASSIUM SERPL-SCNC: 4.2 MMOL/L (ref 3.5–5.1)
PROT SERPL-MCNC: 8.1 G/DL (ref 6.4–8.2)
RBC # BLD AUTO: 3.8 M/UL (ref 4.1–5.7)
SERVICE CMNT-IMP: ABNORMAL
SERVICE CMNT-IMP: ABNORMAL
SERVICE CMNT-IMP: NORMAL
SERVICE CMNT-IMP: NORMAL
SODIUM SERPL-SCNC: 138 MMOL/L (ref 136–145)
WBC # BLD AUTO: 6 K/UL (ref 4.1–11.1)

## 2020-12-28 PROCEDURE — 82962 GLUCOSE BLOOD TEST: CPT

## 2020-12-28 PROCEDURE — 74011000636 HC RX REV CODE- 636: Performed by: RADIOLOGY

## 2020-12-28 PROCEDURE — 74011250637 HC RX REV CODE- 250/637: Performed by: INTERNAL MEDICINE

## 2020-12-28 PROCEDURE — 65270000032 HC RM SEMIPRIVATE

## 2020-12-28 PROCEDURE — 74011000258 HC RX REV CODE- 258: Performed by: INTERNAL MEDICINE

## 2020-12-28 PROCEDURE — 74011636637 HC RX REV CODE- 636/637: Performed by: INTERNAL MEDICINE

## 2020-12-28 PROCEDURE — 74011250637 HC RX REV CODE- 250/637: Performed by: HOSPITALIST

## 2020-12-28 PROCEDURE — 80053 COMPREHEN METABOLIC PANEL: CPT

## 2020-12-28 PROCEDURE — 74011250636 HC RX REV CODE- 250/636: Performed by: INTERNAL MEDICINE

## 2020-12-28 PROCEDURE — 74177 CT ABD & PELVIS W/CONTRAST: CPT

## 2020-12-28 PROCEDURE — 36415 COLL VENOUS BLD VENIPUNCTURE: CPT

## 2020-12-28 PROCEDURE — 85025 COMPLETE CBC W/AUTO DIFF WBC: CPT

## 2020-12-28 RX ORDER — SODIUM CHLORIDE 9 MG/ML
75 INJECTION, SOLUTION INTRAVENOUS CONTINUOUS
Status: DISCONTINUED | OUTPATIENT
Start: 2020-12-28 | End: 2020-12-30

## 2020-12-28 RX ADMIN — ASPIRIN 81 MG: 81 TABLET, COATED ORAL at 13:09

## 2020-12-28 RX ADMIN — DAPTOMYCIN 400 MG: 500 INJECTION, POWDER, LYOPHILIZED, FOR SOLUTION INTRAVENOUS at 19:12

## 2020-12-28 RX ADMIN — METFORMIN HYDROCHLORIDE 1000 MG: 500 TABLET ORAL at 07:03

## 2020-12-28 RX ADMIN — Medication 10 ML: at 19:13

## 2020-12-28 RX ADMIN — Medication 10 ML: at 07:04

## 2020-12-28 RX ADMIN — AMLODIPINE BESYLATE 10 MG: 5 TABLET ORAL at 13:10

## 2020-12-28 RX ADMIN — ENOXAPARIN SODIUM 40 MG: 40 INJECTION SUBCUTANEOUS at 19:11

## 2020-12-28 RX ADMIN — LOSARTAN POTASSIUM 100 MG: 50 TABLET, FILM COATED ORAL at 13:09

## 2020-12-28 RX ADMIN — Medication 1 CAPSULE: at 13:09

## 2020-12-28 RX ADMIN — INSULIN GLARGINE 12 UNITS: 100 INJECTION, SOLUTION SUBCUTANEOUS at 23:22

## 2020-12-28 RX ADMIN — SODIUM CHLORIDE 75 ML/HR: 9 INJECTION, SOLUTION INTRAVENOUS at 19:07

## 2020-12-28 RX ADMIN — IOPAMIDOL 100 ML: 755 INJECTION, SOLUTION INTRAVENOUS at 12:09

## 2020-12-28 NOTE — PROGRESS NOTES
ID Progress Note  2020    Subjective:   Afebrile. Right flank still indurated. Objective:     Vitals:   Visit Vitals  BP (!) 142/80 (BP 1 Location: Right arm, BP Patient Position: Sitting)   Pulse 80   Temp 97.1 °F (36.2 °C)   Resp 18   Ht 5' 7\" (1.702 m)   Wt 71.7 kg (158 lb) Comment: as of last week per chart review   SpO2 97%   BMI 24.75 kg/m²        Tmax:  Temp (24hrs), Av.8 °F (36.6 °C), Min:97.1 °F (36.2 °C), Max:98.8 °F (37.1 °C)      Exam:    Not in distress  Abdomen:  indurated right flank   Speech fluent     Labs:   Lab Results   Component Value Date/Time    WBC 6.0 2020 03:20 AM    HGB 10.0 (L) 2020 03:20 AM    HCT 32.1 (L) 2020 03:20 AM    PLATELET 024  03:20 AM    MCV 84.5 2020 03:20 AM     Lab Results   Component Value Date/Time    Sodium 138 2020 03:20 AM    Potassium 4.2 2020 03:20 AM    Chloride 103 2020 03:20 AM    CO2 30 2020 03:20 AM    Anion gap 5 2020 03:20 AM    Glucose 180 (H) 2020 03:20 AM    BUN 25 (H) 2020 03:20 AM    Creatinine 1.40 (H) 2020 03:20 AM    BUN/Creatinine ratio 18 2020 03:20 AM    GFR est AA >60 2020 03:20 AM    GFR est non-AA 51 (L) 2020 03:20 AM    Calcium 9.5 2020 03:20 AM    Bilirubin, total 0.2 2020 03:20 AM    Alk. phosphatase 99 2020 03:20 AM    Protein, total 8.1 2020 03:20 AM    Albumin 2.5 (L) 2020 03:20 AM    Globulin 5.6 (H) 2020 03:20 AM    A-G Ratio 0.4 (L) 2020 03:20 AM    ALT (SGPT) 22 2020 03:20 AM           Assessment:     #1 abdominal wall abscess      #2 mild renal insufficiency     #3 syncope status post ICD placement (due to inducible VT/VF)            Recommendations:      cultures growing staph aureus. Continue dapto. Check ck tomorrow    Repeat ct today shows a smaller collection but still present. Drain only put out 30 cc today. Would ask surgery to see the patient again.      Cr elevated and he received IV contrast today.  Start NS at 76 cc/hr                          Tomás Chávez MD

## 2020-12-28 NOTE — PROGRESS NOTES
Bedside and Verbal shift change report given to Felecia Sahni RN (oncoming nurse) by CHELSEA Calzada (offgoing nurse). Report included the following information SBAR, Kardex, ED Summary, Intake/Output, MAR and Recent Results.

## 2020-12-28 NOTE — PROGRESS NOTES
1600 - Transition of Care  (ITZEL) Plan:        RUR:  19 %       LOS: 7 days    GLOS: 2.9     Dx: Cellulitis                    Notes:     -- Abdominal wall abscess - On ABX - ID following - improvement in infection. Disposition:   Home    Transport:      TBD     CM will continue to follow and assist with ITZEL needs as they arise. Available on [employer] Shagufta DE JESUS, [address] [facility] [place],  RN, [facility] - [phone].

## 2020-12-28 NOTE — PROGRESS NOTES
Hospitalist Progress Note      Hospital summary: Mora Stringer is a 77 y.o. male who presents with right lower quadrant abdominal pain which started 1 week back worsen since Monday radiating towards the groin denies any fever chills nausea vomiting diarrhea or any other associated symptoms. States this started about 1 week ago, initially saw PCP Dr. Lucy Joshi worsening since Friday. Saw Dr. Naz Merlos on Monday who ordered outpatient CT scan and abdominal ultrasound 12/10/2020     INTERVAL HISTORY AND SUBJECTIVE:   12/28/2020 : CT - improvement in infection foot print rt flank/abd wall     Assessment/Plan:  Abdominal wall abscess  -CT abdomen 12/16: Right lateral abdominal wall musculature thickening is again noted. More discrete somewhat oblong fluid collection projects from the suprailiac region into the right inguinal region without evidence of gas. Increasing right flank and right chest wall subcutaneous edema.  - s/p I&D on 12/19- 20 cc of pus aspirated. - -Blood culture: NGTD  - drain cx: prelim - MRSA   - ID following  - rpt CT abd 12/23; subcutaneous edema in the right abdominal wall. There is a fluid collection with peripheral enhancement consistent with an abscess in the right inferior abdominal wall which extends from the pelvis into  the mid abdomen laterally and is slightly larger than the prior study  - s/p I&D 12/23 - 12 cc of pus was aspirated and sent for cultures.  Pigtail catheter placed - 10cc so far  -  c/w IV daptomycin- as above, for CT in a couple of days from today 12/24/2020 -scheduled for 12/28 am   - CT 12/28/2020 : CT - improvement in infection foot print rt flank/abd wall        Uncontrolled diabetes type 2   -  A1c 14  - appreciate Endocrinology input   - c/w lantus 8U, ISS  - DM diet   Lab Results   Component Value Date/Time    Glucose 180 (H) 12/28/2020 03:20 AM    Glucose (POC) 73 12/28/2020 11:56 AM        Hypertension   - c/w amlodipine, losartan BP Readings from Last 1 Encounters:   12/28/20 (!) 142/80         Hyperlipidemia - statin on hold as pt on Dapto      APARNA - resolved     Brugada Syndrome s/p AICD   - Follows with cardiology  Dr. Patricio Esquivel.    Code status: Full  DVT prophylaxis: Lovenox  Disposition: TBD. Home when ready   ----------------------------------------------    CC: Abdominal wall pain and swelling     S: as above       Review of Systems:  A comprehensive review of systems was negative.     O:  Visit Vitals  BP (!) 142/80 (BP 1 Location: Right arm, BP Patient Position: Sitting)   Pulse 80   Temp 97.1 °F (36.2 °C)   Resp 18   Ht 5' 7\" (1.702 m)   Wt 71.7 kg (158 lb) Comment: as of last week per chart review   SpO2 97%   BMI 24.75 kg/m²       PHYSICAL EXAM:  GEN:   No apparent distress; pleasant cooperative     HEENT:  Atraumatic;no nasal discharge; MM moist;non icteric sclarea  PUL:   Un labored breathing; no externally audible wheeze; no retractions    ABD:   Has wound drain in place Non distended; Soft; non tender  EXT:   No gross edema; muscle mass fair;   NEURO:  Non focal; oriented x 3  PSYCH:  Non agitated; not anxious   SKIN:   Non icteric; dry,        Intake/Output Summary (Last 24 hours) at 12/28/2020 1440  Last data filed at 12/28/2020 0706  Gross per 24 hour   Intake --   Output 1050 ml   Net -1050 ml        Recent labs & imaging reviewed:  Recent Results (from the past 24 hour(s))   GLUCOSE, POC    Collection Time: 12/27/20  4:13 PM   Result Value Ref Range    Glucose (POC) 77 65 - 100 mg/dL    Performed by [address] [place] [address], POC    Collection Time: 12/27/20  9:41 PM   Result Value Ref Range    Glucose (POC) 302 (H) 65 - 100 mg/dL    Performed by Yolanda Galvin  [facility]    GLUCOSE, POC    Collection Time: 12/27/20 10:19 PM   Result Value Ref Range    Glucose (POC) 284 (H) 65 - 100 mg/dL    Performed by [facility]    CBC WITH AUTOMATED DIFF    Collection Time: 12/28/20  3:20 AM   Result Value Ref Range    WBC 6.0 4.1 - 11.1 K/uL    RBC 3.80 (L) 4.10 - 5.70 M/uL    HGB 10.0 (L) 12.1 - 17.0 g/dL    HCT 32.1 (L) 36.6 - 50.3 %    MCV 84.5 80.0 - 99.0 FL    MCH 26.3 26.0 - 34.0 PG    MCHC 31.2 30.0 - 36.5 g/dL    RDW 13.5 11.5 - 14.5 %    PLATELET 702 646 - 735 K/uL    MPV 8.9 8.9 - 12.9 FL    NRBC 0.0 0  WBC    ABSOLUTE NRBC 0.00 0.00 - 0.01 K/uL    NEUTROPHILS 75 32 - 75 %    LYMPHOCYTES 17 12 - 49 %    MONOCYTES 5 5 - 13 %    EOSINOPHILS 2 0 - 7 %    BASOPHILS 1 0 - 1 %    IMMATURE GRANULOCYTES 0 0.0 - 0.5 %    ABS. NEUTROPHILS 4.5 1.8 - 8.0 K/UL    ABS. LYMPHOCYTES 1.0 0.8 - 3.5 K/UL    ABS. MONOCYTES 0.3 0.0 - 1.0 K/UL    ABS. EOSINOPHILS 0.1 0.0 - 0.4 K/UL    ABS. BASOPHILS 0.1 0.0 - 0.1 K/UL    ABS. IMM. GRANS. 0.0 0.00 - 0.04 K/UL    DF AUTOMATED     METABOLIC PANEL, COMPREHENSIVE    Collection Time: 12/28/20  3:20 AM   Result Value Ref Range    Sodium 138 136 - 145 mmol/L    Potassium 4.2 3.5 - 5.1 mmol/L    Chloride 103 97 - 108 mmol/L    CO2 30 21 - 32 mmol/L    Anion gap 5 5 - 15 mmol/L    Glucose 180 (H) 65 - 100 mg/dL    BUN 25 (H) 6 - 20 MG/DL    Creatinine 1.40 (H) 0.70 - 1.30 MG/DL    BUN/Creatinine ratio 18 12 - 20      GFR est AA >60 >60 ml/min/1.73m2    GFR est non-AA 51 (L) >60 ml/min/1.73m2    Calcium 9.5 8.5 - 10.1 MG/DL    Bilirubin, total 0.2 0.2 - 1.0 MG/DL    ALT (SGPT) 22 12 - 78 U/L    AST (SGOT) 19 15 - 37 U/L    Alk.  phosphatase 99 45 - 117 U/L    Protein, total 8.1 6.4 - 8.2 g/dL    Albumin 2.5 (L) 3.5 - 5.0 g/dL    Globulin 5.6 (H) 2.0 - 4.0 g/dL    A-G Ratio 0.4 (L) 1.1 - 2.2     GLUCOSE, POC    Collection Time: 12/28/20  6:11 AM   Result Value Ref Range    Glucose (POC) 125 (H) 65 - 100 mg/dL    Performed by [address], POC    Collection Time: 12/28/20 11:56 AM   Result Value Ref Range    Glucose (POC) 73 65 - 100 mg/dL    Performed by [address] [place] [address] Drive     12/28/20  0320   WBC 6.0   HGB 10.0*   HCT 32.1*        Recent Labs     12/28/20  0320      K 4.2   CL 103   CO2 30   BUN 25*   CREA 1.40*   *   CA 9.5     Recent Labs     12/28/20  0320   ALT 22   AP 99   TBILI 0.2   TP 8.1   ALB 2.5*   GLOB 5.6*     No results for input(s): INR, PTP, APTT, INREXT, INREXT in the last 72 hours. No results for input(s): FE, TIBC, PSAT, FERR in the last 72 hours. No results found for: FOL, RBCF   No results for input(s): PH, PCO2, PO2 in the last 72 hours. No results for input(s): CPK, CKNDX, TROIQ in the last 72 hours.     No lab exists for component: CPKMB  Lab Results   Component Value Date/Time    Cholesterol, total 151 06/09/2020 04:19 PM    HDL Cholesterol 63 06/09/2020 04:19 PM    LDL, calculated 74 06/09/2020 04:19 PM    Triglyceride 70 06/09/2020 04:19 PM    CHOL/HDL Ratio 2.5 03/13/2019 03:23 AM     Lab Results   Component Value Date/Time    Glucose (POC) 73 12/28/2020 11:56 AM    Glucose (POC) 125 (H) 12/28/2020 06:11 AM    Glucose (POC) 284 (H) 12/27/2020 10:19 PM    Glucose (POC) 302 (H) 12/27/2020 09:41 PM    Glucose (POC) 77 12/27/2020 04:13 PM     Lab Results   Component Value Date/Time    Color YELLOW/STRAW 07/14/2020 10:27 AM    Appearance CLEAR 07/14/2020 10:27 AM    Specific gravity 1.014 07/14/2020 10:27 AM    pH (UA) 6.0 07/14/2020 10:27 AM    Protein 30 (A) 07/14/2020 10:27 AM    Glucose Negative 07/14/2020 10:27 AM    Ketone Negative 07/14/2020 10:27 AM    Bilirubin Negative 07/14/2020 10:27 AM    Urobilinogen 0.2 07/14/2020 10:27 AM    Nitrites Negative 07/14/2020 10:27 AM    Leukocyte Esterase Negative 07/14/2020 10:27 AM    Epithelial cells FEW 07/14/2020 10:27 AM    Bacteria Negative 07/14/2020 10:27 AM    WBC 0-4 07/14/2020 10:27 AM    RBC 0-5 07/14/2020 10:27 AM       Med list reviewed  Current Facility-Administered Medications   Medication Dose Route Frequency    insulin glargine (LANTUS) injection 12 Units  12 Units SubCUTAneous QHS    metFORMIN (GLUCOPHAGE) tablet 1,000 mg  1,000 mg Oral BID WITH MEALS    hydrALAZINE (APRESOLINE) 20 mg/mL injection 10 mg  10 mg IntraVENous Q6H PRN    losartan (COZAAR) tablet 100 mg  100 mg Oral DAILY    influenza vaccine 2020-21 (6 mos+)(PF) (FLUARIX/FLULAVAL/FLUZONE QUAD) injection 0.5 mL  0.5 mL IntraMUSCular PRIOR TO DISCHARGE    insulin lispro (HUMALOG) injection   SubCUTAneous TIDAC    dextrose (D50W) injection syrg 12.5-25 g  12.5-25 g IntraVENous PRN    bisacodyL (DULCOLAX) suppository 10 mg  10 mg Rectal DAILY PRN    DAPTOmycin (CUBICIN) 400 mg in 0.9% sodium chloride 50 mL IVPB RF formulation  400 mg IntraVENous Q24H    polyethylene glycol (MIRALAX) packet 17 g  17 g Oral BID    bisacodyL (DULCOLAX) tablet 10 mg  10 mg Oral DAILY PRN    lactobac ac& pc-s.therm-b.anim (FESTUS Q/RISAQUAD)  1 Cap Oral DAILY    enoxaparin (LOVENOX) injection 40 mg  40 mg SubCUTAneous Q24H    sodium chloride (NS) flush 5-40 mL  5-40 mL IntraVENous Q8H    sodium chloride (NS) flush 5-40 mL  5-40 mL IntraVENous PRN    acetaminophen (TYLENOL) tablet 650 mg  650 mg Oral Q6H PRN    Or    acetaminophen (TYLENOL) suppository 650 mg  650 mg Rectal Q6H PRN    polyethylene glycol (MIRALAX) packet 17 g  17 g Oral DAILY PRN    promethazine (PHENERGAN) tablet 12.5 mg  12.5 mg Oral Q6H PRN    Or    ondansetron (ZOFRAN) injection 4 mg  4 mg IntraVENous Q6H PRN    amLODIPine (NORVASC) tablet 10 mg  10 mg Oral DAILY    aspirin delayed-release tablet 81 mg  81 mg Oral DAILY    glucose chewable tablet 16 g  4 Tab Oral PRN    glucagon (GLUCAGEN) injection 1 mg  1 mg IntraMUSCular PRN       Care Plan discussed with:  Patient/Family and Nurse    Alesia Horton MD  Internal Medicine  Date of Service: 12/28/2020

## 2020-12-29 LAB
CK SERPL-CCNC: 80 U/L (ref 39–308)
GLUCOSE BLD STRIP.AUTO-MCNC: 111 MG/DL (ref 65–100)
GLUCOSE BLD STRIP.AUTO-MCNC: 179 MG/DL (ref 65–100)
GLUCOSE BLD STRIP.AUTO-MCNC: 212 MG/DL (ref 65–100)
GLUCOSE BLD STRIP.AUTO-MCNC: 98 MG/DL (ref 65–100)
SERVICE CMNT-IMP: ABNORMAL
SERVICE CMNT-IMP: NORMAL

## 2020-12-29 PROCEDURE — 74011000258 HC RX REV CODE- 258: Performed by: INTERNAL MEDICINE

## 2020-12-29 PROCEDURE — 82550 ASSAY OF CK (CPK): CPT

## 2020-12-29 PROCEDURE — 74011250637 HC RX REV CODE- 250/637: Performed by: INTERNAL MEDICINE

## 2020-12-29 PROCEDURE — 82962 GLUCOSE BLOOD TEST: CPT

## 2020-12-29 PROCEDURE — 36415 COLL VENOUS BLD VENIPUNCTURE: CPT

## 2020-12-29 PROCEDURE — 74011636637 HC RX REV CODE- 636/637: Performed by: INTERNAL MEDICINE

## 2020-12-29 PROCEDURE — 74011250637 HC RX REV CODE- 250/637: Performed by: HOSPITALIST

## 2020-12-29 PROCEDURE — 74011250636 HC RX REV CODE- 250/636: Performed by: INTERNAL MEDICINE

## 2020-12-29 PROCEDURE — 99232 SBSQ HOSP IP/OBS MODERATE 35: CPT | Performed by: INTERNAL MEDICINE

## 2020-12-29 PROCEDURE — 65270000032 HC RM SEMIPRIVATE

## 2020-12-29 RX ADMIN — ENOXAPARIN SODIUM 40 MG: 40 INJECTION SUBCUTANEOUS at 17:48

## 2020-12-29 RX ADMIN — LOSARTAN POTASSIUM 100 MG: 50 TABLET, FILM COATED ORAL at 09:21

## 2020-12-29 RX ADMIN — Medication 1 CAPSULE: at 09:21

## 2020-12-29 RX ADMIN — ASPIRIN 81 MG: 81 TABLET, COATED ORAL at 09:21

## 2020-12-29 RX ADMIN — AMLODIPINE BESYLATE 10 MG: 5 TABLET ORAL at 09:21

## 2020-12-29 RX ADMIN — SODIUM CHLORIDE 75 ML/HR: 9 INJECTION, SOLUTION INTRAVENOUS at 09:28

## 2020-12-29 RX ADMIN — INSULIN GLARGINE 12 UNITS: 100 INJECTION, SOLUTION SUBCUTANEOUS at 22:41

## 2020-12-29 RX ADMIN — DAPTOMYCIN 400 MG: 500 INJECTION, POWDER, LYOPHILIZED, FOR SOLUTION INTRAVENOUS at 17:48

## 2020-12-29 RX ADMIN — INSULIN LISPRO 3 UNITS: 100 INJECTION, SOLUTION INTRAVENOUS; SUBCUTANEOUS at 11:30

## 2020-12-29 NOTE — PROGRESS NOTES
Hospital Progress Note    NAME:  Adrienne Shaw   :   1955   MRN:  [id number]     Date/Time:  2020 9:03 AM    Plan:   1. Repeat surgical opinion pending  2. Continue IV antibiotics  3. Minimal drainage  Risk of Deterioration: Low  []           Moderate  [x]           High  []                 Assessment:   Principal Problem:    Cellulitis (2020)     CT abdomen : Right lateral abdominal wall musculature thickening is again noted. More discrete somewhat oblong fluid collection projects from the suprailiac region into the right inguinal region without evidence of gas. Increasing right flank and right chest wall subcutaneous edema.  - s/p I&D on - 20 cc of pus aspirated.   - -Blood culture: NGTD  - drain cx: prelim - MRSA   - ID following  - rpt CT abd ; subcutaneous edema in the right abdominal wall. There is a fluid collection with peripheral enhancement consistent with an abscess in the right inferior abdominal wall which extends from the pelvis into  the mid abdomen laterally and is slightly larger than the prior study  - s/p I&D  - 12 cc of pus was aspirated and sent for cultures. Pigtail catheter placed - 10cc so far  -  c/w IV daptomycin- as above, for CT in a couple of days from today 2020 -scheduled for  am   - CT 2020 : CT - improvement in infection foot print rt flank/abd wall     Active Problems:    Dyslipidemia (2014)     Continue Lipitor      Uncontrolled type 2 diabetes mellitus with complication, with long-term current use of insulin ([address] (2018)      Basal bolus      Brugada syndrome (2018)     Results of [facility] noted to be POSITIVE.                 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  SCN5A gene is associated with autosomal                   dominant                    Brugada Syndrome, long QT, dilated cardiomyopathy, and afib.                    - Follows up with Dr. Carlos Delarosa.              - ICD Check 10/28/20 - No events. Less than 1% RVP              - Cardiac cath 2019 with mild luminal irregularities           ICD (implantable cardioverter-defibrillator) in place (12/21/2018)      Hypertension (12/21/2018)      Abdominal pain (12/10/2020)      ARF (acute renal failure) ([facility] (12/12/2020)     Resolved     Now exacerbated by IV contrast      Moderate protein-calorie malnutrition ([address] (12/20/2020)     Nutrition assistance appreciated          Admitting notes:65 y. o. male who presents with right lower quadrant abdominal pain which started 1 week back worsen since Monday radiating towards the groin denies any fever chills nausea vomiting diarrhea or any other associated symptoms. States this started about 1 week ago, initially saw PCP Dr. John Paul Martin worsening since Friday. Saw Dr. Heriberto Foreman on Monday who ordered outpatient CT scan and abdominal ultrasound      Subjective: Feeling better this morning and voices no new complaints.        11 Point Review of Systems:   Negative except no chest pain no shortness of breath    []            Unable to obtain ROS due to:       []            mental status change []            sedated []            intubated     Social History     Tobacco Use    Smoking status: Never Smoker    Smokeless tobacco: Never Used   Substance Use Topics    Alcohol use: No     Medications reviewed:  Current Facility-Administered Medications   Medication Dose Route Frequency    0.9% sodium chloride infusion  75 mL/hr IntraVENous CONTINUOUS    insulin glargine (LANTUS) injection 12 Units  12 Units SubCUTAneous QHS    metFORMIN (GLUCOPHAGE) tablet 1,000 mg  1,000 mg Oral BID WITH MEALS    hydrALAZINE (APRESOLINE) 20 mg/mL injection 10 mg  10 mg IntraVENous Q6H PRN    losartan (COZAAR) tablet 100 mg  100 mg Oral DAILY    influenza vaccine 2020-21 (6 mos+)(PF) (FLUARIX/FLULAVAL/FLUZONE QUAD) injection 0.5 mL  0.5 mL IntraMUSCular PRIOR TO DISCHARGE    insulin lispro (HUMALOG) injection   SubCUTAneous TIDAC    dextrose (D50W) injection syrg 12.5-25 g  12.5-25 g IntraVENous PRN    bisacodyL (DULCOLAX) suppository 10 mg  10 mg Rectal DAILY PRN    DAPTOmycin (CUBICIN) 400 mg in 0.9% sodium chloride 50 mL IVPB RF formulation  400 mg IntraVENous Q24H    polyethylene glycol (MIRALAX) packet 17 g  17 g Oral BID    bisacodyL (DULCOLAX) tablet 10 mg  10 mg Oral DAILY PRN    lactobac ac& pc-s.therm-b.anim (FESTUS Q/RISAQUAD)  1 Cap Oral DAILY    enoxaparin (LOVENOX) injection 40 mg  40 mg SubCUTAneous Q24H    sodium chloride (NS) flush 5-40 mL  5-40 mL IntraVENous Q8H    sodium chloride (NS) flush 5-40 mL  5-40 mL IntraVENous PRN    acetaminophen (TYLENOL) tablet 650 mg  650 mg Oral Q6H PRN    Or    acetaminophen (TYLENOL) suppository 650 mg  650 mg Rectal Q6H PRN    polyethylene glycol (MIRALAX) packet 17 g  17 g Oral DAILY PRN    promethazine (PHENERGAN) tablet 12.5 mg  12.5 mg Oral Q6H PRN    Or    ondansetron (ZOFRAN) injection 4 mg  4 mg IntraVENous Q6H PRN    amLODIPine (NORVASC) tablet 10 mg  10 mg Oral DAILY    aspirin delayed-release tablet 81 mg  81 mg Oral DAILY    glucose chewable tablet 16 g  4 Tab Oral PRN    glucagon (GLUCAGEN) injection 1 mg  1 mg IntraMUSCular PRN        Objective:   Vitals:  Visit Vitals  /66   Pulse 66   Temp 98.2 °F (36.8 °C)   Resp 18   Ht 5' 7\" (1.702 m)   Wt 158 lb (71.7 kg) Comment: as of last week per chart review   SpO2 93%   BMI 24.75 kg/m²     Temp (24hrs), Av.7 °F (36.5 °C), Min:97.1 °F (36.2 °C), Max:98.2 °F (36.8 °C)      O2 Device: Room air    Last 24hr Input/Output:    Intake/Output Summary (Last 24 hours) at 2020 5755  Last data filed at 2020 9192  Gross per 24 hour   Intake 320 ml   Output 1120 ml   Net -800 ml        PHYSICAL EXAM:  General:    Alert, cooperative, no distress, appears stated age. Head:   Normocephalic, without obvious abnormality, atraumatic. Eyes:   Conjunctivae/corneas clear.   PERRLA  Nose:  Nares normal. No drainage or sinus tenderness. Throat:    Lips, mucosa, and tongue normal.  No Thrush  Neck:  Supple, symmetrical,  no adenopathy, thyroid: non tender    no carotid bruit and no JVD. Back:    Symmetric,  No CVA tenderness. Lungs:   Clear to auscultation bilaterally. No Wheezing or Rhonchi. No rales. Chest wall:  No tenderness or deformity. No Accessory muscle use. Heart:   Regular rate and rhythm,  no murmur, rub or gallop. Abdomen:   Soft, non-tender. Not distended. Bowel sounds normal.  Drain intact. Induration is less in the right lower quadrant and flank area         Lab Data Reviewed:    Recent Labs     12/28/20  0320   WBC 6.0   HGB 10.0*   HCT 32.1*        Recent Labs     12/28/20  0320      K 4.2      CO2 30   *   BUN 25*   CREA 1.40*   CA 9.5   ALB 2.5*   TBILI 0.2   ALT 22     Lab Results   Component Value Date/Time    Glucose (POC) 98 12/29/2020 06:28 AM    Glucose (POC) 168 (H) 12/28/2020 10:12 PM    Glucose (POC) 90 12/28/2020 04:59 PM    Glucose (POC) 73 12/28/2020 11:56 AM    Glucose (POC) 125 (H) 12/28/2020 06:11 AM     No results for input(s): PH, PCO2, PO2, HCO3, FIO2 in the last 72 hours. No results for input(s): INR, INREXT in the last 72 hours.   ___________________________________________________  ___________________________________________________    Attending Physician: Rossy Choudhary MD

## 2020-12-29 NOTE — DIABETES MGMT
[facility] SPECIALIST CONSULT NOTE    Presentation   Jonny Delgado is a 77 y.o. male admitted on 12/10/20 with right lower abdominal pain. Upon initial evaluation cellulitis of abdomen determined. This was possibly due to non-sterile insulin administration. HX:   Past Medical History:   Diagnosis Date    Hypertension     ICD (implantable cardioverter-defibrillator) in place     NSVT (nonsustained ventricular tachycardia) ([address] 11/21/2018    EPS 11/21/2018 VT/VF     Right groin pain 12/7/2020   DM2    DX: CT scan    TX: abx. General surgery consulted and seen: not surgical candidate. Current clinical course has been complicated by fluctuating hyper and hypoglycemia. Diabetes: Patient has known Type 2 diabetes, treated with Metfomin/Detemir/and Trulicity PTA. Family history unknown for diabetes. Admission  and A1c 14%  indicate poor  diabetes control.    Consulted by Rich Bailey dietician for advanced diabetes nursing assessment and care, specifically related to   [x] Inpatient management strategy  [x] Home management assessment      Diabetes-related medical history  Acute complications  Fluctuating hyper and hypoglycemia  Neurological complications  Peripheral neuropathy  Microvascular disease-minor  denies  Macrovascular disease  denies  Other associated conditions     CAD-ICD/HTN    Diabetes medication history  Drug class Currently in use Discontinued Never used   Biguanide Metformin 2000mg daily     DDP-4 inhibitor       Sulfonylurea      Thiazolidinedione      GLP-1 RA Trulicity 1.5 mg weekly     SGLT-2 inhibitors      Basal insulin Detemir 40units AM  30units PM     Bolus insulin      Fixed Dose  Combinations        Subjective   On isolation precautions: MRSA +    CT 12/28/2020 : CT - improvement in infection foot print rt flank/abd wall, small collection still present     CT with contrast yesterday  Eating very well  On Lantus 12 units daily  Metformin 1000 mg BID ordered  Objective   Physical exam  General Alert, oriented and in no acute distress. Conversant and cooperative. Vital Signs   Visit Vitals  /72 (BP 1 Location: Right arm, BP Patient Position: At rest)   Pulse 74   Temp 98.2 °F (36.8 °C)   Resp 18   Ht 5' 7\" (1.702 m)   Wt 71.7 kg (158 lb)   SpO2 96%   BMI 24.75 kg/m²     Skin  Warm and dry. Abdomen less distended today after procedure. Heart   Regular rate and rhythm. No murmurs, rubs or gallops  Lungs  Clear to auscultation without rales or rhonchi  Extremities No foot wounds        Laboratory  BMP:   No results found for: NA, K, CL, CO2, AGAP, GLU, BUN, CREA, GFRAA, GFRNA   Factors affecting BG management  Factor Dose Comments   Nutrition:  Carb-controlled meals     60 grams/meal      Pain abdomen    Infection Daptomycin/Zosyn      Blood glucose pattern-        Assessment and Plan   Nursing Diagnosis Risk for unstable blood glucose pattern   Nursing Intervention Domain 7371 Decision-making Support   Nursing Interventions Examined current inpatient diabetes control   Explored factors facilitating and impeding inpatient management  Identified self-management practices impeding diabetes control  Explored corrective strategies with patient and responsible inpatient provider   Informed patient of rational for insulin strategy while hospitalized  Instructed patient in:   -ways to save money with purchasing glucometer supplies at [facility],   -importance of daily sterile insulin  Injections, and importance of checking BG 3 times a day. -Also discussed his diet and the modifications he needs to [facility] him 'healthy plate' and portion control.     -Recommended him to see a podiatrist.    Discussed Long term effects of uncontrolled diabetes (amputations, non healing wounds, stroke, MI etc)     Evaluation   Mr. Kwan Blake,  with Type 2 diabetes, did not achieve diabetes control prior to admission (PTA), as evidenced by admission BG of 197 and A1c of 14%. Based on assessment of diabetes self-care practices, interventions that warrant action while hospitalized include:   [x] Healthy Eating   [x] Problem Solving  [] Being Active   [] Healthy Coping  [x] Monitoring   [x] Reducing Risks  [x] Taking Medications  The patient would also benefit from diabetes self-management education and support (DSMES) after discharge. During this hospitalization, the patient has not achieved inpatient blood glucose target of 100-180mg/dl. Factors that have played a role include:  [x]  Meal/tube feeding disruption  [x] Compromised insulin absorption or delivery      To optimize BG control and support a positive health outcome, would utilize the Subcutaneous Insulin Order set (1294). Recommendations   Recommend:    [x] Use of Subcutaneous Insulin Order set (1339)  Insulin Use Options   CONTINUE  Basal  Dose             (Based on weight, BMI & GFR) Addresses basic metabolic needs 12 units daily, IF AMBG trends <100mg/dl, reduce basal by another 20%. ADD Nutritional  Address rise in glucose associated with carbohydrate intake 3 units Humalog with meals   Corrective                                       (Offset gaps in dosing) Useful in adjusting insulin dosing [x] Normal sensitivity       D/C Metformin during inpatient admission  Discharge Planning   1. Since A1C now above goal, 14%, he will continue to need insulin. He is requiring much less doses of insulin during this hospitalization compared to PTA doses. 2. Re-start PO diabetic meds as PTA. 3. Continue to follow up with PCP, Dr. Katelin Phan for diabetes management. Billing Code(s)   I personally reviewed medical record, including notes, data and current medications, and examined the patient at bedside before making care recommendations.        [x] 92155 Prolonged Services - 15 minutes    Francheska Heck CNS  Diabetes Clinical Nurse Specialist  [facility]  Access via [employer]  [phone]

## 2020-12-29 NOTE — PROGRESS NOTES
Comprehensive Nutrition Assessment    Type and Reason for Visit: Reassess    Nutrition Recommendations/Plan:    Insulin adjustments per [facility] recommendations. Nutrition Assessment:    Pt admitted for cellulitis. PMHx: HTN, NSVT, DM. Abd pain for 1 week PTA with N/VD. Questions if cellulitis related to nonsterile insulin administration practices noted. Drainage of abd fluid collection on 12/18 and I&D on 12/23. ID following for abx. May require surgical intervention per rounds discussion. A1c 14% noted. BG better controlled. Meal time insulin recommended with meals today. Appears well nourished. Appetite continue to be very good per RN and documentation. Will add Ensure High Protein for added protein for healing (160kcal, 16 g protein), high protein snacks also in place from visit last week. Malnutrition Assessment:  Malnutrition Status:  No malnutrition      Nutritionally Significant Medications: daptomycin, lantus (12 units daily), SSI, Nay-Q, metformin, miralax, NS @ 75ml/hr    Estimated Daily Nutrient Needs:  Energy (kcal): 1740-1885kcal(MSJx 1.2-1.3); Weight Used for Energy Requirements: (71.66kg)  Protein (g): 72-79g (1-1.1g/kg);  Weight Used for Protein Requirements: (71.66kg)  Fluid (ml/day): 1885; Method Used for Fluid Requirements: 1 ml/kcal    Nutrition Related Findings:       BM: 12/28  Edema: 1+ genital   Wounds:  None        Current Nutrition Therapies:   Diet: consistent CHO + snack  Supplements: none  Meal intake:   Patient Vitals for the past 168 hrs:   % Diet Eaten   12/28/20 1919 50 %   12/27/20 1256 100 %   12/27/20 0845 100 %   12/26/20 1749 100 %   12/26/20 1302 100 %   12/26/20 0838 100 %   12/25/20 1939 100 %   12/25/20 1230 100 %   12/25/20 0957 100 %     Anthropometric Measures:  · Height:  5' 7\" (170.2 cm)  · Current Body Wt:  71.7 kg (157 lb 15.7 oz)   · Admission Body Wt:      · Usual Body Wt:      155#  · Ideal Body Wt:   :  106.7 %   Wt Readings from Last 10 Encounters:   12/10/20 71.7 kg (158 lb)   12/03/20 71.9 kg (158 lb 9.6 oz)   07/21/20 72.9 kg (160 lb 11.2 oz)   07/14/20 69.4 kg (153 lb)   06/09/20 71.9 kg (158 lb 9.6 oz)   05/14/20 68.9 kg (152 lb)   04/18/19 72 kg (158 lb 12.8 oz)   04/15/19 72.3 kg (159 lb 4.8 oz)   03/18/19 70.1 kg (154 lb 8 oz)   03/13/19 72.1 kg (159 lb)     Nutrition Diagnosis:   · Altered nutrition-related lab values(resolved) related to endocrine dysfunction as evidenced by (BG WNL, insulin adjusted)     Nutrition Interventions:   Food and/or Nutrient Delivery: Start oral nutrition supplement  Nutrition Education and Counseling: No recommendations at this time  Coordination of Nutrition Care: Continue to monitor while inpatient, Interdisciplinary rounds    Goals:  Continued consumption of at least 75% meals in 5-7 days       Nutrition Monitoring and Evaluation:   Behavioral-Environmental Outcomes: Beliefs and attitudes, Knowledge or skill  Food/Nutrient Intake Outcomes: Food and nutrient intake  Physical Signs/Symptoms Outcomes: Biochemical data, Weight    Discharge Planning:    Continue oral nutrition supplement, Continue current diet     Kvng Coronado, RD  [address] , Pager #[phone] or via farmhopping

## 2020-12-30 LAB
ALBUMIN SERPL-MCNC: 2.4 G/DL (ref 3.5–5)
ANION GAP SERPL CALC-SCNC: 4 MMOL/L (ref 5–15)
BASOPHILS # BLD: 0 K/UL (ref 0–0.1)
BASOPHILS NFR BLD: 1 % (ref 0–1)
BUN SERPL-MCNC: 21 MG/DL (ref 6–20)
BUN/CREAT SERPL: 17 (ref 12–20)
CALCIUM SERPL-MCNC: 8.9 MG/DL (ref 8.5–10.1)
CHLORIDE SERPL-SCNC: 107 MMOL/L (ref 97–108)
CO2 SERPL-SCNC: 30 MMOL/L (ref 21–32)
CREAT SERPL-MCNC: 1.21 MG/DL (ref 0.7–1.3)
DIFFERENTIAL METHOD BLD: ABNORMAL
EOSINOPHIL # BLD: 0.1 K/UL (ref 0–0.4)
EOSINOPHIL NFR BLD: 2 % (ref 0–7)
ERYTHROCYTE [DISTWIDTH] IN BLOOD BY AUTOMATED COUNT: 13.7 % (ref 11.5–14.5)
GLUCOSE BLD STRIP.AUTO-MCNC: 112 MG/DL (ref 65–100)
GLUCOSE BLD STRIP.AUTO-MCNC: 160 MG/DL (ref 65–100)
GLUCOSE BLD STRIP.AUTO-MCNC: 185 MG/DL (ref 65–100)
GLUCOSE BLD STRIP.AUTO-MCNC: 221 MG/DL (ref 65–100)
GLUCOSE BLD STRIP.AUTO-MCNC: 70 MG/DL (ref 65–100)
GLUCOSE SERPL-MCNC: 109 MG/DL (ref 65–100)
HCT VFR BLD AUTO: 29.8 % (ref 36.6–50.3)
HGB BLD-MCNC: 9.1 G/DL (ref 12.1–17)
IMM GRANULOCYTES # BLD AUTO: 0 K/UL (ref 0–0.04)
IMM GRANULOCYTES NFR BLD AUTO: 0 % (ref 0–0.5)
LYMPHOCYTES # BLD: 0.8 K/UL (ref 0.8–3.5)
LYMPHOCYTES NFR BLD: 18 % (ref 12–49)
MCH RBC QN AUTO: 26.2 PG (ref 26–34)
MCHC RBC AUTO-ENTMCNC: 30.5 G/DL (ref 30–36.5)
MCV RBC AUTO: 85.9 FL (ref 80–99)
MONOCYTES # BLD: 0.3 K/UL (ref 0–1)
MONOCYTES NFR BLD: 8 % (ref 5–13)
NEUTS SEG # BLD: 3.1 K/UL (ref 1.8–8)
NEUTS SEG NFR BLD: 71 % (ref 32–75)
NRBC # BLD: 0 K/UL (ref 0–0.01)
NRBC BLD-RTO: 0 PER 100 WBC
PHOSPHATE SERPL-MCNC: 3.3 MG/DL (ref 2.6–4.7)
PLATELET # BLD AUTO: 289 K/UL (ref 150–400)
PMV BLD AUTO: 9.6 FL (ref 8.9–12.9)
POTASSIUM SERPL-SCNC: 4.3 MMOL/L (ref 3.5–5.1)
RBC # BLD AUTO: 3.47 M/UL (ref 4.1–5.7)
SERVICE CMNT-IMP: ABNORMAL
SERVICE CMNT-IMP: NORMAL
SODIUM SERPL-SCNC: 141 MMOL/L (ref 136–145)
WBC # BLD AUTO: 4.4 K/UL (ref 4.1–11.1)

## 2020-12-30 PROCEDURE — 80069 RENAL FUNCTION PANEL: CPT

## 2020-12-30 PROCEDURE — 74011250637 HC RX REV CODE- 250/637: Performed by: HOSPITALIST

## 2020-12-30 PROCEDURE — 74011000258 HC RX REV CODE- 258: Performed by: INTERNAL MEDICINE

## 2020-12-30 PROCEDURE — 65270000032 HC RM SEMIPRIVATE

## 2020-12-30 PROCEDURE — 82962 GLUCOSE BLOOD TEST: CPT

## 2020-12-30 PROCEDURE — 74011250637 HC RX REV CODE- 250/637: Performed by: INTERNAL MEDICINE

## 2020-12-30 PROCEDURE — 99232 SBSQ HOSP IP/OBS MODERATE 35: CPT | Performed by: INTERNAL MEDICINE

## 2020-12-30 PROCEDURE — 74011636637 HC RX REV CODE- 636/637: Performed by: INTERNAL MEDICINE

## 2020-12-30 PROCEDURE — 74011250636 HC RX REV CODE- 250/636: Performed by: INTERNAL MEDICINE

## 2020-12-30 PROCEDURE — 36415 COLL VENOUS BLD VENIPUNCTURE: CPT

## 2020-12-30 PROCEDURE — 85025 COMPLETE CBC W/AUTO DIFF WBC: CPT

## 2020-12-30 RX ADMIN — AMLODIPINE BESYLATE 10 MG: 5 TABLET ORAL at 09:21

## 2020-12-30 RX ADMIN — INSULIN GLARGINE 12 UNITS: 100 INJECTION, SOLUTION SUBCUTANEOUS at 23:43

## 2020-12-30 RX ADMIN — SODIUM CHLORIDE 75 ML/HR: 9 INJECTION, SOLUTION INTRAVENOUS at 12:59

## 2020-12-30 RX ADMIN — DAPTOMYCIN 400 MG: 500 INJECTION, POWDER, LYOPHILIZED, FOR SOLUTION INTRAVENOUS at 18:32

## 2020-12-30 RX ADMIN — ASPIRIN 81 MG: 81 TABLET, COATED ORAL at 09:20

## 2020-12-30 RX ADMIN — INSULIN LISPRO 2 UNITS: 100 INJECTION, SOLUTION INTRAVENOUS; SUBCUTANEOUS at 16:33

## 2020-12-30 RX ADMIN — Medication 5 ML: at 22:00

## 2020-12-30 RX ADMIN — Medication 1 CAPSULE: at 09:20

## 2020-12-30 RX ADMIN — ENOXAPARIN SODIUM 40 MG: 40 INJECTION SUBCUTANEOUS at 18:33

## 2020-12-30 NOTE — PROGRESS NOTES
Hospital Progress Note    NAME:  Henry Brown   :   1955   MRN:  [id number]     Date/Time:  2020 9:03 AM    Plan:   1. Repeat surgical opinion pending  2. Continue IV antibiotics  3. Minimal drainage  4. Check a.m. labs  Risk of Deterioration: Low  []           Moderate  [x]           High  []                 Assessment:   Principal Problem:    Cellulitis (2020)     CT abdomen : Right lateral abdominal wall musculature thickening is again noted. More discrete somewhat oblong fluid collection projects from the suprailiac region into the right inguinal region without evidence of gas. Increasing right flank and right chest wall subcutaneous edema.  - s/p I&D on - 20 cc of pus aspirated.   - -Blood culture: NGTD  - drain cx: prelim - MRSA   - ID following  - rpt CT abd ; subcutaneous edema in the right abdominal wall. There is a fluid collection with peripheral enhancement consistent with an abscess in the right inferior abdominal wall which extends from the pelvis into  the mid abdomen laterally and is slightly larger than the prior study  - s/p I&D  - 12 cc of pus was aspirated and sent for cultures. Pigtail catheter placed - 10cc so far  -  c/w IV daptomycin- as above, for CT in a couple of days from today 2020 -scheduled for  am   - CT 2020 : CT - improvement in infection foot print rt flank/abd wall     Active Problems:    Dyslipidemia (2014)     Continue Lipitor      Uncontrolled type 2 diabetes mellitus with complication, with long-term current use of insulin ([address] (2018)      Basal bolus      Brugada syndrome (2018)     Results of [facility] noted to be POSITIVE.                 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  SCN5A gene is associated with autosomal                   dominant                    Brugada Syndrome, long QT, dilated cardiomyopathy, and afib.                      - Follows up with Dr. Oniel Beckham.              - ICD Check 10/28/20 - No events. Less than 1% RVP              - Cardiac cath 2019 with mild luminal irregularities           ICD (implantable cardioverter-defibrillator) in place (12/21/2018)      Hypertension (12/21/2018)      Abdominal pain (12/10/2020)      ARF (acute renal failure) ([facility] (12/12/2020)     Resolved     Now exacerbated by IV contrast      Moderate protein-calorie malnutrition ([facility] [address] (12/20/2020)     Nutrition assistance appreciated          Admitting notes:65 y. o. male who presents with right lower quadrant abdominal pain which started 1 week back worsen since Monday radiating towards the groin denies any fever chills nausea vomiting diarrhea or any other associated symptoms. States this started about 1 week ago, initially saw PCP Dr. Mallory Peralta worsening since Friday. Saw Dr. Javad Stewart on Monday who ordered outpatient CT scan and abdominal ultrasound      Subjective: Feeling better this morning and voices no new complaints.        11 Point Review of Systems:   Negative except no chest pain no shortness of breath    []            Unable to obtain ROS due to:       []            mental status change []            sedated []            intubated     Social History     Tobacco Use    Smoking status: Never Smoker    Smokeless tobacco: Never Used   Substance Use Topics    Alcohol use: No     Medications reviewed:  Current Facility-Administered Medications   Medication Dose Route Frequency    0.9% sodium chloride infusion  75 mL/hr IntraVENous CONTINUOUS    insulin glargine (LANTUS) injection 12 Units  12 Units SubCUTAneous QHS    hydrALAZINE (APRESOLINE) 20 mg/mL injection 10 mg  10 mg IntraVENous Q6H PRN    influenza vaccine 2020-21 (6 mos+)(PF) (FLUARIX/FLULAVAL/FLUZONE QUAD) injection 0.5 mL  0.5 mL IntraMUSCular PRIOR TO DISCHARGE    insulin lispro (HUMALOG) injection   SubCUTAneous TIDAC    dextrose (D50W) injection syrg 12.5-25 g  12.5-25 g IntraVENous PRN    bisacodyL (DULCOLAX) suppository 10 mg  10 mg Rectal DAILY PRN   • DAPTOmycin (CUBICIN) 400 mg in 0.9% sodium chloride 50 mL IVPB RF formulation  400 mg IntraVENous Q24H   • polyethylene glycol (MIRALAX) packet 17 g  17 g Oral BID   • bisacodyL (DULCOLAX) tablet 10 mg  10 mg Oral DAILY PRN   • lactobac ac& pc-s.therm-b.anim (FESTUS Q/RISAQUAD)  1 Cap Oral DAILY   • enoxaparin (LOVENOX) injection 40 mg  40 mg SubCUTAneous Q24H   • sodium chloride (NS) flush 5-40 mL  5-40 mL IntraVENous Q8H   • sodium chloride (NS) flush 5-40 mL  5-40 mL IntraVENous PRN   • acetaminophen (TYLENOL) tablet 650 mg  650 mg Oral Q6H PRN    Or   • acetaminophen (TYLENOL) suppository 650 mg  650 mg Rectal Q6H PRN   • polyethylene glycol (MIRALAX) packet 17 g  17 g Oral DAILY PRN   • promethazine (PHENERGAN) tablet 12.5 mg  12.5 mg Oral Q6H PRN    Or   • ondansetron (ZOFRAN) injection 4 mg  4 mg IntraVENous Q6H PRN   • amLODIPine (NORVASC) tablet 10 mg  10 mg Oral DAILY   • aspirin delayed-release tablet 81 mg  81 mg Oral DAILY   • glucose chewable tablet 16 g  4 Tab Oral PRN   • glucagon (GLUCAGEN) injection 1 mg  1 mg IntraMUSCular PRN        Objective:   Vitals:  Visit Vitals  BP (!) 143/85   Pulse 79   Temp 98.1 °F (36.7 °C)   Resp 18   Ht 5' 7\" (1.702 m)   Wt 158 lb (71.7 kg) Comment: as of last week per chart review   SpO2 97%   BMI 24.75 kg/m²     Temp (24hrs), Av.2 °F (36.8 °C), Min:97.8 °F (36.6 °C), Max:98.6 °F (37 °C)      O2 Device: Room air    Last 24hr Input/Output:    Intake/Output Summary (Last 24 hours) at 2020 0904  Last data filed at 2020 2243  Gross per 24 hour   Intake --   Output 1185 ml   Net -1185 ml        PHYSICAL EXAM:  General:    Alert, cooperative, no distress, appears stated age.     Head:   Normocephalic, without obvious abnormality, atraumatic.  Eyes:   Conjunctivae/corneas clear.  PERRLA  Nose:  Nares normal. No drainage or sinus tenderness.  Throat:    Lips, mucosa, and tongue normal.  No Thrush  Neck:  Supple, symmetrical,  no  adenopathy, thyroid: non tender    no carotid bruit and no JVD. Back:    Symmetric,  No CVA tenderness. Lungs:   Clear to auscultation bilaterally. No Wheezing or Rhonchi. No rales. Chest wall:  No tenderness or deformity. No Accessory muscle use. Heart:   Regular rate and rhythm,  no murmur, rub or gallop. Abdomen:   Soft, non-tender. Not distended. Bowel sounds normal.  Drain intact. Induration is less in the right lower quadrant and flank area         Lab Data Reviewed:    Recent Labs     12/28/20  0320   WBC 6.0   HGB 10.0*   HCT 32.1*        Recent Labs     12/28/20  0320      K 4.2      CO2 30   *   BUN 25*   CREA 1.40*   CA 9.5   ALB 2.5*   TBILI 0.2   ALT 22     Lab Results   Component Value Date/Time    Glucose (POC) 160 (H) 12/30/2020 07:40 AM    Glucose (POC) 70 12/30/2020 07:10 AM    Glucose (POC) 179 (H) 12/29/2020 10:04 PM    Glucose (POC) 111 (H) 12/29/2020 03:54 PM    Glucose (POC) 212 (H) 12/29/2020 11:09 AM     No results for input(s): PH, PCO2, PO2, HCO3, FIO2 in the last 72 hours. No results for input(s): INR, INREXT, INREXT in the last 72 hours.   ___________________________________________________  ___________________________________________________    Attending Physician: Mahin Griffin MD

## 2020-12-30 NOTE — PROGRESS NOTES
Daptomycin 1800 dose received from pharmacy. This RN placed in fridge after ok from pharmacy to do so.

## 2020-12-30 NOTE — ROUTINE PROCESS
Bedside shift change report given to [address] [place] [address] Extension (oncoming nurse) by Carmelina Gallo RN (offgoing nurse). Report included the following information SBAR, Kardex, Intake/Output, MAR and Recent Results.

## 2020-12-30 NOTE — PROGRESS NOTES
ID Progress Note  2020    Subjective:     Afebrile. Feels ok. Objective:     Vitals:   Visit Vitals  BP (!) 142/80   Pulse 85   Temp 98.6 °F (37 °C)   Resp 18   Ht 5' 7\" (1.702 m)   Wt 72.5 kg (159 lb 14.4 oz)   SpO2 98%   BMI 25.04 kg/m²        Tmax:  Temp (24hrs), Av.3 °F (36.8 °C), Min:97.8 °F (36.6 °C), Max:98.6 °F (37 °C)      Exam:    Not in distress  Abdomen:  indurated right flank   Speech fluent     Labs:   Lab Results   Component Value Date/Time    WBC 4.4 2020 07:40 AM    HGB 9.1 (L) 2020 07:40 AM    HCT 29.8 (L) 2020 07:40 AM    PLATELET 817  07:40 AM    MCV 85.9 2020 07:40 AM     Lab Results   Component Value Date/Time    Sodium 141 2020 07:40 AM    Potassium 4.3 2020 07:40 AM    Chloride 107 2020 07:40 AM    CO2 30 2020 07:40 AM    Anion gap 4 (L) 2020 07:40 AM    Glucose 109 (H) 2020 07:40 AM    BUN 21 (H) 2020 07:40 AM    Creatinine 1.21 2020 07:40 AM    BUN/Creatinine ratio 17 2020 07:40 AM    GFR est AA >60 2020 07:40 AM    GFR est non-AA 60 (L) 2020 07:40 AM    Calcium 8.9 2020 07:40 AM    Bilirubin, total 0.2 2020 03:20 AM    Alk. phosphatase 99 2020 03:20 AM    Protein, total 8.1 2020 03:20 AM    Albumin 2.4 (L) 2020 07:40 AM    Globulin 5.6 (H) 2020 03:20 AM    A-G Ratio 0.4 (L) 2020 03:20 AM    ALT (SGPT) 22 2020 03:20 AM           Assessment:     #1 abdominal wall abscess      #2 mild renal insufficiency     #3 syncope status post ICD placement (due to inducible VT/VF)            Recommendations:      cultures growing staph aureus. Continue dapto. Repeat ct today shows a smaller collection but still present. Drain didn't really put out much today.       Cr normal.  Discontinue  NS                           Kylah Parish MD

## 2020-12-30 NOTE — PROGRESS NOTES
Bedside shift change report given to Aleta TRAN (oncoming nurse) by Sophia Pierce (offgoing nurse). Report included the following information SBAR, Kardex and MAR.

## 2020-12-31 ENCOUNTER — ANESTHESIA EVENT (OUTPATIENT)
Dept: SURGERY | Age: 65
DRG: 857 | End: 2020-12-31
Payer: COMMERCIAL

## 2020-12-31 ENCOUNTER — ANESTHESIA (OUTPATIENT)
Dept: SURGERY | Age: 65
DRG: 857 | End: 2020-12-31
Payer: COMMERCIAL

## 2020-12-31 LAB
ALBUMIN SERPL-MCNC: 2.4 G/DL (ref 3.5–5)
ANION GAP SERPL CALC-SCNC: 4 MMOL/L (ref 5–15)
BUN SERPL-MCNC: 26 MG/DL (ref 6–20)
BUN/CREAT SERPL: 21 (ref 12–20)
CALCIUM SERPL-MCNC: 8.8 MG/DL (ref 8.5–10.1)
CHLORIDE SERPL-SCNC: 108 MMOL/L (ref 97–108)
CO2 SERPL-SCNC: 30 MMOL/L (ref 21–32)
CREAT SERPL-MCNC: 1.23 MG/DL (ref 0.7–1.3)
GLUCOSE BLD STRIP.AUTO-MCNC: 182 MG/DL (ref 65–100)
GLUCOSE BLD STRIP.AUTO-MCNC: 378 MG/DL (ref 65–100)
GLUCOSE BLD STRIP.AUTO-MCNC: 65 MG/DL (ref 65–100)
GLUCOSE BLD STRIP.AUTO-MCNC: 83 MG/DL (ref 65–100)
GLUCOSE BLD STRIP.AUTO-MCNC: 89 MG/DL (ref 65–100)
GLUCOSE SERPL-MCNC: 89 MG/DL (ref 65–100)
PHOSPHATE SERPL-MCNC: 3.1 MG/DL (ref 2.6–4.7)
POTASSIUM SERPL-SCNC: 4.2 MMOL/L (ref 3.5–5.1)
SERVICE CMNT-IMP: ABNORMAL
SERVICE CMNT-IMP: ABNORMAL
SERVICE CMNT-IMP: NORMAL
SODIUM SERPL-SCNC: 142 MMOL/L (ref 136–145)

## 2020-12-31 PROCEDURE — 99231 SBSQ HOSP IP/OBS SF/LOW 25: CPT | Performed by: INTERNAL MEDICINE

## 2020-12-31 PROCEDURE — 74011636637 HC RX REV CODE- 636/637: Performed by: INTERNAL MEDICINE

## 2020-12-31 PROCEDURE — 2709999900 HC NON-CHARGEABLE SUPPLY: Performed by: SURGERY

## 2020-12-31 PROCEDURE — 87102 FUNGUS ISOLATION CULTURE: CPT

## 2020-12-31 PROCEDURE — 10060 I&D ABSCESS SIMPLE/SINGLE: CPT | Performed by: SURGERY

## 2020-12-31 PROCEDURE — 74011250636 HC RX REV CODE- 250/636: Performed by: NURSE ANESTHETIST, CERTIFIED REGISTERED

## 2020-12-31 PROCEDURE — 77030031139 HC SUT VCRL2 J&J -A: Performed by: SURGERY

## 2020-12-31 PROCEDURE — 77030010509 HC AIRWY LMA MSK TELE -A: Performed by: ANESTHESIOLOGY

## 2020-12-31 PROCEDURE — 80069 RENAL FUNCTION PANEL: CPT

## 2020-12-31 PROCEDURE — 65270000032 HC RM SEMIPRIVATE

## 2020-12-31 PROCEDURE — 82962 GLUCOSE BLOOD TEST: CPT

## 2020-12-31 PROCEDURE — 77030002933 HC SUT MCRYL J&J -A: Performed by: SURGERY

## 2020-12-31 PROCEDURE — 87075 CULTR BACTERIA EXCEPT BLOOD: CPT

## 2020-12-31 PROCEDURE — 36415 COLL VENOUS BLD VENIPUNCTURE: CPT

## 2020-12-31 PROCEDURE — 87205 SMEAR GRAM STAIN: CPT

## 2020-12-31 PROCEDURE — 74011250637 HC RX REV CODE- 250/637: Performed by: INTERNAL MEDICINE

## 2020-12-31 PROCEDURE — 74011000258 HC RX REV CODE- 258: Performed by: SURGERY

## 2020-12-31 PROCEDURE — 74011000250 HC RX REV CODE- 250: Performed by: NURSE ANESTHETIST, CERTIFIED REGISTERED

## 2020-12-31 PROCEDURE — 76210000006 HC OR PH I REC 0.5 TO 1 HR: Performed by: SURGERY

## 2020-12-31 PROCEDURE — 76060000032 HC ANESTHESIA 0.5 TO 1 HR: Performed by: SURGERY

## 2020-12-31 PROCEDURE — 74011250637 HC RX REV CODE- 250/637: Performed by: HOSPITALIST

## 2020-12-31 PROCEDURE — 74011636637 HC RX REV CODE- 636/637: Performed by: SURGERY

## 2020-12-31 PROCEDURE — 76010000138 HC OR TIME 0.5 TO 1 HR: Performed by: SURGERY

## 2020-12-31 PROCEDURE — 0J980ZZ DRAINAGE OF ABDOMEN SUBCUTANEOUS TISSUE AND FASCIA, OPEN APPROACH: ICD-10-PCS | Performed by: SURGERY

## 2020-12-31 PROCEDURE — 74011250636 HC RX REV CODE- 250/636: Performed by: SURGERY

## 2020-12-31 PROCEDURE — 74011000250 HC RX REV CODE- 250: Performed by: SURGERY

## 2020-12-31 RX ORDER — SODIUM CHLORIDE, SODIUM LACTATE, POTASSIUM CHLORIDE, CALCIUM CHLORIDE 600; 310; 30; 20 MG/100ML; MG/100ML; MG/100ML; MG/100ML
INJECTION, SOLUTION INTRAVENOUS
Status: DISCONTINUED | OUTPATIENT
Start: 2020-12-31 | End: 2020-12-31 | Stop reason: HOSPADM

## 2020-12-31 RX ORDER — PROPOFOL 10 MG/ML
INJECTION, EMULSION INTRAVENOUS AS NEEDED
Status: DISCONTINUED | OUTPATIENT
Start: 2020-12-31 | End: 2020-12-31 | Stop reason: HOSPADM

## 2020-12-31 RX ORDER — OXYCODONE AND ACETAMINOPHEN 5; 325 MG/1; MG/1
1 TABLET ORAL
Status: DISCONTINUED | OUTPATIENT
Start: 2020-12-31 | End: 2021-01-11 | Stop reason: HOSPADM

## 2020-12-31 RX ORDER — ONDANSETRON 2 MG/ML
INJECTION INTRAMUSCULAR; INTRAVENOUS AS NEEDED
Status: DISCONTINUED | OUTPATIENT
Start: 2020-12-31 | End: 2020-12-31 | Stop reason: HOSPADM

## 2020-12-31 RX ORDER — DEXAMETHASONE SODIUM PHOSPHATE 4 MG/ML
INJECTION, SOLUTION INTRA-ARTICULAR; INTRALESIONAL; INTRAMUSCULAR; INTRAVENOUS; SOFT TISSUE AS NEEDED
Status: DISCONTINUED | OUTPATIENT
Start: 2020-12-31 | End: 2020-12-31 | Stop reason: HOSPADM

## 2020-12-31 RX ORDER — SUCCINYLCHOLINE CHLORIDE 20 MG/ML
INJECTION INTRAMUSCULAR; INTRAVENOUS AS NEEDED
Status: DISCONTINUED | OUTPATIENT
Start: 2020-12-31 | End: 2020-12-31 | Stop reason: HOSPADM

## 2020-12-31 RX ORDER — LIDOCAINE HYDROCHLORIDE 20 MG/ML
INJECTION, SOLUTION EPIDURAL; INFILTRATION; INTRACAUDAL; PERINEURAL AS NEEDED
Status: DISCONTINUED | OUTPATIENT
Start: 2020-12-31 | End: 2020-12-31 | Stop reason: HOSPADM

## 2020-12-31 RX ORDER — FENTANYL CITRATE 50 UG/ML
INJECTION, SOLUTION INTRAMUSCULAR; INTRAVENOUS AS NEEDED
Status: DISCONTINUED | OUTPATIENT
Start: 2020-12-31 | End: 2020-12-31 | Stop reason: HOSPADM

## 2020-12-31 RX ORDER — ROCURONIUM BROMIDE 10 MG/ML
INJECTION, SOLUTION INTRAVENOUS AS NEEDED
Status: DISCONTINUED | OUTPATIENT
Start: 2020-12-31 | End: 2020-12-31 | Stop reason: HOSPADM

## 2020-12-31 RX ORDER — MIDAZOLAM HYDROCHLORIDE 1 MG/ML
INJECTION, SOLUTION INTRAMUSCULAR; INTRAVENOUS AS NEEDED
Status: DISCONTINUED | OUTPATIENT
Start: 2020-12-31 | End: 2020-12-31 | Stop reason: HOSPADM

## 2020-12-31 RX ORDER — BUPIVACAINE HYDROCHLORIDE AND EPINEPHRINE 5; 5 MG/ML; UG/ML
INJECTION, SOLUTION EPIDURAL; INTRACAUDAL; PERINEURAL AS NEEDED
Status: DISCONTINUED | OUTPATIENT
Start: 2020-12-31 | End: 2020-12-31 | Stop reason: HOSPADM

## 2020-12-31 RX ADMIN — ROCURONIUM BROMIDE 5 MG: 10 SOLUTION INTRAVENOUS at 16:40

## 2020-12-31 RX ADMIN — Medication 10 ML: at 06:00

## 2020-12-31 RX ADMIN — Medication 1 CAPSULE: at 09:12

## 2020-12-31 RX ADMIN — AMLODIPINE BESYLATE 10 MG: 5 TABLET ORAL at 09:12

## 2020-12-31 RX ADMIN — FENTANYL CITRATE 50 MCG: 50 INJECTION, SOLUTION INTRAMUSCULAR; INTRAVENOUS at 16:40

## 2020-12-31 RX ADMIN — INSULIN LISPRO 2 UNITS: 100 INJECTION, SOLUTION INTRAVENOUS; SUBCUTANEOUS at 11:49

## 2020-12-31 RX ADMIN — SODIUM CHLORIDE, POTASSIUM CHLORIDE, SODIUM LACTATE AND CALCIUM CHLORIDE: 600; 310; 30; 20 INJECTION, SOLUTION INTRAVENOUS at 16:16

## 2020-12-31 RX ADMIN — ASPIRIN 81 MG: 81 TABLET, COATED ORAL at 09:12

## 2020-12-31 RX ADMIN — SUCCINYLCHOLINE CHLORIDE 120 MG: 20 INJECTION, SOLUTION INTRAMUSCULAR; INTRAVENOUS at 16:40

## 2020-12-31 RX ADMIN — INSULIN GLARGINE 12 UNITS: 100 INJECTION, SOLUTION SUBCUTANEOUS at 23:08

## 2020-12-31 RX ADMIN — DEXAMETHASONE SODIUM PHOSPHATE 4 MG: 4 INJECTION, SOLUTION INTRAMUSCULAR; INTRAVENOUS at 16:57

## 2020-12-31 RX ADMIN — ONDANSETRON HYDROCHLORIDE 4 MG: 2 INJECTION, SOLUTION INTRAMUSCULAR; INTRAVENOUS at 16:57

## 2020-12-31 RX ADMIN — MIDAZOLAM 2 MG: 1 INJECTION INTRAMUSCULAR; INTRAVENOUS at 16:34

## 2020-12-31 RX ADMIN — DAPTOMYCIN 400 MG: 500 INJECTION, POWDER, LYOPHILIZED, FOR SOLUTION INTRAVENOUS at 18:44

## 2020-12-31 RX ADMIN — OXYCODONE HYDROCHLORIDE AND ACETAMINOPHEN 1 TABLET: 5; 325 TABLET ORAL at 23:08

## 2020-12-31 RX ADMIN — LIDOCAINE HYDROCHLORIDE 80 MG: 20 INJECTION, SOLUTION EPIDURAL; INFILTRATION; INTRACAUDAL; PERINEURAL at 16:40

## 2020-12-31 RX ADMIN — PROPOFOL 120 MG: 10 INJECTION, EMULSION INTRAVENOUS at 16:40

## 2020-12-31 NOTE — PROGRESS NOTES
Bedside shift change report given to Aleta TRAN (oncoming nurse) by Yasmani Prieto (offgoing nurse). Report included the following information SBAR, Kardex and MAR.

## 2020-12-31 NOTE — PERIOP NOTES
TRANSFER - OUT REPORT:    Verbal report given to RN on Chris Cardenas  being transferred to   for routine post - op       Report consisted of patients Situation, Background, Assessment and   Recommendations(SBAR). Time Pre op antibiotic given:floor schedule  Anesthesia Stop time: 1914  Ramesh Present on Transfer to floor:no  Order for Ramesh on Chart:no  Discharge Prescriptions with Chart:no    Information from the following report(s) SBAR, ED Summary, Intake/Output, MAR and Recent Results was reviewed with the receiving nurse. Opportunity for questions and clarification was provided. Is the patient on 02? NO       L/Min -       Other -    Is the patient on a monitor? NO    Is the nurse transporting with the patient? NO    Surgical Waiting Area notified of patient's transfer from PACU? NO      The following personal items collected during your admission accompanied patient upon transfer:   Dental Appliance: Dental Appliances: None  Vision: Visual Aid: Glasses, With patient  Hearing Aid:    Jewelry: Jewelry: None  Clothing: Clothing: At bedside  Other Valuables:  Other Valuables: None  Valuables sent to safe: Personal Items Sent to Safe: none

## 2020-12-31 NOTE — PROGRESS NOTES
Surgery Progress Note    12/31/2020    Admit Date: 12/10/2020    CC: Right flank mass      Subjective:     Seen by Radha Meyer and Consuelo over the last few weeks. Right flank fluid collection persists on imaging. Surgery called for plan. Constitutional: No fever or chills  Neurologic: No headache  Eyes: No scleral icterus or irritated eyes  Nose: No nasal pain or drainage  Mouth: No oral lesions or sore throat  Cardiac: No palpations or chest pain  Pulmonary: No cough or shortness or breath  Gastrointestinal: Right flank mass  Genitourinary: No dysuria  Musculoskeletal: No muscle or joint tenderness  Skin: No rashes or lesions  Psychiatric: No anxiety or depressed mood    Objective:     Visit Vitals  /78   Pulse 68   Temp 98.7 °F (37.1 °C)   Resp 18   Ht 5' 7\" (1.702 m)   Wt 159 lb 14.4 oz (72.5 kg)   SpO2 96%   BMI 25.04 kg/m²       General: No acute distress, conversant  Eyes: PERRLA, no scleral icterus  HENT: Normocephalic without oral lesions  Neck: Trachea midline without LAD  Cardiac: Normal pulse rate and rhythm  Pulmonary: Symmetric chest rise with normal effort  GI: Firm. Indurated right flank with drain with serosang output. Skin: Warm without rash  Extremities: No edema or joint stiffness  Psych: Appropriate mood and affect    Labs, vital signs, and I/O reviewed. Assessment:     76 y/o M with right flank mass unresponsive to drain    Plan:     NPO  Consent discussed  Staph on Dapto from 12/18 culture. Unsure of source for this collection  I and D right flank mass tonight.     Denver Sidhu MD  Bariatric and General Surgeon  [facility]

## 2020-12-31 NOTE — PROGRESS NOTES
12:30 PM  Pharm dropped off dapto and placed it in the fridge at this time. OR holding called as well. Patient is to remain NPO. Needs consent signed and CHG bath at this time. 12:38 PM  Patient has had CHG bath. 12:55 PM  Spoke with OR holding. Patient needs to be NPO for 6 hours. Last meal was 0900. Earliest for surgery is 1500. Consent signed.

## 2020-12-31 NOTE — ANESTHESIA POSTPROCEDURE EVALUATION
Procedure(s):  INCISION AND DRAINAGE TRUNK.    general    Anesthesia Post Evaluation        Patient location during evaluation: PACU  Patient participation: complete - patient participated  Level of consciousness: awake  Pain management: adequate  Airway patency: patent  Anesthetic complications: no  Cardiovascular status: hemodynamically stable  Respiratory status: acceptable  Hydration status: acceptable  Comments: I have seen and evaluated the patient. The patient is ready for PACU discharge. Lissa Moon, DO                         INITIAL Post-op Vital signs:   Vitals Value Taken Time   /71 12/31/20 1740   Temp 36.4 °C (97.5 °F) 12/31/20 1731   Pulse 68 12/31/20 1745   Resp 11 12/31/20 1745   SpO2 94 % 12/31/20 1745   Vitals shown include unvalidated device data.

## 2020-12-31 NOTE — PROGRESS NOTES
Patient back to the floor at this time. Patient right abd/groin dsg partially saturated new external dsg placed at this time. 4x4, abd pads, and tape. Patient has no complaints of pain, n/v, numbness/tingling, or SOB. Patient is asking for food. Will continue to monitor.

## 2020-12-31 NOTE — PROGRESS NOTES
Hospital Progress Note    NAME:  Sarah Lazcano   :   1955   MRN:  [id number]     Date/Time:  2020 9:03 AM    Plan:   1. Repeat surgical opinion requested by patient  2. Continue IV antibiotics  3. Minimal drainage  4. Encourage activity  Risk of Deterioration: Low  []           Moderate  [x]           High  []                 Assessment:   Principal Problem:    Cellulitis (2020)     CT abdomen : Right lateral abdominal wall musculature thickening is again noted. More discrete somewhat oblong fluid collection projects from the suprailiac region into the right inguinal region without evidence of gas. Increasing right flank and right chest wall subcutaneous edema.  - s/p I&D on - 20 cc of pus aspirated.   - -Blood culture: NGTD  - drain cx: prelim - MRSA   - ID following  - rpt CT abd ; subcutaneous edema in the right abdominal wall. There is a fluid collection with peripheral enhancement consistent with an abscess in the right inferior abdominal wall which extends from the pelvis into  the mid abdomen laterally and is slightly larger than the prior study  - s/p I&D  - 12 cc of pus was aspirated and sent for cultures. Pigtail catheter placed - 10cc so far  -  c/w IV daptomycin- as above, for CT in a couple of days from today 2020 -scheduled for  am   - CT 2020 : CT - improvement in infection foot print rt flank/abd wall     Active Problems:    Dyslipidemia (2014)     Continue Lipitor      Uncontrolled type 2 diabetes mellitus with complication, with long-term current use of insulin ([address] (2018)      Basal bolus      Brugada syndrome (2018)     Results of [facility] noted to be POSITIVE.                 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  SCN5A gene is associated with autosomal                   dominant                    Brugada Syndrome, long QT, dilated cardiomyopathy, and afib.                      - Follows up with  Shawn.              - ICD Check 10/28/20 - No events. Less than 1% RVP              - Cardiac cath 2019 with mild luminal irregularities           ICD (implantable cardioverter-defibrillator) in place (12/21/2018)      Hypertension (12/21/2018)      Abdominal pain (12/10/2020)      ARF (acute renal failure) ([facility] [address] (12/12/2020)     Resolved     Now exacerbated by IV contrast      Moderate protein-calorie malnutrition ([facility] [address] (12/20/2020)     Nutrition assistance appreciated          Admitting notes:65 y. o. male who presents with right lower quadrant abdominal pain which started 1 week back worsen since Monday radiating towards the groin denies any fever chills nausea vomiting diarrhea or any other associated symptoms. States this started about 1 week ago, initially saw PCP Dr. Chere Kocher worsening since Friday. Saw Dr. Edith Larkin on Monday who ordered outpatient CT scan and abdominal ultrasound      Subjective: Feeling better this morning and voices no new complaints. Requesting another surgical opinion.        11 Point Review of Systems:   Negative except no chest pain no shortness of breath    []            Unable to obtain ROS due to:       []            mental status change []            sedated []            intubated     Social History     Tobacco Use    Smoking status: Never Smoker    Smokeless tobacco: Never Used   Substance Use Topics    Alcohol use: No     Medications reviewed:  Current Facility-Administered Medications   Medication Dose Route Frequency    insulin glargine (LANTUS) injection 12 Units  12 Units SubCUTAneous QHS    hydrALAZINE (APRESOLINE) 20 mg/mL injection 10 mg  10 mg IntraVENous Q6H PRN    influenza vaccine 2020-21 (6 mos+)(PF) (FLUARIX/FLULAVAL/FLUZONE QUAD) injection 0.5 mL  0.5 mL IntraMUSCular PRIOR TO DISCHARGE    insulin lispro (HUMALOG) injection   SubCUTAneous TIDAC    dextrose (D50W) injection syrg 12.5-25 g  12.5-25 g IntraVENous PRN    bisacodyL (DULCOLAX) suppository 10 mg 10 mg Rectal DAILY PRN    DAPTOmycin (CUBICIN) 400 mg in 0.9% sodium chloride 50 mL IVPB RF formulation  400 mg IntraVENous Q24H    polyethylene glycol (MIRALAX) packet 17 g  17 g Oral BID    bisacodyL (DULCOLAX) tablet 10 mg  10 mg Oral DAILY PRN    lactobac ac& pc-s.therm-b.anim (FESTUS Q/RISAQUAD)  1 Cap Oral DAILY    enoxaparin (LOVENOX) injection 40 mg  40 mg SubCUTAneous Q24H    sodium chloride (NS) flush 5-40 mL  5-40 mL IntraVENous Q8H    sodium chloride (NS) flush 5-40 mL  5-40 mL IntraVENous PRN    acetaminophen (TYLENOL) tablet 650 mg  650 mg Oral Q6H PRN    Or    acetaminophen (TYLENOL) suppository 650 mg  650 mg Rectal Q6H PRN    polyethylene glycol (MIRALAX) packet 17 g  17 g Oral DAILY PRN    promethazine (PHENERGAN) tablet 12.5 mg  12.5 mg Oral Q6H PRN    Or    ondansetron (ZOFRAN) injection 4 mg  4 mg IntraVENous Q6H PRN    amLODIPine (NORVASC) tablet 10 mg  10 mg Oral DAILY    aspirin delayed-release tablet 81 mg  81 mg Oral DAILY    glucose chewable tablet 16 g  4 Tab Oral PRN    glucagon (GLUCAGEN) injection 1 mg  1 mg IntraMUSCular PRN        Objective:   Vitals:  Visit Vitals  /78   Pulse 68   Temp 98.7 °F (37.1 °C)   Resp 18   Ht 5' 7\" (1.702 m)   Wt 159 lb 14.4 oz (72.5 kg)   SpO2 96%   BMI 25.04 kg/m²     Temp (24hrs), Av.8 °F (37.1 °C), Min:98.6 °F (37 °C), Max:98.9 °F (37.2 °C)      O2 Device: Room air    Last 24hr Input/Output:    Intake/Output Summary (Last 24 hours) at 2020 0855  Last data filed at 2020 0731  Gross per 24 hour   Intake 500 ml   Output 520 ml   Net -20 ml        PHYSICAL EXAM:  General:    Alert, cooperative, no distress, appears stated age. Head:   Normocephalic, without obvious abnormality, atraumatic. Eyes:   Conjunctivae/corneas clear. PERRLA  Nose:  Nares normal. No drainage or sinus tenderness.   Throat:    Lips, mucosa, and tongue normal.  No Thrush  Neck:  Supple, symmetrical,  no adenopathy, thyroid: non tender    no carotid bruit and no JVD. Back:    Symmetric,  No CVA tenderness. Lungs:   Clear to auscultation bilaterally. No Wheezing or Rhonchi. No rales. Chest wall:  No tenderness or deformity. No Accessory muscle use. Heart:   Regular rate and rhythm,  no murmur, rub or gallop. Abdomen:   Soft, non-tender. Not distended. Bowel sounds normal.  Drain intact. Induration is less in the right lower quadrant and flank area         Lab Data Reviewed:    Recent Labs     12/30/20  0740   WBC 4.4   HGB 9.1*   HCT 29.8*        Recent Labs     12/31/20  0538 12/30/20  0740    141   K 4.2 4.3    107   CO2 30 30   GLU 89 109*   BUN 26* 21*   CREA 1.23 1.21   CA 8.8 8.9   PHOS 3.1 3.3   ALB 2.4* 2.4*     Lab Results   Component Value Date/Time    Glucose (POC) 83 12/31/2020 06:29 AM    Glucose (POC) 221 (H) 12/30/2020 09:55 PM    Glucose (POC) 185 (H) 12/30/2020 04:11 PM    Glucose (POC) 112 (H) 12/30/2020 11:07 AM    Glucose (POC) 160 (H) 12/30/2020 07:40 AM     No results for input(s): PH, PCO2, PO2, HCO3, FIO2 in the last 72 hours. No results for input(s): INR, INREXT, INREXT in the last 72 hours.   ___________________________________________________  ___________________________________________________    Attending Physician: Aida Stanton MD

## 2020-12-31 NOTE — PROGRESS NOTES
Transitions of Care plan: TBD      -RUR: 19%  -Patient with plans to go to the OR today for an I&D of his right flank mass.   -Discharge needs are unknown at this time.     Chrissy WOODRUFF, ACM-SW

## 2020-12-31 NOTE — PROGRESS NOTES
New order from Gen Surg for the patient to be NPO. Patient made aware. Awaiting gen surg to eval patient.

## 2020-12-31 NOTE — OP NOTES
OPERATIVE NOTE    Date of Procedure: 12/31/2020     Preoperative Diagnosis: Right flank abscess  Postoperative Diagnosis: Same    Procedure: Procedure(s):  INCISION AND DRAINAGE TRUNK        Surgeon: Tucker Messer MD  Assistant(s):  - They were critically important in the exposure and key portions of the case  Anesthesia: General   Indications: Right flank abscess  Findings: Purulence in the right flank. No obvious etiology    Description of Operation: Adrienne Shaw was identified in the pre-operative holding area. Informed consent was obtained after a complete discussion of risks, benefits and alternatives to surgery were had with the patient. The patient was brought back to the operating room and placed under MAC anesthesia in the supine position on the operating room table. The patient was then prepped and draped in the usual sterile fashion. A timeout was performed. The drain tract was used to follow. I cut down over this with the Bovie to create a 2cm incision through skin and subcutaneous fat. I entered the tract. I then digitalize the tract medially towards the pubic symphysis. Then made another incision along his right flank following the tract. It was a 4 cm incision down through the skin and subcutaneous fat down to but not entering the muscle. I expressed purulence from this area. A culture was obtained. I digitalized the complete pocket and irrigated both pockets. I ensured hemostasis. I packed both pockets wet-to-dry. I could not identify any etiology for this infection. There was no bony prominences exposed. At the end of the procedure all instrument, needle, and sponge counts were correct. I was present and scrubbed throughout the entirety of the case. The patient awoke from anesthesia and was extubated without complication and sent to PACU in stable condition.       Estimated Blood Loss: 5cc    Specimens:   ID Type Source Tests Collected by Time Destination   1 : Abdominal wound culture Body Fluid Abdominal Fluid CULTURE, ANAEROBIC, CULTURE, BODY FLUID, GRAM STAIN, CULTURE & SMEAR, AFB, CULTURE, FUNGUS Yuval Park MD 12/31/2020 5173 Microbiology        Complications: None      Yon Farrar MD  Bariatric and General Surgeon  [facility]  12/31/2020

## 2020-12-31 NOTE — ROUTINE PROCESS
Bedside shift change report given to [address] [place] [address] Extension (oncoming nurse) by Nayla Vincent RN (offgoing nurse). Report included the following information SBAR, Kardex, Intake/Output, MAR and Recent Results.

## 2020-12-31 NOTE — ANESTHESIA PREPROCEDURE EVALUATION
Relevant Problems   No relevant active problems       Anesthetic History   No history of anesthetic complications            Review of Systems / Medical History  Patient summary reviewed, nursing notes reviewed and pertinent labs reviewed    Pulmonary  Within defined limits                 Neuro/Psych   Within defined limits           Cardiovascular  Within defined limits          Dysrhythmias   Pacemaker         GI/Hepatic/Renal  Within defined limits              Endo/Other  Within defined limits  Diabetes         Other Findings              Physical Exam    Airway  Mallampati: II  TM Distance: > 6 cm  Neck ROM: normal range of motion   Mouth opening: Normal     Cardiovascular  Regular rate and rhythm,  S1 and S2 normal,  no murmur, click, rub, or gallop             Dental  No notable dental hx       Pulmonary  Breath sounds clear to auscultation               Abdominal  GI exam deferred       Other Findings            Anesthetic Plan    ASA: 3  Anesthesia type: general          Induction: Intravenous  Anesthetic plan and risks discussed with: Patient

## 2021-01-01 LAB
GLUCOSE BLD STRIP.AUTO-MCNC: 235 MG/DL (ref 65–100)
GLUCOSE BLD STRIP.AUTO-MCNC: 242 MG/DL (ref 65–100)
GLUCOSE BLD STRIP.AUTO-MCNC: 243 MG/DL (ref 65–100)
GLUCOSE BLD STRIP.AUTO-MCNC: 350 MG/DL (ref 65–100)
SERVICE CMNT-IMP: ABNORMAL

## 2021-01-01 PROCEDURE — 99231 SBSQ HOSP IP/OBS SF/LOW 25: CPT | Performed by: INTERNAL MEDICINE

## 2021-01-01 PROCEDURE — 74011250636 HC RX REV CODE- 250/636: Performed by: SURGERY

## 2021-01-01 PROCEDURE — 82962 GLUCOSE BLOOD TEST: CPT

## 2021-01-01 PROCEDURE — 74011250637 HC RX REV CODE- 250/637: Performed by: SURGERY

## 2021-01-01 PROCEDURE — 74011000258 HC RX REV CODE- 258: Performed by: SURGERY

## 2021-01-01 PROCEDURE — 65270000032 HC RM SEMIPRIVATE

## 2021-01-01 PROCEDURE — 74011250637 HC RX REV CODE- 250/637: Performed by: INTERNAL MEDICINE

## 2021-01-01 PROCEDURE — 74011636637 HC RX REV CODE- 636/637: Performed by: SURGERY

## 2021-01-01 RX ADMIN — ASPIRIN 81 MG: 81 TABLET, COATED ORAL at 09:38

## 2021-01-01 RX ADMIN — OXYCODONE HYDROCHLORIDE AND ACETAMINOPHEN 1 TABLET: 5; 325 TABLET ORAL at 09:38

## 2021-01-01 RX ADMIN — ENOXAPARIN SODIUM 40 MG: 40 INJECTION SUBCUTANEOUS at 16:56

## 2021-01-01 RX ADMIN — ACETAMINOPHEN 650 MG: 325 TABLET ORAL at 06:34

## 2021-01-01 RX ADMIN — INSULIN LISPRO 3 UNITS: 100 INJECTION, SOLUTION INTRAVENOUS; SUBCUTANEOUS at 12:20

## 2021-01-01 RX ADMIN — DAPTOMYCIN 400 MG: 500 INJECTION, POWDER, LYOPHILIZED, FOR SOLUTION INTRAVENOUS at 18:19

## 2021-01-01 RX ADMIN — INSULIN GLARGINE 12 UNITS: 100 INJECTION, SOLUTION SUBCUTANEOUS at 22:48

## 2021-01-01 RX ADMIN — INSULIN LISPRO 3 UNITS: 100 INJECTION, SOLUTION INTRAVENOUS; SUBCUTANEOUS at 06:34

## 2021-01-01 RX ADMIN — INSULIN LISPRO 3 UNITS: 100 INJECTION, SOLUTION INTRAVENOUS; SUBCUTANEOUS at 16:56

## 2021-01-01 RX ADMIN — OXYCODONE HYDROCHLORIDE AND ACETAMINOPHEN 1 TABLET: 5; 325 TABLET ORAL at 21:03

## 2021-01-01 RX ADMIN — AMLODIPINE BESYLATE 10 MG: 5 TABLET ORAL at 09:38

## 2021-01-01 RX ADMIN — Medication 1 CAPSULE: at 09:38

## 2021-01-01 NOTE — PROGRESS NOTES
[facility]    POD #1 s/p I&D of right flank/groin wound. Subjective     No acute events. Patient denies significant pain. Dressing changed last night. Objective     Patient Vitals for the past 24 hrs:   Temp Pulse Resp BP SpO2   01/01/21 0802 98.2 °F (36.8 °C) 60 16 138/77 96 %   01/01/21 0353 97.6 °F (36.4 °C) 62 16 115/64 95 %   12/31/20 2309 97.9 °F (36.6 °C) 62 16 127/78 95 %   12/31/20 2136 97.5 °F (36.4 °C) 62 18 123/76 95 %   12/31/20 1841 97.7 °F (36.5 °C) 68 16 132/78 95 %   12/31/20 1805 -- 70 14 -- 95 %   12/31/20 1800 -- 65 13 114/74 94 %   12/31/20 1754 97.6 °F (36.4 °C) -- -- -- 95 %   12/31/20 1750 -- 69 11 122/73 96 %   12/31/20 1745 -- 68 11 118/70 94 %   12/31/20 1740 -- 67 12 106/71 94 %   12/31/20 1735 -- 69 13 110/65 92 %   12/31/20 1731 97.5 °F (36.4 °C) 69 13 115/65 94 %   12/31/20 1730 -- 71 12 -- 94 %   12/31/20 1729 -- 72 13 115/65 95 %   12/31/20 1728 -- -- -- -- 91 %   12/31/20 1508 98.6 °F (37 °C) 76 16 106/66 95 %       Date 12/31/20 0700 - 01/01/21 0659 01/01/21 0700 - 01/02/21 0659   Shift [id number] 1900-0659 24 Hour Total 1160-4600 8390-8574 24 Hour Total   INTAKE   P.O. 240  240        P. O. 240  240      I. V.(mL/kg/hr) 700(0.8)  700(0.4)        I.V. 100  100        Volume (lactated Ringers infusion) 600  600      Shift Total(mL/kg) 940(13)  940(13)      OUTPUT   Urine(mL/kg/hr)  1600(1.8) 1600(0.9)        Urine Voided  1600 1600      Drains 10  10        Output (ml) ([REMOVED] Drain [facility] Drain 12/22/20) 10  10      Shift Total(mL/kg) 10(0.1) 1600(22.1) 1610(22.2)       -1600 -670      Weight (kg) 72.5 72.5 72.5 72.5 72.5 72.5       PE  GEN - Awake, alert, communicating appropriately. NAD  Right flank/groin with continued induration and mild erythema. No obvious purulence or foul smell.       Labs  Recent Results (from the past 24 hour(s))   GLUCOSE, POC    Collection Time: 12/31/20 11:12 AM   Result Value Ref Range    Glucose (POC) 182 (H) 65 - 100 mg/dL    Performed by Ramy Epstein    CULTURE, WOUND Raford Payer STAIN    Collection Time: 12/31/20  5:08 PM    Specimen: Abdomen   Result Value Ref Range    Special Requests: ABDOMINAL WOUND CULTUR     GRAM STAIN OCCASIONAL WBCS SEEN      GRAM STAIN NO ORGANISMS SEEN      Culture result: PENDING    GLUCOSE, POC    Collection Time: 12/31/20  5:47 PM   Result Value Ref Range    Glucose (POC) 65 65 - 100 mg/dL    Performed by Charles Anders    GLUCOSE, POC    Collection Time: 12/31/20  6:04 PM   Result Value Ref Range    Glucose (POC) 89 65 - 100 mg/dL    Performed by Charles Anders    GLUCOSE, POC    Collection Time: 12/31/20  9:35 PM   Result Value Ref Range    Glucose (POC) 378 (H) 65 - 100 mg/dL    Performed by YINA ALBERT JR. [facility]  PCT    GLUCOSE, POC    Collection Time: 01/01/21  6:27 AM   Result Value Ref Range    Glucose (POC) 243 (H) 65 - 100 mg/dL    Performed by Monday [facility] Christian Juarez is a 77 y. o.yr old male s/p I&D of flank abscess.       Plan     - Continue wound care/packing  - Cultures from OR pending  - Continue antibiotics  - Appreciate care from primary team    Deidra Rene MD  1/1/2021  9:41 AM

## 2021-01-01 NOTE — PROGRESS NOTES
Hospital Progress Note    NAME:  Refugio Zamora   :   1955   MRN:  [id number]     Date/Time:  2021 9:03 AM    Plan:     1. Continue IV antibiotics  2. Wound care  3. Encourage activity  Risk of Deterioration: Low  []           Moderate  [x]           High  []                 Assessment:   Principal Problem:    Cellulitis (2020)     CT abdomen : Right lateral abdominal wall musculature thickening is again noted. More discrete somewhat oblong fluid collection projects from the suprailiac region into the right inguinal region without evidence of gas. Increasing right flank and right chest wall subcutaneous edema.  - s/p I&D on - 20 cc of pus aspirated.   - -Blood culture: NGTD  - drain cx: prelim - MRSA   - ID following  - rpt CT abd ; subcutaneous edema in the right abdominal wall. There is a fluid collection with peripheral enhancement consistent with an abscess in the right inferior abdominal wall which extends from the pelvis into  the mid abdomen laterally and is slightly larger than the prior study  - s/p I&D  - 12 cc of pus was aspirated and sent for cultures. Pigtail catheter placed - 10cc so far  -  c/w IV daptomycin- as above, for CT in a couple of days from today 2020 -scheduled for 1228 am   - CT 2020 : CT - improvement in infection foot print rt flank/abd wall   -20 - INCISION AND DRAINAGE TRUNK    Active Problems:    Dyslipidemia (2014)     Continue Lipitor      Uncontrolled type 2 diabetes mellitus with complication, with long-term current use of insulin ([address] (2018)      Basal bolus      Brugada syndrome (2018)     Results of [facility] noted to be POSITIVE.                 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  SCN5A gene is associated with autosomal                   dominant                    Brugada Syndrome, long QT, dilated cardiomyopathy, and afib.                      - Follows up with Dr. Tracy Humphries.              - ICD Check 10/28/20 - No events. Less than 1% RVP              - Cardiac cath 2019 with mild luminal irregularities           ICD (implantable cardioverter-defibrillator) in place (12/21/2018)      Hypertension (12/21/2018)      Abdominal pain (12/10/2020)      ARF (acute renal failure) ([facility] [address] (12/12/2020)     Resolved     Now exacerbated by IV contrast      Moderate protein-calorie malnutrition ([facility] [address] (12/20/2020)     Nutrition assistance appreciated          Admitting notes:65 y. o. male who presents with right lower quadrant abdominal pain which started 1 week back worsen since Monday radiating towards the groin denies any fever chills nausea vomiting diarrhea or any other associated symptoms. States this started about 1 week ago, initially saw PCP Dr. Alyssa Mcdaniel worsening since Friday. Saw Dr. Graham Wall on Monday who ordered outpatient CT scan and abdominal ultrasound      Subjective: Feeling better this morning and voices no new complaints.   Feeling better post op       11 Point Review of Systems:   Negative except no chest pain no shortness of breath    []            Unable to obtain ROS due to:       []            mental status change []            sedated []            intubated     Social History     Tobacco Use    Smoking status: Never Smoker    Smokeless tobacco: Never Used   Substance Use Topics    Alcohol use: No     Medications reviewed:  Current Facility-Administered Medications   Medication Dose Route Frequency    oxyCODONE-acetaminophen (PERCOCET) 5-325 mg per tablet 1 Tab  1 Tab Oral Q4H PRN    insulin glargine (LANTUS) injection 12 Units  12 Units SubCUTAneous QHS    hydrALAZINE (APRESOLINE) 20 mg/mL injection 10 mg  10 mg IntraVENous Q6H PRN    influenza vaccine 2020-21 (6 mos+)(PF) (FLUARIX/FLULAVAL/FLUZONE QUAD) injection 0.5 mL  0.5 mL IntraMUSCular PRIOR TO DISCHARGE    insulin lispro (HUMALOG) injection   SubCUTAneous TIDAC    dextrose (D50W) injection syrg 12.5-25 g  12.5-25 g IntraVENous PRN    bisacodyL (DULCOLAX) suppository 10 mg  10 mg Rectal DAILY PRN    DAPTOmycin (CUBICIN) 400 mg in 0.9% sodium chloride 50 mL IVPB RF formulation  400 mg IntraVENous Q24H    polyethylene glycol (MIRALAX) packet 17 g  17 g Oral BID    bisacodyL (DULCOLAX) tablet 10 mg  10 mg Oral DAILY PRN    lactobac ac& pc-s.therm-b.anim (FESTUS Q/RISAQUAD)  1 Cap Oral DAILY    enoxaparin (LOVENOX) injection 40 mg  40 mg SubCUTAneous Q24H    sodium chloride (NS) flush 5-40 mL  5-40 mL IntraVENous Q8H    sodium chloride (NS) flush 5-40 mL  5-40 mL IntraVENous PRN    acetaminophen (TYLENOL) tablet 650 mg  650 mg Oral Q6H PRN    Or    acetaminophen (TYLENOL) suppository 650 mg  650 mg Rectal Q6H PRN    polyethylene glycol (MIRALAX) packet 17 g  17 g Oral DAILY PRN    promethazine (PHENERGAN) tablet 12.5 mg  12.5 mg Oral Q6H PRN    Or    ondansetron (ZOFRAN) injection 4 mg  4 mg IntraVENous Q6H PRN    amLODIPine (NORVASC) tablet 10 mg  10 mg Oral DAILY    aspirin delayed-release tablet 81 mg  81 mg Oral DAILY    glucose chewable tablet 16 g  4 Tab Oral PRN    glucagon (GLUCAGEN) injection 1 mg  1 mg IntraMUSCular PRN        Objective:   Vitals:  Visit Vitals  /77   Pulse 60   Temp 98.2 °F (36.8 °C)   Resp 16   Ht 5' 7\" (1.702 m)   Wt 159 lb 14.4 oz (72.5 kg)   SpO2 96%   BMI 25.04 kg/m²     Temp (24hrs), Av.8 °F (36.6 °C), Min:97.5 °F (36.4 °C), Max:98.6 °F (37 °C)      O2 Device: Room air    Last 24hr Input/Output:    Intake/Output Summary (Last 24 hours) at 2021 0818  Last data filed at 2021 6210  Gross per 24 hour   Intake 940 ml   Output 1600 ml   Net -660 ml        PHYSICAL EXAM:  General:    Alert, cooperative, no distress, appears stated age. Head:   Normocephalic, without obvious abnormality, atraumatic. Eyes:   Conjunctivae/corneas clear. PERRLA  Nose:  Nares normal. No drainage or sinus tenderness.   Throat:    Lips, mucosa, and tongue normal.  No Thrush  Neck:  Supple, symmetrical,  no adenopathy, thyroid: non tender    no carotid bruit and no JVD. Back:    Symmetric,  No CVA tenderness. Lungs:   Clear to auscultation bilaterally. No Wheezing or Rhonchi. No rales. Chest wall:  No tenderness or deformity. No Accessory muscle use. Heart:   Regular rate and rhythm,  no murmur, rub or gallop. Abdomen:   Soft, non-tender. Not distended. Bowel sounds normal.  Drain intact. Induration is less in the right lower quadrant and flank area         Lab Data Reviewed:    Recent Labs     12/30/20  0740   WBC 4.4   HGB 9.1*   HCT 29.8*        Recent Labs     12/31/20  0538 12/30/20  0740    141   K 4.2 4.3    107   CO2 30 30   GLU 89 109*   BUN 26* 21*   CREA 1.23 1.21   CA 8.8 8.9   PHOS 3.1 3.3   ALB 2.4* 2.4*     Lab Results   Component Value Date/Time    Glucose (POC) 243 (H) 01/01/2021 06:27 AM    Glucose (POC) 378 (H) 12/31/2020 09:35 PM    Glucose (POC) 89 12/31/2020 06:04 PM    Glucose (POC) 65 12/31/2020 05:47 PM    Glucose (POC) 182 (H) 12/31/2020 11:12 AM     No results for input(s): PH, PCO2, PO2, HCO3, FIO2 in the last 72 hours. No results for input(s): INR, INREXT, INREXT in the last 72 hours.   ___________________________________________________  ___________________________________________________    Attending Physician: Jolie Fuller MD

## 2021-01-01 NOTE — ROUTINE PROCESS
Bedside shift change report given to Guillermina RN (oncoming nurse) by Nayla Vincent RN (offgoing nurse). Report included the following information SBAR, Kardex, Intake/Output, MAR and Recent Results.

## 2021-01-02 LAB
GLUCOSE BLD STRIP.AUTO-MCNC: 130 MG/DL (ref 65–100)
GLUCOSE BLD STRIP.AUTO-MCNC: 185 MG/DL (ref 65–100)
GLUCOSE BLD STRIP.AUTO-MCNC: 188 MG/DL (ref 65–100)
GLUCOSE BLD STRIP.AUTO-MCNC: 221 MG/DL (ref 65–100)
SERVICE CMNT-IMP: ABNORMAL

## 2021-01-02 PROCEDURE — 74011250637 HC RX REV CODE- 250/637: Performed by: INTERNAL MEDICINE

## 2021-01-02 PROCEDURE — 99231 SBSQ HOSP IP/OBS SF/LOW 25: CPT | Performed by: INTERNAL MEDICINE

## 2021-01-02 PROCEDURE — 74011250637 HC RX REV CODE- 250/637: Performed by: SURGERY

## 2021-01-02 PROCEDURE — 82962 GLUCOSE BLOOD TEST: CPT

## 2021-01-02 PROCEDURE — 65270000032 HC RM SEMIPRIVATE

## 2021-01-02 PROCEDURE — 74011250636 HC RX REV CODE- 250/636: Performed by: SURGERY

## 2021-01-02 PROCEDURE — 74011636637 HC RX REV CODE- 636/637: Performed by: SURGERY

## 2021-01-02 PROCEDURE — 74011000258 HC RX REV CODE- 258: Performed by: SURGERY

## 2021-01-02 RX ORDER — METFORMIN HYDROCHLORIDE 750 MG/1
750 TABLET, EXTENDED RELEASE ORAL
Status: DISCONTINUED | OUTPATIENT
Start: 2021-01-02 | End: 2021-01-09

## 2021-01-02 RX ADMIN — INSULIN GLARGINE 12 UNITS: 100 INJECTION, SOLUTION SUBCUTANEOUS at 22:00

## 2021-01-02 RX ADMIN — AMLODIPINE BESYLATE 10 MG: 5 TABLET ORAL at 09:52

## 2021-01-02 RX ADMIN — INSULIN LISPRO 2 UNITS: 100 INJECTION, SOLUTION INTRAVENOUS; SUBCUTANEOUS at 11:30

## 2021-01-02 RX ADMIN — OXYCODONE HYDROCHLORIDE AND ACETAMINOPHEN 1 TABLET: 5; 325 TABLET ORAL at 09:52

## 2021-01-02 RX ADMIN — ENOXAPARIN SODIUM 40 MG: 40 INJECTION SUBCUTANEOUS at 17:27

## 2021-01-02 RX ADMIN — INSULIN LISPRO 3 UNITS: 100 INJECTION, SOLUTION INTRAVENOUS; SUBCUTANEOUS at 17:27

## 2021-01-02 RX ADMIN — Medication 10 ML: at 21:58

## 2021-01-02 RX ADMIN — POLYETHYLENE GLYCOL 3350 17 G: 17 POWDER, FOR SOLUTION ORAL at 09:52

## 2021-01-02 RX ADMIN — DAPTOMYCIN 400 MG: 500 INJECTION, POWDER, LYOPHILIZED, FOR SOLUTION INTRAVENOUS at 17:27

## 2021-01-02 RX ADMIN — METFORMIN HYDROCHLORIDE 750 MG: 750 TABLET, EXTENDED RELEASE ORAL at 17:27

## 2021-01-02 RX ADMIN — ASPIRIN 81 MG: 81 TABLET, COATED ORAL at 09:52

## 2021-01-02 RX ADMIN — Medication 1 CAPSULE: at 09:52

## 2021-01-02 RX ADMIN — Medication 10 ML: at 13:18

## 2021-01-02 RX ADMIN — OXYCODONE HYDROCHLORIDE AND ACETAMINOPHEN 1 TABLET: 5; 325 TABLET ORAL at 15:16

## 2021-01-02 NOTE — PROGRESS NOTES
ID Progress Note  2021     I and D on      Subjective:     Afebrile. Feels ok. Objective:     Vitals:   Visit Vitals  /74 (BP 1 Location: Right arm, BP Patient Position: At rest;Sitting)   Pulse 67   Temp 98.4 °F (36.9 °C)   Resp 14   Ht 5' 7\" (1.702 m)   Wt 72.5 kg (159 lb 14.4 oz)   SpO2 96%   BMI 25.04 kg/m²        Tmax:  Temp (24hrs), Av.5 °F (36.9 °C), Min:98 °F (36.7 °C), Max:99 °F (37.2 °C)      Exam:    Not in distress  Abdomen: right flank bandaged  Speech fluent     Labs:   Lab Results   Component Value Date/Time    WBC 4.4 2020 07:40 AM    HGB 9.1 (L) 2020 07:40 AM    HCT 29.8 (L) 2020 07:40 AM    PLATELET 660  07:40 AM    MCV 85.9 2020 07:40 AM     Lab Results   Component Value Date/Time    Sodium 142 2020 05:38 AM    Potassium 4.2 2020 05:38 AM    Chloride 108 2020 05:38 AM    CO2 30 2020 05:38 AM    Anion gap 4 (L) 2020 05:38 AM    Glucose 89 2020 05:38 AM    BUN 26 (H) 2020 05:38 AM    Creatinine 1.23 2020 05:38 AM    BUN/Creatinine ratio 21 (H) 2020 05:38 AM    GFR est AA >60 2020 05:38 AM    GFR est non-AA 59 (L) 2020 05:38 AM    Calcium 8.8 2020 05:38 AM    Bilirubin, total 0.2 2020 03:20 AM    Alk. phosphatase 99 2020 03:20 AM    Protein, total 8.1 2020 03:20 AM    Albumin 2.4 (L) 2020 05:38 AM    Globulin 5.6 (H) 2020 03:20 AM    A-G Ratio 0.4 (L) 2020 03:20 AM    ALT (SGPT) 22 2020 03:20 AM           Assessment:     #1 abdominal wall abscess  S/p I and D on 2020     #2 mild renal insufficiency     #3 syncope status post ICD placement (due to inducible VT/VF)            Recommendations:      cultures growing staph aureus. Continue dapto. Ff up surgical cultures. Will check back Monday.  Please call tomorrow with questions                             Shila Kennedy MD

## 2021-01-02 NOTE — PROGRESS NOTES
Progress Note    Patient: Mariaelena Jaimes MRN: [id number]  SSN: [id number]    YOB: 1955  Age: 77 y.o. Sex: male      Admit Date: 12/10/2020    2 Days Post-Op    Procedure:  Procedure(s):  INCISION AND DRAINAGE TRUNK    Subjective:     Patient without new complaints . Objective:     Visit Vitals  /74 (BP 1 Location: Right arm, BP Patient Position: At rest;Sitting)   Pulse 67   Temp 98.4 °F (36.9 °C)   Resp 14   Ht 5' 7\" (1.702 m)   Wt 159 lb 14.4 oz (72.5 kg)   SpO2 96%   BMI 25.04 kg/m²       Temp (24hrs), Av.5 °F (36.9 °C), Min:98 °F (36.7 °C), Max:99 °F (37.2 °C)      Physical Exam:    Right groin- Wound ok   Right flank- dressing removed and this still appeared to be full  Wound was digitally examined and there was a large amount of hematoma in  The wound. This was cleaned out. Data Review: images and reports reviewed    Lab Review: All lab results for the last 24 hours reviewed.   Recent Results (from the past 24 hour(s))   GLUCOSE, POC    Collection Time: 21  4:12 PM   Result Value Ref Range    Glucose (POC) 242 (H) 65 - 100 mg/dL    Performed by Samantha Barahona    GLUCOSE, POC    Collection Time: 21  9:29 PM   Result Value Ref Range    Glucose (POC) 350 (H) 65 - 100 mg/dL    Performed by Tania Cast  [facility]    GLUCOSE, POC    Collection Time: 21  6:11 AM   Result Value Ref Range    Glucose (POC) 130 (H) 65 - 100 mg/dL    Performed by Tania Cast  [facility]    GLUCOSE, POC    Collection Time: 21 11:11 AM   Result Value Ref Range    Glucose (POC) 185 (H) 65 - 100 mg/dL    Performed by [facility]        Assessment:     Hospital Problems  Date Reviewed: 2020          Codes Class Noted POA    Moderate protein-calorie malnutrition ([facility] [address] ICD-10-CM: E44.0  ICD-9-CM: 263.0  2020 Yes        ARF (acute renal failure) ([facility] [address] ICD-10-CM: N17.9  ICD-9-CM: 584.9  2020 Yes        * (Principal) Cellulitis ICD-10-CM: L03.90  ICD-9-CM: 682.9  2020 Yes        Abdominal pain ICD-10-CM: R10.9  ICD-9-CM: 789.00  12/10/2020 Yes        ICD (implantable cardioverter-defibrillator) in place (Chronic) ICD-10-CM: Z95.810  ICD-9-CM: V45.02  12/21/2018 Yes        Hypertension (Chronic) ICD-10-CM: I10  ICD-9-CM: 401.9  12/21/2018 Yes        Brugada syndrome (Chronic) ICD-10-CM: I49.8  ICD-9-CM: 746.89  11/18/2018 Yes        Uncontrolled type 2 diabetes mellitus with complication, with long-term current use of insulin (HCC) ICD-10-CM: E11.8, E11.65, Z79.4  ICD-9-CM: 250.82, V58.67  2/18/2018 Yes        Dyslipidemia ICD-10-CM: E78.5  ICD-9-CM: 272.4  9/25/2014 Yes              Plan/Recommendations/Medical Decision Making:   Continue dressing changes  Abx- per ID None

## 2021-01-02 NOTE — PROGRESS NOTES
Hospital Progress Note    NAME:  Irina Arango   :   1955   MRN:  [id number]     Date/Time:  2021 9:03 AM    Plan:     1. Continue IV antibiotics  2. Wound care  3. Encourage activity  4. Add metformin  Risk of Deterioration: Low  []           Moderate  [x]           High  []                 Assessment:   Principal Problem:    Cellulitis (2020)     CT abdomen : Right lateral abdominal wall musculature thickening is again noted. More discrete somewhat oblong fluid collection projects from the suprailiac region into the right inguinal region without evidence of gas. Increasing right flank and right chest wall subcutaneous edema.  - s/p I&D on - 20 cc of pus aspirated.   - -Blood culture: NGTD  - drain cx: prelim - MRSA   - ID following  - rpt CT abd ; subcutaneous edema in the right abdominal wall. There is a fluid collection with peripheral enhancement consistent with an abscess in the right inferior abdominal wall which extends from the pelvis into  the mid abdomen laterally and is slightly larger than the prior study  - s/p I&D  - 12 cc of pus was aspirated and sent for cultures. Pigtail catheter placed - 10cc so far  -  c/w IV daptomycin- as above, for CT in a couple of days from today 2020 -scheduled for  am   - CT 2020 : CT - improvement in infection foot print rt flank/abd wall   -20 - INCISION AND DRAINAGE TRUNK    Active Problems:    Dyslipidemia (2014)     Continue Lipitor      Uncontrolled type 2 diabetes mellitus with complication, with long-term current use of insulin ([address] (2018)      Basal bolus      Brugada syndrome (2018)     Results of [facility] noted to be POSITIVE.                 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  SCN5A gene is associated with autosomal                   dominant                    Brugada Syndrome, long QT, dilated cardiomyopathy, and afib.                      - Follows up with  Shawn.              - ICD Check 10/28/20 - No events. Less than 1% RVP              - Cardiac cath 2019 with mild luminal irregularities           ICD (implantable cardioverter-defibrillator) in place (12/21/2018)      Hypertension (12/21/2018)      Abdominal pain (12/10/2020)      ARF (acute renal failure) ([facility] [address] (12/12/2020)     Resolved     Now exacerbated by IV contrast      Moderate protein-calorie malnutrition ([facility] [address] (12/20/2020)     Nutrition assistance appreciated          Admitting notes:65 y. o. male who presents with right lower quadrant abdominal pain which started 1 week back worsen since Monday radiating towards the groin denies any fever chills nausea vomiting diarrhea or any other associated symptoms. States this started about 1 week ago, initially saw PCP Dr. Yolis Romero worsening since Friday. Saw Dr. Elvira Tomlinson on Monday who ordered outpatient CT scan and abdominal ultrasound      Subjective: Feeling better this morning and voices no new complaints.          11 Point Review of Systems:   Negative except no chest pain no shortness of breath    []            Unable to obtain ROS due to:       []            mental status change []            sedated []            intubated     Social History     Tobacco Use    Smoking status: Never Smoker    Smokeless tobacco: Never Used   Substance Use Topics    Alcohol use: No     Medications reviewed:  Current Facility-Administered Medications   Medication Dose Route Frequency    oxyCODONE-acetaminophen (PERCOCET) 5-325 mg per tablet 1 Tab  1 Tab Oral Q4H PRN    insulin glargine (LANTUS) injection 12 Units  12 Units SubCUTAneous QHS    hydrALAZINE (APRESOLINE) 20 mg/mL injection 10 mg  10 mg IntraVENous Q6H PRN    influenza vaccine 2020-21 (6 mos+)(PF) (FLUARIX/FLULAVAL/FLUZONE QUAD) injection 0.5 mL  0.5 mL IntraMUSCular PRIOR TO DISCHARGE    insulin lispro (HUMALOG) injection   SubCUTAneous TIDAC    dextrose (D50W) injection syrg 12.5-25 g  12.5-25 g IntraVENous PRN    bisacodyL (DULCOLAX) suppository 10 mg  10 mg Rectal DAILY PRN    DAPTOmycin (CUBICIN) 400 mg in 0.9% sodium chloride 50 mL IVPB RF formulation  400 mg IntraVENous Q24H    polyethylene glycol (MIRALAX) packet 17 g  17 g Oral BID    bisacodyL (DULCOLAX) tablet 10 mg  10 mg Oral DAILY PRN    lactobac ac& pc-s.therm-b.anim (FESTUS Q/RISAQUAD)  1 Cap Oral DAILY    enoxaparin (LOVENOX) injection 40 mg  40 mg SubCUTAneous Q24H    sodium chloride (NS) flush 5-40 mL  5-40 mL IntraVENous Q8H    sodium chloride (NS) flush 5-40 mL  5-40 mL IntraVENous PRN    acetaminophen (TYLENOL) tablet 650 mg  650 mg Oral Q6H PRN    Or    acetaminophen (TYLENOL) suppository 650 mg  650 mg Rectal Q6H PRN    polyethylene glycol (MIRALAX) packet 17 g  17 g Oral DAILY PRN    promethazine (PHENERGAN) tablet 12.5 mg  12.5 mg Oral Q6H PRN    Or    ondansetron (ZOFRAN) injection 4 mg  4 mg IntraVENous Q6H PRN    amLODIPine (NORVASC) tablet 10 mg  10 mg Oral DAILY    aspirin delayed-release tablet 81 mg  81 mg Oral DAILY    glucose chewable tablet 16 g  4 Tab Oral PRN    glucagon (GLUCAGEN) injection 1 mg  1 mg IntraMUSCular PRN        Objective:   Vitals:  Visit Vitals  /74 (BP 1 Location: Right arm, BP Patient Position: At rest;Sitting)   Pulse 67   Temp 98.4 °F (36.9 °C)   Resp 14   Ht 5' 7\" (1.702 m)   Wt 159 lb 14.4 oz (72.5 kg)   SpO2 96%   BMI 25.04 kg/m²     Temp (24hrs), Av.5 °F (36.9 °C), Min:98 °F (36.7 °C), Max:99 °F (37.2 °C)      O2 Device: Room air    Last 24hr Input/Output:    Intake/Output Summary (Last 24 hours) at 2021 0931  Last data filed at 2021 2104  Gross per 24 hour   Intake 940 ml   Output 1700 ml   Net -760 ml        PHYSICAL EXAM:  General:    Alert, cooperative, no distress, appears stated age. Head:   Normocephalic, without obvious abnormality, atraumatic. Eyes:   Conjunctivae/corneas clear. PERRLA  Nose:  Nares normal. No drainage or sinus tenderness.   Throat: Lips, mucosa, and tongue normal.  No Thrush  Neck:  Supple, symmetrical,  no adenopathy, thyroid: non tender    no carotid bruit and no JVD. Back:    Symmetric,  No CVA tenderness. Lungs:   Clear to auscultation bilaterally. No Wheezing or Rhonchi. No rales. Chest wall:  No tenderness or deformity. No Accessory muscle use. Heart:   Regular rate and rhythm,  no murmur, rub or gallop. Abdomen:   Soft, non-tender. Not distended. Bowel sounds normal.  Drain intact. Induration is less in the right lower quadrant and flank area         Lab Data Reviewed:    No results for input(s): WBC, HGB, HCT, PLT, HGBEXT, HCTEXT, PLTEXT, HGBEXT, HCTEXT, PLTEXT in the last 72 hours. Recent Labs     12/31/20  0538      K 4.2      CO2 30   GLU 89   BUN 26*   CREA 1.23   CA 8.8   PHOS 3.1   ALB 2.4*     Lab Results   Component Value Date/Time    Glucose (POC) 130 (H) 01/02/2021 06:11 AM    Glucose (POC) 350 (H) 01/01/2021 09:29 PM    Glucose (POC) 242 (H) 01/01/2021 04:12 PM    Glucose (POC) 235 (H) 01/01/2021 11:21 AM    Glucose (POC) 243 (H) 01/01/2021 06:27 AM     No results for input(s): PH, PCO2, PO2, HCO3, FIO2 in the last 72 hours. No results for input(s): INR, INREXT, INREXT in the last 72 hours.   ___________________________________________________  ___________________________________________________    Attending Physician: Blaise March MD

## 2021-01-03 LAB
GLUCOSE BLD STRIP.AUTO-MCNC: 166 MG/DL (ref 65–100)
GLUCOSE BLD STRIP.AUTO-MCNC: 310 MG/DL (ref 65–100)
GLUCOSE BLD STRIP.AUTO-MCNC: 67 MG/DL (ref 65–100)
GLUCOSE BLD STRIP.AUTO-MCNC: 79 MG/DL (ref 65–100)
GLUCOSE BLD STRIP.AUTO-MCNC: 98 MG/DL (ref 65–100)
SERVICE CMNT-IMP: ABNORMAL
SERVICE CMNT-IMP: ABNORMAL
SERVICE CMNT-IMP: NORMAL

## 2021-01-03 PROCEDURE — 74011250637 HC RX REV CODE- 250/637: Performed by: INTERNAL MEDICINE

## 2021-01-03 PROCEDURE — 74011250636 HC RX REV CODE- 250/636: Performed by: SURGERY

## 2021-01-03 PROCEDURE — 99231 SBSQ HOSP IP/OBS SF/LOW 25: CPT | Performed by: INTERNAL MEDICINE

## 2021-01-03 PROCEDURE — 74011636637 HC RX REV CODE- 636/637: Performed by: INTERNAL MEDICINE

## 2021-01-03 PROCEDURE — 74011000258 HC RX REV CODE- 258: Performed by: SURGERY

## 2021-01-03 PROCEDURE — 65270000032 HC RM SEMIPRIVATE

## 2021-01-03 PROCEDURE — 74011250637 HC RX REV CODE- 250/637: Performed by: SURGERY

## 2021-01-03 PROCEDURE — 82962 GLUCOSE BLOOD TEST: CPT

## 2021-01-03 PROCEDURE — 74011636637 HC RX REV CODE- 636/637: Performed by: SURGERY

## 2021-01-03 RX ORDER — INSULIN GLARGINE 100 [IU]/ML
8 INJECTION, SOLUTION SUBCUTANEOUS
Status: DISCONTINUED | OUTPATIENT
Start: 2021-01-03 | End: 2021-01-11

## 2021-01-03 RX ADMIN — INSULIN LISPRO 2 UNITS: 100 INJECTION, SOLUTION INTRAVENOUS; SUBCUTANEOUS at 12:45

## 2021-01-03 RX ADMIN — OXYCODONE HYDROCHLORIDE AND ACETAMINOPHEN 1 TABLET: 5; 325 TABLET ORAL at 21:46

## 2021-01-03 RX ADMIN — OXYCODONE HYDROCHLORIDE AND ACETAMINOPHEN 1 TABLET: 5; 325 TABLET ORAL at 12:42

## 2021-01-03 RX ADMIN — INSULIN GLARGINE 8 UNITS: 100 INJECTION, SOLUTION SUBCUTANEOUS at 23:17

## 2021-01-03 RX ADMIN — Medication 1 CAPSULE: at 09:48

## 2021-01-03 RX ADMIN — AMLODIPINE BESYLATE 10 MG: 5 TABLET ORAL at 09:48

## 2021-01-03 RX ADMIN — ENOXAPARIN SODIUM 40 MG: 40 INJECTION SUBCUTANEOUS at 17:18

## 2021-01-03 RX ADMIN — POLYETHYLENE GLYCOL 3350 17 G: 17 POWDER, FOR SOLUTION ORAL at 09:48

## 2021-01-03 RX ADMIN — METFORMIN HYDROCHLORIDE 750 MG: 750 TABLET, EXTENDED RELEASE ORAL at 17:18

## 2021-01-03 RX ADMIN — DAPTOMYCIN 400 MG: 500 INJECTION, POWDER, LYOPHILIZED, FOR SOLUTION INTRAVENOUS at 17:18

## 2021-01-03 RX ADMIN — Medication 10 ML: at 06:58

## 2021-01-03 RX ADMIN — ASPIRIN 81 MG: 81 TABLET, COATED ORAL at 09:48

## 2021-01-03 NOTE — PROGRESS NOTES
Progress Note    Patient: Fly Campos MRN: [id number]  SSN: [id number]    YOB: 1955  Age: 77 y.o. Sex: male      Admit Date: 12/10/2020    3 Days Post-Op    Procedure:  Procedure(s):  INCISION AND DRAINAGE TRUNK    Subjective:     Patient has not complaints     Objective:     Visit Vitals  /74   Pulse 68   Temp 97.6 °F (36.4 °C)   Resp 16   Ht 5' 7\" (1.702 m)   Wt 159 lb 14.4 oz (72.5 kg)   SpO2 95%   BMI 25.04 kg/m²       Temp (24hrs), Av °F (36.7 °C), Min:97.6 °F (36.4 °C), Max:98.4 °F (36.9 °C)      Physical Exam:    Right groin - clean  Right flank- wound with minimal residual hematoma. No real purulence      Data Review: images and reports reviewed    Lab Review: All lab results for the last 24 hours reviewed.   Recent Results (from the past 24 hour(s))   GLUCOSE, POC    Collection Time: 21  4:27 PM   Result Value Ref Range    Glucose (POC) 221 (H) 65 - 100 mg/dL    Performed by [address], POC    Collection Time: 21  9:27 PM   Result Value Ref Range    Glucose (POC) 188 (H) 65 - 100 mg/dL    Performed by Rod Lopes, POC    Collection Time: 21  6:34 AM   Result Value Ref Range    Glucose (POC) 67 65 - 100 mg/dL    Performed by Rod Lopes, POC    Collection Time: 21  6:58 AM   Result Value Ref Range    Glucose (POC) 79 65 - 100 mg/dL    Performed by Rod Lopes, POC    Collection Time: 21 11:18 AM   Result Value Ref Range    Glucose (POC) 166 (H) 65 - 100 mg/dL    Performed by Elizabeth Wiseman        Assessment:     Hospital Problems  Date Reviewed: 2020          Codes Class Noted POA    Moderate protein-calorie malnutrition ([facility] ICD-10-CM: E44.0  ICD-9-CM: 263.0  2020 Yes        ARF (acute renal failure) ([facility] [address] ICD-10-CM: N17.9  ICD-9-CM: 584.9  2020 Yes        * (Principal) Cellulitis ICD-10-CM: L03.90  ICD-9-CM: 682.9  2020 Yes        Abdominal pain ICD-10-CM: R10.9  ICD-9-CM: 789.00  12/10/2020 Yes        ICD (implantable cardioverter-defibrillator) in place (Chronic) ICD-10-CM: Z95.810  ICD-9-CM: V45.02  12/21/2018 Yes        Hypertension (Chronic) ICD-10-CM: I10  ICD-9-CM: 401.9  12/21/2018 Yes        Brugada syndrome (Chronic) ICD-10-CM: I49.8  ICD-9-CM: 746.89  11/18/2018 Yes        Uncontrolled type 2 diabetes mellitus with complication, with long-term current use of insulin (HCC) ICD-10-CM: E11.8, E11.65, Z79.4  ICD-9-CM: 250.82, V58.67  2/18/2018 Yes        Dyslipidemia ICD-10-CM: E78.5  ICD-9-CM: 272.4  9/25/2014 Yes              Plan/Recommendations/Medical Decision Making:   Continue current dressing changes. final cultures pending.    Dusty Ferrara

## 2021-01-03 NOTE — PROGRESS NOTES
Bedside and Verbal shift change report given to Thanh Mclaughlin RN (oncoming nurse) by CHELSEA Calzada (offgoing nurse). Report included the following information SBAR, Kardex, ED Summary, Intake/Output, MAR and Recent Results.

## 2021-01-03 NOTE — PROGRESS NOTES
Hospital Progress Note    NAME:  Champ Gudion   :   1955   MRN:  [id number]     Date/Time:  1/3/2021 9:03 AM    Plan:     1. Continue IV antibiotics  2. Wound care  3. Encourage activity  4. Titrate insulin  Risk of Deterioration: Low  []           Moderate  [x]           High  []                 Assessment:   Principal Problem:    Cellulitis (2020)     CT abdomen : Right lateral abdominal wall musculature thickening is again noted. More discrete somewhat oblong fluid collection projects from the suprailiac region into the right inguinal region without evidence of gas. Increasing right flank and right chest wall subcutaneous edema.  - s/p I&D on - 20 cc of pus aspirated.   - -Blood culture: NGTD  - drain cx: prelim - MRSA   - ID following  - rpt CT abd ; subcutaneous edema in the right abdominal wall. There is a fluid collection with peripheral enhancement consistent with an abscess in the right inferior abdominal wall which extends from the pelvis into  the mid abdomen laterally and is slightly larger than the prior study  - s/p I&D  - 12 cc of pus was aspirated and sent for cultures. Pigtail catheter placed - 10cc so far  -  c/w IV daptomycin- as above, for CT in a couple of days from today 2020 -scheduled for  am   - CT 2020 : CT - improvement in infection foot print rt flank/abd wall   -20 - INCISION AND DRAINAGE TRUNK    Active Problems:    Dyslipidemia (2014)     Continue Lipitor      Uncontrolled type 2 diabetes mellitus with complication, with long-term current use of insulin ([address] (2018)      Basal bolus      Brugada syndrome (2018)     Results of [facility] noted to be POSITIVE.                 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  SCN5A gene is associated with autosomal                   dominant                    Brugada Syndrome, long QT, dilated cardiomyopathy, and afib.                      - Follows up with  Shawn.              - ICD Check 10/28/20 - No events. Less than 1% RVP              - Cardiac cath 2019 with mild luminal irregularities           ICD (implantable cardioverter-defibrillator) in place (12/21/2018)      Hypertension (12/21/2018)      Abdominal pain (12/10/2020)      ARF (acute renal failure) ([facility] [address] (12/12/2020)     Resolved     Now exacerbated by IV contrast      Moderate protein-calorie malnutrition ([facility] [address] (12/20/2020)     Nutrition assistance appreciated          Admitting notes:65 y. o. male who presents with right lower quadrant abdominal pain which started 1 week back worsen since Monday radiating towards the groin denies any fever chills nausea vomiting diarrhea or any other associated symptoms. States this started about 1 week ago, initially saw PCP Dr. Degroot Loveless worsening since Friday. Saw Dr. Radha Meyer on Monday who ordered outpatient CT scan and abdominal ultrasound      Subjective: Feeling better this morning and voices no new complaints.          11 Point Review of Systems:   Negative except no chest pain no shortness of breath    []            Unable to obtain ROS due to:       []            mental status change []            sedated []            intubated     Social History     Tobacco Use    Smoking status: Never Smoker    Smokeless tobacco: Never Used   Substance Use Topics    Alcohol use: No     Medications reviewed:  Current Facility-Administered Medications   Medication Dose Route Frequency    insulin glargine (LANTUS) injection 8 Units  8 Units SubCUTAneous QHS    metFORMIN ER (GLUCOPHAGE XR) tablet 750 mg  750 mg Oral DAILY WITH DINNER    oxyCODONE-acetaminophen (PERCOCET) 5-325 mg per tablet 1 Tab  1 Tab Oral Q4H PRN    hydrALAZINE (APRESOLINE) 20 mg/mL injection 10 mg  10 mg IntraVENous Q6H PRN    influenza vaccine 2020-21 (6 mos+)(PF) (FLUARIX/FLULAVAL/FLUZONE QUAD) injection 0.5 mL  0.5 mL IntraMUSCular PRIOR TO DISCHARGE    insulin lispro (HUMALOG) injection SubCUTAneous TIDAC    dextrose (D50W) injection syrg 12.5-25 g  12.5-25 g IntraVENous PRN    bisacodyL (DULCOLAX) suppository 10 mg  10 mg Rectal DAILY PRN    DAPTOmycin (CUBICIN) 400 mg in 0.9% sodium chloride 50 mL IVPB RF formulation  400 mg IntraVENous Q24H    polyethylene glycol (MIRALAX) packet 17 g  17 g Oral BID    bisacodyL (DULCOLAX) tablet 10 mg  10 mg Oral DAILY PRN    lactobac ac& pc-s.therm-b.anim (FESTUS Q/RISAQUAD)  1 Cap Oral DAILY    enoxaparin (LOVENOX) injection 40 mg  40 mg SubCUTAneous Q24H    sodium chloride (NS) flush 5-40 mL  5-40 mL IntraVENous Q8H    sodium chloride (NS) flush 5-40 mL  5-40 mL IntraVENous PRN    acetaminophen (TYLENOL) tablet 650 mg  650 mg Oral Q6H PRN    Or    acetaminophen (TYLENOL) suppository 650 mg  650 mg Rectal Q6H PRN    polyethylene glycol (MIRALAX) packet 17 g  17 g Oral DAILY PRN    promethazine (PHENERGAN) tablet 12.5 mg  12.5 mg Oral Q6H PRN    Or    ondansetron (ZOFRAN) injection 4 mg  4 mg IntraVENous Q6H PRN    amLODIPine (NORVASC) tablet 10 mg  10 mg Oral DAILY    aspirin delayed-release tablet 81 mg  81 mg Oral DAILY    glucose chewable tablet 16 g  4 Tab Oral PRN    glucagon (GLUCAGEN) injection 1 mg  1 mg IntraMUSCular PRN        Objective:   Vitals:  Visit Vitals  /70   Pulse 62   Temp 97.6 °F (36.4 °C)   Resp 16   Ht 5' 7\" (1.702 m)   Wt 159 lb 14.4 oz (72.5 kg)   SpO2 95%   BMI 25.04 kg/m²     Temp (24hrs), Av.1 °F (36.7 °C), Min:97.6 °F (36.4 °C), Max:98.4 °F (36.9 °C)      O2 Device: Room air    Last 24hr Input/Output:    Intake/Output Summary (Last 24 hours) at 1/3/2021 0809  Last data filed at 2021 1804  Gross per 24 hour   Intake 1470 ml   Output 1950 ml   Net -480 ml        PHYSICAL EXAM:  General:    Alert, cooperative, no distress, appears stated age. Head:   Normocephalic, without obvious abnormality, atraumatic. Eyes:   Conjunctivae/corneas clear.   PERRLA  Nose:  Nares normal. No drainage or sinus tenderness. Throat:    Lips, mucosa, and tongue normal.  No Thrush  Neck:  Supple, symmetrical,  no adenopathy, thyroid: non tender    no carotid bruit and no JVD. Back:    Symmetric,  No CVA tenderness. Lungs:   Clear to auscultation bilaterally. No Wheezing or Rhonchi. No rales. Chest wall:  No tenderness or deformity. No Accessory muscle use. Heart:   Regular rate and rhythm,  no murmur, rub or gallop. Abdomen:   Soft, non-tender. Not distended. Bowel sounds normal.  Drain intact. Induration is less in the right lower quadrant and flank area         Lab Data Reviewed:    No results for input(s): WBC, HGB, HCT, PLT, HGBEXT, HCTEXT, PLTEXT, HGBEXT, HCTEXT, PLTEXT in the last 72 hours. No results for input(s): NA, K, CL, CO2, GLU, BUN, CREA, CA, MG, PHOS, ALB, TBIL, TBILI, ALT, INR, INREXT, INREXT in the last 72 hours. No lab exists for component: SGOT  Lab Results   Component Value Date/Time    Glucose (POC) 79 01/03/2021 06:58 AM    Glucose (POC) 67 01/03/2021 06:34 AM    Glucose (POC) 188 (H) 01/02/2021 09:27 PM    Glucose (POC) 221 (H) 01/02/2021 04:27 PM    Glucose (POC) 185 (H) 01/02/2021 11:11 AM     No results for input(s): PH, PCO2, PO2, HCO3, FIO2 in the last 72 hours. No results for input(s): INR, INREXT, INREXT in the last 72 hours.   ___________________________________________________  ___________________________________________________    Attending Physician: Kip Stratton MD

## 2021-01-04 LAB
BACTERIA SPEC CULT: NORMAL
BACTERIA SPEC CULT: NORMAL
GLUCOSE BLD STRIP.AUTO-MCNC: 119 MG/DL (ref 65–100)
GLUCOSE BLD STRIP.AUTO-MCNC: 201 MG/DL (ref 65–100)
GLUCOSE BLD STRIP.AUTO-MCNC: 202 MG/DL (ref 65–100)
GLUCOSE BLD STRIP.AUTO-MCNC: 215 MG/DL (ref 65–100)
GLUCOSE BLD STRIP.AUTO-MCNC: 61 MG/DL (ref 65–100)
GLUCOSE BLD STRIP.AUTO-MCNC: 74 MG/DL (ref 65–100)
GRAM STN SPEC: NORMAL
GRAM STN SPEC: NORMAL
SERVICE CMNT-IMP: ABNORMAL
SERVICE CMNT-IMP: NORMAL

## 2021-01-04 PROCEDURE — 74011250636 HC RX REV CODE- 250/636: Performed by: SURGERY

## 2021-01-04 PROCEDURE — 99231 SBSQ HOSP IP/OBS SF/LOW 25: CPT | Performed by: INTERNAL MEDICINE

## 2021-01-04 PROCEDURE — 74011250637 HC RX REV CODE- 250/637: Performed by: SURGERY

## 2021-01-04 PROCEDURE — 74011636637 HC RX REV CODE- 636/637: Performed by: SURGERY

## 2021-01-04 PROCEDURE — C1751 CATH, INF, PER/CENT/MIDLINE: HCPCS

## 2021-01-04 PROCEDURE — 82962 GLUCOSE BLOOD TEST: CPT

## 2021-01-04 PROCEDURE — 99024 POSTOP FOLLOW-UP VISIT: CPT | Performed by: SURGERY

## 2021-01-04 PROCEDURE — 74011636637 HC RX REV CODE- 636/637: Performed by: INTERNAL MEDICINE

## 2021-01-04 PROCEDURE — 74011250637 HC RX REV CODE- 250/637: Performed by: INTERNAL MEDICINE

## 2021-01-04 PROCEDURE — 65270000032 HC RM SEMIPRIVATE

## 2021-01-04 PROCEDURE — 99239 HOSP IP/OBS DSCHRG MGMT >30: CPT | Performed by: INTERNAL MEDICINE

## 2021-01-04 PROCEDURE — 99231 SBSQ HOSP IP/OBS SF/LOW 25: CPT | Performed by: CLINICAL NURSE SPECIALIST

## 2021-01-04 RX ORDER — METFORMIN HYDROCHLORIDE 1000 MG/1
1000 TABLET ORAL
Qty: 90 TAB | Refills: 3 | Status: SHIPPED | OUTPATIENT
Start: 2021-01-04 | End: 2021-01-16 | Stop reason: CLARIF

## 2021-01-04 RX ORDER — DOXYCYCLINE HYCLATE 100 MG
100 TABLET ORAL EVERY 12 HOURS
Status: COMPLETED | OUTPATIENT
Start: 2021-01-04 | End: 2021-01-11

## 2021-01-04 RX ORDER — POLYETHYLENE GLYCOL 3350 17 G/17G
17 POWDER, FOR SOLUTION ORAL 2 TIMES DAILY
Qty: 60 EACH | Refills: 11 | Status: SHIPPED | OUTPATIENT
Start: 2021-01-04 | End: 2022-03-20

## 2021-01-04 RX ORDER — INSULIN DETEMIR 100 [IU]/ML
6 INJECTION, SOLUTION SUBCUTANEOUS
Qty: 1 ADJUSTABLE DOSE PRE-FILLED PEN SYRINGE | Refills: 11 | Status: SHIPPED | OUTPATIENT
Start: 2021-01-04 | End: 2021-10-31

## 2021-01-04 RX ORDER — DOXYCYCLINE 100 MG/1
100 TABLET ORAL 2 TIMES DAILY
Qty: 20 TAB | Refills: 0 | Status: SHIPPED | OUTPATIENT
Start: 2021-01-04 | End: 2021-01-11

## 2021-01-04 RX ADMIN — INSULIN LISPRO 3 UNITS: 100 INJECTION, SOLUTION INTRAVENOUS; SUBCUTANEOUS at 17:16

## 2021-01-04 RX ADMIN — OXYCODONE HYDROCHLORIDE AND ACETAMINOPHEN 1 TABLET: 5; 325 TABLET ORAL at 23:14

## 2021-01-04 RX ADMIN — Medication 1 CAPSULE: at 10:01

## 2021-01-04 RX ADMIN — ENOXAPARIN SODIUM 40 MG: 40 INJECTION SUBCUTANEOUS at 17:16

## 2021-01-04 RX ADMIN — INSULIN LISPRO 3 UNITS: 100 INJECTION, SOLUTION INTRAVENOUS; SUBCUTANEOUS at 12:05

## 2021-01-04 RX ADMIN — OXYCODONE HYDROCHLORIDE AND ACETAMINOPHEN 1 TABLET: 5; 325 TABLET ORAL at 10:01

## 2021-01-04 RX ADMIN — ASPIRIN 81 MG: 81 TABLET, COATED ORAL at 10:01

## 2021-01-04 RX ADMIN — Medication 10 ML: at 22:00

## 2021-01-04 RX ADMIN — DOXYCYCLINE HYCLATE 100 MG: 100 TABLET, COATED ORAL at 21:36

## 2021-01-04 RX ADMIN — INSULIN GLARGINE 8 UNITS: 100 INJECTION, SOLUTION SUBCUTANEOUS at 23:09

## 2021-01-04 RX ADMIN — AMLODIPINE BESYLATE 10 MG: 5 TABLET ORAL at 10:01

## 2021-01-04 RX ADMIN — METFORMIN HYDROCHLORIDE 750 MG: 750 TABLET, EXTENDED RELEASE ORAL at 17:16

## 2021-01-04 NOTE — ROUTINE PROCESS
Attempted to schedule [facility] PCP appt with Dr. Darlin Lopes, patient already has an appt on Jan 14 @ 10AM. Patient has a note in his chart that he frequently misses appts and to schedule as an 'overbook'. Patient appt stands for Jan 14.

## 2021-01-04 NOTE — PROGRESS NOTES
Surgery Progress Note    1/4/2021    Admit Date: 12/10/2020    CC: Mild incision pain    POD: 4 ID flank wound    Subjective:     Feels good. Pain only on dressing change. Constitutional: No fever or chills  Neurologic: No headache  Eyes: No scleral icterus or irritated eyes  Nose: No nasal pain or drainage  Mouth: No oral lesions or sore throat  Cardiac: No palpations or chest pain  Pulmonary: No cough or shortness or breath  Gastrointestinal: No nausea, emesis, diarrhea, or constipation  Genitourinary: No dysuria  Musculoskeletal: Right flank sore  Skin: No rashes or lesions  Psychiatric: No anxiety or depressed mood    Objective:     Visit Vitals  /70   Pulse (!) 58   Temp 97.6 °F (36.4 °C)   Resp 15   Ht 5' 7\" (1.702 m)   Wt 159 lb 14.4 oz (72.5 kg)   SpO2 96%   BMI 25.04 kg/m²       General: No acute distress, conversant  Eyes: PERRLA, no scleral icterus  HENT: Normocephalic without oral lesions  Neck: Trachea midline without LAD  Cardiac: Normal pulse rate and rhythm  Pulmonary: Symmetric chest rise with normal effort  GI: Soft, NT, ND, no hernia, no splenomegaly  Skin: Right flank and groin packed. Serosang output  Extremities: No edema or joint stiffness  Psych: Appropriate mood and affect    Labs, vital signs, and I/O reviewed. Assessment:     76 y/o M with staph infection right flank and groin status post I & D    Plan:     BID dressing changes to groin and flank. Okay to shower and let water and soap run over wounds. Abx plan per ID. Appears to be sterilized fluid from OR. Okay for discharge from my POV. Can follow up in office in two weeks with me.     Chin Donovan MD  Bariatric and General Surgeon  [facility]

## 2021-01-04 NOTE — PROGRESS NOTES
Hospital Progress Note    NAME:  Fredrick Loo   :   1955   MRN:  [id number]     Date/Time:  2021 9:03 AM    Plan:     1. Home today with   Risk of Deterioration: Low  []           Moderate  [x]           High  []                 Assessment:   Principal Problem:    Cellulitis (2020)     CT abdomen : Right lateral abdominal wall musculature thickening is again noted. More discrete somewhat oblong fluid collection projects from the suprailiac region into the right inguinal region without evidence of gas. Increasing right flank and right chest wall subcutaneous edema.  - s/p I&D on - 20 cc of pus aspirated.   - -Blood culture: NGTD  - drain cx: prelim - MRSA   - ID following  - rpt CT abd ; subcutaneous edema in the right abdominal wall. There is a fluid collection with peripheral enhancement consistent with an abscess in the right inferior abdominal wall which extends from the pelvis into  the mid abdomen laterally and is slightly larger than the prior study  - s/p I&D  - 12 cc of pus was aspirated and sent for cultures. Pigtail catheter placed - 10cc so far  -  c/w IV daptomycin- as above, for CT in a couple of days from today 2020 -scheduled for 12 am   - CT 2020 : CT - improvement in infection foot print rt flank/abd wall   -20 - INCISION AND DRAINAGE TRUNK    Active Problems:    Dyslipidemia (2014)     Continue Lipitor      Uncontrolled type 2 diabetes mellitus with complication, with long-term current use of insulin ([address] (2018)      Basal bolus      Brugada syndrome (2018)     Results of [facility] noted to be POSITIVE.                 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  SCN5A gene is associated with autosomal                   dominant                    Brugada Syndrome, long QT, dilated cardiomyopathy, and afib.                    - Follows up with Dr. Sebastian Martinez.              - ICD Check 10/28/20 - No events.  Less than 1% RVP              - Cardiac cath 2019 with mild luminal irregularities           ICD (implantable cardioverter-defibrillator) in place (12/21/2018)      Hypertension (12/21/2018)      Abdominal pain (12/10/2020)      ARF (acute renal failure) ([facility] (12/12/2020)     Resolved     Now exacerbated by IV contrast      Moderate protein-calorie malnutrition ([address] (12/20/2020)     Nutrition assistance appreciated          Admitting notes:65 y. o. male who presents with right lower quadrant abdominal pain which started 1 week back worsen since Monday radiating towards the groin denies any fever chills nausea vomiting diarrhea or any other associated symptoms. States this started about 1 week ago, initially saw PCP Dr. Dony Steele worsening since Friday. Saw Dr. Thomas Adams on Monday who ordered outpatient CT scan and abdominal ultrasound      Subjective: Feeling better this morning and voices no new complaints.          11 Point Review of Systems:   Negative except no chest pain no shortness of breath    []            Unable to obtain ROS due to:       []            mental status change []            sedated []            intubated     Social History     Tobacco Use    Smoking status: Never Smoker    Smokeless tobacco: Never Used   Substance Use Topics    Alcohol use: No     Medications reviewed:  Current Facility-Administered Medications   Medication Dose Route Frequency    insulin glargine (LANTUS) injection 8 Units  8 Units SubCUTAneous QHS    metFORMIN ER (GLUCOPHAGE XR) tablet 750 mg  750 mg Oral DAILY WITH DINNER    oxyCODONE-acetaminophen (PERCOCET) 5-325 mg per tablet 1 Tab  1 Tab Oral Q4H PRN    hydrALAZINE (APRESOLINE) 20 mg/mL injection 10 mg  10 mg IntraVENous Q6H PRN    influenza vaccine 2020-21 (6 mos+)(PF) (FLUARIX/FLULAVAL/FLUZONE QUAD) injection 0.5 mL  0.5 mL IntraMUSCular PRIOR TO DISCHARGE    insulin lispro (HUMALOG) injection   SubCUTAneous TIDAC    dextrose (D50W) injection syrg 12.5-25 g  12.5-25 g IntraVENous PRN    bisacodyL (DULCOLAX) suppository 10 mg  10 mg Rectal DAILY PRN    DAPTOmycin (CUBICIN) 400 mg in 0.9% sodium chloride 50 mL IVPB RF formulation  400 mg IntraVENous Q24H    polyethylene glycol (MIRALAX) packet 17 g  17 g Oral BID    bisacodyL (DULCOLAX) tablet 10 mg  10 mg Oral DAILY PRN    lactobac ac& pc-s.therm-b.anim (FESTUS Q/RISAQUAD)  1 Cap Oral DAILY    enoxaparin (LOVENOX) injection 40 mg  40 mg SubCUTAneous Q24H    sodium chloride (NS) flush 5-40 mL  5-40 mL IntraVENous Q8H    sodium chloride (NS) flush 5-40 mL  5-40 mL IntraVENous PRN    acetaminophen (TYLENOL) tablet 650 mg  650 mg Oral Q6H PRN    Or    acetaminophen (TYLENOL) suppository 650 mg  650 mg Rectal Q6H PRN    polyethylene glycol (MIRALAX) packet 17 g  17 g Oral DAILY PRN    promethazine (PHENERGAN) tablet 12.5 mg  12.5 mg Oral Q6H PRN    Or    ondansetron (ZOFRAN) injection 4 mg  4 mg IntraVENous Q6H PRN    amLODIPine (NORVASC) tablet 10 mg  10 mg Oral DAILY    aspirin delayed-release tablet 81 mg  81 mg Oral DAILY    glucose chewable tablet 16 g  4 Tab Oral PRN    glucagon (GLUCAGEN) injection 1 mg  1 mg IntraMUSCular PRN        Objective:   Vitals:  Visit Vitals  /70   Pulse (!) 58   Temp 97.6 °F (36.4 °C)   Resp 15   Ht 5' 7\" (1.702 m)   Wt 159 lb 14.4 oz (72.5 kg)   SpO2 96%   BMI 25.04 kg/m²     Temp (24hrs), Av.1 °F (36.7 °C), Min:97.6 °F (36.4 °C), Max:98.6 °F (37 °C)      O2 Device: Room air    Last 24hr Input/Output:    Intake/Output Summary (Last 24 hours) at 2021 0825  Last data filed at 1/3/2021 1724  Gross per 24 hour   Intake 1100 ml   Output 1600 ml   Net -500 ml        PHYSICAL EXAM:  General:    Alert, cooperative, no distress, appears stated age. Head:   Normocephalic, without obvious abnormality, atraumatic. Eyes:   Conjunctivae/corneas clear. PERRLA  Nose:  Nares normal. No drainage or sinus tenderness.   Throat:    Lips, mucosa, and tongue normal.  No Thrush  Neck:  Supple, symmetrical,  no adenopathy, thyroid: non tender    no carotid bruit and no JVD. Back:    Symmetric,  No CVA tenderness. Lungs:   Clear to auscultation bilaterally. No Wheezing or Rhonchi. No rales. Chest wall:  No tenderness or deformity. No Accessory muscle use. Heart:   Regular rate and rhythm,  no murmur, rub or gallop. Abdomen:   Soft, non-tender. Not distended. Bowel sounds normal.  Drain intact. Induration is less in the right lower quadrant and flank area         Lab Data Reviewed:    No results for input(s): WBC, HGB, HCT, PLT, HGBEXT, HCTEXT, PLTEXT, HGBEXT, HCTEXT, PLTEXT in the last 72 hours. No results for input(s): NA, K, CL, CO2, GLU, BUN, CREA, CA, MG, PHOS, ALB, TBIL, TBILI, ALT, INR, INREXT, INREXT in the last 72 hours. No lab exists for component: SGOT  Lab Results   Component Value Date/Time    Glucose (POC) 119 (H) 01/04/2021 07:46 AM    Glucose (POC) 61 (L) 01/04/2021 07:04 AM    Glucose (POC) 74 01/04/2021 06:52 AM    Glucose (POC) 310 (H) 01/03/2021 08:32 PM    Glucose (POC) 98 01/03/2021 04:21 PM     No results for input(s): PH, PCO2, PO2, HCO3, FIO2 in the last 72 hours. No results for input(s): INR, INREXT, INREXT in the last 72 hours.   ___________________________________________________  ___________________________________________________    Attending Physician: Trudi Bear MD

## 2021-01-04 NOTE — PROGRESS NOTES
Page out to [employer]. 11:22 AM  [employer] spoke with ID this AM. Patient ok to go home on PO doxycycline. Awaiting CM to set up home SN. Will update the patient. 1:32 PM  Spoke with CM about SN for [facility]. HH will be able to come once a day 3x/week. [facility] for the patient. 1:48 PM  Spoke with Marylin Vazquez NP who stated wound care needs to be done at minimum once/day. If the patient does not have a friend/neighbor/family member who can assist then the patient will need to go to a SNF. CM notified.

## 2021-01-04 NOTE — DIABETES MGMT
[facility] SPECIALIST CONSULT NOTE    Presentation   John Koenig is a 77 y.o. male admitted on 12/10/20 with right lower abdominal pain. Upon initial evaluation cellulitis of abdomen determined. This was possibly due to non-sterile insulin administration. HX:   Past Medical History:   Diagnosis Date    Hypertension     ICD (implantable cardioverter-defibrillator) in place     NSVT (nonsustained ventricular tachycardia) ([address] 11/21/2018    EPS 11/21/2018 VT/VF     Right groin pain 12/7/2020   DM2    DX: CT scan    TX: abx. General surgery consulted and seen: not surgical candidate. Current clinical course has been complicated by fluctuating hyper and hypoglycemia. Diabetes: Patient has known Type 2 diabetes, treated with Metfomin/Detemir/and Trulicity PTA. Family history unknown for diabetes. Admission  and A1c 14%  indicate poor  diabetes control.    Consulted by Warren Leon dietabhinav for advanced diabetes nursing assessment and care, specifically related to   [x] Inpatient management strategy  [x] Home management assessment      Diabetes-related medical history  Acute complications  Fluctuating hyper and hypoglycemia  Neurological complications  Peripheral neuropathy  Microvascular disease-minor  denies  Macrovascular disease  denies  Other associated conditions     CAD-ICD/HTN    Diabetes medication history  Drug class Currently in use Discontinued Never used   Biguanide Metformin 2000mg daily     DDP-4 inhibitor       Sulfonylurea      Thiazolidinedione      GLP-1 RA Trulicity 1.5 mg weekly     SGLT-2 inhibitors      Basal insulin Detemir 40units AM  30units PM     Bolus insulin      Fixed Dose  Combinations        Subjective   On isolation precautions: MRSA +; s/p I& D of trunk (abdomen) 12/31/20    CT 12/28/2020 : CT - improvement in infection foot print rt flank/abd wall, small collection still present     CT with contrast yesterday  Eating very well  On Lantus 12 units daily; reduced to 8units daily for episode of hypoglycemia  Metformin 1000 mg BID ordered  Objective   Physical exam  General Alert, oriented and in no acute distress. Conversant and cooperative. Vital Signs   Visit Vitals  /73   Pulse 82   Temp 98.1 °F (36.7 °C)   Resp 16   Ht 5' 7\" (1.702 m)   Wt 72.5 kg (159 lb 14.4 oz)   SpO2 96%   BMI 25.04 kg/m²     Skin  Warm and dry. Abdomen less distended today after procedure. Heart   Regular rate and rhythm. No murmurs, rubs or gallops  Lungs  Clear to auscultation without rales or rhonchi  Extremities No foot wounds        Laboratory  BMP:   No results found for: NA, K, CL, CO2, AGAP, GLU, BUN, CREA, GFRAA, GFRNA   Factors affecting BG management  Factor Dose Comments   Nutrition:  Carb-controlled meals     60 grams/meal      Pain abdomen    Infection Daptomycin/Zosyn      Blood glucose pattern-        Assessment and Plan   Nursing Diagnosis Risk for unstable blood glucose pattern   Nursing Intervention Domain 5257 Decision-making Support   Nursing Interventions Examined current inpatient diabetes control   Explored factors facilitating and impeding inpatient management  Identified self-management practices impeding diabetes control  Explored corrective strategies with patient and responsible inpatient provider   Informed patient of rational for insulin strategy while hospitalized  Instructed patient in:   -ways to save money with purchasing glucometer supplies at [facility],   -importance of daily sterile insulin  Injections, and importance of checking BG 3 times a day. -Also discussed his diet and the modifications he needs to [facility] him 'healthy plate' and portion control.     -Recommended him to see a podiatrist.    Discussed Long term effects of uncontrolled diabetes (amputations, non healing wounds, stroke, MI etc)     Evaluation   Mr. Jude Ceja,  with Type 2 diabetes, did not achieve diabetes control prior to admission (PTA), as evidenced by admission BG of 197 and A1c of 14%. Based on assessment of diabetes self-care practices, interventions that warrant action while hospitalized include:   [x] Healthy Eating   [x] Problem Solving  [] Being Active   [] Healthy Coping  [x] Monitoring   [x] Reducing Risks  [x] Taking Medications  The patient would also benefit from diabetes self-management education and support (DSMES) after discharge. During this hospitalization, the patient has not achieved inpatient blood glucose target of 100-180mg/dl. Factors that have played a role include:  [x]  Meal/tube feeding disruption  [x] Compromised insulin absorption or delivery      To optimize BG control and support a positive health outcome, would utilize the Subcutaneous Insulin Order set (2389). BG trends over weekend labile with episodes of hypoglycemia noted in early am hours. Basal insulin reduced and Metformin dose reduced. Requiring minimal correctional insulin. IF continues to have episodes of hypoglycemia would recommend discontinuing Metformin and just treating BG with insulin only while hospitalized. Recommendations   Recommend:    [x] Use of Subcutaneous Insulin Order set (0045)  Insulin Use Options   CONTINUE  Basal  Dose             (Based on weight, BMI & GFR) Addresses basic metabolic needs 8 units daily, IF AMBG trends <100mg/dl, reduce basal by another 20%. Corrective                                       (Offset gaps in dosing) Useful in adjusting insulin dosing [x] Normal sensitivity         Discharge Planning   1. Since A1C now above goal, 14%, he will continue to need insulin. He is requiring much less doses of insulin during this hospitalization compared to PTA doses. 2. Re-start PO diabetic meds as PTA. 3. Continue to follow up with PCP, Dr. Octaviano Lau for diabetes management.   Billing Code(s)   I personally reviewed medical record, including notes, data and current medications, and examined the patient at bedside before making care recommendations.        [x] 43265 Prolonged Services - 15 minutes    MARTINE Santa  Diabetes Clinical Nurse Specialist  [facility]  Access via [employer]  [phone]

## 2021-01-04 NOTE — PROGRESS NOTES
Spoke with  about patient daptomyocin. He will touch base with Greg Boyce about whether or not to switch the patient to their oral ABX today or tomorrow.

## 2021-01-04 NOTE — PROGRESS NOTES
Transitions of Care plan: home with home health, ?may need IV ABX    -RUR: 16%  -Consult received for home health SN, noted discharge order for today. -CAMERON discussed with nursing and was informed that patient still needs to be seen by ID, so he may need IV ABX.   -Patient has [employer]. Referrals sent to the limited home health agencies that accept this insurance; awaiting a response. -CM met with patient and updated him on the above, informed him that he may not be able to discharge today. 12:15pm: Response received from one home health company that may be able to accept patient [facility]). They stated they would need specific orders for the wound care and stated they would not be able to come daily. CAMERON will notify MD via [employer].     1:50pm: CAMERON spoke with surgery NP to advise that home health care cannot come out daily for the wound care. Her suggestion was that patient go to a SNF if he cannot find someone to assist him with the wound care. CAMERON met with patient to discuss the options, explained that his insurance may not even authorize a SNF but we could pursue that if he wanted to. CM asked patient to continue to brainstorm on a friend or neighbor who might be able to help him. In the meantime, CAMERON will send a referral to [facility] and [place] [id number].      Chelo WOODRUFF, AC-

## 2021-01-05 LAB
COVID-19 RAPID TEST, COVR: NOT DETECTED
GLUCOSE BLD STRIP.AUTO-MCNC: 107 MG/DL (ref 65–100)
GLUCOSE BLD STRIP.AUTO-MCNC: 228 MG/DL (ref 65–100)
GLUCOSE BLD STRIP.AUTO-MCNC: 288 MG/DL (ref 65–100)
GLUCOSE BLD STRIP.AUTO-MCNC: 326 MG/DL (ref 65–100)
SERVICE CMNT-IMP: ABNORMAL
SOURCE, COVRS: NORMAL
SPECIMEN SOURCE, FCOV2M: NORMAL
SPECIMEN TYPE, XMCV1T: NORMAL

## 2021-01-05 PROCEDURE — 87635 SARS-COV-2 COVID-19 AMP PRB: CPT

## 2021-01-05 PROCEDURE — 74011250637 HC RX REV CODE- 250/637: Performed by: SURGERY

## 2021-01-05 PROCEDURE — 74011250637 HC RX REV CODE- 250/637: Performed by: INTERNAL MEDICINE

## 2021-01-05 PROCEDURE — 74011636637 HC RX REV CODE- 636/637: Performed by: SURGERY

## 2021-01-05 PROCEDURE — 74011250636 HC RX REV CODE- 250/636: Performed by: SURGERY

## 2021-01-05 PROCEDURE — 99232 SBSQ HOSP IP/OBS MODERATE 35: CPT | Performed by: CLINICAL NURSE SPECIALIST

## 2021-01-05 PROCEDURE — 74011636637 HC RX REV CODE- 636/637: Performed by: INTERNAL MEDICINE

## 2021-01-05 PROCEDURE — 99231 SBSQ HOSP IP/OBS SF/LOW 25: CPT | Performed by: INTERNAL MEDICINE

## 2021-01-05 PROCEDURE — 82962 GLUCOSE BLOOD TEST: CPT

## 2021-01-05 PROCEDURE — 65270000032 HC RM SEMIPRIVATE

## 2021-01-05 RX ADMIN — Medication 10 ML: at 22:00

## 2021-01-05 RX ADMIN — INSULIN LISPRO 2 UNITS: 100 INJECTION, SOLUTION INTRAVENOUS; SUBCUTANEOUS at 17:08

## 2021-01-05 RX ADMIN — DOXYCYCLINE HYCLATE 100 MG: 100 TABLET, COATED ORAL at 21:41

## 2021-01-05 RX ADMIN — METFORMIN HYDROCHLORIDE 750 MG: 750 TABLET, EXTENDED RELEASE ORAL at 17:09

## 2021-01-05 RX ADMIN — Medication 10 ML: at 06:00

## 2021-01-05 RX ADMIN — ASPIRIN 81 MG: 81 TABLET, COATED ORAL at 09:52

## 2021-01-05 RX ADMIN — INSULIN LISPRO 5 UNITS: 100 INJECTION, SOLUTION INTRAVENOUS; SUBCUTANEOUS at 12:28

## 2021-01-05 RX ADMIN — DOXYCYCLINE HYCLATE 100 MG: 100 TABLET, COATED ORAL at 09:52

## 2021-01-05 RX ADMIN — INSULIN GLARGINE 8 UNITS: 100 INJECTION, SOLUTION SUBCUTANEOUS at 22:56

## 2021-01-05 RX ADMIN — OXYCODONE HYDROCHLORIDE AND ACETAMINOPHEN 1 TABLET: 5; 325 TABLET ORAL at 11:46

## 2021-01-05 RX ADMIN — Medication 1 CAPSULE: at 09:52

## 2021-01-05 RX ADMIN — ENOXAPARIN SODIUM 40 MG: 40 INJECTION SUBCUTANEOUS at 17:08

## 2021-01-05 RX ADMIN — AMLODIPINE BESYLATE 10 MG: 5 TABLET ORAL at 09:51

## 2021-01-05 RX ADMIN — Medication 10 ML: at 17:10

## 2021-01-05 NOTE — DIABETES MGMT
[facility]    CLINICAL NURSE SPECIALIST CONSULT  FOLLOW UP  NOTE    Presentation   Nino Short is a 77 y.o. male admitted on 12/10/20 with right lower abdominal pain. Upon initial evaluation cellulitis of abdomen determined. This was possibly due to non-sterile insulin administration. HX:   Past Medical History:   Diagnosis Date    Hypertension     ICD (implantable cardioverter-defibrillator) in place     NSVT (nonsustained ventricular tachycardia) ([facility] [address] 11/21/2018    EPS 11/21/2018 VT/VF     Right groin pain 12/7/2020   DM2    DX: CT scan    TX: abx. General surgery consulted and seen: not surgical candidate. Current clinical course has been complicated by fluctuating hyper and hypoglycemia. Diabetes: Patient has known Type 2 diabetes, treated with Metfomin/Detemir/and Trulicity PTA. Family history unknown for diabetes. Admission  and A1c 14%  indicate poor  diabetes control. Consulted by Sammy Olsen dietician for advanced diabetes nursing assessment and care, specifically related to   [x] Inpatient management strategy  [x] Home management assessment      Diabetes-related medical history  Acute complications  Fluctuating hyper and hypoglycemia  Neurological complications  Peripheral neuropathy  Microvascular disease-minor  denies  Macrovascular disease  denies  Other associated conditions     CAD-ICD/HTN    Diabetes medication history  Drug class Currently in use Discontinued Never used   Biguanide Metformin 2000mg daily     DDP-4 inhibitor       Sulfonylurea      Thiazolidinedione      GLP-1 RA Trulicity 1.5 mg weekly     SGLT-2 inhibitors      Basal insulin Detemir 40units AM  30units PM     Bolus insulin      Fixed Dose  Combinations        Subjective   Up in bedside chair.   We talked about maintaining a carb consistent diet at discharge as his insulin requirements have been greatly reduced since he has been on a carb consistent diet while hospitalized. On isolation precautions: MRSA +; s/p I& D of trunk (abdomen) 12/31/20    CT 12/28/2020 : CT - improvement in infection foot print rt flank/abd wall, small collection still present     Eating very well  Lantus reduced to 8units daily for episode of hypoglycemia  Metformin 750mg daily  Ordered  Fasting -no hypoglycemia overnight. Objective   Physical exam  General Alert, oriented and in no acute distress. Conversant and cooperative. Vital Signs   Visit Vitals  BP (!) 148/84 (BP 1 Location: Left arm, BP Patient Position: Sitting)   Pulse 79   Temp 97.6 °F (36.4 °C)   Resp 16   Ht 5' 7\" (1.702 m)   Wt 72.5 kg (159 lb 14.4 oz)   SpO2 99%   BMI 25.04 kg/m²     Skin  Warm and dry. Abdomen less distended today after procedure. Heart   Regular rate and rhythm. No murmurs, rubs or gallops  Lungs  Clear to auscultation without rales or rhonchi  Extremities No foot wounds        Laboratory  BMP:   No results found for: NA, K, CL, CO2, AGAP, GLU, BUN, CREA, GFRAA, GFRNA   Factors affecting BG management  Factor Dose Comments   Nutrition:  Carb-controlled meals     60 grams/meal      Pain abdomen    Infection Daptomycin/Zosyn      Blood glucose pattern-        Assessment and Plan   Nursing Diagnosis Risk for unstable blood glucose pattern   Nursing Intervention Domain 9363 Decision-making Support   Nursing Interventions Examined current inpatient diabetes control   Explored factors facilitating and impeding inpatient management  Identified self-management practices impeding diabetes control  Explored corrective strategies with patient and responsible inpatient provider   Informed patient of rational for insulin strategy while hospitalized  Instructed patient in:   -ways to save money with purchasing glucometer supplies at [facility],   -importance of daily sterile insulin  Injections, and importance of checking BG 3 times a day.     -Also discussed his diet and the modifications he needs to make-showed him 'healthy plate' and portion control.     -Recommended him to see a podiatrist.    Discussed Long term effects of uncontrolled diabetes (amputations, non healing wounds, stroke, MI etc)     Evaluation   Mr. Claudio Scott,  with Type 2 diabetes, did not achieve diabetes control prior to admission (PTA), as evidenced by admission BG of 197 and A1c of 14%. Based on assessment of diabetes self-care practices, interventions that warrant action while hospitalized include:   [x] Healthy Eating   [x] Problem Solving  [] Being Active   [] Healthy Coping  [x] Monitoring   [x] Reducing Risks  [x] Taking Medications  The patient would also benefit from diabetes self-management education and support (DSMES) after discharge. During this hospitalization, the patient has not achieved inpatient blood glucose target of 100-180mg/dl. Factors that have played a role include:  [x]  Meal/tube feeding disruption  [x] Compromised insulin absorption or delivery      To optimize BG control and support a positive health outcome, would utilize the Subcutaneous Insulin Order set (3803). BG trends over weekend labile with episodes of hypoglycemia noted in early am hours. Basal insulin reduced and Metformin dose reduced. Requiring minimal correctional insulin. IF continues to have episodes of hypoglycemia would recommend discontinuing Metformin and just treating BG with insulin only while hospitalized. Recommendations   Recommend:    [x] Use of Subcutaneous Insulin Order set (9250)  Insulin Use Options   CONTINUE  Basal  Dose             (Based on weight, BMI & GFR) Addresses basic metabolic needs 8 units daily, IF AMBG trends <100mg/dl, reduce basal by another 20%. Corrective                                       (Offset gaps in dosing) Useful in adjusting insulin dosing [x] Normal sensitivity         Discharge Planning   1. Since A1C now above goal, 14%, he will continue to need insulin.   Adjustments will need to be made to his PTA insulins when discharged. He will require a lower dose. 2. Re-start PO diabetic meds as PTA. 3. Continue to follow up with PCP, Dr. Yajaira Ellsworth for diabetes management. 4.Diet and meal planning resources will be reinforced prior to discharge. Billing Code(s)   I personally reviewed medical record, including notes, data and current medications, and examined the patient at bedside before making care recommendations.        [x] 56898 IP subsequent hospital care - 25 minutes    MARTINE Bonilla  Diabetes Clinical Nurse Specialist  [facility]  Access via [address] [place] [address]  [phone]

## 2021-01-05 NOTE — PROGRESS NOTES
Comprehensive Nutrition Assessment    Type and Reason for Visit: Reassess    Nutrition Recommendations/Plan:    Insulin adjustments per [facility] recommendations. Nutrition Assessment:    Pt admitted for cellulitis. PMHx: HTN, NSVT, DM. Drainage of abd fluid collection on 12/18 and I&D on 12/23. ID following for abx. S/p I&D on 12/31 of right flank abscess. Ready for d/c when home health plans confirmed per MD notes. A1c 14% noted. Issues with hypoglycemia noted with insulin reduced. [facility] continues to follow. Appetite continue to be very good per  documentation. Continue Ensure High Protein for added protein for healing (160kcal, 16 g protein), high protein snacks also in place from visit last week. Nutritionally Significant Medications: docycline, lantus (8 units daily), SSI, Nay-Q, metformin, miralax    Estimated Daily Nutrient Needs:  Energy (kcal): 1740-1885kcal(MSJx 1.2-1.3); Weight Used for Energy Requirements: (71.66kg)  Protein (g): 72-79g (1-1.1g/kg);  Weight Used for Protein Requirements: (71.66kg)  Fluid (ml/day): 1885; Method Used for Fluid Requirements: 1 ml/kcal    Nutrition Related Findings:       BM: 12/28  Edema: 1+ genital   Wounds:  None        Current Nutrition Therapies:   Diet: consistent CHO + snack  Supplements: none  Meal intake:   Patient Vitals for the past 168 hrs:   % Diet Eaten   01/05/21 0951 100 %   01/04/21 1000 100 %   01/03/21 1724 100 %   01/03/21 1251 100 %   01/03/21 0903 100 %   01/02/21 1804 75 %   01/02/21 1210 100 %   01/02/21 0953 100 %   01/01/21 1819 100 %   01/01/21 1220 100 %   01/01/21 0802 75 %   12/31/20 0911 100 %   12/30/20 1038 100 %   12/29/20 1751 100 %   12/29/20 1230 100 %     Anthropometric Measures:  · Height:  5' 7\" (170.2 cm)  · Current Body Wt:  71.7 kg (157 lb 15.7 oz)   · Admission Body Wt:      · Usual Body Wt:      155#  · Ideal Body Wt:   :  106.7 %   Wt Readings from Last 10 Encounters: 12/30/20 72.5 kg (159 lb 14.4 oz)   12/03/20 71.9 kg (158 lb 9.6 oz)   07/21/20 72.9 kg (160 lb 11.2 oz)   07/14/20 69.4 kg (153 lb)   06/09/20 71.9 kg (158 lb 9.6 oz)   05/14/20 68.9 kg (152 lb)   04/18/19 72 kg (158 lb 12.8 oz)   04/15/19 72.3 kg (159 lb 4.8 oz)   03/18/19 70.1 kg (154 lb 8 oz)   03/13/19 72.1 kg (159 lb)     Nutrition Diagnosis:   · Increased nutrient needs related to (wound healing) as evidenced by (right flank wound s/p I&D)     Nutrition Interventions:   Food and/or Nutrient Delivery: Continue oral nutrition supplement  Nutrition Education and Counseling: No recommendations at this time  Coordination of Nutrition Care: Continue to monitor while inpatient    Goals:  Continued consumption of at least 75% meals in 5-7 days       Nutrition Monitoring and Evaluation:   Behavioral-Environmental Outcomes: Beliefs and attitudes, Knowledge or skill  Food/Nutrient Intake Outcomes: Food and nutrient intake  Physical Signs/Symptoms Outcomes: Biochemical data, Weight    Discharge Planning:    Continue oral nutrition supplement, Continue current diet     Alejandra Coronado, RD  [address] , Pager #[phone] or via [employer]

## 2021-01-05 NOTE — PROGRESS NOTES
Physician Progress Note      Lilli Jiménez  CSN #:                  [id number]  :                       1955  ADMIT DATE:       12/10/2020 9:48 AM  DISCH DATE:  Shakeel Kwon  PROVIDER #:        Trey Mejia MD          QUERY TEXT:    Dear Dr. Supa Angel,    Per Op note dated  documentation of I&D of R flank abscess. To accurately reflect the procedure performed please further specify the depth of tissue incised and drained: The medical record reflects the following:    Risk Factors: 65M cellulitis, DM II  Clinical Indicators:   OP Note  Preoperative Diagnosis: Right flank abscess  Postoperative Diagnosis: Same  ? Procedure: Procedure(s):  INCISION AND DRAINAGE TRUNK  ?  ?  ? Surgeon: Cielo Maloney MD  Assistant(s):  - They were critically important in the exposure and key portions of the case  Anesthesia: General  Indications: Right flank abscess  Findings: Purulence in the right flank. No obvious etiology  ? Description of Operation: Champ Gudino was identified in the pre-operative holding area. Informed consent was obtained after a complete discussion of risks, benefits and alternatives to surgery were had with the patient. ?  The patient was brought back to the operating room and placed under MAC anesthesia in the supine position on the operating room table. The patient was then prepped and draped in the usual sterile fashion. A timeout was performed. ? The drain tract was used to follow. I cut down over this with the Bovie. I entered the tract. I then digitalize the tract medially towards the pubic symphysis. Then made another incision along his right flank following the tract. Is a 4 cm incision. I expressed purulence from this area. A culture was obtained. I digitalized the complete pocket and irrigated both pockets. I ensured hemostasis. I packed both pockets wet-to-dry. ?  I could not identify any etiology for this infection.  There was no bony prominences exposed    Treatment: Surgical ID, Lab monitoring, ID consult, IV abx    Thank you    Bernarda Agustin BSN RN CRCR  Clinical Documentation Improvement    Options provided:  -- Skin only  -- Subcutaneous tissue  -- Fascia  -- Muscle  -- Bone  -- Other - I will add my own diagnosis  -- Disagree - Not applicable / Not valid  -- Disagree - Clinically unable to determine / Unknown  -- Refer to Clinical Documentation Reviewer    PROVIDER RESPONSE TEXT:    Addendum to 12/31 procedure note The depth of the drainage to R flank was down to and including subcutaneous tissue.     Query created by: Charles Bravo on 1/4/2021 2:24 PM      Electronically signed by:  Jennie Renee MD 1/5/2021 10:29 AM

## 2021-01-05 NOTE — PROGRESS NOTES
Transitions of Care plan: SNF placement    -RUR: 16%  -Patient has been accepted to [facility] and they are submitting a request for authorization.  -Message sent to attending MD to request a covid test and he confirmed. -CM met with patient and updated him on the plan.      Chloe WOODRUFF, ACM-SW

## 2021-01-05 NOTE — PROGRESS NOTES
Hospital Progress Note    NAME:  Billy Nelson   :   1955   MRN:  [id number]     Date/Time:  2021 9:03 AM    Plan:     1. Discharge plan appropriate wound care arrangements can be made  Risk of Deterioration: Low  []           Moderate  [x]           High  []                 Assessment:   Principal Problem:    Cellulitis (2020)     CT abdomen : Right lateral abdominal wall musculature thickening is again noted. More discrete somewhat oblong fluid collection projects from the suprailiac region into the right inguinal region without evidence of gas. Increasing right flank and right chest wall subcutaneous edema.  - s/p I&D on - 20 cc of pus aspirated.   - -Blood culture: NGTD  - drain cx: prelim - MRSA   - ID following  - rpt CT abd ; subcutaneous edema in the right abdominal wall. There is a fluid collection with peripheral enhancement consistent with an abscess in the right inferior abdominal wall which extends from the pelvis into  the mid abdomen laterally and is slightly larger than the prior study  - s/p I&D  - 12 cc of pus was aspirated and sent for cultures. Pigtail catheter placed - 10cc so far  -  c/w IV daptomycin- as above, for CT in a couple of days from today 2020 -scheduled for  am   - CT 2020 : CT - improvement in infection foot print rt flank/abd wall   -20 - INCISION AND DRAINAGE TRUNK    Active Problems:    Dyslipidemia (2014)     Continue Lipitor      Uncontrolled type 2 diabetes mellitus with complication, with long-term current use of insulin ([address] (2018)      Basal bolus      Brugada syndrome (2018)     Results of [facility] noted to be POSITIVE.                 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  SCN5A gene is associated with autosomal                   dominant                    Brugada Syndrome, long QT, dilated cardiomyopathy, and afib.                      - Follows up with Dr. Hector Hammond.              - ICD Check 10/28/20 - No events. Less than 1% RVP              - Cardiac cath 2019 with mild luminal irregularities           ICD (implantable cardioverter-defibrillator) in place (12/21/2018)      Hypertension (12/21/2018)      Abdominal pain (12/10/2020)      ARF (acute renal failure) ([facility] (12/12/2020)     Resolved     Now exacerbated by IV contrast      Moderate protein-calorie malnutrition ([facility] (12/20/2020)     Nutrition assistance appreciated          Admitting notes:65 y. o. male who presents with right lower quadrant abdominal pain which started 1 week back worsen since Monday radiating towards the groin denies any fever chills nausea vomiting diarrhea or any other associated symptoms. States this started about 1 week ago, initially saw PCP Dr. Lina Cosby worsening since Friday. Saw Dr. Veronica Eason on Monday who ordered outpatient CT scan and abdominal ultrasound      Subjective: Feeling better this morning and voices no new complaints.          11 Point Review of Systems:   Negative except no chest pain no shortness of breath    []            Unable to obtain ROS due to:       []            mental status change []            sedated []            intubated     Social History     Tobacco Use    Smoking status: Never Smoker    Smokeless tobacco: Never Used   Substance Use Topics    Alcohol use: No     Medications reviewed:  Current Facility-Administered Medications   Medication Dose Route Frequency    doxycycline (VIBRA-TABS) tablet 100 mg  100 mg Oral Q12H    insulin glargine (LANTUS) injection 8 Units  8 Units SubCUTAneous QHS    metFORMIN ER (GLUCOPHAGE XR) tablet 750 mg  750 mg Oral DAILY WITH DINNER    oxyCODONE-acetaminophen (PERCOCET) 5-325 mg per tablet 1 Tab  1 Tab Oral Q4H PRN    hydrALAZINE (APRESOLINE) 20 mg/mL injection 10 mg  10 mg IntraVENous Q6H PRN    influenza vaccine 2020-21 (6 mos+)(PF) (FLUARIX/FLULAVAL/FLUZONE QUAD) injection 0.5 mL  0.5 mL IntraMUSCular PRIOR TO DISCHARGE    insulin lispro (HUMALOG) injection   SubCUTAneous TIDAC    dextrose (D50W) injection syrg 12.5-25 g  12.5-25 g IntraVENous PRN    bisacodyL (DULCOLAX) suppository 10 mg  10 mg Rectal DAILY PRN    polyethylene glycol (MIRALAX) packet 17 g  17 g Oral BID    bisacodyL (DULCOLAX) tablet 10 mg  10 mg Oral DAILY PRN    lactobac ac& pc-s.therm-b.anim (FESTUS Q/RISAQUAD)  1 Cap Oral DAILY    enoxaparin (LOVENOX) injection 40 mg  40 mg SubCUTAneous Q24H    sodium chloride (NS) flush 5-40 mL  5-40 mL IntraVENous Q8H    sodium chloride (NS) flush 5-40 mL  5-40 mL IntraVENous PRN    acetaminophen (TYLENOL) tablet 650 mg  650 mg Oral Q6H PRN    Or    acetaminophen (TYLENOL) suppository 650 mg  650 mg Rectal Q6H PRN    polyethylene glycol (MIRALAX) packet 17 g  17 g Oral DAILY PRN    promethazine (PHENERGAN) tablet 12.5 mg  12.5 mg Oral Q6H PRN    Or    ondansetron (ZOFRAN) injection 4 mg  4 mg IntraVENous Q6H PRN    amLODIPine (NORVASC) tablet 10 mg  10 mg Oral DAILY    aspirin delayed-release tablet 81 mg  81 mg Oral DAILY    glucose chewable tablet 16 g  4 Tab Oral PRN    glucagon (GLUCAGEN) injection 1 mg  1 mg IntraMUSCular PRN        Objective:   Vitals:  Visit Vitals  BP (!) 148/84 (BP 1 Location: Left arm, BP Patient Position: Sitting)   Pulse 79   Temp 97.6 °F (36.4 °C)   Resp 16   Ht 5' 7\" (1.702 m)   Wt 159 lb 14.4 oz (72.5 kg)   SpO2 99%   BMI 25.04 kg/m²     Temp (24hrs), Av °F (36.7 °C), Min:97.5 °F (36.4 °C), Max:98.9 °F (37.2 °C)      O2 Device: Room air    Last 24hr Input/Output:    Intake/Output Summary (Last 24 hours) at 2021 0913  Last data filed at 2021 1000  Gross per 24 hour   Intake 240 ml   Output --   Net 240 ml        PHYSICAL EXAM:  General:    Alert, cooperative, no distress, appears stated age. Head:   Normocephalic, without obvious abnormality, atraumatic. Eyes:   Conjunctivae/corneas clear. PERRLA  Nose:  Nares normal. No drainage or sinus tenderness.   Throat:    Lips, mucosa, and tongue normal.  No Thrush  Neck:  Supple, symmetrical,  no adenopathy, thyroid: non tender    no carotid bruit and no JVD. Back:    Symmetric,  No CVA tenderness. Lungs:   Clear to auscultation bilaterally. No Wheezing or Rhonchi. No rales. Chest wall:  No tenderness or deformity. No Accessory muscle use. Heart:   Regular rate and rhythm,  no murmur, rub or gallop. Abdomen:   Soft, non-tender. Not distended. Bowel sounds normal.  Drain intact. Induration is less in the right lower quadrant and flank area         Lab Data Reviewed:    No results for input(s): WBC, HGB, HCT, PLT, HGBEXT, HCTEXT, PLTEXT, HGBEXT, HCTEXT, PLTEXT in the last 72 hours. No results for input(s): NA, K, CL, CO2, GLU, BUN, CREA, CA, MG, PHOS, ALB, TBIL, TBILI, ALT, INR, INREXT, INREXT in the last 72 hours. No lab exists for component: SGOT  Lab Results   Component Value Date/Time    Glucose (POC) 107 (H) 01/05/2021 07:04 AM    Glucose (POC) 202 (H) 01/04/2021 09:57 PM    Glucose (POC) 215 (H) 01/04/2021 04:20 PM    Glucose (POC) 201 (H) 01/04/2021 11:19 AM    Glucose (POC) 119 (H) 01/04/2021 07:46 AM     No results for input(s): PH, PCO2, PO2, HCO3, FIO2 in the last 72 hours. No results for input(s): INR, INREXT, INREXT in the last 72 hours.   ___________________________________________________  ___________________________________________________    Attending Physician: Adithya Lawton MD

## 2021-01-05 NOTE — DISCHARGE SUMMARY
[facility] SUMMARY    Name:  Loida Peter  MR#:  [id number]  :  1955  ACCOUNT #:  [de-identified]  ADMIT DATE:  12/10/2020  DISCHARGE DATE:      PRIMARY CARE PHYSICIAN:  Babak Menezes MD    HISTORY OF PRESENT ILLNESS:  A 78-year-old male presents with right lower quadrant abdominal pain which started a week back, worsening since Monday, radiating towards the groin. Denies any fever, chills, nausea, vomiting, diarrhea, or other associated symptoms. Basically started about a week ago. Initially saw PCP, Dr. Angelique Gunderson; with worsening since Friday, saw Dr. Lillie Velásquez on Monday who ordered outpatient CT and abdominal ultrasound. The patient was subsequently seen in the emergency room, subsequently admitted by the hospitalist for evaluation and care. Past medical history, review of systems, and physical examination in the [facility]. CONSULTATIONS:  1. Luis Roberts MD, Surgery. The patient has right flank and groin pain, suspicion for right inguinal hernia prior to CT scan. CT scan shows the patient has myositis and no right inguinal hernia. The ultrasound is irrelevant as the CT scan shows definitely that there is no right inguinal hernia. No need for operative intervention at this time. Vitaly Willett MD, Infectious Disease. Impression:  Abdominal distention; right flank induration; mild renal insufficiency; syncope, status post ICD placement. Recommendations:  Repeat CT scan to see if there is development of drainable collection. We would prefer for this to be with IV contrast.  Hydrating with 75 mL of saline and we should repeat the creatinine tomorrow and then perform a CT scan of the abdomen and pelvis with IV and p.o. contrast.  I will change vancomycin to daptomycin because of the renal insufficiency. We should take him off atorvastatin temporarily. 3.  Singh Knox MD, Cardiology.   Assessment:  Brugada syndrome, status post AICD, results of [facility] noted to be positive. There is pathogenic variant identified as SCN5A, gene is associated with autosomal dominant Brugada syndrome. Long QT, dilated cardiomyopathy, and atrial fibrillation. Follows with Dr. Magdalena Reina.  ICD check on 10/28/2020 with no events, less than 1% RVT. Cardiac catheterization 2019, mild luminal irregularities. We will see again as needed. 4.  Dr. Jayshree Leon, Endocrinology, recommendations were followed. OPERATIONS/PROCEDURES:  Dr. Catrachita Marie. Preoperative diagnosis:  12/31/2020, right flank abscess. Postoperative diagnosis:  12/31/2020, right flank abscess. Procedure: Incision and drainage, trunk. RESULTS REVIEW:  Hemoglobin 9.1, hematocrit 29.8, WBC 4.4. Sodium 142, potassium 4.2, chloride 108, CO2 30, glucose 89, BUN 26, and creatinine 1.23. Wound culture, 12/18, methicillin-resistant Staphylococcus aureus. The remainder of the wound culture has been negative including the surgical wound culture, and blood cultures are negative. IMAGING STUDIES:  CT on 12/28, decreased size of right lower quadrant abdominal wall fluid collection after catheter drainage. A small to moderate amount of fluid remains. 33/37, uncomplicated ultrasound-guided right abdominal wall abscess drainage with an 8-French locking catheter, pigtail loops must be unlocked before the catheter is removed. On 12/18, ultrasound-guided aspiration of a multi-septated collection in the right inguinal area extending to superior which yielded 20 mL of pus. Fluid was sent for Gram stain and culture. The patient tolerated the procedure well. A Doppler was done with the right lower extremity vein thrombosis. HOSPITAL COURSE:  The patient was admitted as stated above. He was felt to have cellulitis in the right lower quadrant with right lateral abdominal wall musculature thickening on CT scan.   He was initially treated with IV antibiotics and was followed by Infectious Disease throughout the entire hospital stay, initially on vancomycin which was then changed to daptomycin as noted above. He remained on daptomycin throughout the hospital stay and will go home with 10 days of doxycycline as recommended by Infectious Disease. Initially, there was no fluid within the collection and as the  and antibiotics continued, he had a CT scan which did reveal a collection and 20 mL of pus was aspirated that was cultured positive on 12/18. He had a repeat CT scan on the 12/23 that resulted in aspiration of 12 mL of pus from an ultrasound and about 12/28, there was improvement in reduction of the flank wall induration/abscess. He was seen again in consultation by Surgery and on 12/31 was taken to the operating room where incision and drainage was made and wet-to-dry dressings were applied which he continued throughout the entire rest of his hospital stay and will be continued as an outpatient. He initially was mildly symptomatic and as the hospital stay continued, he became completely asymptomatic. For the wound care, that will be orchestrated through Surgery as well as home health. His blood sugar came under control relatively easy with appropriate diet. To that end, he was seen in consultation by Endocrinology, recommendations were followed. He was advised of the proper technique of insulin administration and the need to keep his blood sugars controlled. He agreed to the plan. He did not receive his GLP-1 inhibitor during the hospital stay, which will be initiated as an outpatient and as will be expected with appropriate diet, he requires very little insulin. I suspect that if he stuck with his diet, he could get off insulin completely and use metformin and the GLP-1 inhibitor. This will be determined by his primary care physician, Dr. Katia Bagley, as an outpatient.     He has a known history of Brugada syndrome and was asymptomatic as expected during the hospital stay and was seen in consultation by Cardiology. Blood pressure control was adequate with home medications. He developed acute renal failure on two occasions, first on admission and then subsequently during the hospital stay and for that reason, vancomycin was discontinued. He was given hydration with improvement of his renal status back to his baseline. He was noted to have moderate protein caloric malnutrition and was seen in consultation by nutritionist who assisted us with his dietary care. All medical issues have now been addressed. The patient's medical status is stable, will have wound care through home health; and therefore, he is being discharged to home ambulatory in satisfactory condition, improved. FINAL DIAGNOSES:  1. Cellulitis/abscess, right lateral abdominal wall and right flank. 2.  Uncontrolled type 2 diabetes. 3.  Brugada syndrome. 4.  Status post implantable cardioverter-defibrillator. 5.  Primary hypertension. 6.  Acute renal failure. 7.  Moderate protein caloric malnutrition. DISPOSITION:  Discharged to home ambulatory, up as tolerated. We note Dr. Jean Pierre Haskins noted that he should have qd dressing changes to the groin and flank, that he could shower and let water and soap run over the wounds, and to see Dr. Rochelle Teresa in 3-5 days for followup care. DISCHARGE MEDICATIONS:  Amlodipine 10 mg daily, aspirin 81 mg daily, atorvastatin qhs, Levemir 6 units subcutaneously nightly, metformin 1000 mg with supper, MiraLax 17 g b.i.d., Trulicity 1.5 mg weekly. Wound Recommendations:    Remove packing, clean with spray . Pack upper site (abdomen) with saline-damp gauze. Pack lower site (groin) with Iodoform gauze. Cover each with dry topper. Change daily.      I personally saw the patient today. I spent greater than or equal to 30 minutes on counseling and coordination of care and discharging this patient.       Emma Teran MD      [id number]  D:  01/04/2021 9:30  T:  01/04/2021 22:16  JOB #:  [id number]

## 2021-01-06 LAB
GLUCOSE BLD STRIP.AUTO-MCNC: 109 MG/DL (ref 65–100)
GLUCOSE BLD STRIP.AUTO-MCNC: 113 MG/DL (ref 65–100)
GLUCOSE BLD STRIP.AUTO-MCNC: 174 MG/DL (ref 65–100)
GLUCOSE BLD STRIP.AUTO-MCNC: 215 MG/DL (ref 65–100)
SERVICE CMNT-IMP: ABNORMAL

## 2021-01-06 PROCEDURE — 97161 PT EVAL LOW COMPLEX 20 MIN: CPT | Performed by: PHYSICAL THERAPIST

## 2021-01-06 PROCEDURE — 74011250637 HC RX REV CODE- 250/637: Performed by: INTERNAL MEDICINE

## 2021-01-06 PROCEDURE — 99231 SBSQ HOSP IP/OBS SF/LOW 25: CPT | Performed by: INTERNAL MEDICINE

## 2021-01-06 PROCEDURE — 74011250637 HC RX REV CODE- 250/637: Performed by: SURGERY

## 2021-01-06 PROCEDURE — 97165 OT EVAL LOW COMPLEX 30 MIN: CPT

## 2021-01-06 PROCEDURE — 65270000032 HC RM SEMIPRIVATE

## 2021-01-06 PROCEDURE — 97535 SELF CARE MNGMENT TRAINING: CPT

## 2021-01-06 PROCEDURE — 74011636637 HC RX REV CODE- 636/637: Performed by: SURGERY

## 2021-01-06 PROCEDURE — 82962 GLUCOSE BLOOD TEST: CPT

## 2021-01-06 PROCEDURE — 74011636637 HC RX REV CODE- 636/637: Performed by: INTERNAL MEDICINE

## 2021-01-06 PROCEDURE — 97116 GAIT TRAINING THERAPY: CPT | Performed by: PHYSICAL THERAPIST

## 2021-01-06 PROCEDURE — 74011250636 HC RX REV CODE- 250/636: Performed by: SURGERY

## 2021-01-06 RX ORDER — OXYCODONE AND ACETAMINOPHEN 5; 325 MG/1; MG/1
1 TABLET ORAL
Qty: 60 TAB | Refills: 0 | Status: SHIPPED | OUTPATIENT
Start: 2021-01-06 | End: 2021-01-16 | Stop reason: CLARIF

## 2021-01-06 RX ORDER — INSULIN LISPRO 100 [IU]/ML
INJECTION, SOLUTION INTRAVENOUS; SUBCUTANEOUS
Qty: 1 VIAL | Refills: 5 | Status: SHIPPED
Start: 2021-01-06 | End: 2021-05-19

## 2021-01-06 RX ORDER — ATORVASTATIN CALCIUM 20 MG/1
TABLET, FILM COATED ORAL
Qty: 90 TAB | Refills: 3 | Status: SHIPPED
Start: 2021-01-18 | End: 2021-04-13 | Stop reason: SDUPTHER

## 2021-01-06 RX ADMIN — Medication 1 CAPSULE: at 09:19

## 2021-01-06 RX ADMIN — DOXYCYCLINE HYCLATE 100 MG: 100 TABLET, COATED ORAL at 22:25

## 2021-01-06 RX ADMIN — ENOXAPARIN SODIUM 40 MG: 40 INJECTION SUBCUTANEOUS at 17:03

## 2021-01-06 RX ADMIN — Medication 10 ML: at 17:04

## 2021-01-06 RX ADMIN — Medication 10 ML: at 06:00

## 2021-01-06 RX ADMIN — OXYCODONE HYDROCHLORIDE AND ACETAMINOPHEN 1 TABLET: 5; 325 TABLET ORAL at 12:44

## 2021-01-06 RX ADMIN — DOXYCYCLINE HYCLATE 100 MG: 100 TABLET, COATED ORAL at 09:20

## 2021-01-06 RX ADMIN — INSULIN LISPRO 2 UNITS: 100 INJECTION, SOLUTION INTRAVENOUS; SUBCUTANEOUS at 12:44

## 2021-01-06 RX ADMIN — AMLODIPINE BESYLATE 10 MG: 5 TABLET ORAL at 09:20

## 2021-01-06 RX ADMIN — METFORMIN HYDROCHLORIDE 750 MG: 750 TABLET, EXTENDED RELEASE ORAL at 17:03

## 2021-01-06 RX ADMIN — Medication 10 ML: at 22:00

## 2021-01-06 RX ADMIN — ASPIRIN 81 MG: 81 TABLET, COATED ORAL at 09:20

## 2021-01-06 RX ADMIN — INSULIN GLARGINE 8 UNITS: 100 INJECTION, SOLUTION SUBCUTANEOUS at 22:25

## 2021-01-06 NOTE — PROGRESS NOTES
OCCUPATIONAL THERAPY EVALUATION/DISCHARGE  Patient: Champ Gudino (76 y.o. male)  Date: 1/6/2021  Primary Diagnosis: Abdominal pain [R10.9]  Abdominal pain [R10.9]  Procedure(s) (LRB):  INCISION AND DRAINAGE TRUNK (Right) 6 Days Post-Op   Precautions: none    ASSESSMENT  Based on the objective data described below, the patient presents with decreased higher level mobility/balance and activity tolerance; however, he is noted to be completing ADL routine with Modified [place] at RW, with good DME safety, as well as, good distal LE reaching for ADL management. Pt reports he is Independent at baseline without use of DME and was educated on acute OT's role within rehab process, with pt verbalizing understanding and in agreement with no acute OT needs being identified, as pt's higher level mobility/balance deficits are being followed by physical therapist.  Pt did report concerns with returning home as he state he doesn't think he'll be able to manage on his own and his significant other is unable to assist; nursing and CM notified and following. Prior to discharge, OT answered pt's questions/concerns with patient thanking therapist for his efforts. Current Level of Function (ADLs/self-care): Mod [place] at [facility] [place]    Functional Outcome Measure: The patient scored 90/100 on the Barthel Index outcome measure. Other factors to consider for discharge: Wound care needs     PLAN :  Recommend with staff: OOB meals, Active ADL engagement, RW level Modified Independent toileting    Recommendation for discharge: (in order for the patient to meet his/her long term goals)  No skilled occupational therapy/ follow up rehabilitation needs identified at this time.     This discharge recommendation:  Has been made in collaboration with the attending provider and/or case management    IF patient discharges home will need the following DME: walker: rolling       SUBJECTIVE:   Patient stated I've been walking out in the serna and going to and from the bathroom on my own, well I'm using this walker.     OBJECTIVE DATA SUMMARY:   HISTORY:   Past Medical History:   Diagnosis Date    Hypertension     ICD (implantable cardioverter-defibrillator) in place     NSVT (nonsustained ventricular tachycardia) ([facility] [address] 11/21/2018    EPS 11/21/2018 VT/VF     Right groin pain 12/7/2020     Past Surgical History:   Procedure Laterality Date    COLONOSCOPY  1/2/2015         HX HEART CATHETERIZATION      HX HEENT      l cararact removal    HX IMPLANTABLE CARDIOVERTER DEFIBRILLATOR         Prior Level of Function/Environment/Context: Independent with ADLs/IADLs without use of DME  Expanded or extensive additional review of patient history:   Home Situation  Home Environment: Apartment  One/Two Story Residence: One story  Living Alone: No  Support Systems: Family member(s), Friends \ neighbors(lives with significant other)  Patient Expects to be Discharged to[de-identified] Private residence  Current DME Used/Available at Home: None  Tub or Shower Type: Tub/Shower combination    Hand dominance: Right    EXAMINATION OF PERFORMANCE DEFICITS:  Cognitive/Behavioral Status:  Neurologic State: Alert  Orientation Level: Oriented X4  Cognition: Appropriate decision making; Appropriate for age attention/concentration; Appropriate safety awareness; Follows commands  Perception: Appears intact  Perseveration: No perseveration noted  Safety/Judgement: Awareness of environment    Hearing: Auditory  Auditory Impairment: None    Vision/Perceptual:    Corrective Lenses: Reading glasses    Range of Motion:  AROM: Within functional limits  PROM: Within functional limits    Strength:  Strength: Within functional limits    Coordination:  Coordination: Within functional limits  Fine Motor Skills-Upper: Left Intact; Right Intact    Gross Motor Skills-Upper: Left Intact; Right Intact    Tone & Sensation:  Tone: Normal  Sensation: Intact    Balance:  Sitting: Intact; Without support  Standing: Intact; With support    Functional Mobility and Transfers for ADLs:  Transfers:  Sit to Stand: Modified independent  Stand to Sit: Modified independent  Bed to Chair: Modified independent  Bathroom Mobility: Modified independent  Toilet Transfer : Modified independent    ADL Assessment:  Feeding: Independent    Oral Facial Hygiene/Grooming: Modified Independent    Bathing: Modified independent    Upper Body Dressing: Modified independent    Lower Body Dressing: Modified independent    Toileting: Modified independent    ADL Intervention and task modifications:  Patient instructed and indicated understanding the benefits of maintaining activity tolerance, functional mobility, and independence with self care tasks during acute stay  to ensure safe return home and to baseline. Encouraged patient to increase frequency and duration OOB, be out of bed for all meals, perform daily ADLs (as approved by RN/MD regarding bathing etc), and performing functional mobility to/from bathroom. Pt educated on safe transfer techniques, with specific emphasis on proper hand placement to push up from seated surface rather than attempt to pull self up, fully positioning self in-front of desired seated location, feeling chair on back of legs and reaching back with 1-2 UE to slowly lower self to seated position. Cognitive Retraining  Safety/Judgement: Awareness of environment    Functional Measure:  Barthel Index:    Bathin  Bladder: 10  Bowels: 10  Groomin  Dressing: 10  Feeding: 10  Mobility: 10  Stairs: 5  Toilet Use: 10  Transfer (Bed to Chair and Back): 15  Total: 90/100        The Barthel ADL Index: Guidelines  1. The index should be used as a record of what a patient does, not as a record of what a patient could do. 2. The main aim is to establish degree of independence from any help, physical or verbal, however minor and for whatever reason.   3. The need for supervision renders the patient not independent. 4. A patient's performance should be established using the best available evidence. Asking the patient, friends/relatives and nurses are the usual sources, but direct observation and common sense are also important. However direct testing is not needed. 5. Usually the patient's performance over the preceding 24-48 hours is important, but occasionally longer periods will be relevant. 6. Middle categories imply that the patient supplies over 50 per cent of the effort. 7. Use of aids to be independent is allowed. Eliana Acosta Barthel, D.W. (3227). Functional evaluation: the Barthel Index. [facility] (14)2. LIZZ Wetzel, Genie Sykes., Alberta Bonds., Kelli, [address] (1999). Measuring the change indisability after inpatient rehabilitation; comparison of the responsiveness of the Barthel Index and Functional Shannon Measure. Journal of Neurology, Neurosurgery, and Psychiatry, 66(4), 141-324. Gregorio Conte, N.J.A, SHER Bonner, & Alexys Stevens M.A. (2004.) Assessment of post-stroke quality of life in cost-effectiveness studies: The usefulness of the Barthel Index and the EuroQoL-5D.  Quality of Life Research, 15, 151-68     Occupational Therapy Evaluation Charge Determination   History Examination Decision-Making   LOW Complexity : Brief history review  LOW Complexity : 1-3 performance deficits relating to physical, cognitive , or psychosocial skils that result in activity limitations and / or participation restrictions  LOW Complexity : No comorbidities that affect functional and no verbal or physical assistance needed to complete eval tasks       Based on the above components, the patient evaluation is determined to be of the following complexity level: LOW   Pain Rating:  No c/o pain during OT evaluation; however, pt did state when he rotates core he has pain at wound site (did not quantify); nursing aware and following    Activity Tolerance:   Good and requires rest breaks    After treatment patient left in no apparent distress:    Sitting in chair, Call bell within reach and Bed / chair alarm activated    COMMUNICATION/EDUCATION:   The patients plan of care was discussed with: Physical therapist, Registered nurse and Case management.      Thank you for this referral.  Nahum aMrtínez OT  Time Calculation: 20 mins

## 2021-01-06 NOTE — ADT AUTH CERT NOTES
Cellulitis - Care Day 26 (1/4/2021) by Sonia Hughes       Review Status Review Entered   Completed 1/5/2021 07:51      Criteria Review      Care Day: 26 Care Date: 1/4/2021 Level of Care: Inpatient Floor    Guideline Day 3    Clinical Status    (X) * Hemodynamic stability    (X) * Afebrile or fever improved    (X) * Skin exam stable or improved    (X) * Mental status at baseline    (X) * Antibiotic treatment needs appropriate for next level of care    (X) * Pain absent or manageable at next level of care    ( ) * Discharge plans and education understood    Activity    (X) * Ambulatory or acceptable for next level of care    Routes    (X) * Oral hydration, medications, [F] and diet    * Milestone   Additional Notes   1/4/2021 Continued Stay Review   128/73; 98.1; 82; 96% RA      MEDS: Tylenol 650mg q6hrs prn PO x1; Lovenox 40mg qd SC; Percocet 5-325mg 1 q4hrs prn PO x2; Cubicin 400mg qd IV; Humalog insulin tid [facility]         Surgery MD Notes 1/2/2021:    CC: Mild incision pain       POD: 4 ID flank wound       Subjective:       Feels good. Pain only on dressing change      Assessment:       78 y/o M with staph infection right flank and groin status post I & D       Plan:       BID dressing changes to groin and flank. Okay to shower and let water and soap run over wounds. Abx plan per ID. Appears to be sterilized fluid from OR. Hospitalist MD Notes 1/4/2021      Assessment:   Principal Problem:     Cellulitis (12/11/2020)      CT abdomen 12/16: Right lateral abdominal wall musculature thickening is again noted. More discrete somewhat oblong fluid collection projects from the suprailiac region into the right inguinal region without evidence of gas. Increasing right flank and right chest wall subcutaneous edema.   - s/p I&D on 12/19- 20 cc of pus aspirated. - -Blood culture: NGTD   - drain cx: prelim - MRSA    - ID following   - rpt CT abd 12/23; subcutaneous edema in the right abdominal wall.  There is a fluid collection with peripheral enhancement consistent with an abscess in the right inferior abdominal wall which extends from the pelvis into   the mid abdomen laterally and is slightly larger than the prior study   - s/p I&D 12/23 - 12 cc of pus was aspirated and sent for cultures.  Pigtail catheter placed - 10cc so far   -  c/w IV daptomycin- as above, for CT in a couple of days from today 12/24/2020 -scheduled for 12/28 am    - CT 12/28/2020 : CT - improvement in infection foot print rt flank/abd wall    -12/31/20 - INCISION AND DRAINAGE TRUNK         1/4/2021 06:52   GLUCOSE,FAST - POC: 74      1/4/2021 07:04   GLUCOSE,FAST - POC: 61 (L)      1/4/2021 07:46   GLUCOSE,FAST - POC: 119 (H)      1/4/2021 11:19   GLUCOSE,FAST - POC: 201 (H)      1/4/2021 16:20   GLUCOSE,FAST - POC: 215 (H)      1/4/2021 21:57   GLUCOSE,FAST - POC: 202 (H)               Cellulitis - Care Day 23 (1/1/2021) by Roel Reyeserfield       Review Status Review Entered   Completed 1/5/2021 07:37      Criteria Review      Care Day: 23 Care Date: 1/1/2021 Level of Care: Inpatient Floor    Guideline Day 3    Clinical Status    (X) * Hemodynamic stability    1/5/2021 07:37:39 EST by Roel Lagos      130/73; 99.3; 73; 16; 94% RA    (X) * Afebrile or fever improved    1/5/2021 07:37:39 EST by Roel Lagos      98.3-99.3    (X) * Skin exam stable or improved    1/5/2021 07:37:39 EST by Roel Lagos      improving    (X) * Mental status at baseline    1/5/2021 07:37:39 EST by Eron Maria no issue noted    (X) * Antibiotic treatment needs appropriate for next level of care    1/5/2021 07:37:39 EST by Juan Og IV    (X) * Pain absent or manageable at next level of care    1/5/2021 07:37:39 EST by Eron Maria managed with percocet    ( ) * Discharge plans and education understood    Activity    (X) * Ambulatory or acceptable for next level of care    1/5/2021 07:37:39 EST by Eron Maria as tolerated    Routes    (X) * Oral hydration, medications, [F] and diet    1/5/2021 07:37:39 EST by Mercedez Foster as tolerated    * Milestone   Additional Notes   1/1/2021 Continued Stay Review      MEDS: Tylenol 650mg q6hrs prn PO x1; Lovenox 40mg qd SC; Percocet 5-325mg 1 q4hrs prn PO x3; Cubicin 400mg qd IV; Humalog insulin tid SC      MD Notes 1/1/2021:       3 Days Post-Op       Procedure:  Procedure(s):   incision and drainage trunk   Assessment:          Hospital Problems Date Reviewed: 12/31/2020          Moderate protein-calorie malnutrition (HCC)           ARF (acute renal failure) (HCC)           * (Principal) Cellulitis           Abdominal pain       Plan/Recommendations/Medical Decision Making:   Continue current dressing changes. final cultures pending.     Conitnue Abx      Encourage activity   Titrate insulin            1/1/2021 06:27   GLUCOSE,FAST - POC: 243 (H)      1/1/2021 11:21   GLUCOSE,FAST - POC: 235 (H)      1/1/2021 16:12   GLUCOSE,FAST - POC: 242 (H)      1/1/2021 21:29   GLUCOSE,FAST - POC: 350 (H)

## 2021-01-06 NOTE — PROGRESS NOTES
Hospital Progress Note    NAME:  Fredrick Loo   :   1955   MRN:  [id number]     Date/Time:  2021 9:03 AM    Plan:     1. Discharge when authorization approved by insurance  Risk of Deterioration: Low  []           Moderate  [x]           High  []                 Assessment:   Principal Problem:    Cellulitis (2020)     CT abdomen : Right lateral abdominal wall musculature thickening is again noted. More discrete somewhat oblong fluid collection projects from the suprailiac region into the right inguinal region without evidence of gas. Increasing right flank and right chest wall subcutaneous edema.  - s/p I&D on - 20 cc of pus aspirated.   - -Blood culture: NGTD  - drain cx: prelim - MRSA   - ID following  - rpt CT abd ; subcutaneous edema in the right abdominal wall. There is a fluid collection with peripheral enhancement consistent with an abscess in the right inferior abdominal wall which extends from the pelvis into  the mid abdomen laterally and is slightly larger than the prior study  - s/p I&D  - 12 cc of pus was aspirated and sent for cultures. Pigtail catheter placed - 10cc so far  -  c/w IV daptomycin- as above, for CT in a couple of days from today 2020 -scheduled for  am   - CT 2020 : CT - improvement in infection foot print rt flank/abd wall   -20 - INCISION AND DRAINAGE TRUNK    Active Problems:    Dyslipidemia (2014)     Continue Lipitor      Uncontrolled type 2 diabetes mellitus with complication, with long-term current use of insulin ([address] (2018)      Basal bolus      Brugada syndrome (2018)     Results of [facility] noted to be POSITIVE.                 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  SCN5A gene is associated with autosomal                   dominant                    Brugada Syndrome, long QT, dilated cardiomyopathy, and afib.                      - Follows up with Dr. Sebastian Martinez.              - ICD Check 10/28/20 - No events. Less than 1% RVP              - Cardiac cath 2019 with mild luminal irregularities           ICD (implantable cardioverter-defibrillator) in place (12/21/2018)      Hypertension (12/21/2018)      Abdominal pain (12/10/2020)      ARF (acute renal failure) ([facility] [address] (12/12/2020)     Resolved     Now exacerbated by IV contrast      Moderate protein-calorie malnutrition ([facility] [address] (12/20/2020)     Nutrition assistance appreciated          Admitting notes:65 y. o. male who presents with right lower quadrant abdominal pain which started 1 week back worsen since Monday radiating towards the groin denies any fever chills nausea vomiting diarrhea or any other associated symptoms. States this started about 1 week ago, initially saw PCP Dr. Luca Taylor worsening since Friday. Saw Dr. Geovanna Reynoso on Monday who ordered outpatient CT scan and abdominal ultrasound      Subjective: Feeling better this morning and voices no new complaints.          11 Point Review of Systems:   Negative except no chest pain no shortness of breath    []            Unable to obtain ROS due to:       []            mental status change []            sedated []            intubated     Social History     Tobacco Use    Smoking status: Never Smoker    Smokeless tobacco: Never Used   Substance Use Topics    Alcohol use: No     Medications reviewed:  Current Facility-Administered Medications   Medication Dose Route Frequency    doxycycline (VIBRA-TABS) tablet 100 mg  100 mg Oral Q12H    insulin glargine (LANTUS) injection 8 Units  8 Units SubCUTAneous QHS    metFORMIN ER (GLUCOPHAGE XR) tablet 750 mg  750 mg Oral DAILY WITH DINNER    oxyCODONE-acetaminophen (PERCOCET) 5-325 mg per tablet 1 Tab  1 Tab Oral Q4H PRN    hydrALAZINE (APRESOLINE) 20 mg/mL injection 10 mg  10 mg IntraVENous Q6H PRN    influenza vaccine 2020-21 (6 mos+)(PF) (FLUARIX/FLULAVAL/FLUZONE QUAD) injection 0.5 mL  0.5 mL IntraMUSCular PRIOR TO DISCHARGE    insulin lispro (HUMALOG) injection   SubCUTAneous TIDAC    dextrose (D50W) injection syrg 12.5-25 g  12.5-25 g IntraVENous PRN    bisacodyL (DULCOLAX) suppository 10 mg  10 mg Rectal DAILY PRN    polyethylene glycol (MIRALAX) packet 17 g  17 g Oral BID    bisacodyL (DULCOLAX) tablet 10 mg  10 mg Oral DAILY PRN    lactobac ac& pc-s.therm-b.anim (FESTUS Q/RISAQUAD)  1 Cap Oral DAILY    enoxaparin (LOVENOX) injection 40 mg  40 mg SubCUTAneous Q24H    sodium chloride (NS) flush 5-40 mL  5-40 mL IntraVENous Q8H    sodium chloride (NS) flush 5-40 mL  5-40 mL IntraVENous PRN    acetaminophen (TYLENOL) tablet 650 mg  650 mg Oral Q6H PRN    Or    acetaminophen (TYLENOL) suppository 650 mg  650 mg Rectal Q6H PRN    polyethylene glycol (MIRALAX) packet 17 g  17 g Oral DAILY PRN    promethazine (PHENERGAN) tablet 12.5 mg  12.5 mg Oral Q6H PRN    Or    ondansetron (ZOFRAN) injection 4 mg  4 mg IntraVENous Q6H PRN    amLODIPine (NORVASC) tablet 10 mg  10 mg Oral DAILY    aspirin delayed-release tablet 81 mg  81 mg Oral DAILY    glucose chewable tablet 16 g  4 Tab Oral PRN    glucagon (GLUCAGEN) injection 1 mg  1 mg IntraMUSCular PRN        Objective:   Vitals:  Visit Vitals  /77 (BP 1 Location: Left arm, BP Patient Position: Sitting)   Pulse 66   Temp 98.5 °F (36.9 °C)   Resp 16   Ht 5' 7\" (1.702 m)   Wt 159 lb 14.4 oz (72.5 kg)   SpO2 99%   BMI 25.04 kg/m²     Temp (24hrs), Av.5 °F (36.9 °C), Min:98 °F (36.7 °C), Max:99 °F (37.2 °C)      O2 Device: Room air    Last 24hr Input/Output:    Intake/Output Summary (Last 24 hours) at 2021 0850  Last data filed at 2021 1746  Gross per 24 hour   Intake 1120 ml   Output --   Net 1120 ml        PHYSICAL EXAM:  General:    Alert, cooperative, no distress, appears stated age. Head:   Normocephalic, without obvious abnormality, atraumatic. Eyes:   Conjunctivae/corneas clear. PERRLA  Nose:  Nares normal. No drainage or sinus tenderness.   Throat:    Lips, mucosa, and tongue normal.  No Thrush  Neck:  Supple, symmetrical,  no adenopathy, thyroid: non tender    no carotid bruit and no JVD. Back:    Symmetric,  No CVA tenderness. Lungs:   Clear to auscultation bilaterally. No Wheezing or Rhonchi. No rales. Chest wall:  No tenderness or deformity. No Accessory muscle use. Heart:   Regular rate and rhythm,  no murmur, rub or gallop. Abdomen:   Soft, non-tender. Not distended. Bowel sounds normal.  Dressing intact         Lab Data Reviewed:    No results for input(s): WBC, HGB, HCT, PLT, HGBEXT, HCTEXT, PLTEXT, HGBEXT, HCTEXT, PLTEXT in the last 72 hours. No results for input(s): NA, K, CL, CO2, GLU, BUN, CREA, CA, MG, PHOS, ALB, TBIL, TBILI, ALT, INR, INREXT, INREXT in the last 72 hours. No lab exists for component: SGOT  Lab Results   Component Value Date/Time    Glucose (POC) 113 (H) 01/06/2021 06:49 AM    Glucose (POC) 326 (H) 01/05/2021 10:10 PM    Glucose (POC) 228 (H) 01/05/2021 04:13 PM    Glucose (POC) 288 (H) 01/05/2021 11:47 AM    Glucose (POC) 107 (H) 01/05/2021 07:04 AM     No results for input(s): PH, PCO2, PO2, HCO3, FIO2 in the last 72 hours. No results for input(s): INR, INREXT, INREXT in the last 72 hours.   ___________________________________________________  ___________________________________________________    Attending Physician: Ken Suero MD

## 2021-01-06 NOTE — PROGRESS NOTES
TRANSITION OF CARE  RUR--17%  Disposition--To SNF rehab. Referred to [facility]. Accepted with insurance auth pending. Covid negative result 1/5/21. Transport--TBD    CM spoke with Donna Yee SNF Admissions [phone], who confirmed that patient is accepted and Yetta Favio is pending. Lea Peña said that request made to [facility] to waive therapy notes as skilled need is wound care. Kula Old response was that therapy notes are needed. CAMERON received call from [facility], Juan  [phone] Ext [phone], offering to assist. Adela Castillo was unaware of above information. Iris will call Tomas Faby, Juan CHOPRA, to confirm the above information. CAMERON advised Iris that therapy notes are not currently available. Addendum-- CM obtained order for PT and OT evaluations  PT and OT evaluations were then completed, and CM faxed reports to [address]  (Fax [phone]).

## 2021-01-06 NOTE — ROUTINE PROCESS
Bedside shift change report given to Robb (oncoming nurse) by Shahid Bonilla RN (offgoing nurse). Report included the following information SBAR, Kardex, Intake/Output, MAR and Recent Results.

## 2021-01-06 NOTE — PROGRESS NOTES
Problem: Mobility Impaired (Adult and Pediatric)  Goal: *Acute Goals and Plan of Care (Insert Text)  Description: FUNCTIONAL STATUS PRIOR TO ADMISSION: Patient was independent and active without use of DME.    HOME SUPPORT PRIOR TO ADMISSION: The patient lived with significant other but did not require assist.  Reports that SO cannot assist him as she has difficulties of her own. Physical Therapy Goals  Initiated 1/6/2021  1. Patient will move from supine to sit and sit to supine  in bed with modified independence within 7 day(s). 2.  Patient will transfer from bed to chair and chair to bed with modified independence using the least restrictive device within 7 day(s). 3.  Patient will perform sit to stand with modified independence within 7 day(s). 4.  Patient will ambulate with modified independence for 400 feet with the least restrictive device within 7 day(s). Outcome: Progressing Towards Goal  PHYSICAL THERAPY EVALUATION  Patient: Dwain Ruano (13 y.o. male)  Date: 1/6/2021  Primary Diagnosis: Abdominal pain [R10.9]  Abdominal pain [R10.9]  Procedure(s) (LRB):  INCISION AND DRAINAGE TRUNK (Right) 6 Days Post-Op   Precautions:   Fall    ASSESSMENT  Based on the objective data described below, the patient presents with slight decrease in functional mobility from baseline. Patient currently limited by pain but otherwise moving well. Needing SBA for transfers and amb approx 300 feet with RW and SBA. Patient using RW for support, which he does not use at home. Patient is not too far from his baseline but will continue to follow to ensure independence with mobility. Other factors to consider for discharge: has no support/assist at home     Patient will benefit from skilled therapy intervention to address the above noted impairments.        PLAN :  Recommendations and Planned Interventions: bed mobility training, transfer training, gait training, therapeutic exercises, patient and family training/education, and therapeutic activities      Frequency/Duration: Patient will be followed by physical therapy:  3 times a week to address goals. Recommendation for discharge: (in order for the patient to meet his/her long term goals)  Patient needing SNF for wound care. HH PT vs SNF rehab        IF patient discharges home will need the following DME: may need RW if she DC home         SUBJECTIVE:   Patient stated I just don't have any help at home.     OBJECTIVE DATA SUMMARY:   HISTORY:    Past Medical History:   Diagnosis Date    Hypertension     ICD (implantable cardioverter-defibrillator) in place     NSVT (nonsustained ventricular tachycardia) ([facility] [address] 11/21/2018    EPS 11/21/2018 VT/VF     Right groin pain 12/7/2020     Past Surgical History:   Procedure Laterality Date    COLONOSCOPY  1/2/2015         HX HEART CATHETERIZATION      HX HEENT      l cararact removal    HX IMPLANTABLE CARDIOVERTER DEFIBRILLATOR         Personal factors and/or comorbidities impacting plan of care:     Home Situation  Home Environment: Apartment  One/Two Story Residence: One story  Living Alone: No  Support Systems: Family member(s), Friends \ neighbors(lives with significant other)  Patient Expects to be Discharged to[de-identified] Private residence  Current DME Used/Available at Home: None  Tub or Shower Type: Tub/Shower combination    EXAMINATION/PRESENTATION/DECISION MAKING:   Critical Behavior:  Neurologic State: Alert  Orientation Level: Oriented X4  Cognition: Appropriate decision making, Appropriate for age attention/concentration, Appropriate safety awareness, Follows commands  Safety/Judgement: Awareness of environment  Hearing: Auditory  Auditory Impairment: None    Range Of Motion:  AROM: Within functional limits           PROM: Within functional limits           Strength:    Strength:  Within functional limits                    Tone & Sensation:   Tone: Normal              Sensation: Intact Coordination:  Coordination: Within functional limits  Vision:   Corrective Lenses: Reading glasses  Functional Mobility:  Bed Mobility:              Transfers:  Sit to Stand: Modified independent  Stand to Sit: Modified independent        Bed to Chair: Modified independent              Balance:   Sitting: Intact; Without support  Standing: Intact; With support  Ambulation/Gait Training:  Distance (ft): 300 Feet (ft)  Assistive Device: Gait belt;Walker, rolling  Ambulation - Level of Assistance: Stand-by assistance     Gait Description (WDL): Exceptions to WDL  Gait Abnormalities: Decreased step clearance;Shuffling gait        Base of Support: Widened     Speed/Denise: Pace decreased (<100 feet/min); Slow  Step Length: Left shortened;Right shortened       Pain Rating:  No c/o pain    Activity Tolerance:   Good and requires rest breaks    After treatment patient left in no apparent distress:   Sitting in chair and Call bell within reach    COMMUNICATION/EDUCATION:   The patients plan of care was discussed with: Physical therapist, Occupational therapist, and Registered nurse. Fall prevention education was provided and the patient/caregiver indicated understanding., Patient/family have participated as able in goal setting and plan of care. , and Patient/family agree to work toward stated goals and plan of care.     Thank you for this referral.  Nehemiah Tamayo, PT, DPT   Time Calculation: 22 mins

## 2021-01-06 NOTE — PROGRESS NOTES
ID Progress Note  2021     I and D on      Subjective:     Afebrile. No complaints. Objective:     Vitals:   Visit Vitals  /83 (BP 1 Location: Left arm, BP Patient Position: At rest)   Pulse 76   Temp 98.5 °F (36.9 °C)   Resp 16   Ht 5' 7\" (1.702 m)   Wt 72.5 kg (159 lb 14.4 oz)   SpO2 98%   BMI 25.04 kg/m²        Tmax:  Temp (24hrs), Av.4 °F (36.9 °C), Min:98 °F (36.7 °C), Max:98.5 °F (36.9 °C)      Exam:    Not in distress  Abdomen: right flank bandaged, abdomen nontender   Speech fluent     Labs:   Lab Results   Component Value Date/Time    WBC 4.4 2020 07:40 AM    HGB 9.1 (L) 2020 07:40 AM    HCT 29.8 (L) 2020 07:40 AM    PLATELET 343  07:40 AM    MCV 85.9 2020 07:40 AM     Lab Results   Component Value Date/Time    Sodium 142 2020 05:38 AM    Potassium 4.2 2020 05:38 AM    Chloride 108 2020 05:38 AM    CO2 30 2020 05:38 AM    Anion gap 4 (L) 2020 05:38 AM    Glucose 89 2020 05:38 AM    BUN 26 (H) 2020 05:38 AM    Creatinine 1.23 2020 05:38 AM    BUN/Creatinine ratio 21 (H) 2020 05:38 AM    GFR est AA >60 2020 05:38 AM    GFR est non-AA 59 (L) 2020 05:38 AM    Calcium 8.8 2020 05:38 AM    Bilirubin, total 0.2 2020 03:20 AM    Alk. phosphatase 99 2020 03:20 AM    Protein, total 8.1 2020 03:20 AM    Albumin 2.4 (L) 2020 05:38 AM    Globulin 5.6 (H) 2020 03:20 AM    A-G Ratio 0.4 (L) 2020 03:20 AM    ALT (SGPT) 22 2020 03:20 AM           Assessment:     #1 abdominal wall abscess  S/p I and D on 2020     #2 mild renal insufficiency     #3 syncope status post ICD placement (due to inducible VT/VF)            Recommendations:      cultures growing staph aureus. Continue doxycycline until 2021. Will sign off.  Please call with questions.                                 Colton Agosto MD

## 2021-01-07 LAB
GLUCOSE BLD STRIP.AUTO-MCNC: 109 MG/DL (ref 65–100)
GLUCOSE BLD STRIP.AUTO-MCNC: 197 MG/DL (ref 65–100)
GLUCOSE BLD STRIP.AUTO-MCNC: 267 MG/DL (ref 65–100)
GLUCOSE BLD STRIP.AUTO-MCNC: 79 MG/DL (ref 65–100)
SERVICE CMNT-IMP: ABNORMAL
SERVICE CMNT-IMP: NORMAL

## 2021-01-07 PROCEDURE — 82962 GLUCOSE BLOOD TEST: CPT

## 2021-01-07 PROCEDURE — 65270000032 HC RM SEMIPRIVATE

## 2021-01-07 PROCEDURE — 99231 SBSQ HOSP IP/OBS SF/LOW 25: CPT | Performed by: CLINICAL NURSE SPECIALIST

## 2021-01-07 PROCEDURE — 74011250637 HC RX REV CODE- 250/637: Performed by: INTERNAL MEDICINE

## 2021-01-07 PROCEDURE — 74011250636 HC RX REV CODE- 250/636: Performed by: SURGERY

## 2021-01-07 PROCEDURE — 99231 SBSQ HOSP IP/OBS SF/LOW 25: CPT | Performed by: INTERNAL MEDICINE

## 2021-01-07 PROCEDURE — 74011250637 HC RX REV CODE- 250/637: Performed by: SURGERY

## 2021-01-07 PROCEDURE — 74011636637 HC RX REV CODE- 636/637: Performed by: SURGERY

## 2021-01-07 PROCEDURE — 74011636637 HC RX REV CODE- 636/637: Performed by: INTERNAL MEDICINE

## 2021-01-07 RX ADMIN — ASPIRIN 81 MG: 81 TABLET, COATED ORAL at 10:36

## 2021-01-07 RX ADMIN — OXYCODONE HYDROCHLORIDE AND ACETAMINOPHEN 1 TABLET: 5; 325 TABLET ORAL at 22:14

## 2021-01-07 RX ADMIN — Medication 1 CAPSULE: at 10:36

## 2021-01-07 RX ADMIN — DOXYCYCLINE HYCLATE 100 MG: 100 TABLET, COATED ORAL at 10:36

## 2021-01-07 RX ADMIN — INSULIN LISPRO 2 UNITS: 100 INJECTION, SOLUTION INTRAVENOUS; SUBCUTANEOUS at 11:56

## 2021-01-07 RX ADMIN — METFORMIN HYDROCHLORIDE 750 MG: 750 TABLET, EXTENDED RELEASE ORAL at 17:23

## 2021-01-07 RX ADMIN — INSULIN GLARGINE 8 UNITS: 100 INJECTION, SOLUTION SUBCUTANEOUS at 21:59

## 2021-01-07 RX ADMIN — AMLODIPINE BESYLATE 10 MG: 5 TABLET ORAL at 10:36

## 2021-01-07 RX ADMIN — ENOXAPARIN SODIUM 40 MG: 40 INJECTION SUBCUTANEOUS at 17:23

## 2021-01-07 RX ADMIN — OXYCODONE HYDROCHLORIDE AND ACETAMINOPHEN 1 TABLET: 5; 325 TABLET ORAL at 11:57

## 2021-01-07 RX ADMIN — DOXYCYCLINE HYCLATE 100 MG: 100 TABLET, COATED ORAL at 21:59

## 2021-01-07 RX ADMIN — OXYCODONE HYDROCHLORIDE AND ACETAMINOPHEN 1 TABLET: 5; 325 TABLET ORAL at 03:12

## 2021-01-07 NOTE — DIABETES MGMT
[facility]    CLINICAL NURSE SPECIALIST CONSULT  FOLLOW UP  NOTE    Presentation   Dwain Ruano is a 77 y.o. male admitted on 12/10/20 with right lower abdominal pain. Upon initial evaluation cellulitis of abdomen determined. This was possibly due to non-sterile insulin administration. HX:   Past Medical History:   Diagnosis Date    Hypertension     ICD (implantable cardioverter-defibrillator) in place     NSVT (nonsustained ventricular tachycardia) ([facility] [address] 11/21/2018    EPS 11/21/2018 VT/VF     Right groin pain 12/7/2020   DM2    DX: CT scan    TX: abx. General surgery consulted and seen: not surgical candidate. Current clinical course has been complicated by fluctuating hyper and hypoglycemia. Diabetes: Patient has known Type 2 diabetes, treated with Metfomin/Detemir/and Trulicity PTA. Family history unknown for diabetes. Admission  and A1c 14%  indicate poor  diabetes control. Consulted by guru Acevedo for advanced diabetes nursing assessment and care, specifically related to   [x] Inpatient management strategy  [x] Home management assessment      Diabetes-related medical history  Acute complications  Fluctuating hyper and hypoglycemia  Neurological complications  Peripheral neuropathy  Microvascular disease-minor  denies  Macrovascular disease  denies  Other associated conditions     CAD-ICD/HTN    Diabetes medication history  Drug class Currently in use Discontinued Never used   Biguanide Metformin 2000mg daily     DDP-4 inhibitor       Sulfonylurea      Thiazolidinedione      GLP-1 RA Trulicity 1.5 mg weekly     SGLT-2 inhibitors      Basal insulin Detemir 40units AM  30units PM     Bolus insulin      Fixed Dose  Combinations        Subjective   Noted CM working on rehab vs [place] placement.     On isolation precautions: MRSA +; s/p I& D of trunk (abdomen) 12/31/20    CT 12/28/2020 : CT - improvement in infection foot print rt flank/abd wall, small collection still present     Eating very well  Continues on Lantus 8units daily without evidence of hypoglycemia  Metformin 750mg daily -tolderating  Fasting BG today-109  Objective   Physical exam  General Alert, oriented and in no acute distress. Conversant and cooperative. Vital Signs   Visit Vitals  /71   Pulse 78   Temp 97.6 °F (36.4 °C)   Resp 16   Ht 5' 7\" (1.702 m)   Wt 72.5 kg (159 lb 14.4 oz)   SpO2 96%   BMI 25.04 kg/m²     Skin  Warm and dry. Abdomen less distended today after procedure. Heart   Regular rate and rhythm. No murmurs, rubs or gallops  Lungs  Clear to auscultation without rales or rhonchi  Extremities No foot wounds        Laboratory  BMP:   No results found for: NA, K, CL, CO2, AGAP, GLU, BUN, CREA, GFRAA, GFRNA   Factors affecting BG management  Factor Dose Comments   Nutrition:  Carb-controlled meals     60 grams/meal      Pain abdomen    Infection Daptomycin/Zosyn      Blood glucose pattern-        Assessment and Plan   Nursing Diagnosis Risk for unstable blood glucose pattern   Nursing Intervention Domain 9685 Decision-making Support   Nursing Interventions Examined current inpatient diabetes control   Explored factors facilitating and impeding inpatient management  Identified self-management practices impeding diabetes control  Explored corrective strategies with patient and responsible inpatient provider   Informed patient of rational for insulin strategy while hospitalized  Instructed patient in:   -ways to save money with purchasing glucometer supplies at [facility],   -importance of daily sterile insulin  Injections, and importance of checking BG 3 times a day. -Also discussed his diet and the modifications he needs to [facility] him 'healthy plate' and portion control.     -Recommended him to see a podiatrist.    Discussed Long term effects of uncontrolled diabetes (amputations, non healing wounds, stroke, MI etc)     Evaluation   Mr. Boyle,  with Type 2 diabetes, did not achieve diabetes control prior to admission (PTA), as evidenced by admission BG of 197 and A1c of 14%. Based on assessment of diabetes self-care practices, interventions that warrant action while hospitalized include:   [x] Healthy Eating   [x] Problem Solving  [] Being Active   [] Healthy Coping  [x] Monitoring   [x] Reducing Risks  [x] Taking Medications  The patient would also benefit from diabetes self-management education and support (DSMES) after discharge. During this hospitalization, the patient has not achieved inpatient blood glucose target of 100-180mg/dl. Factors that have played a role include:  [x]  Meal/tube feeding disruption  [x] Compromised insulin absorption or delivery      To optimize BG control and support a positive health outcome, would utilize the Subcutaneous Insulin Order set (6653). BG trends over weekend labile with episodes of hypoglycemia noted in early am hours. Basal insulin reduced and Metformin dose reduced. Requiring minimal correctional insulin. IF continues to have episodes of hypoglycemia would recommend discontinuing Metformin and just treating BG with insulin only while hospitalized. Recommendations   Recommend:    [x] Use of Subcutaneous Insulin Order set (7455)  Insulin Use Options   CONTINUE  Basal  Dose             (Based on weight, BMI & GFR) Addresses basic metabolic needs 8 units daily, IF AMBG trends <100mg/dl, reduce basal by another 20%. Corrective                                       (Offset gaps in dosing) Useful in adjusting insulin dosing [x] Normal sensitivity         Discharge Planning   1. Since A1C now above goal, 14%, he will continue to need insulin. Adjustments will need to be made to his PTA insulins when discharged. He will require a lower dose. 2. Re-start PO diabetic meds as PTA. 3. Continue to follow up with PCP, Dr. Natalie Camara for diabetes management.     4.Diet and meal planning resources will be reinforced prior to discharge. Billing Code(s)   I personally reviewed medical record, including notes, data and current medications, and examined the patient at bedside before making care recommendations.        [x] 81890 IP subsequent hospital care - 15 minutes    MARTINE Perez  Diabetes Clinical Nurse Specialist  [facility]  Access via [employer]  [phone]

## 2021-01-07 NOTE — PROGRESS NOTES
Transition of Care Plan  RUR 17%  COVID 19 Negative 1/5/2021    Disposition   SNF   Pending insurance authorization  [facility] [phone]  Contact admissions   [address] [facility] fax [phone]    Transportation TBD  Family vs BLS    Contact   Girlfriend   Eduardo Roberts [phone]    CM talked with [place] in admissions at [facility] and she  Is starting authorization today for patient's admission to the facility. CM to follow-- If patient is not authorized to go to SNF from insurance [address] [facility] accepts his insurance--wound need specific wound care orders,  but cannot go to the home daily for wound care. Patient will need someone to assist.     Patient will not require IV ABX. Prior to admission, patient lived at home with his girlfriend. He was self care and working.

## 2021-01-07 NOTE — ROUTINE PROCESS
Bedside shift change report given to Cookie Raza and [facility] RN (oncoming nurse) by Maria L Quintero RN (offgoing nurse). Report included the following information SBAR, Kardex, Intake/Output and MAR.

## 2021-01-07 NOTE — PROGRESS NOTES
0400- Pt IV was not flushing. This nurse took out old IV and attempted to put in another IV. This IV ended up infiltrating and pt. Refused another IV because he suspected that he would be D/C today and hasn't needed any IV drugs or fluids. Bedside shift change report given to Sharmila RN (oncoming nurse) by Sai Salgado (offgoing nurse). Report included the following information SBAR and Kardex.

## 2021-01-07 NOTE — PROGRESS NOTES
Hospital Progress Note    NAME:  Luz Winn   :   1955   MRN:  [id number]     Date/Time:  2021 9:03 AM    Plan:     1. Discharge when authorization approved by insurance  Risk of Deterioration: Low  []           Moderate  [x]           High  []                 Assessment:   Principal Problem:    Cellulitis (2020)     CT abdomen : Right lateral abdominal wall musculature thickening is again noted. More discrete somewhat oblong fluid collection projects from the suprailiac region into the right inguinal region without evidence of gas. Increasing right flank and right chest wall subcutaneous edema.  - s/p I&D on -  cc of pus aspirated.   - -Blood culture: NGTD  - drain cx: prelim - MRSA   - ID following  - rpt CT abd ; subcutaneous edema in the right abdominal wall. There is a fluid collection with peripheral enhancement consistent with an abscess in the right inferior abdominal wall which extends from the pelvis into  the mid abdomen laterally and is slightly larger than the prior study  - s/p I&D  - 12 cc of pus was aspirated and sent for cultures. Pigtail catheter placed - 10cc so far  -  c/w IV daptomycin- as above, for CT in a couple of days from today 2020 -scheduled for  am   - CT 2020 : CT - improvement in infection foot print rt flank/abd wall   -20 - INCISION AND DRAINAGE TRUNK    Active Problems:    Dyslipidemia (2014)     Continue Lipitor      Uncontrolled type 2 diabetes mellitus with complication, with long-term current use of insulin ([address] (2018)      Basal bolus      Brugada syndrome (2018)     Results of [facility] noted to be POSITIVE.                 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  SCN5A gene is associated with autosomal                   dominant                    Brugada Syndrome, long QT, dilated cardiomyopathy, and afib.                      - Follows up with Dr. Sj Byrd.              - ICD Check 10/28/20 - No events. Less than 1% RVP              - Cardiac cath 2019 with mild luminal irregularities           ICD (implantable cardioverter-defibrillator) in place (12/21/2018)      Hypertension (12/21/2018)      Abdominal pain (12/10/2020)      ARF (acute renal failure) ([facility] [address] (12/12/2020)     Resolved     Now exacerbated by IV contrast      Moderate protein-calorie malnutrition ([facility] [address] (12/20/2020)     Nutrition assistance appreciated          Admitting notes:65 y. o. male who presents with right lower quadrant abdominal pain which started 1 week back worsen since Monday radiating towards the groin denies any fever chills nausea vomiting diarrhea or any other associated symptoms. States this started about 1 week ago, initially saw PCP Dr. Jonh Denney worsening since Friday. Saw Dr. Sanjiv Espinoza on Monday who ordered outpatient CT scan and abdominal ultrasound      Subjective: Feeling better this morning and voices no new complaints.          11 Point Review of Systems:   Negative except no chest pain no shortness of breath    []            Unable to obtain ROS due to:       []            mental status change []            sedated []            intubated     Social History     Tobacco Use    Smoking status: Never Smoker    Smokeless tobacco: Never Used   Substance Use Topics    Alcohol use: No     Medications reviewed:  Current Facility-Administered Medications   Medication Dose Route Frequency    doxycycline (VIBRA-TABS) tablet 100 mg  100 mg Oral Q12H    insulin glargine (LANTUS) injection 8 Units  8 Units SubCUTAneous QHS    metFORMIN ER (GLUCOPHAGE XR) tablet 750 mg  750 mg Oral DAILY WITH DINNER    oxyCODONE-acetaminophen (PERCOCET) 5-325 mg per tablet 1 Tab  1 Tab Oral Q4H PRN    hydrALAZINE (APRESOLINE) 20 mg/mL injection 10 mg  10 mg IntraVENous Q6H PRN    influenza vaccine 2020-21 (6 mos+)(PF) (FLUARIX/FLULAVAL/FLUZONE QUAD) injection 0.5 mL  0.5 mL IntraMUSCular PRIOR TO DISCHARGE    insulin lispro (HUMALOG) injection   SubCUTAneous TIDAC    dextrose (D50W) injection syrg 12.5-25 g  12.5-25 g IntraVENous PRN    bisacodyL (DULCOLAX) suppository 10 mg  10 mg Rectal DAILY PRN    polyethylene glycol (MIRALAX) packet 17 g  17 g Oral BID    bisacodyL (DULCOLAX) tablet 10 mg  10 mg Oral DAILY PRN    lactobac ac& pc-s.therm-b.anim (FESTUS Q/RISAQUAD)  1 Cap Oral DAILY    enoxaparin (LOVENOX) injection 40 mg  40 mg SubCUTAneous Q24H    sodium chloride (NS) flush 5-40 mL  5-40 mL IntraVENous Q8H    sodium chloride (NS) flush 5-40 mL  5-40 mL IntraVENous PRN    acetaminophen (TYLENOL) tablet 650 mg  650 mg Oral Q6H PRN    Or    acetaminophen (TYLENOL) suppository 650 mg  650 mg Rectal Q6H PRN    polyethylene glycol (MIRALAX) packet 17 g  17 g Oral DAILY PRN    promethazine (PHENERGAN) tablet 12.5 mg  12.5 mg Oral Q6H PRN    Or    ondansetron (ZOFRAN) injection 4 mg  4 mg IntraVENous Q6H PRN    amLODIPine (NORVASC) tablet 10 mg  10 mg Oral DAILY    aspirin delayed-release tablet 81 mg  81 mg Oral DAILY    glucose chewable tablet 16 g  4 Tab Oral PRN    glucagon (GLUCAGEN) injection 1 mg  1 mg IntraMUSCular PRN        Objective:   Vitals:  Visit Vitals  /74   Pulse 69   Temp 98.3 °F (36.8 °C)   Resp 16   Ht 5' 7\" (1.702 m)   Wt 159 lb 14.4 oz (72.5 kg)   SpO2 99%   BMI 25.04 kg/m²     Temp (24hrs), Av.4 °F (36.9 °C), Min:98.3 °F (36.8 °C), Max:98.5 °F (36.9 °C)      O2 Device: Room air    Last 24hr Input/Output:    Intake/Output Summary (Last 24 hours) at 2021 0841  Last data filed at 2021 8289  Gross per 24 hour   Intake --   Output 645 ml   Net -645 ml        PHYSICAL EXAM:  General:    Alert, cooperative, no distress, appears stated age. Head:   Normocephalic, without obvious abnormality, atraumatic. Eyes:   Conjunctivae/corneas clear. PERRLA  Nose:  Nares normal. No drainage or sinus tenderness.   Throat:    Lips, mucosa, and tongue normal.  No Thrush  Neck:  Supple, symmetrical,  no adenopathy, thyroid: non tender    no carotid bruit and no JVD. Back:    Symmetric,  No CVA tenderness. Lungs:   Clear to auscultation bilaterally. No Wheezing or Rhonchi. No rales. Chest wall:  No tenderness or deformity. No Accessory muscle use. Heart:   Regular rate and rhythm,  no murmur, rub or gallop. Abdomen:   Soft, non-tender. Not distended. Bowel sounds normal.  Dressing intact         Lab Data Reviewed:    No results for input(s): WBC, HGB, HCT, PLT, HGBEXT, HCTEXT, PLTEXT, HGBEXT, HCTEXT, PLTEXT in the last 72 hours. No results for input(s): NA, K, CL, CO2, GLU, BUN, CREA, CA, MG, PHOS, ALB, TBIL, TBILI, ALT, INR, INREXT, INREXT in the last 72 hours. No lab exists for component: SGOT  Lab Results   Component Value Date/Time    Glucose (POC) 109 (H) 01/07/2021 06:13 AM    Glucose (POC) 215 (H) 01/06/2021 10:11 PM    Glucose (POC) 109 (H) 01/06/2021 04:22 PM    Glucose (POC) 174 (H) 01/06/2021 11:24 AM    Glucose (POC) 113 (H) 01/06/2021 06:49 AM     No results for input(s): PH, PCO2, PO2, HCO3, FIO2 in the last 72 hours. No results for input(s): INR, INREXT, INREXT in the last 72 hours.   ___________________________________________________  ___________________________________________________    Attending Physician: Alice Koenig MD

## 2021-01-08 LAB
GLUCOSE BLD STRIP.AUTO-MCNC: 101 MG/DL (ref 65–100)
GLUCOSE BLD STRIP.AUTO-MCNC: 191 MG/DL (ref 65–100)
GLUCOSE BLD STRIP.AUTO-MCNC: 194 MG/DL (ref 65–100)
GLUCOSE BLD STRIP.AUTO-MCNC: 280 MG/DL (ref 65–100)
SERVICE CMNT-IMP: ABNORMAL

## 2021-01-08 PROCEDURE — 82962 GLUCOSE BLOOD TEST: CPT

## 2021-01-08 PROCEDURE — 74011636637 HC RX REV CODE- 636/637: Performed by: SURGERY

## 2021-01-08 PROCEDURE — 74011250637 HC RX REV CODE- 250/637: Performed by: INTERNAL MEDICINE

## 2021-01-08 PROCEDURE — 97116 GAIT TRAINING THERAPY: CPT

## 2021-01-08 PROCEDURE — 74011636637 HC RX REV CODE- 636/637: Performed by: INTERNAL MEDICINE

## 2021-01-08 PROCEDURE — 74011250637 HC RX REV CODE- 250/637: Performed by: SURGERY

## 2021-01-08 PROCEDURE — 99231 SBSQ HOSP IP/OBS SF/LOW 25: CPT | Performed by: INTERNAL MEDICINE

## 2021-01-08 PROCEDURE — 65270000032 HC RM SEMIPRIVATE

## 2021-01-08 PROCEDURE — 74011250636 HC RX REV CODE- 250/636: Performed by: SURGERY

## 2021-01-08 RX ADMIN — INSULIN LISPRO 2 UNITS: 100 INJECTION, SOLUTION INTRAVENOUS; SUBCUTANEOUS at 17:35

## 2021-01-08 RX ADMIN — ENOXAPARIN SODIUM 40 MG: 40 INJECTION SUBCUTANEOUS at 17:35

## 2021-01-08 RX ADMIN — INSULIN GLARGINE 8 UNITS: 100 INJECTION, SOLUTION SUBCUTANEOUS at 22:46

## 2021-01-08 RX ADMIN — AMLODIPINE BESYLATE 10 MG: 5 TABLET ORAL at 08:37

## 2021-01-08 RX ADMIN — DOXYCYCLINE HYCLATE 100 MG: 100 TABLET, COATED ORAL at 22:36

## 2021-01-08 RX ADMIN — METFORMIN HYDROCHLORIDE 750 MG: 750 TABLET, EXTENDED RELEASE ORAL at 17:34

## 2021-01-08 RX ADMIN — DOXYCYCLINE HYCLATE 100 MG: 100 TABLET, COATED ORAL at 08:37

## 2021-01-08 RX ADMIN — OXYCODONE HYDROCHLORIDE AND ACETAMINOPHEN 1 TABLET: 5; 325 TABLET ORAL at 11:29

## 2021-01-08 RX ADMIN — INSULIN LISPRO 5 UNITS: 100 INJECTION, SOLUTION INTRAVENOUS; SUBCUTANEOUS at 11:28

## 2021-01-08 RX ADMIN — Medication 1 CAPSULE: at 08:37

## 2021-01-08 RX ADMIN — ASPIRIN 81 MG: 81 TABLET, COATED ORAL at 08:36

## 2021-01-08 NOTE — PROGRESS NOTES
Transition of Care Plan  RUR 17%  COVID 19 Negative 1/5/2021     Pt now denied SNF stay per his [employer] insurance.  Insurance will cover home health.  Will update pt/attending.    [facility], requested confirmation of soonest start date of care agency can do.     Contact   Girlfriendaniel Turner [phone]    CM called [facility], insurance authorization for snf started 1/5/2021 to check status.  Authorization is still pending with [facility] and [facility] continues back/forth conversation with [facility], hopefully will get determination today.     Pt is not able to do self wound care and identifies that a caregiver is not able to do it twice a day as ordered.  If [facility] denies SNF for recovery, pt will have to go home with  and [facility] has accepted but the barrier remains BID wound care.    JUAN Ashton

## 2021-01-08 NOTE — DIABETES MGMT
Diabetes Management Team to sign off at this point as patient's blood glucose remains stable. Please re-consult us if patient needs change. Thank you for including us in their care.        Signed By: MARTINE Jeter   [facility]    January 8, 2021

## 2021-01-08 NOTE — PROGRESS NOTES
PHYSICAL THERAPY TREATMENT/DISCHARGE  Patient: Esa Jordan (83 y.o. male)  Date: 1/8/2021  Diagnosis: Abdominal pain [R10.9]  Abdominal pain [R10.9] Cellulitis  Procedure(s) (LRB):  INCISION AND DRAINAGE TRUNK (Right) 8 Days Post-Op  Precautions: Fall  Chart, physical therapy assessment, plan of care and goals were reviewed. ASSESSMENT  Patient continues with skilled PT services and has progressed towards goals. Pt received seated in the chair and agreeable to therapy. Pt reported he walked in the hallways 3x yesterday. Pt completed transfers independently without AD. Pt tolerated gait training in the hallway without AD today x 2 laps (~600ft) with standby assist. Pt intermittently reached for the hallway railing, but no overt LOB or instability noted. Pt does not require any further acute PT needs at this time. Continue to recommend ambulate with RN staff with use of RW for safety. Other factors to consider for discharge: wound care needs         PLAN :  Patient will be discharged from acute skilled physical therapy at this time. Rationale for discharge:  Goals achieved    Recommendation for discharge: (in order for the patient to meet his/her long term goals)  No skilled physical therapy/ follow up rehabilitation needs identified at this time. 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     OBJECTIVE DATA SUMMARY:   Critical Behavior:  Neurologic State: Alert  Orientation Level: Oriented X4  Cognition: Appropriate decision making, Appropriate for age attention/concentration, Appropriate safety awareness, Follows commands  Safety/Judgement: Awareness of environment  Functional Mobility Training:  Bed Mobility: Transfers:  Sit to Stand: Independent  Stand to Sit: Independent                             Balance:  Sitting: Intact  Standing: Intact  Ambulation/Gait Training:  Distance (ft): 600 Feet (ft)  Assistive Device: Gait belt  Ambulation - Level of Assistance: Supervision        Gait Abnormalities: Decreased step clearance        Base of Support: Widened     Speed/Denise: Pace decreased (<100 feet/min)                       Stairs:               Therapeutic Exercises:     Pain Rating:  Pt denied pain    Activity Tolerance:   Good    After treatment patient left in no apparent distress:   Sitting in chair and Call bell within reach    COMMUNICATION/COLLABORATION:   The patients plan of care was discussed with: Registered nurse. , CAMERON Bautista, PT, DPT   Time Calculation: 10 mins

## 2021-01-08 NOTE — PROGRESS NOTES
Hospital Progress Note    NAME:  Refugio Zamora   :   1955   MRN:  [id number]     Date/Time:  2021 9:03 AM    Plan:     1. Discharge when authorization approved by insurance  Risk of Deterioration: Low  []           Moderate  [x]           High  []                 Assessment:   Principal Problem:    Cellulitis (2020)     CT abdomen : Right lateral abdominal wall musculature thickening is again noted. More discrete somewhat oblong fluid collection projects from the suprailiac region into the right inguinal region without evidence of gas. Increasing right flank and right chest wall subcutaneous edema.  - s/p I&D on - 20 cc of pus aspirated.   - -Blood culture: NGTD  - drain cx: prelim - MRSA   - ID following  - rpt CT abd ; subcutaneous edema in the right abdominal wall. There is a fluid collection with peripheral enhancement consistent with an abscess in the right inferior abdominal wall which extends from the pelvis into  the mid abdomen laterally and is slightly larger than the prior study  - s/p I&D  - 12 cc of pus was aspirated and sent for cultures. Pigtail catheter placed - 10cc so far  -  c/w IV daptomycin- as above, for CT in a couple of days from today 2020 -scheduled for  am   - CT 2020 : CT - improvement in infection foot print rt flank/abd wall   -20 - INCISION AND DRAINAGE TRUNK    Active Problems:    Dyslipidemia (2014)     Continue Lipitor      Uncontrolled type 2 diabetes mellitus with complication, with long-term current use of insulin ([address] (2018)      Basal bolus      Brugada syndrome (2018)     Results of [facility] noted to be POSITIVE.                 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  SCN5A gene is associated with autosomal                   dominant                    Brugada Syndrome, long QT, dilated cardiomyopathy, and afib.                      - Follows up with Dr. Tracy Humphries.              - ICD Check 10/28/20 - No events. Less than 1% RVP              - Cardiac cath 2019 with mild luminal irregularities           ICD (implantable cardioverter-defibrillator) in place (12/21/2018)      Hypertension (12/21/2018)      Abdominal pain (12/10/2020)      ARF (acute renal failure) ([facility] (12/12/2020)     Resolved     Now exacerbated by IV contrast      Moderate protein-calorie malnutrition ([facility] (12/20/2020)     Nutrition assistance appreciated          Admitting notes:65 y. o. male who presents with right lower quadrant abdominal pain which started 1 week back worsen since Monday radiating towards the groin denies any fever chills nausea vomiting diarrhea or any other associated symptoms. States this started about 1 week ago, initially saw PCP Dr. Clarissa Weber worsening since Friday. Saw Dr. Shelbie Whitman on Monday who ordered outpatient CT scan and abdominal ultrasound      Subjective: Feeling better this morning and voices no new complaints.          11 Point Review of Systems:   Negative except no chest pain no shortness of breath    []            Unable to obtain ROS due to:       []            mental status change []            sedated []            intubated     Social History     Tobacco Use    Smoking status: Never Smoker    Smokeless tobacco: Never Used   Substance Use Topics    Alcohol use: No     Medications reviewed:  Current Facility-Administered Medications   Medication Dose Route Frequency    doxycycline (VIBRA-TABS) tablet 100 mg  100 mg Oral Q12H    insulin glargine (LANTUS) injection 8 Units  8 Units SubCUTAneous QHS    metFORMIN ER (GLUCOPHAGE XR) tablet 750 mg  750 mg Oral DAILY WITH DINNER    oxyCODONE-acetaminophen (PERCOCET) 5-325 mg per tablet 1 Tab  1 Tab Oral Q4H PRN    hydrALAZINE (APRESOLINE) 20 mg/mL injection 10 mg  10 mg IntraVENous Q6H PRN    influenza vaccine 2020-21 (6 mos+)(PF) (FLUARIX/FLULAVAL/FLUZONE QUAD) injection 0.5 mL  0.5 mL IntraMUSCular PRIOR TO DISCHARGE    insulin lispro (HUMALOG) injection   SubCUTAneous TIDAC    dextrose (D50W) injection syrg 12.5-25 g  12.5-25 g IntraVENous PRN    bisacodyL (DULCOLAX) suppository 10 mg  10 mg Rectal DAILY PRN    polyethylene glycol (MIRALAX) packet 17 g  17 g Oral BID    bisacodyL (DULCOLAX) tablet 10 mg  10 mg Oral DAILY PRN    lactobac ac& pc-s.therm-b.anim (FESTUS Q/RISAQUAD)  1 Cap Oral DAILY    enoxaparin (LOVENOX) injection 40 mg  40 mg SubCUTAneous Q24H    sodium chloride (NS) flush 5-40 mL  5-40 mL IntraVENous Q8H    sodium chloride (NS) flush 5-40 mL  5-40 mL IntraVENous PRN    acetaminophen (TYLENOL) tablet 650 mg  650 mg Oral Q6H PRN    Or    acetaminophen (TYLENOL) suppository 650 mg  650 mg Rectal Q6H PRN    polyethylene glycol (MIRALAX) packet 17 g  17 g Oral DAILY PRN    promethazine (PHENERGAN) tablet 12.5 mg  12.5 mg Oral Q6H PRN    Or    ondansetron (ZOFRAN) injection 4 mg  4 mg IntraVENous Q6H PRN    amLODIPine (NORVASC) tablet 10 mg  10 mg Oral DAILY    aspirin delayed-release tablet 81 mg  81 mg Oral DAILY    glucose chewable tablet 16 g  4 Tab Oral PRN    glucagon (GLUCAGEN) injection 1 mg  1 mg IntraMUSCular PRN        Objective:   Vitals:  Visit Vitals  /76   Pulse 70   Temp 98.4 °F (36.9 °C)   Resp 18   Ht 5' 7\" (1.702 m)   Wt 159 lb 14.4 oz (72.5 kg)   SpO2 97%   BMI 25.04 kg/m²     Temp (24hrs), Av.2 °F (36.8 °C), Min:97.6 °F (36.4 °C), Max:98.5 °F (36.9 °C)      O2 Device: Room air    Last 24hr Input/Output:  No intake or output data in the 24 hours ending 21     PHYSICAL EXAM:  General:    Alert, cooperative, no distress, appears stated age. Head:   Normocephalic, without obvious abnormality, atraumatic. Eyes:   Conjunctivae/corneas clear. PERRLA  Nose:  Nares normal. No drainage or sinus tenderness. Throat:    Lips, mucosa, and tongue normal.  No Thrush  Neck:  Supple, symmetrical,  no adenopathy, thyroid: non tender    no carotid bruit and no JVD.   Back:    Symmetric,  No CVA tenderness. Lungs:   Clear to auscultation bilaterally. No Wheezing or Rhonchi. No rales. Chest wall:  No tenderness or deformity. No Accessory muscle use. Heart:   Regular rate and rhythm,  no murmur, rub or gallop. Abdomen:   Soft, non-tender. Not distended. Bowel sounds normal.  Dressing intact         Lab Data Reviewed:    No results for input(s): WBC, HGB, HCT, PLT, HGBEXT, HCTEXT, PLTEXT, HGBEXT, HCTEXT, PLTEXT in the last 72 hours. No results for input(s): NA, K, CL, CO2, GLU, BUN, CREA, CA, MG, PHOS, ALB, TBIL, TBILI, ALT, INR, INREXT, INREXT in the last 72 hours. No lab exists for component: SGOT  Lab Results   Component Value Date/Time    Glucose (POC) 101 (H) 01/08/2021 06:02 AM    Glucose (POC) 267 (H) 01/07/2021 09:59 PM    Glucose (POC) 79 01/07/2021 03:57 PM    Glucose (POC) 197 (H) 01/07/2021 10:58 AM    Glucose (POC) 109 (H) 01/07/2021 06:13 AM     No results for input(s): PH, PCO2, PO2, HCO3, FIO2 in the last 72 hours. No results for input(s): INR, INREXT, INREXT in the last 72 hours.   ___________________________________________________  ___________________________________________________    Attending Physician: Jolie Fuller MD

## 2021-01-08 NOTE — PROGRESS NOTES
No issues with wound care currently. Continue BID packing. Discharge per primary. Follow up in two weeks with me in office.     Cielo Maloney MD  Bariatric and General Surgeon  [facility]

## 2021-01-08 NOTE — PROGRESS NOTES
Patient ambulated 4x laps in the halls independently, tolerated wound care well with pre-medication. Bedside shift change report given to Robb (oncoming nurse) by Becka Motta RN (offgoing nurse). Report included the following information SBAR, Kardex, MAR and Recent Results.

## 2021-01-09 LAB
GLUCOSE BLD STRIP.AUTO-MCNC: 108 MG/DL (ref 65–100)
GLUCOSE BLD STRIP.AUTO-MCNC: 198 MG/DL (ref 65–100)
GLUCOSE BLD STRIP.AUTO-MCNC: 329 MG/DL (ref 65–100)
GLUCOSE BLD STRIP.AUTO-MCNC: 99 MG/DL (ref 65–100)
SERVICE CMNT-IMP: ABNORMAL
SERVICE CMNT-IMP: NORMAL

## 2021-01-09 PROCEDURE — 74011250636 HC RX REV CODE- 250/636: Performed by: SURGERY

## 2021-01-09 PROCEDURE — 74011636637 HC RX REV CODE- 636/637: Performed by: SURGERY

## 2021-01-09 PROCEDURE — 99231 SBSQ HOSP IP/OBS SF/LOW 25: CPT | Performed by: INTERNAL MEDICINE

## 2021-01-09 PROCEDURE — 74011636637 HC RX REV CODE- 636/637: Performed by: INTERNAL MEDICINE

## 2021-01-09 PROCEDURE — 74011250637 HC RX REV CODE- 250/637: Performed by: SURGERY

## 2021-01-09 PROCEDURE — 74011250637 HC RX REV CODE- 250/637: Performed by: INTERNAL MEDICINE

## 2021-01-09 PROCEDURE — 65270000032 HC RM SEMIPRIVATE

## 2021-01-09 PROCEDURE — 82962 GLUCOSE BLOOD TEST: CPT

## 2021-01-09 RX ORDER — METFORMIN HYDROCHLORIDE 500 MG/1
1000 TABLET, EXTENDED RELEASE ORAL
Status: DISCONTINUED | OUTPATIENT
Start: 2021-01-09 | End: 2021-01-11 | Stop reason: HOSPADM

## 2021-01-09 RX ADMIN — AMLODIPINE BESYLATE 10 MG: 5 TABLET ORAL at 10:42

## 2021-01-09 RX ADMIN — OXYCODONE HYDROCHLORIDE AND ACETAMINOPHEN 1 TABLET: 5; 325 TABLET ORAL at 12:09

## 2021-01-09 RX ADMIN — DOXYCYCLINE HYCLATE 100 MG: 100 TABLET, COATED ORAL at 10:43

## 2021-01-09 RX ADMIN — ENOXAPARIN SODIUM 40 MG: 40 INJECTION SUBCUTANEOUS at 17:45

## 2021-01-09 RX ADMIN — DOXYCYCLINE HYCLATE 100 MG: 100 TABLET, COATED ORAL at 22:09

## 2021-01-09 RX ADMIN — POLYETHYLENE GLYCOL 3350 17 G: 17 POWDER, FOR SOLUTION ORAL at 10:44

## 2021-01-09 RX ADMIN — METFORMIN HYDROCHLORIDE 1000 MG: 500 TABLET, EXTENDED RELEASE ORAL at 17:44

## 2021-01-09 RX ADMIN — OXYCODONE HYDROCHLORIDE AND ACETAMINOPHEN 1 TABLET: 5; 325 TABLET ORAL at 00:43

## 2021-01-09 RX ADMIN — INSULIN LISPRO 2 UNITS: 100 INJECTION, SOLUTION INTRAVENOUS; SUBCUTANEOUS at 12:08

## 2021-01-09 RX ADMIN — ASPIRIN 81 MG: 81 TABLET, COATED ORAL at 10:42

## 2021-01-09 RX ADMIN — Medication 1 CAPSULE: at 10:42

## 2021-01-09 RX ADMIN — INSULIN GLARGINE 8 UNITS: 100 INJECTION, SOLUTION SUBCUTANEOUS at 22:09

## 2021-01-09 NOTE — PROGRESS NOTES
Patient had difficulty trying to do his wound care. Patient had difficulty seeing wound and inserting packing. Abd large and round.

## 2021-01-09 NOTE — PROGRESS NOTES
ITZEL:  To home, current wound care orders are BID, pt reporting unable to do it himself and not having an available caregiver, [facility] accepted for two times per week with start date of Wednesday January 13. Pt's insurance denied SNF and will only approve skilled home ke[place]. However, only accepting home health agency is [facility] and they can do [address] per WEEK and earliest [address] Wednesday January ! 3. Will update AVS.    There are no BID wound care services to set up for this pt. Spoke with bedside nurse and her plan for the day is to teach/ pt in self wound care for the two packings today and physician to reevaluate tomorrow.       JUAN Saucedo

## 2021-01-09 NOTE — PROGRESS NOTES
Hospital Progress Note    NAME:  José Luis Gutierrez   :   1955   MRN:  [id number]     Date/Time:  2021 9:03 AM    Plan:     1. Discharge when mechanism of wound care twice daily can be provided  Risk of Deterioration: Low  []           Moderate  [x]           High  []                 Assessment:   Principal Problem:    Cellulitis (2020)     CT abdomen : Right lateral abdominal wall musculature thickening is again noted. More discrete somewhat oblong fluid collection projects from the suprailiac region into the right inguinal region without evidence of gas. Increasing right flank and right chest wall subcutaneous edema.  - s/p I&D on - 20 cc of pus aspirated.   - -Blood culture: NGTD  - drain cx: prelim - MRSA   - ID following  - rpt CT abd ; subcutaneous edema in the right abdominal wall. There is a fluid collection with peripheral enhancement consistent with an abscess in the right inferior abdominal wall which extends from the pelvis into  the mid abdomen laterally and is slightly larger than the prior study  - s/p I&D  - 12 cc of pus was aspirated and sent for cultures. Pigtail catheter placed - 10cc so far  -  c/w IV daptomycin- as above, for CT in a couple of days from today 2020 -scheduled for 12/28 am   - CT 2020 : CT - improvement in infection foot print rt flank/abd wall   -20 - INCISION AND DRAINAGE TRUNK    Active Problems:    Dyslipidemia (2014)     Continue Lipitor      Uncontrolled type 2 diabetes mellitus with complication, with long-term current use of insulin ([address] (2018)      Basal bolus      Brugada syndrome (2018)     Results of [facility] noted to be POSITIVE.                 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  SCN5A gene is associated with autosomal                   dominant                    Brugada Syndrome, long QT, dilated cardiomyopathy, and afib.                      - Follows up with Dr. Gareth Lyles.              - ICD Check 10/28/20 - No events. Less than 1% RVP              - Cardiac cath 2019 with mild luminal irregularities           ICD (implantable cardioverter-defibrillator) in place (12/21/2018)      Hypertension (12/21/2018)      Abdominal pain (12/10/2020)      ARF (acute renal failure) ([facility] (12/12/2020)     Resolved     Now exacerbated by IV contrast      Moderate protein-calorie malnutrition ([facility] (12/20/2020)     Nutrition assistance appreciated          Admitting notes:65 y. o. male who presents with right lower quadrant abdominal pain which started 1 week back worsen since Monday radiating towards the groin denies any fever chills nausea vomiting diarrhea or any other associated symptoms. States this started about 1 week ago, initially saw PCP Dr. Degroot Loveless worsening since Friday. Saw Dr. Radha Meyer on Monday who ordered outpatient CT scan and abdominal ultrasound      Subjective: Feeling better this morning and voices no new complaints.          11 Point Review of Systems:   Negative except no chest pain no shortness of breath    []            Unable to obtain ROS due to:       []            mental status change []            sedated []            intubated     Social History     Tobacco Use    Smoking status: Never Smoker    Smokeless tobacco: Never Used   Substance Use Topics    Alcohol use: No     Medications reviewed:  Current Facility-Administered Medications   Medication Dose Route Frequency    doxycycline (VIBRA-TABS) tablet 100 mg  100 mg Oral Q12H    insulin glargine (LANTUS) injection 8 Units  8 Units SubCUTAneous QHS    metFORMIN ER (GLUCOPHAGE XR) tablet 750 mg  750 mg Oral DAILY WITH DINNER    oxyCODONE-acetaminophen (PERCOCET) 5-325 mg per tablet 1 Tab  1 Tab Oral Q4H PRN    hydrALAZINE (APRESOLINE) 20 mg/mL injection 10 mg  10 mg IntraVENous Q6H PRN    influenza vaccine 2020-21 (6 mos+)(PF) (FLUARIX/FLULAVAL/FLUZONE QUAD) injection 0.5 mL  0.5 mL IntraMUSCular PRIOR TO DISCHARGE    insulin lispro (HUMALOG) injection   SubCUTAneous TIDAC    dextrose (D50W) injection syrg 12.5-25 g  12.5-25 g IntraVENous PRN    bisacodyL (DULCOLAX) suppository 10 mg  10 mg Rectal DAILY PRN    polyethylene glycol (MIRALAX) packet 17 g  17 g Oral BID    bisacodyL (DULCOLAX) tablet 10 mg  10 mg Oral DAILY PRN    lactobac ac& pc-s.therm-b.anim (FESTUS Q/RISAQUAD)  1 Cap Oral DAILY    enoxaparin (LOVENOX) injection 40 mg  40 mg SubCUTAneous Q24H    sodium chloride (NS) flush 5-40 mL  5-40 mL IntraVENous Q8H    sodium chloride (NS) flush 5-40 mL  5-40 mL IntraVENous PRN    acetaminophen (TYLENOL) tablet 650 mg  650 mg Oral Q6H PRN    Or    acetaminophen (TYLENOL) suppository 650 mg  650 mg Rectal Q6H PRN    polyethylene glycol (MIRALAX) packet 17 g  17 g Oral DAILY PRN    promethazine (PHENERGAN) tablet 12.5 mg  12.5 mg Oral Q6H PRN    Or    ondansetron (ZOFRAN) injection 4 mg  4 mg IntraVENous Q6H PRN    amLODIPine (NORVASC) tablet 10 mg  10 mg Oral DAILY    aspirin delayed-release tablet 81 mg  81 mg Oral DAILY    glucose chewable tablet 16 g  4 Tab Oral PRN    glucagon (GLUCAGEN) injection 1 mg  1 mg IntraMUSCular PRN        Objective:   Vitals:  Visit Vitals  /74   Pulse 81   Temp 97.9 °F (36.6 °C)   Resp 18   Ht 5' 7\" (1.702 m)   Wt 159 lb 14.4 oz (72.5 kg)   SpO2 95%   BMI 25.04 kg/m²     Temp (24hrs), Av.3 °F (36.8 °C), Min:97.9 °F (36.6 °C), Max:98.8 °F (37.1 °C)      O2 Device: Room air    Last 24hr Input/Output:    Intake/Output Summary (Last 24 hours) at 2021 1301  Last data filed at 2021 0200  Gross per 24 hour   Intake --   Output 475 ml   Net -475 ml        PHYSICAL EXAM:  General:    Alert, cooperative, no distress, appears stated age. Head:   Normocephalic, without obvious abnormality, atraumatic. Eyes:   Conjunctivae/corneas clear. PERRLA  Nose:  Nares normal. No drainage or sinus tenderness.   Throat:    Lips, mucosa, and tongue normal.  No Thrush  Neck:  Supple, symmetrical,  no adenopathy, thyroid: non tender    no carotid bruit and no JVD. Back:    Symmetric,  No CVA tenderness. Lungs:   Clear to auscultation bilaterally. No Wheezing or Rhonchi. No rales. Chest wall:  No tenderness or deformity. No Accessory muscle use. Heart:   Regular rate and rhythm,  no murmur, rub or gallop. Abdomen:   Soft, non-tender. Not distended. Bowel sounds normal.  Dressing intact         Lab Data Reviewed:    No results for input(s): WBC, HGB, HCT, PLT, HGBEXT, HCTEXT, PLTEXT, HGBEXT, HCTEXT, PLTEXT in the last 72 hours. No results for input(s): NA, K, CL, CO2, GLU, BUN, CREA, CA, MG, PHOS, ALB, TBIL, TBILI, ALT, INR, INREXT, INREXT in the last 72 hours. No lab exists for component: SGOT  Lab Results   Component Value Date/Time    Glucose (POC) 198 (H) 01/09/2021 11:33 AM    Glucose (POC) 99 01/09/2021 06:32 AM    Glucose (POC) 191 (H) 01/08/2021 10:34 PM    Glucose (POC) 194 (H) 01/08/2021 04:19 PM    Glucose (POC) 280 (H) 01/08/2021 11:14 AM     No results for input(s): PH, PCO2, PO2, HCO3, FIO2 in the last 72 hours. No results for input(s): INR, INREXT, INREXT in the last 72 hours.   ___________________________________________________  ___________________________________________________    Attending Physician: Sury Lopez MD

## 2021-01-09 NOTE — PROGRESS NOTES
Bedside shift change report given to Amrit Patrick RN (oncoming nurse) by Nida Nolan RN (offgoing nurse). Report included the following information SBAR, Kardex, Procedure Summary, Intake/Output, MAR and Recent Results.

## 2021-01-10 LAB
GLUCOSE BLD STRIP.AUTO-MCNC: 119 MG/DL (ref 65–100)
GLUCOSE BLD STRIP.AUTO-MCNC: 194 MG/DL (ref 65–100)
GLUCOSE BLD STRIP.AUTO-MCNC: 241 MG/DL (ref 65–100)
GLUCOSE BLD STRIP.AUTO-MCNC: 73 MG/DL (ref 65–100)
SERVICE CMNT-IMP: ABNORMAL
SERVICE CMNT-IMP: NORMAL

## 2021-01-10 PROCEDURE — 74011636637 HC RX REV CODE- 636/637: Performed by: SURGERY

## 2021-01-10 PROCEDURE — 74011250637 HC RX REV CODE- 250/637: Performed by: INTERNAL MEDICINE

## 2021-01-10 PROCEDURE — 82962 GLUCOSE BLOOD TEST: CPT

## 2021-01-10 PROCEDURE — 74011250636 HC RX REV CODE- 250/636: Performed by: SURGERY

## 2021-01-10 PROCEDURE — 99231 SBSQ HOSP IP/OBS SF/LOW 25: CPT | Performed by: INTERNAL MEDICINE

## 2021-01-10 PROCEDURE — 74011636637 HC RX REV CODE- 636/637: Performed by: INTERNAL MEDICINE

## 2021-01-10 PROCEDURE — 65270000032 HC RM SEMIPRIVATE

## 2021-01-10 PROCEDURE — 74011250637 HC RX REV CODE- 250/637: Performed by: SURGERY

## 2021-01-10 RX ADMIN — METFORMIN HYDROCHLORIDE 1000 MG: 500 TABLET, EXTENDED RELEASE ORAL at 17:12

## 2021-01-10 RX ADMIN — ASPIRIN 81 MG: 81 TABLET, COATED ORAL at 09:35

## 2021-01-10 RX ADMIN — INSULIN GLARGINE 8 UNITS: 100 INJECTION, SOLUTION SUBCUTANEOUS at 23:06

## 2021-01-10 RX ADMIN — INSULIN LISPRO 2 UNITS: 100 INJECTION, SOLUTION INTRAVENOUS; SUBCUTANEOUS at 13:15

## 2021-01-10 RX ADMIN — DOXYCYCLINE HYCLATE 100 MG: 100 TABLET, COATED ORAL at 09:35

## 2021-01-10 RX ADMIN — Medication 1 CAPSULE: at 09:35

## 2021-01-10 RX ADMIN — OXYCODONE HYDROCHLORIDE AND ACETAMINOPHEN 1 TABLET: 5; 325 TABLET ORAL at 05:10

## 2021-01-10 RX ADMIN — DOXYCYCLINE HYCLATE 100 MG: 100 TABLET, COATED ORAL at 23:06

## 2021-01-10 RX ADMIN — ENOXAPARIN SODIUM 40 MG: 40 INJECTION SUBCUTANEOUS at 17:12

## 2021-01-10 RX ADMIN — OXYCODONE HYDROCHLORIDE AND ACETAMINOPHEN 1 TABLET: 5; 325 TABLET ORAL at 17:12

## 2021-01-10 RX ADMIN — POLYETHYLENE GLYCOL 3350 17 G: 17 POWDER, FOR SOLUTION ORAL at 09:35

## 2021-01-10 RX ADMIN — AMLODIPINE BESYLATE 10 MG: 5 TABLET ORAL at 09:35

## 2021-01-10 NOTE — PROGRESS NOTES
Hospital Progress Note    NAME:  Mitra Quiros   :   1955   MRN:  [id number]     Date/Time:  1/10/2021 9:03 AM    Plan:     1. Discharge when mechanism of wound care twice daily can be provided  Risk of Deterioration: Low  []           Moderate  [x]           High  []                 Assessment:   Principal Problem:    Cellulitis (2020)     CT abdomen : Right lateral abdominal wall musculature thickening is again noted. More discrete somewhat oblong fluid collection projects from the suprailiac region into the right inguinal region without evidence of gas. Increasing right flank and right chest wall subcutaneous edema.  - s/p I&D on - 20 cc of pus aspirated.   - -Blood culture: NGTD  - drain cx: prelim - MRSA   - ID following  - rpt CT abd ; subcutaneous edema in the right abdominal wall. There is a fluid collection with peripheral enhancement consistent with an abscess in the right inferior abdominal wall which extends from the pelvis into  the mid abdomen laterally and is slightly larger than the prior study  - s/p I&D  - 12 cc of pus was aspirated and sent for cultures. Pigtail catheter placed - 10cc so far  -  c/w IV daptomycin- as above, for CT in a couple of days from today 2020 -scheduled for 1228 am   - CT 2020 : CT - improvement in infection foot print rt flank/abd wall   -20 - INCISION AND DRAINAGE TRUNK    Active Problems:    Dyslipidemia (2014)     Continue Lipitor      Uncontrolled type 2 diabetes mellitus with complication, with long-term current use of insulin ([address] (2018)      Basal bolus      Brugada syndrome (2018)     Results of [employer] noted to be POSITIVE.                 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  SCN5A gene is associated with autosomal                   dominant                    Brugada Syndrome, long QT, dilated cardiomyopathy, and afib.                      - Follows up with Dr. Linda Bennett.              - ICD Check 10/28/20 - No events. Less than 1% RVP              - Cardiac cath 2019 with mild luminal irregularities           ICD (implantable cardioverter-defibrillator) in place (12/21/2018)      Hypertension (12/21/2018)      Abdominal pain (12/10/2020)      ARF (acute renal failure) ([facility] (12/12/2020)     Resolved     Now exacerbated by IV contrast      Moderate protein-calorie malnutrition ([address] (12/20/2020)     Nutrition assistance appreciated          Admitting notes:65 y. o. male who presents with right lower quadrant abdominal pain which started 1 week back worsen since Monday radiating towards the groin denies any fever chills nausea vomiting diarrhea or any other associated symptoms. States this started about 1 week ago, initially saw PCP Dr. Christy Steiner worsening since Friday. Saw Dr. Neto Yee on Monday who ordered outpatient CT scan and abdominal ultrasound      Subjective: Patient is having difficulty doing wound care but is determined to do it independently and plans to use mirrors and therefore would like to try with next dressing change.        11 Point Review of Systems:   Negative except no chest pain no shortness of breath    []            Unable to obtain ROS due to:       []            mental status change []            sedated []            intubated     Social History     Tobacco Use    Smoking status: Never Smoker    Smokeless tobacco: Never Used   Substance Use Topics    Alcohol use: No     Medications reviewed:  Current Facility-Administered Medications   Medication Dose Route Frequency    metFORMIN ER (GLUCOPHAGE XR) tablet 1,000 mg  1,000 mg Oral DAILY WITH DINNER    doxycycline (VIBRA-TABS) tablet 100 mg  100 mg Oral Q12H    insulin glargine (LANTUS) injection 8 Units  8 Units SubCUTAneous QHS    oxyCODONE-acetaminophen (PERCOCET) 5-325 mg per tablet 1 Tab  1 Tab Oral Q4H PRN    hydrALAZINE (APRESOLINE) 20 mg/mL injection 10 mg  10 mg IntraVENous Q6H PRN    influenza vaccine 2020-21 (6 mos+)(PF) (FLUARIX/FLULAVAL/FLUZONE QUAD) injection 0.5 mL  0.5 mL IntraMUSCular PRIOR TO DISCHARGE    insulin lispro (HUMALOG) injection   SubCUTAneous TIDAC    dextrose (D50W) injection syrg 12.5-25 g  12.5-25 g IntraVENous PRN    bisacodyL (DULCOLAX) suppository 10 mg  10 mg Rectal DAILY PRN    polyethylene glycol (MIRALAX) packet 17 g  17 g Oral BID    bisacodyL (DULCOLAX) tablet 10 mg  10 mg Oral DAILY PRN    lactobac ac& pc-s.therm-b.anim (FESTUS Q/RISAQUAD)  1 Cap Oral DAILY    enoxaparin (LOVENOX) injection 40 mg  40 mg SubCUTAneous Q24H    sodium chloride (NS) flush 5-40 mL  5-40 mL IntraVENous Q8H    sodium chloride (NS) flush 5-40 mL  5-40 mL IntraVENous PRN    acetaminophen (TYLENOL) tablet 650 mg  650 mg Oral Q6H PRN    Or    acetaminophen (TYLENOL) suppository 650 mg  650 mg Rectal Q6H PRN    polyethylene glycol (MIRALAX) packet 17 g  17 g Oral DAILY PRN    promethazine (PHENERGAN) tablet 12.5 mg  12.5 mg Oral Q6H PRN    Or    ondansetron (ZOFRAN) injection 4 mg  4 mg IntraVENous Q6H PRN    amLODIPine (NORVASC) tablet 10 mg  10 mg Oral DAILY    aspirin delayed-release tablet 81 mg  81 mg Oral DAILY    glucose chewable tablet 16 g  4 Tab Oral PRN    glucagon (GLUCAGEN) injection 1 mg  1 mg IntraMUSCular PRN        Objective:   Vitals:  Visit Vitals  /67 (BP 1 Location: Left arm, BP Patient Position: Sitting)   Pulse 65   Temp 98.1 °F (36.7 °C)   Resp 17   Ht 5' 7\" (1.702 m)   Wt 159 lb 14.4 oz (72.5 kg)   SpO2 97%   BMI 25.04 kg/m²     Temp (24hrs), Av.2 °F (36.8 °C), Min:97.9 °F (36.6 °C), Max:98.4 °F (36.9 °C)      O2 Device: Room air    Last 24hr Input/Output:  No intake or output data in the 24 hours ending 01/10/21 0749     PHYSICAL EXAM:  General:    Alert, cooperative, no distress, appears stated age. Head:   Normocephalic, without obvious abnormality, atraumatic. Eyes:   Conjunctivae/corneas clear. PERRLA  Lungs:   Clear to auscultation bilaterally.   No Wheezing or Rhonchi. No rales. .  Heart:   Regular rate and rhythm,  no murmur, rub or gallop. Abdomen:   Soft, non-tender. Not distended. Bowel sounds normal.  Dressing intact         Lab Data Reviewed:    No results for input(s): WBC, HGB, HCT, PLT, HGBEXT, HCTEXT, PLTEXT, HGBEXT, HCTEXT, PLTEXT in the last 72 hours. No results for input(s): NA, K, CL, CO2, GLU, BUN, CREA, CA, MG, PHOS, ALB, TBIL, TBILI, ALT, INR, INREXT, INREXT in the last 72 hours. No lab exists for component: SGOT  Lab Results   Component Value Date/Time    Glucose (POC) 73 01/10/2021 06:59 AM    Glucose (POC) 329 (H) 01/09/2021 09:00 PM    Glucose (POC) 108 (H) 01/09/2021 04:18 PM    Glucose (POC) 198 (H) 01/09/2021 11:33 AM    Glucose (POC) 99 01/09/2021 06:32 AM     No results for input(s): PH, PCO2, PO2, HCO3, FIO2 in the last 72 hours. No results for input(s): INR, INREXT, INREXT in the last 72 hours.   ___________________________________________________  ___________________________________________________    Attending Physician: Idania Alexandre MD

## 2021-01-10 NOTE — PROGRESS NOTES
[phone] Patient performed wound care with moderate assistance by RN. Patient tolerated well and expressed some improvement in performance of wound care but does not feel completely comfortable doing wound care without assistance. Patient returned to chair with call bell in hand.

## 2021-01-11 VITALS
WEIGHT: 159.9 LBS | HEART RATE: 76 BPM | SYSTOLIC BLOOD PRESSURE: 146 MMHG | DIASTOLIC BLOOD PRESSURE: 79 MMHG | OXYGEN SATURATION: 98 % | TEMPERATURE: 98.4 F | HEIGHT: 67 IN | RESPIRATION RATE: 16 BRPM | BODY MASS INDEX: 25.1 KG/M2

## 2021-01-11 LAB
ALBUMIN SERPL-MCNC: 3 G/DL (ref 3.5–5)
ANION GAP SERPL CALC-SCNC: 3 MMOL/L (ref 5–15)
BASOPHILS # BLD: 0 K/UL (ref 0–0.1)
BASOPHILS NFR BLD: 1 % (ref 0–1)
BUN SERPL-MCNC: 29 MG/DL (ref 6–20)
BUN/CREAT SERPL: 23 (ref 12–20)
CALCIUM SERPL-MCNC: 9.6 MG/DL (ref 8.5–10.1)
CHLORIDE SERPL-SCNC: 105 MMOL/L (ref 97–108)
CO2 SERPL-SCNC: 32 MMOL/L (ref 21–32)
CREAT SERPL-MCNC: 1.24 MG/DL (ref 0.7–1.3)
DIFFERENTIAL METHOD BLD: ABNORMAL
EOSINOPHIL # BLD: 0.2 K/UL (ref 0–0.4)
EOSINOPHIL NFR BLD: 5 % (ref 0–7)
ERYTHROCYTE [DISTWIDTH] IN BLOOD BY AUTOMATED COUNT: 14.5 % (ref 11.5–14.5)
GLUCOSE BLD STRIP.AUTO-MCNC: 109 MG/DL (ref 65–100)
GLUCOSE BLD STRIP.AUTO-MCNC: 187 MG/DL (ref 65–100)
GLUCOSE BLD STRIP.AUTO-MCNC: 229 MG/DL (ref 65–100)
GLUCOSE BLD STRIP.AUTO-MCNC: 61 MG/DL (ref 65–100)
GLUCOSE SERPL-MCNC: 76 MG/DL (ref 65–100)
HCT VFR BLD AUTO: 30.2 % (ref 36.6–50.3)
HGB BLD-MCNC: 9.5 G/DL (ref 12.1–17)
IMM GRANULOCYTES # BLD AUTO: 0 K/UL (ref 0–0.04)
IMM GRANULOCYTES NFR BLD AUTO: 0 % (ref 0–0.5)
LYMPHOCYTES # BLD: 1.3 K/UL (ref 0.8–3.5)
LYMPHOCYTES NFR BLD: 32 % (ref 12–49)
MCH RBC QN AUTO: 26.8 PG (ref 26–34)
MCHC RBC AUTO-ENTMCNC: 31.5 G/DL (ref 30–36.5)
MCV RBC AUTO: 85.1 FL (ref 80–99)
MONOCYTES # BLD: 0.4 K/UL (ref 0–1)
MONOCYTES NFR BLD: 10 % (ref 5–13)
NEUTS SEG # BLD: 2.1 K/UL (ref 1.8–8)
NEUTS SEG NFR BLD: 52 % (ref 32–75)
NRBC # BLD: 0 K/UL (ref 0–0.01)
NRBC BLD-RTO: 0 PER 100 WBC
PHOSPHATE SERPL-MCNC: 3.5 MG/DL (ref 2.6–4.7)
PLATELET # BLD AUTO: 214 K/UL (ref 150–400)
PMV BLD AUTO: 9.1 FL (ref 8.9–12.9)
POTASSIUM SERPL-SCNC: 3.9 MMOL/L (ref 3.5–5.1)
RBC # BLD AUTO: 3.55 M/UL (ref 4.1–5.7)
SERVICE CMNT-IMP: ABNORMAL
SODIUM SERPL-SCNC: 140 MMOL/L (ref 136–145)
WBC # BLD AUTO: 4.1 K/UL (ref 4.1–11.1)

## 2021-01-11 PROCEDURE — 74011250637 HC RX REV CODE- 250/637: Performed by: INTERNAL MEDICINE

## 2021-01-11 PROCEDURE — 36415 COLL VENOUS BLD VENIPUNCTURE: CPT

## 2021-01-11 PROCEDURE — 74011250637 HC RX REV CODE- 250/637: Performed by: SURGERY

## 2021-01-11 PROCEDURE — 82962 GLUCOSE BLOOD TEST: CPT

## 2021-01-11 PROCEDURE — 80069 RENAL FUNCTION PANEL: CPT

## 2021-01-11 PROCEDURE — 74011250636 HC RX REV CODE- 250/636: Performed by: SURGERY

## 2021-01-11 PROCEDURE — 99231 SBSQ HOSP IP/OBS SF/LOW 25: CPT | Performed by: INTERNAL MEDICINE

## 2021-01-11 PROCEDURE — 90686 IIV4 VACC NO PRSV 0.5 ML IM: CPT | Performed by: SURGERY

## 2021-01-11 PROCEDURE — 85025 COMPLETE CBC W/AUTO DIFF WBC: CPT

## 2021-01-11 PROCEDURE — 74011636637 HC RX REV CODE- 636/637: Performed by: SURGERY

## 2021-01-11 PROCEDURE — 90471 IMMUNIZATION ADMIN: CPT

## 2021-01-11 RX ORDER — METFORMIN HYDROCHLORIDE 500 MG/1
1000 TABLET, EXTENDED RELEASE ORAL
Qty: 30 TAB | Refills: 5 | Status: SHIPPED
Start: 2021-01-11 | End: 2021-05-19 | Stop reason: SDUPTHER

## 2021-01-11 RX ORDER — INSULIN GLARGINE 100 [IU]/ML
6 INJECTION, SOLUTION SUBCUTANEOUS
Status: DISCONTINUED | OUTPATIENT
Start: 2021-01-11 | End: 2021-01-11 | Stop reason: HOSPADM

## 2021-01-11 RX ORDER — OXYCODONE AND ACETAMINOPHEN 5; 325 MG/1; MG/1
1 TABLET ORAL
Qty: 60 TAB | Refills: 0 | Status: SHIPPED | OUTPATIENT
Start: 2021-01-11 | End: 2021-01-16 | Stop reason: CLARIF

## 2021-01-11 RX ADMIN — INFLUENZA VIRUS VACCINE 0.5 ML: 15; 15; 15; 15 SUSPENSION INTRAMUSCULAR at 15:58

## 2021-01-11 RX ADMIN — OXYCODONE HYDROCHLORIDE AND ACETAMINOPHEN 1 TABLET: 5; 325 TABLET ORAL at 13:53

## 2021-01-11 RX ADMIN — DOXYCYCLINE HYCLATE 100 MG: 100 TABLET, COATED ORAL at 09:45

## 2021-01-11 RX ADMIN — Medication 1 CAPSULE: at 09:45

## 2021-01-11 RX ADMIN — INSULIN LISPRO 3 UNITS: 100 INJECTION, SOLUTION INTRAVENOUS; SUBCUTANEOUS at 16:30

## 2021-01-11 RX ADMIN — ASPIRIN 81 MG: 81 TABLET, COATED ORAL at 09:45

## 2021-01-11 RX ADMIN — INSULIN LISPRO 2 UNITS: 100 INJECTION, SOLUTION INTRAVENOUS; SUBCUTANEOUS at 11:50

## 2021-01-11 RX ADMIN — METFORMIN HYDROCHLORIDE 1000 MG: 500 TABLET, EXTENDED RELEASE ORAL at 17:33

## 2021-01-11 RX ADMIN — AMLODIPINE BESYLATE 10 MG: 5 TABLET ORAL at 09:45

## 2021-01-11 NOTE — WOUND CARE
WOCN Note:     New consult for I&D site to flank and groin. Chart shows:  Admitted for abscess with I&D 12/31    Assessment:   A&O x 4 and pre-medicated by RN. Mobile and continent. Right side of abdomen I&D site = 1 x 5 x 6 cm and tracks anteriorly. Base is moist red with no odor or purulence. Right upper groin I&D site = 0.5 x 2.5 x 1 cm   Both bases are moist red with no odor or purulence or periwound redness. He competently demonstrated self wound care with removal of dressing, cleaning with spray , and packing each site; abdomen with saline moist gauze and groin with Iodoform gauze. Wound Recommendations:    Remove packing, clean with spray . Pack upper site (abdomen) with saline-damp gauze. Pack lower site (groin) with Iodoform gauze. Cover each with dry topper. Change daily. Discussed with Dr. Barbara Blackman and claire TRAN and , General Hassan. Home supplies were provided and home health to follow.      Transition of Care: Plan to follow weekly and as needed while admitted to hospital.      ANTWAN RosalesN, RN, [facility]  Certified Wound, Ostomy, Continence Nurse  office [phone]  pager [phone] or call  to page

## 2021-01-11 NOTE — DISCHARGE INSTRUCTIONS
Patient Discharge Instructions    Bridger Rodrigues / [id number] : 1955    Admitted 12/10/2020 Discharged: 2021     Patient Active Problem List   Diagnosis Code    Dyslipidemia E78.5    Eczema I25.1    Umbilical hernia T14.4    Femoral bruit R09.89    Mitral valve prolapse I34.1    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 ([facility] E13.319    Type 2 diabetes mellitus with diabetic polyneuropathy, with long-term current use of insulin ([facility]) E11.42, Z79.4    Uncontrolled type 2 diabetes mellitus with complication, with long-term current use of insulin ([facility]) E11.8, E11.65, Z79.4    Syncope and collapse R55    Abnormal EKG R94.31    Chest pain, cardiac R07.9    Brugada syndrome I49.8    NSVT (nonsustained ventricular tachycardia) ([facility]) I47.2    Gastritis K29.70    ICD (implantable cardioverter-defibrillator) in place Z95.810    Hypertension I10    Bell's palsy G51.0    Vasovagal syncope R55    Right groin pain R10.31    Abdominal pain R10.9    Cellulitis L03.90    ARF (acute renal failure) ([facility]) N17.9    Moderate protein-calorie malnutrition ([facility]) E44.0       Take Home Medications         It is important that you take the medication exactly as they are prescribed. · Keep your medication in the bottles provided by the pharmacist and keep a list of the medication names, dosages, and times to be taken in your wallet. · Do not take other medications without consulting your doctor. What to do at Home    Wound Recommendations:    Remove packing, clean with spray . Pack upper site (abdomen) with saline-damp gauze. Pack lower site (groin) with Iodoform gauze. Cover each with dry topper.  Change daily.        Recommended diet: Cardiac Diet and Diabetic Diet,   Recommended activity: Activity as tolerated,     Follow-up with JONO LONG MD  in 4 day  Follow-up with Ross Funes MD - in 2 weeks  Bariatric and General Surgeon  [facility]    I personally saw the patient today and spent greater than or equal to 30 minutes  on counseling and coordination of care in discharging this patient. Information obtained by :  I understand that if any problems occur once I am at home I am to contact my physician. I understand and acknowledge receipt of the instructions indicated above.                                                                                                                                            Physician's or R.N.'s Signature                                                                  Date/Time                                                                                                                                              Patient or Representative Signature                                                          Date/Time

## 2021-01-11 NOTE — PROGRESS NOTES
Problem: Diabetes Self-Management  Goal: *Disease process and treatment process  Description: Define diabetes and identify own type of diabetes; list 3 options for treating diabetes. Outcome: Resolved/Met  Goal: *Incorporating nutritional management into lifestyle  Description: Describe effect of type, amount and timing of food on blood glucose; list 3 methods for planning meals. Outcome: Resolved/Met  Goal: *Incorporating physical activity into lifestyle  Description: State effect of exercise on blood glucose levels. Outcome: Resolved/Met  Goal: *Developing strategies to promote health/change behavior  Description: Define the ABC's of diabetes; identify appropriate screenings, schedule and personal plan for screenings. Outcome: Resolved/Met  Goal: *Using medications safely  Description: State effect of diabetes medications on diabetes; name diabetes medication taking, action and side effects. Outcome: Resolved/Met  Goal: *Monitoring blood glucose, interpreting and using results  Description: Identify recommended blood glucose targets  and personal targets. Outcome: Resolved/Met  Goal: *Prevention, detection, treatment of acute complications  Description: List symptoms of hyper- and hypoglycemia; describe how to treat low blood sugar and actions for lowering  high blood glucose level. Outcome: Resolved/Met  Goal: *Prevention, detection and treatment of chronic complications  Description: Define the natural course of diabetes and describe the relationship of blood glucose levels to long term complications of diabetes.   Outcome: Resolved/Met  Goal: *Developing strategies to address psychosocial issues  Description: Describe feelings about living with diabetes; identify support needed and support network  Outcome: Resolved/Met  Goal: *Insulin pump training  Outcome: Resolved/Met  Goal: *Sick day guidelines  Outcome: Resolved/Met  Goal: *Patient Specific Goal (EDIT GOAL, INSERT TEXT)  Outcome: Resolved/Met Problem: Patient Education: Go to Patient Education Activity  Goal: Patient/Family Education  Outcome: Resolved/Met     Problem: Discharge Planning  Goal: *Discharge to safe environment  Outcome: Resolved/Met     Problem: Falls - Risk of  Goal: *Absence of Falls  Description: Document Garland Sol Fall Risk and appropriate interventions in the flowsheet. Outcome: Resolved/Met  Note: Fall Risk Interventions:  Mobility Interventions: Patient to call before getting OOB         Medication Interventions: Patient to call before getting OOB, Teach patient to arise slowly    Elimination Interventions: Call light in reach              Problem: Patient Education: Go to Patient Education Activity  Goal: Patient/Family Education  Outcome: Resolved/Met     Problem: Risk for Spread of Infection  Goal: Prevent transmission of infectious organism to others  Description: Prevent the transmission of infectious organisms to other patients, staff members, and visitors.   Outcome: Resolved/Met     Problem: Patient Education:  Go to Education Activity  Goal: Patient/Family Education  Outcome: Resolved/Met     Problem: Patient Education: Go to Patient Education Activity  Goal: Patient/Family Education  Outcome: Resolved/Met

## 2021-01-11 NOTE — PROGRESS NOTES
Hospital Progress Note    NAME:  Mora Stringer   :   1955   MRN:  [id number]     Date/Time:  2021 9:03 AM    Plan:     1. Discharge when mechanism of wound care twice daily can be provided  2. Wound care to see for possible wound VAC as recommended by Dr. Kaleb Porter if this will facilitate discharge  Risk of Deterioration: Low  []           Moderate  [x]           High  []                 Assessment:   Principal Problem:    Cellulitis (2020)     CT abdomen : Right lateral abdominal wall musculature thickening is again noted. More discrete somewhat oblong fluid collection projects from the suprailiac region into the right inguinal region without evidence of gas. Increasing right flank and right chest wall subcutaneous edema.  - s/p I&D on - 20 cc of pus aspirated.   - -Blood culture: NGTD  - drain cx: prelim - MRSA   - ID following  - rpt CT abd ; subcutaneous edema in the right abdominal wall. There is a fluid collection with peripheral enhancement consistent with an abscess in the right inferior abdominal wall which extends from the pelvis into  the mid abdomen laterally and is slightly larger than the prior study  - s/p I&D  - 12 cc of pus was aspirated and sent for cultures. Pigtail catheter placed - 10cc so far  -  c/w IV daptomycin- as above, for CT in a couple of days from today 2020 -scheduled for  am   - CT 2020 : CT - improvement in infection foot print rt flank/abd wall   -20 - INCISION AND DRAINAGE TRUNK    Active Problems:    Dyslipidemia (2014)     Continue Lipitor      Uncontrolled type 2 diabetes mellitus with complication, with long-term current use of insulin ([address] (2018)      Basal bolus      Brugada syndrome (2018)     Results of [employer] noted to be POSITIVE.                    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  SCN5A gene is associated with autosomal                   dominant                    Brugada Syndrome, long QT, dilated cardiomyopathy, and afib.                    - Follows up with Dr. Chayo Templeton.              - ICD Check 10/28/20 - No events. Less than 1% RVP              - Cardiac cath 2019 with mild luminal irregularities           ICD (implantable cardioverter-defibrillator) in place (12/21/2018)      Hypertension (12/21/2018)      Abdominal pain (12/10/2020)      ARF (acute renal failure) ([facility] [address] (12/12/2020)     Resolved     Now exacerbated by IV contrast      Moderate protein-calorie malnutrition ([address] (12/20/2020)     Nutrition assistance appreciated          Admitting notes:65 y. o. male who presents with right lower quadrant abdominal pain which started 1 week back worsen since Monday radiating towards the groin denies any fever chills nausea vomiting diarrhea or any other associated symptoms. States this started about 1 week ago, initially saw PCP Dr. Sohail Mccarthy worsening since Friday. Saw Dr. Anthony Frank on Monday who ordered outpatient CT scan and abdominal ultrasound      Subjective: Patient is having difficulty doing wound care but is determined to do it independently and plans to use mirrors and therefore would like to try with next dressing change.        11 Point Review of Systems:   Negative except no chest pain no shortness of breath    []            Unable to obtain ROS due to:       []            mental status change []            sedated []            intubated     Social History     Tobacco Use    Smoking status: Never Smoker    Smokeless tobacco: Never Used   Substance Use Topics    Alcohol use: No     Medications reviewed:  Current Facility-Administered Medications   Medication Dose Route Frequency    insulin glargine (LANTUS) injection 6 Units  6 Units SubCUTAneous QHS    metFORMIN ER (GLUCOPHAGE XR) tablet 1,000 mg  1,000 mg Oral DAILY WITH DINNER    doxycycline (VIBRA-TABS) tablet 100 mg  100 mg Oral Q12H    oxyCODONE-acetaminophen (PERCOCET) 5-325 mg per tablet 1 Tab  1 Tab Oral Q4H PRN  hydrALAZINE (APRESOLINE) 20 mg/mL injection 10 mg  10 mg IntraVENous Q6H PRN    influenza vaccine 2020-21 (6 mos+)(PF) (FLUARIX/FLULAVAL/FLUZONE QUAD) injection 0.5 mL  0.5 mL IntraMUSCular PRIOR TO DISCHARGE    insulin lispro (HUMALOG) injection   SubCUTAneous TIDAC    dextrose (D50W) injection syrg 12.5-25 g  12.5-25 g IntraVENous PRN    bisacodyL (DULCOLAX) suppository 10 mg  10 mg Rectal DAILY PRN    polyethylene glycol (MIRALAX) packet 17 g  17 g Oral BID    bisacodyL (DULCOLAX) tablet 10 mg  10 mg Oral DAILY PRN    lactobac ac& pc-s.therm-b.anim (FESTUS Q/RISAQUAD)  1 Cap Oral DAILY    enoxaparin (LOVENOX) injection 40 mg  40 mg SubCUTAneous Q24H    sodium chloride (NS) flush 5-40 mL  5-40 mL IntraVENous Q8H    sodium chloride (NS) flush 5-40 mL  5-40 mL IntraVENous PRN    acetaminophen (TYLENOL) tablet 650 mg  650 mg Oral Q6H PRN    Or    acetaminophen (TYLENOL) suppository 650 mg  650 mg Rectal Q6H PRN    polyethylene glycol (MIRALAX) packet 17 g  17 g Oral DAILY PRN    promethazine (PHENERGAN) tablet 12.5 mg  12.5 mg Oral Q6H PRN    Or    ondansetron (ZOFRAN) injection 4 mg  4 mg IntraVENous Q6H PRN    amLODIPine (NORVASC) tablet 10 mg  10 mg Oral DAILY    aspirin delayed-release tablet 81 mg  81 mg Oral DAILY    glucose chewable tablet 16 g  4 Tab Oral PRN    glucagon (GLUCAGEN) injection 1 mg  1 mg IntraMUSCular PRN        Objective:   Vitals:  Visit Vitals  /73 (BP 1 Location: Left arm, BP Patient Position: At rest)   Pulse 65   Temp 97.7 °F (36.5 °C)   Resp 16   Ht 5' 7\" (1.702 m)   Wt 159 lb 14.4 oz (72.5 kg)   SpO2 99%   BMI 25.04 kg/m²     Temp (24hrs), Av.1 °F (36.7 °C), Min:97.7 °F (36.5 °C), Max:98.4 °F (36.9 °C)      O2 Device: Room air    Last 24hr Input/Output:  No intake or output data in the 24 hours ending 21 0837     PHYSICAL EXAM:  General:    Alert, cooperative, no distress, appears stated age.      Head:   Normocephalic, without obvious abnormality, atraumatic. Eyes:   Conjunctivae/corneas clear. PERRLA  Lungs:   Clear to auscultation bilaterally. No Wheezing or Rhonchi. No rales. .  Heart:   Regular rate and rhythm,  no murmur, rub or gallop. Abdomen:   Soft, non-tender. Not distended. Bowel sounds normal.  Dressing intact         Lab Data Reviewed:    Recent Labs     01/11/21  0730   WBC 4.1   HGB 9.5*   HCT 30.2*        Recent Labs     01/11/21 0730      K 3.9      CO2 32   GLU 76   BUN 29*   CREA 1.24   CA 9.6   PHOS 3.5   ALB 3.0*     Lab Results   Component Value Date/Time    Glucose (POC) 109 (H) 01/11/2021 07:25 AM    Glucose (POC) 61 (L) 01/11/2021 06:57 AM    Glucose (POC) 241 (H) 01/10/2021 10:49 PM    Glucose (POC) 119 (H) 01/10/2021 04:13 PM    Glucose (POC) 194 (H) 01/10/2021 11:24 AM     No results for input(s): PH, PCO2, PO2, HCO3, FIO2 in the last 72 hours. No results for input(s): INR, INREXT, INREXT in the last 72 hours.   ___________________________________________________  ___________________________________________________    Attending Physician: Ghazala Menjivar MD

## 2021-01-11 NOTE — PROGRESS NOTES
Spoke with Dr. Marion Dobson. The patient ok'd for dc. New dc summary made. This RN to [employer] as the script has been sent to the patients pharmacy.

## 2021-01-11 NOTE — PROGRESS NOTES
Can also consider wound vac for patient with bridging piece if it will make his discharge situation earlier given the twice a week allowance. Okay for twice weekly changes with vac therapy. Please consult wound care if this will help with DC options.     Chin Donovan MD  Bariatric and General Surgeon  [facility]

## 2021-01-11 NOTE — PROGRESS NOTES
Okay for daily dressing changes per wound care instructions.     Luis Osborne MD  Bariatric and General Surgeon  [facility]

## 2021-01-11 NOTE — PROGRESS NOTES
Discharge education completed at this time. No questions asked. Wound care reiterated at this time. Patient sent with supplies. Paperwork signed. Patient to go home via [facility]. PCT to take patient down at this time.

## 2021-01-11 NOTE — PROGRESS NOTES
HYPOGLYCEMIC EPISODE DOCUMENTATION    Patient with hypoglycemic episode(s) at 0650  (time) on 1/11/21(date). BG value(s) pre-treatment 64      Was patient symptomatic?  [] yes, [x] no  Patient was treated with the following rescue medications/treatments: [] D50                [] Glucose tablets                [] Glucagon                [x] 4oz juice                [] 6oz reg soda                [] 8oz low fat milk  BG value post-treatment:

## 2021-01-11 NOTE — PROGRESS NOTES
Transitions of Care plan: home with home health and possible wound vac    -RUR: 20%  -CM noted new plan for wound care to evaluate patient for possible wound vac placement. CM will await decision and start process for wound vac if needed.   -[facility] accepted patient and can start services on Wednesday.      Jamilah WOODRUFF, ACM-SW

## 2021-01-11 NOTE — PROGRESS NOTES
Spoke with wound care who stated they would be by to see the patient and assist with wound care. 1:45 PM  This RN asked to premedicate patient prior to wound care by wound care RN at this time.

## 2021-01-12 ENCOUNTER — PATIENT OUTREACH (OUTPATIENT)
Dept: CASE MANAGEMENT | Age: 66
End: 2021-01-12

## 2021-01-12 NOTE — PROGRESS NOTES
Patient was admitted to [facility] on 12/10/2020 and discharged on 2021 for cellulitis. Outreach made within 2 business days of discharge: Yes       Top Discharge Challenges to be reviewed by the provider       Discussed COVID-19 related testing which was available at this time. Test results were negative. Patient informed of results, if available? yes  Method of communication with provider : chart routing       Advance Care Planning:   Does patient have an Advance Directive:  currently not on file; education provided  CTN to send via [facility] portal a copy of the [facility] form    Inpatient Readmission Risk score:   Was this a readmission? no     Patients top risk factors for readmission: None identified at this time      Care Transition Nurse (CTN) contacted the patient by telephone to perform post hospital discharge assessment. Verified name and  with patient as identifiers. Provided introduction to self, and explanation of the CTN role. Patient reports he is doing well. Denies fever, chest pain, sob. Tolerating PO. No bowel / bladder concerns. LBM today. Reports wound is healing, no evidence of infection noted. Performs daily wound care dressing changes. Reviewed diabetic diet. Discussed the importance of blood glucose control for wound healing and eating high protein die to promote wound healing. CTN reviewed discharge instructions, medical action plan and red flags with patient who verbalized understanding. Patient given an opportunity to ask questions and does not have any further questions or concerns at this time. The patient agrees to contact the PCP office for questions related to their healthcare. Medication reconciliation was performed with patient, who verbalizes understanding of administration of home medications.    Referral to Pharm D needed: no     Home Health/Outpatient orders at discharge: home health care and [employer] company: [address]  Date of initial visit: 1/13/2021    Durable Medical Equipment ordered at discharge: None      Covid Risk Education    Patient has following risk factors of: diabetes and HTN. Education provided regarding infection prevention, and signs and symptoms of COVID-19 and when to seek medical attention with patient who verbalized understanding. Discussed exposure protocols and quarantine From CDC: Are you at higher risk for severe illness?  and given an opportunity for questions and concerns. The patient agrees to contact PCP office for questions related to COVID-19. For more information on steps you can take to protect yourself, see CDC's How to Protect Yourself     Discussed follow-up appointments. If no appointment was previously scheduled, appointment scheduling offered: [facility] follow up appointment(s):   Future Appointments   Date Time Provider Chalino Alaniz   1/14/2021 10:00 AM Manuela Rice MD [facility] MAIN BS AMB   [facility]     Non-BS follow up appointment(s): NA  Plan for follow-up call in 10-14 days based on severity of symptoms and risk factors. CTN provided contact information for future needs. Goals Addressed                 This Visit's Progress     Attend follow up appointments on schedule        Understands and monitors blood sugar levels          01/12/21   Will perform home glucose monitoring at least TID and PRN    mg/dl today   Patient able to verbalize s/s hypoglycemia/hyperglycemia and treatment   Diet- maintain consistent carb diet        Understands red flags post discharge.           01/12/21   Will monitor wound for increased drainage, odor   Will perform daily wound care dressing changes   Will monitor for COVID symptoms   No readmissions for the next thirty days   [facility] SN will access wound

## 2021-01-12 NOTE — ACP (ADVANCE CARE PLANNING)
Advance Care Planning:   Does patient have an Advance Directive:  currently not on file; education provided  CTN to send via [employer] portal a copy of the [facility] form

## 2021-01-14 ENCOUNTER — OFFICE VISIT (OUTPATIENT)
Dept: INTERNAL MEDICINE CLINIC | Age: 66
End: 2021-01-14
Payer: COMMERCIAL

## 2021-01-14 VITALS
OXYGEN SATURATION: 95 % | DIASTOLIC BLOOD PRESSURE: 75 MMHG | RESPIRATION RATE: 16 BRPM | BODY MASS INDEX: 24.19 KG/M2 | HEART RATE: 88 BPM | TEMPERATURE: 98.4 F | WEIGHT: 154.1 LBS | HEIGHT: 67 IN | SYSTOLIC BLOOD PRESSURE: 139 MMHG

## 2021-01-14 DIAGNOSIS — L02.91 ABSCESS: ICD-10-CM

## 2021-01-14 DIAGNOSIS — I10 ESSENTIAL HYPERTENSION: Chronic | ICD-10-CM

## 2021-01-14 DIAGNOSIS — L02.91 PHLEGMON: Primary | ICD-10-CM

## 2021-01-14 DIAGNOSIS — Z09 HOSPITAL DISCHARGE FOLLOW-UP: ICD-10-CM

## 2021-01-14 DIAGNOSIS — I49.8 BRUGADA SYNDROME: Chronic | ICD-10-CM

## 2021-01-14 PROCEDURE — 99213 OFFICE O/P EST LOW 20 MIN: CPT | Performed by: INTERNAL MEDICINE

## 2021-01-14 PROCEDURE — 1100F PTFALLS ASSESS-DOCD GE2>/YR: CPT | Performed by: INTERNAL MEDICINE

## 2021-01-14 PROCEDURE — G8427 DOCREV CUR MEDS BY ELIG CLIN: HCPCS | Performed by: INTERNAL MEDICINE

## 2021-01-14 PROCEDURE — G8536 NO DOC ELDER MAL SCRN: HCPCS | Performed by: INTERNAL MEDICINE

## 2021-01-14 PROCEDURE — 1111F DSCHRG MED/CURRENT MED MERGE: CPT | Performed by: INTERNAL MEDICINE

## 2021-01-14 PROCEDURE — G8752 SYS BP LESS 140: HCPCS | Performed by: INTERNAL MEDICINE

## 2021-01-14 PROCEDURE — G8754 DIAS BP LESS 90: HCPCS | Performed by: INTERNAL MEDICINE

## 2021-01-14 PROCEDURE — 3288F FALL RISK ASSESSMENT DOCD: CPT | Performed by: INTERNAL MEDICINE

## 2021-01-14 PROCEDURE — G8420 CALC BMI NORM PARAMETERS: HCPCS | Performed by: INTERNAL MEDICINE

## 2021-01-14 PROCEDURE — 2022F DILAT RTA XM EVC RTNOPTHY: CPT | Performed by: INTERNAL MEDICINE

## 2021-01-14 PROCEDURE — 3046F HEMOGLOBIN A1C LEVEL >9.0%: CPT | Performed by: INTERNAL MEDICINE

## 2021-01-14 PROCEDURE — G8432 DEP SCR NOT DOC, RNG: HCPCS | Performed by: INTERNAL MEDICINE

## 2021-01-14 PROCEDURE — 3017F COLORECTAL CA SCREEN DOC REV: CPT | Performed by: INTERNAL MEDICINE

## 2021-01-14 NOTE — PROGRESS NOTES
Chief Complaint   Patient presents with   [address] Follow Up     Patient admitted to [facility] on 12/1/2020 for Cellulitis. 1. Have you been to the ER, urgent care clinic since your last visit? Hospitalized since your last visit? Yes When: 12/1/2020 Where: [facility]  Reason for visit: cellulitis    2. Have you seen or consulted any other health care providers outside of the [address] [place] [address] since your last visit? Include any pap smears or colon screening.  No

## 2021-01-16 NOTE — PROGRESS NOTES
[facility]  [facility]  [address] [place] [address]  Phone:  [phone]  Fax: [phone]       Chief Complaint   Patient presents with   [address] Follow Up     Patient admitted to [facility] on 12/1/2020 for Cellulitis. .      SUBJECTIVE:    José Luis Gutierrez is a 77 y.o. male Comes in for his first return visit after being hospitalized for an extended period of time for a large abdominal wall abscess. In reality, he had a phlegmon for the majority of the time and had I&D of the abscess on one to two occasions of a small to moderate amount of purulent material.  He has been placed on long term antibiotics, Daptomycin, since the offending organism was methicillin resistant staph aureus. The precipitating factor was his poorly controlled diabetes with initial hemoglobin A1c of approximately 12-13. Unfortunately, his diabetic control has been extremely poor, exclusively related to noncompliance, oftentimes with not only medication, but to follow up. He has microvascular complications consistent of severe proliferative diabetic retinopathy, as well as polyneuropathy. He feels much better currently. He has a past history of primary hypertension, dyslipidemia, as well as     syndrome. Current Outpatient Medications   Medication Sig Dispense Refill    Lactobac no.41/Bifidobact no.7 (PROBIOTIC-10 PO) Take 1 Tab by mouth daily.  metFORMIN ER (GLUCOPHAGE XR) 500 mg tablet Take 2 Tabs by mouth daily (with dinner).  30 Tab 5    insulin lispro (HUMALOG) 100 unit/mL injection AC meals by correctional scale: 1  give 2 units, 201 to 250 give 4 units 2  give 6 units 301 to 350 give 8 units 3  give 10 units greater than 401 give 12 units 1 Vial 5    [START ON 1/18/2021] atorvastatin (LIPITOR) 20 mg tablet TAKE ONE TABLET BY MOUTH DAILY 90 Tab 3    insulin detemir U-100 (Levemir FlexTouch U-100 Insuln) 100 unit/mL (3 mL) inpn 6 Units by SubCUTAneous route nightly. INJECT 6 UNITS AT BEDTIME -hold if bs less than 120 1 Adjustable Dose Pre-filled Pen Syringe 11    polyethylene glycol (MIRALAX) 17 gram packet Take 1 Packet by mouth two (2) times a day. 60 Each 11    Insulin Needles, Disposable, 31 gauge x 1/4\" ndle USE WITH INSULIN PENS THREE TIMES PER  Pen Needle 11    aspirin delayed-release 81 mg tablet Take 81 mg by mouth daily.  glucose blood VI test strips (ASCENSIA AUTODISC VI, ONE TOUCH ULTRA TEST VI) strip 3 times daily before meals 300 Strip 3    amLODIPine (NORVASC) 10 mg tablet TAKE ONE TABLET BY MOUTH DAILY 90 Tab 3    dulaglutide (Trulicity) 1.5 YK/9.7 mL sub-q pen 0.5 mL by SubCUTAneous route every seven (7) days.  4 Syringe 11     Past Medical History:   Diagnosis Date    Hypertension     ICD (implantable cardioverter-defibrillator) in place     NSVT (nonsustained ventricular tachycardia) ([facility]) 11/21/2018    EPS 11/21/2018 VT/VF     Right groin pain 12/7/2020     Past Surgical History:   Procedure Laterality Date    COLONOSCOPY  1/2/2015         HX HEART CATHETERIZATION      HX HEENT      l cararact removal    HX IMPLANTABLE CARDIOVERTER DEFIBRILLATOR       No Known Allergies      REVIEW OF SYSTEMS:  General: negative for - chills or fever  ENT: negative for - headaches, nasal congestion or tinnitus  Respiratory: negative for - cough, hemoptysis, shortness of breath or wheezing  Cardiovascular : negative for - chest pain, edema, palpitations or shortness of breath  Gastrointestinal: negative for - abdominal pain, blood in stools, heartburn or nausea/vomiting  Genito-Urinary: no dysuria, trouble voiding, or hematuria  Musculoskeletal: negative for - gait disturbance, joint pain, joint stiffness or joint swelling  Neurological: no TIA or stroke symptoms  Hematologic: no bruises, no bleeding, no swollen glands  Integument: no lumps, mole changes, nail changes or rash  Endocrine: no malaise/lethargy or unexpected weight changes      Social History     Socioeconomic History    Marital status: SINGLE     Spouse name: Not on file    Number of children: 0    Years of education: Not on file    Highest education level: Not on file   Occupational History    Occupation: [employer]   Tobacco Use    Smoking status: Never Smoker    Smokeless tobacco: Never Used   Substance and Sexual Activity    Alcohol use: No    Drug use: No    Sexual activity: Yes     Partners: Female     Birth control/protection: None     Family History   Problem Relation Age of Onset    Diabetes Mother     Hypertension Mother     Stroke Mother        OBJECTIVE:    Visit Vitals  /75   Pulse 88   Temp 98.4 °F (36.9 °C) (Oral)   Resp 16   Ht 5' 7\" (1.702 m)   Wt 154 lb 1.6 oz (69.9 kg)   SpO2 95%   BMI 24.14 kg/m²     CONSTITUTIONAL: well , well nourished, appears age appropriate  EYES: perrla, eom intact  ENMT:moist mucous membranes, pharynx clear  NECK: supple. Thyroid normal  RESPIRATORY: Chest: clear to ascultation and percussion   CARDIOVASCULAR: Heart: regular rate and rhythm  GASTROINTESTINAL: Abdomen: soft, bowel sounds active  HEMATOLOGIC: no pathological lymph nodes palpated  MUSCULOSKELETAL: Extremities: no edema, pulse 1+   INTEGUMENT: No unusual rashes or suspicious skin lesions noted. Nails appear normal.  NEUROLOGIC: non-focal exam   MENTAL STATUS: alert and oriented, appropriate affect      ASSESSMENT:  1. Phlegmon    2. Abscess    3. Uncontrolled type 2 diabetes mellitus with complication, with long-term current use of insulin ([address]    4. Essential hypertension    5. Brugada syndrome        PLAN:    1. The patient initially had a phlegmon, which converted to a slight degree to an abscess. This was aspirated on several occasions of purulent material, but the amount was relatively small. Most of the treatment was relegated to parenteral antibiotics, specifically Daptomycin.  He has continued this for multiple weeks and appears to be getting better. Interestingly, he was never toxic. 2. His poorly controlled diabetes was the inciting factor. Obviously, since being in a controlled environment, his diabetic control has improved significantly. He was placed on a basal bolus preparation, but the bolus preparation will not work because he will not check blood sugars on a consistent basis or eat in a timely fashion. He will resume his GLP1 agonist and his basal insulin. If there is a need for prandial insulin before a meal, this will indeed be done, but I doubt this will be required. When he consistently followed up and did everything that I asked him to do, he was well controlled on the basal insulin and GLP1 agonist.  3. Blood pressure is excellent today. 4. He has had no cardiac complications from his Brugada syndrome. He has a defibrillator placed because of the inducible V-tach. 5. The dosing of his basal insulin is grossly inadequate, as would be expected coming from a controlled setting. He is told to take 3 units of his basal insulin, which is obviously not adequate. We resumed his basal insulin, which is Levemir, 10 units every morning for now, and resume Trulicity, which he was not getting in the hospital, at 1.5 mg q.7 days. The bolus doses of insulin will be discontinued. He will check his sugars on a regular basis and hopefully he will do so. I also remind him of the importance of eating at the same time every day. .  Orders Placed This Encounter    Lactobac no.41/Bifidobact no.7 (PROBIOTIC-10 PO)         Follow-up and Dispositions    · Return in about 2 weeks (around 1/28/2021).            Cruzito Mcfarland MD

## 2021-01-25 ENCOUNTER — OFFICE VISIT (OUTPATIENT)
Dept: CARDIOLOGY CLINIC | Age: 66
End: 2021-01-25
Payer: COMMERCIAL

## 2021-01-25 DIAGNOSIS — Z95.810 ICD (IMPLANTABLE CARDIOVERTER-DEFIBRILLATOR) IN PLACE: Primary | ICD-10-CM

## 2021-01-25 DIAGNOSIS — I47.29 NSVT (NONSUSTAINED VENTRICULAR TACHYCARDIA): ICD-10-CM

## 2021-01-25 PROCEDURE — 93295 DEV INTERROG REMOTE 1/2/MLT: CPT | Performed by: INTERNAL MEDICINE

## 2021-01-25 PROCEDURE — 93296 REM INTERROG EVL PM/IDS: CPT | Performed by: INTERNAL MEDICINE

## 2021-01-26 ENCOUNTER — PATIENT OUTREACH (OUTPATIENT)
Dept: CASE MANAGEMENT | Age: 66
End: 2021-01-26

## 2021-01-26 NOTE — PROGRESS NOTES
Called patient to follow up    Goals      Attend follow up appointments on schedule        01/26/21   Attended follow up appt with PCP scheduled 1/14   Plans to contact Dr. Floyd Costa, surgeon, today to schedule follow up appt   Confirmed upcoming appt with PCP       Understands and monitors blood sugar levels        01/12/21   Will perform home glucose monitoring at least TID and PRN    mg/dl today   Patient able to verbalize s/s hypoglycemia/hyperglycemia and treatment   Diet- maintain consistent carb diet     01/26/21   Continues to monitor BS TID/PRN   FBS today 128 mg/ml   Average -121 mg/gl   Compliant with diabetic diet       Understands red flags post discharge.         01/12/21   Will monitor wound for increased drainage, odor   Will perform daily wound care dressing changes   Will monitor for COVID symptoms   No readmissions for the next thirty days   [facility] SN will access wound    01/26/21   Reports wound healing-getting smaller   Minimal drainage    SN visits at least twice per week, visit today

## 2021-01-28 ENCOUNTER — OFFICE VISIT (OUTPATIENT)
Dept: INTERNAL MEDICINE CLINIC | Age: 66
End: 2021-01-28
Payer: COMMERCIAL

## 2021-01-28 VITALS
HEART RATE: 71 BPM | HEIGHT: 67 IN | WEIGHT: 154.6 LBS | OXYGEN SATURATION: 96 % | SYSTOLIC BLOOD PRESSURE: 131 MMHG | DIASTOLIC BLOOD PRESSURE: 77 MMHG | BODY MASS INDEX: 24.27 KG/M2 | TEMPERATURE: 98 F | RESPIRATION RATE: 18 BRPM

## 2021-01-28 DIAGNOSIS — Z13.31 SCREENING FOR DEPRESSION: ICD-10-CM

## 2021-01-28 DIAGNOSIS — Z00.00 WELLNESS EXAMINATION: ICD-10-CM

## 2021-01-28 DIAGNOSIS — I10 ESSENTIAL HYPERTENSION: Chronic | ICD-10-CM

## 2021-01-28 DIAGNOSIS — E78.5 DYSLIPIDEMIA: ICD-10-CM

## 2021-01-28 DIAGNOSIS — L02.91 PHLEGMON: ICD-10-CM

## 2021-01-28 PROCEDURE — 99214 OFFICE O/P EST MOD 30 MIN: CPT | Performed by: INTERNAL MEDICINE

## 2021-01-28 PROCEDURE — G0439 PPPS, SUBSEQ VISIT: HCPCS | Performed by: INTERNAL MEDICINE

## 2021-01-28 NOTE — PROGRESS NOTES
1. Have you been to the ER, urgent care clinic since your last visit? Hospitalized since your last visit? No    2. Have you seen or consulted any other health care providers outside of the [address] [place] [address] since your last visit? Include any pap smears or colon screening.  No

## 2021-01-29 ENCOUNTER — OFFICE VISIT (OUTPATIENT)
Dept: SURGERY | Age: 66
End: 2021-01-29
Payer: COMMERCIAL

## 2021-01-29 VITALS
OXYGEN SATURATION: 97 % | HEART RATE: 87 BPM | RESPIRATION RATE: 18 BRPM | DIASTOLIC BLOOD PRESSURE: 81 MMHG | TEMPERATURE: 99.8 F | SYSTOLIC BLOOD PRESSURE: 126 MMHG | BODY MASS INDEX: 24.45 KG/M2 | WEIGHT: 155.8 LBS | HEIGHT: 67 IN

## 2021-01-29 DIAGNOSIS — Z09 POSTOPERATIVE EXAMINATION: Primary | ICD-10-CM

## 2021-01-29 PROBLEM — L08.9 CHRONIC ABDOMINAL WOUND INFECTION, SEQUELA: Status: ACTIVE | Noted: 2021-01-29

## 2021-01-29 PROBLEM — S31.109S CHRONIC ABDOMINAL WOUND INFECTION, SEQUELA: Status: ACTIVE | Noted: 2021-01-29

## 2021-01-29 PROCEDURE — 99024 POSTOP FOLLOW-UP VISIT: CPT | Performed by: SURGERY

## 2021-01-29 NOTE — PATIENT INSTRUCTIONS
Medicare Wellness Visit, Male    The best way to live healthy is to have a lifestyle where you eat a well-balanced diet, exercise regularly, limit alcohol use, and quit all forms of tobacco/nicotine, if applicable. Regular preventive services are another way to keep healthy. Preventive services (vaccines, screening tests, monitoring & exams) can help personalize your care plan, which helps you manage your own care. Screening tests can find health problems at the earliest stages, when they are easiest to treat. [facility] follows the current, evidence-based guidelines published by the [facility] ([facility]) when recommending preventive services for our patients. Because we follow these guidelines, sometimes recommendations change over time as research supports it. (For example, a prostate screening blood test is no longer routinely recommended for men with no symptoms). Of course, you and your doctor may decide to screen more often for some diseases, based on your risk and co-morbidities (chronic disease you are already diagnosed with). Preventive services for you include:  - Medicare offers their members a free annual wellness visit, which is time for you and your primary care provider to discuss and plan for your preventive service needs. Take advantage of this benefit every year!  -All adults over age 72 should receive the recommended pneumonia vaccines. Current USPSTF guidelines recommend a series of two vaccines for the best pneumonia protection.   -All adults should have a flu vaccine yearly and tetanus vaccine every 10 years.  -All adults age 48 and older should receive the shingles vaccines (series of two vaccines).        -All adults age 38-68 who are overweight should have a diabetes screening test once every three years.   -Other screening tests & preventive services for persons with diabetes include: an eye exam to screen for diabetic retinopathy, a kidney function test, a foot exam, and stricter control over your cholesterol.   -Cardiovascular screening for adults with routine risk involves an electrocardiogram (ECG) at intervals determined by the provider.   -Colorectal cancer screening should be done for adults age 54-65 with no increased risk factors for colorectal cancer. There are a number of acceptable methods of screening for this type of cancer. Each test has its own benefits and drawbacks. Discuss with your provider what is most appropriate for you during your annual wellness visit. The different tests include: colonoscopy (considered the best screening method), a fecal occult blood test, a fecal DNA test, and sigmoidoscopy.  -All adults born between [facility] should be screened once for Hepatitis C.  -An Abdominal Aortic Aneurysm (AAA) Screening is recommended for men age 73-68 who has ever smoked in their lifetime.      Here is a list of your current Health Maintenance items (your personalized list of preventive services) with a due date:  Health Maintenance Due   Topic Date Due    Hepatitis C Test  1955    COVID-19 Vaccine (1 of 2) 10/07/1969    Shingles Vaccine (1 of 2) 10/07/2003    Pneumococcal Vaccine (1 of 1 - PPSV23) 10/07/2018    Eye Exam  03/16/2020

## 2021-01-29 NOTE — PROGRESS NOTES
1. Have you been to the ER, urgent care clinic since your last visit? Hospitalized since your last visit? no    2. Have you seen or consulted any other health care providers outside of the [address] [place] [address] since your last visit? Include any pap smears or colon screening.   5/22/21 eye doctor.

## 2021-01-29 NOTE — PROGRESS NOTES
[address] [facility]  [place] [address]  [address] [place] [address]  Phone:  [phone]  Fax: [phone]       Chief Complaint   Patient presents with   [facility] Wellness Visit   . SUBJECTIVE:    Fly Campos is a 77 y.o. male who comes in for a return visit. He is stating that he is doing well. His blood sugars have been better than usual.  His fastings are excellent but his a.c. dinner sugars have remained a bit elevated. His is only taking 10 units of Lantus once a day. The lesion on the right side of his abdomen related to a resolving phelgmon continues to improve. He will see a surgeon in the very near future. He has a past history of primary hypertension and dyslipidemia. He would be going back to work in the near future. Current Outpatient Medications   Medication Sig Dispense Refill    dulaglutide (Trulicity) 1.5 QM/5.4 mL sub-q pen 0.5 mL by SubCUTAneous route every seven (7) days. 4 Syringe 11    Lactobac no.41/Bifidobact no.7 (PROBIOTIC-10 PO) Take 1 Tab by mouth daily.  metFORMIN ER (GLUCOPHAGE XR) 500 mg tablet Take 2 Tabs by mouth daily (with dinner). 30 Tab 5    insulin lispro (HUMALOG) 100 unit/mL injection AC meals by correctional scale: 1  give 2 units, 201 to 250 give 4 units 2  give 6 units 301 to 350 give 8 units 3  give 10 units greater than 401 give 12 units 1 Vial 5    atorvastatin (LIPITOR) 20 mg tablet TAKE ONE TABLET BY MOUTH DAILY 90 Tab 3    insulin detemir U-100 (Levemir FlexTouch U-100 Insuln) 100 unit/mL (3 mL) inpn 6 Units by SubCUTAneous route nightly. INJECT 6 UNITS AT BEDTIME -hold if bs less than 120 1 Adjustable Dose Pre-filled Pen Syringe 11    polyethylene glycol (MIRALAX) 17 gram packet Take 1 Packet by mouth two (2) times a day.  60 Each 11    Insulin Needles, Disposable, 31 gauge x 1/4\" ndle USE WITH INSULIN PENS THREE TIMES PER  Pen Needle 11    aspirin delayed-release 81 mg tablet Take 81 mg by mouth daily.       glucose blood VI test strips (ASCENSIA AUTODISC VI, ONE TOUCH ULTRA TEST VI) strip 3 times daily before meals 300 Strip 3    amLODIPine (NORVASC) 10 mg tablet TAKE ONE TABLET BY MOUTH DAILY 90 Tab 3     Past Medical History:   Diagnosis Date    Hypertension     ICD (implantable cardioverter-defibrillator) in place     NSVT (nonsustained ventricular tachycardia) ([address] 11/21/2018    EPS 11/21/2018 VT/VF     Right groin pain 12/7/2020     Past Surgical History:   Procedure Laterality Date    COLONOSCOPY  1/2/2015         HX HEART CATHETERIZATION      HX HEENT      l cararact removal    HX IMPLANTABLE CARDIOVERTER DEFIBRILLATOR       No Known Allergies      REVIEW OF SYSTEMS:  General: negative for - chills or fever  ENT: negative for - headaches, nasal congestion or tinnitus  Respiratory: negative for - cough, hemoptysis, shortness of breath or wheezing  Cardiovascular : negative for - chest pain, edema, palpitations or shortness of breath  Gastrointestinal: negative for - abdominal pain, blood in stools, heartburn or nausea/vomiting  Genito-Urinary: no dysuria, trouble voiding, or hematuria  Musculoskeletal: negative for - gait disturbance, joint pain, joint stiffness or joint swelling  Neurological: no TIA or stroke symptoms  Hematologic: no bruises, no bleeding, no swollen glands  Integument: no lumps, mole changes, nail changes or rash  Endocrine: no malaise/lethargy or unexpected weight changes      Social History     Socioeconomic History    Marital status: SINGLE     Spouse name: Not on file    Number of children: 0    Years of education: Not on file    Highest education level: Not on file   Occupational History    Occupation: [employer]   Tobacco Use    Smoking status: Never Smoker    Smokeless tobacco: Never Used   Substance and Sexual Activity    Alcohol use: No    Drug use: No    Sexual activity: Yes     Partners: Female     Birth control/protection: None Family History   Problem Relation Age of Onset    Diabetes Mother     Hypertension Mother     Stroke Mother        OBJECTIVE:    Visit Vitals  /77   Pulse 71   Temp 98 °F (36.7 °C) (Oral)   Resp 18   Ht 5' 7\" (1.702 m)   Wt 154 lb 9.6 oz (70.1 kg)   SpO2 96%   BMI 24.21 kg/m²     CONSTITUTIONAL: well , well nourished, appears age appropriate  EYES: perrla, eom intact  ENMT:moist mucous membranes, pharynx clear  NECK: supple. Thyroid normal  RESPIRATORY: Chest: clear to ascultation and percussion   CARDIOVASCULAR: Heart: regular rate and rhythm  GASTROINTESTINAL: Abdomen: soft, bowel sounds active  HEMATOLOGIC: no pathological lymph nodes palpated  MUSCULOSKELETAL: Extremities: no edema, pulse 1+   INTEGUMENT: No unusual rashes or suspicious skin lesions noted. Nails appear normal.  NEUROLOGIC: non-focal exam   MENTAL STATUS: alert and oriented, appropriate affect      ASSESSMENT:  1. Uncontrolled type 2 diabetes mellitus with complication, with long-term current use of insulin ([address]    2. Phlegmon    3. Essential hypertension    4. Dyslipidemia    5. Screening for depression    6. Wellness examination        PLAN:  who comes in _____??(0:02). As far as his diabetes is concerned, I will increase his insulin by 6 units to 16. He will continue his Trulicity. He will follow up with his surgeon for his _____??(0:17), which appears to be resolving nicely. Blood pressure is excellent. He remains on a statin in view of his primary cardiovascular risk prevention. Follow-up and Dispositions    · Return in about 4 weeks (around 2/25/2021). Shanti Osborne MD  This is the Subsequent Medicare Annual Wellness Exam, performed 12 months or more after the Initial AWV or the last Subsequent AWV    I have reviewed the patient's medical history in detail and updated the computerized patient record.      Depression Risk Factor Screening:     3 most recent PHQ Screens 1/28/2021   Little interest or pleasure in doing things Not at all   Feeling down, depressed, irritable, or hopeless Not at all   Total Score PHQ 2 0       Alcohol Risk Screen    Do you average more than 1 drink per night or more than 7 drinks a week: No    In the past three months have you have had more than 4 drinks containing alcohol on one occasion: No        Functional Ability and Level of Safety:    Hearing: Hearing is good. Activities of Daily Living: The home contains: no safety equipment. Patient does total self care      Ambulation: with no difficulty     Fall Risk:  Fall Risk Assessment, last 12 mths 1/28/2021   Able to walk? Yes   Fall in past 12 months? 0   Do you feel unsteady? 0   Are you worried about falling 0      Abuse Screen:  Patient is not abused       Cognitive Screening    Has your family/caregiver stated any concerns about your memory: no     Cognitive Screening: Not applicable    Assessment/Plan   Education and counseling provided:  Are appropriate based on today's review and evaluation    Diagnoses and all orders for this visit:    1. Uncontrolled type 2 diabetes mellitus with complication, with long-term current use of insulin ([address]    2. Phlegmon    3. Essential hypertension    4. Dyslipidemia    5. Screening for depression  -     DEPRESSION SCREEN ANNUAL    6.  Wellness examination        Health Maintenance Due     Health Maintenance Due   Topic Date Due    Hepatitis C Screening  1955    COVID-19 Vaccine (1 of 2) 10/07/1969    Shingrix Vaccine Age 50> (1 of 2) 10/07/2003    Pneumococcal 65+ years (1 of 1 - PPSV23) 10/07/2018    Eye Exam Retinal or Dilated  03/16/2020       Patient Care Team   Patient Care Team:  Ronnie Segundo MD as PCP - General (Internal Medicine)  Ronnie Segundo MD as PCP - [facility] Empaneled Provider  Amy Ham MD as Consulting Provider (Cardiology)  Carloz Ortiz MD as Consulting Provider (Cardiology)  Adelina Alcocer RN as Care Transitions Nurse    History     Patient Active Problem List   Diagnosis Code    Dyslipidemia E78.5    Eczema L20.6    Umbilical hernia F67.2    Femoral bruit R09.89    Mitral valve prolapse I34.1    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 ([facility] E13.319    Type 2 diabetes mellitus with diabetic polyneuropathy, with long-term current use of insulin ([facility]) E11.42, Z79.4    Uncontrolled type 2 diabetes mellitus with complication, with long-term current use of insulin ([facility]) E11.8, E11.65, Z79.4    Syncope and collapse R55    Abnormal EKG R94.31    Brugada syndrome I49.8    NSVT (nonsustained ventricular tachycardia) ([facility]) I47.2    Gastritis K29.70    ICD (implantable cardioverter-defibrillator) in place Z95.810    Hypertension I10    Bell's palsy G51.0    Vasovagal syncope R55    Right groin pain R10.31    Abdominal pain R10.9    Phlegmon L02.91    ARF (acute renal failure) ([facility]) N17.9    Moderate protein-calorie malnutrition ([facility]) E44.0     Past Medical History:   Diagnosis Date    Hypertension     ICD (implantable cardioverter-defibrillator) in place     NSVT ([facility]onsustained [facility] tachycardia) [facility] 11/21/2018    EPS 11/21/2018 VT/VF     Right groin pain 12/7/2020      Past Surgical History:   Procedure Laterality Date    COLONOSCOPY  1/2/2015         HX HEART CATHETERIZATION      HX HEENT      l cararact removal    HX IMPLANTABLE CARDIOVERTER DEFIBRILLATOR       Current Outpatient Medications   Medication Sig Dispense Refill    dulaglutide (Trulicity) 1.5 RU/5.6 mL sub-q pen 0.5 mL by SubCUTAneous route every seven (7) days. 4 Syringe 11    Lactobac no.41/Bifidobact no.7 (PROBIOTIC-10 PO) Take 1 Tab by mouth daily.  metFORMIN ER (GLUCOPHAGE XR) 500 mg tablet Take 2 Tabs by mouth daily (with dinner).  30 Tab 5    insulin lispro (HUMALOG) 100 unit/mL injection AC meals by correctional scale: 1  give 2 units, 201 to 250 give 4 units 2  give 6 units 301 to 350 give 8 units 3  give 10 units greater than 401 give 12 units 1 Vial 5    atorvastatin (LIPITOR) 20 mg tablet TAKE ONE TABLET BY MOUTH DAILY 90 Tab 3    insulin detemir U-100 (Levemir FlexTouch U-100 Insuln) 100 unit/mL (3 mL) inpn 6 Units by SubCUTAneous route nightly. INJECT 6 UNITS AT BEDTIME -hold if bs less than 120 1 Adjustable Dose Pre-filled Pen Syringe 11    polyethylene glycol (MIRALAX) 17 gram packet Take 1 Packet by mouth two (2) times a day. 60 Each 11    Insulin Needles, Disposable, 31 gauge x 1/4\" ndle USE WITH INSULIN PENS THREE TIMES PER  Pen Needle 11    aspirin delayed-release 81 mg tablet Take 81 mg by mouth daily.  glucose blood VI test strips (ASCENSIA AUTODISC VI, ONE TOUCH ULTRA TEST VI) strip 3 times daily before meals 300 Strip 3    amLODIPine (NORVASC) 10 mg tablet TAKE ONE TABLET BY MOUTH DAILY 90 Tab 3     No Known Allergies    Family History   Problem Relation Age of Onset    Diabetes Mother     Hypertension Mother     Stroke Mother      Social History     Tobacco Use    Smoking status: Never Smoker    Smokeless tobacco: Never Used   Substance Use Topics    Alcohol use:  No

## 2021-01-29 NOTE — PROGRESS NOTES
Surgery Progress Note    1/29/2021    CC: Postoperative wound    Subjective:     Patient has done well at home. He is now off antibiotics. Wound is getting much smaller. Inferior wound is now closed. No fevers or chills. Constitutional: No fever or chills  Neurologic: No headache  Eyes: No scleral icterus or irritated eyes  Nose: No nasal pain or drainage  Mouth: No oral lesions or sore throat  Cardiac: No palpations or chest pain  Pulmonary: No cough or shortness or breath  Gastrointestinal: No nausea, emesis, diarrhea, or constipation  Genitourinary: No dysuria  Musculoskeletal: No muscle or joint tenderness  Skin: Right sided abdominal wound  Psychiatric: No anxiety or depressed mood    Objective:     Visit Vitals  /81   Pulse 87   Temp 99.8 °F (37.7 °C) (Oral)   Resp 18   Ht 5' 7\" (1.702 m)   Wt 155 lb 12.8 oz (70.7 kg)   SpO2 97%   BMI 24.40 kg/m²       General: No acute distress, conversant  Eyes: PERRLA, no scleral icterus  HENT: Normocephalic without oral lesions  Neck: Trachea midline without LAD  Cardiac: Normal pulse rate and rhythm  Pulmonary: Symmetric chest rise with normal effort  GI: Soft, right-sided abdominal wound is now 2 x 1 cm and superficial.  Improved induration around the site. Right lower incision site closed  1 cm umbilical hernia with reducible omental fat  Skin: Warm without rash  Extremities: No edema or joint stiffness  Psych: Appropriate mood and affect    Assessment:     47-year-old male with uncontrolled diabetes status post I&D of right abdominal wall abscess doing well    Plan:     Continue local wound care  PCP managing A1c. Reports being compliant with home diabetic medication  Can follow-up as needed  Informed him that I would be happy to take care of his umbilical hernia when his diabetes controlled if he develops symptoms    Total time involved with this patient's care was: 15 minutes, of which >50% of the time was spent counciling the patient.     Dariel Conteh Chris Borrero MD  Bariatric and General Surgeon  [facility]

## 2021-01-29 NOTE — PROGRESS NOTES
To whom it may concern:    Mr. John Koenig was hospitalized last month and has subsequently been under my care. Their prognosis for full recovery is good. I recommend that they remain at home and off work until 2/15/2021 after which time they can return to full duty. If there are any questions, please feel free to call our office. Sincerely,      Bri Correa MD    Bariatric and General Surgeon  [facility]   [address] [facility], [address] [place] [address].  [address] [place] [address]: [phone]   F: [phone]

## 2021-01-30 ENCOUNTER — IMMUNIZATION (OUTPATIENT)
Dept: INTERNAL MEDICINE CLINIC | Age: 66
End: 2021-01-30
Payer: COMMERCIAL

## 2021-01-30 DIAGNOSIS — Z23 ENCOUNTER FOR IMMUNIZATION: Primary | ICD-10-CM

## 2021-01-30 PROCEDURE — 0011A PR IMM ADMN SARSCOV2 100 MCG/0.5 ML 1ST DOSE: CPT | Performed by: FAMILY MEDICINE

## 2021-01-30 PROCEDURE — 91301 COVID-19, MRNA, LNP-S, PF, 100MCG/0.5ML DOSE(MODERNA): CPT | Performed by: FAMILY MEDICINE

## 2021-02-01 LAB
BACTERIA SPEC CULT: NORMAL
SERVICE CMNT-IMP: NORMAL

## 2021-02-05 ENCOUNTER — TELEPHONE (OUTPATIENT)
Dept: SURGERY | Age: 66
End: 2021-02-05

## 2021-02-05 NOTE — TELEPHONE ENCOUNTER
Spoke with patient who wants a letter to return to work. Per Dr. Elmo Roman patient may have a letter to return for work.

## 2021-02-05 NOTE — LETTER
NOTIFICATION RETURN TO WORK / SCHOOL 
 
2/5/2021 9:25 AM 
 
Mr. Quintin Feliciano [address] [phone] To Whom It May Concern: 
 
Quintin Feliciano was under the care of [facility] from 12/31/2020 to present. Vonzella Goodpasture He will return to work on 2/15/2021 with no restrictions. If there are questions or concerns please have the patient contact our office.  
 
 
 
Sincerely, 
 
 
Governor MD Rob

## 2021-02-16 ENCOUNTER — PATIENT OUTREACH (OUTPATIENT)
Dept: CASE MANAGEMENT | Age: 66
End: 2021-02-16

## 2021-02-16 NOTE — PROGRESS NOTES
Patient has graduated from the Transitions of Care Coordination  program on 2/16/2021. Patient/family has the ability to self-manage at this time Care management goals have been completed. Patient was not referred to the [facility] team for further management. Goals Addressed                 This Visit's Progress     COMPLETED: Attend follow up appointments on schedule          01/26/21   Attended follow up appt with PCP scheduled 1/14   Plans to contact Dr. Ashley Chau, surgeon, today to schedule follow up appt   Confirmed upcoming appt with PCP       COMPLETED: Understands and monitors blood sugar levels          01/12/21   Will perform home glucose monitoring at least TID and PRN    mg/dl today   Patient able to verbalize s/s hypoglycemia/hyperglycemia and treatment   Diet- maintain consistent carb diet     01/26/21   Continues to monitor BS TID/PRN   FBS today 128 mg/ml   Average -121 mg/gl   Compliant with diabetic diet       COMPLETED: Understands red flags post discharge. 01/12/21   Will monitor wound for increased drainage, odor   Will perform daily wound care dressing changes   Will monitor for COVID symptoms   No readmissions for the next thirty days   [facility] will access wound    01/26/21   Reports wound healing-getting smaller   Minimal drainage    SN visits at least twice per week, visit today            Patient has Care Transition Nurse's contact information for any further questions, concerns, or needs.   Patients upcoming visits:    Future Appointments   Date Time Provider Chalino Alaniz   2/27/2021  9:10 AM [facility] VAC INITIAL [facility] MAIN BS AMB   3/1/2021 11:30 AM Bel Sanders MD [facility] MAIN BS AMB   4/30/2021  8:00 AM [facility] BS AMB

## 2021-02-27 ENCOUNTER — IMMUNIZATION (OUTPATIENT)
Dept: INTERNAL MEDICINE CLINIC | Age: 66
End: 2021-02-27
Payer: COMMERCIAL

## 2021-02-27 DIAGNOSIS — Z23 ENCOUNTER FOR IMMUNIZATION: Primary | ICD-10-CM

## 2021-02-27 PROCEDURE — 0012A COVID-19, MRNA, LNP-S, PF, 100MCG/0.5ML DOSE(MODERNA): CPT | Performed by: FAMILY MEDICINE

## 2021-02-27 PROCEDURE — 91301 COVID-19, MRNA, LNP-S, PF, 100MCG/0.5ML DOSE(MODERNA): CPT | Performed by: FAMILY MEDICINE

## 2021-03-18 ENCOUNTER — PATIENT MESSAGE (OUTPATIENT)
Dept: INTERNAL MEDICINE CLINIC | Age: 66
End: 2021-03-18

## 2021-04-13 DIAGNOSIS — E78.5 DYSLIPIDEMIA: ICD-10-CM

## 2021-04-13 RX ORDER — AMLODIPINE BESYLATE 10 MG/1
TABLET ORAL
Qty: 90 TAB | Refills: 3 | Status: SHIPPED | OUTPATIENT
Start: 2021-04-13 | End: 2022-05-05

## 2021-04-13 RX ORDER — ATORVASTATIN CALCIUM 20 MG/1
TABLET, FILM COATED ORAL
Qty: 90 TAB | Refills: 3 | Status: SHIPPED | OUTPATIENT
Start: 2021-04-13 | End: 2022-05-06 | Stop reason: SDUPTHER

## 2021-04-30 ENCOUNTER — OFFICE VISIT (OUTPATIENT)
Dept: CARDIOLOGY CLINIC | Age: 66
End: 2021-04-30
Payer: COMMERCIAL

## 2021-04-30 DIAGNOSIS — I47.29 NSVT (NONSUSTAINED VENTRICULAR TACHYCARDIA): ICD-10-CM

## 2021-04-30 DIAGNOSIS — Z95.810 ICD (IMPLANTABLE CARDIOVERTER-DEFIBRILLATOR) IN PLACE: Primary | ICD-10-CM

## 2021-05-03 PROCEDURE — 93296 REM INTERROG EVL PM/IDS: CPT | Performed by: INTERNAL MEDICINE

## 2021-05-03 PROCEDURE — 93295 DEV INTERROG REMOTE 1/2/MLT: CPT | Performed by: INTERNAL MEDICINE

## 2021-05-19 ENCOUNTER — OFFICE VISIT (OUTPATIENT)
Dept: CARDIOLOGY CLINIC | Age: 66
End: 2021-05-19
Payer: COMMERCIAL

## 2021-05-19 ENCOUNTER — OFFICE VISIT (OUTPATIENT)
Dept: CARDIOLOGY CLINIC | Age: 66
End: 2021-05-19

## 2021-05-19 VITALS
DIASTOLIC BLOOD PRESSURE: 74 MMHG | WEIGHT: 153 LBS | HEIGHT: 67 IN | RESPIRATION RATE: 18 BRPM | BODY MASS INDEX: 24.01 KG/M2 | SYSTOLIC BLOOD PRESSURE: 125 MMHG | OXYGEN SATURATION: 96 % | HEART RATE: 64 BPM

## 2021-05-19 DIAGNOSIS — I49.8 BRUGADA SYNDROME: Primary | ICD-10-CM

## 2021-05-19 DIAGNOSIS — I10 ESSENTIAL HYPERTENSION: ICD-10-CM

## 2021-05-19 DIAGNOSIS — I47.29 NSVT (NONSUSTAINED VENTRICULAR TACHYCARDIA): ICD-10-CM

## 2021-05-19 DIAGNOSIS — Z95.810 ICD (IMPLANTABLE CARDIOVERTER-DEFIBRILLATOR) IN PLACE: Primary | ICD-10-CM

## 2021-05-19 DIAGNOSIS — Z95.810 ICD (IMPLANTABLE CARDIOVERTER-DEFIBRILLATOR) IN PLACE: ICD-10-CM

## 2021-05-19 PROCEDURE — 93000 ELECTROCARDIOGRAM COMPLETE: CPT | Performed by: NURSE PRACTITIONER

## 2021-05-19 PROCEDURE — 99214 OFFICE O/P EST MOD 30 MIN: CPT | Performed by: NURSE PRACTITIONER

## 2021-05-19 PROCEDURE — 93289 INTERROG DEVICE EVAL HEART: CPT | Performed by: INTERNAL MEDICINE

## 2021-05-19 RX ORDER — METFORMIN HYDROCHLORIDE 1000 MG/1
TABLET ORAL
COMMUNITY
Start: 2021-03-25 | End: 2021-10-18

## 2021-05-19 NOTE — PROGRESS NOTES
ELECTROPHYSIOLOGY        Subjective:      Fred Mora is a 77 y.o. male is here for EP follow up. The patient denies chest pain/ shortness of breath, orthopnea, PND, LE edema, palpitations, syncope, presyncope or fatigue. Has felt well over the last year.        Patient Active Problem List    Diagnosis Date Noted    Chronic abdominal wound infection, sequela 01/29/2021    Moderate protein-calorie malnutrition ([address] 12/20/2020    ARF (acute renal failure) ([address] 12/12/2020    Phlegmon 12/11/2020    Abdominal pain 12/10/2020    Right groin pain 12/07/2020    Vasovagal syncope 07/21/2020    Bell's palsy 04/28/2019    ICD (implantable cardioverter-defibrillator) in place 12/21/2018    Hypertension 12/21/2018    Gastritis 12/06/2018    NSVT (nonsustained ventricular tachycardia) ([address] 11/21/2018    Syncope and collapse 11/18/2018    Abnormal EKG 11/18/2018    Brugada syndrome 11/18/2018    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 ([address] 02/18/2018    Type 2 diabetes mellitus with diabetic polyneuropathy, with long-term current use of insulin ([address] 02/18/2018    Uncontrolled type 2 diabetes mellitus with complication, with long-term current use of insulin ([address] 02/18/2018    Dyslipidemia 09/25/2014    Eczema 18/81/7297    Umbilical hernia 38/25/6967    Femoral bruit 09/25/2014    Mitral valve prolapse 09/25/2014      Debra Cleaning MD  Past Medical History:   Diagnosis Date    Abscess 12/2020    left side of abdomen    Hypertension     ICD (implantable cardioverter-defibrillator) in place     NSVT (nonsustained ventricular tachycardia) ([address] 11/21/2018    EPS 11/21/2018 VT/VF     Right groin pain 12/7/2020      Past Surgical History:   Procedure Laterality Date    COLONOSCOPY  1/2/2015         HX HEART CATHETERIZATION      HX HEENT      l cararact removal    HX IMPLANTABLE CARDIOVERTER DEFIBRILLATOR       No Known Allergies   Family History   Problem Relation Age of Onset    Diabetes Mother     Hypertension Mother     Stroke Mother     negative for cardiac disease  Social History     Socioeconomic History    Marital status: SINGLE     Spouse name: Not on file    Number of children: 0    Years of education: Not on file    Highest education level: Not on file   Occupational History    Occupation: [employer]   Tobacco Use    Smoking status: Never Smoker    Smokeless tobacco: Never Used   Vaping Use    Vaping Use: Never used   Substance and Sexual Activity    Alcohol use: No    Drug use: No    Sexual activity: Yes     Partners: Female     Birth control/protection: None     Social Determinants of Health     Financial Resource Strain:     Difficulty of Paying Living Expenses:    Food Insecurity:     Worried About Running Out of Food in the Last Year:     [address] in the Last Year:    Transportation Needs:     Lack of Transportation (Medical):  Lack of Transportation (Non-Medical):    Physical Activity:     Days of Exercise per Week:     Minutes of Exercise per Session:    Stress:     Feeling of Stress :    Social Connections:     Frequency of Communication with Friends and Family:     Frequency of Social Gatherings with Friends and Family:     Attends Anabaptism Services:     Active Member of Clubs or Organizations:     Attends Club or Organization Meetings:     Marital Status:      Current Outpatient Medications   Medication Sig    metFORMIN (GLUCOPHAGE) 1,000 mg tablet TAKE 1 TABLET BY MOUTH IN THE EVENING BEFORE DINNER    amLODIPine (NORVASC) 10 mg tablet TAKE ONE TABLET BY MOUTH DAILY    atorvastatin (LIPITOR) 20 mg tablet TAKE ONE TABLET BY MOUTH DAILY    dulaglutide (Trulicity) 1.5 DJ/3.6 mL sub-q pen 0.5 mL by SubCUTAneous route every seven (7) days.  insulin detemir U-100 (Levemir FlexTouch U-100 Insuln) 100 unit/mL (3 mL) inpn 6 Units by SubCUTAneous route nightly.  INJECT 6 UNITS AT BEDTIME -hold if bs less than 120 (Patient taking differently: 16 Units by SubCUTAneous route nightly. INJECT 6 UNITS AT BEDTIME -hold if bs less than 120)    Insulin Needles, Disposable, 31 gauge x 1/4\" ndle USE WITH INSULIN PENS THREE TIMES PER DAY    aspirin delayed-release 81 mg tablet Take 81 mg by mouth daily.  glucose blood VI test strips (ASCENSIA AUTODISC VI, ONE TOUCH ULTRA TEST VI) strip 3 times daily before meals    Lactobac no.41/Bifidobact no.7 (PROBIOTIC-10 PO) Take 1 Tab by mouth daily. (Patient not taking: Reported on 5/19/2021)    insulin lispro (HUMALOG) 100 unit/mL injection AC meals by correctional scale: 1  give 2 units, 201 to 250 give 4 units 2  give 6 units 301 to 350 give 8 units 3  give 10 units greater than 401 give 12 units (Patient not taking: Reported on 5/19/2021)    polyethylene glycol (MIRALAX) 17 gram packet Take 1 Packet by mouth two (2) times a day. (Patient not taking: Reported on 5/19/2021)     No current facility-administered medications for this visit. Vitals:    05/19/21 0912   BP: 125/74   Pulse: 64   Resp: 18   SpO2: 96%   Weight: 153 lb (69.4 kg)   Height: 5' 7\" (1.702 m)       I have reviewed the nurses notes, vitals, problem list, allergy list, medical history, family, social history and medications. Review of Symptoms:    General: Pt denies excessive weight gain or loss. Pt is able to conduct ADL's  HEENT: Denies blurred vision, headaches, epistaxis and difficulty swallowing. Respiratory: Denies shortness of breath, RESENDIZ, wheezing or stridor. Cardiovascular: Denies precordial pain, palpitations, edema or PND  Gastrointestinal: Denies poor appetite, indigestion, abdominal pain or blood in stool  Urinary: Denies dysuria, pyuria  Musculoskeletal: Denies pain or swelling from muscles or joints  Neurologic: Denies tremor, paresthesias, or sensory motor disturbance  Skin: Denies rash, itching or texture change.   Psych: Denies depression      Physical Exam:      General: Well developed, in no acute distress. HEENT: Eyes - PERRL  Heart:  Normal S1/S2 negative S3 or S4. Regular, no murmur  Respiratory: Clear bilaterally x 4, no wheezing or rales  Extremities:  No edema, no cyanosis. Musculoskeletal: No clubbing  Neuro: A&Ox3, speech clear  Skin: No visible rashes or lesions. Non diaphoretic. No visible ulcers  Vascular: 2+ pulses symmetric in all extremities  Psych - judgement intact and orientation is wnl       Cardiographics    Ek21  Sinus  Rhythm   -Old inferior infarct.      Results for orders placed or performed during the hospital encounter of 20   EKG, 12 LEAD, INITIAL   Result Value Ref Range    Ventricular Rate 76 BPM    Atrial Rate 76 BPM    P-R Interval 144 ms    QRS Duration 94 ms    Q-T Interval 354 ms    QTC Calculation (Bezet) 398 ms    Calculated P Axis 44 degrees    Calculated R Axis -34 degrees    Calculated T Axis 11 degrees    Diagnosis         Normal sinus rhythm  Left axis deviation  Minimal voltage criteria for LVH, may be normal variant  Inferior infarct (cited on or before 2018)    ST elevation, probably due to early repolarization    When compared with ECG of 2018 04:13,  No significant change was found  Confirmed by Stephanie Angela M.D. ([id number]) on 7/15/2020 1:22:28 PM           Lab Results   Component Value Date/Time    WBC 4.1 2021 07:30 AM    HGB 9.5 (L) 2021 07:30 AM    HCT 30.2 (L) 2021 07:30 AM    PLATELET 542  07:30 AM    MCV 85.1 2021 07:30 AM      Lab Results   Component Value Date/Time    Sodium 140 2021 07:30 AM    Potassium 3.9 2021 07:30 AM    Chloride 105 2021 07:30 AM    CO2 32 2021 07:30 AM    Anion gap 3 (L) 2021 07:30 AM    Glucose 76 2021 07:30 AM    BUN 29 (H) 2021 07:30 AM    Creatinine 1.24 2021 07:30 AM    BUN/Creatinine ratio 23 (H) 2021 07:30 AM    GFR est AA >60 2021 07:30 AM    GFR est non-AA 58 (L) 2021 07:30 AM Calcium 9.6 01/11/2021 07:30 AM    Bilirubin, total 0.2 12/28/2020 03:20 AM    Alk. phosphatase 99 12/28/2020 03:20 AM    Protein, total 8.1 12/28/2020 03:20 AM    Albumin 3.0 (L) 01/11/2021 07:30 AM    Globulin 5.6 (H) 12/28/2020 03:20 AM    A-G Ratio 0.4 (L) 12/28/2020 03:20 AM    ALT (SGPT) 22 12/28/2020 03:20 AM      No results found for: TSH, TSH2, TSH3, TSHP, TSHEXT, TSHEXT     Echo 3/13/19-  · Right ventricular global systolic function is mildly reduced. Pacer/ICD present. · Estimated left ventricular ejection fraction is 56 - 60%. Mild (grade 1) left ventricular diastolic dysfunction. · Bubble study with late bubbles within the left atrium with Valsalva, which is consistent with shunting at the pulmonary level. Assessment:           ICD-10-CM ICD-9-CM    1. Brugada syndrome  I49.8 746.89    2. NSVT (nonsustained ventricular tachycardia) ([facility])  I47.2 427.1    3. ICD (implantable cardioverter-defibrillator) in place  Z95.810 V45.02 AMB POC EKG ROUTINE W/ 12 LEADS, INTER & REP   4. Essential hypertension  I10 401.9      Orders Placed This Encounter    AMB POC EKG ROUTINE W/ 12 LEADS, INTER & REP     Order Specific Question:   Reason for Exam:     Answer:   ROUTINE    metFORMIN (GLUCOPHAGE) 1,000 mg tablet     Sig: TAKE 1 TABLET BY MOUTH IN THE EVENING BEFORE DINNER        Plan:     Mr Singh Chacon is here for annual follow up s/p ICD 11/2018 for Brugada syndrome. He has no cardiac complaints. ICD remote demonstrates normal functioning with <1% RVP; no events.  Battery remaining is 7.4 years. Last echo 3/19 with normal EF. EKG normal sinus, normotensive this visit. During this visit,  the patient and I had a comprehensive discussion of device management using principles of shared decision making. we reviewed device therapy, including the potential risks and benefits of device management.  These risks include death, myocardial infarction, stroke, cardiac perforation, vascular injury, injury, pacing induced cardiomyopathy, inappropriate shocks (defibrillator) and other less severe complications. The patient demonstrated a clear understanding of these issues during out discussion. Our plans, determined together after thorough consideration, are outlined else where in this note. He is enrolled in remote monitoring and I will see him back in 1 year. Addressed all patient questions and concerns at this visit. Continue medical management for DM. Discussed side effects, efficacy and good medication compliance with pt re: oral and insulin. Thank you for allowing me to participate in Anna Ponce 's care.       Yanelis Horton NP

## 2021-05-19 NOTE — PROGRESS NOTES
Chief Complaint   Patient presents with    Pacemaker Check     ICD - Annual follow up      1. Have you been to the ER, urgent care clinic since your last visit? Hospitalized since your last visit? Yes [facility] ER for abscess 12/2020    2. Have you seen or consulted any other health care providers outside of the [address] [place] [address] since your last visit? Include any pap smears or colon screening.   No

## 2021-08-03 PROBLEM — R55 SYNCOPE AND COLLAPSE: Status: RESOLVED | Noted: 2018-11-18 | Resolved: 2021-08-03

## 2021-08-09 NOTE — PROGRESS NOTES
[facility] 3/12-3/13/2019:  Facial Droop/Bell's Palsy  Patient on [facility] discharge report dated 3/13/2019. Undersigned left message on voice mail with my contact information and sent \"Get In Touch\" letter. Need to assess for discharge needs.       [facility] ED Note:  /123  Glucose 407 No

## 2021-08-20 ENCOUNTER — APPOINTMENT (OUTPATIENT)
Dept: VASCULAR SURGERY | Age: 66
End: 2021-08-20
Attending: NURSE PRACTITIONER
Payer: COMMERCIAL

## 2021-08-20 ENCOUNTER — HOSPITAL ENCOUNTER (EMERGENCY)
Age: 66
Discharge: HOME | End: 2021-08-20
Attending: EMERGENCY MEDICINE
Payer: COMMERCIAL

## 2021-08-20 ENCOUNTER — APPOINTMENT (OUTPATIENT)
Dept: GENERAL RADIOLOGY | Age: 66
End: 2021-08-20
Attending: NURSE PRACTITIONER
Payer: COMMERCIAL

## 2021-08-20 VITALS
RESPIRATION RATE: 16 BRPM | HEART RATE: 76 BPM | DIASTOLIC BLOOD PRESSURE: 79 MMHG | SYSTOLIC BLOOD PRESSURE: 125 MMHG | BODY MASS INDEX: 25.41 KG/M2 | TEMPERATURE: 97.7 F | OXYGEN SATURATION: 96 % | WEIGHT: 162.26 LBS

## 2021-08-20 DIAGNOSIS — R60.9 PERIPHERAL EDEMA: ICD-10-CM

## 2021-08-20 DIAGNOSIS — M79.89 LEG SWELLING: Primary | ICD-10-CM

## 2021-08-20 LAB
ALBUMIN SERPL-MCNC: 3.5 G/DL (ref 3.5–5)
ALBUMIN/GLOB SERPL: 0.9 {RATIO} (ref 1.1–2.2)
ALP SERPL-CCNC: 95 U/L (ref 45–117)
ALT SERPL-CCNC: 61 U/L (ref 12–78)
ANION GAP SERPL CALC-SCNC: 3 MMOL/L (ref 5–15)
AST SERPL-CCNC: 40 U/L (ref 15–37)
BASOPHILS # BLD: 0 K/UL (ref 0–0.1)
BASOPHILS NFR BLD: 0 % (ref 0–1)
BILIRUB SERPL-MCNC: 0.3 MG/DL (ref 0.2–1)
BNP SERPL-MCNC: 62 PG/ML
BUN SERPL-MCNC: 31 MG/DL (ref 6–20)
BUN/CREAT SERPL: 27 (ref 12–20)
CALCIUM SERPL-MCNC: 9.1 MG/DL (ref 8.5–10.1)
CHLORIDE SERPL-SCNC: 105 MMOL/L (ref 97–108)
CO2 SERPL-SCNC: 29 MMOL/L (ref 21–32)
COMMENT, HOLDF: NORMAL
CREAT SERPL-MCNC: 1.15 MG/DL (ref 0.7–1.3)
DIFFERENTIAL METHOD BLD: ABNORMAL
EOSINOPHIL # BLD: 0.2 K/UL (ref 0–0.4)
EOSINOPHIL NFR BLD: 4 % (ref 0–7)
ERYTHROCYTE [DISTWIDTH] IN BLOOD BY AUTOMATED COUNT: 13.6 % (ref 11.5–14.5)
GLOBULIN SER CALC-MCNC: 3.8 G/DL (ref 2–4)
GLUCOSE SERPL-MCNC: 196 MG/DL (ref 65–100)
HCT VFR BLD AUTO: 35.2 % (ref 36.6–50.3)
HGB BLD-MCNC: 11.3 G/DL (ref 12.1–17)
IMM GRANULOCYTES # BLD AUTO: 0 K/UL (ref 0–0.04)
IMM GRANULOCYTES NFR BLD AUTO: 0 % (ref 0–0.5)
LYMPHOCYTES # BLD: 0.9 K/UL (ref 0.8–3.5)
LYMPHOCYTES NFR BLD: 21 % (ref 12–49)
MAGNESIUM SERPL-MCNC: 2.1 MG/DL (ref 1.6–2.4)
MCH RBC QN AUTO: 27.6 PG (ref 26–34)
MCHC RBC AUTO-ENTMCNC: 32.1 G/DL (ref 30–36.5)
MCV RBC AUTO: 85.9 FL (ref 80–99)
MONOCYTES # BLD: 0.4 K/UL (ref 0–1)
MONOCYTES NFR BLD: 10 % (ref 5–13)
NEUTS SEG # BLD: 3 K/UL (ref 1.8–8)
NEUTS SEG NFR BLD: 65 % (ref 32–75)
NRBC # BLD: 0 K/UL (ref 0–0.01)
NRBC BLD-RTO: 0 PER 100 WBC
PLATELET # BLD AUTO: 161 K/UL (ref 150–400)
PMV BLD AUTO: 10 FL (ref 8.9–12.9)
POTASSIUM SERPL-SCNC: 3.8 MMOL/L (ref 3.5–5.1)
PROT SERPL-MCNC: 7.3 G/DL (ref 6.4–8.2)
RBC # BLD AUTO: 4.1 M/UL (ref 4.1–5.7)
SAMPLES BEING HELD,HOLD: NORMAL
SODIUM SERPL-SCNC: 137 MMOL/L (ref 136–145)
TROPONIN I SERPL-MCNC: <0.05 NG/ML
WBC # BLD AUTO: 4.5 K/UL (ref 4.1–11.1)

## 2021-08-20 PROCEDURE — 80053 COMPREHEN METABOLIC PANEL: CPT

## 2021-08-20 PROCEDURE — 99282 EMERGENCY DEPT VISIT SF MDM: CPT

## 2021-08-20 PROCEDURE — 83880 ASSAY OF NATRIURETIC PEPTIDE: CPT

## 2021-08-20 PROCEDURE — 71046 X-RAY EXAM CHEST 2 VIEWS: CPT

## 2021-08-20 PROCEDURE — 84484 ASSAY OF TROPONIN QUANT: CPT

## 2021-08-20 PROCEDURE — 93005 ELECTROCARDIOGRAM TRACING: CPT

## 2021-08-20 PROCEDURE — 93970 EXTREMITY STUDY: CPT

## 2021-08-20 PROCEDURE — 85025 COMPLETE CBC W/AUTO DIFF WBC: CPT

## 2021-08-20 PROCEDURE — 83735 ASSAY OF MAGNESIUM: CPT

## 2021-08-20 PROCEDURE — 36415 COLL VENOUS BLD VENIPUNCTURE: CPT

## 2021-08-20 RX ORDER — FUROSEMIDE 20 MG/1
20 TABLET ORAL DAILY
Qty: 15 TABLET | Refills: 0 | Status: SHIPPED | OUTPATIENT
Start: 2021-08-20 | End: 2021-09-04

## 2021-08-20 NOTE — ED PROVIDER NOTES
57-year-old male with past medical history of hypertension, ICD (nonsustained V. tach), and diabetes presents for evaluation of progressive bilateral lower extremity swelling for several weeks in duration. Denies any pain, fevers, chest pain, difficulty breathing. Denies any recent medication adjustments. Past Medical History:   Diagnosis Date    Abscess 12/2020    left side of abdomen    Hypertension     ICD (implantable cardioverter-defibrillator) in place     NSVT (nonsustained ventricular tachycardia) ([facility]) 11/21/2018    EPS 11/21/2018 VT/VF     Right groin pain 12/7/2020       Past Surgical History:   Procedure Laterality Date    COLONOSCOPY  1/2/2015         HX HEART CATHETERIZATION      HX HEENT      l cararact removal    HX IMPLANTABLE CARDIOVERTER DEFIBRILLATOR           Family History:   Problem Relation Age of Onset    Diabetes Mother     Hypertension Mother     Stroke Mother        Social History     Socioeconomic History    Marital status: SINGLE     Spouse name: Not on file    Number of children: 0    Years of education: Not on file    Highest education level: Not on file   Occupational History    Occupation: [employer]   Tobacco Use    Smoking status: Never Smoker    Smokeless tobacco: Never Used   Vaping Use    Vaping Use: Never used   Substance and Sexual Activity    Alcohol use: No    Drug use: No    Sexual activity: Yes     Partners: Female     Birth control/protection: None   Other Topics Concern    Not on file   Social History Narrative    Not on file     Social Determinants of Health     Financial Resource Strain:     Difficulty of Paying Living Expenses:    Food Insecurity:     Worried About Running Out of Food in the Last Year:     Ran Out of Food in the Last Year:    Transportation Needs:     Lack of Transportation (Medical):      Lack of Transportation (Non-Medical):    Physical Activity:     Days of Exercise per Week:     Minutes of Exercise per Session:    Stress:     Feeling of Stress :    Social Connections:     Frequency of Communication with Friends and Family:     Frequency of Social Gatherings with Friends and Family:     Attends Yarsanism Services:     Active Member of Clubs or Organizations:     Attends Club or Organization Meetings:     Marital Status:    Intimate Partner Violence:     Fear of Current or Ex-Partner:     Emotionally Abused:     Physically Abused:     Sexually Abused: ALLERGIES: Patient has no known allergies. Review of Systems   Constitutional: Negative for fever. HENT: Negative for sore throat. Eyes: Negative for visual disturbance. Respiratory: Negative for shortness of breath. Cardiovascular: Positive for leg swelling. Negative for chest pain. Gastrointestinal: Negative for vomiting. Genitourinary: Negative for dysuria. Musculoskeletal: Negative for myalgias. Skin: Negative for rash. Neurological: Negative for dizziness. Psychiatric/Behavioral: Negative for dysphoric mood. Vitals:    08/20/21 1628   BP: 125/79   Pulse: 76   Resp: 16   Temp: 97.7 °F (36.5 °C)   SpO2: 96%   Weight: 73.6 kg (162 lb 4.1 oz)            Physical Exam  Vitals and nursing note reviewed. Constitutional:       General: He is not in acute distress. Appearance: Normal appearance. He is not ill-appearing. HENT:      Head: Normocephalic and atraumatic. Nose: Nose normal.      Mouth/Throat:      Mouth: Mucous membranes are moist.      Pharynx: Oropharynx is clear. Eyes:      Extraocular Movements: Extraocular movements intact. Cardiovascular:      Rate and Rhythm: Normal rate and regular rhythm. Pulses: Normal pulses. Heart sounds: Normal heart sounds. No friction rub. No gallop. Comments: RLE appears slightly larger than LLE. Difficult to palpate pulses due to edema but skin feels warm and well perfused.   Pulmonary:      Effort: Pulmonary effort is normal.      Breath sounds: Normal breath sounds. Abdominal:      General: Bowel sounds are normal.      Palpations: Abdomen is soft. Musculoskeletal:         General: Normal range of motion. Cervical back: Normal range of motion and neck supple. Right lower le+ Edema present. Left lower le+ Edema present. Skin:     General: Skin is warm and dry. Neurological:      Mental Status: He is alert and oriented to person, place, and time. Mental status is at baseline. Psychiatric:         Mood and Affect: Mood normal.         Behavior: Behavior normal.          MDM     VITAL SIGNS:  No data found. LABS:  Recent Results (from the past 6 hour(s))   EKG, 12 LEAD, INITIAL    Collection Time: 21  6:31 PM   Result Value Ref Range    Ventricular Rate 63 BPM    Atrial Rate 63 BPM    P-R Interval 140 ms    QRS Duration 92 ms    Q-T Interval 386 ms    QTC Calculation (Bezet) 395 ms    Calculated P Axis 59 degrees    Calculated R Axis -32 degrees    Calculated T Axis 37 degrees    Diagnosis       Normal sinus rhythm  Left axis deviation  Minimal voltage criteria for LVH, may be normal variant ( R in aVL )  ST elevation, consider early repolarization, pericarditis, or injury  Abnormal ECG  When compared with ECG of 2020 07:36,  Criteria for Inferior infarct are no longer present     CBC WITH AUTOMATED DIFF    Collection Time: 21  6:48 PM   Result Value Ref Range    WBC 4.5 4.1 - 11.1 K/uL    RBC 4.10 4. 10 - 5.70 M/uL    HGB 11.3 (L) 12.1 - 17.0 g/dL    HCT 35.2 (L) 36.6 - 50.3 %    MCV 85.9 80.0 - 99.0 FL    MCH 27.6 26.0 - 34.0 PG    MCHC 32.1 30.0 - 36.5 g/dL    RDW 13.6 11.5 - 14.5 %    PLATELET 312 021 - 182 K/uL    MPV 10.0 8.9 - 12.9 FL    NRBC 0.0 0  WBC    ABSOLUTE NRBC 0.00 0.00 - 0.01 K/uL    NEUTROPHILS 65 32 - 75 %    LYMPHOCYTES 21 12 - 49 %    MONOCYTES 10 5 - 13 %    EOSINOPHILS 4 0 - 7 %    BASOPHILS 0 0 - 1 %    IMMATURE GRANULOCYTES 0 0.0 - 0.5 %    ABS.  NEUTROPHILS 3.0 1.8 - 8.0 K/UL    ABS. LYMPHOCYTES 0.9 0.8 - 3.5 K/UL    ABS. MONOCYTES 0.4 0.0 - 1.0 K/UL    ABS. EOSINOPHILS 0.2 0.0 - 0.4 K/UL    ABS. BASOPHILS 0.0 0.0 - 0.1 K/UL    ABS. IMM. GRANS. 0.0 0.00 - 0.04 K/UL    DF AUTOMATED     METABOLIC PANEL, COMPREHENSIVE    Collection Time: 08/20/21  6:48 PM   Result Value Ref Range    Sodium 137 136 - 145 mmol/L    Potassium 3.8 3.5 - 5.1 mmol/L    Chloride 105 97 - 108 mmol/L    CO2 29 21 - 32 mmol/L    Anion gap 3 (L) 5 - 15 mmol/L    Glucose 196 (H) 65 - 100 mg/dL    BUN 31 (H) 6 - 20 MG/DL    Creatinine 1.15 0.70 - 1.30 MG/DL    BUN/Creatinine ratio 27 (H) 12 - 20      GFR est AA >60 >60 ml/min/1.73m2    GFR est non-AA >60 >60 ml/min/1.73m2    Calcium 9.1 8.5 - 10.1 MG/DL    Bilirubin, total 0.3 0.2 - 1.0 MG/DL    ALT (SGPT) 61 12 - 78 U/L    AST (SGOT) 40 (H) 15 - 37 U/L    Alk. phosphatase 95 45 - 117 U/L    Protein, total 7.3 6.4 - 8.2 g/dL    Albumin 3.5 3.5 - 5.0 g/dL    Globulin 3.8 2.0 - 4.0 g/dL    A-G Ratio 0.9 (L) 1.1 - 2.2     NT-PRO BNP    Collection Time: 08/20/21  6:48 PM   Result Value Ref Range    NT pro-BNP 62 <125 PG/ML   TROPONIN I    Collection Time: 08/20/21  6:48 PM   Result Value Ref Range    Troponin-I, Qt. <0.05 <0.05 ng/mL   MAGNESIUM    Collection Time: 08/20/21  6:48 PM   Result Value Ref Range    Magnesium 2.1 1.6 - 2.4 mg/dL   SAMPLES BEING HELD    Collection Time: 08/20/21  6:48 PM   Result Value Ref Range    SAMPLES BEING HELD 1RED,1BLUE     COMMENT        Add-on orders for these samples will be processed based on acceptable specimen integrity and analyte stability, which may vary by analyte. IMAGING:  XR CHEST PA LAT   Final Result   No acute abnormality                  DUPLEX LOWER EXT VENOUS BILAT    (Results Pending)         Medications During Visit:  Medications - No data to display      DECISION MAKING:  Rod Lozada is a 77 y.o. male who comes in as above.    Work-up negative for any evidence of significant kidney disease, hypoalbuminemia, or heart failure. Will treat patient with 15-day course of Lasix and recommended compression stockings. Close follow-up with PCP or cardiologist.    The clinical decision making for this encounter included ordering and interpreting the above diagnostic tests with comparison to prior studies that are within our EMR. Past medical and surgical histories were reviewed, as were records from recent outpatient and emergency department visits. The above results discussed and reviewed with the patient. Patient verbalized understanding of the care plan, including any changes to current outpatient medication regimen, discussed disease process, symptom control, and follow-up care. Return precautions reviewed. IMPRESSION:  1. Leg swelling    2. Peripheral edema        DISPOSITION:  Discharged      Current Discharge Medication List      START taking these medications    Details   furosemide (Lasix) 20 mg tablet Take 1 Tablet by mouth daily for 15 days. Qty: 15 Tablet, Refills: 0  Start date: 8/20/2021, End date: 9/4/2021              Follow-up Information     Follow up With Specialties Details Why Contact Info    Dorian Khanna MD Internal Medicine  As needed [place] [id number] [phone]              The patient is asked to follow-up with their primary care provider in the next several days. They are to call tomorrow for an appointment. The patient is asked to return promptly for any increased concerns or worsening of symptoms. They can return to this emergency department or any other emergency department.     Procedures

## 2021-08-20 NOTE — ED TRIAGE NOTES
He arrives from [facility] after going there for several weeks of swelling both legs. He says hes had it happen before but it went away. He had labs drawn at [facility] and his electrolytes were normal. He was sent here for an ultrasound.

## 2021-08-21 LAB
ATRIAL RATE: 63 BPM
CALCULATED P AXIS, ECG09: 59 DEGREES
CALCULATED R AXIS, ECG10: -32 DEGREES
CALCULATED T AXIS, ECG11: 37 DEGREES
DIAGNOSIS, 93000: NORMAL
P-R INTERVAL, ECG05: 140 MS
Q-T INTERVAL, ECG07: 386 MS
QRS DURATION, ECG06: 92 MS
QTC CALCULATION (BEZET), ECG08: 395 MS
VENTRICULAR RATE, ECG03: 63 BPM

## 2021-08-21 NOTE — DISCHARGE INSTRUCTIONS
Your test came back looking very reassuring. You do have some extra fluid accumulating in your lower legs. Try to get some compression stockings over-the-counter and take Lasix once daily for the next 10 days to see if this helps the swelling. Try to follow-up with your primary care doctor or cardiologist to reevaluate for improvement after this medication.

## 2021-08-23 ENCOUNTER — OFFICE VISIT (OUTPATIENT)
Dept: CARDIOLOGY CLINIC | Age: 66
End: 2021-08-23
Payer: COMMERCIAL

## 2021-08-23 DIAGNOSIS — I47.29 NSVT (NONSUSTAINED VENTRICULAR TACHYCARDIA): ICD-10-CM

## 2021-08-23 DIAGNOSIS — Z95.810 ICD (IMPLANTABLE CARDIOVERTER-DEFIBRILLATOR) IN PLACE: Primary | ICD-10-CM

## 2021-08-23 PROCEDURE — 93296 REM INTERROG EVL PM/IDS: CPT | Performed by: INTERNAL MEDICINE

## 2021-08-23 PROCEDURE — 93295 DEV INTERROG REMOTE 1/2/MLT: CPT | Performed by: INTERNAL MEDICINE

## 2021-10-18 RX ORDER — METFORMIN HYDROCHLORIDE 1000 MG/1
TABLET ORAL
Qty: 90 TABLET | Refills: 2 | Status: SHIPPED | OUTPATIENT
Start: 2021-10-18 | End: 2022-11-04

## 2021-12-03 ENCOUNTER — OFFICE VISIT (OUTPATIENT)
Dept: CARDIOLOGY CLINIC | Age: 66
End: 2021-12-03
Payer: COMMERCIAL

## 2021-12-03 DIAGNOSIS — Z95.810 ICD (IMPLANTABLE CARDIOVERTER-DEFIBRILLATOR) IN PLACE: Primary | ICD-10-CM

## 2021-12-03 DIAGNOSIS — I47.29 NSVT (NONSUSTAINED VENTRICULAR TACHYCARDIA): ICD-10-CM

## 2021-12-03 PROCEDURE — 93295 DEV INTERROG REMOTE 1/2/MLT: CPT | Performed by: INTERNAL MEDICINE

## 2021-12-03 PROCEDURE — 93296 REM INTERROG EVL PM/IDS: CPT | Performed by: INTERNAL MEDICINE

## 2022-03-04 ENCOUNTER — OFFICE VISIT (OUTPATIENT)
Dept: CARDIOLOGY CLINIC | Age: 67
End: 2022-03-04
Payer: COMMERCIAL

## 2022-03-04 DIAGNOSIS — I47.29 NSVT (NONSUSTAINED VENTRICULAR TACHYCARDIA): ICD-10-CM

## 2022-03-04 DIAGNOSIS — Z95.810 ICD (IMPLANTABLE CARDIOVERTER-DEFIBRILLATOR) IN PLACE: Primary | ICD-10-CM

## 2022-03-04 PROCEDURE — 93296 REM INTERROG EVL PM/IDS: CPT | Performed by: INTERNAL MEDICINE

## 2022-03-04 PROCEDURE — 93295 DEV INTERROG REMOTE 1/2/MLT: CPT | Performed by: INTERNAL MEDICINE

## 2022-03-08 ENCOUNTER — HOSPITAL ENCOUNTER (OUTPATIENT)
Age: 67
Setting detail: OBSERVATION
Discharge: HOME | End: 2022-03-12
Attending: EMERGENCY MEDICINE | Admitting: FAMILY MEDICINE
Payer: COMMERCIAL

## 2022-03-08 ENCOUNTER — NURSE TRIAGE (OUTPATIENT)
Dept: OTHER | Facility: CLINIC | Age: 67
End: 2022-03-08

## 2022-03-08 ENCOUNTER — APPOINTMENT (OUTPATIENT)
Dept: CT IMAGING | Age: 67
End: 2022-03-08
Attending: EMERGENCY MEDICINE
Payer: COMMERCIAL

## 2022-03-08 ENCOUNTER — APPOINTMENT (OUTPATIENT)
Dept: ULTRASOUND IMAGING | Age: 67
End: 2022-03-08
Attending: FAMILY MEDICINE
Payer: COMMERCIAL

## 2022-03-08 DIAGNOSIS — Z95.810 ICD (IMPLANTABLE CARDIOVERTER-DEFIBRILLATOR) IN PLACE: Chronic | ICD-10-CM

## 2022-03-08 DIAGNOSIS — R09.89 SUSPECTED CEREBROVASCULAR ACCIDENT (CVA): ICD-10-CM

## 2022-03-08 DIAGNOSIS — R27.0 ATAXIA: Primary | ICD-10-CM

## 2022-03-08 DIAGNOSIS — I49.8 BRUGADA SYNDROME: Chronic | ICD-10-CM

## 2022-03-08 LAB
ALBUMIN SERPL-MCNC: 3.8 G/DL (ref 3.5–5)
ALBUMIN/GLOB SERPL: 1 {RATIO} (ref 1.1–2.2)
ALP SERPL-CCNC: 104 U/L (ref 45–117)
ALT SERPL-CCNC: 46 U/L (ref 12–78)
ANION GAP SERPL CALC-SCNC: 7 MMOL/L (ref 5–15)
AST SERPL-CCNC: 34 U/L (ref 15–37)
ATRIAL RATE: 65 BPM
BASOPHILS # BLD: 0 K/UL (ref 0–0.1)
BASOPHILS NFR BLD: 0 % (ref 0–1)
BILIRUB SERPL-MCNC: 0.4 MG/DL (ref 0.2–1)
BNP SERPL-MCNC: 38 PG/ML
BUN SERPL-MCNC: 32 MG/DL (ref 6–20)
BUN/CREAT SERPL: 22 (ref 12–20)
CALCIUM SERPL-MCNC: 9.7 MG/DL (ref 8.5–10.1)
CALCULATED P AXIS, ECG09: 50 DEGREES
CALCULATED R AXIS, ECG10: -28 DEGREES
CALCULATED T AXIS, ECG11: 34 DEGREES
CHLORIDE SERPL-SCNC: 103 MMOL/L (ref 97–108)
CK SERPL-CCNC: 398 U/L (ref 39–308)
CO2 SERPL-SCNC: 25 MMOL/L (ref 21–32)
COMMENT, HOLDF: NORMAL
CREAT SERPL-MCNC: 1.43 MG/DL (ref 0.7–1.3)
DIAGNOSIS, 93000: NORMAL
DIFFERENTIAL METHOD BLD: ABNORMAL
EOSINOPHIL # BLD: 0.2 K/UL (ref 0–0.4)
EOSINOPHIL NFR BLD: 3 % (ref 0–7)
ERYTHROCYTE [DISTWIDTH] IN BLOOD BY AUTOMATED COUNT: 13.2 % (ref 11.5–14.5)
GLOBULIN SER CALC-MCNC: 4 G/DL (ref 2–4)
GLUCOSE BLD STRIP.AUTO-MCNC: 271 MG/DL (ref 65–117)
GLUCOSE SERPL-MCNC: 335 MG/DL (ref 65–100)
HCT VFR BLD AUTO: 38.4 % (ref 36.6–50.3)
HGB BLD-MCNC: 12.6 G/DL (ref 12.1–17)
IMM GRANULOCYTES # BLD AUTO: 0 K/UL (ref 0–0.04)
IMM GRANULOCYTES NFR BLD AUTO: 0 % (ref 0–0.5)
LYMPHOCYTES # BLD: 0.6 K/UL (ref 0.8–3.5)
LYMPHOCYTES NFR BLD: 10 % (ref 12–49)
MCH RBC QN AUTO: 27.8 PG (ref 26–34)
MCHC RBC AUTO-ENTMCNC: 32.8 G/DL (ref 30–36.5)
MCV RBC AUTO: 84.6 FL (ref 80–99)
MONOCYTES # BLD: 0.3 K/UL (ref 0–1)
MONOCYTES NFR BLD: 5 % (ref 5–13)
NEUTS SEG # BLD: 4.5 K/UL (ref 1.8–8)
NEUTS SEG NFR BLD: 82 % (ref 32–75)
NRBC # BLD: 0 K/UL (ref 0–0.01)
NRBC BLD-RTO: 0 PER 100 WBC
P-R INTERVAL, ECG05: 142 MS
PLATELET # BLD AUTO: 165 K/UL (ref 150–400)
PMV BLD AUTO: 10.8 FL (ref 8.9–12.9)
POTASSIUM SERPL-SCNC: 4.4 MMOL/L (ref 3.5–5.1)
PROT SERPL-MCNC: 7.8 G/DL (ref 6.4–8.2)
Q-T INTERVAL, ECG07: 380 MS
QRS DURATION, ECG06: 90 MS
QTC CALCULATION (BEZET), ECG08: 395 MS
RBC # BLD AUTO: 4.54 M/UL (ref 4.1–5.7)
RBC MORPH BLD: ABNORMAL
SAMPLES BEING HELD,HOLD: NORMAL
SERVICE CMNT-IMP: ABNORMAL
SODIUM SERPL-SCNC: 135 MMOL/L (ref 136–145)
TROPONIN-HIGH SENSITIVITY: 10 NG/L (ref 0–76)
TROPONIN-HIGH SENSITIVITY: 9 NG/L (ref 0–76)
VENTRICULAR RATE, ECG03: 65 BPM
WBC # BLD AUTO: 5.6 K/UL (ref 4.1–11.1)

## 2022-03-08 PROCEDURE — 70450 CT HEAD/BRAIN W/O DYE: CPT

## 2022-03-08 PROCEDURE — 76770 US EXAM ABDO BACK WALL COMP: CPT

## 2022-03-08 PROCEDURE — G0378 HOSPITAL OBSERVATION PER HR: HCPCS

## 2022-03-08 PROCEDURE — 74011250636 HC RX REV CODE- 250/636: Performed by: FAMILY MEDICINE

## 2022-03-08 PROCEDURE — 74011636637 HC RX REV CODE- 636/637: Performed by: FAMILY MEDICINE

## 2022-03-08 PROCEDURE — 80053 COMPREHEN METABOLIC PANEL: CPT

## 2022-03-08 PROCEDURE — 85025 COMPLETE CBC W/AUTO DIFF WBC: CPT

## 2022-03-08 PROCEDURE — 82962 GLUCOSE BLOOD TEST: CPT

## 2022-03-08 PROCEDURE — 36415 COLL VENOUS BLD VENIPUNCTURE: CPT

## 2022-03-08 PROCEDURE — 83036 HEMOGLOBIN GLYCOSYLATED A1C: CPT

## 2022-03-08 PROCEDURE — 82550 ASSAY OF CK (CPK): CPT

## 2022-03-08 PROCEDURE — 74011250637 HC RX REV CODE- 250/637: Performed by: FAMILY MEDICINE

## 2022-03-08 PROCEDURE — 93005 ELECTROCARDIOGRAM TRACING: CPT

## 2022-03-08 PROCEDURE — 70496 CT ANGIOGRAPHY HEAD: CPT

## 2022-03-08 PROCEDURE — 84484 ASSAY OF TROPONIN QUANT: CPT

## 2022-03-08 PROCEDURE — 74011000636 HC RX REV CODE- 636: Performed by: EMERGENCY MEDICINE

## 2022-03-08 PROCEDURE — 83880 ASSAY OF NATRIURETIC PEPTIDE: CPT

## 2022-03-08 PROCEDURE — 99285 EMERGENCY DEPT VISIT HI MDM: CPT

## 2022-03-08 RX ORDER — SODIUM CHLORIDE 9 MG/ML
75 INJECTION, SOLUTION INTRAVENOUS CONTINUOUS
Status: DISPENSED | OUTPATIENT
Start: 2022-03-08 | End: 2022-03-09

## 2022-03-08 RX ORDER — GUAIFENESIN 600 MG/1
600 TABLET, EXTENDED RELEASE ORAL EVERY 12 HOURS
Status: DISCONTINUED | OUTPATIENT
Start: 2022-03-08 | End: 2022-03-12 | Stop reason: HOSPADM

## 2022-03-08 RX ORDER — ACETAMINOPHEN 325 MG/1
650 TABLET ORAL
Status: DISCONTINUED | OUTPATIENT
Start: 2022-03-08 | End: 2022-03-12 | Stop reason: HOSPADM

## 2022-03-08 RX ORDER — ATORVASTATIN CALCIUM 20 MG/1
20 TABLET, FILM COATED ORAL
Status: DISCONTINUED | OUTPATIENT
Start: 2022-03-08 | End: 2022-03-12 | Stop reason: HOSPADM

## 2022-03-08 RX ORDER — FAMOTIDINE 20 MG/1
20 TABLET, FILM COATED ORAL EVERY 24 HOURS
Status: DISCONTINUED | OUTPATIENT
Start: 2022-03-08 | End: 2022-03-12 | Stop reason: HOSPADM

## 2022-03-08 RX ORDER — ATORVASTATIN CALCIUM 40 MG/1
80 TABLET, FILM COATED ORAL
Status: DISCONTINUED | OUTPATIENT
Start: 2022-03-08 | End: 2022-03-08 | Stop reason: SDUPTHER

## 2022-03-08 RX ORDER — AMLODIPINE BESYLATE 5 MG/1
5 TABLET ORAL DAILY
Status: DISCONTINUED | OUTPATIENT
Start: 2022-03-09 | End: 2022-03-12 | Stop reason: HOSPADM

## 2022-03-08 RX ORDER — ASPIRIN 81 MG/1
81 TABLET ORAL DAILY
Status: DISCONTINUED | OUTPATIENT
Start: 2022-03-09 | End: 2022-03-12 | Stop reason: HOSPADM

## 2022-03-08 RX ORDER — ACETAMINOPHEN 650 MG/1
650 SUPPOSITORY RECTAL
Status: DISCONTINUED | OUTPATIENT
Start: 2022-03-08 | End: 2022-03-12 | Stop reason: HOSPADM

## 2022-03-08 RX ORDER — MAGNESIUM SULFATE 100 %
4 CRYSTALS MISCELLANEOUS AS NEEDED
Status: DISCONTINUED | OUTPATIENT
Start: 2022-03-08 | End: 2022-03-12 | Stop reason: HOSPADM

## 2022-03-08 RX ORDER — GUAIFENESIN 100 MG/5ML
81 LIQUID (ML) ORAL DAILY
Status: DISCONTINUED | OUTPATIENT
Start: 2022-03-08 | End: 2022-03-08 | Stop reason: SDUPTHER

## 2022-03-08 RX ORDER — INSULIN LISPRO 100 [IU]/ML
INJECTION, SOLUTION INTRAVENOUS; SUBCUTANEOUS
Status: DISCONTINUED | OUTPATIENT
Start: 2022-03-08 | End: 2022-03-12 | Stop reason: HOSPADM

## 2022-03-08 RX ADMIN — Medication 3 UNITS: at 21:21

## 2022-03-08 RX ADMIN — FAMOTIDINE 20 MG: 20 TABLET, FILM COATED ORAL at 21:21

## 2022-03-08 RX ADMIN — GUAIFENESIN 600 MG: 600 TABLET, EXTENDED RELEASE ORAL at 21:21

## 2022-03-08 RX ADMIN — SODIUM CHLORIDE 75 ML/HR: 9 INJECTION, SOLUTION INTRAVENOUS at 18:34

## 2022-03-08 RX ADMIN — IOPAMIDOL 100 ML: 755 INJECTION, SOLUTION INTRAVENOUS at 15:18

## 2022-03-08 RX ADMIN — ATORVASTATIN CALCIUM 20 MG: 20 TABLET, FILM COATED ORAL at 21:21

## 2022-03-08 NOTE — ED PROVIDER NOTES
The history is provided by the patient. Dizziness  This is a new problem. The current episode started 6 to 12 hours ago. The problem has not changed since onset. There was no focality noted. Primary symptoms include auditory change. Pertinent negatives include no focal weakness, no loss of sensation, no loss of balance, no slurred speech, no speech difficulty, no memory loss, no movement disorder, no agitation, no visual change, no mental status change, no unresponsiveness and no disorientation. There has been no fever. Pertinent negatives include no shortness of breath, no chest pain, no vomiting, no altered mental status, no confusion, no headaches, no choking, no nausea, no bowel incontinence and no bladder incontinence.         Past Medical History:   Diagnosis Date    Abscess 12/2020    left side of abdomen    Hypertension     ICD (implantable cardioverter-defibrillator) in place     NSVT (nonsustained [facility] tachycardia) ([facility]) 11/21/2018    EPS 11/21/2018 VT/VF     Right groin pain 12/7/2020       Past Surgical History:   Procedure Laterality Date    COLONOSCOPY  1/2/2015         HX HEART CATHETERIZATION      HX HEENT      l cararact removal    HX IMPLANTABLE CARDIOVERTER DEFIBRILLATOR           Family History:   Problem Relation Age of Onset    Diabetes Mother     Hypertension Mother     Stroke Mother        Social History     Socioeconomic History    Marital status: SINGLE     Spouse name: Not on file    Number of children: 0    Years of education: Not on file    Highest education level: Not on file   Occupational History    Occupation: [employer]   Tobacco Use    Smoking status: Never Smoker    Smokeless tobacco: Never Used   Vaping Use    Vaping Use: Never used   Substance and Sexual Activity    Alcohol use: No    Drug use: No    Sexual activity: Yes     Partners: Female     Birth control/protection: None   Other Topics Concern    Not on file   Social History Narrative    Not on file     Social Determinants of Health     Financial Resource Strain:     Difficulty of Paying Living Expenses: Not on file   Food Insecurity:     Worried About Running Out of Food in the Last Year: Not on file    Shelley of Food in the Last Year: Not on file   Transportation Needs:     Lack of Transportation (Medical): Not on file    Lack of Transportation (Non-Medical): Not on file   Physical Activity:     Days of Exercise per Week: Not on file    Minutes of Exercise per Session: Not on file   Stress:     Feeling of Stress : Not on file   Social Connections:     Frequency of Communication with Friends and Family: Not on file    Frequency of Social Gatherings with Friends and Family: Not on file    Attends Hinduism Services: Not on file    Active Member of [address] [place] [address] [employer] or Organizations: Not on file    Attends Club or Organization Meetings: Not on file    Marital Status: Not on file   Intimate Partner Violence:     Fear of Current or Ex-Partner: Not on file    Emotionally Abused: Not on file    Physically Abused: Not on file    Sexually Abused: Not on file   Housing Stability:     Unable to Pay for Housing in the Last Year: Not on file    Number of [place] in the Last Year: Not on file    Unstable Housing in the Last Year: Not on file         ALLERGIES: Patient has no known allergies. Review of Systems   Constitutional: Negative for activity change, chills and fever. HENT: Positive for hearing loss. Negative for nosebleeds, sore throat, trouble swallowing and voice change. Eyes: Negative for visual disturbance. Respiratory: Negative for choking and shortness of breath. Cardiovascular: Negative for chest pain and palpitations. Gastrointestinal: Negative for abdominal pain, bowel incontinence, constipation, diarrhea, nausea and vomiting. Genitourinary: Negative for bladder incontinence, difficulty urinating, dysuria, hematuria and urgency.    Musculoskeletal: Positive for gait problem. Negative for back pain, neck pain and neck stiffness. Skin: Negative for color change. Allergic/Immunologic: Negative for immunocompromised state. Neurological: Positive for dizziness. Negative for focal weakness, seizures, syncope, speech difficulty, weakness, light-headedness, numbness, headaches and loss of balance. Psychiatric/Behavioral: Negative for agitation, behavioral problems, confusion, hallucinations, memory loss, self-injury and suicidal ideas. Vitals:    03/08/22 1310   BP: (!) 153/85   Pulse: 76   Resp: 14   Temp: 98.1 °F (36.7 °C)   SpO2: 98%   Weight: 71.2 kg (157 lb)   Height: 5' 7\" (1.702 m)            Physical Exam  Vitals and nursing note reviewed. Constitutional:       General: He is not in acute distress. Appearance: He is well-developed. He is not diaphoretic. HENT:      Head: Normocephalic and atraumatic. Left Ear: Tympanic membrane normal.   Eyes:      Pupils: Pupils are equal, round, and reactive to light. Cardiovascular:      Rate and Rhythm: Normal rate. Rhythm irregularly irregular. Heart sounds: Normal heart sounds. No murmur heard. No friction rub. No gallop. Pulmonary:      Effort: Pulmonary effort is normal. No respiratory distress. Breath sounds: Normal breath sounds. No wheezing. Abdominal:      General: Bowel sounds are normal. There is no distension. Palpations: Abdomen is soft. Tenderness: There is no abdominal tenderness. There is no guarding or rebound. Musculoskeletal:         General: Normal range of motion. Cervical back: Normal range of motion and neck supple. Skin:     General: Skin is warm. Findings: No rash. Neurological:      Mental Status: He is alert and oriented to person, place, and time. Psychiatric:         Behavior: Behavior normal.         Thought Content:  Thought content normal.         Judgment: Judgment normal.          MDM     This is a 20-year-old male with past medical history, review of systems, physical exam as above, presenting with complaints of dizziness, left ear decreased hearing. Patient states symptoms present when he woke this morning. He denies focal weakness, paresthesias, facial droop, vision changes. Patient states he walked to work this morning from the bus stop, typically taking them 5 minutes took 15 minutes as his steps had to be \"intentional\". He denies history of similar symptoms in the past, denies use of blood thinners, complains of also bilateral lower extremity pedal edema. Physical exam remarkable for well-appearing elderly male, in no acute distress noted to be hypertensive, afebrile without tachycardia, satting well on room air. He has a regular rate, irregular rhythm to auscultation, soft abdomen, clear TM on the left, nonfocal neurologic exam.  Will defer ambulation due to concerns for fall. Plan to obtain CMP, CBC, EKG, head CT. We will reassess, and make a disposition. Procedures    Perfect Serve Consult for Admission  4:18 PM    ED Room Number: ER22/22  Patient Name and age:  Estrellita Pack 74 y.o.  male  Working Diagnosis:   1.  Ataxia        COVID-19 Suspicion:  no  Sepsis present:  no  Reassessment needed: no  Code Status:  Full Code  Readmission: no  Isolation Requirements:  no  Recommended Level of Care:  telemetry  Department:[facility] Adult ED - 21   Other:  D/w Dr. Marie Ferro

## 2022-03-08 NOTE — ED NOTES
TRANSFER - OUT REPORT:    Verbal report given to Lyubov Corey RN(name) on Loren Fontanez  being transferred to NSTU(unit) for routine progression of care       Report consisted of patients Situation, Background, Assessment and   Recommendations(SBAR). Information from the following report(s) SBAR was reviewed with the receiving nurse. Lines:   Peripheral IV 03/08/22 Right Antecubital (Active)   Site Assessment Clean, dry, & intact 03/08/22 1358   Phlebitis Assessment 0 03/08/22 1358   Infiltration Assessment 0 03/08/22 1358   Dressing Status Clean, dry, & intact 03/08/22 1358   Dressing Type Transparent 03/08/22 1358   Hub Color/Line Status Pink;Flushed 03/08/22 1358   Action Taken Blood drawn 03/08/22 1358        Opportunity for questions and clarification was provided.       Patient transported with:   [employer]

## 2022-03-08 NOTE — PROGRESS NOTES
Renal Dosing/Monitoring  Medication: famotidine   Current regimen:  20 every 12 hr  Recent Labs     03/08/22  1346   CREA 1.43*   BUN 32*     Estimated CrCl:  46 ml/min  Plan: Change to famotidine 20 mg Q24H for crcl < 50 ml/min

## 2022-03-08 NOTE — TELEPHONE ENCOUNTER
Received call from [facility] at [facility] with Red Flag Complaint. Subjective: Caller states \"My right leg has more swelling than the left leg. Sunday, it felt like the swelling was in my knees-had a tightness when stooping to get something down low. The next day the knee issue was gone. Today I still feel tightness when pulling leg up. \"     Current Symptoms: bilateral  leg swelling(right worse than left, but states that right leg is normally larger than left leg.). Tightness below knee, soft but swelling above knee. Coloring to feet is lighter than rest of leg. Legs were slightly less swollen this morning than when he went to bed last night. Not painful to touch, but some discomfort when walking. Has had swelling before but has not stayed this long. No hx blood clot. Began 6-7 days days ago. Occasional tingling noted in feet. ( began about 4-5 weeks ago, currently not experiencing.) When standing on feet, can feel pressure inwardly not on the surface of foot. No noticeable coolness to feet. Denies fluid leaking on legs    Has some loss of control in hands when hands are cold, Like when putting key in door and twisting- fingers don't twist so good. When it's warm, there's not a problem with that. Denies chest pain, does have some slight SOB when going up steps or going up hill. Dizziness- Feels like he's tilting and feel unbalanced when walking. Has not had dizziness before. (drinks about 4-5 bottles daily). Began today. Denies ear pain and headache. Decreased hearing in ears, denies pain and drainage. .  Left ear hears very little sound. Right ear can hear some- sounded like I was in a well when talking. Not having trouble with phone up close. Just began today. Onset: different onsets- see each problem    Associated Symptoms: NA    Pain Severity: 0/10; Temperature: not that he is aware of.      What has been tried: elevation    LMP: NA Pregnant: NA    Recommended disposition: Go to ED/UCC Now (Or to Office with PCP Approval)     [facility] office. Spoke to Sg Diaz and warm transferred patient to her. Care advice provided, patient verbalizes understanding; denies any other questions or concerns; instructed to call back for any new or worsening symptoms. Attention Provider: Thank you for allowing me to participate in the care of your patient. The patient was connected to triage in response to information provided to the ECC. Please do not respond through this encounter as the response is not directed to a shared pool.         Reason for Disposition   SEVERE swelling (e.g., swelling extends above knee, entire leg is swollen, weeping fluid)    Protocols used: LEG SWELLING AND EDEMA-ADULT-[place]

## 2022-03-08 NOTE — ED TRIAGE NOTES
Pt arrives via EMS from work, with complaints of feeling awkward when walking, swelling in legs and left hand. Pt with hx of HTN, DM and defibrillator placed 2020.

## 2022-03-08 NOTE — H&P
[address] [facility] Adult  Hospitalist Group  History and Physical    Date of Service:  3/8/2022  Primary Care Provider: Piter Boss MD  Source of information: The patient and Chart review    Chief Complaint: Peripheral Edema and Gait Problem      History of Presenting Illness:   Loren Fontanez is a 74 y.o. male who presents with ataxia    History was primary team from the patient    Patient reports that he woke up this morning and had onset of ataxia, patient reports that he went to bed last night without any complaints, patient reports that he felt off balance, also had decreased hearing in his left ear, and felt that his ear was \"plugged up\". Patient came to the ER as a code stroke, patient was requested to be admitted to the hospital service, patient denies any other complaints or problems, patient denies taking any new medications or drinking alcohol in the last 7 days    The patient denies any headache, blurry vision, sore throat, trouble swallowing, trouble with speech, chest pain, SOB, cough, fever, chills, N/V/D, abd pain, urinary symptoms, constipation, recent travels, sick contacts, , falls, injuries, rashes, contact with COVID-19 diagnosed patients, hematemesis, melena, hemoptysis, hematuria, rashes, denies starting any new medications and denies any other concerns or problems besides as mentioned above. REVIEW OF SYSTEMS:  A comprehensive review of systems was negative except for that written in the History of Present Illness.      Past Medical History:   Diagnosis Date    Abscess 12/2020    left side of abdomen    Hypertension     ICD (implantable cardioverter-defibrillator) in place     NSVT (nonsustained ventricular tachycardia) ([facility] [address] 11/21/2018    EPS 11/21/2018 VT/VF     Right groin pain 12/7/2020      Past Surgical History:   Procedure Laterality Date    COLONOSCOPY  1/2/2015         HX HEART CATHETERIZATION      HX HEENT      l cararact removal    HX IMPLANTABLE CARDIOVERTER DEFIBRILLATOR       Prior to Admission medications    Medication Sig Start Date End Date Taking? Authorizing Provider   Levemir FlexTouch U-100 Insuln 100 unit/mL (3 mL) inpn INJECT 30 UNITS UNDER SKIN EVERY MORNING AND AT BEDTIME 10/31/21   Rodney Stover MD   metFORMIN (GLUCOPHAGE) 1,000 mg tablet TAKE 1 TABLET BY MOUTH IN THE EVENING BEFORE DINNER 10/18/21   Grisel Echols MD   Trulicity 1.5 ES/7.0 mL sub-q pen INJECT 0.5 ML UNDER SKIN EVERY SEVEN (7) DAYS. 8/19/21   Rodney Stover MD   amLODIPine (NORVASC) 10 mg tablet TAKE ONE TABLET BY MOUTH DAILY 4/13/21   Rodney Stover MD   atorvastatin (LIPITOR) 20 mg tablet TAKE ONE TABLET BY MOUTH DAILY 4/13/21   Rodney Stover MD   Lactobac no.41/Bifidobact no.7 (PROBIOTIC-10 PO) Take 1 Tab by mouth daily. Patient not taking: Reported on 5/19/2021    Provider, Historical   polyethylene glycol (MIRALAX) 17 gram packet Take 1 Packet by mouth two (2) times a day. Patient not taking: Reported on 5/19/2021 1/4/21   Elizabeth Palacios MD   Insulin Needles, Disposable, 31 gauge x 1/4\" ndle USE WITH INSULIN PENS THREE TIMES PER DAY 7/30/20   Rodney Stover MD   aspirin delayed-release 81 mg tablet Take 81 mg by mouth daily. Provider, Historical   glucose blood VI test strips (ASCENSIA AUTODISC VI, ONE TOUCH ULTRA TEST VI) strip 3 times daily before meals 4/29/20   Rodney Stover MD     No Known Allergies   Family History   Problem Relation Age of Onset    Diabetes Mother     Hypertension Mother     Stroke Mother       Social History:  reports that he has never smoked. He has never used smokeless tobacco. He reports that he does not drink alcohol and does not use drugs. Family and social history were personally reviewed, all pertinent and relevant details are outlined as above.     Objective:     Visit Vitals  BP (!) 166/96 (BP 1 Location: Left upper arm, BP Patient Position: At rest)   Pulse 72   Temp 97.3 °F (36.3 °C)   Resp 16 Ht 5' 7\" (1.702 m)   Wt 71.2 kg (157 lb)   SpO2 99%   BMI 24.59 kg/m²      O2 Device: None (Room air)    PHYSICAL EXAM:   General: Alert x oriented x 3,   HEENT: PEERL, EOMI, moist mucus membranes, bulging of the left tympanic membrane  Neck: Supple, no JVD, no meningeal signs  Chest: Clear to auscultation bilaterally   CVS: RRR, S1 S2 heard, no murmurs/rubs/gallops  Abd: Soft, non-tender, non-distended, +bowel sounds   Ext: No clubbing, no cyanosis, no edema  Neuro/Psych: Pleasant mood and affect, CN 2-12 grossly intact, sensory grossly within normal limit, Strength 5/5 in all extremities, DTR 1+ x 4  Cap refill: Brisk, less than 3 seconds  Pulses: 2+, symmetric in all extremities  Skin: Warm, dry, without rashes or lesions    Data Review: All diagnostic labs and studies have been reviewed. Abnormal Labs Reviewed   CBC WITH AUTOMATED DIFF - Abnormal; Notable for the following components:       Result Value    NEUTROPHILS 82 (*)     LYMPHOCYTES 10 (*)     ABS. LYMPHOCYTES 0.6 (*)     All other components within normal limits   METABOLIC PANEL, COMPREHENSIVE - Abnormal; Notable for the following components:    Sodium 135 (*)     Glucose 335 (*)     BUN 32 (*)     Creatinine 1.43 (*)     BUN/Creatinine ratio 22 (*)     GFR est AA 60 (*)     GFR est non-AA 49 (*)     A-G Ratio 1.0 (*)     All other components within normal limits       All Micro Results     None          IMAGING:   CT HEAD WO CONT   Final Result   No significant abnormalities. CTA HEAD NECK W CONT   Final Result      1. No acute large vessel occlusion. No flow-limiting stenosis. Widely patent   vertebral basilar system.          MRI BRAIN WO CONT    (Results Pending)        ECG/ECHO:    Results for orders placed or performed during the hospital encounter of 03/08/22   EKG, 12 LEAD, INITIAL   Result Value Ref Range    Ventricular Rate 65 BPM    Atrial Rate 65 BPM    P-R Interval 142 ms    QRS Duration 90 ms    Q-T Interval 380 ms    QTC Calculation (Bezet) 395 ms    Calculated P Axis 50 degrees    Calculated R Axis -28 degrees    Calculated T Axis 34 degrees    Diagnosis       Normal sinus rhythm  Minimal voltage criteria for LVH, may be normal variant ( R in aVL )  Borderline ECG  When compared with ECG of 20-AUG-2021 18:31,  No significant change was found          Assessment:   Given the patient's current clinical presentation, there is a high level of concern for decompensation if discharged from the emergency department. Complex decision making was performed, which includes reviewing the patient's available past medical records, laboratory results, and imaging studies. Active Problems:    Ataxia (3/8/2022)  Diabetes mellitus type 2  Hypertension  APARNA    Plan:     Patient will be hospitalized on a telemetry bed, rule out CVA, symptoms more consistent with peripheral disease, likely vestibular etiology, MRI of the brain, neurovascular checks, aspirin, statin, neurology consult, PT OT speech consult, fall precautions, start patient on decongestants, hold antibiotics for now, close monitoring and further intervention per hospital course  Sliding-scale insulin, checks, diet control, close monitoring  Continue home medication, optimize blood pressure control  Unclear etiology, likely prerenal, gentle IV hydration, avoid nephrotoxic medication, renally dose all other medication, trend creatinine and renal ultrasound, continue to monitor        DIET: ADULT DIET Regular; 4 carb choices (60 gm/meal); Low Fat/Low Chol/High Fiber/2 gm Na; Low Sodium (2 gm)   ISOLATION PRECAUTIONS: There are currently no Active Isolations  CODE STATUS: Full Code   DVT PROPHYLAXIS: SCDs  FUNCTIONAL STATUS PRIOR TO HOSPITALIZATION: Fully active and ambulatory; able to carry on all self-care without restriction. EARLY MOBILITY ASSESSMENT: Recommend an assessment from physical therapy and/or occupational therapy  ANTICIPATED DISCHARGE: 24-48 hours.       Signed By: Hayden Santana Martin Blackburn MD     March 8, 2022         Please note that this dictation may have been completed with Alessia Menjivar, the computer voice recognition software. Quite often unanticipated grammatical, syntax, homophones, and other interpretive errors are inadvertently transcribed by the computer software. Please disregard these errors. Please excuse any errors that have escaped final proofreading.

## 2022-03-09 ENCOUNTER — HOME HEALTH ADMISSION (OUTPATIENT)
Dept: HOME HEALTH SERVICES | Facility: HOME HEALTH | Age: 67
End: 2022-03-09
Payer: COMMERCIAL

## 2022-03-09 LAB
ANION GAP SERPL CALC-SCNC: 3 MMOL/L (ref 5–15)
BUN SERPL-MCNC: 26 MG/DL (ref 6–20)
BUN/CREAT SERPL: 25 (ref 12–20)
CALCIUM SERPL-MCNC: 9.4 MG/DL (ref 8.5–10.1)
CHLORIDE SERPL-SCNC: 105 MMOL/L (ref 97–108)
CHOLEST SERPL-MCNC: 155 MG/DL
CK SERPL-CCNC: 261 U/L (ref 39–308)
CO2 SERPL-SCNC: 26 MMOL/L (ref 21–32)
CREAT SERPL-MCNC: 1.06 MG/DL (ref 0.7–1.3)
ERYTHROCYTE [DISTWIDTH] IN BLOOD BY AUTOMATED COUNT: 13.1 % (ref 11.5–14.5)
ERYTHROCYTE [SEDIMENTATION RATE] IN BLOOD: 47 MM/HR (ref 0–20)
EST. AVERAGE GLUCOSE BLD GHB EST-MCNC: ABNORMAL MG/DL
GLUCOSE BLD STRIP.AUTO-MCNC: 191 MG/DL (ref 65–117)
GLUCOSE BLD STRIP.AUTO-MCNC: 218 MG/DL (ref 65–117)
GLUCOSE BLD STRIP.AUTO-MCNC: 227 MG/DL (ref 65–117)
GLUCOSE BLD STRIP.AUTO-MCNC: 260 MG/DL (ref 65–117)
GLUCOSE SERPL-MCNC: 194 MG/DL (ref 65–100)
HBA1C MFR BLD: >14 % (ref 4–5.6)
HCT VFR BLD AUTO: 32.2 % (ref 36.6–50.3)
HDLC SERPL-MCNC: 88 MG/DL
HDLC SERPL: 1.8 {RATIO} (ref 0–5)
HGB BLD-MCNC: 10.4 G/DL (ref 12.1–17)
LDLC SERPL CALC-MCNC: 52.8 MG/DL (ref 0–100)
MAGNESIUM SERPL-MCNC: 2.1 MG/DL (ref 1.6–2.4)
MCH RBC QN AUTO: 27.7 PG (ref 26–34)
MCHC RBC AUTO-ENTMCNC: 32.3 G/DL (ref 30–36.5)
MCV RBC AUTO: 85.9 FL (ref 80–99)
NRBC # BLD: 0 K/UL (ref 0–0.01)
NRBC BLD-RTO: 0 PER 100 WBC
PHOSPHATE SERPL-MCNC: 3.8 MG/DL (ref 2.6–4.7)
PLATELET # BLD AUTO: 164 K/UL (ref 150–400)
PMV BLD AUTO: 10.2 FL (ref 8.9–12.9)
POTASSIUM SERPL-SCNC: 4.5 MMOL/L (ref 3.5–5.1)
RBC # BLD AUTO: 3.75 M/UL (ref 4.1–5.7)
SERVICE CMNT-IMP: ABNORMAL
SODIUM SERPL-SCNC: 134 MMOL/L (ref 136–145)
TRIGL SERPL-MCNC: 71 MG/DL (ref ?–150)
TROPONIN-HIGH SENSITIVITY: 12 NG/L (ref 0–76)
TSH SERPL DL<=0.05 MIU/L-ACNC: 1.3 UIU/ML (ref 0.36–3.74)
VLDLC SERPL CALC-MCNC: 14.2 MG/DL
WBC # BLD AUTO: 4.6 K/UL (ref 4.1–11.1)

## 2022-03-09 PROCEDURE — 97530 THERAPEUTIC ACTIVITIES: CPT

## 2022-03-09 PROCEDURE — 84484 ASSAY OF TROPONIN QUANT: CPT

## 2022-03-09 PROCEDURE — 74011636637 HC RX REV CODE- 636/637: Performed by: FAMILY MEDICINE

## 2022-03-09 PROCEDURE — 83036 HEMOGLOBIN GLYCOSYLATED A1C: CPT

## 2022-03-09 PROCEDURE — 74011250637 HC RX REV CODE- 250/637: Performed by: FAMILY MEDICINE

## 2022-03-09 PROCEDURE — 97165 OT EVAL LOW COMPLEX 30 MIN: CPT

## 2022-03-09 PROCEDURE — 85652 RBC SED RATE AUTOMATED: CPT

## 2022-03-09 PROCEDURE — 97535 SELF CARE MNGMENT TRAINING: CPT

## 2022-03-09 PROCEDURE — 84443 ASSAY THYROID STIM HORMONE: CPT

## 2022-03-09 PROCEDURE — 82962 GLUCOSE BLOOD TEST: CPT

## 2022-03-09 PROCEDURE — 94760 N-INVAS EAR/PLS OXIMETRY 1: CPT

## 2022-03-09 PROCEDURE — 99214 OFFICE O/P EST MOD 30 MIN: CPT | Performed by: INTERNAL MEDICINE

## 2022-03-09 PROCEDURE — 83735 ASSAY OF MAGNESIUM: CPT

## 2022-03-09 PROCEDURE — 80061 LIPID PANEL: CPT

## 2022-03-09 PROCEDURE — 80048 BASIC METABOLIC PNL TOTAL CA: CPT

## 2022-03-09 PROCEDURE — 85027 COMPLETE CBC AUTOMATED: CPT

## 2022-03-09 PROCEDURE — 74011250637 HC RX REV CODE- 250/637: Performed by: PSYCHIATRY & NEUROLOGY

## 2022-03-09 PROCEDURE — G0378 HOSPITAL OBSERVATION PER HR: HCPCS

## 2022-03-09 PROCEDURE — 36415 COLL VENOUS BLD VENIPUNCTURE: CPT

## 2022-03-09 PROCEDURE — 97162 PT EVAL MOD COMPLEX 30 MIN: CPT

## 2022-03-09 PROCEDURE — 84100 ASSAY OF PHOSPHORUS: CPT

## 2022-03-09 PROCEDURE — 82550 ASSAY OF CK (CPK): CPT

## 2022-03-09 PROCEDURE — 99223 1ST HOSP IP/OBS HIGH 75: CPT | Performed by: PSYCHIATRY & NEUROLOGY

## 2022-03-09 PROCEDURE — 97116 GAIT TRAINING THERAPY: CPT

## 2022-03-09 RX ORDER — CLOPIDOGREL BISULFATE 75 MG/1
300 TABLET ORAL ONCE
Status: COMPLETED | OUTPATIENT
Start: 2022-03-09 | End: 2022-03-09

## 2022-03-09 RX ORDER — CLOPIDOGREL BISULFATE 75 MG/1
75 TABLET ORAL DAILY
Status: DISCONTINUED | OUTPATIENT
Start: 2022-03-10 | End: 2022-03-12 | Stop reason: HOSPADM

## 2022-03-09 RX ORDER — INSULIN GLARGINE 100 [IU]/ML
40 INJECTION, SOLUTION SUBCUTANEOUS
Status: DISCONTINUED | OUTPATIENT
Start: 2022-03-09 | End: 2022-03-12 | Stop reason: HOSPADM

## 2022-03-09 RX ADMIN — CLOPIDOGREL BISULFATE 300 MG: 75 TABLET ORAL at 11:43

## 2022-03-09 RX ADMIN — Medication 3 UNITS: at 16:54

## 2022-03-09 RX ADMIN — Medication 3 UNITS: at 11:44

## 2022-03-09 RX ADMIN — ATORVASTATIN CALCIUM 20 MG: 20 TABLET, FILM COATED ORAL at 23:57

## 2022-03-09 RX ADMIN — Medication 3 UNITS: at 23:57

## 2022-03-09 RX ADMIN — ASPIRIN 81 MG: 81 TABLET, COATED ORAL at 08:48

## 2022-03-09 RX ADMIN — FAMOTIDINE 20 MG: 20 TABLET, FILM COATED ORAL at 23:57

## 2022-03-09 RX ADMIN — GUAIFENESIN 600 MG: 600 TABLET, EXTENDED RELEASE ORAL at 08:48

## 2022-03-09 RX ADMIN — INSULIN GLARGINE 40 UNITS: 100 INJECTION, SOLUTION SUBCUTANEOUS at 23:58

## 2022-03-09 RX ADMIN — GUAIFENESIN 600 MG: 600 TABLET, EXTENDED RELEASE ORAL at 23:57

## 2022-03-09 RX ADMIN — AMLODIPINE BESYLATE 5 MG: 5 TABLET ORAL at 08:49

## 2022-03-09 RX ADMIN — Medication 2 UNITS: at 08:48

## 2022-03-09 NOTE — PROGRESS NOTES
MRI update:     Received cardiology settings form faxed back from Dr. Johanna Anderson office, form was not completed and someone wrote \"Device not MR-Conditional\" across the top. However, device IS MR-conditional in normal mode on 1.5T magnet per [employer]. Called Dr. Johanna Anderson office to discuss and was disconnected,called back and currently on hold.

## 2022-03-09 NOTE — CONSULTS
Cardiology  Initial visit    Patient: Candida Angel MRN: [id number]  SSN: [id number]    YOB: 1955  Age: 74 y.o. Sex: male       Subjective:      Date of  Admission: 3/8/2022     Admission type: Emergency    Candida Angel is a 74 y.o. male admitted for Ataxia [R27.0]. Reason: CVA  Primary cardiologist: Dr. Marjorie Posada  Pt with h/o brugada s/p ICD for NSVT, HTN, cath 2019 no CAD, admit with neuro deficits concerning for acute CVA. Denies any exertional chest pain, shortness of breath. No palpitations, lightheaded/dizziness.      Primary Care Provider: Mona Story MD  Past Medical History:   Diagnosis Date    Abscess 12/2020    left side of abdomen    Hypertension     ICD (implantable cardioverter-defibrillator) in place     NSVT (nonsustained ventricular tachycardia) ([facility]) 11/21/2018    EPS 11/21/2018 VT/VF     Right groin pain 12/7/2020      Past Surgical History:   Procedure Laterality Date    COLONOSCOPY  1/2/2015         HX HEART CATHETERIZATION      HX HEENT      l cararact removal    HX IMPLANTABLE CARDIOVERTER DEFIBRILLATOR       Family History   Problem Relation Age of Onset    Diabetes Mother     Hypertension Mother     Stroke Mother       Social History     Tobacco Use    Smoking status: Never Smoker    Smokeless tobacco: Never Used   Substance Use Topics    Alcohol use: No      Current Facility-Administered Medications   Medication Dose Route Frequency    insulin glargine (LANTUS) injection 40 Units  40 Units SubCUTAneous ACB/HS    [START ON 3/10/2022] clopidogreL (PLAVIX) tablet 75 mg  75 mg Oral DAILY    amLODIPine (NORVASC) tablet 5 mg  5 mg Oral DAILY    aspirin delayed-release tablet 81 mg  81 mg Oral DAILY    atorvastatin (LIPITOR) tablet 20 mg  20 mg Oral QHS    acetaminophen (TYLENOL) tablet 650 mg  650 mg Oral Q4H PRN    Or    acetaminophen (TYLENOL) solution 650 mg  650 mg Per NG tube Q4H PRN    Or    acetaminophen (TYLENOL) suppository 650 mg  650 mg Rectal Q4H PRN    0.9% sodium chloride infusion  75 mL/hr IntraVENous CONTINUOUS    famotidine (PEPCID) tablet 20 mg  20 mg Oral Q24H    guaiFENesin ER (MUCINEX) tablet 600 mg  600 mg Oral Q12H    glucose chewable tablet 16 g  4 Tablet Oral PRN    dextrose 10 % infusion 0-250 mL  0-250 mL IntraVENous PRN    glucagon (GLUCAGEN) injection 1 mg  1 mg IntraMUSCular PRN    insulin lispro (HUMALOG) injection   SubCUTAneous AC&HS        No Known Allergies     Review of Systems:  A comprehensive review of systems was negative except for that written in the History of Present Illness. Subjective:     Visit Vitals  BP (!) 131/99   Pulse 68   Temp 97.8 °F (36.6 °C)   Resp 18   Ht 5' 7\" (1.702 m)   Wt 156 lb 12 oz (71.1 kg)   SpO2 98%   BMI 24.55 kg/m²        Physical Exam:  Visit Vitals  BP (!) 131/99   Pulse 68   Temp 97.8 °F (36.6 °C)   Resp 18   Ht 5' 7\" (1.702 m)   Wt 156 lb 12 oz (71.1 kg)   SpO2 98%   BMI 24.55 kg/m²     General Appearance:  Well developed, alert, appropriate, no acute distress. Ears/Nose/Mouth/Throat:   Hearing grossly normal.         Neck: Supple. No JVD   Chest:   Lungs clear to auscultation bilaterally. Cardiovascular:  Regular rate and rhythm, S1, S2 normal, no murmur. Abdomen:   Soft, non-tender, bowel sounds are active. Extremities: No edema bilaterally. Skin: Warm and dry. Cardiographics:  Telemetry: normal sinus rhythm  ECG: normal sinus rhythm    Data Reviewed: All lab results for the last 24 hours reviewed. Assessment/Plan:     CVA suspected. No active cardiac issues. Pt has ICD with single lead to ventricle so can't detect afib. No afib or significant arrhythmia on telemetry. Recommend outpt 1 month loop monitor with Dr. Rossi Boles; will contact them to set up outpt. No additional cardiac evaluation indicated at this time and will sign off.

## 2022-03-09 NOTE — PROGRESS NOTES
Occupational Therapy  03/09/22    Orders received, chart reviewed and patient evaluated by occupational therapy. Pending progression with skilled acute occupational therapy, recommend:  Occupational therapy at least 2 days/week in the home      Recommend with nursing ADLs with assist PRN, OOB to chair 3x/day and toileting via functional mobility to and from bathroom with 1 assist. Thank you for completing as able in order to maintain patient strength, endurance and independence. Full evaluation to follow.      Thank you,   Jaymie Conde, OTFLORENCE, OTR/L

## 2022-03-09 NOTE — PROGRESS NOTES
Problem: Self Care Deficits Care Plan (Adult)  Goal: *Acute Goals and Plan of Care (Insert Text)  Description: FUNCTIONAL STATUS PRIOR TO ADMISSION: Patient was independent and active without use of DME. Patient was independent for basic and instrumental ADLs, works full time as a . HOME SUPPORT: The patient lived with his significant other who he occasionally assisted. Occupational Therapy Goals  Initiated 3/9/2022  1. Patient will perform grooming at the sink with supervision/set-up within 7 day(s). 2.  Patient will perform bathing with supervision/set-up within 7 day(s). 3.  Patient will perform lower body dressing with modified independence within 7 day(s). 4.  Patient will perform toilet transfers with supervision/set-up within 7 day(s). 5.  Patient will perform all aspects of toileting with supervision/set-up within 7 day(s). 6.  Patient will participate in upper extremity therapeutic exercise/activities with supervision/set-up for 10 minutes within 7 day(s). 7.  Patient will utilize energy conservation techniques during functional activities with verbal and visual cues within 7 day(s). Outcome: Not Met       OCCUPATIONAL THERAPY EVALUATION  Patient: Dany Orantes (89 y.o. male)  Date: 3/9/2022  Primary Diagnosis: Ataxia [R27.0]        Precautions:   Fall,Contact    ASSESSMENT  Based on the objective data described below, the patient presents with standing balance, strength, activity tolerance, L hearing loss, and safety awareness s/p admission for CVA workup, CT negative, MRI pending. At baseline, patient IND with mobility and ADLs, works as a , and lives with his significant other. Patient now presents slightly below his baseline, requiring up to CGA for ADLs and bathroom mobility. Increased time for steadying with standing, stiffness reported in lower back and proximal posterior BLEs.  Educated on adaptive techniques for lower body ADLs with good carry over, would benefit from continued therapy services to maximize safety and functional independence. Recommend dc home with HHOT with assist PRN. Current Level of Function Impacting Discharge (ADLs/self-care): SPV - CGA for ADLs and mobility    Functional Outcome Measure: The patient scored Total: 65/100 on the Barthel Index outcome measure which is indicative of being minimally independent in basic self-care. Other factors to consider for discharge: PMH, PLOF, MRI pending     Patient will benefit from skilled therapy intervention to address the above noted impairments. PLAN :  Recommendations and Planned Interventions: self care training, functional mobility training, therapeutic exercise, balance training, therapeutic activities, endurance activities, neuromuscular re-education, patient education, home safety training, and family training/education    Frequency/Duration: Patient will be followed by occupational therapy 5 times a week to address goals. Recommendation for discharge: (in order for the patient to meet his/her long term goals)  Occupational therapy at least 2 days/week in the home     This discharge recommendation:  A follow-up discussion with the attending provider and/or case management is planned    IF patient discharges home will need the following DME: shower chair       SUBJECTIVE:   Patient stated Sometimes I forget to take care of myself and take care of her, running on adrenaline in the mornings.  re: patient reporting he assists significant other in the mornings, then notices stiffness in back & legs    OBJECTIVE DATA SUMMARY:   HISTORY:   Past Medical History:   Diagnosis Date    Abscess 12/2020    left side of abdomen    Hypertension     ICD (implantable cardioverter-defibrillator) in place     NSVT (nonsustained ventricular tachycardia) ([address] 11/21/2018    EPS 11/21/2018 VT/VF     Right groin pain 12/7/2020     Past Surgical History:   Procedure Laterality Date    COLONOSCOPY  1/2/2015 HX HEART CATHETERIZATION      HX HEENT      l cararact removal    HX IMPLANTABLE CARDIOVERTER DEFIBRILLATOR         Expanded or extensive additional review of patient history:     Home Situation  Home Environment: Apartment  # Steps to Enter: 0  One/Two Story Residence: One story  Living Alone: No  Support Systems: Friend/Neighbor  Patient Expects to be Discharged to[de-identified]  (home)  Current DME Used/Available at Home: Walker, rollator  Tub or Shower Type: Tub/Shower combination    Hand dominance: Right    EXAMINATION OF PERFORMANCE DEFICITS:  Cognitive/Behavioral Status:  Neurologic State: Alert  Orientation Level: Oriented X4  Cognition: Appropriate decision making; Appropriate for age attention/concentration; Follows commands;Poor safety awareness  Perception: Appears intact  Perseveration: No perseveration noted  Safety/Judgement: Awareness of environment;Decreased awareness of need for assistance;Decreased insight into deficits; Decreased awareness of need for safety    Skin: Appears intact    Edema: Very slight L dorsum swelling of the hand (patient reporting much improved over the past 2 days)    Hearing: Auditory  Auditory Impairment:  (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 hearing in L ear)    Vision/Perceptual:    Tracking: Able to track stimulus in all quadrants w/o difficulty                 Diplopia: No    Acuity:  (baseline retinopathy w/ decr acuity, req high contrast)    Corrective Lenses: Glasses    Range of Motion:  AROM: Within functional limits                         Strength:  Strength: Within functional limits                Coordination:  Coordination: Within functional limits  Fine Motor Skills-Upper: Left Intact; Right Intact    Gross Motor Skills-Upper: Left Intact; Right Intact    Tone & Sensation:  Tone: Normal  Sensation: Intact                      Balance:  Sitting: Intact  Standing: Impaired; Without support  Standing - Static: Good  Standing - Dynamic : Fair    Functional Mobility and Transfers for ADLs:  Bed Mobility:  Supine to Sit: Stand-by assistance  Scooting: Stand-by assistance    Transfers:  Sit to Stand: Stand-by assistance  Stand to Sit: Stand-by assistance  Bathroom Mobility: Contact guard assistance  Tub Transfer: Contact guard assistance (simulated)  Assistive Device : Gait Belt    ADL Assessment:  Feeding: Independent    Oral Facial Hygiene/Grooming: Contact guard assistance    Bathing: Contact guard assistance    Upper Body Dressing: Independent    Lower Body Dressing: Contact guard assistance    Toileting: Stand by assistance                ADL Intervention and task modifications:                 Lower Body Bathing  Lower Body : Compensatory technique training  Cues: Verbal cues provided;Visual cues provided         Lower Body Dressing Assistance  Dressing Assistance: Contact guard assistance  Pants With Elastic Waist: Contact guard assistance (simulated)  Socks: Supervision  Leg Crossed Method Used: No  Position Performed: Bending forward method;Seated edge of bed;Seated in chair;Standing  Cues: Visual cues provided;Verbal cues provided; Tactile cues provided    Toileting  Toileting Assistance: Stand-by assistance (simulated)  Bladder Hygiene: Stand-by assistance  Bowel Hygiene: Stand-by assistance  Clothing Management: Stand-by assistance  Cues: Verbal cues provided;Visual cues provided  Adaptive Equipment: Grab bars    Cognitive Retraining  Safety/Judgement: Awareness of environment;Decreased awareness of need for assistance;Decreased insight into deficits; Decreased awareness of need for safety    Functional Measure:    Barthel Index:  Bathin  Bladder: 10  Bowels: 10  Groomin  Dressin  Feeding: 10  Mobility: 10  Stairs: 0  Toilet Use: 5  Transfer (Bed to Chair and Back): 10  Total: 65/100      The Barthel ADL Index: Guidelines  1. The index should be used as a record of what a patient does, not as a record of what a patient could do.   2. The main aim is to establish degree of independence from any help, physical or verbal, however minor and for whatever reason. 3. The need for supervision renders the patient not independent. 4. A patient's performance should be established using the best available evidence. Asking the patient, friends/relatives and nurses are the usual sources, but direct observation and common sense are also important. However direct testing is not needed. 5. Usually the patient's performance over the preceding 24-48 hours is important, but occasionally longer periods will be relevant. 6. Middle categories imply that the patient supplies over 50 per cent of the effort. 7. Use of aids to be independent is allowed. Score Interpretation (from [address] [facility])    Independent   60-79 Minimally independent   40-59 Partially dependent   20-39 Very dependent   <20 Totally dependent     -Murray Vasquez., Barthel, D.W. (1965). Functional evaluation: the Barthel Index. [facility] ([address] (1997). The Barthel activities of daily living index: self-reporting versus actual performance in the old (> or = 75 years). Journal [address] 45(7), 14 [facility], J.JAMAPROF, Ashley Gardiner., Ana [facility]. (1999). Measuring the change in disability after inpatient rehabilitation; comparison of the responsiveness of the Barthel Index and Functional Oxford Measure. Journal of Neurology, Neurosurgery, and Psychiatry, 66(4), 994-332. Junaid Recinos, NAlexJ.KIERA, SHER Bonner, & Katerina Jeffries, M.A. (2004) Assessment of post-stroke quality of life in cost-effectiveness studies: The usefulness of the Barthel Index and the EuroQoL-5D.  Quality of Life Research, 15, 357-00     Occupational Therapy Evaluation Charge Determination   History Examination Decision-Making   LOW Complexity : Brief history review  LOW Complexity : 1-3 performance deficits relating to physical, cognitive , or psychosocial skils that result in activity limitations and / or participation restrictions  LOW Complexity : No comorbidities that affect functional and no verbal or physical assistance needed to complete eval tasks       Based on the above components, the patient evaluation is determined to be of the following complexity level: LOW   Pain Rating:  Low back and poserior BLE stiffness (proximal) reported    Activity Tolerance:   Good    After treatment patient left in no apparent distress:    Sitting in chair, Call bell within reach, and Bed / chair alarm activated    COMMUNICATION/EDUCATION:   The patients plan of care was discussed with: Physical therapist and Registered nurse. Home safety education was provided and the patient/caregiver indicated understanding., Patient/family have participated as able in goal setting and plan of care. , and Patient/family agree to work toward stated goals and plan of care. This patients plan of care is appropriate for delegation to [facility].     Thank you for this referral.  SJ Fonseca, OTR/L  Time Calculation: 28 mins

## 2022-03-09 NOTE — PROGRESS NOTES
Defibrillator MR Conditional. Sent Cardiology order form to Dr. Barry Light office with confirmation it was received.

## 2022-03-09 NOTE — PROGRESS NOTES
800:    Bedside shift change report given to Marce (oncoming nurse) by Brandy Dawn (offgoing nurse). Report included the following information SBAR, Kardex, Intake/Output, MAR and Recent Results.

## 2022-03-09 NOTE — DIABETES MGMT
[facility]    CLINICAL NURSE SPECIALIST CONSULT     Initial Presentation   Estrellita Pack is a 74 y.o. male admitted  with c/o s/s of stroke consistent with ataxia with feelings of being \"off balance\" and decreased hearing in left ear. Code S was called in ED. HX:   Past Medical History:   Diagnosis Date    Abscess 12/2020    left side of abdomen    Hypertension     ICD (implantable cardioverter-defibrillator) in place     NSVT (nonsustained ventricular tachycardia) ([address] 11/21/2018    EPS 11/21/2018 VT/VF     Right groin pain 12/7/2020        INITIAL DX:   Ataxia [R27.0]     Current Treatment     TX: CT/CTA scan- CT no significant abnormalities/ CTA no acute large vessel occlusion/stenosis/ ECHO-WNL    Consulted by Provider for advanced diabetes nursing assessment and care for:   [x] Home management assessment  [x] Survival skill education    Hospital Course   Clinical progress has been complicated by DM2, uncontrolled on admission with A1C >14%. .     Diabetes History   DM2 who is followed by Dr. Perla Carvajal for diabetes management-last office visit 1/2021. Current A1C this admission, >14%. Since 2015, A1C has been above goal with lowest A1C 8.0 in 2018.  Per hospitalist note patient had not been taking his meds for 6weeks due to low finances (cost related non-adherence)    Diabetes-related Medical History  Acute complications  hyperglycemia  Neurological complications  Peripheral neuropathy  Microvascular disease  NONE  Macrovascular disease  NONE  Other associated conditions     HTN / s/p ICD / past abdominal abscess    Diabetes Medication History  Key Antihyperglycemic Medications             Levemir FlexTouch U-100 Insuln 100 unit/mL (3 mL) inpn INJECT 30 UNITS UNDER SKIN EVERY MORNING AND AT BEDTIME    metFORMIN (GLUCOPHAGE) 1,000 mg tablet TAKE 1 TABLET BY MOUTH IN THE EVENING BEFORE DINNER    Trulicity 1.5 ED/1.0 mL sub-q pen INJECT 0.5 ML UNDER SKIN EVERY SEVEN (7) DAYS. Key CAD CHF Meds             amLODIPine (NORVASC) 10 mg tablet TAKE ONE TABLET BY MOUTH DAILY    atorvastatin (LIPITOR) 20 mg tablet TAKE ONE TABLET BY MOUTH DAILY    aspirin delayed-release 81 mg tablet Take 81 mg by mouth daily. *Lipid profile on admission WNL**     Diabetes self-management practices:   Eating pattern- \"I need to stay away from the potatoes! \"  Mostly drinks water/ diet pepsis/ hot/cold teas with equal/sweet and low    Physical activity pattern-on feet at work, no other exercise discussed     Monitoring pattern-not as consistent with checking-verbalized was running low on supplies     Taking medications pattern-   [x] IN -Consistent administration-2/2 cost related non adherence. [x] Levemir insuiln NO T Affordable- was splitting doses in half to [employer] it out\"  Social determinants of health impacting diabetes self-management practices   Concerned that you need to know more about how to stay healthy with diabetes  Overall evaluation:    [x] NOT Achieving A1c target with drug therapy & self-care practices    Subjective   I could not afford the $99/mo for levemir plus all my other meds\"  Verbalized his pharmacy was only giving him 1 insulin pen/mo and he was paying $99 monthly when he should've been getting 2 pens for that price. He now has recently \"gotten it straight\" and should be getting 2 Levemir PENS monthly for $99.   He admits to not following a consistent diabetic diet/ and routinely checking his BG. Continues to work full time @ [employer] in [employer]. Objective   Physical exam  General Normal weight male in no acute distress. Conversant and cooperative  Neuro  Alert, oriented   Vital Signs   Visit Vitals  /85 (BP 1 Location: Left upper arm, BP Patient Position: Sitting)   Pulse 67   Temp 98 °F (36.7 °C)   Resp 12   Ht 5' 7\" (1.702 m)   Wt 71.1 kg (156 lb 12 oz)   SpO2 96%   BMI 24.55 kg/m²     Skin  Warm and dry. Heart   Regular rate and rhythm.  No murmurs, rubs or gallops  Lungs  Clear to auscultation without rales or rhonchi  Extremities No foot wounds; old scabs noted on right lower shin from a 'bump in the BR'     Diabetic foot exam: bilateral overgrown toenails    Left Foot- +1 pitting edema in ankles, +2 pitting edema in feet     Visual Exam: callous - heels   Pulse DP: 2+ (normal)   Filament test: reduced sensation      Right Foot-+1 pitting edema in ankles, +2 pitting edema in feet   Visual Exam: callous - heels   Pulse DP: 2+ (normal)   Filament test: reduced sensation     DP & PT pulses +2. Laboratory  Recent Labs     03/09/22  1210 03/09/22  0355 03/08/22  1346   *  --  335*   AGAP 3*  --  7   TRIGL  --  71  --    WBC  --  4.6 5.6   CREA 1.06  --  1.43*   GFRNA >60  --  49*   AST  --   --  34   ALT  --   --  46       Factors impacting BG management  Factor Dose Comments   Nutrition:  Standard meals     60 grams/meal      Other:   Kidney function  Liver function     WNL  WNL      Blood glucose pattern      Significant diabetes-related events over the past 24-72 hours  A1C >14% (2/2 cost related non-adherence)  Admitting BG >300 on 3/8/22.    3/9/22:  , starting on basal insulin per PTA dose tonight     Assessment and Plan   Nursing Diagnosis Risk for unstable blood glucose pattern   Nursing Intervention Domain 5250 Decision-making Support   Nursing Interventions Examined current inpatient diabetes/blood glucose control   Explored factors facilitating and impeding inpatient management  Explored corrective strategies with patient and responsible inpatient provider   Informed patient of rational for insulin strategy while hospitalized     Nursing Diagnosis 98978 Ineffective Health Management   Nursing Intervention Domain 5250 Decision-makingSupport   Nursing Interventions Identified diabetes self-management practices impeding diabetes control  Discussed diabetes survival skills related to  1.  Healthy Plate eating plan; given handouts  2. Role of physical activity in improving insulin sensitivity and action  3. Procedure for blood glucose monitoring & options for low-cost products available from [facility]   4. Medications plan at discharge     Evaluation   This is a 63yo AA male well known to our service from 2 previous admissions in 2020 and 2021 for diabetes related complications. Presented for this admission with c/o stroke like symptoms of ataxia and reduced hearing in left ear. CT/CTA ruled out CVA for occlusion/hemmorage. A1C on admission >14%,  Discussed his diabetes management at length and he relayed concerns over affording his Levemir insulin monthly- he had been 'stretching it out \" and then ran out for past 6-8weeks. Verbalizes still taking Trulicity and Metformin. His diet is not what is should be-as he consumes 'too many potatoes'. He also verbalizes inconsistent BG monitoring. He is aware that he needs to be more disciplined and realizes he is at greater risk for major complications -. He has a follow up appointment with PCP set for this Friday, March 11th. He recently was able to get his 2 Levemir Pens for $99 but stated \"im going to have to hold back my money for the next month for those pens\". There is a mix of cost related non-adherence and lack of discipline with his diabetes management and he is aware of this. I discussed that [address] [place] [address] has their own insulin that is much cheaper-Novolin N (Relion brand) Pens are $42.80/ 5 pens. He had his last opthamology appointment 12/2021  He is in great need of podiatry care-      Recommendations     [x] Use of Subcutaneous Insulin Order set (1472)  Insulin Dosing Specific recommendation   START Basal                                      (Based on weight, BMI & GFR) Lantus 40 units BID    CONTINUE Corrective                     (Useful in adjusting insulin dosing) [x] Normal sensitivity      2. Referral to podiatrist ASAP. Discharge Planning   1.  Would offer SCRIPT for Novolin N PEN (NPH)-Relion 30 units BID-as this would be more affordable for him and will allow for improved compliance. 2. Needs glucometer strips- Uses Relion meter. 3. Has follow up appointment for this Friday, March 11th. @1130    4. On Discharge, please place an outpatient order for \"diabetes education\" (enter as REF20). This will trigger a referral for the Program for Diabetes Health which includes outpatient diabetes self management training with a certified diabetes educator. Billing Code(s)   35196  49979  Before making these care recommendations, I personally reviewed the hospitalization record, including notes, laboratory & diagnostic data and current medications, and examined the patient at the bedside (circumstances permitting) before making care recommendations. More than fifty (50) percent of the time was spent in patient counseling and/or care coordination.   Total minutes: 23 MARTINE Stearns  Diabetes Clinical Nurse Specialist  [facility]  Access via datatracker

## 2022-03-09 NOTE — PROGRESS NOTES
[facility]                                                                                          Hospitalist Progress Note  Esdras Sandoval MD  Answering service: [phone] from in house phone        Date of Service:  3/9/2022  NAME:  Loren Fontanez  :  1955  MRN:  [id number]      Admission Summary:   Loren Fontanez is a 74 y.o. male who presents with ataxia     Patient reports that he woke up this morning and had onset of ataxia, patient reports that he went to bed last night without any complaints, patient reports that he felt off balance, also had decreased hearing in his left ear, and felt that his ear was \"plugged up\".   Patient came to the ER as a code stroke, patient was requested to be admitted to the hospital service, patient denies any other complaints or problems, patient denies taking any new medications or drinking alcohol in the last 7 days       Interval history / Subjective:   Pt reports hearing impairment left ear , unsteady gait while walked today with PT  Pt reports not bee taking diabetic meds for 56 weeks due to low finances     Assessment & Plan:     Ataxia, Decreased hearing  -CT head negative, CTA show no large vessel occlusion  -f/u MRI, pt has AICD check for compatibility   - Echo ,prior echo with R to L shunt   -rule out acute CVA  -LDL 52, hga1c 14  -optomize diabetes control   -resume home asa,statin  -neuro  Consult appreciated prior echo with R to L shunt ,in case of stroke pt may need [address]  -PT/OT/ST  -fall precautions      Diabetes mellitus type 2 with hyperglycemia  -Uncontrolled, hga1c >14  -current regimin noted ,levimir 94MCFF, trulicity 8.8WE,Y week, metformin 1000mgha  -pt reports non compliance x 6 weeks due to low finances  -hold metformin for now   -Increase levimir to 40 units bid  -accu checks ac qhs appear improving on SSI   -diabetic teaching    Hypertension  -optomize BP control, hold antihypertensive,until MRI results    APARNA  -in setting of hyperglycemia   -IVF hydration  -renal US   -avoid nephrotoxic meds     H/o Non sustained VT AICD  -tele monitoring  -NSR  -with acute stroke,suspect embolic etiology  -consult card     Code status: full  Prophylaxis:   Care Plan discussed with:   Anticipated Disposition:      Hospital Problems  Date Reviewed: 1/29/2021          Codes Class Noted POA    Ataxia ICD-10-CM: R27.0  ICD-9-CM: 781.3  3/8/2022 Unknown                Review of Systems:   Pertinent items are noted in HPI. Vital Signs:    Last 24hrs VS reviewed since prior progress note. Most recent are:  Visit Vitals  /75 (BP 1 Location: Left upper arm, BP Patient Position: Supine)   Pulse 70   Temp 97.7 °F (36.5 °C)   Resp 16   Ht 5' 7\" (1.702 m)   Wt 71.1 kg (156 lb 12 oz)   SpO2 99%   BMI 24.55 kg/m²         Intake/Output Summary (Last 24 hours) at 3/9/2022 9095  Last data filed at 3/9/2022 7460  Gross per 24 hour   Intake --   Output 950 ml   Net -950 ml        Physical Examination:     I had a face to face encounter with this patient and independently examined them on 3/9/2022 as outlined below:          Constitutional:  No acute distress, cooperative, pleasant    ENT:  Oral mucosa moist, oropharynx benign. Resp:  CTA bilaterally. No wheezing/rhonchi/rales. No accessory muscle use. CV:  Regular rhythm, normal rate, no murmurs, gallops, rubs    GI:  Soft, non distended, non tender. normoactive bowel sounds, no hepatosplenomegaly     Musculoskeletal:  No edema, warm, 2+ pulses throughout    Neurologic:  Moves all extremities.   AAOx3, CN II-XII reviewed            Data Review:    Review and/or order of clinical lab test  Review and/or order of tests in the radiology section of CPT  Review and/or order of tests in the medicine section of CPT      Labs:     Recent Labs     03/09/22  0355 03/08/22  1346   WBC 4.6 5.6   HGB 10.4* 12.6   HCT 32.2* 38.4    165     Recent Labs     03/09/22  0355 03/08/22  1346   NA  --  135*   K  --  4.4   CL  --  103   CO2  --  25   BUN  --  32*   CREA  --  1.43*   GLU  --  335*   CA  --  9.7   MG 2.1  --    PHOS 3.8  --      Recent Labs     03/08/22  1346   ALT 46      TBILI 0.4   TP 7.8   ALB 3.8   GLOB 4.0     No results for input(s): INR, PTP, APTT, INREXT in the last 72 hours. No results for input(s): FE, TIBC, PSAT, FERR in the last 72 hours. No results found for: FOL, RBCF   No results for input(s): PH, PCO2, PO2 in the last 72 hours.   Recent Labs     03/09/22  0359 03/08/22  1842    398*     Lab Results   Component Value Date/Time    Cholesterol, total 155 03/09/2022 03:55 AM    HDL Cholesterol 88 03/09/2022 03:55 AM    LDL, calculated 52.8 03/09/2022 03:55 AM    Triglyceride 71 03/09/2022 03:55 AM    CHOL/HDL Ratio 1.8 03/09/2022 03:55 AM     Lab Results   Component Value Date/Time    Glucose (POC) 191 (H) 03/09/2022 06:23 AM    Glucose (POC) 271 (H) 03/08/2022 09:12 PM    Glucose (POC) 229 (H) 01/11/2021 05:03 PM    Glucose (POC) 187 (H) 01/11/2021 11:15 AM    Glucose (POC) 109 (H) 01/11/2021 07:25 AM     Lab Results   Component Value Date/Time    Color YELLOW/STRAW 07/14/2020 10:27 AM    Appearance CLEAR 07/14/2020 10:27 AM    Specific gravity 1.014 07/14/2020 10:27 AM    pH (UA) 6.0 07/14/2020 10:27 AM    Protein 30 (A) 07/14/2020 10:27 AM    Glucose Negative 07/14/2020 10:27 AM    Ketone Negative 07/14/2020 10:27 AM    Bilirubin Negative 07/14/2020 10:27 AM    Urobilinogen 0.2 07/14/2020 10:27 AM    Nitrites Negative 07/14/2020 10:27 AM    Leukocyte Esterase Negative 07/14/2020 10:27 AM    Epithelial cells FEW 07/14/2020 10:27 AM    Bacteria Negative 07/14/2020 10:27 AM    WBC 0-4 07/14/2020 10:27 AM    RBC 0-5 07/14/2020 10:27 AM         Medications Reviewed:     Current Facility-Administered Medications   Medication Dose Route Frequency    amLODIPine (NORVASC) tablet 5 mg  5 mg Oral DAILY    aspirin delayed-release tablet 81 mg  81 mg Oral DAILY    atorvastatin (LIPITOR) tablet 20 mg  20 mg Oral QHS    acetaminophen (TYLENOL) tablet 650 mg  650 mg Oral Q4H PRN    Or    acetaminophen (TYLENOL) solution 650 mg  650 mg Per NG tube Q4H PRN    Or    acetaminophen (TYLENOL) suppository 650 mg  650 mg Rectal Q4H PRN    0.9% sodium chloride infusion  75 mL/hr IntraVENous CONTINUOUS    famotidine (PEPCID) tablet 20 mg  20 mg Oral Q24H    guaiFENesin ER (MUCINEX) tablet 600 mg  600 mg Oral Q12H    glucose chewable tablet 16 g  4 Tablet Oral PRN    dextrose 10 % infusion 0-250 mL  0-250 mL IntraVENous PRN    glucagon (GLUCAGEN) injection 1 mg  1 mg IntraMUSCular PRN    insulin lispro (HUMALOG) injection   SubCUTAneous AC&HS     ______________________________________________________________________  EXPECTED LENGTH OF STAY: - - -  ACTUAL LENGTH OF STAY:          0                 Padmini Stephenson MD

## 2022-03-09 NOTE — CONSULTS
[address]    Name:  Alexys Moeller  MR#:  [id number]  :  1955  ACCOUNT #:  [de-identified]  DATE OF SERVICE:  2022    REQUESTING PHYSICIAN:  Dr. Pawel Alicia. REASON FOR EVALUATION:  Possible stroke. HISTORY OF PRESENT ILLNESS:  The patient is a 18-year-old male with past medical history of hypertension, diabetes, who woke up yesterday morning and felt that he could not hear very well from his left ear. He was also somewhat unsteady on his feet and had a very cautious gait. When symptoms did not improve after he got to work, his coworkers got concerned and 911 was called. He was brought in and admitted for further workup. Gait problems still persist.  Denies any problems with vision, speech or swallowing. No chest pain, shortness of breath, palpitations, nausea or vomiting. PAST MEDICAL HISTORY:  As mentioned above. HOME MEDICATIONS:  1. Metformin. 2.  Trulicity. 3.  Amlodipine. 4.  Levemir. 5.  Atorvastatin. 6.  Aspirin 81 mg daily. ALLERGIES:  NONE. SOCIAL HISTORY:  No smoking. No alcohol use, still actively working. FAMILY HISTORY:  Positive for diabetes, hypertension and stroke in mother. PHYSICAL EXAMINATION:  GENERAL:  The patient is alert, fully oriented. VITAL SIGNS:  Blood pressure 125/85, temperature 97.7, pulse is 71. HEART:  Regular rate and rhythm. CHEST:  Clear. ABDOMEN:  Soft, nontender. Positive bowel sounds. EXTREMITIES:  No edema. NEUROLOGIC:  Speech is clear. Comprehension is normal.  Pupils are equal, round and reactive. Extraocular movements are full. Face is symmetric. Hearing is reduced in the left ear compared to the right. Muscle tone and bulk normal.  Strength normal in both upper and lower extremities. Mild finger-nose-finger dysmetria noted in the left upper extremity. Gait is unsteady. LABORATORY DATA:  CBC with WBC 4.6, hemoglobin 10.4, hematocrit 32.2, platelets 001.   Chemistry, sodium 135, potassium 4.4, BUN 32, creatinine 1.43. Lipid profile, triglycerides 71, LDL 52.8. Hemoglobin A1c is greater than 14. CT of the head and neck is unremarkable. Echocardiogram done in 2019 showed late bubbles within the left atrium with bubble study consistent with right-to-left shunting at the pulmonary level. ASSESSMENT AND PLAN:  68-year-old male with poorly controlled diabetes presenting with decreased hearing in the left ear, incoordination of the left hand and unsteadiness of gait. Suspect that he has had a small lacunar infarct in the posterior circulation/possibly in the medullary region. Await MRI scan of the brain. Recommend dual antiplatelet therapy for now. We will load him with Plavix in addition to aspirin. His previous echocardiogram has shown intrapulmonary right to left shunt and we may need to consider anticoagulation if we see any embolic phenomenon. Physical Therapy/Occupational Therapy evaluation. We will follow. Thank you for this consultation.       Paige Santana MD      [id number]  D:  03/09/2022 11:35  T:  03/09/2022 12:52  JOB #:  [id number]

## 2022-03-09 NOTE — PROGRESS NOTES
Chart reviewed. Head CT clear, no fluid. Likely central issue, mri pending. Pt needs in office evaluation with audio testing. Recommend f/u with us in office immediately after discharge. We can also set up vestibular evaluation in office with possible PT if needed.

## 2022-03-09 NOTE — PROGRESS NOTES
Problem: Diabetes Self-Management  Goal: *Disease process and treatment process  Description: Define diabetes and identify own type of diabetes; list 3 options for treating diabetes. Outcome: Progressing Towards Goal  Goal: *Incorporating nutritional management into lifestyle  Description: Describe effect of type, amount and timing of food on blood glucose; list 3 methods for planning meals. Outcome: Progressing Towards Goal  Goal: *Incorporating physical activity into lifestyle  Description: State effect of exercise on blood glucose levels. Outcome: Progressing Towards Goal  Goal: *Developing strategies to promote health/change behavior  Description: Define the ABC's of diabetes; identify appropriate screenings, schedule and personal plan for screenings. Outcome: Progressing Towards Goal  Goal: *Using medications safely  Description: State effect of diabetes medications on diabetes; name diabetes medication taking, action and side effects. Outcome: Progressing Towards Goal  Goal: *Monitoring blood glucose, interpreting and using results  Description: Identify recommended blood glucose targets  and personal targets. Outcome: Progressing Towards Goal  Goal: *Prevention, detection, treatment of acute complications  Description: List symptoms of hyper- and hypoglycemia; describe how to treat low blood sugar and actions for lowering  high blood glucose level. Outcome: Progressing Towards Goal  Goal: *Prevention, detection and treatment of chronic complications  Description: Define the natural course of diabetes and describe the relationship of blood glucose levels to long term complications of diabetes.   Outcome: Progressing Towards Goal  Goal: *Developing strategies to address psychosocial issues  Description: Describe feelings about living with diabetes; identify support needed and support network  Outcome: Progressing Towards Goal  Goal: *Insulin pump training  Outcome: Progressing Towards Goal  Goal: *Sick day guidelines  Outcome: Progressing Towards Goal     Problem: Falls - Risk of  Goal: *Absence of Falls  Description: Document Darlen Merrill Fall Risk and appropriate interventions in the flowsheet.   Outcome: Progressing Towards Goal  Note: Fall Risk Interventions:  Mobility Interventions: Assess mobility with egress test,Communicate number of staff needed for ambulation/transfer,Patient to call before getting OOB         Medication Interventions: Patient to call before getting OOB,Teach patient to arise slowly,Assess postural VS orthostatic hypotension    Elimination Interventions: Call light in reach,Urinal in reach,Patient to call for help with toileting needs              Problem: TIA/CVA Stroke: 0-24 hours  Goal: Activity/Safety  Outcome: Progressing Towards Goal  Goal: Consults, if ordered  Outcome: Progressing Towards Goal  Goal: Diagnostic Test/Procedures  Outcome: Progressing Towards Goal  Goal: Nutrition/Diet  Outcome: Progressing Towards Goal  Goal: Discharge Planning  Outcome: Progressing Towards Goal  Goal: Medications  Outcome: Progressing Towards Goal  Goal: Respiratory  Outcome: Progressing Towards Goal  Goal: Treatments/Interventions/Procedures  Outcome: Progressing Towards Goal  Goal: Minimize risk of bleeding post-thrombolytic infusion  Outcome: Progressing Towards Goal  Goal: Monitor for complications post-thrombolytic infusion  Outcome: Progressing Towards Goal  Goal: Psychosocial  Outcome: Progressing Towards Goal  Goal: *Hemodynamically stable  Outcome: Progressing Towards Goal  Goal: *Neurologically stable  Description: Absence of additional neurological deficits    Outcome: Progressing Towards Goal  Goal: *Verbalizes anxiety and depression are reduced or absent  Outcome: Progressing Towards Goal  Goal: *Absence of Signs of Aspiration on Current Diet  Outcome: Progressing Towards Goal  Goal: *Absence of deep venous thrombosis signs and symptoms(Stroke Metric)  Outcome: Progressing Towards Goal  Goal: *Ability to perform ADLs and demonstrates progressive mobility and function  Outcome: Progressing Towards Goal  Goal: *Stroke education started(Stroke Metric)  Outcome: Progressing Towards Goal  Goal: *Dysphagia screen performed(Stroke Metric)  Outcome: Progressing Towards Goal  Goal: *Rehab consulted(Stroke Metric)  Outcome: Progressing Towards Goal

## 2022-03-09 NOTE — PROGRESS NOTES
SLP Contact Note    Consult received and appreciated. Patient chart reviewed. CT negative for acute infarct. Pt passed swallow screen. Will await MRI and can complete evaluation if indicated.     Thank you,  ALBIN SantillanEd, [address] [facility]  Speech-Language Pathologist

## 2022-03-09 NOTE — PROGRESS NOTES
MRI UPDATE:    Called [address] and informed floor nurse: ICD is conditional for MRI and cardiology order/MRI settings received. Plan to do MRI 3/10/22 @ 6402.      Eduardo Friedman RN  [facility]

## 2022-03-09 NOTE — CONSULTS
Consult dictated. 53-year-old with poorly controlled diabetes, presenting with decreased hearing in the left ear, incoordination of the left hand and unsteady gait. Suspect he has had a small lacunar infarct in the posterior circulation/possibly in the medullary region. Await MRI. DAPT for now. His previous echocardiogram has shown intrapulmonary right-to-left shunt and we may need to consider anticoagulation if we see any embolic phenomena.   Karely Castillo MD

## 2022-03-09 NOTE — PROGRESS NOTES
Transition of Care  1. RUR n/a   2. Disposition     Lives in home with friend Jasmin Stage with [address]   3. Transportation  [employer]   4. Follow Up PCP/Specialist     CM sent ref for home health to [address] [place] [address] [address] ref# [id number] per PT/OT recommendations     CM informed patient Mr. Boyle of [facility] acceptance.

## 2022-03-09 NOTE — PROGRESS NOTES
Transition of Care  1. RUR n/a  2. Disposition    Home w/home health [place] with friend Bridgette Haas   3. Transportation  uber   4. Follow Up PCP/Specialist     Reason for Admission:  Peripheral Edema and Gait Problem                        RUR Score:  n/a                    Plan for utilizing home health:    CM to send referral requests for Home Health  per PT/OT recommendations     PCP: First and Last name:  Ozella Cogan, MD     Name of Practice: Internal Medicine Specialist    Are you a current patient: Yes/No: Yes    Approximate date of last visit: November 2021    Can you participate in a virtual visit with your PCP: n/a                     Current Advanced Directive/Advance Care Plan: Full Code      Healthcare Decision Maker:   Click here to complete [address] including selection of the Healthcare Decision Maker Relationship (ie \"Primary\")                             Transition of Care Plan:                      CM spoke with patient to verify demographics, emergency contact, insurance, PCP. Living situation: Home w/friend Kandace/ Single level dwelling. No steps to enter home or inside home   ADL's: Independent   DME:  No medical equipment used   Pharmacy:  [facility] [address]   Discharge transportation:uber    Rehab history: Patient had in home wound care for approx. 1 month after surgery at [facility]. Care Management Interventions  PCP Verified by CM: Yes  Mode of Transport at Discharge:  Other (see comment) Gurvinder Verduzco)  Transition of Care Consult (CM Consult): Home Health (home with home health )  Physical Therapy Consult: Yes  Occupational Therapy Consult: Yes  Speech Therapy Consult: Yes  Support Systems: Friend/Neighbor  Confirm Follow Up Transport: Other (see comment) Gurvinder Verduzco)  Discharge Location  Patient Expects to be Discharged to[de-identified]  (home)     Medicare Outpatient Observation Notice (MOON)/ [place] Outpatient Observation Notice (Lawson Pallas) provided to patient/representative with verbal explanation of the notice. Time allotted for questions regarding the notice. Patient representative provided a completed copy of the MOON/VOON notice. Copy placed on bedside chart. CM spoke with patient about PT/OT recommendation for Home Health. Patient agrees with Home Health Recommendation. CM will send referrals for Home Health acceptance. Transition of Care Plan:    The Plan for Transition of Care is related to the following treatment goals: The Patient  was provided with a choice of provider and agrees  with the discharge plan. Yes  No    A Freedom of choice list was provided with basic dialogue that supports the patient's individualized plan of care/goals and shares the quality data associated with the providers. Yes  No    CM sent ref for home health to [address] [place] [address] ref# [id number] per PT/OT recommendations     CM informed patient Mr. Boyle of [facility] acceptance. CM to monitor for any needs.       Bethany Biggs RN

## 2022-03-09 NOTE — PROGRESS NOTES
Problem: Mobility Impaired (Adult and Pediatric)  Goal: *Acute Goals and Plan of Care (Insert Text)  Description:   FUNCTIONAL STATUS PRIOR TO ADMISSION: Patient was independent and active without use of DME. Pt working full time as a  at [employer]: The patient lived with spouse but did not require assist.    Physical Therapy Goals  Initiated 3/9/2022  1. Patient will move from supine to sit and sit to supine  in bed with modified independence within 7 day(s). 2.  Patient will transfer from bed to chair and chair to bed with modified independence using the least restrictive device within 7 day(s). 3.  Patient will perform sit to stand with modified independence within 7 day(s). 4.  Patient will ambulate with modified independence for 300 feet with the least restrictive device within 7 day(s). 5.  Patient will improve Lyles Balance score by 7 points within 7 days. Outcome: Progressing Towards Goal   PHYSICAL THERAPY EVALUATION- NEURO POPULATION  Patient: Dany Orantes (6 y.o. male)  Date: 3/9/2022  Primary Diagnosis: Ataxia [R27.0]        Precautions:   Fall,Contact      ASSESSMENT  Based on the objective data described below, the patient presents with impaired balance and gait, reports of low back pain/stiffness with radiating symptoms posterior LEs s/p admission on 3/8 with impaired walking, dizziness, BLE edema, and impaired L side hearing. Head CT: negative for acute infarct, Brain MRI: pending. Pt received supine in bed and agreeable to therapy. Pt reported BLE edema has improved significantly. Pt did report having ~10 days of back pain with LE stiffness and radiating symptoms. Pt tolerated gait training x 200 ft with CGA demonstrating antalgic gait pattern, wide HARVEY and lateral trunk sway. Pt reaching out for objects for support initially, but improved as gait progressed. Pt with decreased anterior/posterior pelvic tilting.  Pt remained seated in the chair with all needs met. Patient may benefit from further imaging of spine with reports of impaired balance, gait, and pain with radiating symptoms down bilateral LEs. Pt also reported having a fall in the shower recently. Recommend HHPT upon discharge pending progress. .    Current Level of Function Impacting Discharge (mobility/balance): CGA gait training x 200 ft    Functional Outcome Measure: The patient scored Total: 53/56 on the Lyles Balance Assessment which is indicative of low fall risk. Other factors to consider for discharge: previously independent and active, working full time     Patient will benefit from skilled therapy intervention to address the above noted impairments. PLAN :  Recommendations and Planned Interventions: bed mobility training, transfer training, gait training, therapeutic exercises, neuromuscular re-education, patient and family training/education, and therapeutic activities      Frequency/Duration: Patient will be followed by physical therapy:  5 times a week to address goals. Recommendation for discharge: (in order for the patient to meet his/her long term goals)  Physical therapy at least 2 days/week in the home     This discharge recommendation:  Has been made in collaboration with the attending provider and/or case management    IF patient discharges home will need the following DME: none         SUBJECTIVE:   Patient stated I'm impressed the swelling in my legs have gone down so much. My back has been feeling really stiff and it goes down the back of my legs.     OBJECTIVE DATA SUMMARY:   HISTORY:    Past Medical History:   Diagnosis Date    Abscess 12/2020    left side of abdomen    Hypertension     ICD (implantable cardioverter-defibrillator) in place     NSVT (nonsustained ventricular tachycardia) ([facility] [address] 11/21/2018    EPS 11/21/2018 VT/VF     Right groin pain 12/7/2020     Past Surgical History:   Procedure Laterality Date    COLONOSCOPY  1/2/2015 HX HEART CATHETERIZATION      HX HEENT      l cararact removal    HX IMPLANTABLE CARDIOVERTER DEFIBRILLATOR         Personal factors and/or comorbidities impacting plan of care:     Home Situation  Home Environment: Apartment  # Steps to Enter: 0  One/Two Story Residence: One story  Living Alone: No  Support Systems: Spouse/Significant Other (\"not 100% either\")  Current DME Used/Available at Home: Christy Nathanly, rollator  Tub or Shower Type: Tub/Shower combination    EXAMINATION/PRESENTATION/DECISION MAKING:   Critical Behavior:  Neurologic State: Alert  Orientation Level: Oriented X4  Cognition: (P) Follows commands,Appropriate safety awareness,Appropriate for age attention/concentration     Range Of Motion:  AROM: Within functional limits                       Strength:    Strength: Within functional limits                    Tone & Sensation:   Tone: Normal              Sensation: Intact               Coordination:  Coordination: Within functional limits  Vision:      Functional Mobility:  Bed Mobility:     Supine to Sit: Stand-by assistance     Scooting: Stand-by assistance  Transfers:  Sit to Stand: Stand-by assistance  Stand to Sit: Stand-by assistance                       Balance:   Sitting: Intact  Standing: Impaired; Without support  Standing - Static: Good  Standing - Dynamic : Fair  Ambulation/Gait Training:  Distance (ft): 200 Feet (ft)  Assistive Device: Gait belt  Ambulation - Level of Assistance: Contact guard assistance        Gait Abnormalities: Antalgic;Decreased step clearance; Altered arm swing;Trunk sway increased        Base of Support: Widened     Speed/Denise: Pace decreased (<100 feet/min); Shuffled  Step Length: Right shortened;Left shortened             Functional Measure  Lyles Balance Test:    Sitting to Standin  Standing Unsupported: 4  Sitting with Back Unsupported: 4  Standing to Sittin  Transfers: 4  Standing Unsupported with Eyes Closed: 4  Standing Unsupported with Feet Together: 4  Reach Forward with Outstretched Arm: 4   Object: 4  Turn to Look Over Shoulders: 4  Turn 360 Degrees: 4  Alternate Foot on Step/Stool: 3  Standing Unsupported One Foot in Front: 3  Stand on One Leg: 3  Total: 53/56         56=Maximum possible score;   0-20=High fall risk  21-40=Moderate fall risk   41-56=Low fall risk           Based on the above components, the patient evaluation is determined to be of the following complexity level: LOW     Pain Rating:  Pt reported low back pain with stiffness/tightness radiating into BLEs    Activity Tolerance:   Good and Fair      After treatment patient left in no apparent distress:   Sitting in chair, Call bell within reach, Bed / chair alarm activated, and Side rails x 3    COMMUNICATION/EDUCATION:   The patients plan of care was discussed with: Occupational therapist, Registered nurse, and Case management. Patient was educated regarding his deficit(s) of impaired balance and gait as this relates to his diagnosis of CVA r/o. He demonstrated Good understanding    Patient and/or family was verbally educated on the BE FAST acronym for signs/symptoms of CVA and TIA. BE FAST was written on patient's communication board  for visual education and reinforcement. All questions answered with patient indicating good understanding. Fall prevention education was provided and the patient/caregiver indicated understanding., Patient/family have participated as able in goal setting and plan of care. , and Patient/family agree to work toward stated goals and plan of care.     Thank you for this referral.  Anny Conteh, PT, DPT   Time Calculation: 27 mins

## 2022-03-10 ENCOUNTER — APPOINTMENT (OUTPATIENT)
Dept: NON INVASIVE DIAGNOSTICS | Age: 67
End: 2022-03-10
Attending: FAMILY MEDICINE
Payer: COMMERCIAL

## 2022-03-10 ENCOUNTER — APPOINTMENT (OUTPATIENT)
Dept: MRI IMAGING | Age: 67
End: 2022-03-10
Attending: FAMILY MEDICINE
Payer: COMMERCIAL

## 2022-03-10 PROBLEM — I63.30 CEREBROVASCULAR ACCIDENT (CVA) DUE TO THROMBOSIS OF CEREBRAL ARTERY (HCC): Status: ACTIVE | Noted: 2022-03-10

## 2022-03-10 LAB
ANION GAP SERPL CALC-SCNC: 4 MMOL/L (ref 5–15)
BUN SERPL-MCNC: 25 MG/DL (ref 6–20)
BUN/CREAT SERPL: 23 (ref 12–20)
CALCIUM SERPL-MCNC: 9.2 MG/DL (ref 8.5–10.1)
CHLORIDE SERPL-SCNC: 106 MMOL/L (ref 97–108)
CO2 SERPL-SCNC: 26 MMOL/L (ref 21–32)
CREAT SERPL-MCNC: 1.09 MG/DL (ref 0.7–1.3)
ECHO AV PEAK GRADIENT: 8 MMHG
ECHO AV PEAK VELOCITY: 1.4 M/S
ECHO AV VELOCITY RATIO: 0.64
ECHO LA DIAMETER INDEX: 2.1 CM/M2
ECHO LA DIAMETER: 3.9 CM
ECHO LA VOL 4C: 64 ML (ref 18–58)
ECHO LA VOLUME INDEX A4C: 34 ML/M2 (ref 16–34)
ECHO LV E' LATERAL VELOCITY: 6 CM/S
ECHO LV E' SEPTAL VELOCITY: 7 CM/S
ECHO LV FRACTIONAL SHORTENING: 23 % (ref 28–44)
ECHO LV INTERNAL DIMENSION DIASTOLE INDEX: 2.15 CM/M2
ECHO LV INTERNAL DIMENSION DIASTOLIC: 4 CM (ref 4.2–5.9)
ECHO LV INTERNAL DIMENSION SYSTOLIC INDEX: 1.67 CM/M2
ECHO LV INTERNAL DIMENSION SYSTOLIC: 3.1 CM
ECHO LV IVSD: 1.3 CM (ref 0.6–1)
ECHO LV MASS 2D: 186.5 G (ref 88–224)
ECHO LV MASS INDEX 2D: 100.3 G/M2 (ref 49–115)
ECHO LV POSTERIOR WALL DIASTOLIC: 1.3 CM (ref 0.6–1)
ECHO LV RELATIVE WALL THICKNESS RATIO: 0.65
ECHO LVOT PEAK GRADIENT: 3 MMHG
ECHO LVOT PEAK VELOCITY: 0.9 M/S
ECHO MV A VELOCITY: 0.74 M/S
ECHO MV E VELOCITY: 0.52 M/S
ECHO MV E/A RATIO: 0.7
ECHO MV E/E' LATERAL: 8.67
ECHO MV E/E' RATIO (AVERAGED): 8.05
ECHO MV E/E' SEPTAL: 7.43
ECHO RV INTERNAL DIMENSION: 3.3 CM
ECHO RV TAPSE: 1.7 CM (ref 1.5–2)
ECHO TV REGURGITANT MAX VELOCITY: 2.04 M/S
ECHO TV REGURGITANT PEAK GRADIENT: 17 MMHG
GLUCOSE BLD STRIP.AUTO-MCNC: 102 MG/DL (ref 65–117)
GLUCOSE BLD STRIP.AUTO-MCNC: 179 MG/DL (ref 65–117)
GLUCOSE BLD STRIP.AUTO-MCNC: 193 MG/DL (ref 65–117)
GLUCOSE BLD STRIP.AUTO-MCNC: 228 MG/DL (ref 65–117)
GLUCOSE SERPL-MCNC: 127 MG/DL (ref 65–100)
POTASSIUM SERPL-SCNC: 4 MMOL/L (ref 3.5–5.1)
SERVICE CMNT-IMP: ABNORMAL
SERVICE CMNT-IMP: NORMAL
SODIUM SERPL-SCNC: 136 MMOL/L (ref 136–145)

## 2022-03-10 PROCEDURE — 70551 MRI BRAIN STEM W/O DYE: CPT

## 2022-03-10 PROCEDURE — 74011250637 HC RX REV CODE- 250/637: Performed by: FAMILY MEDICINE

## 2022-03-10 PROCEDURE — 97116 GAIT TRAINING THERAPY: CPT

## 2022-03-10 PROCEDURE — 74011250636 HC RX REV CODE- 250/636: Performed by: FAMILY MEDICINE

## 2022-03-10 PROCEDURE — 80048 BASIC METABOLIC PNL TOTAL CA: CPT

## 2022-03-10 PROCEDURE — 93306 TTE W/DOPPLER COMPLETE: CPT | Performed by: INTERNAL MEDICINE

## 2022-03-10 PROCEDURE — 96374 THER/PROPH/DIAG INJ IV PUSH: CPT

## 2022-03-10 PROCEDURE — 99232 SBSQ HOSP IP/OBS MODERATE 35: CPT | Performed by: PSYCHIATRY & NEUROLOGY

## 2022-03-10 PROCEDURE — 74011250637 HC RX REV CODE- 250/637: Performed by: PSYCHIATRY & NEUROLOGY

## 2022-03-10 PROCEDURE — 74011636637 HC RX REV CODE- 636/637: Performed by: FAMILY MEDICINE

## 2022-03-10 PROCEDURE — 36415 COLL VENOUS BLD VENIPUNCTURE: CPT

## 2022-03-10 PROCEDURE — 82962 GLUCOSE BLOOD TEST: CPT

## 2022-03-10 PROCEDURE — G0378 HOSPITAL OBSERVATION PER HR: HCPCS

## 2022-03-10 PROCEDURE — 93306 TTE W/DOPPLER COMPLETE: CPT

## 2022-03-10 RX ORDER — LORAZEPAM 2 MG/ML
1 INJECTION INTRAMUSCULAR ONCE
Status: COMPLETED | OUTPATIENT
Start: 2022-03-10 | End: 2022-03-10

## 2022-03-10 RX ADMIN — CLOPIDOGREL BISULFATE 75 MG: 75 TABLET ORAL at 08:34

## 2022-03-10 RX ADMIN — ASPIRIN 81 MG: 81 TABLET, COATED ORAL at 08:33

## 2022-03-10 RX ADMIN — GUAIFENESIN 600 MG: 600 TABLET, EXTENDED RELEASE ORAL at 21:35

## 2022-03-10 RX ADMIN — INSULIN GLARGINE 40 UNITS: 100 INJECTION, SOLUTION SUBCUTANEOUS at 21:35

## 2022-03-10 RX ADMIN — INSULIN GLARGINE 40 UNITS: 100 INJECTION, SOLUTION SUBCUTANEOUS at 08:32

## 2022-03-10 RX ADMIN — LORAZEPAM 1 MG: 2 INJECTION INTRAMUSCULAR; INTRAVENOUS at 13:38

## 2022-03-10 RX ADMIN — Medication 3 UNITS: at 11:39

## 2022-03-10 RX ADMIN — ATORVASTATIN CALCIUM 20 MG: 20 TABLET, FILM COATED ORAL at 21:35

## 2022-03-10 RX ADMIN — FAMOTIDINE 20 MG: 20 TABLET, FILM COATED ORAL at 21:35

## 2022-03-10 RX ADMIN — AMLODIPINE BESYLATE 5 MG: 5 TABLET ORAL at 08:33

## 2022-03-10 RX ADMIN — GUAIFENESIN 600 MG: 600 TABLET, EXTENDED RELEASE ORAL at 08:34

## 2022-03-10 RX ADMIN — Medication 2 UNITS: at 17:16

## 2022-03-10 NOTE — PROGRESS NOTES
Awilda Larose is a 74 y.o. male with poorly controlled diabetes presenting with decreased hearing in the left ear, incoordination of the left hand and unsteadiness of gait. Clinically he still remains slightly unsteady on his feet. Was evaluated by PT/OT. MRI scan of the brain did not show any acute infarction    EXAM  Exam:  Visit Vitals  /79 (BP 1 Location: Left upper arm, BP Patient Position: At rest)   Pulse 64 Comment: pacemaker/ICD reset to pre-MRI settings by [employer] rep   Temp 98.4 °F (36.9 °C)   Resp 14   Ht 5' 7\" (1.702 m)   Wt 165 lb 5.5 oz (75 kg)   SpO2 100%   BMI 25.90 kg/m²     Gen:  CV: RRR  Lungs: non labored breathing  Abd: non distending  Neuro: A&O x 3, no dysarthria or aphasia  CN II-XII: PERRL, EOMI, face symmetric, tongue/palate midline  Motor: strength 5/5 all four ext  Sensory: intact to LT  Gait: Unsteady, mild dysmetria on finger-nose-finger testing on the left    LABS/ IMAGING  MRI Results (most recent):  Results from Hospital Encounter encounter on 03/08/22    MRI BRAIN WO CONT    Narrative  EXAM:  MRI BRAIN WO CONT  INDICATION:  cva, patient awoke with acute on set of ataxia. Patient with known  history diabetes and hypertension. COMPARISON:  CTA head and neck 3/8/22, MRI head 3/13/19  TECHNIQUE:  Sagittal T1, axial FLAIR, T2, T2*, T1 and SWI imaging as well as coronal T2  weighted images and axial diffusion weighted images of the head were obtained. FINDINGS:  Ventricular size and configuration are within normal limits. Minimal T2 hyperintensity periventricular white matter similar to the prior exam  may be within the range of normal.  No abnormal restricted diffusion or other finding that would suggest acute or  subacute brain infarction. No evidence of intracranial hemorrhage, mass or abnormal extra-axial fluid  collection. Flow voids are present in vertebral basilar and carotid artery systems. Craniocervical junction is unremarkable.   Structures of the skull base could not paranasal sinuses, orbits and temporal  bones are unremarkable. Impression  Normal MRI of the head. No significant change. CTA negative    Lab Results   Component Value Date/Time    Cholesterol, total 155 03/09/2022 03:55 AM    HDL Cholesterol 88 03/09/2022 03:55 AM    LDL, calculated 52.8 03/09/2022 03:55 AM    VLDL, calculated 14.2 03/09/2022 03:55 AM    Triglyceride 71 03/09/2022 03:55 AM    CHOL/HDL Ratio 1.8 03/09/2022 03:55 AM     Lab Results   Component Value Date/Time    Hemoglobin A1c >14.0 (H) 03/09/2022 12:10 PM       ASSESSMENT  Hospital Problems  Date Reviewed: 1/29/2021          Codes Class Noted POA    Cerebrovascular accident (CVA) due to thrombosis of cerebral artery ([facility] [address] ICD-10-CM: I63.30  ICD-9-CM: 434.01  3/10/2022 Unknown        Ataxia ICD-10-CM: R27.0  ICD-9-CM: 781.3  3/8/2022 Unknown               PLAN  Strongly suspect that he has had a small lacunar infarct in the left brainstem/medulla. These strokes can sometimes be difficult to detect on imaging but it is apparent clinically   Continue dual antiplatelet therapy for 21 days and then may switch back to aspirin 81 mg daily  His LDL is at goal  Echo is pending.  He was evaluated by cardiology  Continue PT/OT and initiate discharge planning   Strict control of diabetes    Ann Figueroa MD

## 2022-03-10 NOTE — PROGRESS NOTES
Bedside shift change report given to [facility] (oncoming nurse) by Riley (offgoing nurse). Report included the following information SBAR, MAR, Cardiac Rhythm (NSR) and Dual Neuro Assessment.

## 2022-03-10 NOTE — PROGRESS NOTES
Transition of Care Plan   RUR- Observation    DISPOSITION: Home with friend and [facility] home health    F/U with PCP/Specialist     Transport: Family     Emergency contact: Jennifer Espinoza [phone]    CM barriers to discharge: None identified at this time    MRI completed. PT/OT recommending home health, [facility] has accepted. CM will continue to follow patient progress. Ronald Justice M.S.KEI.

## 2022-03-10 NOTE — PROGRESS NOTES
Problem: Mobility Impaired (Adult and Pediatric)  Goal: *Acute Goals and Plan of Care (Insert Text)  Description:   FUNCTIONAL STATUS PRIOR TO ADMISSION: Patient was independent and active without use of DME. Pt working full time as a  at [employer]: The patient lived with spouse but did not require assist.    Physical Therapy Goals  Initiated 3/9/2022  1. Patient will move from supine to sit and sit to supine  in bed with modified independence within 7 day(s). 2.  Patient will transfer from bed to chair and chair to bed with modified independence using the least restrictive device within 7 day(s). 3.  Patient will perform sit to stand with modified independence within 7 day(s). 4.  Patient will ambulate with modified independence for 300 feet with the least restrictive device within 7 day(s). 5.  Patient will improve Lyles Balance score by 7 points within 7 days. Outcome: Progressing Towards Goal   PHYSICAL THERAPY TREATMENT  Patient: Yuliet Lyons (32 y.o. male)  Date: 3/10/2022  Diagnosis: Ataxia [R27.0] <principal problem not specified>       Precautions: Fall,Contact  Chart, physical therapy assessment, plan of care and goals were reviewed. ASSESSMENT  Patient continues with skilled PT services and is progressing towards goals. Pt received seated on the toilet and agreeable to therapy. Transport outside of the room waiting to take patient for MRI. Pt tolerated transfers without AD with SBA and completed gait training x 20 ft with CGA and noted minor path deviations and trunk sway, but able to correct any instabilities. Pt reported his back pain has improved somewhat and less symptoms into his LEs. Pt continues to be limited by impaired balance and gait from previous baseline. Pt will continue to benefit from HHPT upon discharge.     Current Level of Function Impacting Discharge (mobility/balance): SBA transfers, gait training x 20 ft with CGA    Other factors to consider for discharge: previously independent         PLAN :  Patient continues to benefit from skilled intervention to address the above impairments. Continue treatment per established plan of care. to address goals. Recommendation for discharge: (in order for the patient to meet his/her long term goals)  Physical therapy at least 2 days/week in the home     This discharge recommendation:  Has been made in collaboration with the attending provider and/or case management    IF patient discharges home will need the following DME: none       SUBJECTIVE:   Patient stated I'm feeling a little better. I think my walking is a little better.     OBJECTIVE DATA SUMMARY:   Critical Behavior:  Neurologic State: Alert,Eyes open spontaneously  Orientation Level: Oriented X4  Cognition: Appropriate decision making,Appropriate for age attention/concentration,Appropriate safety awareness,Follows commands  Safety/Judgement: Awareness of environment,Decreased awareness of need for assistance,Decreased insight into deficits,Decreased awareness of need for safety  Functional Mobility Training:  Bed Mobility:     Supine to Sit:  (NT--pt received/returned seated position)              Transfers:  Sit to Stand: Stand-by assistance  Stand to Sit: Stand-by assistance                             Balance:  Sitting: Intact  Standing: Impaired; Without support  Standing - Static: Good  Standing - Dynamic : Fair  Ambulation/Gait Training:  Distance (ft): 20 Feet (ft)  Assistive Device: Gait belt  Ambulation - Level of Assistance: Contact guard assistance        Gait Abnormalities: Decreased step clearance;Trunk sway increased; Path deviations        Base of Support: Widened     Speed/Denise: Pace decreased (<100 feet/min)  Step Length: Right shortened;Left shortened         Pain Rating:  Pt denied pain    Activity Tolerance:   Good    After treatment patient left in no apparent distress:   Sitting with transport taking pt off the floor for MRI    COMMUNICATION/COLLABORATION:   The patients plan of care was discussed with: Occupational therapist and Registered nurse.      Miriam Rose, PT, DPT   Time Calculation: 11 mins

## 2022-03-10 NOTE — PROGRESS NOTES
SLP Contact Note    Consult received and discussed with RN. Patient with no SLP needs. Will sign off.       Thank you,  ALBIN PeraltaEd, [id number] [facility]  Speech-Language Pathologist

## 2022-03-10 NOTE — PROGRESS NOTES
Occupational Therapy  03/10/22    Chart reviewed, attempted to see for OT treatment. Patient GRAHAM to MRI, will follow up as able.      Thank you,   Tabatha Torres OTFLORENCE, OTR/L

## 2022-03-10 NOTE — PROGRESS NOTES
Problem: Patient Education: Go to Patient Education Activity  Goal: Patient/Family Education  Outcome: Progressing Towards Goal     Problem: TIA/CVA Stroke: Day 2 Until Discharge  Goal: Activity/Safety  Outcome: Progressing Towards Goal  Goal: Diagnostic Test/Procedures  Outcome: Progressing Towards Goal  Goal: Nutrition/Diet  Outcome: Progressing Towards Goal  Goal: Discharge Planning  Outcome: Progressing Towards Goal  Goal: Medications  Outcome: Progressing Towards Goal  Goal: Treatments/Interventions/Procedures  Outcome: Progressing Towards Goal  Goal: Psychosocial  Outcome: Progressing Towards Goal  Goal: *Verbalizes anxiety and depression are reduced or absent  Outcome: Progressing Towards Goal  Goal: *Absence of aspiration  Outcome: Progressing Towards Goal  Goal: *Absence of deep venous thrombosis signs and symptoms(Stroke Metric)  Outcome: Progressing Towards Goal  Goal: *Optimal pain control at patient's stated goal  Outcome: Progressing Towards Goal  Goal: *Tolerating diet  Outcome: Progressing Towards Goal  Goal: *Ability to perform ADLs and demonstrates progressive mobility and function  Outcome: Progressing Towards Goal  Goal: *Stroke education continued(Stroke Metric)  Outcome: Progressing Towards Goal

## 2022-03-10 NOTE — PROGRESS NOTES
[address] [facility] Adult  Hospitalist Group                                                                                          Hospitalist Progress Note  Marley Woodard MD  Answering service: [phone] from in house phone        Date of Service:  3/10/2022  NAME:  Neel Tabares  :  1955  MRN:  [id number]      Admission Summary:   Neel Tabares is a 74 y.o. male who presents with ataxia     Patient reports that he woke up this morning and had onset of ataxia, patient reports that he went to bed last night without any complaints, patient reports that he felt off balance, also had decreased hearing in his left ear, and felt that his ear was \"plugged up\".   Patient came to the ER as a code stroke, patient was requested to be admitted to the hospital service, patient denies any other complaints or problems, patient denies taking any new medications or drinking alcohol in the last 7 days       Interval history / Subjective:   with hearing impairment left ear , unsteady gait   No new complaints, today      Assessment & Plan:     Ataxia, Decreased hearing  -CT head negative, CTA show no large vessel occlusion  -f/u MRI, shows new infarct  -Neuro consult, suspect small lacunar infarct  In left brain stem/medulla difficult to see on imaging, recommend ASA,pavix 21 days, and then asa 81mg daily   - Echo ,prior echo with R to showed L shunt   -Cardiology consulted   -repeat echo results pending  -LDL 52, hga1c >14  -optomize diabetes control   -continue home statin  -fall precautions  -PT/OT ,recc       Diabetes mellitus type 2 with hyperglycemia  -Uncontrolled, hga1c >14  -home regimin noted ,levimir 87INIY, trulicity 8.2HD,V week, metformin 1000mgha  -pt reports non compliance x 6 weeks due to low finances  -held metformin   -levimir to 40 units bid  -accu checks ac qhs appear improving on SSI   -BG improved , will resume metformin on d/c       Hypertension  -optomize BP control, hold antihypertensive,until MRI results    APARNA, resovled  -in setting of hyperglycemia   -IVF hydration, Cr ,improved  -renal US normal  -avoid nephrotoxic meds     H/o Non sustained VT AICD  -tele monitoring  -NSR  -with acute stroke,suspect embolic etiology  -consult card ,pt has ICD,with single lead cant detect afib,or significant arrythmia on tele  -oupt 1 month loop recorder,to monitor with Dr Darren Koehler   -no additional cardiac w/u at this time per card   Code status: full  Prophylaxis:SCD   Care Plan discussed with: pt/nsg  Anticipated Disposition: home with H/H ,pending Echo Results      Hospital Problems  Date Reviewed: 1/29/2021          Codes Class Noted POA    Cerebrovascular accident (CVA) due to thrombosis of cerebral artery ([address] ICD-10-CM: I63.30  ICD-9-CM: 434.01  3/10/2022 Unknown        Ataxia ICD-10-CM: R27.0  ICD-9-CM: 781.3  3/8/2022 Unknown                Review of Systems:   Pertinent items are noted in HPI. Vital Signs:    Last 24hrs VS reviewed since prior progress note. Most recent are:  Visit Vitals  /79 (BP 1 Location: Left upper arm, BP Patient Position: At rest)   Pulse (P) 65   Temp (P) 98.1 °F (36.7 °C)   Resp 14   Ht 5' 7\" (1.702 m)   Wt 75 kg (165 lb 5.5 oz)   SpO2 100%   BMI 25.90 kg/m²         Intake/Output Summary (Last 24 hours) at 3/10/2022 1609  Last data filed at 3/10/2022 1145  Gross per 24 hour   Intake 360 ml   Output 1675 ml   Net -1315 ml        Physical Examination:     I had a face to face encounter with this patient and independently examined them on 3/10/2022 as outlined below:          Constitutional:  No acute distress, cooperative, pleasant    ENT:  Oral mucosa moist, oropharynx benign. Resp:  CTA bilaterally. No wheezing/rhonchi/rales. No accessory muscle use. CV:  Regular rhythm, normal rate, no murmurs, gallops, rubs    GI:  Soft, non distended, non tender.  normoactive bowel sounds, no hepatosplenomegaly     Musculoskeletal:  No edema, warm, 2+ pulses throughout    Neurologic:  Moves all extremities. AAOx3, CN II-XII reviewed            Data Review:    Review and/or order of clinical lab test  Review and/or order of tests in the radiology section of CPT  Review and/or order of tests in the medicine section of [facility]      Labs:     Recent Labs     03/09/22  0355 03/08/22  1346   WBC 4.6 5.6   HGB 10.4* 12.6   HCT 32.2* 38.4    165     Recent Labs     03/10/22  0454 03/09/22  1210 03/09/22  0355 03/08/22  1346    134*  --  135*   K 4.0 4.5  --  4.4    105  --  103   CO2 26 26  --  25   BUN 25* 26*  --  32*   CREA 1.09 1.06  --  1.43*   * 194*  --  335*   CA 9.2 9.4  --  9.7   MG  --   --  2.1  --    PHOS  --   --  3.8  --      Recent Labs     03/08/22  1346   ALT 46      TBILI 0.4   TP 7.8   ALB 3.8   GLOB 4.0     No results for input(s): INR, PTP, APTT, INREXT, INREXT in the last 72 hours. No results for input(s): FE, TIBC, PSAT, FERR in the last 72 hours. No results found for: FOL, RBCF   No results for input(s): PH, PCO2, PO2 in the last 72 hours.   Recent Labs     03/09/22  0359 03/08/22  1842    398*     Lab Results   Component Value Date/Time    Cholesterol, total 155 03/09/2022 03:55 AM    HDL Cholesterol 88 03/09/2022 03:55 AM    LDL, calculated 52.8 03/09/2022 03:55 AM    Triglyceride 71 03/09/2022 03:55 AM    CHOL/HDL Ratio 1.8 03/09/2022 03:55 AM     Lab Results   Component Value Date/Time    Glucose (POC) 228 (H) 03/10/2022 11:26 AM    Glucose (POC) 102 03/10/2022 07:01 AM    Glucose (POC) 260 (H) 03/09/2022 10:22 PM    Glucose (POC) 227 (H) 03/09/2022 04:38 PM    Glucose (POC) 218 (H) 03/09/2022 11:33 AM     Lab Results   Component Value Date/Time    Color YELLOW/STRAW 07/14/2020 10:27 AM    Appearance CLEAR 07/14/2020 10:27 AM    Specific gravity 1.014 07/14/2020 10:27 AM    pH (UA) 6.0 07/14/2020 10:27 AM    Protein 30 (A) 07/14/2020 10:27 AM    Glucose Negative 07/14/2020 10:27 AM    Ketone Negative 07/14/2020 10:27 AM    Bilirubin Negative 07/14/2020 10:27 AM    Urobilinogen 0.2 07/14/2020 10:27 AM    Nitrites Negative 07/14/2020 10:27 AM    Leukocyte Esterase Negative 07/14/2020 10:27 AM    Epithelial cells FEW 07/14/2020 10:27 AM    Bacteria Negative 07/14/2020 10:27 AM    WBC 0-4 07/14/2020 10:27 AM    RBC 0-5 07/14/2020 10:27 AM         Medications Reviewed:     Current Facility-Administered Medications   Medication Dose Route Frequency    insulin glargine (LANTUS) injection 40 Units  40 Units SubCUTAneous ACB/HS    clopidogreL (PLAVIX) tablet 75 mg  75 mg Oral DAILY    amLODIPine (NORVASC) tablet 5 mg  5 mg Oral DAILY    aspirin delayed-release tablet 81 mg  81 mg Oral DAILY    atorvastatin (LIPITOR) tablet 20 mg  20 mg Oral QHS    acetaminophen (TYLENOL) tablet 650 mg  650 mg Oral Q4H PRN    Or    acetaminophen (TYLENOL) solution 650 mg  650 mg Per NG tube Q4H PRN    Or    acetaminophen (TYLENOL) suppository 650 mg  650 mg Rectal Q4H PRN    famotidine (PEPCID) tablet 20 mg  20 mg Oral Q24H    guaiFENesin ER (MUCINEX) tablet 600 mg  600 mg Oral Q12H    glucose chewable tablet 16 g  4 Tablet Oral PRN    dextrose 10 % infusion 0-250 mL  0-250 mL IntraVENous PRN    glucagon (GLUCAGEN) injection 1 mg  1 mg IntraMUSCular PRN    insulin lispro (HUMALOG) injection   SubCUTAneous AC&HS     ______________________________________________________________________  EXPECTED LENGTH OF STAY: - - -  ACTUAL LENGTH OF STAY:          0                 Taya Aldana MD

## 2022-03-11 LAB
GLUCOSE BLD STRIP.AUTO-MCNC: 162 MG/DL (ref 65–117)
GLUCOSE BLD STRIP.AUTO-MCNC: 214 MG/DL (ref 65–117)
GLUCOSE BLD STRIP.AUTO-MCNC: 252 MG/DL (ref 65–117)
GLUCOSE BLD STRIP.AUTO-MCNC: 50 MG/DL (ref 65–117)
GLUCOSE BLD STRIP.AUTO-MCNC: 52 MG/DL (ref 65–117)
GLUCOSE BLD STRIP.AUTO-MCNC: 91 MG/DL (ref 65–117)
SERVICE CMNT-IMP: ABNORMAL
SERVICE CMNT-IMP: NORMAL

## 2022-03-11 PROCEDURE — 82962 GLUCOSE BLOOD TEST: CPT

## 2022-03-11 PROCEDURE — 74011636637 HC RX REV CODE- 636/637: Performed by: FAMILY MEDICINE

## 2022-03-11 PROCEDURE — 94760 N-INVAS EAR/PLS OXIMETRY 1: CPT

## 2022-03-11 PROCEDURE — 97116 GAIT TRAINING THERAPY: CPT

## 2022-03-11 PROCEDURE — 74011250637 HC RX REV CODE- 250/637: Performed by: PSYCHIATRY & NEUROLOGY

## 2022-03-11 PROCEDURE — 97535 SELF CARE MNGMENT TRAINING: CPT

## 2022-03-11 PROCEDURE — 97530 THERAPEUTIC ACTIVITIES: CPT

## 2022-03-11 PROCEDURE — G0378 HOSPITAL OBSERVATION PER HR: HCPCS

## 2022-03-11 PROCEDURE — 74011250637 HC RX REV CODE- 250/637: Performed by: FAMILY MEDICINE

## 2022-03-11 PROCEDURE — 99233 SBSQ HOSP IP/OBS HIGH 50: CPT | Performed by: CLINICAL NURSE SPECIALIST

## 2022-03-11 RX ADMIN — INSULIN GLARGINE 40 UNITS: 100 INJECTION, SOLUTION SUBCUTANEOUS at 11:21

## 2022-03-11 RX ADMIN — ATORVASTATIN CALCIUM 20 MG: 20 TABLET, FILM COATED ORAL at 21:05

## 2022-03-11 RX ADMIN — FAMOTIDINE 20 MG: 20 TABLET, FILM COATED ORAL at 21:05

## 2022-03-11 RX ADMIN — Medication 16 G: at 06:55

## 2022-03-11 RX ADMIN — INSULIN GLARGINE 40 UNITS: 100 INJECTION, SOLUTION SUBCUTANEOUS at 21:09

## 2022-03-11 RX ADMIN — GUAIFENESIN 600 MG: 600 TABLET, EXTENDED RELEASE ORAL at 08:18

## 2022-03-11 RX ADMIN — GUAIFENESIN 600 MG: 600 TABLET, EXTENDED RELEASE ORAL at 21:05

## 2022-03-11 RX ADMIN — Medication 5 UNITS: at 11:21

## 2022-03-11 RX ADMIN — CLOPIDOGREL BISULFATE 75 MG: 75 TABLET ORAL at 08:18

## 2022-03-11 RX ADMIN — Medication 3 UNITS: at 16:38

## 2022-03-11 RX ADMIN — AMLODIPINE BESYLATE 5 MG: 5 TABLET ORAL at 08:18

## 2022-03-11 RX ADMIN — ASPIRIN 81 MG: 81 TABLET, COATED ORAL at 08:18

## 2022-03-11 NOTE — PROGRESS NOTES
Transition of Care Plan   RUR- Observation    DISPOSITION: Home with friend and [facility] home health    F/U with PCP/Specialist     Transport: Family     Emergency contact: Cheryl Cotto [phone]    CM barriers to discharge: None identified at this time    CM received consult for DME, single point cane. CM sent referral to [facility] for delivery to patient's home address. 12:31pm: [facility] has accepted patient for cane, will deliver to patient's home address. Follow up info added to AVS.    Anny Service) REBEKAH Taylor.

## 2022-03-11 NOTE — PROGRESS NOTES
Problem: Mobility Impaired (Adult and Pediatric)  Goal: *Acute Goals and Plan of Care (Insert Text)  Description:   FUNCTIONAL STATUS PRIOR TO ADMISSION: Patient was independent and active without use of DME. Pt working full time as a  at [employer]: The patient lived with spouse but did not require assist.    Physical Therapy Goals  Initiated 3/9/2022  1. Patient will move from supine to sit and sit to supine  in bed with modified independence within 7 day(s). 2.  Patient will transfer from bed to chair and chair to bed with modified independence using the least restrictive device within 7 day(s). 3.  Patient will perform sit to stand with modified independence within 7 day(s). 4.  Patient will ambulate with modified independence for 300 feet with the least restrictive device within 7 day(s). 5.  Patient will improve Lyles Balance score by 7 points within 7 days. Outcome: Progressing Towards Goal    PHYSICAL THERAPY TREATMENT  Patient: Lavonne Welch (81 y.o. male)  Date: 3/11/2022  Diagnosis: Ataxia [R27.0] <principal problem not specified>       Precautions: Fall,Contact  Chart, physical therapy assessment, plan of care and goals were reviewed. ASSESSMENT  Patient continues with skilled PT services and is progressing towards goals. Pt received sitting in chair, willing to work with therapy. Pt reports no pain, difficulties with tripping over objects and falling. Pt presents with decreased strength B hip flexion, unsteady gait, decreased balance this session. Pt  able to tolerate STS, followed by amb in hallway with no AD, presenting with out stretched hands and short shuffled gait with wide HARVEY. Pt agreeable to trial LBSC (due to no SPC avail), able to perform with around unit with improved stability, and increased step length. Pt able to return to room with no rest breaks needed or LOB.  Pt continued with review and demo of LE ex to improve hip flexion. Recommend SPC due to history of falls and unsteady gait without. Pt completed session with call bell within reach and all needs met at the time. Rn notified of session. Ordered placed for SPC. Current Level of Function Impacting Discharge (mobility/balance): SBA for STS, amb up to 600' with CGA, with and w/o AD. Other factors to consider for discharge: assists partner with STS, fall risk, hx of falls. PLAN :  Patient continues to benefit from skilled intervention to address the above impairments. Continue treatment per established plan of care. to address goals. Recommendation for discharge: (in order for the patient to meet his/her long term goals)  Physical therapy at least 2 days/week in the home     This discharge recommendation:  Has been made in collaboration with the attending provider and/or case management    IF patient discharges home will need the following DME: straight cane       SUBJECTIVE:   Patient stated I will hold he wall up if it needs a rest.    OBJECTIVE DATA SUMMARY:   Critical Behavior:  Neurologic State: Alert,Eyes open spontaneously  Orientation Level: Oriented X4  Cognition: Follows commands  Safety/Judgement: Awareness of environment,Decreased awareness of need for assistance,Decreased insight into deficits,Decreased awareness of need for safety  Functional Mobility Training:  Transfers:  Sit to Stand: Stand-by assistance  Stand to Sit: Stand-by assistance     Balance:  Sitting: Intact  Standing: Impaired  Standing - Static: Good  Standing - Dynamic : Fair    Ambulation/Gait Training:  Distance (ft): 600 Feet (ft)  Assistive Device: Gait belt;Cane, quad (300' no AD, 300' LBQC (due to [facility]))  Ambulation - Level of Assistance: Contact guard assistance; Additional time  Gait Abnormalities: Decreased step clearance;Shuffling gait  Base of Support: Widened  Speed/Denise: Pace decreased (<100 feet/min); Shuffled  Step Length: Left shortened;Right shortened  Therapeutic Exercises:   Reviewed LE ex for strengthening hip flexion  Pain Rating:  No pain reported    Activity Tolerance:   Good    After treatment patient left in no apparent distress:   Sitting in chair, Call bell within reach, and Bed / chair alarm activated    COMMUNICATION/COLLABORATION:   The patients plan of care was discussed with: Registered nurse.      Vanessa Low PTA   Time Calculation: 30 mins

## 2022-03-11 NOTE — DIABETES MGMT
[address]    CLINICAL NURSE SPECIALIST CONSULT     Initial Presentation   Estrellita Pack is a 74 y.o. male admitted  with c/o s/s of stroke consistent with ataxia with feelings of being \"off balance\" and decreased hearing in left ear. Code S was called in ED. HX:   Past Medical History:   Diagnosis Date    Abscess 12/2020    left side of abdomen    Hypertension     ICD (implantable cardioverter-defibrillator) in place     NSVT (nonsustained ventricular tachycardia) ([address] 11/21/2018    EPS 11/21/2018 VT/VF     Right groin pain 12/7/2020        INITIAL DX:   Ataxia [R27.0]     Current Treatment     TX: CT/CTA scan- CT no significant abnormalities/ CTA no acute large vessel occlusion/stenosis/ ECHO-WNL    Consulted by Provider for advanced diabetes nursing assessment and care for:   [x] Home management assessment  [x] Survival skill education    Hospital Course   Clinical progress has been complicated by DM2, uncontrolled on admission with A1C >14%. .     Diabetes History   DM2 who is followed by Dr. Perla Carvajal for diabetes management-last office visit 1/2021. Current A1C this admission, >14%. Since 2015, A1C has been above goal with lowest A1C 8.0 in 2018.  Per hospitalist note patient had not been taking his meds for 6weeks due to low finances (cost related non-adherence)    Diabetes-related Medical History  Acute complications  hyperglycemia  Neurological complications  Peripheral neuropathy  Microvascular disease  NONE  Macrovascular disease  NONE  Other associated conditions     HTN / s/p ICD / past abdominal abscess    Diabetes Medication History  Key Antihyperglycemic Medications             Levemir FlexTouch U-100 Insuln 100 unit/mL (3 mL) inpn INJECT 30 UNITS UNDER SKIN EVERY MORNING AND AT BEDTIME    metFORMIN (GLUCOPHAGE) 1,000 mg tablet TAKE 1 TABLET BY MOUTH IN THE EVENING BEFORE DINNER    Trulicity 1.5 BT/7.1 mL sub-q pen INJECT 0.5 ML UNDER SKIN EVERY SEVEN (7) DAYS. Key CAD CHF Meds             amLODIPine (NORVASC) 10 mg tablet TAKE ONE TABLET BY MOUTH DAILY    atorvastatin (LIPITOR) 20 mg tablet TAKE ONE TABLET BY MOUTH DAILY    aspirin delayed-release 81 mg tablet Take 81 mg by mouth daily. *Lipid profile on admission WNL**     Diabetes self-management practices:   Eating pattern- \"I need to stay away from the potatoes! \"  Mostly drinks water/ diet pepsis/ hot/cold teas with equal/sweet and low    Physical activity pattern-on feet at work, no other exercise discussed     Monitoring pattern-not as consistent with checking-verbalized was running low on supplies     Taking medications pattern-   [x] IN -Consistent administration-2/2 cost related non adherence. [x] Levemir insuiln NO T Affordable- was splitting doses in half to [employer] it out\"  Social determinants of health impacting diabetes self-management practices   Concerned that you need to know more about how to stay healthy with diabetes  Overall evaluation:    [x] NOT Achieving A1c target with drug therapy & self-care practices    Subjective   I could not afford the $99/mo for levemir plus all my other meds\"  Verbalized his pharmacy was only giving him 1 insulin pen/mo and he was paying $99 monthly when he should've been getting 2 pens for that price. He now has recently \"gotten it straight\" and should be getting 2 Levemir PENS monthly for $99.   He admits to not following a consistent diabetic diet/ and routinely checking his BG.     3/11/22: Had a lengthy discussion on how important managing his diabetes- discussed portion control with diet/ and taking his insuiln as prescribed. Discussed affordable insulin options at [facility] that could provide same coverage as Levemir. Continues to work full time @ [employer] in [employer] Objective   Physical exam  General Normal weight male in no acute distress.  Conversant and cooperative  Neuro  Alert, oriented   Vital Signs   Visit Vitals  /74 Pulse 68   Temp 98.2 °F (36.8 °C)   Resp 16   Ht 5' 7\" (1.702 m)   Wt 75 kg (165 lb 5.5 oz)   SpO2 96%   BMI 25.90 kg/m²     Skin  Warm and dry. Heart   Regular rate and rhythm. No murmurs, rubs or gallops  Lungs  Clear to auscultation without rales or rhonchi  Extremities No foot wounds; old scabs noted on right lower shin from a 'bump in the BR'     Diabetic foot exam: bilateral overgrown toenails    Left Foot- +1 pitting edema in ankles, +2 pitting edema in feet     Visual Exam: callous - heels   Pulse DP: 2+ (normal)   Filament test: reduced sensation      Right Foot-+1 pitting edema in ankles, +2 pitting edema in feet   Visual Exam: callous - heels   Pulse DP: 2+ (normal)   Filament test: reduced sensation     DP & PT pulses +2. Laboratory  Recent Labs     03/10/22  0454 03/09/22  1210 03/09/22  0355 03/08/22  1346   * 194*  --  335*   AGAP 4* 3*  --  7   TRIGL  --   --  71  --    WBC  --   --  4.6 5.6   CREA 1.09 1.06  --  1.43*   GFRNA >60 >60  --  49*   AST  --   --   --  34   ALT  --   --   --  46       Factors impacting BG management  Factor Dose Comments   Nutrition:  Standard meals     60 grams/meal      Other:   Kidney function  Liver function     WNL  WNL      Blood glucose pattern      Significant diabetes-related events over the past 24-72 hours  A1C >14% (2/2 cost related non-adherence)  Admitting BG >300 on 3/8/22.    3/9/22:  , starting on basal insulin per PTA dose tonight   3/11/22: FBG 52; likely due to patient did not have a meal due to MRI? FBG from day prior on current Lantus dose 102.     Assessment and Plan   Nursing Diagnosis Risk for unstable blood glucose pattern   Nursing Intervention Domain 1964 Decision-making Support   Nursing Interventions Examined current inpatient diabetes/blood glucose control   Explored factors facilitating and impeding inpatient management  Explored corrective strategies with patient and responsible inpatient provider   Informed patient of rational for insulin strategy while hospitalized     Nursing Diagnosis 05255 Ineffective Health Management   Nursing Intervention Domain 12 Decision-makingSupport   Nursing Interventions Identified diabetes self-management practices impeding diabetes control  Discussed diabetes survival skills related to  1. Healthy Plate eating plan; given handouts  2. Role of physical activity in improving insulin sensitivity and action  3. Procedure for blood glucose monitoring & options for low-cost products available from [facility]   4. Medications plan at discharge-Discussed [facility]     Evaluation   This is a 63yo AA male well known to our service from 2 previous admissions in 2020 and 2021 for diabetes related complications. Presented for this admission with c/o stroke like symptoms of ataxia and reduced hearing in left ear. CT/CTA ruled out CVA for occlusion/hemmorage. A1C on admission >14%,  Discussed his diabetes management at length and he relayed concerns over affording his Levemir insulin monthly- he had been 'stretching it out \" and then ran out for past 6-8weeks. Verbalizes still taking Trulicity and Metformin. His diet is not what is should be-as he consumes 'too many potatoes'. He also verbalizes inconsistent BG monitoring. He is aware that he needs to be more disciplined and realizes he is at greater risk for major complications -. He has a follow up appointment with PCP set for this Friday, March 11th. He recently was able to get his 2 Levemir Pens for $99 but stated \"im going to have to hold back my money for the next month for those pens\". There is a mix of cost related non-adherence and lack of discipline with his diabetes management and he is aware of this. I discussed that [facility] has their own insulin that is much cheaper-Novolin N (Relion brand) Pens are $42.80/ 5 pens.      He had his last opthamology appointment 12/2021  He is in great need of podiatry care-      Recommendations     [x] Use of Subcutaneous Insulin Order set (2331)  Insulin Dosing Specific recommendation   START Basal                                      (Based on weight, BMI & GFR) Lantus 40 units BID    CONTINUE Corrective                     (Useful in adjusting insulin dosing) [x] Normal sensitivity      2. Referral to podiatrist ASAP. Discharge Planning   1. Would offer SCRIPT for Novolin NPH injection  (NPH)-Relion 30 units BID-as this would be more affordable for him and will allow for improved compliance. SCRIPT for insulin needles- 31g, 1/4in    2. Needs glucometer strips refill - Uses Relion meter. 3. Has follow up appointment for this Friday, March 11th. @1130-will need to reschedule this    4. On Discharge, please place an outpatient order for \"diabetes education\" (enter as REF20). This will trigger a referral for the Program for Diabetes Health which includes outpatient diabetes self management training with a certified diabetes educator-DONE   Billing Code(s)   23608  Before making these care recommendations, I personally reviewed the hospitalization record, including notes, laboratory & diagnostic data and current medications, and examined the patient at the bedside (circumstances permitting) before making care recommendations. More than fifty (50) percent of the time was spent in patient counseling and/or care coordination.   Total minutes: [address] MARTINE Luque  Diabetes Clinical Nurse Specialist  Program for Diabetes Health  Access via [facility]

## 2022-03-11 NOTE — PROGRESS NOTES
Problem: Diabetes Self-Management  Goal: *Disease process and treatment process  Description: Define diabetes and identify own type of diabetes; list 3 options for treating diabetes. Outcome: Progressing Towards Goal  Goal: *Incorporating nutritional management into lifestyle  Description: Describe effect of type, amount and timing of food on blood glucose; list 3 methods for planning meals. Outcome: Progressing Towards Goal  Goal: *Incorporating physical activity into lifestyle  Description: State effect of exercise on blood glucose levels. Outcome: Progressing Towards Goal  Goal: *Developing strategies to promote health/change behavior  Description: Define the ABC's of diabetes; identify appropriate screenings, schedule and personal plan for screenings. Outcome: Progressing Towards Goal  Goal: *Using medications safely  Description: State effect of diabetes medications on diabetes; name diabetes medication taking, action and side effects. Outcome: Progressing Towards Goal  Goal: *Monitoring blood glucose, interpreting and using results  Description: Identify recommended blood glucose targets  and personal targets. Outcome: Progressing Towards Goal  Goal: *Prevention, detection, treatment of acute complications  Description: List symptoms of hyper- and hypoglycemia; describe how to treat low blood sugar and actions for lowering  high blood glucose level. Outcome: Progressing Towards Goal  Goal: *Prevention, detection and treatment of chronic complications  Description: Define the natural course of diabetes and describe the relationship of blood glucose levels to long term complications of diabetes.   Outcome: Progressing Towards Goal  Goal: *Developing strategies to address psychosocial issues  Description: Describe feelings about living with diabetes; identify support needed and support network  Outcome: Progressing Towards Goal  Goal: *Insulin pump training  Outcome: Progressing Towards Goal  Goal: *Sick day guidelines  Outcome: Progressing Towards Goal  Goal: *Patient Specific Goal (EDIT GOAL, INSERT TEXT)  Outcome: Progressing Towards Goal     Problem: Patient Education: Go to Patient Education Activity  Goal: Patient/Family Education  Outcome: Progressing Towards Goal     Problem: Falls - Risk of  Goal: *Absence of Falls  Description: Document Umberto Counts Fall Risk and appropriate interventions in the flowsheet. Outcome: Progressing Towards Goal  Note: Fall Risk Interventions:  Mobility Interventions: Bed/chair exit alarm,Patient to call before getting OOB         Medication Interventions: Assess postural VS orthostatic hypotension,Patient to call before getting OOB    Elimination Interventions: Call light in reach,Bed/chair exit alarm,Patient to call for help with toileting needs    History of Falls Interventions: Bed/chair exit alarm,Door open when patient unattended         Problem: Patient Education: Go to Patient Education Activity  Goal: Patient/Family Education  Outcome: Progressing Towards Goal     Problem: Risk for Spread of Infection  Goal: Prevent transmission of infectious organism to others  Description: Prevent the transmission of infectious organisms to other patients, staff members, and visitors.   Outcome: Progressing Towards Goal     Problem: Patient Education:  Go to Education Activity  Goal: Patient/Family Education  Outcome: Progressing Towards Goal     Problem: Patient Education: Go to Patient Education Activity  Goal: Patient/Family Education  Outcome: Progressing Towards Goal     Problem: Patient Education: Go to Patient Education Activity  Goal: Patient/Family Education  Outcome: Progressing Towards Goal     Problem: TIA/CVA Stroke: 0-24 hours  Goal: Off Pathway (Use only if patient is Off Pathway)  Outcome: Progressing Towards Goal  Goal: Activity/Safety  Outcome: Progressing Towards Goal  Goal: Consults, if ordered  Outcome: Progressing Towards Goal  Goal: Diagnostic Test/Procedures  Outcome: Progressing Towards Goal  Goal: Nutrition/Diet  Outcome: Progressing Towards Goal  Goal: Discharge Planning  Outcome: Progressing Towards Goal  Goal: Medications  Outcome: Progressing Towards Goal  Goal: Respiratory  Outcome: Progressing Towards Goal  Goal: Treatments/Interventions/Procedures  Outcome: Progressing Towards Goal  Goal: Minimize risk of bleeding post-thrombolytic infusion  Outcome: Progressing Towards Goal  Goal: Monitor for complications post-thrombolytic infusion  Outcome: Progressing Towards Goal  Goal: Psychosocial  Outcome: Progressing Towards Goal  Goal: *Hemodynamically stable  Outcome: Progressing Towards Goal  Goal: *Neurologically stable  Description: Absence of additional neurological deficits    Outcome: Progressing Towards Goal  Goal: *Verbalizes anxiety and depression are reduced or absent  Outcome: Progressing Towards Goal  Goal: *Absence of Signs of Aspiration on Current Diet  Outcome: Progressing Towards Goal  Goal: *Absence of deep venous thrombosis signs and symptoms(Stroke Metric)  Outcome: Progressing Towards Goal  Goal: *Ability to perform ADLs and demonstrates progressive mobility and function  Outcome: Progressing Towards Goal  Goal: *Stroke education started(Stroke Metric)  Outcome: Progressing Towards Goal  Goal: *Dysphagia screen performed(Stroke Metric)  Outcome: Progressing Towards Goal  Goal: *Rehab consulted(Stroke Metric)  Outcome: Progressing Towards Goal     Problem: TIA/CVA Stroke: Day 2 Until Discharge  Goal: Off Pathway (Use only if patient is Off Pathway)  Outcome: Progressing Towards Goal  Goal: Activity/Safety  Outcome: Progressing Towards Goal  Goal: Diagnostic Test/Procedures  Outcome: Progressing Towards Goal  Goal: Nutrition/Diet  Outcome: Progressing Towards Goal  Goal: Discharge Planning  Outcome: Progressing Towards Goal  Goal: Medications  Outcome: Progressing Towards Goal  Goal: Respiratory  Outcome: Progressing Towards Goal  Goal: Treatments/Interventions/Procedures  Outcome: Progressing Towards Goal  Goal: Psychosocial  Outcome: Progressing Towards Goal  Goal: *Verbalizes anxiety and depression are reduced or absent  Outcome: Progressing Towards Goal  Goal: *Absence of aspiration  Outcome: Progressing Towards Goal  Goal: *Absence of deep venous thrombosis signs and symptoms(Stroke Metric)  Outcome: Progressing Towards Goal  Goal: *Optimal pain control at patient's stated goal  Outcome: Progressing Towards Goal  Goal: *Tolerating diet  Outcome: Progressing Towards Goal  Goal: *Ability to perform ADLs and demonstrates progressive mobility and function  Outcome: Progressing Towards Goal  Goal: *Stroke education continued(Stroke Metric)  Outcome: Progressing Towards Goal     Problem: Ischemic Stroke: Discharge Outcomes  Goal: *Verbalizes anxiety and depression are reduced or absent  Outcome: Progressing Towards Goal  Goal: *Verbalize understanding of risk factor modification(Stroke Metric)  Outcome: Progressing Towards Goal  Goal: *Hemodynamically stable  Outcome: Progressing Towards Goal  Goal: *Absence of aspiration pneumonia  Outcome: Progressing Towards Goal  Goal: *Aware of needed dietary changes  Outcome: Progressing Towards Goal  Goal: *Verbalize understanding of prescribed medications including anti-coagulants, anti-lipid, and/or anti-platelets(Stroke Metric)  Outcome: Progressing Towards Goal  Goal: *Tolerating diet  Outcome: Progressing Towards Goal  Goal: *Aware of follow-up diagnostics related to anticoagulants  Outcome: Progressing Towards Goal  Goal: *Ability to perform ADLs and demonstrates progressive mobility and function  Outcome: Progressing Towards Goal  Goal: *Absence of DVT(Stroke Metric)  Outcome: Progressing Towards Goal  Goal: *Absence of aspiration  Outcome: Progressing Towards Goal  Goal: *Optimal pain control at patient's stated goal  Outcome: Progressing Towards Goal  Goal: *Home safety concerns addressed  Outcome: Progressing Towards Goal  Goal: *Describes available resources and support systems  Outcome: Progressing Towards Goal  Goal: *Verbalizes understanding of activation of EMS(911) for stroke symptoms(Stroke Metric)  Outcome: Progressing Towards Goal  Goal: *Understands and describes signs and symptoms to report to providers(Stroke Metric)  Outcome: Progressing Towards Goal  Goal: *Neurolgocially stable (absence of additional neurological deficits)  Outcome: Progressing Towards Goal  Goal: *Verbalizes importance of follow-up with primary care physician(Stroke Metric)  Outcome: Progressing Towards Goal  Goal: *Smoking cessation discussed,if applicable(Stroke Metric)  Outcome: Progressing Towards Goal  Goal: *Depression screening completed(Stroke Metric)  Outcome: Progressing Towards Goal

## 2022-03-11 NOTE — PROGRESS NOTES
Problem: Self Care Deficits Care Plan (Adult)  Goal: *Acute Goals and Plan of Care (Insert Text)  Description: FUNCTIONAL STATUS PRIOR TO ADMISSION: Patient was independent and active without use of DME. Patient was independent for basic and instrumental ADLs, works full time as a . HOME SUPPORT: The patient lived with his significant other who he occasionally assisted. Occupational Therapy Goals  Initiated 3/9/2022  1. Patient will perform grooming at the sink with supervision/set-up within 7 day(s). 2.  Patient will perform bathing with supervision/set-up within 7 day(s). 3.  Patient will perform lower body dressing with modified independence within 7 day(s). 4.  Patient will perform toilet transfers with supervision/set-up within 7 day(s). 5.  Patient will perform all aspects of toileting with supervision/set-up within 7 day(s). 6.  Patient will participate in upper extremity therapeutic exercise/activities with supervision/set-up for 10 minutes within 7 day(s). 7.  Patient will utilize energy conservation techniques during functional activities with verbal and visual cues within 7 day(s). Outcome: Progressing Towards Goal     OCCUPATIONAL THERAPY TREATMENT  Patient: Eduard Severe (71 y.o. male)  Date: 3/11/2022  Diagnosis: Ataxia [R27.0] <principal problem not specified>       Precautions: Fall,Contact  Chart, occupational therapy assessment, plan of care, and goals were reviewed. ASSESSMENT  Patient continues with skilled OT services and is progressing towards goals however remains limited by decreased standing balance with toileting, bathing, dressing, grooming, and dynamic functional reaching tasks completed this session. Initial CGA required for steadying with getting patient OOB for ADLs, progressed to SBA for most ADLs & reaching with continue standing activities. Good safety awareness, reinforced home safety & EC principles, continue to recommend d/c home with [facility]. Current Level of Function Impacting Discharge (ADLs): setup-CGA for ADLs and mobility    Other factors to consider for discharge: fall risk, PMH, PLOF         PLAN :  Patient continues to benefit from skilled intervention to address the above impairments. Continue treatment per established plan of care to address goals. Recommend with staff: Recommend with nursing, ADLs with assist, OOB to chair 3x/day, and toileting via functional mobility to and from bathroom. Thank you for completing as able in order to maintain patient strength, endurance and independence. Recommendation for discharge: (in order for the patient to meet his/her long term goals)  Occupational therapy at least 2 days/week in the home     This discharge recommendation:  Has been made in collaboration with the attending provider and/or case management    IF patient discharges home will need the following DME: patient owns DME required for discharge       SUBJECTIVE:   Patient stated It takes me a little bit to get my balance, but my legs feel better now.     OBJECTIVE DATA SUMMARY:   Cognitive/Behavioral Status:  Neurologic State: Alert  Orientation Level: Oriented X4  Cognition: Appropriate for age attention/concentration; Follows commands  Perception: Appears intact  Perseveration: No perseveration noted  Safety/Judgement: Awareness of environment; Fall prevention    Functional Mobility and Transfers for ADLs:  Bed Mobility:  Scooting: Supervision    Transfers:  Sit to Stand: Contact guard assistance;Stand-by assistance (initial CGA, progressed to SBA)  Functional Transfers  Bathroom Mobility: Contact guard assistance;Stand-by assistance (initial CGA, progressed to standby)  Toilet Transfer : Stand-by assistance (with grab bar)  Cues: Tactile cues provided;Verbal cues provided;Visual cues provided  Adaptive Equipment: Grab bars       Balance:  Sitting: Intact  Standing: Impaired; Without support  Standing - Static: Good  Standing - Dynamic : Fair    ADL Intervention:       Grooming  Grooming Assistance: Stand-by assistance  Position Performed: Standing (at sink )  Washing Face: Stand-by assistance  Washing Hands: Stand-by assistance  Brushing Teeth: Stand-by assistance  Cues: Verbal cues provided;Visual cues provided    Upper Body Bathing  Bathing Assistance: Set-up  Position Performed: Seated in chair    Lower Body Bathing  Bathing Assistance: Stand-by assistance  Perineal  : Stand-by assistance  Position Performed: Standing  Cues: Verbal cues provided;Visual cues provided  Lower Body : Supervision  Position Performed: Seated in chair  Cues: Verbal cues provided    Upper Body Dressing Assistance  Dressing Assistance: [facility]: Set-up    Lower Body [address]: Stand-by assistance  Pants With Elastic Waist: Stand-by assistance (simulated)  Leg Crossed Method Used: No  Position Performed: Bending forward method;Seated in chair;Standing  Cues: Verbal cues provided;Visual cues provided    Toileting  Toileting Assistance: Stand-by assistance  Bladder Hygiene: Supervision  Bowel Hygiene: Stand-by assistance  Clothing Management: Stand-by assistance  Cues: Verbal cues provided;Visual cues provided    Cognitive Retraining  Safety/Judgement: Awareness of environment; Fall prevention    Patient instructed and indicated understanding the benefits of maintaining activity tolerance, functional mobility, and independence with self care tasks during acute stay  to ensure safe return home and to baseline. Encouraged patient to increase frequency and duration OOB, be out of bed for all meals, perform daily ADLs (as approved by RN/MD regarding bathing etc), and performing functional mobility to/from bathroom.     Pain:  None    Activity Tolerance:   Good    After treatment patient left in no apparent distress:   Sitting in chair, Call bell within reach, and Bed / chair alarm activated    COMMUNICATION/COLLABORATION: The patients plan of care was discussed with: Physical therapy assistant, Registered nurse, and Case management.      Nathalie David, OTD, OTR/L  Time Calculation: 30 mins

## 2022-03-11 NOTE — PROGRESS NOTES
Bedside shift change report given to [facility] (oncoming nurse) by Riley (offgoing nurse). Report included the following information SBAR, MAR, Cardiac Rhythm (NSR) and Dual Neuro Assessment.

## 2022-03-11 NOTE — PROGRESS NOTES
[facility]  Hospitalist Group                                                                                          Hospitalist Progress Note  Molly Vilchis MD  Answering service: [phone] OR [phone] from in house phone        Date of Service:  3/11/2022  NAME:  Joseph Toro  :  1955  MRN:  [id number]      Admission Summary:   Joseph Toro is a 74 y.o. male who presents with ataxia     Patient reports that he woke up this morning and had onset of ataxia, patient reports that he went to bed last night without any complaints, patient reports that he felt off balance, also had decreased hearing in his left ear, and felt that his ear was \"plugged up\". Patient came to the ER as a code stroke, patient was requested to be admitted to the hospital service, patient denies any other complaints or problems, patient denies taking any new medications or drinking alcohol in the last 7 days       Interval history / Subjective:   with hearing impairment left ear , unsteady gait   No new complaints, today      Assessment & Plan:     Ataxia, Decreased hearing  -CT head negative, CTA show no large vessel occlusion  -f/u MRI, shows new infarct  -Neuro consult, suspect small lacunar infarct  In left brain stem/medulla difficult to see on imaging, recommend ASA,pavix 21 days, and then asa 81mg daily   - Echo ,prior echo with R to showed L shunt   - Echo 3/10 Left ventricle size is normal. Increased wall thickness. Findings consistent with mild concentric hypertrophy. Normal wall motion. Normal left ventricular systolic function with a visually estimated EF of 55 - 60%.  Normal diastolic function.  -Cardiology consulted   -LDL 52, hga1c >14  -optomize diabetes control   -continue home statin  -fall precautions  -PT/OT ,recc     Diabetes mellitus type 2 with labile BG  -Uncontrolled, hga1c >14  -home regimin noted ,levimir 82ATMD, trulicity 9.3BT,I week, metformin 1000mgha  -pt reports non compliance x 6 weeks due to low finances  -held metformin   -levimir to 40 units bid  -accu checks ac qhs appear improving on SSI   -BG improved , will resume metformin on d/c       Hypertension  -optomize BP control, hold antihypertensive,until MRI results    APARNA, resovled  -in setting of hyperglycemia   -IVF hydration, Cr ,improved  -renal US normal  -avoid nephrotoxic meds     H/o Non sustained VT AICD  -tele monitoring  -NSR  -with acute stroke,suspect embolic etiology  -consult card ,pt has ICD,with single lead cant detect afib,or significant arrythmia on tele  -oupt 1 month loop recorder,to monitor with Dr Aileen Huang   -no additional cardiac w/u at this time per card   Code status: full  Prophylaxis:SCD   Care Plan discussed with: pt/nsg  Anticipated Disposition: home with H/H ,pending Echo Results      Hospital Problems  Date Reviewed: 1/29/2021          Codes Class Noted POA    Cerebrovascular accident (CVA) due to thrombosis of cerebral artery ([address] ICD-10-CM: I63.30  ICD-9-CM: 434.01  3/10/2022 Unknown        Ataxia ICD-10-CM: R27.0  ICD-9-CM: 781.3  3/8/2022 Unknown                Review of Systems:   Pertinent items are noted in HPI. Vital Signs:    Last 24hrs VS reviewed since prior progress note. Most recent are:  Visit Vitals  /74   Pulse 85   Temp 98.2 °F (36.8 °C)   Resp 16   Ht 5' 7\" (1.702 m)   Wt 75 kg (165 lb 5.5 oz)   SpO2 96%   BMI 25.90 kg/m²       No intake or output data in the 24 hours ending 03/11/22 1320     Physical Examination:     I had a face to face encounter with this patient and independently examined them on 3/11/2022 as outlined below:          Constitutional:  No acute distress, cooperative, pleasant    ENT:  Oral mucosa moist, oropharynx benign. Resp:  CTA bilaterally. No wheezing/rhonchi/rales. No accessory muscle use. CV:  Regular rhythm, normal rate, no murmurs, gallops, rubs    GI:  Soft, non distended, non tender.  normoactive bowel sounds, no hepatosplenomegaly     Musculoskeletal:  No edema, warm, 2+ pulses throughout    Neurologic:  Moves all extremities. AAOx3, CN II-XII reviewed            Data Review:    Review and/or order of clinical lab test  Review and/or order of tests in the radiology section of CPT  Review and/or order of tests in the medicine section of [facility]      Labs:     Recent Labs     03/09/22  0355 03/08/22  1346   WBC 4.6 5.6   HGB 10.4* 12.6   HCT 32.2* 38.4    165     Recent Labs     03/10/22  0454 03/09/22  1210 03/09/22  0355 03/08/22  1346    134*  --  135*   K 4.0 4.5  --  4.4    105  --  103   CO2 26 26  --  25   BUN 25* 26*  --  32*   CREA 1.09 1.06  --  1.43*   * 194*  --  335*   CA 9.2 9.4  --  9.7   MG  --   --  2.1  --    PHOS  --   --  3.8  --      Recent Labs     03/08/22  1346   ALT 46      TBILI 0.4   TP 7.8   ALB 3.8   GLOB 4.0     No results for input(s): INR, PTP, APTT, INREXT, INREXT in the last 72 hours. No results for input(s): FE, TIBC, PSAT, FERR in the last 72 hours. No results found for: FOL, RBCF   No results for input(s): PH, PCO2, PO2 in the last 72 hours.   Recent Labs     03/09/22  0359 03/08/22  1842    398*     Lab Results   Component Value Date/Time    Cholesterol, total 155 03/09/2022 03:55 AM    HDL Cholesterol 88 03/09/2022 03:55 AM    LDL, calculated 52.8 03/09/2022 03:55 AM    Triglyceride 71 03/09/2022 03:55 AM    CHOL/HDL Ratio 1.8 03/09/2022 03:55 AM     Lab Results   Component Value Date/Time    Glucose (POC) 252 (H) 03/11/2022 11:14 AM    Glucose (POC) 91 03/11/2022 07:13 AM    Glucose (POC) 52 (LL) 03/11/2022 06:51 AM    Glucose (POC) 50 (LL) 03/11/2022 06:49 AM    Glucose (POC) 179 (H) 03/10/2022 09:27 PM     Lab Results   Component Value Date/Time    Color YELLOW/STRAW 07/14/2020 10:27 AM    Appearance CLEAR 07/14/2020 10:27 AM    Specific gravity 1.014 07/14/2020 10:27 AM    pH (UA) 6.0 07/14/2020 10:27 AM    Protein 30 (A) 07/14/2020 10:27 AM Glucose Negative 07/14/2020 10:27 AM    Ketone Negative 07/14/2020 10:27 AM    Bilirubin Negative 07/14/2020 10:27 AM    Urobilinogen 0.2 07/14/2020 10:27 AM    Nitrites Negative 07/14/2020 10:27 AM    Leukocyte Esterase Negative 07/14/2020 10:27 AM    Epithelial cells FEW 07/14/2020 10:27 AM    Bacteria Negative 07/14/2020 10:27 AM    WBC 0-4 07/14/2020 10:27 AM    RBC 0-5 07/14/2020 10:27 AM         Medications Reviewed:     Current Facility-Administered Medications   Medication Dose Route Frequency    insulin glargine (LANTUS) injection 40 Units  40 Units SubCUTAneous ACB/HS    clopidogreL (PLAVIX) tablet 75 mg  75 mg Oral DAILY    amLODIPine (NORVASC) tablet 5 mg  5 mg Oral DAILY    aspirin delayed-release tablet 81 mg  81 mg Oral DAILY    atorvastatin (LIPITOR) tablet 20 mg  20 mg Oral QHS    acetaminophen (TYLENOL) tablet 650 mg  650 mg Oral Q4H PRN    Or    acetaminophen (TYLENOL) solution 650 mg  650 mg Per NG tube Q4H PRN    Or    acetaminophen (TYLENOL) suppository 650 mg  650 mg Rectal Q4H PRN    famotidine (PEPCID) tablet 20 mg  20 mg Oral Q24H    guaiFENesin ER (MUCINEX) tablet 600 mg  600 mg Oral Q12H    glucose chewable tablet 16 g  4 Tablet Oral PRN    dextrose 10 % infusion 0-250 mL  0-250 mL IntraVENous PRN    glucagon (GLUCAGEN) injection 1 mg  1 mg IntraMUSCular PRN    insulin lispro (HUMALOG) injection   SubCUTAneous AC&HS     ______________________________________________________________________  EXPECTED LENGTH OF STAY: - - -  ACTUAL LENGTH OF STAY:          0                 Chayo Spaulding MD

## 2022-03-12 ENCOUNTER — HOME CARE VISIT (OUTPATIENT)
Dept: HOME HEALTH SERVICES | Facility: HOME HEALTH | Age: 67
End: 2022-03-12

## 2022-03-12 VITALS
WEIGHT: 168.43 LBS | TEMPERATURE: 97.8 F | BODY MASS INDEX: 26.44 KG/M2 | RESPIRATION RATE: 15 BRPM | DIASTOLIC BLOOD PRESSURE: 83 MMHG | OXYGEN SATURATION: 97 % | HEIGHT: 67 IN | SYSTOLIC BLOOD PRESSURE: 131 MMHG | HEART RATE: 74 BPM

## 2022-03-12 LAB
GLUCOSE BLD STRIP.AUTO-MCNC: 374 MG/DL (ref 65–117)
GLUCOSE BLD STRIP.AUTO-MCNC: 45 MG/DL (ref 65–117)
GLUCOSE BLD STRIP.AUTO-MCNC: 45 MG/DL (ref 65–117)
GLUCOSE BLD STRIP.AUTO-MCNC: 82 MG/DL (ref 65–117)
SERVICE CMNT-IMP: ABNORMAL
SERVICE CMNT-IMP: NORMAL

## 2022-03-12 PROCEDURE — 74011250637 HC RX REV CODE- 250/637: Performed by: PSYCHIATRY & NEUROLOGY

## 2022-03-12 PROCEDURE — 82962 GLUCOSE BLOOD TEST: CPT

## 2022-03-12 PROCEDURE — 74011250637 HC RX REV CODE- 250/637: Performed by: FAMILY MEDICINE

## 2022-03-12 PROCEDURE — G0378 HOSPITAL OBSERVATION PER HR: HCPCS

## 2022-03-12 PROCEDURE — 74011636637 HC RX REV CODE- 636/637: Performed by: FAMILY MEDICINE

## 2022-03-12 RX ORDER — INSULIN DETEMIR 100 [IU]/ML
30 INJECTION, SOLUTION SUBCUTANEOUS 2 TIMES DAILY
Qty: 6 ADJUSTABLE DOSE PRE-FILLED PEN SYRINGE | Refills: 11 | Status: SHIPPED | OUTPATIENT
Start: 2022-03-12 | End: 2022-04-11

## 2022-03-12 RX ORDER — ASPIRIN 325 MG
325 TABLET ORAL DAILY
Qty: 30 TABLET | Refills: 4 | Status: SHIPPED | OUTPATIENT
Start: 2022-03-12

## 2022-03-12 RX ORDER — INSULIN LISPRO 100 [IU]/ML
INJECTION, SOLUTION INTRAVENOUS; SUBCUTANEOUS
Qty: 3 ML | Refills: 4 | Status: SHIPPED | OUTPATIENT
Start: 2022-03-12 | End: 2022-05-15

## 2022-03-12 RX ADMIN — AMLODIPINE BESYLATE 5 MG: 5 TABLET ORAL at 09:14

## 2022-03-12 RX ADMIN — ASPIRIN 81 MG: 81 TABLET, COATED ORAL at 09:14

## 2022-03-12 RX ADMIN — GUAIFENESIN 600 MG: 600 TABLET, EXTENDED RELEASE ORAL at 09:14

## 2022-03-12 RX ADMIN — CLOPIDOGREL BISULFATE 75 MG: 75 TABLET ORAL at 09:14

## 2022-03-12 RX ADMIN — Medication 10 UNITS: at 11:47

## 2022-03-12 NOTE — DISCHARGE SUMMARY
Discharge Summary       PATIENT ID: Loren Fontanez  MRN: [id number]   YOB: 1955    DATE OF ADMISSION: 3/8/2022  1:05 PM    DATE OF DISCHARGE: 03.12.22  PRIMARY CARE PROVIDER: Piter Boss MD     ATTENDING PHYSICIAN: Dr. Dudley Guerrier  DISCHARGING PROVIDER: Celso Lu MD    To contact this individual call [phone] and ask the  to page. If unavailable ask to be transferred the Adult Hospitalist Department. CONSULTATIONS: IP CONSULT TO HOSPITALIST  IP CONSULT TO NEUROLOGY  IP CONSULT TO CARDIOLOGY    PROCEDURES/SURGERIES: * No surgery found *    ADMITTING DIAGNOSES & HOSPITAL COURSE:     Patient reports that he woke up this morning and had onset of ataxia, patient reports that he went to bed last night without any complaints, patient reports that he felt off balance, also had decreased hearing in his left ear, and felt that his ear was \"plugged up\". Patient came to the ER as a code stroke, patient was requested to be admitted to the hospital service, patient denies any other complaints or problems, patient denies taking any new medications or drinking alcohol in the last 7 days. Patient with poorly control diabetes mellitus,his A1c is 14, home health will be arrange for better control         DISCHARGE DIAGNOSES / PLAN:      1. Ataxia/ Lacunar Infarct  2. Poorly control Diabetes Mellitus. 3. Hypertension  4. Hx of Non sustained Ventricular Tachycardia.        PENDING TEST RESULTS:   At the time of discharge the following test results are still pending: none    FOLLOW UP APPOINTMENTS:    Follow-up Information     Follow up With Specialties Details Why Contact Info    Piter Boss MD Internal Medicine On 3/11/2022 @[address] [facility]  [address]  [facility] 7 [phone]      [address] [facility]   will deliver your cane to home address, please reach out if you do not hear back from them ricky 48 hours of discharge  [phone]    [address] [facility]  this is your home health provider pt/ot Kenji 240 [phone]           ADDITIONAL CARE RECOMMENDATIONS:   Patient must have strict control of his Diabetes Mellitus    DIET: Cardiac Diet and Diabetic Diet  Oral Nutritional Supplements: nonenone    ACTIVITY: Activity as tolerated    WOUND CARE: None    EQUIPMENT needed: none      DISCHARGE MEDICATIONS:  Current Discharge Medication List      START taking these medications    Details   aspirin (ASPIRIN) 325 mg tablet Take 1 Tablet by mouth daily. Qty: 30 Tablet, Refills: 4  Start date: 3/12/2022      insulin lispro (HumaLOG U-100 Insulin) 100 unit/mL cartridge by SubCUTAneous route. If blood sugar 150 to 200 use 4 units Subcutaneous  If blood sugar 201 to 250 use 6 units subcutaneous  If blood sugar 251 to 300 use 8 units subcutaneous  If blood sugar 302 to 350 use 10 units subcutaneous  Qty: 3 mL, Refills: 4  Start date: 3/12/2022         CONTINUE these medications which have CHANGED    Details   insulin detemir U-100 (Levemir FlexTouch U-100 Insuln) 100 unit/mL (3 mL) inpn 30 Units by SubCUTAneous route two (2) times a day for 30 days. Qty: 6 Adjustable Dose Pre-filled Pen Syringe, Refills: 11  Start date: 3/12/2022, End date: 4/11/2022    Associated Diagnoses: Uncontrolled type 2 diabetes mellitus with complication, with long-term current use of insulin ([address]         CONTINUE these medications which have NOT CHANGED    Details   metFORMIN (GLUCOPHAGE) 1,000 mg tablet TAKE 1 TABLET BY MOUTH IN THE EVENING BEFORE DINNER  Qty: 90 Tablet, Refills: 2      Trulicity 1.5 DZ/4.3 mL sub-q pen INJECT 0.5 ML UNDER SKIN EVERY SEVEN (7) DAYS.   Qty: 4 Syringe, Refills: 11    Associated Diagnoses: Uncontrolled type 2 diabetes mellitus with complication, with long-term current use of insulin ([facility])      amLODIPine (NORVASC) 10 mg tablet TAKE ONE TABLET BY MOUTH DAILY  Qty: 90 Tab, Refills: 3      atorvastatin (LIPITOR) 20 mg tablet TAKE ONE TABLET BY MOUTH DAILY  Qty: 90 Tab, Refills: 3    Associated Diagnoses: Dyslipidemia      Lactobac no.41/Bifidobact no.7 (PROBIOTIC-10 PO) Take 1 Tab by mouth daily. polyethylene glycol (MIRALAX) 17 gram packet Take 1 Packet by mouth two (2) times a day. Qty: 60 Each, Refills: 11      Insulin Needles, Disposable, 31 gauge x 1/4\" ndle USE WITH INSULIN PENS THREE TIMES PER DAY  Qty: 100 Pen Needle, Refills: 11      aspirin delayed-release 81 mg tablet Take 81 mg by mouth daily. glucose blood VI test strips (ASCENSIA AUTODISC VI, ONE TOUCH ULTRA TEST VI) strip 3 times daily before meals  Qty: 300 Strip, Refills: 3    Associated Diagnoses: Uncontrolled type 2 diabetes mellitus with complication, with long-term current use of insulin (H[facility])               NOTIFY YOUR PHYSICIAN FOR ANY OF THE FOLLOWING:   Fever over 101 degrees for 24 hours. Chest pain, shortness of breath, fever, chills, nausea, vomiting, diarrhea, change in mentation, falling, weakness, bleeding. Severe pain or pain not relieved by medications. Or, any other signs or symptoms that you may have questions about.     DISPOSITION:    Home With:   OT  PT  HH  RN       Long term SNF/Inpatient Rehab    Independent/assisted living    Hospice    Other:       PATIENT CONDITION AT DISCHARGE:     Functional status    Poor     Deconditioned    x Independent      Cognition   x  Lucid     Forgetful     Dementia      Catheters/lines (plus indication)    Ramesh     PICC     PEG    x None      Code status   x  Full code     DNR      PHYSICAL EXAMINATION AT DISCHARGE:   Refer to Progress Note 03.11.22      CHRONIC MEDICAL DIAGNOSES:  Problem List as of 3/12/2022 Date Reviewed: 1/29/2021          Codes Class Noted - Resolved    Cerebrovascular accident (CVA) due to thrombosis of cerebral artery ([facility] ICD-10-CM: I63.30  ICD-9-CM: 434.01  3/10/2022 - Present        Ataxia ICD-10-CM: R27.0  ICD-9-CM: 781.3  3/8/2022 - Present        Chronic abdominal wound infection, sequela ICD-10-CM: S31.109S, L08.9  ICD-9-CM: 906.0  1/29/2021 - Present        Moderate protein-calorie malnutrition ([facility] ICD-10-CM: E44.0  ICD-9-CM: 263.0  12/20/2020 - Present        ARF (acute renal failure) ([facility] ICD-10-CM: N17.9  ICD-9-CM: 584.9  12/12/2020 - Present        Phlegmon ICD-10-CM: L02.91  ICD-9-CM: 682.9  12/11/2020 - Present        Abdominal pain ICD-10-CM: R10.9  ICD-9-CM: 789.00  12/10/2020 - Present        Right groin pain ICD-10-CM: R10.31  ICD-9-CM: 789.03  12/7/2020 - Present        Vasovagal syncope ICD-10-CM: R55  ICD-9-CM: 780.2  7/21/2020 - Present        Bell's palsy ICD-10-CM: G51.0  ICD-9-CM: 351.0  4/28/2019 - Present    Overview Signed 4/28/2019 10:51 AM by Leann Bashir MD     R sided             ICD (implantable cardioverter-defibrillator) in place (Chronic) ICD-10-CM: Z95.810  ICD-9-CM: V45.02  12/21/2018 - Present        Hypertension (Chronic) ICD-10-CM: I10  ICD-9-CM: 401.9  12/21/2018 - Present        Gastritis ICD-10-CM: K29.70  ICD-9-CM: 535.50  12/6/2018 - Present        NSVT (nonsustained ventricular tachycardia) (HCC) (Chronic) ICD-10-CM: I47.2  ICD-9-CM: 427.1  11/21/2018 - Present    Overview Signed 11/21/2018 10:25 AM by Amos MONTANO 11/21/2018 VT/VF               Abnormal EKG ICD-10-CM: R94.31  ICD-9-CM: 794.31  11/18/2018 - Present        Brugada syndrome (Chronic) ICD-10-CM: I49.8  ICD-9-CM: 746.89  11/18/2018 - Present        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 ([facility] ICD-10-CM: E13.319  ICD-9-CM: 249.50, 362.01  2/18/2018 - Present        Type 2 diabetes mellitus with diabetic polyneuropathy, with long-term current use of insulin (HCC) ICD-10-CM: E11.42, Z79.4  ICD-9-CM: 250.60, 357.2, V58.67  2/18/2018 - Present        Uncontrolled type 2 diabetes mellitus with complication, with long-term current use of insulin (HCC) ICD-10-CM: E11.8, E11.65, Z79.4  ICD-9-CM: 250.82, V58.67  2/18/2018 - Present        Dyslipidemia ICD-10-CM: E78.5  ICD-9-CM: 272.4  9/25/2014 - Present        Eczema ICD-10-CM: L30.9  ICD-9-CM: 692.9  9/25/2014 - Present        Umbilical hernia PGM-32-JI: K42.9  ICD-9-CM: 553.1  9/25/2014 - Present        Femoral bruit ICD-10-CM: R09.89  ICD-9-CM: 785.9  9/25/2014 - Present        Mitral valve prolapse ICD-10-CM: I34.1  ICD-9-CM: 424.0  9/25/2014 - Present        RESOLVED: Right-sided Bell's palsy ICD-10-CM: G51.0  ICD-9-CM: 351.0  3/12/2019 - 4/28/2019        RESOLVED: Syncope and collapse ICD-10-CM: R55  ICD-9-CM: 780.2  11/18/2018 - 8/3/2021              Greater than 30 minutes were spent with the patient on counseling and coordination of care    Signed:   Sylvia Morales MD  3/12/2022  11:51 AM

## 2022-03-12 NOTE — PROGRESS NOTES
Problem: Falls - Risk of  Goal: *Absence of Falls  Description: Document Marybeth Gamma Fall Risk and appropriate interventions in the flowsheet.   Outcome: Progressing Towards Goal  Note: Fall Risk Interventions:  Mobility Interventions: Bed/chair exit alarm,Communicate number of staff needed for ambulation/transfer,Patient to call before getting OOB         Medication Interventions: Evaluate medications/consider consulting pharmacy,Patient to call before getting OOB    Elimination Interventions: Call light in reach,Patient to call for help with toileting needs    History of Falls Interventions: Bed/chair exit alarm,Consult care management for discharge planning,Door open when patient unattended         Problem: TIA/CVA Stroke: Day 2 Until Discharge  Goal: Medications  Outcome: Progressing Towards Goal

## 2022-03-12 NOTE — PROGRESS NOTES
Bedside shift change report given to Kay Matias RN (oncoming nurse) by Pepe Murdock RN (offgoing nurse). Report included the following information SBAR, Kardex, ED Summary, OR Summary, Procedure Summary, Intake/Output, MAR, Cardiac Rhythm NSR, Alarm Parameters  and Dual Neuro Assessment.

## 2022-03-12 NOTE — PROGRESS NOTES
I have reviewed discharge instructions with the patient. The patient verbalized understanding. patient going home patient transport home [employer].

## 2022-03-12 NOTE — PROGRESS NOTES
Problem: Diabetes Self-Management  Goal: *Disease process and treatment process  Description: Define diabetes and identify own type of diabetes; list 3 options for treating diabetes. Outcome: Progressing Towards Goal  Goal: *Incorporating nutritional management into lifestyle  Description: Describe effect of type, amount and timing of food on blood glucose; list 3 methods for planning meals. Outcome: Progressing Towards Goal  Goal: *Incorporating physical activity into lifestyle  Description: State effect of exercise on blood glucose levels. Outcome: Progressing Towards Goal  Goal: *Developing strategies to promote health/change behavior  Description: Define the ABC's of diabetes; identify appropriate screenings, schedule and personal plan for screenings. Outcome: Progressing Towards Goal  Goal: *Using medications safely  Description: State effect of diabetes medications on diabetes; name diabetes medication taking, action and side effects. Outcome: Progressing Towards Goal  Goal: *Monitoring blood glucose, interpreting and using results  Description: Identify recommended blood glucose targets  and personal targets. Outcome: Progressing Towards Goal  Goal: *Prevention, detection, treatment of acute complications  Description: List symptoms of hyper- and hypoglycemia; describe how to treat low blood sugar and actions for lowering  high blood glucose level. Outcome: Progressing Towards Goal  Goal: *Prevention, detection and treatment of chronic complications  Description: Define the natural course of diabetes and describe the relationship of blood glucose levels to long term complications of diabetes.   Outcome: Progressing Towards Goal  Goal: *Developing strategies to address psychosocial issues  Description: Describe feelings about living with diabetes; identify support needed and support network  Outcome: Progressing Towards Goal  Goal: *Insulin pump training  Outcome: Progressing Towards Goal  Goal: *Sick day guidelines  Outcome: Progressing Towards Goal  Goal: *Patient Specific Goal (EDIT GOAL, INSERT TEXT)  Outcome: Progressing Towards Goal     Problem: Patient Education: Go to Patient Education Activity  Goal: Patient/Family Education  Outcome: Progressing Towards Goal     Problem: Falls - Risk of  Goal: *Absence of Falls  Description: Document Marybeth Gamma Fall Risk and appropriate interventions in the flowsheet. Outcome: Progressing Towards Goal  Note: Fall Risk Interventions:  Mobility Interventions: Bed/chair exit alarm,Patient to call before getting OOB         Medication Interventions: Bed/chair exit alarm,Patient to call before getting OOB    Elimination Interventions: Bed/chair exit alarm,Call light in reach,Patient to call for help with toileting needs    History of Falls Interventions: Bed/chair exit alarm,Door open when patient unattended         Problem: Patient Education: Go to Patient Education Activity  Goal: Patient/Family Education  Outcome: Progressing Towards Goal     Problem: Risk for Spread of Infection  Goal: Prevent transmission of infectious organism to others  Description: Prevent the transmission of infectious organisms to other patients, staff members, and visitors.   Outcome: Progressing Towards Goal     Problem: Patient Education:  Go to Education Activity  Goal: Patient/Family Education  Outcome: Progressing Towards Goal     Problem: Patient Education: Go to Patient Education Activity  Goal: Patient/Family Education  Outcome: Progressing Towards Goal     Problem: Patient Education: Go to Patient Education Activity  Goal: Patient/Family Education  Outcome: Progressing Towards Goal     Problem: TIA/CVA Stroke: 0-24 hours  Goal: Off Pathway (Use only if patient is Off Pathway)  Outcome: Progressing Towards Goal  Goal: Activity/Safety  Outcome: Progressing Towards Goal  Goal: Consults, if ordered  Outcome: Progressing Towards Goal  Goal: Diagnostic Test/Procedures  Outcome: Progressing Towards Goal  Goal: Nutrition/Diet  Outcome: Progressing Towards Goal  Goal: Discharge Planning  Outcome: Progressing Towards Goal  Goal: Medications  Outcome: Progressing Towards Goal  Goal: Respiratory  Outcome: Progressing Towards Goal  Goal: Treatments/Interventions/Procedures  Outcome: Progressing Towards Goal  Goal: Minimize risk of bleeding post-thrombolytic infusion  Outcome: Progressing Towards Goal  Goal: Monitor for complications post-thrombolytic infusion  Outcome: Progressing Towards Goal  Goal: Psychosocial  Outcome: Progressing Towards Goal  Goal: *Hemodynamically stable  Outcome: Progressing Towards Goal  Goal: *Neurologically stable  Description: Absence of additional neurological deficits    Outcome: Progressing Towards Goal  Goal: *Verbalizes anxiety and depression are reduced or absent  Outcome: Progressing Towards Goal  Goal: *Absence of Signs of Aspiration on Current Diet  Outcome: Progressing Towards Goal  Goal: *Absence of deep venous thrombosis signs and symptoms(Stroke Metric)  Outcome: Progressing Towards Goal  Goal: *Ability to perform ADLs and demonstrates progressive mobility and function  Outcome: Progressing Towards Goal  Goal: *Stroke education started(Stroke Metric)  Outcome: Progressing Towards Goal  Goal: *Dysphagia screen performed(Stroke Metric)  Outcome: Progressing Towards Goal  Goal: *Rehab consulted(Stroke Metric)  Outcome: Progressing Towards Goal     Problem: TIA/CVA Stroke: Day 2 Until Discharge  Goal: Off Pathway (Use only if patient is Off Pathway)  Outcome: Progressing Towards Goal  Goal: Activity/Safety  Outcome: Progressing Towards Goal  Goal: Diagnostic Test/Procedures  Outcome: Progressing Towards Goal  Goal: Nutrition/Diet  Outcome: Progressing Towards Goal  Goal: Discharge Planning  Outcome: Progressing Towards Goal  Goal: Medications  Outcome: Progressing Towards Goal  Goal: Respiratory  Outcome: Progressing Towards Goal  Goal: Treatments/Interventions/Procedures  Outcome: Progressing Towards Goal  Goal: Psychosocial  Outcome: Progressing Towards Goal  Goal: *Verbalizes anxiety and depression are reduced or absent  Outcome: Progressing Towards Goal  Goal: *Absence of aspiration  Outcome: Progressing Towards Goal  Goal: *Absence of deep venous thrombosis signs and symptoms(Stroke Metric)  Outcome: Progressing Towards Goal  Goal: *Optimal pain control at patient's stated goal  Outcome: Progressing Towards Goal  Goal: *Tolerating diet  Outcome: Progressing Towards Goal  Goal: *Ability to perform ADLs and demonstrates progressive mobility and function  Outcome: Progressing Towards Goal  Goal: *Stroke education continued(Stroke Metric)  Outcome: Progressing Towards Goal     Problem: Ischemic Stroke: Discharge Outcomes  Goal: *Verbalizes anxiety and depression are reduced or absent  Outcome: Progressing Towards Goal  Goal: *Verbalize understanding of risk factor modification(Stroke Metric)  Outcome: Progressing Towards Goal  Goal: *Hemodynamically stable  Outcome: Progressing Towards Goal  Goal: *Absence of aspiration pneumonia  Outcome: Progressing Towards Goal  Goal: *Aware of needed dietary changes  Outcome: Progressing Towards Goal  Goal: *Verbalize understanding of prescribed medications including anti-coagulants, anti-lipid, and/or anti-platelets(Stroke Metric)  Outcome: Progressing Towards Goal  Goal: *Tolerating diet  Outcome: Progressing Towards Goal  Goal: *Aware of follow-up diagnostics related to anticoagulants  Outcome: Progressing Towards Goal  Goal: *Ability to perform ADLs and demonstrates progressive mobility and function  Outcome: Progressing Towards Goal  Goal: *Absence of DVT(Stroke Metric)  Outcome: Progressing Towards Goal  Goal: *Absence of aspiration  Outcome: Progressing Towards Goal  Goal: *Optimal pain control at patient's stated goal  Outcome: Progressing Towards Goal  Goal: *Home safety concerns addressed  Outcome: Progressing Towards Goal  Goal: *Describes available resources and support systems  Outcome: Progressing Towards Goal  Goal: *Verbalizes understanding of activation of EMS(911) for stroke symptoms(Stroke Metric)  Outcome: Progressing Towards Goal  Goal: *Understands and describes signs and symptoms to report to providers(Stroke Metric)  Outcome: Progressing Towards Goal  Goal: *Neurolgocially stable (absence of additional neurological deficits)  Outcome: Progressing Towards Goal  Goal: *Verbalizes importance of follow-up with primary care physician(Stroke Metric)  Outcome: Progressing Towards Goal  Goal: *Smoking cessation discussed,if applicable(Stroke Metric)  Outcome: Progressing Towards Goal  Goal: *Depression screening completed(Stroke Metric)  Outcome: Progressing Towards Goal

## 2022-03-13 ENCOUNTER — HOME CARE VISIT (OUTPATIENT)
Dept: SCHEDULING | Facility: HOME HEALTH | Age: 67
End: 2022-03-13
Payer: COMMERCIAL

## 2022-03-13 VITALS
OXYGEN SATURATION: 98 % | DIASTOLIC BLOOD PRESSURE: 65 MMHG | RESPIRATION RATE: 16 BRPM | SYSTOLIC BLOOD PRESSURE: 110 MMHG | TEMPERATURE: 97.8 F | HEART RATE: 80 BPM

## 2022-03-13 PROCEDURE — 400018 HH-NO PAY CLAIM PROCEDURE

## 2022-03-13 PROCEDURE — 400013 HH SOC

## 2022-03-13 PROCEDURE — G0151 HHCP-SERV OF PT,EA 15 MIN: HCPCS

## 2022-03-14 ENCOUNTER — HOME CARE VISIT (OUTPATIENT)
Dept: HOME HEALTH SERVICES | Facility: HOME HEALTH | Age: 67
End: 2022-03-14
Payer: COMMERCIAL

## 2022-03-14 ENCOUNTER — PATIENT OUTREACH (OUTPATIENT)
Dept: CASE MANAGEMENT | Age: 67
End: 2022-03-14

## 2022-03-14 NOTE — PROGRESS NOTES
Care Transitions Initial Call    Call within 2 business days of discharge: Yes     Patient: Yuliet Lyons Patient : 1955 MRN: [id number]    Last Discharge [address]       Complaint Diagnosis Description Type Department Provider    3/8/22 Peripheral Edema; Gait Problem Ataxia . .. ED to Hosp-Admission (Discharged) (ADMIT) Alejandra Myrick MD; Fr. Shoaib.. Was this an external facility discharge? No    Challenges to be reviewed by the provider   Additional needs identified to be addressed with provider: yes    Component      Latest Ref Rng & Units 3/9/2022   Hemoglobin A1c, (calculated)      4.0 - 5.6 % >14.0 (H)        Method of communication with provider : chart routing    Discussed COVID-19 related testing which was not done at this time. Advance Care Planning:   Does patient have an Advance Directive:  [address] on file    Inpatient Readmission Risk score: No data recorded  Was this a readmission? no   Patient stated reason for the admission: dizziness, unsteady gait    Patients top risk factors for readmission: medical condition-CVA, DM   Interventions to address risk factors: Scheduled appointment with PCP-(pt.contacted office), Scheduled appointment with Specialist-(Cardio/3/17/22), Obtained and reviewed discharge summary and/or continuity of care documents and [address] [facility] [address] Transition Nurse (CTN) contacted the patient by telephone to perform post hospital discharge assessment. Verified name and  with patient as identifiers. Provided introduction to self, and explanation of the CTN role. CTN reviewed discharge instructions, medical action plan and red flags with patient who verbalized understanding. Were discharge instructions available to patient? yes. Reviewed appropriate site of care based on symptoms and resources available to patient including: PCP, Specialist, [facility] Jamin Chavira, When to call 911 and [facility].  Patient given an opportunity to ask questions and does not have any further questions or concerns at this time. The patient agrees to contact the PCP office for questions related to their healthcare. Medication reconciliation was performed with patient, who verbalizes understanding of administration of home medications. Advised obtaining a 90-day supply of all daily and as-needed medications. Referral to Pharm D needed: no   START taking these medications     Details   aspirin (ASPIRIN) 325 mg tablet Take 1 Tablet by mouth daily. Qty: 30 Tablet, Refills: 4  Start date: 3/12/2022       insulin lispro (HumaLOG U-100 Insulin) 100 unit/mL cartridge by SubCUTAneous route. If blood sugar 150 to 200 use 4 units Subcutaneous  If blood sugar 201 to 250 use 6 units subcutaneous  If blood sugar 251 to 300 use 8 units subcutaneous  If blood sugar 302 to 350 use 10 units subcutaneous  Qty: 3 mL, Refills: 4  Start date: 3/12/2022         CONTINUE these medications which have CHANGED     Details   insulin detemir U-100 (Levemir FlexTouch U-100 Insuln) 100 unit/mL (3 mL) inpn 30 Units by SubCUTAneous route two (2) times a day for 30 days. Qty: 6 Adjustable Dose Pre-filled Pen Syringe, Refills: 11  Start date: 3/12/2022, End date: 4/11/2022     Associated Diagnoses: Uncontrolled type 2 diabetes mellitus with complication, with long-term current use of insulin ([facility]       Home Health/Outpatient orders at discharge: home health care  [address]: [facility]  Date of initial visit: 3/13/22    Durable Medical Equipment ordered at discharge: Cane/Walker/Crutches and [address]: [address] Equipment received: CTN spoke with leah Delgado shipped Friday 3/11/22 allow 2-3 business days    Covid Risk Education      Decline education  COVID-19 and CDC guidelines.  CTN reviewed discharge instructions, medical action plan and red flag symptoms with the patient who verbalized understanding. Patient was given an opportunity to verbalize any questions and concerns and agrees to contact CTN or health care provider for questions related to their healthcare. Discussed follow-up appointments. If no appointment was previously scheduled, appointment scheduling offered: yes. Is follow up appointment scheduled within 7 days of discharge? (pt.contact office 3/14/22 waiting return call). [facility] follow up appointment(s):   Future Appointments   Date Time Provider Chalino Alaniz   3/17/2022  9:45 AM Patricia Joya MD [facility] BS AMB   3/21/2022 10:30 AM Austin Conley MD [facility] BS AMB   5/25/2022  8:45 AM PACEMAKER, RCAM [facility] BS AMB   5/25/2022  9:00 AM Analilia Yeager, ANP [facility] AMB       Plan for follow-up call in 5-7 days based on severity of symptoms and risk factors. Plan for next call: follow up appointment-med, follow ups  CTN provided contact information for future needs. Goals      Establish PCP relationships and regularly scheduled appointments. 3/14 Pt.will attend all recommended follow ups with pcp and specialist w/n 30 day period. Pt.scheduled hosp.follow up:  Future Appointments   Date Time Provider   3/17/2022  9:45 AM Patricia Joya MD   3/21/2022 10:30 AM Austin Conley MD   5/25/2022  8:45 AM PACEMAKER, RCAM   5/25/2022  9:00 AM Analilia Yeager, ANP   CTN will follow up with pt.in 5-7 days. -               Understands red flags post discharge. 3/14 Pt.will have someone drive him to the ED immediately if you develop these symptoms: acute changes in your balance or vision, weakness in your face, arm or leg, speech changes or difficulties. -[address]

## 2022-03-15 ENCOUNTER — OFFICE VISIT (OUTPATIENT)
Dept: INTERNAL MEDICINE CLINIC | Age: 67
End: 2022-03-15
Payer: COMMERCIAL

## 2022-03-15 VITALS
BODY MASS INDEX: 25.38 KG/M2 | HEIGHT: 67 IN | TEMPERATURE: 97.9 F | RESPIRATION RATE: 18 BRPM | WEIGHT: 161.7 LBS | SYSTOLIC BLOOD PRESSURE: 118 MMHG | HEART RATE: 71 BPM | OXYGEN SATURATION: 97 % | DIASTOLIC BLOOD PRESSURE: 75 MMHG

## 2022-03-15 DIAGNOSIS — I63.89 BRAINSTEM INFARCT, ACUTE (HCC): Primary | ICD-10-CM

## 2022-03-15 DIAGNOSIS — I10 PRIMARY HYPERTENSION: Chronic | ICD-10-CM

## 2022-03-15 DIAGNOSIS — E78.5 DYSLIPIDEMIA: ICD-10-CM

## 2022-03-15 DIAGNOSIS — R27.0 ATAXIA: ICD-10-CM

## 2022-03-15 PROCEDURE — 3046F HEMOGLOBIN A1C LEVEL >9.0%: CPT | Performed by: INTERNAL MEDICINE

## 2022-03-15 PROCEDURE — 99214 OFFICE O/P EST MOD 30 MIN: CPT | Performed by: INTERNAL MEDICINE

## 2022-03-15 NOTE — PROGRESS NOTES
Trae Alexandra is a 74 y.o. male    Chief Complaint   Patient presents with   [facility] Follow Up     1. Have you been to the ER, urgent care clinic since your last visit? Hospitalized since your last visit? Yes at [facility] on march 08 2022 for: Patient reports that he woke up this morning and had onset of ataxia, patient reports that he went to bed last night without any complaints, patient reports that he felt off balance, also had decreased hearing in his left ear, and felt that his ear was \"plugged up\". 2. Have you seen or consulted any other health care providers outside of the [address] [place] [address] since your last visit? Include any pap smears or colon screening.  No

## 2022-03-16 ENCOUNTER — HOME CARE VISIT (OUTPATIENT)
Dept: SCHEDULING | Facility: HOME HEALTH | Age: 67
End: 2022-03-16
Payer: COMMERCIAL

## 2022-03-16 VITALS
TEMPERATURE: 99.2 F | RESPIRATION RATE: 16 BRPM | OXYGEN SATURATION: 97 % | SYSTOLIC BLOOD PRESSURE: 120 MMHG | DIASTOLIC BLOOD PRESSURE: 70 MMHG | HEART RATE: 81 BPM

## 2022-03-16 PROCEDURE — G0151 HHCP-SERV OF PT,EA 15 MIN: HCPCS

## 2022-03-17 ENCOUNTER — HOME CARE VISIT (OUTPATIENT)
Dept: SCHEDULING | Facility: HOME HEALTH | Age: 67
End: 2022-03-17
Payer: COMMERCIAL

## 2022-03-17 ENCOUNTER — PATIENT OUTREACH (OUTPATIENT)
Dept: CASE MANAGEMENT | Age: 67
End: 2022-03-17

## 2022-03-17 ENCOUNTER — OFFICE VISIT (OUTPATIENT)
Dept: CARDIOLOGY CLINIC | Age: 67
End: 2022-03-17
Payer: COMMERCIAL

## 2022-03-17 VITALS
OXYGEN SATURATION: 96 % | HEIGHT: 67 IN | DIASTOLIC BLOOD PRESSURE: 72 MMHG | RESPIRATION RATE: 18 BRPM | HEART RATE: 69 BPM | BODY MASS INDEX: 25.71 KG/M2 | WEIGHT: 163.8 LBS | SYSTOLIC BLOOD PRESSURE: 130 MMHG

## 2022-03-17 VITALS
DIASTOLIC BLOOD PRESSURE: 71 MMHG | TEMPERATURE: 98.7 F | SYSTOLIC BLOOD PRESSURE: 140 MMHG | HEART RATE: 67 BPM | OXYGEN SATURATION: 96 %

## 2022-03-17 VITALS
TEMPERATURE: 98.5 F | DIASTOLIC BLOOD PRESSURE: 78 MMHG | HEART RATE: 72 BPM | SYSTOLIC BLOOD PRESSURE: 130 MMHG | OXYGEN SATURATION: 98 % | RESPIRATION RATE: 16 BRPM

## 2022-03-17 DIAGNOSIS — E78.5 DYSLIPIDEMIA: ICD-10-CM

## 2022-03-17 DIAGNOSIS — I47.29 NSVT (NONSUSTAINED VENTRICULAR TACHYCARDIA): Primary | ICD-10-CM

## 2022-03-17 DIAGNOSIS — I63.30 CEREBROVASCULAR ACCIDENT (CVA) DUE TO THROMBOSIS OF CEREBRAL ARTERY (HCC): ICD-10-CM

## 2022-03-17 DIAGNOSIS — I63.9 CRYPTOGENIC STROKE (HCC): ICD-10-CM

## 2022-03-17 DIAGNOSIS — I10 PRIMARY HYPERTENSION: ICD-10-CM

## 2022-03-17 PROCEDURE — 3017F COLORECTAL CA SCREEN DOC REV: CPT | Performed by: INTERNAL MEDICINE

## 2022-03-17 PROCEDURE — G0151 HHCP-SERV OF PT,EA 15 MIN: HCPCS

## 2022-03-17 PROCEDURE — G0152 HHCP-SERV OF OT,EA 15 MIN: HCPCS

## 2022-03-17 PROCEDURE — G8752 SYS BP LESS 140: HCPCS | Performed by: INTERNAL MEDICINE

## 2022-03-17 PROCEDURE — G8510 SCR DEP NEG, NO PLAN REQD: HCPCS | Performed by: INTERNAL MEDICINE

## 2022-03-17 PROCEDURE — G8419 CALC BMI OUT NRM PARAM NOF/U: HCPCS | Performed by: INTERNAL MEDICINE

## 2022-03-17 PROCEDURE — G8427 DOCREV CUR MEDS BY ELIG CLIN: HCPCS | Performed by: INTERNAL MEDICINE

## 2022-03-17 PROCEDURE — 93000 ELECTROCARDIOGRAM COMPLETE: CPT | Performed by: INTERNAL MEDICINE

## 2022-03-17 PROCEDURE — 1101F PT FALLS ASSESS-DOCD LE1/YR: CPT | Performed by: INTERNAL MEDICINE

## 2022-03-17 PROCEDURE — 99205 OFFICE O/P NEW HI 60 MIN: CPT | Performed by: INTERNAL MEDICINE

## 2022-03-17 PROCEDURE — G8754 DIAS BP LESS 90: HCPCS | Performed by: INTERNAL MEDICINE

## 2022-03-17 PROCEDURE — G8536 NO DOC ELDER MAL SCRN: HCPCS | Performed by: INTERNAL MEDICINE

## 2022-03-17 NOTE — PROGRESS NOTES
Chief Complaint   Patient presents with   [facility] Follow Up     [facility] 3/8-3/12/22 for cva - ? ILR- denies cardiac sx      1. Have you been to the ER, urgent care clinic since your last visit? Hospitalized since your last visit? No     2. Have you seen or consulted any other health care providers outside of the [address] [place] [address] since your last visit? Include any pap smears or colon screening.   No

## 2022-03-17 NOTE — PROGRESS NOTES
Subjective:      Reynolds Cooks is a 74 y.o. male is here for EP consult. Here to reestablish care. He had a cryptogenic stroke and was inpatient at [facility].  Denies cp/sob        Patient Active Problem List    Diagnosis Date Noted    Cerebrovascular accident (CVA) due to thrombosis of cerebral artery ([address] 03/10/2022    Ataxia 03/08/2022    Chronic abdominal wound infection, sequela 01/29/2021    Moderate protein-calorie malnutrition ([address] 12/20/2020    ARF (acute renal failure) ([address] 12/12/2020    Phlegmon 12/11/2020    Abdominal pain 12/10/2020    Right groin pain 12/07/2020    Vasovagal syncope 07/21/2020    Bell's palsy 04/28/2019    ICD (implantable cardioverter-defibrillator) in place 12/21/2018    Hypertension 12/21/2018    Gastritis 12/06/2018    NSVT (nonsustained ventricular tachycardia) ([address] 11/21/2018    Abnormal EKG 11/18/2018    Brugada syndrome 11/18/2018    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 ([address] 02/18/2018    Type 2 diabetes mellitus with diabetic polyneuropathy, with long-term current use of insulin ([address] 02/18/2018    Uncontrolled type 2 diabetes mellitus with complication, with long-term current use of insulin ([address] 02/18/2018    Dyslipidemia 09/25/2014    Eczema 25/18/5468    Umbilical hernia 30/62/0041    Femoral bruit 09/25/2014    Mitral valve prolapse 09/25/2014      Fallon Guillaume MD  Past Medical History:   Diagnosis Date    Abscess 12/2020    left side of abdomen    Diabetes ([address]     Hyperlipidemia     Hypertension     ICD (implantable cardioverter-defibrillator) in place     NSVT (nonsustained ventricular tachycardia) ([address] 11/21/2018    EPS 11/21/2018 VT/VF     Right groin pain 12/7/2020    Stroke ([address]     Syncope     Valvular heart disease       Past Surgical History:   Procedure Laterality Date    COLONOSCOPY  1/2/2015         HX HEART CATHETERIZATION      HX HEENT      l cararact removal    HX IMPLANTABLE CARDIOVERTER DEFIBRILLATOR       No Known Allergies   Family History   Problem Relation Age of Onset    Diabetes Mother     Hypertension Mother     Stroke Mother     negative for cardiac disease  Social History     Socioeconomic History    Marital status: SINGLE    Number of children: 0   Occupational History    Occupation: [employer]   Tobacco Use    Smoking status: Never Smoker    Smokeless tobacco: Never Used   Vaping Use    Vaping Use: Never used   Substance and Sexual Activity    Alcohol use: No    Drug use: No    Sexual activity: Yes     Partners: Female     Birth control/protection: None     Current Outpatient Medications   Medication Sig    aspirin (ASPIRIN) 325 mg tablet Take 1 Tablet by mouth daily.  insulin detemir U-100 (Levemir FlexTouch U-100 Insuln) 100 unit/mL (3 mL) inpn 30 Units by SubCUTAneous route two (2) times a day for 30 days. (Patient taking differently: 34 Units by SubCUTAneous route two (2) times a day. 34 units by subcutaneous route 2 times a day)    metFORMIN (GLUCOPHAGE) 1,000 mg tablet TAKE 1 TABLET BY MOUTH IN THE EVENING BEFORE DINNER    Trulicity 1.5 TF/5.0 mL sub-q pen INJECT 0.5 ML UNDER SKIN EVERY SEVEN (7) DAYS.  amLODIPine (NORVASC) 10 mg tablet TAKE ONE TABLET BY MOUTH DAILY    atorvastatin (LIPITOR) 20 mg tablet TAKE ONE TABLET BY MOUTH DAILY    Insulin Needles, Disposable, 31 gauge x 1/4\" ndle USE WITH INSULIN PENS THREE TIMES PER DAY    glucose blood VI test strips (ASCENSIA AUTODISC VI, ONE TOUCH ULTRA TEST VI) strip 3 times daily before meals    insulin lispro (HumaLOG U-100 Insulin) 100 unit/mL cartridge by SubCUTAneous route.  If blood sugar 150 to 200 use 4 units Subcutaneous  If blood sugar 201 to 250 use 6 units subcutaneous  If blood sugar 251 to 300 use 8 units subcutaneous  If blood sugar 302 to 350 use 10 units subcutaneous (Patient not taking: Reported on 3/14/2022)    polyethylene glycol (MIRALAX) 17 gram packet Take 1 Packet by mouth two (2) times a day. (Patient not taking: Reported on 3/14/2022)    aspirin delayed-release 81 mg tablet Take 81 mg by mouth daily. (Patient not taking: Reported on 3/17/2022)     No current facility-administered medications for this visit. Vitals:    03/17/22 0940   BP: 130/72   Pulse: 69   Resp: 18   SpO2: 96%   Weight: 163 lb 12.8 oz (74.3 kg)   Height: 5' 7\" (1.702 m)       I have reviewed the nurses notes, vitals, problem list, allergy list, medical history, family, social history and medications. Review of Symptoms:    General: Pt denies excessive weight gain or loss. Pt is able to conduct ADL's  HEENT: Denies blurred vision, headaches, hearing loss, epistaxis and difficulty swallowing. Respiratory: Denies cough, congestion, shortness of breath, RESENDIZ, wheezing or stridor. Cardiovascular: Denies precordial pain, palpitations, edema or PND  Gastrointestinal: Denies poor appetite, indigestion, abdominal pain or blood in stool  Genitourinary: Denies hematuria, dysuria, increased urinary frequency  Musculoskeletal: Denies joint pain or swelling from muscles or joints  Neurologic: sp cva - difficulty with word finding  Psychiatric: Denies confusion, insomnia, depression  Integumentray: Denies rash, itching or ulcers. Hematologic: Denies easy bruising, bleeding    Physical Exam:      General: Well developed, in no acute distress. HEENT: Eyes - PERRL, no jvd  Heart:  Normal S1/S2 negative S3 or S4. Regular, no murmur, gallop or rub. Respiratory: Clear bilaterally x 4, no wheezing or rales  Abdomen:   Soft, non-tender, bowel sounds are active. Extremities:  No edema, normal cap refill, no cyanosis. Musculoskeletal: No clubbing  Neuro: A&Ox3, dysarthria  Skin: Skin color is normal. No rashes or lesions.  Non diaphoretic, no ulcers or subcutaneous nodule  Vascular: 2+ pulses symmetric in all extremities  Psych - judgement intact and orientation is wnl     Cardiographics    Ekg: nsr    Results for orders placed or performed during the hospital encounter of 03/08/22   EKG, 12 LEAD, INITIAL   Result Value Ref Range    Ventricular Rate 65 BPM    Atrial Rate 65 BPM    P-R Interval 142 ms    QRS Duration 90 ms    Q-T Interval 380 ms    QTC Calculation (Bezet) 395 ms    Calculated P Axis 50 degrees    Calculated R Axis -28 degrees    Calculated T Axis 34 degrees    Diagnosis       Normal sinus rhythm  Voltage criteria for left ventricular hypertrophy with repolarization   abnormality  Nonspecific ST abnormality  Leftward axis    When compared with ECG of 20-AUG-2021 18:31,  No significant change was found  Confirmed by Jorgito Rosado M.D., Albina Hof ([id number]) on 3/8/2022 9:37:47 PM           Lab Results   Component Value Date/Time    WBC 4.6 03/09/2022 03:55 AM    HGB 10.4 (L) 03/09/2022 03:55 AM    HCT 32.2 (L) 03/09/2022 03:55 AM    PLATELET 293 11/89/3068 03:55 AM    MCV 85.9 03/09/2022 03:55 AM      Lab Results   Component Value Date/Time    Sodium 136 03/10/2022 04:54 AM    Potassium 4.0 03/10/2022 04:54 AM    Chloride 106 03/10/2022 04:54 AM    CO2 26 03/10/2022 04:54 AM    Anion gap 4 (L) 03/10/2022 04:54 AM    Glucose 127 (H) 03/10/2022 04:54 AM    BUN 25 (H) 03/10/2022 04:54 AM    Creatinine 1.09 03/10/2022 04:54 AM    BUN/Creatinine ratio 23 (H) 03/10/2022 04:54 AM    GFR est AA >60 03/10/2022 04:54 AM    GFR est non-AA >60 03/10/2022 04:54 AM    Calcium 9.2 03/10/2022 04:54 AM    Bilirubin, total 0.4 03/08/2022 01:46 PM    Alk. phosphatase 104 03/08/2022 01:46 PM    Protein, total 7.8 03/08/2022 01:46 PM    Albumin 3.8 03/08/2022 01:46 PM    Globulin 4.0 03/08/2022 01:46 PM    A-G Ratio 1.0 (L) 03/08/2022 01:46 PM    ALT (SGPT) 46 03/08/2022 01:46 PM         Assessment:     Assessment:        ICD-10-CM ICD-9-CM    1. NSVT (nonsustained ventricular tachycardia) (HCC)  I47.2 427.1 AMB POC EKG ROUTINE W/ 12 LEADS, INTER & REP      CBC WITH AUTOMATED DIFF      PROTHROMBIN TIME + INR      METABOLIC PANEL, COMPREHENSIVE   2. Dyslipidemia  E78.5 272.4 CBC WITH AUTOMATED DIFF      PROTHROMBIN TIME + INR      METABOLIC PANEL, COMPREHENSIVE   3. Primary hypertension  I10 401.9 CBC WITH AUTOMATED DIFF      PROTHROMBIN TIME + INR      METABOLIC PANEL, COMPREHENSIVE   4. Cerebrovascular accident (CVA) due to thrombosis of cerebral artery ([address]  I63.30 434.01    5. Cryptogenic stroke (HCC)  I63.9 434.91 CBC WITH AUTOMATED DIFF      PROTHROMBIN TIME + INR      METABOLIC PANEL, COMPREHENSIVE     Orders Placed This Encounter    CBC WITH AUTOMATED DIFF     Standing Status:   Future     Standing Expiration Date:   9/17/2022    PROTHROMBIN TIME + INR     Standing Status:   Future     Standing Expiration Date:   9/91/2583    METABOLIC PANEL, COMPREHENSIVE     Standing Status:   Future     Standing Expiration Date:   9/17/2022    AMB POC EKG ROUTINE W/ 12 LEADS, INTER & REP     Order Specific Question:   Reason for Exam:     Answer:   routine        Plan:   Lauren Morgan is here to reestablish care. He had a cryptogenic stroke earlier this month. His ICD is a single chamber ICD and cannot differentiate AF. It was implanted for VF. He is a candidate for an ILR to r/o AF as a cause of his cryptogenic stroke. I discussed the risks/benefits/alternatives of the procedure with the patient. Risks include (but are not limited to) bleeding, heart block, infection, cva/mi/tamponade/death. The patient understands and agrees to proceed. Thank you for this interesting consultation. Thank you for allowing me to participate in Lauren Morgan 's care.     So Caputo MD, Nikki Adair

## 2022-03-17 NOTE — PROGRESS NOTES
Care Transitions Outreach Attempt    Call within 2 business days of discharge: Yes   Attempted to reach patient for transitions of care follow up. Unable to reach patient. Patient: Avril Maker Patient : 1955 MRN: [id number]    Last Discharge [address]       Complaint Diagnosis Description Type Department Provider    3/8/22 Peripheral Edema; Gait Problem Ataxia . .. ED to Hosp-Admission (Discharged) (ADMIT) Agnieszka Cox MD; Fr. Shoaib .. Was this an external facility discharge? No     Noted following upcoming appointments from discharge chart review:   [address] follow up appointment(s):   Future Appointments   Date Time Provider Chalino Alaniz   3/21/2022 10:30 AM Ocie Severe, MD [facility] BS AMB   3/21/2022 [facility]   3/23/2022 To Be Determined [facility] [address]   3/28/2022 [facility]   3/29/2022  9:30 AM Imani Chavez MD [facility] MAIN BS AMB   3/30/2022 To Be Determined Ishan Arthur, PT [address] [place] [address]   2022  8:45 AM PACEMAKER, RCAM [facility] BS AMB   2022  9:00 AM Analilia Yeager, ANP [facility] BS AMB     Goals      Establish PCP relationships and regularly scheduled appointments. 3/14 Pt.will attend all recommended follow ups with pcp and specialist w/n 30 day period. Pt.scheduled hosp.follow up:  Future Appointments   Date Time Provider   3/17/2022  9:45 AM Nya Olivo MD   3/21/2022 10:30 AM Ocie Severe, MD   2022  8:45 AM PACEMAKER, RCAM   2022  9:00 AM Analilia Yeager, ANP   CTN will follow up with pt.in 5-7 days. -[address]    3/17 Review chart: UTR pt.  -PCP, Saige Regan MD 3/15/22  -(Cardiology) 3/17/22                Understands red flags post discharge.       3/14 Pt.will have someone drive him to the ED immediately if you develop these symptoms: acute changes in your balance or vision, weakness in your face, arm or leg, speech changes or difficulties. -[address]    3/17 UTR.-

## 2022-03-17 NOTE — LETTER
3/17/2022    Patient: Devon Fish   YOB: 1955   Date of Visit: 3/17/2022     Rufina Montoya, [address]  [address]  [place] [id number]  [address] [place]    Dear Rufina Montoya MD,      Thank you for referring Mr. Arnoldo Oates to [facility] for evaluation. My notes for this consultation are attached. If you have questions, please do not hesitate to call me. I look forward to following your patient along with you.       Sincerely,    Gabrielle Alvarez MD

## 2022-03-18 PROBLEM — Z79.4 TYPE 2 DIABETES MELLITUS WITH DIABETIC POLYNEUROPATHY, WITH LONG-TERM CURRENT USE OF INSULIN (HCC): Status: ACTIVE | Noted: 2018-02-18

## 2022-03-18 PROBLEM — G51.0 BELL'S PALSY: Status: ACTIVE | Noted: 2019-04-28

## 2022-03-18 PROBLEM — E11.42 TYPE 2 DIABETES MELLITUS WITH DIABETIC POLYNEUROPATHY, WITH LONG-TERM CURRENT USE OF INSULIN (HCC): Status: ACTIVE | Noted: 2018-02-18

## 2022-03-19 PROBLEM — E44.0 MODERATE PROTEIN-CALORIE MALNUTRITION (HCC): Status: ACTIVE | Noted: 2020-12-20

## 2022-03-19 PROBLEM — R55 VASOVAGAL SYNCOPE: Status: ACTIVE | Noted: 2020-07-21

## 2022-03-19 PROBLEM — L02.91 PHLEGMON: Status: ACTIVE | Noted: 2020-12-11

## 2022-03-19 PROBLEM — I49.8 BRUGADA SYNDROME: Status: ACTIVE | Noted: 2018-11-18

## 2022-03-19 PROBLEM — E13.319 RETINOPATHY DUE TO SECONDARY DIABETES MELLITUS (HCC): Status: ACTIVE | Noted: 2018-02-18

## 2022-03-19 PROBLEM — I63.30 CEREBROVASCULAR ACCIDENT (CVA) DUE TO THROMBOSIS OF CEREBRAL ARTERY (HCC): Status: ACTIVE | Noted: 2022-03-10

## 2022-03-19 PROBLEM — R27.0 ATAXIA: Status: ACTIVE | Noted: 2022-03-08

## 2022-03-19 PROBLEM — I10 HYPERTENSION: Status: ACTIVE | Noted: 2018-12-21

## 2022-03-19 PROBLEM — R94.31 ABNORMAL EKG: Status: ACTIVE | Noted: 2018-11-18

## 2022-03-19 PROBLEM — N17.9 ARF (ACUTE RENAL FAILURE) (HCC): Status: ACTIVE | Noted: 2020-12-12

## 2022-03-19 PROBLEM — L08.9 CHRONIC ABDOMINAL WOUND INFECTION, SEQUELA: Status: ACTIVE | Noted: 2021-01-29

## 2022-03-19 PROBLEM — R10.31 RIGHT GROIN PAIN: Status: ACTIVE | Noted: 2020-12-07

## 2022-03-19 PROBLEM — Z95.810 ICD (IMPLANTABLE CARDIOVERTER-DEFIBRILLATOR) IN PLACE: Status: ACTIVE | Noted: 2018-12-21

## 2022-03-19 PROBLEM — R10.9 ABDOMINAL PAIN: Status: ACTIVE | Noted: 2020-12-10

## 2022-03-19 PROBLEM — S31.109S CHRONIC ABDOMINAL WOUND INFECTION, SEQUELA: Status: ACTIVE | Noted: 2021-01-29

## 2022-03-19 PROBLEM — I47.29 NSVT (NONSUSTAINED VENTRICULAR TACHYCARDIA) (HCC): Status: ACTIVE | Noted: 2018-11-21

## 2022-03-20 PROBLEM — I63.89 BRAINSTEM INFARCT, ACUTE (HCC): Status: ACTIVE | Noted: 2022-03-20

## 2022-03-20 PROBLEM — K29.70 GASTRITIS: Status: ACTIVE | Noted: 2018-12-06

## 2022-03-20 NOTE — PROGRESS NOTES
[facility]  [place] [address]  [address] [place] [address]  Phone:  [phone]  Fax: [phone]       Chief Complaint   Patient presents with   [facility] Follow Up   . SUBJECTIVE:    Gabriel Spears is a 74 y.o. male The patient comes in having been hospitalized most recently for an impairing CVA. He presented because his gait was unsteady to the point where he was falling. He was evaluated and it appeared to be mild weakness on his left side. This lasted for longer than 24 hours and has continued. He had a full workup including an MRI, which was negative. He was seen by Neurology. He felt that in spite of the negative MRI, he probably had an infarct in his mirian area. Often times, small infarcts here may not been seen on MRI scans according to the comments by the neurologist.  In any event, he was treated appropriately, actually taking all the same medicines he was taken before he was admitted. He is fairly well vascular protected. From a diabetic perspective, however, he is quite vague about any blood sugars and did not really give me any blood sugars. He admits to, as usual, taking his insulin on several occasions, but he is now taking everything as he should. Specifically, above and beyond his basal insulin, he is taking his GLP-1 agonist.    He has not been able to work since the event happened. He has a past history of primary hypertension and dyslipidemia. Current Outpatient Medications   Medication Sig Dispense Refill    aspirin (ASPIRIN) 325 mg tablet Take 1 Tablet by mouth daily. 30 Tablet 4    insulin detemir U-100 (Levemir FlexTouch U-100 Insuln) 100 unit/mL (3 mL) inpn 30 Units by SubCUTAneous route two (2) times a day for 30 days. (Patient taking differently: 34 Units by SubCUTAneous route two (2) times a day.  34 units by subcutaneous route 2 times a day) 6 Adjustable Dose Pre-filled Pen Syringe 11    metFORMIN (GLUCOPHAGE) 1,000 mg tablet TAKE 1 TABLET BY MOUTH IN THE EVENING BEFORE DINNER 90 Tablet 2    Trulicity 1.5 WJ/0.1 mL sub-q pen INJECT 0.5 ML UNDER SKIN EVERY SEVEN (7) DAYS. 4 Syringe 11    amLODIPine (NORVASC) 10 mg tablet TAKE ONE TABLET BY MOUTH DAILY 90 Tab 3    atorvastatin (LIPITOR) 20 mg tablet TAKE ONE TABLET BY MOUTH DAILY 90 Tab 3    Insulin Needles, Disposable, 31 gauge x 1/4\" ndle USE WITH INSULIN PENS THREE TIMES PER  Pen Needle 11    glucose blood VI test strips (ASCENSIA AUTODISC VI, ONE TOUCH ULTRA TEST VI) strip 3 times daily before meals 300 Strip 3    insulin lispro (HumaLOG U-100 Insulin) 100 unit/mL cartridge by SubCUTAneous route.  If blood sugar 150 to 200 use 4 units Subcutaneous  If blood sugar 201 to 250 use 6 units subcutaneous  If blood sugar 251 to 300 use 8 units subcutaneous  If blood sugar 302 to 350 use 10 units subcutaneous (Patient not taking: Reported on 3/14/2022) 3 mL 4     Past Medical History:   Diagnosis Date    Abscess 12/2020    left side of abdomen    Diabetes ([address]     Hyperlipidemia     Hypertension     ICD (implantable cardioverter-defibrillator) in place     NSVT (nonsustained ventricular tachycardia) ([facility] 11/21/2018    EPS 11/21/2018 VT/VF     Right groin pain 12/7/2020    Stroke ([facility]     Syncope     Valvular heart disease      Past Surgical History:   Procedure Laterality Date    COLONOSCOPY  1/2/2015         HX HEART CATHETERIZATION      HX HEENT      l cararact removal    HX IMPLANTABLE CARDIOVERTER DEFIBRILLATOR       No Known Allergies      REVIEW OF SYSTEMS:  General: negative for - chills or fever  ENT: negative for - headaches, nasal congestion or tinnitus  Respiratory: negative for - cough, hemoptysis, shortness of breath or wheezing  Cardiovascular : negative for - chest pain, edema, palpitations or shortness of breath  Gastrointestinal: negative for - abdominal pain, blood in stools, heartburn or nausea/vomiting  Genito-Urinary: no dysuria, trouble voiding, or hematuria  Musculoskeletal: negative for - gait disturbance, joint pain, joint stiffness or joint swelling  Neurological: no TIA or stroke symptoms  Hematologic: no bruises, no bleeding, no swollen glands  Integument: no lumps, mole changes, nail changes or rash  Endocrine: no malaise/lethargy or unexpected weight changes      Social History     Socioeconomic History    Marital status: SINGLE    Number of children: 0   Occupational History    Occupation: [employer]   Tobacco Use    Smoking status: Never Smoker    Smokeless tobacco: Never Used   Vaping Use    Vaping Use: Never used   Substance and Sexual Activity    Alcohol use: No    Drug use: No    Sexual activity: Yes     Partners: Female     Birth control/protection: None     Family History   Problem Relation Age of Onset    Diabetes Mother     Hypertension Mother     Stroke Mother        OBJECTIVE:    Visit Vitals  /75 (BP 1 Location: Left upper arm, BP Patient Position: Sitting)   Pulse 71   Temp 97.9 °F (36.6 °C) (Oral)   Resp 18   Ht 5' 7\" (1.702 m)   Wt 161 lb 11.2 oz (73.3 kg)   SpO2 97%   BMI 25.33 kg/m²     CONSTITUTIONAL: well , well nourished, appears age appropriate  EYES: perrla, eom intact  ENMT:moist mucous membranes, pharynx clear  NECK: supple. Thyroid normal  RESPIRATORY: Chest: clear to ascultation and percussion   CARDIOVASCULAR: Heart: regular rate and rhythm  GASTROINTESTINAL: Abdomen: soft, bowel sounds active  HEMATOLOGIC: no pathological lymph nodes palpated  MUSCULOSKELETAL: Extremities: no edema, pulse 1+   INTEGUMENT: No unusual rashes or suspicious skin lesions noted. Nails appear normal.  NEUROLOGIC: non-focal exam   MENTAL STATUS: alert and oriented, appropriate affect      ASSESSMENT:  1. Brainstem infarct, acute ([address]    2. Ataxia    3. Primary hypertension    4. Uncontrolled type 2 diabetes mellitus with complication, with long-term current use of insulin ([address]    5. Dyslipidemia        PLAN:  1. The patient most likely had a brainstem infarct in spite of the fact his MRI was negative. This makes sense. I contemplate repeating the MRI, but it will not serve any useful purpose because the conclusions are basically the same and it will not change the treatment regimen. He will continue the dual anti-platelet therapy for 21 days and then he is relegated to aspirin 81 mg thereafter. He continues to have mild ataxia, but it is not as bad, but it will be difficult at this point for him to go back to work. 2. Blood pressure is acceptable, no adjustments are made. 3. As far as his diabetes is concerned, I will make no adjustments for now and he will continue his Levemir and the Trulicity. 4. He will also continue his statin, which he was taking previously. The dose was not changed. Addendum:    I did change the insulin dose instead of 30 and 30, he will take 36 in the morning and 34 in the evening. He will also continue his metformin 1000 mg b.i.d. He additionally has slight decreased hearing in his left ear and tinnitus. Obviously, this did not represent acoustic neuroma because MRI was negative. He has MRI compatibility of his defibrillator, which allowed MRI to be done. Follow-up and Dispositions    · Return in about 2 weeks (around 3/29/2022).            Drake Dumas MD

## 2022-03-21 ENCOUNTER — OFFICE VISIT (OUTPATIENT)
Dept: CARDIOLOGY CLINIC | Age: 67
End: 2022-03-21
Payer: COMMERCIAL

## 2022-03-21 ENCOUNTER — HOME CARE VISIT (OUTPATIENT)
Dept: SCHEDULING | Facility: HOME HEALTH | Age: 67
End: 2022-03-21
Payer: COMMERCIAL

## 2022-03-21 VITALS
DIASTOLIC BLOOD PRESSURE: 80 MMHG | SYSTOLIC BLOOD PRESSURE: 140 MMHG | RESPIRATION RATE: 16 BRPM | TEMPERATURE: 98.7 F | HEART RATE: 72 BPM | OXYGEN SATURATION: 96 %

## 2022-03-21 VITALS
SYSTOLIC BLOOD PRESSURE: 122 MMHG | BODY MASS INDEX: 25.03 KG/M2 | HEIGHT: 67 IN | HEART RATE: 77 BPM | RESPIRATION RATE: 18 BRPM | DIASTOLIC BLOOD PRESSURE: 60 MMHG | OXYGEN SATURATION: 98 % | WEIGHT: 159.5 LBS

## 2022-03-21 DIAGNOSIS — I63.9 CRYPTOGENIC STROKE (HCC): ICD-10-CM

## 2022-03-21 DIAGNOSIS — Z79.4 TYPE 2 DIABETES MELLITUS WITH DIABETIC POLYNEUROPATHY, WITH LONG-TERM CURRENT USE OF INSULIN (HCC): ICD-10-CM

## 2022-03-21 DIAGNOSIS — E11.42 TYPE 2 DIABETES MELLITUS WITH DIABETIC POLYNEUROPATHY, WITH LONG-TERM CURRENT USE OF INSULIN (HCC): ICD-10-CM

## 2022-03-21 DIAGNOSIS — I10 PRIMARY HYPERTENSION: ICD-10-CM

## 2022-03-21 DIAGNOSIS — I47.29 NSVT (NONSUSTAINED VENTRICULAR TACHYCARDIA): ICD-10-CM

## 2022-03-21 DIAGNOSIS — E78.5 DYSLIPIDEMIA: ICD-10-CM

## 2022-03-21 DIAGNOSIS — I49.8 BRUGADA SYNDROME: Primary | ICD-10-CM

## 2022-03-21 DIAGNOSIS — Z95.810 ICD (IMPLANTABLE CARDIOVERTER-DEFIBRILLATOR) IN PLACE: ICD-10-CM

## 2022-03-21 PROCEDURE — G8752 SYS BP LESS 140: HCPCS | Performed by: INTERNAL MEDICINE

## 2022-03-21 PROCEDURE — 1101F PT FALLS ASSESS-DOCD LE1/YR: CPT | Performed by: INTERNAL MEDICINE

## 2022-03-21 PROCEDURE — 99214 OFFICE O/P EST MOD 30 MIN: CPT | Performed by: INTERNAL MEDICINE

## 2022-03-21 PROCEDURE — G8432 DEP SCR NOT DOC, RNG: HCPCS | Performed by: INTERNAL MEDICINE

## 2022-03-21 PROCEDURE — G0151 HHCP-SERV OF PT,EA 15 MIN: HCPCS

## 2022-03-21 PROCEDURE — 3046F HEMOGLOBIN A1C LEVEL >9.0%: CPT | Performed by: INTERNAL MEDICINE

## 2022-03-21 PROCEDURE — G8536 NO DOC ELDER MAL SCRN: HCPCS | Performed by: INTERNAL MEDICINE

## 2022-03-21 PROCEDURE — 2022F DILAT RTA XM EVC RTNOPTHY: CPT | Performed by: INTERNAL MEDICINE

## 2022-03-21 PROCEDURE — G8427 DOCREV CUR MEDS BY ELIG CLIN: HCPCS | Performed by: INTERNAL MEDICINE

## 2022-03-21 PROCEDURE — G8420 CALC BMI NORM PARAMETERS: HCPCS | Performed by: INTERNAL MEDICINE

## 2022-03-21 PROCEDURE — G8754 DIAS BP LESS 90: HCPCS | Performed by: INTERNAL MEDICINE

## 2022-03-21 PROCEDURE — 3017F COLORECTAL CA SCREEN DOC REV: CPT | Performed by: INTERNAL MEDICINE

## 2022-03-21 NOTE — PROGRESS NOTES
Chief Complaint   Patient presents with    Hypertension     Follow up - denies cardiac sx      1. Have you been to the ER, urgent care clinic since your last visit? Hospitalized since your last visit? No     2. Have you seen or consulted any other health care providers outside of the [address] [place] [address] since your last visit? Include any pap smears or colon screening.   No

## 2022-03-21 NOTE — LETTER
3/21/2022    Patient: Yaquelin Mcgovern   YOB: 1955   Date of Visit: 3/21/2022     Jo Ann Stephenson, [address] [facility]  [address] [address]  [place] [id number]  [address] [facility] [address]    Dear Jo Ann Stephenson MD,      Thank you for referring Mr. Sienna Duong to [facility] for evaluation. My notes for this consultation are attached. If you have questions, please do not hesitate to call me. I look forward to following your patient along with you.       Sincerely,    Tanna Zamora MD

## 2022-03-21 NOTE — PROGRESS NOTES
[address] [place] [address]  [phone]     Subjective:      Darlene Ingram is a 74 y.o. male is here for routine f/u. Last seen by me in January 2019. Recently seen at [facility] with cryptogenic CVA, and subsequently followed up with Dr. Sophie Dugan who is planning on implanting loop monitor for monitoring for atrial fibrillation. The patient denies chest pain/ shortness of breath, orthopnea, PND, LE edema, palpitations, syncope, or presyncope.        Patient Active Problem List    Diagnosis Date Noted    Brainstem infarct, acute ([address] 03/20/2022    Cerebrovascular accident (CVA) due to thrombosis of cerebral artery ([address] 03/10/2022    Ataxia 03/08/2022    Chronic abdominal wound infection, sequela 01/29/2021    ARF (acute renal failure) ([address] 12/12/2020    Phlegmon 12/11/2020    Abdominal pain 12/10/2020    Right groin pain 12/07/2020    Vasovagal syncope 07/21/2020    Bell's palsy 04/28/2019    ICD (implantable cardioverter-defibrillator) in place 12/21/2018    Hypertension 12/21/2018    Gastritis 12/06/2018    NSVT (nonsustained ventricular tachycardia) ([address] 11/21/2018    Abnormal EKG 11/18/2018    Brugada syndrome 11/18/2018    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 ([address] 02/18/2018    Type 2 diabetes mellitus with diabetic polyneuropathy, with long-term current use of insulin ([address] 02/18/2018    Uncontrolled type 2 diabetes mellitus with complication, with long-term current use of insulin ([address] 02/18/2018    Dyslipidemia 09/25/2014    Eczema 33/86/5561    Umbilical hernia 99/62/3322    Femoral bruit 09/25/2014    Mitral valve prolapse 09/25/2014      Niranjan Bang MD  Past Medical History:   Diagnosis Date    Abscess 12/2020    left side of abdomen    Diabetes ([address]     Hyperlipidemia     Hypertension     ICD (implantable cardioverter-defibrillator) in place     NSVT (nonsustained ventricular tachycardia) ([address] 11/21/2018    EPS 11/21/2018 VT/VF     Right groin pain 12/7/2020    Stroke [facility]     Syncope     Valvular heart disease       Past Surgical History:   Procedure Laterality Date    COLONOSCOPY  1/2/2015         HX HEART CATHETERIZATION      HX HEENT      l cararact removal    HX IMPLANTABLE CARDIOVERTER DEFIBRILLATOR       No Known Allergies   Family History   Problem Relation Age of Onset    Diabetes Mother     Hypertension Mother     Stroke Mother       Social History     Socioeconomic History    Marital status: SINGLE     Spouse name: Not on file    Number of children: 0    Years of education: Not on file    Highest education level: Not on file   Occupational History    Occupation: [employer]   Tobacco Use    Smoking status: Never Smoker    Smokeless tobacco: Never Used   Vaping Use    Vaping Use: Never used   Substance and Sexual Activity    Alcohol use: No    Drug use: No    Sexual activity: Yes     Partners: Female     Birth control/protection: None   Other Topics Concern    Not on file   Social History Narrative    Not on file     Social Determinants of Health     Financial Resource Strain:     Difficulty of Paying Living Expenses: Not on file   Food Insecurity:     Worried About Running Out of Food in the Last Year: Not on file    Shelley of Food in the Last Year: Not on file   Transportation Needs:     Lack of Transportation (Medical): Not on file    Lack of Transportation (Non-Medical):  Not on file   Physical Activity:     Days of Exercise per Week: Not on file    Minutes of Exercise per Session: Not on file   Stress:     Feeling of Stress : Not on file   Social Connections:     Frequency of Communication with Friends and Family: Not on file    Frequency of Social Gatherings with Friends and Family: Not on file    Attends Bahai Services: Not on file    Active Member of Clubs or Organizations: Not on file    Attends Club or Organization Meetings: Not on file    Marital Status: Not on file Intimate Partner Violence:     Fear of Current or Ex-Partner: Not on file    Emotionally Abused: Not on file    Physically Abused: Not on file    Sexually Abused: Not on file   Housing Stability:     Unable to Pay for Housing in the Last Year: Not on file    Number of [place] in the Last Year: Not on file    Unstable Housing in the Last Year: Not on file      Current Outpatient Medications   Medication Sig    aspirin (ASPIRIN) 325 mg tablet Take 1 Tablet by mouth daily.  insulin detemir U-100 (Levemir FlexTouch U-100 Insuln) 100 unit/mL (3 mL) inpn 30 Units by SubCUTAneous route two (2) times a day for 30 days. (Patient taking differently: 34 Units by SubCUTAneous route two (2) times a day. 34 units by subcutaneous route 2 times a day)    metFORMIN (GLUCOPHAGE) 1,000 mg tablet TAKE 1 TABLET BY MOUTH IN THE EVENING BEFORE DINNER    Trulicity 1.5 BY/4.9 mL sub-q pen INJECT 0.5 ML UNDER SKIN EVERY SEVEN (7) DAYS.  amLODIPine (NORVASC) 10 mg tablet TAKE ONE TABLET BY MOUTH DAILY    atorvastatin (LIPITOR) 20 mg tablet TAKE ONE TABLET BY MOUTH DAILY    Insulin Needles, Disposable, 31 gauge x 1/4\" ndle USE WITH INSULIN PENS THREE TIMES PER DAY    glucose blood VI test strips (ASCENSIA AUTODISC VI, ONE TOUCH ULTRA TEST VI) strip 3 times daily before meals    insulin lispro (HumaLOG U-100 Insulin) 100 unit/mL cartridge by SubCUTAneous route. If blood sugar 150 to 200 use 4 units Subcutaneous  If blood sugar 201 to 250 use 6 units subcutaneous  If blood sugar 251 to 300 use 8 units subcutaneous  If blood sugar 302 to 350 use 10 units subcutaneous (Patient not taking: Reported on 3/14/2022)     No current facility-administered medications for this visit.          Review of Symptoms:  11 systems reviewed, negative other than as stated in the HPI    Physical ExamPhysical Exam:    Vitals:    03/21/22 1027   BP: 122/60   Pulse: 77   Resp: 18   SpO2: 98%   Weight: 159 lb 8 oz (72.3 kg)   Height: 5' 7\" (1.702 m)     Body mass index is 24.98 kg/m². General PE  Gen:  NAD  Mental Status - Alert. General Appearance - Not in acute distress. HEENT:  PERRL, no carotid bruits or JVD  Chest and Lung Exam   Inspection: Accessory muscles - No use of accessory muscles in breathing. Auscultation:   Breath sounds: - Normal.   Cardiovascular   Inspection: Jugular vein - Bilateral - Inspection Normal.   Palpation/Percussion:   Apical Impulse: - Normal.   Auscultation: Rhythm - Regular. Heart Sounds - S1 WNL and S2 WNL. No S3 or S4. Murmurs & Other Heart Sounds: Auscultation of the heart reveals - No Murmurs. Peripheral Vascular   Upper Extremity: Inspection - Bilateral - No Cyanotic nailbeds or Digital clubbing. Lower Extremity:   Palpation: Edema - Bilateral - No edema. Abdomen:   Soft, non-tender, bowel sounds are active.   Neuro: A&O times 3, CN and motor grossly WNL    Labs:   Lab Results   Component Value Date/Time    Cholesterol, total 155 03/09/2022 03:55 AM    Cholesterol, total 151 06/09/2020 04:19 PM    Cholesterol, total 165 03/13/2019 03:23 AM    Cholesterol, total 135 02/27/2019 12:14 PM    Cholesterol, total 192 01/18/2018 12:38 PM    HDL Cholesterol 88 03/09/2022 03:55 AM    HDL Cholesterol 63 06/09/2020 04:19 PM    HDL Cholesterol 67 03/13/2019 03:23 AM    HDL Cholesterol 59 02/27/2019 12:14 PM    HDL Cholesterol 63 01/18/2018 12:38 PM    LDL, calculated 52.8 03/09/2022 03:55 AM    LDL, calculated 74 06/09/2020 04:19 PM    LDL, calculated 82.6 03/13/2019 03:23 AM    LDL, calculated 66 02/27/2019 12:14 PM    LDL, calculated 106 (H) 01/18/2018 12:38 PM    Triglyceride 71 03/09/2022 03:55 AM    Triglyceride 70 06/09/2020 04:19 PM    Triglyceride 77 03/13/2019 03:23 AM    Triglyceride 48 02/27/2019 12:14 PM    Triglyceride 117 01/18/2018 12:38 PM    CHOL/HDL Ratio 1.8 03/09/2022 03:55 AM    CHOL/HDL Ratio 2.5 03/13/2019 03:23 AM     Lab Results   Component Value Date/Time     03/09/2022 03:59 AM Lab Results   Component Value Date/Time    Sodium 136 03/10/2022 04:54 AM    Potassium 4.0 03/10/2022 04:54 AM    Chloride 106 03/10/2022 04:54 AM    CO2 26 03/10/2022 04:54 AM    Anion gap 4 (L) 03/10/2022 04:54 AM    Glucose 127 (H) 03/10/2022 04:54 AM    BUN 25 (H) 03/10/2022 04:54 AM    Creatinine 1.09 03/10/2022 04:54 AM    BUN/Creatinine ratio 23 (H) 03/10/2022 04:54 AM    GFR est AA >60 03/10/2022 04:54 AM    GFR est non-AA >60 03/10/2022 04:54 AM    Calcium 9.2 03/10/2022 04:54 AM    Bilirubin, total 0.4 03/08/2022 01:46 PM    Alk. phosphatase 104 03/08/2022 01:46 PM    Protein, total 7.8 03/08/2022 01:46 PM    Albumin 3.8 03/08/2022 01:46 PM    Globulin 4.0 03/08/2022 01:46 PM    A-G Ratio 1.0 (L) 03/08/2022 01:46 PM    ALT (SGPT) 46 03/08/2022 01:46 PM          Assessment:     Assessment:        ICD-10-CM ICD-9-CM    1. Cryptogenic stroke ([facility])  I63.9 434.91    2. NSVT (nonsustained ventricular tachycardia) ([facility])  I47.2 427.1    3. Dyslipidemia  E78.5 272.4    4. Primary hypertension  I10 401.9    5. ICD (implantable cardioverter-defibrillator) in place  Z95.810 V45.02    6. Type 2 diabetes mellitus with diabetic polyneuropathy, with long-term current use of insulin (HCC)  E11.42 250.60     Z79.4 357.2      V58.67        No orders of the defined types were placed in this encounter. Plan:     Patient presents for overdue follow-up of Brugada syndrome, hypertension, and hyperlipidemia     Syncope  In the setting of Brugada syndrome, inducible VT/VF s/p single lead AICD 11/18  ECHO 11/9/18: EF 55-60%,mild LVH, mild LAE, mild MR, RVSP 40 mmHg  Echo 3/10/2022: EF 55 to 60%, no significant valvular pathology  CATH 11/18/18: showed some mild calcification LAD, L Main  CARDIAC MRI 11/19/18: severe LVH, EF 60%, RVEF 50%.  No areas of myocardial enhancement  Brugada syndrome confirmed by [facility] testing  Last ICD check 3/10/2022 no ventricular events     Cryptogenic CVA March 2022:  He had a cryptogenic stroke earlier this month. His ICD is a single chamber ICD and cannot differentiate AF. It was implanted for VF. He is a candidate for an ILR to r/o AF as a cause of his cryptogenic stroke.     HLD  LDL 59 3/9/2022 on statin. Lipids and labs followed by PCP.       HTN  Controlled with Amlodipine (recently initiated by PCP)     DM  On insulin therapy        Counseled on diet and light to moderate exercise- eventual goal of 30-60 minutes 5-7 times a week as per AHA guidelines. Patient states Dr. Beba Ojeda said that light to moderate exercise is okay with his Brugada syndrome and functional ICD without any ventricular events. Follow-up in 1 year, sooner as needed.     Nasreen Harman MD

## 2022-03-23 ENCOUNTER — HOME CARE VISIT (OUTPATIENT)
Dept: SCHEDULING | Facility: HOME HEALTH | Age: 67
End: 2022-03-23
Payer: COMMERCIAL

## 2022-03-23 VITALS
OXYGEN SATURATION: 98 % | DIASTOLIC BLOOD PRESSURE: 80 MMHG | HEART RATE: 79 BPM | RESPIRATION RATE: 16 BRPM | TEMPERATURE: 98.4 F | SYSTOLIC BLOOD PRESSURE: 118 MMHG

## 2022-03-23 PROCEDURE — G0151 HHCP-SERV OF PT,EA 15 MIN: HCPCS

## 2022-03-24 ENCOUNTER — HOME CARE VISIT (OUTPATIENT)
Dept: SCHEDULING | Facility: HOME HEALTH | Age: 67
End: 2022-03-24
Payer: COMMERCIAL

## 2022-03-24 VITALS
OXYGEN SATURATION: 98 % | TEMPERATURE: 98 F | HEART RATE: 67 BPM | SYSTOLIC BLOOD PRESSURE: 130 MMHG | DIASTOLIC BLOOD PRESSURE: 70 MMHG

## 2022-03-24 PROBLEM — I63.89 BRAINSTEM INFARCT, ACUTE (HCC): Status: ACTIVE | Noted: 2022-03-20

## 2022-03-24 PROCEDURE — G0152 HHCP-SERV OF OT,EA 15 MIN: HCPCS

## 2022-03-25 ENCOUNTER — PATIENT OUTREACH (OUTPATIENT)
Dept: CASE MANAGEMENT | Age: 67
End: 2022-03-25

## 2022-03-25 NOTE — PROGRESS NOTES
Care Transitions Follow Up Call    Care Transition Nurse (CTN) contacted the patient by telephone to follow up after admission on 3/8/22. Verified name and  with patient as identifiers. Addressed changes since last contact: Follow up appointment completed? yes. Was follow up appointment scheduled within 7 days of discharge? yes. [facility] follow up appointment(s):   Future Appointments   Date Time Provider Chalino Alaniz   3/29/2022  9:30 AM Rodney Stover MD [facility] MAIN BS AMB   3/30/2022 To Be Determined Sammye Chamber, PT [facility]   2022 To Be Determined Sammye Chamber, PT [facility] [place] [address]   2022  8:45 AM PACEMAKER, [facility]AM [facility] BS AMB   2022  9:00 AM Analilia Yeager, ANP [facility] BS AMB   3/27/2023  9:20 [facility]     [facility] follow up appointment(s): saw hearing specialist 3/25/22    CTN provided contact information for future needs. Plan for follow-up call in 7-10 days based on severity of symptoms and risk factors. Goals Addressed                 This Visit's Progress     Establish PCP relationships and regularly scheduled appointments. 3/14 Pt.will attend all recommended follow ups with pcp and specialist w/n 30 day period. Pt.scheduled hosp.follow up:  Future Appointments   Date Time Provider   3/17/2022  9:45 AM Jose Alejandro Quinteros MD   3/21/2022 10:30 AM Tra Muñoz MD   2022  8:45 AM PACEMAKER, RCAM   2022  9:00 AM Analilia Yeager, ANP   CTN will follow up with pt.in 5-7 days. -[address]    3/17 Review chart: UTR pt.  -PCP, Naomi Monroy MD 3/15/22  -(Cardiology) 3/17/22     3/25/22 patient attended PCP And cardio appts. Will continue to report completion of appts. JUSTINE       Understands red flags post discharge.         3/14 Pt.will have someone drive him to the ED immediately if you develop these symptoms: acute changes in your balance or vision, weakness in your face, arm or leg, speech changes or difficulties. -[facility]    3/17 UTR.-    3/25/22 Patient denies is practicing balance exercises and has seen an improvement, denies vision changes , has hearing issue and saw specialist today. Will report continued improvement with balance at next outreach.  JUSTINE

## 2022-03-29 ENCOUNTER — TRANSCRIBE ORDER (OUTPATIENT)
Dept: SCHEDULING | Age: 67
End: 2022-03-29

## 2022-03-29 ENCOUNTER — OFFICE VISIT (OUTPATIENT)
Dept: INTERNAL MEDICINE CLINIC | Age: 67
End: 2022-03-29
Payer: COMMERCIAL

## 2022-03-29 VITALS
BODY MASS INDEX: 24.6 KG/M2 | OXYGEN SATURATION: 97 % | WEIGHT: 156.7 LBS | TEMPERATURE: 94.6 F | SYSTOLIC BLOOD PRESSURE: 137 MMHG | HEART RATE: 65 BPM | HEIGHT: 67 IN | RESPIRATION RATE: 14 BRPM | DIASTOLIC BLOOD PRESSURE: 84 MMHG

## 2022-03-29 DIAGNOSIS — R27.0 ATAXIA: Primary | ICD-10-CM

## 2022-03-29 DIAGNOSIS — E11.42 TYPE 2 DIABETES MELLITUS WITH DIABETIC POLYNEUROPATHY, WITH LONG-TERM CURRENT USE OF INSULIN (HCC): ICD-10-CM

## 2022-03-29 DIAGNOSIS — H91.92 DECREASED HEARING OF LEFT EAR: ICD-10-CM

## 2022-03-29 DIAGNOSIS — H83.2X3 HYPERACTIVE LABYRINTH, BILATERAL: Primary | ICD-10-CM

## 2022-03-29 DIAGNOSIS — D33.3 ACOUSTIC NEUROMA (HCC): ICD-10-CM

## 2022-03-29 DIAGNOSIS — I63.89 BRAINSTEM INFARCT, ACUTE (HCC): ICD-10-CM

## 2022-03-29 DIAGNOSIS — H91.20 SUDDEN HEARING LOSS: ICD-10-CM

## 2022-03-29 DIAGNOSIS — Z79.4 TYPE 2 DIABETES MELLITUS WITH DIABETIC POLYNEUROPATHY, WITH LONG-TERM CURRENT USE OF INSULIN (HCC): ICD-10-CM

## 2022-03-29 DIAGNOSIS — H93.12 TINNITUS OF LEFT EAR: ICD-10-CM

## 2022-03-29 PROCEDURE — 99214 OFFICE O/P EST MOD 30 MIN: CPT | Performed by: INTERNAL MEDICINE

## 2022-03-29 PROCEDURE — 3046F HEMOGLOBIN A1C LEVEL >9.0%: CPT | Performed by: INTERNAL MEDICINE

## 2022-03-29 NOTE — PROGRESS NOTES
Rm    Chief Complaint   Patient presents with   [facility] Follow Up     Balance was off        Visit Vitals  /84 (BP 1 Location: Right upper arm, BP Patient Position: Sitting, BP Cuff Size: Adult)   Pulse 65   Temp (!) 94.6 °F (34.8 °C) (Oral)   Resp 14   Ht 5' 7\" (1.702 m)   Wt 156 lb 11.2 oz (71.1 kg)   SpO2 97%   BMI 24.54 kg/m²        1. Have you been to the ER, urgent care clinic since your last visit? Hospitalized since your last visit? Yes When: 3/8/22-3/12/22 [facility]    2. Have you seen or consulted any other health care providers outside of the [address] [place] [address] since your last visit? Include any pap smears or colon screening. No     Health Maintenance Due   Topic Date Due    Hepatitis C Screening  Never done    Shingrix Vaccine Age 50> (1 of 2) Never done    Pneumococcal 65+ years (1 of 1 - PPSV23) Never done    Eye Exam Retinal or Dilated  03/16/2020    MICROALBUMIN Q1  06/09/2021    COVID-19 Vaccine (3 - Booster for Moderna series) 07/27/2021    Flu Vaccine (1) 09/01/2021        3 most recent PHQ Screens 3/29/2022   Little interest or pleasure in doing things Not at all   Feeling down, depressed, irritable, or hopeless Not at all   Total Score PHQ 2 0        Fall Risk Assessment, last 12 mths 3/17/2022   Able to walk? Yes   Fall in past 12 months? 1   Do you feel unsteady? 0   Are you worried about falling 0   Is TUG test greater than 12 seconds? -   Is the gait abnormal? 0   Number of falls in past 12 months 1   Fall with injury?  0       Learning Assessment 4/15/2019   PRIMARY LEARNER Patient   HIGHEST LEVEL OF EDUCATION - PRIMARY LEARNER  GRADUATED HIGH SCHOOL OR GED   BARRIERS PRIMARY LEARNER NONE   CO-LEARNER CAREGIVER No   PRIMARY LANGUAGE ENGLISH   LEARNER PREFERENCE PRIMARY DEMONSTRATION   ANSWERED BY patient   RELATIONSHIP SELF

## 2022-03-30 ENCOUNTER — HOME CARE VISIT (OUTPATIENT)
Dept: SCHEDULING | Facility: HOME HEALTH | Age: 67
End: 2022-03-30
Payer: COMMERCIAL

## 2022-03-30 VITALS
RESPIRATION RATE: 16 BRPM | OXYGEN SATURATION: 97 % | HEART RATE: 68 BPM | SYSTOLIC BLOOD PRESSURE: 130 MMHG | DIASTOLIC BLOOD PRESSURE: 80 MMHG | TEMPERATURE: 98.4 F

## 2022-03-30 PROCEDURE — G0151 HHCP-SERV OF PT,EA 15 MIN: HCPCS

## 2022-03-30 NOTE — PROGRESS NOTES
[facility]  [place] [address]  [address] [facility] [address]  Phone:  [phone]  Fax: [phone]       Chief Complaint   Patient presents with   [facility] Follow Up     Balance was off   . SUBJECTIVE:    Pauly Mendoza is a 74 y.o. male comes in for return visit stating that his walking is for the most part back to normal.  He does get little unsteady on occasion, but it has improved significantly. It is felt that he had a brainstem infarct that did not reveal itself on MRI scan. Allegedly according to neurologist often times this can happen with small brainstem infarcts. The patient saw ENT and they injected his left ear with some agent that I am not familiar with and I am not entirely sure of the reason because I do not have the information from the otolaryngologist.  He suggested to the patient that he might well have acoustic neuroma leading to ataxia, decreased hearing and tinnitus that all started concomitantly. Apparently, the balance problem does not necessarily have to be vertiginous. He suggested a repeat MRI scan with contrast.    He states that his diabetes has been doing quite well. He has a past history of primary hypertension, as well as dyslipidemia. Current Outpatient Medications   Medication Sig Dispense Refill    aspirin (ASPIRIN) 325 mg tablet Take 1 Tablet by mouth daily.  30 Tablet 4    metFORMIN (GLUCOPHAGE) 1,000 mg tablet TAKE 1 TABLET BY MOUTH IN THE EVENING BEFORE DINNER 90 Tablet 2    Trulicity 1.5 OG/1.9 mL sub-q pen INJECT 0.5 ML UNDER SKIN EVERY SEVEN (7) DAYS. 4 Syringe 11    amLODIPine (NORVASC) 10 mg tablet TAKE ONE TABLET BY MOUTH DAILY 90 Tab 3    atorvastatin (LIPITOR) 20 mg tablet TAKE ONE TABLET BY MOUTH DAILY 90 Tab 3    Insulin Needles, Disposable, 31 gauge x 1/4\" ndle USE WITH INSULIN PENS THREE TIMES PER  Pen Needle 11    glucose blood VI test strips (ASCENSIA AUTODISC VI, ONE TOUCH ULTRA TEST VI) strip 3 times daily before meals 300 Strip 3    insulin detemir U-100 (Levemir FlexTouch U-100 Insuln) 100 unit/mL (3 mL) inpn 30 Units by SubCUTAneous route two (2) times a day for 30 days. (Patient taking differently: 34 Units by SubCUTAneous route two (2) times a day. 34 units by subcutaneous route 2 times a day) 6 Adjustable Dose Pre-filled Pen Syringe 11    insulin lispro (HumaLOG U-100 Insulin) 100 unit/mL cartridge by SubCUTAneous route.  If blood sugar 150 to 200 use 4 units Subcutaneous  If blood sugar 201 to 250 use 6 units subcutaneous  If blood sugar 251 to 300 use 8 units subcutaneous  If blood sugar 302 to 350 use 10 units subcutaneous (Patient not taking: Reported on 3/14/2022) 3 mL 4     Past Medical History:   Diagnosis Date    Abscess 12/2020    left side of abdomen    [facility]     Hyperlipidemia     Hypertension     ICD (implantable cardioverter-defibrillator) in place     NSVT (nonsustained ventricular tachycardia) ([facility] 11/21/2018    EPS 11/21/2018 VT/VF     Right groin pain 12/7/2020    Stroke ([facility]     Syncope     Valvular heart disease      Past Surgical History:   Procedure Laterality Date    COLONOSCOPY  1/2/2015         HX HEART CATHETERIZATION      HX HEENT      l cararact removal    HX IMPLANTABLE CARDIOVERTER DEFIBRILLATOR       No Known Allergies      REVIEW OF SYSTEMS:  General: negative for - chills or fever  ENT: negative for - headaches, nasal congestion or tinnitus  Respiratory: negative for - cough, hemoptysis, shortness of breath or wheezing  Cardiovascular : negative for - chest pain, edema, palpitations or shortness of breath  Gastrointestinal: negative for - abdominal pain, blood in stools, heartburn or nausea/vomiting  Genito-Urinary: no dysuria, trouble voiding, or hematuria  Musculoskeletal: negative for - gait disturbance, joint pain, joint stiffness or joint swelling  Neurological: no TIA or stroke symptoms  Hematologic: no bruises, no bleeding, no swollen glands  Integument: no lumps, mole changes, nail changes or rash  Endocrine: no malaise/lethargy or unexpected weight changes      Social History     Socioeconomic History    Marital status: SINGLE    Number of children: 0   Occupational History    Occupation: [employer]   Tobacco Use    Smoking status: Never Smoker    Smokeless tobacco: Never Used   Vaping Use    Vaping Use: Never used   Substance and Sexual Activity    Alcohol use: No    Drug use: No    Sexual activity: Yes     Partners: Female     Birth control/protection: None     Family History   Problem Relation Age of Onset    Diabetes Mother     Hypertension Mother     Stroke Mother        OBJECTIVE:    Visit Vitals  /84 (BP 1 Location: Right upper arm, BP Patient Position: Sitting, BP Cuff Size: Adult)   Pulse 65   Temp (!) 94.6 °F (34.8 °C) (Oral)   Resp 14   Ht 5' 7\" (1.702 m)   Wt 156 lb 11.2 oz (71.1 kg)   SpO2 97%   BMI 24.54 kg/m²     CONSTITUTIONAL: well , well nourished, appears age appropriate  EYES: perrla, eom intact  ENMT:moist mucous membranes, pharynx clear  NECK: supple. Thyroid normal  RESPIRATORY: Chest: clear to ascultation and percussion   CARDIOVASCULAR: Heart: regular rate and rhythm  GASTROINTESTINAL: Abdomen: soft, bowel sounds active  HEMATOLOGIC: no pathological lymph nodes palpated  MUSCULOSKELETAL: Extremities: no edema, pulse 1+   INTEGUMENT: No unusual rashes or suspicious skin lesions noted. Nails appear normal.  NEUROLOGIC: non-focal exam   MENTAL STATUS: alert and oriented, appropriate affect      ASSESSMENT:  1. Ataxia    2. Decreased hearing of left ear    3. Tinnitus of left ear    4. Type 2 diabetes mellitus with diabetic polyneuropathy, with long-term current use of insulin ([address]    5. Brainstem infarct, acute ([address]    6. Acoustic neuroma (HCC)        PLAN:  1. Comments made by Otolaryngology sound quite plausible.   Because of this MRI of the brain will be obtained with contrast.  This will give us the opportunity to determine if indeed there was a concomitant brainstem infarct. I also suggested to the patient that he inform the ENT physician to send me copies of his reports. 2. Diabetes hopefully is doing reasonably well. 3. The question of a brainstem infarct still exists, but the potential possibility of an occult acoustic neuroma also is in the differential diagnosis currently. 4. He will return to work after he returns to see me in one week. Follow-up and Dispositions    · Return in about 1 week (around 4/5/2022).            Agustin Haider MD

## 2022-04-01 ENCOUNTER — PATIENT OUTREACH (OUTPATIENT)
Dept: CASE MANAGEMENT | Age: 67
End: 2022-04-01

## 2022-04-01 ENCOUNTER — HOME CARE VISIT (OUTPATIENT)
Dept: SCHEDULING | Facility: HOME HEALTH | Age: 67
End: 2022-04-01
Payer: COMMERCIAL

## 2022-04-01 VITALS
DIASTOLIC BLOOD PRESSURE: 78 MMHG | TEMPERATURE: 98.9 F | HEART RATE: 76 BPM | OXYGEN SATURATION: 96 % | RESPIRATION RATE: 16 BRPM | SYSTOLIC BLOOD PRESSURE: 138 MMHG

## 2022-04-01 PROCEDURE — G0151 HHCP-SERV OF PT,EA 15 MIN: HCPCS

## 2022-04-01 NOTE — PROGRESS NOTES
Care Transitions Follow Up Call    Challenges to be reviewed by the provider   Additional needs identified to be addressed with provider: no         Method of communication with provider : none    Care Transition Nurse (CTN) contacted the patient by telephone to follow up after admission on 3/8/22. Verified name and  with patient as identifiers. Addressed changes since last contact: none  Follow up appointment completed? yes. Was follow up appointment scheduled within 7 days of discharge? yes. Patients top risk factors for readmission: medical condition-CVA ,DM  Interventions to address risk factors: diet, attend follow up appts. [facility] follow up appointment(s):   Future Appointments   Date Time Provider Chalino Alaniz   2022  9:30 AM Santa Isaac MD [facility] MAIN BS AMB   2022  8:45 AM PACEMAKER, RCAM [facility] BS AMB   2022  9:00 AM Analilia Yeager, ANP [facility] AMB   3/27/2023  9:20 AM Kimberly Romero MD [facility]       CTN provided contact information for future needs. Plan for follow-up call in 5-7 days based on severity of symptoms and risk factors. Plan for next call: self management-DM, CVA   Goals      Establish PCP relationships and regularly scheduled appointments. 3/14 Pt.will attend all recommended follow ups with pcp and specialist w/n 30 day period. Pt.scheduled hosp.follow up:  Future Appointments   Date Time Provider   3/17/2022  9:45 AM Nupur Watson MD   3/21/2022 10:30 AM Ric Moreira MD   2022  8:45 AM PACEMAKER, RCAM   2022  9:00 AM Analilia Yeager, NORY   CTN will follow up with pt.in 5-7 days. -[address]    3/17 Review chart: UTR pt.  -PCPGabriel MD 3/15/22  -(Cardiology) 3/17/22     3/25/22 patient attended PCP And cardio appts. Will continue to report completion of appts. JUSTINE     Pt.attend appt.  -Gabriel BYRNE MD 3/29/22       Understands red flags post discharge.       3/14 Pt.will have someone drive him to the ED immediately if you develop these symptoms: acute changes in your balance or vision, weakness in your face, arm or leg, speech changes or difficulties. -[address]    3/17 UTR.-    3/25/22 Patient denies is practicing balance exercises and has seen an improvement, denies vision changes , has hearing issue and saw specialist today. Will report continued improvement with balance at next outreach. JUSTINE     4/1 Pt.report doing well, almost back at baseline, no reports s/s listed above. CTN discuss fbs, s/s Hyper/Hypoglycemia, meals. Teach back: pt.verbalize plans preparation for meals, reading food labels and eating more vegetable/fruits,  cutting back on serving size;, pt.will check fbs at least 3 times  day, keep log of readings, report to PCP office low reading <70 or high blood sugar reading >250, CTN will follow up with pt.in 7-10 days. -[address]

## 2022-04-04 ENCOUNTER — TRANSCRIBE ORDER (OUTPATIENT)
Dept: SCHEDULING | Age: 67
End: 2022-04-04

## 2022-04-04 DIAGNOSIS — H91.20 SUDDEN HEARING LOSS: ICD-10-CM

## 2022-04-04 DIAGNOSIS — H83.2X3 HYPERACTIVE LABYRINTH, BILATERAL: Primary | ICD-10-CM

## 2022-04-08 ENCOUNTER — PATIENT OUTREACH (OUTPATIENT)
Dept: CASE MANAGEMENT | Age: 67
End: 2022-04-08

## 2022-04-08 NOTE — PROGRESS NOTES
Care Transitions Follow Up Call    Challenges to be reviewed by the provider   Additional needs identified to be addressed with provider: no  none           Method of communication with provider : none    Care Transition Nurse (CTN) contacted the patient by telephone to follow up after admission on 3/8/22. Verified name and  with patient as identifiers. Addressed changes since last contact: none    Patients top risk factors for readmission: medical condition-CVA/DM   Interventions to address risk factors: upcoming appts. listed below. [facility] follow up appointment(s):   Future Appointments   Date Time Provider Chalino Alaniz   2022  8:45 AM PACEMAKER, RCAM [facility]AMB BS AMB   2022  9:00 AM Analilia Yeager, ANP [facility]   3/27/2023  9:20 AM Abhijeet Romero MD [facility]         CTN provided contact information for future needs. Plan for follow-up call in 7-10 days based on severity of symptoms and risk factors. Plan for next call: self management-DM and follow up appointment-specialist     Goals Addressed                 This Visit's Progress     Establish PCP relationships and regularly scheduled appointments. 3/14 Pt.will attend all recommended follow ups with pcp and specialist w/n 30 day period. Pt.scheduled hosp.follow up:  Future Appointments   Date Time Provider   3/17/2022  9:45 AM Christophe Paul MD   3/21/2022 10:30 AM Janel Ridley MD   2022  8:45 AM PACEMAKER, RCAM   2022  9:00 AM Analilia Yeager, ANP   CTN will follow up with pt.in 5-7 days. -[address]    3/17 Review chart: UTR pt.  -PCPMicah MD 3/15/22  -(Cardiology) 3/17/22     3/25/22 patient attended PCP And cardio appts. Will continue to report completion of appts. JUSTINE     Pt.attend appt.  -PCPMicah MD 3/29/22    4/8  Pt.missed appt. with PCP, , reports he is doing well, will attend appts. as scheduled above; reports he looking to received a call to schedule MRI. -[address] Understands red flags post discharge. 3/14 Pt.will have someone drive him to the ED immediately if you develop these symptoms: acute changes in your balance or vision, weakness in your face, arm or leg, speech changes or difficulties. -[address]    3/17 UTR.-    3/25/22 Patient denies is practicing balance exercises and has seen an improvement, denies vision changes , has hearing issue and saw specialist today. Will report continued improvement with balance at next outreach. JUSTINE     4/1 Pt.report doing well, almost back at baseline, no reports s/s listed above. CTN discuss fbs, s/s Hyper/Hypoglycemia, meals. Teach back: pt.verbalize plans preparation for meals, reading food labels and eating more vegetable/fruits,  cutting back on serving size;, pt.will check fbs at least 3 times  day, keep log of readings, report to PCP office low reading <70 or high blood sugar reading >250, CTN will follow up with pt.in 7-10 days. -     4/8 Pt.reports fbs 145-153. -[address]

## 2022-05-05 RX ORDER — AMLODIPINE BESYLATE 10 MG/1
TABLET ORAL
Qty: 90 TABLET | Refills: 3 | Status: SHIPPED | OUTPATIENT
Start: 2022-05-05

## 2022-05-06 DIAGNOSIS — E78.5 DYSLIPIDEMIA: ICD-10-CM

## 2022-05-06 RX ORDER — ATORVASTATIN CALCIUM 20 MG/1
TABLET, FILM COATED ORAL
Qty: 90 TABLET | Refills: 3 | Status: SHIPPED | OUTPATIENT
Start: 2022-05-06

## 2022-05-11 ENCOUNTER — OFFICE VISIT (OUTPATIENT)
Dept: INTERNAL MEDICINE CLINIC | Age: 67
End: 2022-05-11
Payer: COMMERCIAL

## 2022-05-11 DIAGNOSIS — H91.92 DECREASED HEARING OF LEFT EAR: ICD-10-CM

## 2022-05-11 DIAGNOSIS — R42 DISEQUILIBRIUM: ICD-10-CM

## 2022-05-11 DIAGNOSIS — I63.89 BRAINSTEM INFARCT, ACUTE (HCC): ICD-10-CM

## 2022-05-11 DIAGNOSIS — I10 PRIMARY HYPERTENSION: Chronic | ICD-10-CM

## 2022-05-11 DIAGNOSIS — E11.42 TYPE 2 DIABETES MELLITUS WITH DIABETIC POLYNEUROPATHY, WITH LONG-TERM CURRENT USE OF INSULIN (HCC): Primary | ICD-10-CM

## 2022-05-11 DIAGNOSIS — E78.5 DYSLIPIDEMIA: ICD-10-CM

## 2022-05-11 DIAGNOSIS — Z79.4 TYPE 2 DIABETES MELLITUS WITH DIABETIC POLYNEUROPATHY, WITH LONG-TERM CURRENT USE OF INSULIN (HCC): Primary | ICD-10-CM

## 2022-05-11 PROCEDURE — 99215 OFFICE O/P EST HI 40 MIN: CPT | Performed by: INTERNAL MEDICINE

## 2022-05-11 PROCEDURE — 3046F HEMOGLOBIN A1C LEVEL >9.0%: CPT | Performed by: INTERNAL MEDICINE

## 2022-05-11 NOTE — PROGRESS NOTES
Chief Complaint   Patient presents with    Other     Patient states that he was unable to have MRI done at [facility], he is requesting an appointment at another location. He also states that he is interested in returning to work. 1. Have you been to the ER, urgent care clinic since your last visit? Hospitalized since your last visit? No    2. Have you seen or consulted any other health care providers outside of the [address] [place] [address] since your last visit? Include any pap smears or colon screening.  No

## 2022-05-15 VITALS
DIASTOLIC BLOOD PRESSURE: 78 MMHG | SYSTOLIC BLOOD PRESSURE: 140 MMHG | HEIGHT: 67 IN | HEART RATE: 72 BPM | WEIGHT: 158.3 LBS | OXYGEN SATURATION: 97 % | TEMPERATURE: 98.2 F | RESPIRATION RATE: 16 BRPM | BODY MASS INDEX: 24.84 KG/M2

## 2022-05-15 NOTE — PROGRESS NOTES
[address] [facility]  [place] [address]  [address] [place] [address]  Phone:  [phone]  Fax: [phone]       Chief Complaint   Patient presents with    Other     Patient states that he was unable to have MRI done at [facility], he is requesting an appointment at another location. He also states that he is interested in returning to work. .      SUBJECTIVE:    Ketty Rudolph is a 74 y.o. male comes in for a return visit. He has not gone back to work just yet. He is actively followed by ENT because they think he has something other than a possible stroke. Question was raised as to whether or not he has acoustic neuroma. He is in the process of getting another MRI for further clarification. This however has not been done just yet. He has a pacemaker and apparently the pacer is MRI compatible. He has currently decreased hearing in his left ear, which is quite significant and started at the time that he had his CNS symptoms, as well as a slight gait disturbance, but this has improved significantly. His diabetes remains poorly controlled primarily because of his noncompliance, as it relates to his diet, as well as the timeliness of eating. His morning sugars are consistently in the 200 range. He has a past history of primary hypertension and dyslipidemia.            Current Outpatient Medications   Medication Sig Dispense Refill    atorvastatin (LIPITOR) 20 mg tablet TAKE ONE TABLET BY MOUTH DAILY 90 Tablet 3    amLODIPine (NORVASC) 10 mg tablet TAKE 1 TABLET BY MOUTH EVERY DAY 90 Tablet 3    metFORMIN (GLUCOPHAGE) 1,000 mg tablet TAKE 1 TABLET BY MOUTH IN THE EVENING BEFORE DINNER 90 Tablet 2    Trulicity 1.5 XY/8.6 mL sub-q pen INJECT 0.5 ML UNDER SKIN EVERY SEVEN (7) DAYS. 4 Syringe 11    Insulin Needles, Disposable, 31 gauge x 1/4\" ndle USE WITH INSULIN PENS THREE TIMES PER  Pen Needle 11    glucose blood VI test strips (ASCENSIA AUTODISC VI, ONE TOUCH ULTRA TEST VI) strip 3 times daily before meals 300 Strip 3    aspirin (ASPIRIN) 325 mg tablet Take 1 Tablet by mouth daily.  (Patient not taking: Reported on 5/11/2022) 30 Tablet 4     Past Medical History:   Diagnosis Date    Abscess 12/2020    left side of abdomen    Diabetes ([facility]     Hyperlipidemia     Hypertension     ICD (implantable cardioverter-defibrillator) in place     NSVT (nonsustained ventricular tachycardia) ([facility] 11/21/2018    EPS 11/21/2018 VT/VF     Right groin pain 12/7/2020    [facility]     Syncope     Valvular heart disease      Past Surgical History:   Procedure Laterality Date    COLONOSCOPY  1/2/2015         HX HEART CATHETERIZATION      HX HEENT      l cararact removal    HX IMPLANTABLE CARDIOVERTER DEFIBRILLATOR       No Known Allergies      REVIEW OF SYSTEMS:  General: negative for - chills or fever  ENT: negative for - headaches, nasal congestion or tinnitus  Respiratory: negative for - cough, hemoptysis, shortness of breath or wheezing  Cardiovascular : negative for - chest pain, edema, palpitations or shortness of breath  Gastrointestinal: negative for - abdominal pain, blood in stools, heartburn or nausea/vomiting  Genito-Urinary: no dysuria, trouble voiding, or hematuria  Musculoskeletal: negative for - gait disturbance, joint pain, joint stiffness or joint swelling  Neurological: no TIA or stroke symptoms  Hematologic: no bruises, no bleeding, no swollen glands  Integument: no lumps, mole changes, nail changes or rash  Endocrine: no malaise/lethargy or unexpected weight changes      Social History     Socioeconomic History    Marital status: SINGLE    Number of children: 0   Occupational History    Occupation: [employer]   Tobacco Use    Smoking status: Never Smoker    Smokeless tobacco: Never Used   Vaping Use    Vaping Use: Never used   Substance and Sexual Activity    Alcohol use: No    Drug use: No    Sexual activity: Yes     Partners: Female     Birth control/protection: None     Family History   Problem Relation Age of Onset    Diabetes Mother     Hypertension Mother     Stroke Mother        OBJECTIVE:    Visit Vitals  BP (!) 140/78   Pulse 72   Temp 98.2 °F (36.8 °C) (Oral)   Resp 16   Ht 5' 7\" (1.702 m)   Wt 158 lb 4.8 oz (71.8 kg)   SpO2 97%   BMI 24.79 kg/m²     CONSTITUTIONAL: well , well nourished, appears age appropriate  EYES: perrla, eom intact  ENMT:moist mucous membranes, pharynx clear  NECK: supple. Thyroid normal  RESPIRATORY: Chest: clear to ascultation and percussion   CARDIOVASCULAR: Heart: regular rate and rhythm  GASTROINTESTINAL: Abdomen: soft, bowel sounds active  HEMATOLOGIC: no pathological lymph nodes palpated  MUSCULOSKELETAL: Extremities: no edema, pulse 1+   INTEGUMENT: No unusual rashes or suspicious skin lesions noted. Nails appear normal.  NEUROLOGIC: non-focal exam   MENTAL STATUS: alert and oriented, appropriate affect      ASSESSMENT:  1. Type 2 diabetes mellitus with diabetic polyneuropathy, with long-term current use of insulin ([address]    2. Brainstem infarct, acute ([address]    3. Dyslipidemia    4. Decreased hearing of left ear    5. Disequilibrium    6. Primary hypertension        PLAN:  1. The patient's diabetes is not doing well. He will increase his evening dose of insulin 42 units in view of the fact that fasting sugars are consistently in the 200 range suggesting that his dietary intake at night is of poor quality. He will continue the 36 units every morning and this is long acting insulin Lantus. He will also continue GLP-1 agonist weekly and metformin. 2. As far as his CNS status is concerned, it is unclear whether or not he has had a simple brainstem infarct versus an acoustic neuroma possibly. He is awaiting the MRI scan to be done. 3. I am reminded that the patient's pacemaker is MRI compatible. 4. He will continue his statin as prescribed in view of his presumed secondary cardiovascular risk.   5. Blood pressure is reasonable today. 6. As far as his decreased hearing and disequilibrium, this is in the hands of the otolaryngologist.  Addendum:  Records were reviewed from the otolaryngologist.             Follow-up and Dispositions    · Return in about 4 weeks (around 6/8/2022).            Mamta Marquez MD

## 2022-08-13 ENCOUNTER — HOSPITAL ENCOUNTER (INPATIENT)
Age: 67
LOS: 4 days | Discharge: HOME | DRG: 637 | End: 2022-08-17
Attending: EMERGENCY MEDICINE | Admitting: FAMILY MEDICINE
Payer: COMMERCIAL

## 2022-08-13 ENCOUNTER — APPOINTMENT (OUTPATIENT)
Dept: VASCULAR SURGERY | Age: 67
DRG: 637 | End: 2022-08-13
Attending: FAMILY MEDICINE
Payer: COMMERCIAL

## 2022-08-13 ENCOUNTER — APPOINTMENT (OUTPATIENT)
Dept: GENERAL RADIOLOGY | Age: 67
DRG: 637 | End: 2022-08-13
Attending: EMERGENCY MEDICINE
Payer: COMMERCIAL

## 2022-08-13 ENCOUNTER — APPOINTMENT (OUTPATIENT)
Dept: ULTRASOUND IMAGING | Age: 67
DRG: 637 | End: 2022-08-13
Attending: FAMILY MEDICINE
Payer: COMMERCIAL

## 2022-08-13 DIAGNOSIS — R73.9 HYPERGLYCEMIA: ICD-10-CM

## 2022-08-13 DIAGNOSIS — N17.9 AKI (ACUTE KIDNEY INJURY) (HCC): ICD-10-CM

## 2022-08-13 DIAGNOSIS — R05.9 COUGH: ICD-10-CM

## 2022-08-13 DIAGNOSIS — R19.7 NAUSEA VOMITING AND DIARRHEA: Primary | ICD-10-CM

## 2022-08-13 DIAGNOSIS — R11.2 NAUSEA VOMITING AND DIARRHEA: Primary | ICD-10-CM

## 2022-08-13 PROBLEM — R53.83 MALAISE AND FATIGUE: Status: ACTIVE | Noted: 2022-08-13

## 2022-08-13 PROBLEM — R53.81 MALAISE AND FATIGUE: Status: ACTIVE | Noted: 2022-08-13

## 2022-08-13 PROBLEM — R79.89 ELEVATED SERUM CREATININE: Status: ACTIVE | Noted: 2022-08-13

## 2022-08-13 LAB
ADMINISTERED INITIALS, ADMINIT: NORMAL
ALBUMIN SERPL-MCNC: 2.9 G/DL (ref 3.5–5)
ALBUMIN/GLOB SERPL: 0.7 {RATIO} (ref 1.1–2.2)
ALP SERPL-CCNC: 140 U/L (ref 45–117)
ALT SERPL-CCNC: 132 U/L (ref 12–78)
ANION GAP SERPL CALC-SCNC: 6 MMOL/L (ref 5–15)
ANION GAP SERPL CALC-SCNC: 8 MMOL/L (ref 5–15)
AST SERPL-CCNC: 61 U/L (ref 15–37)
BASOPHILS # BLD: 0 K/UL (ref 0–0.1)
BASOPHILS NFR BLD: 0 % (ref 0–1)
BILIRUB SERPL-MCNC: 0.4 MG/DL (ref 0.2–1)
BUN SERPL-MCNC: 42 MG/DL (ref 6–20)
BUN SERPL-MCNC: 46 MG/DL (ref 6–20)
BUN/CREAT SERPL: 22 (ref 12–20)
BUN/CREAT SERPL: 26 (ref 12–20)
CALCIUM SERPL-MCNC: 8.3 MG/DL (ref 8.5–10.1)
CALCIUM SERPL-MCNC: 8.8 MG/DL (ref 8.5–10.1)
CHLORIDE SERPL-SCNC: 93 MMOL/L (ref 97–108)
CHLORIDE SERPL-SCNC: 99 MMOL/L (ref 97–108)
CO2 SERPL-SCNC: 26 MMOL/L (ref 21–32)
CO2 SERPL-SCNC: 28 MMOL/L (ref 21–32)
COVID-19 RAPID TEST, COVR: DETECTED
CREAT SERPL-MCNC: 1.64 MG/DL (ref 0.7–1.3)
CREAT SERPL-MCNC: 2.05 MG/DL (ref 0.7–1.3)
D50 ADMINISTERED, D50ADM: 0 ML
D50 ORDER, D50ORD: 0 ML
DIFFERENTIAL METHOD BLD: ABNORMAL
EOSINOPHIL # BLD: 0 K/UL (ref 0–0.4)
EOSINOPHIL NFR BLD: 0 % (ref 0–7)
ERYTHROCYTE [DISTWIDTH] IN BLOOD BY AUTOMATED COUNT: 13.2 % (ref 11.5–14.5)
EST. AVERAGE GLUCOSE BLD GHB EST-MCNC: ABNORMAL MG/DL
FLUAV AG NPH QL IA: NEGATIVE
FLUBV AG NOSE QL IA: NEGATIVE
GLOBULIN SER CALC-MCNC: 4.1 G/DL (ref 2–4)
GLUCOSE BLD STRIP.AUTO-MCNC: 113 MG/DL (ref 65–117)
GLUCOSE BLD STRIP.AUTO-MCNC: 198 MG/DL (ref 65–117)
GLUCOSE BLD STRIP.AUTO-MCNC: 282 MG/DL (ref 65–117)
GLUCOSE BLD STRIP.AUTO-MCNC: 328 MG/DL (ref 65–117)
GLUCOSE BLD STRIP.AUTO-MCNC: 381 MG/DL (ref 65–117)
GLUCOSE BLD STRIP.AUTO-MCNC: 431 MG/DL (ref 65–117)
GLUCOSE BLD STRIP.AUTO-MCNC: 97 MG/DL (ref 65–117)
GLUCOSE SERPL-MCNC: 402 MG/DL (ref 65–100)
GLUCOSE SERPL-MCNC: 631 MG/DL (ref 65–100)
GLUCOSE, GLC: 113 MG/DL
GLUCOSE, GLC: 198 MG/DL
GLUCOSE, GLC: 282 MG/DL
GLUCOSE, GLC: 328 MG/DL
GLUCOSE, GLC: 381 MG/DL
GLUCOSE, GLC: 431 MG/DL
GLUCOSE, GLC: 600 MG/DL
HBA1C MFR BLD: >14 % (ref 4–5.6)
HCT VFR BLD AUTO: 37.6 % (ref 36.6–50.3)
HGB BLD-MCNC: 12.2 G/DL (ref 12.1–17)
HIGH TARGET, HITG: 250 MG/DL
IMM GRANULOCYTES # BLD AUTO: 0 K/UL (ref 0–0.04)
IMM GRANULOCYTES NFR BLD AUTO: 0 % (ref 0–0.5)
INSULIN ADMINSTERED, INSADM: 10.7 UNITS/HOUR
INSULIN ADMINSTERED, INSADM: 10.8 UNITS/HOUR
INSULIN ADMINSTERED, INSADM: 11.1 UNITS/HOUR
INSULIN ADMINSTERED, INSADM: 2.1 UNITS/HOUR
INSULIN ADMINSTERED, INSADM: 6.9 UNITS/HOUR
INSULIN ADMINSTERED, INSADM: 7.4 UNITS/HOUR
INSULIN ADMINSTERED, INSADM: 9.6 UNITS/HOUR
INSULIN ORDER, INSORD: 10.7 UNITS/HOUR
INSULIN ORDER, INSORD: 10.8 UNITS/HOUR
INSULIN ORDER, INSORD: 11.1 UNITS/HOUR
INSULIN ORDER, INSORD: 2.1 UNITS/HOUR
INSULIN ORDER, INSORD: 6.9 UNITS/HOUR
INSULIN ORDER, INSORD: 7.4 UNITS/HOUR
INSULIN ORDER, INSORD: 9.6 UNITS/HOUR
LIPASE SERPL-CCNC: 178 U/L (ref 73–393)
LOW TARGET, LOT: 150 MG/DL
LYMPHOCYTES # BLD: 0.3 K/UL (ref 0.8–3.5)
LYMPHOCYTES NFR BLD: 8 % (ref 12–49)
MAGNESIUM SERPL-MCNC: 2.5 MG/DL (ref 1.6–2.4)
MCH RBC QN AUTO: 27.5 PG (ref 26–34)
MCHC RBC AUTO-ENTMCNC: 32.4 G/DL (ref 30–36.5)
MCV RBC AUTO: 84.7 FL (ref 80–99)
MINUTES UNTIL NEXT BG, NBG: 60 MIN
MONOCYTES # BLD: 0.3 K/UL (ref 0–1)
MONOCYTES NFR BLD: 7 % (ref 5–13)
MULTIPLIER, MUL: 0.02
MULTIPLIER, MUL: 0.02
MULTIPLIER, MUL: 0.03
MULTIPLIER, MUL: 0.04
MULTIPLIER, MUL: 0.04
MULTIPLIER, MUL: 0.05
MULTIPLIER, MUL: 0.05
NEUTS SEG # BLD: 3.4 K/UL (ref 1.8–8)
NEUTS SEG NFR BLD: 85 % (ref 32–75)
NRBC # BLD: 0 K/UL (ref 0–0.01)
NRBC BLD-RTO: 0 PER 100 WBC
ORDER INITIALS, ORDINIT: NORMAL
PHOSPHATE SERPL-MCNC: 3.9 MG/DL (ref 2.6–4.7)
PLATELET # BLD AUTO: 157 K/UL (ref 150–400)
PMV BLD AUTO: 10.3 FL (ref 8.9–12.9)
POTASSIUM SERPL-SCNC: 4.3 MMOL/L (ref 3.5–5.1)
POTASSIUM SERPL-SCNC: 5.1 MMOL/L (ref 3.5–5.1)
PROT SERPL-MCNC: 7 G/DL (ref 6.4–8.2)
RBC # BLD AUTO: 4.44 M/UL (ref 4.1–5.7)
RBC MORPH BLD: ABNORMAL
SERVICE CMNT-IMP: ABNORMAL
SERVICE CMNT-IMP: NORMAL
SERVICE CMNT-IMP: NORMAL
SODIUM SERPL-SCNC: 127 MMOL/L (ref 136–145)
SODIUM SERPL-SCNC: 133 MMOL/L (ref 136–145)
SOURCE, COVRS: ABNORMAL
WBC # BLD AUTO: 4 K/UL (ref 4.1–11.1)

## 2022-08-13 PROCEDURE — 71045 X-RAY EXAM CHEST 1 VIEW: CPT

## 2022-08-13 PROCEDURE — 76700 US EXAM ABDOM COMPLETE: CPT

## 2022-08-13 PROCEDURE — 84100 ASSAY OF PHOSPHORUS: CPT

## 2022-08-13 PROCEDURE — 74011250636 HC RX REV CODE- 250/636: Performed by: EMERGENCY MEDICINE

## 2022-08-13 PROCEDURE — 65660000001 HC RM ICU INTERMED STEPDOWN

## 2022-08-13 PROCEDURE — 96360 HYDRATION IV INFUSION INIT: CPT

## 2022-08-13 PROCEDURE — 99285 EMERGENCY DEPT VISIT HI MDM: CPT

## 2022-08-13 PROCEDURE — 87804 INFLUENZA ASSAY W/OPTIC: CPT

## 2022-08-13 PROCEDURE — 36415 COLL VENOUS BLD VENIPUNCTURE: CPT

## 2022-08-13 PROCEDURE — 83735 ASSAY OF MAGNESIUM: CPT

## 2022-08-13 PROCEDURE — 80053 COMPREHEN METABOLIC PANEL: CPT

## 2022-08-13 PROCEDURE — 74011000258 HC RX REV CODE- 258: Performed by: EMERGENCY MEDICINE

## 2022-08-13 PROCEDURE — 82962 GLUCOSE BLOOD TEST: CPT

## 2022-08-13 PROCEDURE — 74011636637 HC RX REV CODE- 636/637: Performed by: EMERGENCY MEDICINE

## 2022-08-13 PROCEDURE — 93970 EXTREMITY STUDY: CPT

## 2022-08-13 PROCEDURE — 83036 HEMOGLOBIN GLYCOSYLATED A1C: CPT

## 2022-08-13 PROCEDURE — 83690 ASSAY OF LIPASE: CPT

## 2022-08-13 PROCEDURE — 85025 COMPLETE CBC W/AUTO DIFF WBC: CPT

## 2022-08-13 PROCEDURE — 93005 ELECTROCARDIOGRAM TRACING: CPT

## 2022-08-13 PROCEDURE — 87635 SARS-COV-2 COVID-19 AMP PRB: CPT

## 2022-08-13 PROCEDURE — 74011250636 HC RX REV CODE- 250/636: Performed by: FAMILY MEDICINE

## 2022-08-13 RX ORDER — SODIUM CHLORIDE 0.9 % (FLUSH) 0.9 %
5-40 SYRINGE (ML) INJECTION EVERY 8 HOURS
Status: DISCONTINUED | OUTPATIENT
Start: 2022-08-13 | End: 2022-08-17 | Stop reason: HOSPADM

## 2022-08-13 RX ORDER — SODIUM CHLORIDE 0.9 % (FLUSH) 0.9 %
5-40 SYRINGE (ML) INJECTION AS NEEDED
Status: DISCONTINUED | OUTPATIENT
Start: 2022-08-13 | End: 2022-08-17 | Stop reason: HOSPADM

## 2022-08-13 RX ORDER — ACETAMINOPHEN 325 MG/1
650 TABLET ORAL
Status: DISCONTINUED | OUTPATIENT
Start: 2022-08-13 | End: 2022-08-17 | Stop reason: HOSPADM

## 2022-08-13 RX ORDER — INSULIN LISPRO 100 [IU]/ML
INJECTION, SOLUTION INTRAVENOUS; SUBCUTANEOUS
Status: DISCONTINUED | OUTPATIENT
Start: 2022-08-13 | End: 2022-08-13

## 2022-08-13 RX ORDER — GUAIFENESIN 100 MG/5ML
81 LIQUID (ML) ORAL DAILY
Status: DISCONTINUED | OUTPATIENT
Start: 2022-08-14 | End: 2022-08-17 | Stop reason: HOSPADM

## 2022-08-13 RX ORDER — GUAIFENESIN/DEXTROMETHORPHAN 100-10MG/5
5 SYRUP ORAL
Status: DISCONTINUED | OUTPATIENT
Start: 2022-08-13 | End: 2022-08-17 | Stop reason: HOSPADM

## 2022-08-13 RX ORDER — ACETAMINOPHEN 650 MG/1
650 SUPPOSITORY RECTAL
Status: DISCONTINUED | OUTPATIENT
Start: 2022-08-13 | End: 2022-08-17 | Stop reason: HOSPADM

## 2022-08-13 RX ORDER — ENOXAPARIN SODIUM 100 MG/ML
30 INJECTION SUBCUTANEOUS EVERY 12 HOURS
Status: DISCONTINUED | OUTPATIENT
Start: 2022-08-13 | End: 2022-08-17 | Stop reason: HOSPADM

## 2022-08-13 RX ORDER — MAGNESIUM SULFATE 100 %
4 CRYSTALS MISCELLANEOUS AS NEEDED
Status: DISCONTINUED | OUTPATIENT
Start: 2022-08-13 | End: 2022-08-13

## 2022-08-13 RX ORDER — ONDANSETRON 2 MG/ML
4 INJECTION INTRAMUSCULAR; INTRAVENOUS
Status: ACTIVE | OUTPATIENT
Start: 2022-08-13 | End: 2022-08-14

## 2022-08-13 RX ORDER — AMLODIPINE BESYLATE 5 MG/1
10 TABLET ORAL DAILY
Status: DISCONTINUED | OUTPATIENT
Start: 2022-08-14 | End: 2022-08-17 | Stop reason: HOSPADM

## 2022-08-13 RX ORDER — ONDANSETRON 2 MG/ML
4 INJECTION INTRAMUSCULAR; INTRAVENOUS
Status: DISCONTINUED | OUTPATIENT
Start: 2022-08-13 | End: 2022-08-17 | Stop reason: HOSPADM

## 2022-08-13 RX ORDER — POLYETHYLENE GLYCOL 3350 17 G/17G
17 POWDER, FOR SOLUTION ORAL DAILY PRN
Status: DISCONTINUED | OUTPATIENT
Start: 2022-08-13 | End: 2022-08-17 | Stop reason: HOSPADM

## 2022-08-13 RX ORDER — SODIUM CHLORIDE 9 MG/ML
75 INJECTION, SOLUTION INTRAVENOUS CONTINUOUS
Status: DISCONTINUED | OUTPATIENT
Start: 2022-08-13 | End: 2022-08-14

## 2022-08-13 RX ADMIN — SODIUM CHLORIDE 10.8 UNITS/HR: 9 INJECTION, SOLUTION INTRAVENOUS at 15:29

## 2022-08-13 RX ADMIN — SODIUM CHLORIDE 1000 ML: 9 INJECTION, SOLUTION INTRAVENOUS at 15:01

## 2022-08-13 RX ADMIN — SODIUM CHLORIDE 75 ML/HR: 9 INJECTION, SOLUTION INTRAVENOUS at 17:52

## 2022-08-13 NOTE — ED NOTES
Bedside shift change report given to Cesar (oncoming nurse) by An Luciano (offgoing nurse). Report included the following information SBAR and ED Summary.

## 2022-08-13 NOTE — ED PROVIDER NOTES
78-year-old male with history as below, presents to the emergency department stating that for the last approximately 1 week he has had mild rhinorrhea as well as intermittent nonproductive cough. Starting last night however he began feeling nauseous and had multiple episodes of watery, nonbloody diarrhea. He states that he ate some [place] food last night and thinks that this may have upset his stomach but he is not sure. Denies any fever, chills, dysuria, hematuria, vomiting, abdominal pain. He is vaccinated against COVID-19. Denies any known sick contacts or recent travel. Diarrhea   Associated symptoms include diarrhea and nausea. Pertinent negatives include no fever, no vomiting, no constipation, no dysuria, no hematuria, no headaches, no arthralgias, no myalgias, no chest pain, no testicular pain and no back pain.       Past Medical History:   Diagnosis Date    Abscess 12/2020    left side of abdomen    Diabetes ([facility] [address]     Hyperlipidemia     Hypertension     ICD (implantable cardioverter-defibrillator) in place     NSVT (nonsustained ventricular tachycardia) ([facility] [address] 11/21/2018    EPS 11/21/2018 VT/VF     Right groin pain 12/7/2020    Stroke [facility])     Syncope     Valvular heart disease        Past Surgical History:   Procedure Laterality Date    COLONOSCOPY  1/2/2015         HX HEART CATHETERIZATION      HX HEENT      l cararact removal    HX IMPLANTABLE CARDIOVERTER DEFIBRILLATOR           Family History:   Problem Relation Age of Onset    Diabetes Mother     Hypertension Mother     Stroke Mother        Social History     Socioeconomic History    Marital status: SINGLE     Spouse name: Not on file    Number of children: 0    Years of education: Not on file    Highest education level: Not on file   Occupational History    Occupation: [employer]   Tobacco Use    Smoking status: Never    Smokeless tobacco: Never   Vaping Use    Vaping Use: Never used   Substance and Sexual Activity    Alcohol use: No    Drug use: No    Sexual activity: Yes     Partners: Female     Birth control/protection: None   Other Topics Concern    Not on file   Social History Narrative    Not on file     Social Determinants of Health     Financial Resource Strain: Not on file   Food Insecurity: Not on file   Transportation Needs: Not on file   Physical Activity: Not on file   Stress: Not on file   Social Connections: Not on file   Intimate Partner Violence: Not on file   Housing Stability: Not on file         ALLERGIES: Patient has no known allergies. Review of Systems   Constitutional:  Positive for appetite change. Negative for activity change, chills and fever. HENT:  Negative for congestion, rhinorrhea, sinus pain, sneezing and sore throat. Eyes:  Negative for photophobia and visual disturbance. Respiratory:  Negative for cough and shortness of breath. Cardiovascular:  Negative for chest pain. Gastrointestinal:  Positive for diarrhea and nausea. Negative for abdominal pain, blood in stool, constipation and vomiting. Genitourinary:  Negative for difficulty urinating, dysuria, flank pain, hematuria, penile pain and testicular pain. Musculoskeletal:  Negative for arthralgias, back pain, myalgias and neck pain. Skin:  Negative for rash and wound. Neurological:  Negative for syncope, weakness, light-headedness, numbness and headaches. Psychiatric/Behavioral:  Negative for self-injury and suicidal ideas. All other systems reviewed and are negative. Vitals:    08/13/22 1328   BP: (!) 145/66   Pulse: 77   Resp: 19   Temp: 98 °F (36.7 °C)   SpO2: 96%   Weight: 71.7 kg (158 lb)   Height: 5' 7\" (1.702 m)            Physical Exam  Vitals and nursing note reviewed. Constitutional:       General: He is not in acute distress. Appearance: Normal appearance. He is well-developed. He is not diaphoretic. Comments: Pleasant, no acute distress, speaking full sentences. HENT:      Head: Normocephalic and atraumatic.       Nose: Nose normal.   Eyes:      Extraocular Movements: Extraocular movements intact. Conjunctiva/sclera: Conjunctivae normal.      Pupils: Pupils are equal, round, and reactive to light. Cardiovascular:      Rate and Rhythm: Normal rate and regular rhythm. Heart sounds: Normal heart sounds. Pulmonary:      Effort: Pulmonary effort is normal.      Breath sounds: Normal breath sounds. Abdominal:      General: There is no distension. Palpations: Abdomen is soft. Tenderness: There is no abdominal tenderness. Musculoskeletal:         General: No tenderness. Cervical back: Neck supple. Skin:     General: Skin is warm and dry. Neurological:      General: No focal deficit present. Mental Status: He is alert and oriented to person, place, and time. Cranial Nerves: No cranial nerve deficit. Sensory: No sensory deficit. Motor: No weakness. Coordination: Coordination normal.        MDM    64-year-old male presents with some mild URI symptoms for the last 3 days but developed nausea and diarrhea today. Benign abdomen without guarding or peritonitis on exam.  He is afebrile with vital signs stable no acute distress. Labs returned showing multiple significant abnormalities including marked hyperglycemia with glucose 631, but normal anion gap and bicarb. Elevated creatinine at 2.05, BUN 46, concern for APARNA. Mildly elevated LFTs with , AST 61, alk phos 140, but normal T bili at 0.4. Normal lipase. , likely secondary to hyperglycemia, K5.1, WBC 4.0, no anemia. CXR viewed by myself and read by radiology showing no acute abnormalities. He was given IV fluid bolus and started on insulin gtt. Will admit to the hospitalist service for further care and assessment. Please note that this dictation was completed with [employer], the [employer] voice recognition software.   Quite often unanticipated grammatical, syntax, homophones, and other interpretive errors are inadvertently transcribed by the computer software. Please disregard these errors. Please excuse any errors that have escaped final proofreading. Procedures    1401 EKG shows normal sinus rhythm with a rate of 74 bpm with known left axis deviation and Brugada pattern type I, but no clear acute ST or T wave abnormalities suggestive of ischemia. QTc 426. Perfect Serve Consult for Admission  2:53 PM    ED Room Number: ER07/07  Patient Name and age:  Jeannette Severino 74 y.o.  male  Working Diagnosis:   1. Nausea vomiting and diarrhea    2. Hyperglycemia    3. APARNA (acute kidney injury) ([address]    4. Cough        COVID-19 Suspicion:  slight, but low suspicion, Will test  Sepsis present:  no  Reassessment needed: no  Code Status:  Full Code  Readmission: no  Isolation Requirements:  no  Recommended Level of Care:  telemetry  Department:[facility] Adult ED - 21   Other: 25-year-old male presents with nausea and diarrhea, possibly secondary to food poisoning versus viral illness. Has some mild URI symptoms and cough but satting normally on room air without shortness of breath. Labs show marked hyperglycemia without DKA but evidence of APARNA. Getting IV fluid bolus and started on insulin gtt.

## 2022-08-13 NOTE — ED TRIAGE NOTES
Pt reports 1 episode of diarrhea prior to arrival and \"queesy\". Pt denies abdominal pain or urinary sx's. Pt states he ate Egg priscila lorin and an egg roll late last night and a piece of fish this morning that may have upset his stomach. Sttes he has a \"full feeling \". . states he was \"chewing food for an hour this morning and finally spit it out. \"

## 2022-08-13 NOTE — H&P
[address] [facility] Adult  Hospitalist Group  History and Physical    Date of Service:  8/13/2022  Primary Care Provider: Robert Burk MD  Source of information: The patient and Chart review    Chief Complaint: Diarrhea      History of Presenting Illness:   Abhi Ng is a 74 y.o. male who presents with abdominal discomfort nausea fatigue and malaise, generalized weakness and in the emergency room found to have abnormal labs with a very high blood sugar. Patient tells me his symptoms started approximately 4 to 5 days ago. He denies any symptoms that would be suggestive of an acute infection-he has not had any changes to his medications no known sick contacts-he does question possible mild case of food poisoning patient reports ingesting some [place] food which she had purchased 4 to 6 days prior. Patient denies any diarrhea to me but does report one episode of diarrhea to the nurse on admission as per list were requested to admit the patient due to abnormal labs and blood sugar of 631. REVIEW OF SYSTEMS:  A comprehensive review of systems was negative except for that written in the History of Present Illness. Past Medical History:   Diagnosis Date    Abscess 12/2020    left side of abdomen    Diabetes ([address]     Hyperlipidemia     Hypertension     ICD (implantable cardioverter-defibrillator) in place     NSVT (nonsustained ventricular tachycardia) ([address] 11/21/2018    EPS 11/21/2018 VT/VF     Right groin pain 12/7/2020    Stroke [facility])     Syncope     Valvular heart disease       Past Surgical History:   Procedure Laterality Date    COLONOSCOPY  1/2/2015         HX HEART CATHETERIZATION      HX HEENT      l cararact removal    HX IMPLANTABLE CARDIOVERTER DEFIBRILLATOR       Prior to Admission medications    Medication Sig Start Date End Date Taking?  Authorizing Provider   atorvastatin (LIPITOR) 20 mg tablet TAKE ONE TABLET BY MOUTH DAILY 5/6/22   Robert Burk MD   amLODIPine (NORVASC) 10 mg tablet TAKE 1 TABLET BY MOUTH EVERY DAY 5/5/22   Angelina Oh MD   aspirin (ASPIRIN) 325 mg tablet Take 1 Tablet by mouth daily. Patient not taking: Reported on 5/11/2022 3/12/22   Chin Chambers MD   metFORMIN (GLUCOPHAGE) 1,000 mg tablet TAKE 1 TABLET BY MOUTH IN THE EVENING BEFORE DINNER 10/18/21   Dioni Echols MD   Trulicity 1.5 HU/0.7 mL sub-q pen INJECT 0.5 ML UNDER SKIN EVERY SEVEN (7) DAYS. 8/19/21   Angelina Oh MD   Insulin Needles, Disposable, 31 gauge x 1/4\" ndle USE WITH INSULIN PENS THREE TIMES PER DAY 7/30/20   Angelina Oh MD   glucose blood VI test strips (ASCENSIA AUTODISC VI, ONE TOUCH ULTRA TEST VI) strip 3 times daily before meals 4/29/20   Angelina Oh MD     No Known Allergies   Family History   Problem Relation Age of Onset    Diabetes Mother     Hypertension Mother     Stroke Mother       Social History:  reports that he has never smoked. He has never used smokeless tobacco. He reports that he does not drink alcohol and does not use drugs. Patient is single with a girlfriend, he has no children    Family and social history were personally reviewed, all pertinent and relevant details are outlined as above.     Objective:   Visit Vitals  BP (!) 164/88   Pulse 75   Temp 98 °F (36.7 °C)   Resp 18   Ht 5' 7\" (1.702 m)   Wt 71.7 kg (158 lb)   SpO2 96%   BMI 24.75 kg/m²      O2 Device: None (Room air)  Patient Vitals for the past 24 hrs:   Temp Pulse Resp BP SpO2   08/13/22 1628 -- 75 18 (!) 164/88 --   08/13/22 1613 -- 74 18 (!) 174/88 --   08/13/22 1558 -- 76 16 (!) 162/77 --   08/13/22 1543 -- 72 17 (!) 155/90 --   08/13/22 1528 -- 71 19 (!) 157/83 --   08/13/22 1328 98 °F (36.7 °C) 77 19 (!) 145/66 96 %      PHYSICAL EXAM:   General: Alert x oriented x 3, awake, no acute distress, resting in bed, pleasant male, appears to be stated age  HEENT: PEERL, EOMI, moist mucus membranes  Neck: Supple, no JVD, no meningeal signs  Chest: Clear to auscultation bilaterally   CVS: RRR, S1 S2 heard, no murmurs/rubs/gallops  Abd: Soft, minimal-tenderness, distended, +bowel sounds   Ext: No clubbing, no cyanosis, asymmetric lower extremity edema with 2+ edema in right lower extremity 1+ in left  Neuro/Psych: Pleasant mood and affect, CN 2-12 grossly intact, sensory grossly within normal limit, Strength 5/5 in all extremities,   Pulses: 2+, symmetric in all extremities  Skin: Warm, dry, without rashes or lesions    Data Review: All diagnostic labs and studies have been reviewed. Recent Results (from the past 24 hour(s))   CBC WITH AUTOMATED DIFF    Collection Time: 08/13/22  1:58 PM   Result Value Ref Range    WBC 4.0 (L) 4.1 - 11.1 K/uL    RBC 4.44 4.10 - 5.70 M/uL    HGB 12.2 12.1 - 17.0 g/dL    HCT 37.6 36.6 - 50.3 %    MCV 84.7 80.0 - 99.0 FL    MCH 27.5 26.0 - 34.0 PG    MCHC 32.4 30.0 - 36.5 g/dL    RDW 13.2 11.5 - 14.5 %    PLATELET 737 132 - 833 K/uL    MPV 10.3 8.9 - 12.9 FL    NRBC 0.0 0  WBC    ABSOLUTE NRBC 0.00 0.00 - 0.01 K/uL    NEUTROPHILS 85 (H) 32 - 75 %    LYMPHOCYTES 8 (L) 12 - 49 %    MONOCYTES 7 5 - 13 %    EOSINOPHILS 0 0 - 7 %    BASOPHILS 0 0 - 1 %    IMMATURE GRANULOCYTES 0 0.0 - 0.5 %    ABS. NEUTROPHILS 3.4 1.8 - 8.0 K/UL    ABS. LYMPHOCYTES 0.3 (L) 0.8 - 3.5 K/UL    ABS. MONOCYTES 0.3 0.0 - 1.0 K/UL    ABS. EOSINOPHILS 0.0 0.0 - 0.4 K/UL    ABS. BASOPHILS 0.0 0.0 - 0.1 K/UL    ABS. IMM.  GRANS. 0.0 0.00 - 0.04 K/UL    DF SMEAR SCANNED      RBC COMMENTS NORMOCYTIC, NORMOCHROMIC     METABOLIC PANEL, COMPREHENSIVE    Collection Time: 08/13/22  1:58 PM   Result Value Ref Range    Sodium 127 (L) 136 - 145 mmol/L    Potassium 5.1 3.5 - 5.1 mmol/L    Chloride 93 (L) 97 - 108 mmol/L    CO2 28 21 - 32 mmol/L    Anion gap 6 5 - 15 mmol/L    Glucose 631 (HH) 65 - 100 mg/dL    BUN 46 (H) 6 - 20 MG/DL    Creatinine 2.05 (H) 0.70 - 1.30 MG/DL    BUN/Creatinine ratio 22 (H) 12 - 20      GFR est AA 39 (L) >60 ml/min/1.73m2    GFR est non-AA 32 (L) >60 ml/min/1.73m2    Calcium 8.8 8.5 - 10.1 MG/DL    Bilirubin, total 0.4 0.2 - 1.0 MG/DL    ALT (SGPT) 132 (H) 12 - 78 U/L    AST (SGOT) 61 (H) 15 - 37 U/L    Alk.  phosphatase 140 (H) 45 - 117 U/L    Protein, total 7.0 6.4 - 8.2 g/dL    Albumin 2.9 (L) 3.5 - 5.0 g/dL    Globulin 4.1 (H) 2.0 - 4.0 g/dL    A-G Ratio 0.7 (L) 1.1 - 2.2     LIPASE    Collection Time: 08/13/22  1:58 PM   Result Value Ref Range    Lipase 178 73 - 393 U/L   EKG, 12 LEAD, INITIAL    Collection Time: 08/13/22  2:01 PM   Result Value Ref Range    Ventricular Rate 74 BPM    Atrial Rate 74 BPM    P-R Interval 136 ms    QRS Duration 86 ms    Q-T Interval 384 ms    QTC Calculation (Bezet) 426 ms    Calculated P Axis 62 degrees    Calculated R Axis -32 degrees    Calculated T Axis 58 degrees    Diagnosis       Normal sinus rhythm  Possible Left atrial enlargement  Left axis deviation  Brugada pattern, type 1  Abnormal ECG  When compared with ECG of 08-MAR-2022 13:52,  ST more elevated in Anterior leads     GLUCOSTABILIZER    Collection Time: 08/13/22  3:24 PM   Result Value Ref Range    Glucose 600 mg/dL    Insulin order 10.8 units/hour    Insulin adminstered 10.8 units/hour    Multiplier 0.020     Low target 150 mg/dL    High target 250 mg/dL    D50 order 0.0 ml    D50 administered 0.00 ml    Minutes until next BG 60 min    Order initials ofs     Administered initials ofs    GLUCOSE, POC    Collection Time: 08/13/22  4:30 PM   Result Value Ref Range    Glucose (POC) 431 (H) 65 - 117 mg/dL    Performed by Sam Perez    Collection Time: 08/13/22  4:36 PM   Result Value Ref Range    Glucose 431 mg/dL    Insulin order 7.4 units/hour    Insulin adminstered 7.4 units/hour    Multiplier 0.020     Low target 150 mg/dL    High target 250 mg/dL    D50 order 0.0 ml    D50 administered 0.00 ml    Minutes until next BG 60 min    Order initials hgi     Administered initials hgi         All Micro Results       Procedure Component Value Units Date/Time    COVID-19 RAPID TEST [[id number]]     Order Status: Sent             IMAGING:   DUPLEX LOWER EXT VENOUS BILAT   Final Result      XR CHEST PORT   Final Result      No acute process on portable chest. No change. US ABD COMP    (Results Pending)        ECG/ECHO:    Results for orders placed or performed during the hospital encounter of 08/13/22   EKG, 12 LEAD, INITIAL   Result Value Ref Range    Ventricular Rate 74 BPM    Atrial Rate 74 BPM    P-R Interval 136 ms    QRS Duration 86 ms    Q-T Interval 384 ms    QTC Calculation (Bezet) 426 ms    Calculated P Axis 62 degrees    Calculated R Axis -32 degrees    Calculated T Axis 58 degrees    Diagnosis       Normal sinus rhythm  Possible Left atrial enlargement  Left axis deviation  Brugada pattern, type 1  Abnormal ECG  When compared with ECG of 08-MAR-2022 13:52,  ST more elevated in Anterior leads          Assessment:   Given the patient's current clinical presentation, there is a high level of concern for decompensation if discharged from the emergency department. Complex decision making was performed, which includes reviewing the patient's available past medical records, laboratory results, and imaging studies. Active Problems:    Malaise and fatigue (8/13/2022)      Hyperglycemia (8/13/2022)      Elevated serum creatinine (8/13/2022)      Plan:     1-type 2 diabetes on home insulin with profound hyperglycemia  No evidence of acidosis but at high risk for developing DKA due to level of hyperglycemia  Patient started on insulin drip in the emergency room-we will continue through the night and reassess in the a.m. with regards to starting patient's home insulin regimen  2-hypertension-uncontrolled-resume patient's home medication and adjust antihypertensives  3.   Elevated liver function tests-unclear etiology-hepatitis labs in a.m. abdominal ultrasound  Normal bilirubin-no alcohol use-monitor for improvement and hold statin medication  4. Hyperlipidemia hold home statin medication while LFTs are elevated  5. Elevated creatinine-likely secondary to dehydration from poor p.o. intake due to GI distress  Continue IV fluids complete abdominal ultrasound should evaluate for obstruction  Repeat creatinine in a.m. tomorrow-stop metformin for now  6. Fatigue and malaise-unclear etiology rapid COVID test ordered results pending  7. Hyponatremia-continue normal saline and recheck in a.m.  8.  History of nonsustained ventricular tachycardia status post AICD placement-stable heart rate and unchanged EKG on admission  Patient is a full code      DIET: ADULT DIET Regular; 4 carb choices (60 gm/meal)   ISOLATION PRECAUTIONS: There are currently no Active Isolations  CODE STATUS: Full Code   DVT PROPHYLAXIS: SCDs  FUNCTIONAL STATUS PRIOR TO HOSPITALIZATION: Fully active and ambulatory; able to carry on all self-care without restriction. EARLY MOBILITY ASSESSMENT: Recommend routine ambulation while hospitalized with the assistance of nursing staff  ANTICIPATED DISCHARGE: 24-48 hours.   EMERGENCY CONTACT/SURROGATE DECISION MAKER: Patient's girlfriend on his contact list      Signed By: Franca Cortes MD     August 13, 2022

## 2022-08-14 LAB
25(OH)D3 SERPL-MCNC: 14.8 NG/ML (ref 30–100)
ALBUMIN SERPL-MCNC: 2.2 G/DL (ref 3.5–5)
ALBUMIN/GLOB SERPL: 0.6 {RATIO} (ref 1.1–2.2)
ALP SERPL-CCNC: 109 U/L (ref 45–117)
ALT SERPL-CCNC: 92 U/L (ref 12–78)
ANION GAP SERPL CALC-SCNC: 6 MMOL/L (ref 5–15)
AST SERPL-CCNC: 50 U/L (ref 15–37)
BILIRUB SERPL-MCNC: 0.3 MG/DL (ref 0.2–1)
BUN SERPL-MCNC: 35 MG/DL (ref 6–20)
BUN/CREAT SERPL: 27 (ref 12–20)
CALCIUM SERPL-MCNC: 8.2 MG/DL (ref 8.5–10.1)
CHLORIDE SERPL-SCNC: 105 MMOL/L (ref 97–108)
CO2 SERPL-SCNC: 26 MMOL/L (ref 21–32)
CREAT SERPL-MCNC: 1.3 MG/DL (ref 0.7–1.3)
CRP SERPL-MCNC: 5.35 MG/DL (ref 0–0.6)
D DIMER PPP FEU-MCNC: 0.27 MG/L FEU (ref 0–0.65)
ERYTHROCYTE [DISTWIDTH] IN BLOOD BY AUTOMATED COUNT: 13.2 % (ref 11.5–14.5)
GLOBULIN SER CALC-MCNC: 3.8 G/DL (ref 2–4)
GLUCOSE BLD STRIP.AUTO-MCNC: 128 MG/DL (ref 65–117)
GLUCOSE BLD STRIP.AUTO-MCNC: 358 MG/DL (ref 65–117)
GLUCOSE BLD STRIP.AUTO-MCNC: 400 MG/DL (ref 65–117)
GLUCOSE BLD STRIP.AUTO-MCNC: 447 MG/DL (ref 65–117)
GLUCOSE BLD STRIP.AUTO-MCNC: 449 MG/DL (ref 65–117)
GLUCOSE SERPL-MCNC: 129 MG/DL (ref 65–100)
HCT VFR BLD AUTO: 33.9 % (ref 36.6–50.3)
HGB BLD-MCNC: 11.1 G/DL (ref 12.1–17)
MCH RBC QN AUTO: 27.5 PG (ref 26–34)
MCHC RBC AUTO-ENTMCNC: 32.7 G/DL (ref 30–36.5)
MCV RBC AUTO: 83.9 FL (ref 80–99)
NRBC # BLD: 0 K/UL (ref 0–0.01)
NRBC BLD-RTO: 0 PER 100 WBC
PLATELET # BLD AUTO: 149 K/UL (ref 150–400)
PMV BLD AUTO: 10.1 FL (ref 8.9–12.9)
POTASSIUM SERPL-SCNC: 3.9 MMOL/L (ref 3.5–5.1)
PROT SERPL-MCNC: 6 G/DL (ref 6.4–8.2)
RBC # BLD AUTO: 4.04 M/UL (ref 4.1–5.7)
SERVICE CMNT-IMP: ABNORMAL
SODIUM SERPL-SCNC: 137 MMOL/L (ref 136–145)
WBC # BLD AUTO: 4.4 K/UL (ref 4.1–11.1)

## 2022-08-14 PROCEDURE — 74011636637 HC RX REV CODE- 636/637: Performed by: FAMILY MEDICINE

## 2022-08-14 PROCEDURE — 82962 GLUCOSE BLOOD TEST: CPT

## 2022-08-14 PROCEDURE — 85027 COMPLETE CBC AUTOMATED: CPT

## 2022-08-14 PROCEDURE — 74011250637 HC RX REV CODE- 250/637: Performed by: FAMILY MEDICINE

## 2022-08-14 PROCEDURE — 82306 VITAMIN D 25 HYDROXY: CPT

## 2022-08-14 PROCEDURE — 80053 COMPREHEN METABOLIC PANEL: CPT

## 2022-08-14 PROCEDURE — 86140 C-REACTIVE PROTEIN: CPT

## 2022-08-14 PROCEDURE — 36415 COLL VENOUS BLD VENIPUNCTURE: CPT

## 2022-08-14 PROCEDURE — 65660000001 HC RM ICU INTERMED STEPDOWN

## 2022-08-14 PROCEDURE — 85379 FIBRIN DEGRADATION QUANT: CPT

## 2022-08-14 PROCEDURE — 74011250636 HC RX REV CODE- 250/636: Performed by: FAMILY MEDICINE

## 2022-08-14 PROCEDURE — 74011000250 HC RX REV CODE- 250: Performed by: FAMILY MEDICINE

## 2022-08-14 RX ORDER — INSULIN LISPRO 100 [IU]/ML
INJECTION, SOLUTION INTRAVENOUS; SUBCUTANEOUS
Status: DISCONTINUED | OUTPATIENT
Start: 2022-08-14 | End: 2022-08-17

## 2022-08-14 RX ORDER — INSULIN LISPRO 100 [IU]/ML
20 INJECTION, SOLUTION INTRAVENOUS; SUBCUTANEOUS ONCE
Status: COMPLETED | OUTPATIENT
Start: 2022-08-14 | End: 2022-08-14

## 2022-08-14 RX ORDER — INSULIN GLARGINE 100 [IU]/ML
10 INJECTION, SOLUTION SUBCUTANEOUS DAILY
Status: DISCONTINUED | OUTPATIENT
Start: 2022-08-14 | End: 2022-08-16

## 2022-08-14 RX ORDER — MAGNESIUM SULFATE 100 %
4 CRYSTALS MISCELLANEOUS AS NEEDED
Status: DISCONTINUED | OUTPATIENT
Start: 2022-08-14 | End: 2022-08-17 | Stop reason: HOSPADM

## 2022-08-14 RX ADMIN — SODIUM CHLORIDE, PRESERVATIVE FREE 10 ML: 5 INJECTION INTRAVENOUS at 13:03

## 2022-08-14 RX ADMIN — ENOXAPARIN SODIUM 30 MG: 100 INJECTION SUBCUTANEOUS at 06:41

## 2022-08-14 RX ADMIN — INSULIN GLARGINE 10 UNITS: 100 INJECTION, SOLUTION SUBCUTANEOUS at 08:46

## 2022-08-14 RX ADMIN — ACETAMINOPHEN 650 MG: 325 TABLET ORAL at 03:37

## 2022-08-14 RX ADMIN — Medication 8 UNITS: at 21:31

## 2022-08-14 RX ADMIN — SODIUM CHLORIDE, PRESERVATIVE FREE 10 ML: 5 INJECTION INTRAVENOUS at 21:12

## 2022-08-14 RX ADMIN — ASPIRIN 81 MG CHEWABLE TABLET 81 MG: 81 TABLET CHEWABLE at 08:46

## 2022-08-14 RX ADMIN — Medication 20 UNITS: at 12:39

## 2022-08-14 RX ADMIN — AMLODIPINE BESYLATE 10 MG: 5 TABLET ORAL at 08:46

## 2022-08-14 RX ADMIN — SODIUM CHLORIDE 75 ML/HR: 9 INJECTION, SOLUTION INTRAVENOUS at 06:44

## 2022-08-14 RX ADMIN — ENOXAPARIN SODIUM 30 MG: 100 INJECTION SUBCUTANEOUS at 19:30

## 2022-08-14 RX ADMIN — Medication 10 UNITS: at 19:30

## 2022-08-14 NOTE — ED NOTES
TRANSFER - OUT REPORT:    Verbal report given to Sanjuanita Chua RN(name) on Refugio Loss  being transferred to [facility]) for routine progression of care       Report consisted of patients Situation, Background, Assessment and   Recommendations(SBAR). Information from the following report(s) SBAR, Kardex, ED Summary, [facility] and Recent Results was reviewed with the receiving nurse. Lines:   Peripheral IV 08/13/22 Left Antecubital (Active)   Site Assessment Clean, dry, & intact 08/13/22 1407   Phlebitis Assessment 0 08/13/22 1407   Infiltration Assessment 0 08/13/22 1407   Dressing Status Clean, dry, & intact 08/13/22 1407   Dressing Type Transparent 08/13/22 1407   Hub Color/Line Status Pink;Patent; Flushed 08/13/22 1407   Action Taken Blood drawn 08/13/22 1407   Alcohol Cap Used No 08/13/22 1407        Opportunity for questions and clarification was provided.       Patient transported with:   Registered Nurse

## 2022-08-14 NOTE — PROGRESS NOTES
0800 Bedside shift change report given to Bryon Ahuja   (oncoming nurse) by Milton Peterson (offgoing nurse).  Report included the following information SBAR, Kardex, ED Summary, OR Summary, , Intake/Output, MAR, , Recent Results, Med Rec Status, and Cardiac Rhythm SR

## 2022-08-14 NOTE — PROGRESS NOTES
[facility] [facility] Adult  Hospitalist Group                                                                                          Hospitalist Progress Note  Alejandrina Robertson MD  Answering service: [phone] from in house phone        Date of Service:  2022  NAME:  Sosa Hendricks  :  1955  MRN:  [id number]      Admission Summary:   Sosa Hendricks is a 74 y.o. male who presents with abdominal discomfort nausea fatigue and malaise, generalized weakness and in the emergency room found to have abnormal labs with a very high blood sugar. Patient tells me his symptoms started approximately 4 to 5 days ago. He denies any symptoms that would be suggestive of an acute infection-he has not had any changes to his medications no known sick contacts-he does question possible mild case of food poisoning patient reports ingesting some [employer] food which she had purchased 4 to 6 days prior. Patient denies any diarrhea to me but does report one episode of diarrhea to the nurse on admission as per list were requested to admit the patient due to abnormal labs and blood sugar of 631. Interval history / Subjective:   Patient seen and examined  I reviewed his entire care plan with vitals labs and test results  Overnight he was placed on oxygen-on exam patient had nasal cannula in place but was not receiving any oxygen from the meter  Patient feeling better but oxygen is borderline at 89 to 91% on room air     Assessment & Plan:      1-type 2 diabetes -supposed to be on home insulin but patient telling nursing staff he no longer takes any insulin; you have patient's past hemoglobin A1c's are all above 14  Glucose levels on admission greater than 600  Patient now off insulin drip, start Accu-Cheks and sliding scale insulin  2-hypertension-improved after restarting patient's home medication discontinuing IV fluids  3.   Elevated liver function tests-improving   likely due to infection and hypotension  Sinew to hold statin medication  4. Hyperlipidemia hold home statin medication while LFTs are elevated  5. Elevated creatinine-likely secondary to dehydration from poor p.o. intake due to GI distress  Creatinine normal with IV fluids will discontinue continue to hold metformin for now  6. Fatigue and malaise-due to cute COVID infection  7. Hyponatremia-corrected and resolved  8. History of nonsustained ventricular tachycardia status post AICD placement-stable heart rate and unchanged EKG on admission  9. Acute COVID-19 infection-normal D-dimer and mildly elevated CRP  Currently patient is with stable SaO2 on room air  Will not start dexamethasone at this time due to patient's significant hyperglycemia and poorly controlled diabetes     Code status: Full code  Prophylaxis: Lovenox  Care Plan discussed with: Patient  Anticipated Disposition: Likely home tomorrow     Hospital Problems  Date Reviewed: 5/15/2022            Codes Class Noted POA    Malaise and fatigue ICD-10-CM: R53.81, R53.83  ICD-9-CM: 780.79  8/13/2022 Unknown        Hyperglycemia ICD-10-CM: R73.9  ICD-9-CM: 790.29  8/13/2022 Unknown        Elevated serum creatinine ICD-10-CM: R79.89  ICD-9-CM: 790.99  8/13/2022 Unknown             Review of Systems:   A comprehensive review of systems was negative except for that written in the HPI. Vital Signs:    Last 24hrs VS reviewed since prior progress note.  Most recent are:  Visit Vitals  /62 (BP 1 Location: Left upper arm, BP Patient Position: At rest)   Pulse 72   Temp 99.2 °F (37.3 °C)   Resp 15   Ht 5' 7\" (1.702 m)   Wt 69.4 kg (153 lb)   SpO2 91%   BMI 23.96 kg/m²         Intake/Output Summary (Last 24 hours) at 8/14/2022 1806  Last data filed at 8/14/2022 1758  Gross per 24 hour   Intake 965 ml   Output 2240 ml   Net -1275 ml        Physical Examination:     I had a face to face encounter with this patient and independently examined them on 8/14/2022 as outlined below:          Constitutional:  No acute distress, cooperative, pleasant    ENT:  Oral mucosa moist, oropharynx benign. Resp:  CTA bilaterally. No wheezing/rhonchi/rales. No accessory muscle use. CV:  Regular rhythm, normal rate, no murmurs, gallops, rubs    GI:  Soft, non distended, non tender. normoactive bowel sounds, no hepatosplenomegaly     Musculoskeletal:  No edema, warm, 2+ pulses throughout    Neurologic:  Moves all extremities. AAOx3, CN II-XII reviewed            Data Review:    Review and/or order of clinical lab test  Review and/or order of tests in the radiology section of CPT  Review and/or order of tests in the medicine section of CPT  CXR Results  (Last 48 hours)                 08/13/22 1528  XR CHEST PORT Final result    Impression:      No acute process on portable chest. No change. Narrative:  EXAM: XR CHEST PORT       INDICATION: Cough, diarrhea. COMPARISON: Chest views on 8/20/2021 and 7/14/2020       TECHNIQUE: Upright portable chest AP view       FINDINGS: Defibrillator battery pack and lead are unchanged. Cardiac monitoring   wires overlie the thorax. The cardiomediastinal and hilar contours are within   normal limits. The pulmonary vasculature is within normal limits. The lungs and pleural spaces are clear. Lung volumes are low. Bones are   unchanged.                     Labs:     Recent Labs     08/14/22 0329 08/13/22  1358   WBC 4.4 4.0*   HGB 11.1* 12.2   HCT 33.9* 37.6   * 157     Recent Labs     08/14/22  0329 08/13/22  1740 08/13/22  1545 08/13/22  1358    133*  --  127*   K 3.9 4.3  --  5.1    99  --  93*   CO2 26 26  --  28   BUN 35* 42*  --  46*   CREA 1.30 1.64*  --  2.05*   * 402*  --  631*   CA 8.2* 8.3*  --  8.8   MG  --  2.5*  --   --    PHOS  --   --  3.9  --      Recent Labs     08/14/22  0329 08/13/22  1358   ALT 92* 132*    140*   TBILI 0.3 0.4   TP 6.0* 7.0   ALB 2.2* 2.9*   GLOB 3.8 4.1*   LPSE  --  178 No results for input(s): INR, PTP, APTT, INREXT in the last 72 hours. No results for input(s): FE, TIBC, PSAT, FERR in the last 72 hours. No results found for: FOL, RBCF   No results for input(s): PH, PCO2, PO2 in the last 72 hours. No results for input(s): CPK, CKNDX, TROIQ in the last 72 hours.     No lab exists for component: CPKMB  Lab Results   Component Value Date/Time    Cholesterol, total 155 03/09/2022 03:55 AM    HDL Cholesterol 88 03/09/2022 03:55 AM    LDL, calculated 52.8 03/09/2022 03:55 AM    Triglyceride 71 03/09/2022 03:55 AM    CHOL/HDL Ratio 1.8 03/09/2022 03:55 AM     Lab Results   Component Value Date/Time    Glucose (POC) 449 (H) 08/14/2022 03:43 PM    Glucose (POC) 400 (H) 08/14/2022 11:33 AM    Glucose (POC) 358 (H) 08/14/2022 11:32 AM    Glucose (POC) 128 (H) 08/14/2022 06:40 AM    Glucose (POC) 97 08/13/2022 11:15 PM     Lab Results   Component Value Date/Time    Color YELLOW/STRAW 07/14/2020 10:27 AM    Appearance CLEAR 07/14/2020 10:27 AM    Specific gravity 1.014 07/14/2020 10:27 AM    pH (UA) 6.0 07/14/2020 10:27 AM    Protein 30 (A) 07/14/2020 10:27 AM    Glucose Negative 07/14/2020 10:27 AM    Ketone Negative 07/14/2020 10:27 AM    Bilirubin Negative 07/14/2020 10:27 AM    Urobilinogen 0.2 07/14/2020 10:27 AM    Nitrites Negative 07/14/2020 10:27 AM    Leukocyte Esterase Negative 07/14/2020 10:27 AM    Epithelial cells FEW 07/14/2020 10:27 AM    Bacteria Negative 07/14/2020 10:27 AM    WBC 0-4 07/14/2020 10:27 AM    RBC 0-5 07/14/2020 10:27 AM         Medications Reviewed:     Current Facility-Administered Medications   Medication Dose Route Frequency    insulin glargine (LANTUS) injection 10 Units  10 Units SubCUTAneous DAILY    glucose chewable tablet 16 g  4 Tablet Oral PRN    glucagon (GLUCAGEN) injection 1 mg  1 mg IntraMUSCular PRN    dextrose 10 % infusion 0-250 mL  0-250 mL IntraVENous PRN    insulin lispro (HUMALOG) injection   SubCUTAneous AC&HS    sodium chloride (NS) flush 5-40 mL  5-40 mL IntraVENous Q8H    sodium chloride (NS) flush 5-40 mL  5-40 mL IntraVENous PRN    acetaminophen (TYLENOL) tablet 650 mg  650 mg Oral Q6H PRN    Or    acetaminophen (TYLENOL) suppository 650 mg  650 mg Rectal Q6H PRN    polyethylene glycol (MIRALAX) packet 17 g  17 g Oral DAILY PRN    ondansetron (ZOFRAN) injection 4 mg  4 mg IntraVENous Q6H PRN    amLODIPine (NORVASC) tablet 10 mg  10 mg Oral DAILY    aspirin chewable tablet 81 mg  81 mg Oral DAILY    enoxaparin (LOVENOX) injection 30 mg  30 mg SubCUTAneous Q12H    guaiFENesin-dextromethorphan (ROBITUSSIN DM) 100-10 mg/5 mL syrup 5 mL  5 mL Oral Q4H PRN     ______________________________________________________________________  EXPECTED LENGTH OF STAY: - - -  ACTUAL LENGTH OF STAY:          1                 Yinka Winter MD

## 2022-08-14 NOTE — ED NOTES
Verbal shift change report given to Shelly Grant RN (oncoming nurse) by [facility] [address], RN (offgoing nurse). Report included the following information SBAR, Kardex, ED Summary, [facility] and Recent Results.

## 2022-08-15 LAB
ALBUMIN SERPL-MCNC: 2 G/DL (ref 3.5–5)
ALBUMIN/GLOB SERPL: 0.5 {RATIO} (ref 1.1–2.2)
ALP SERPL-CCNC: 99 U/L (ref 45–117)
ALT SERPL-CCNC: 69 U/L (ref 12–78)
ANION GAP SERPL CALC-SCNC: 7 MMOL/L (ref 5–15)
AST SERPL-CCNC: 43 U/L (ref 15–37)
ATRIAL RATE: 74 BPM
BILIRUB SERPL-MCNC: 0.2 MG/DL (ref 0.2–1)
BUN SERPL-MCNC: 46 MG/DL (ref 6–20)
BUN/CREAT SERPL: 24 (ref 12–20)
CALCIUM SERPL-MCNC: 8.2 MG/DL (ref 8.5–10.1)
CALCULATED P AXIS, ECG09: 62 DEGREES
CALCULATED R AXIS, ECG10: -32 DEGREES
CALCULATED T AXIS, ECG11: 58 DEGREES
CHLORIDE SERPL-SCNC: 103 MMOL/L (ref 97–108)
CO2 SERPL-SCNC: 25 MMOL/L (ref 21–32)
CREAT SERPL-MCNC: 1.88 MG/DL (ref 0.7–1.3)
CRP SERPL-MCNC: 6.86 MG/DL (ref 0–0.6)
D DIMER PPP FEU-MCNC: 0.44 MG/L FEU (ref 0–0.65)
DIAGNOSIS, 93000: NORMAL
ERYTHROCYTE [DISTWIDTH] IN BLOOD BY AUTOMATED COUNT: 13.3 % (ref 11.5–14.5)
GLOBULIN SER CALC-MCNC: 4.1 G/DL (ref 2–4)
GLUCOSE BLD STRIP.AUTO-MCNC: 102 MG/DL (ref 65–117)
GLUCOSE BLD STRIP.AUTO-MCNC: 262 MG/DL (ref 65–117)
GLUCOSE BLD STRIP.AUTO-MCNC: 290 MG/DL (ref 65–117)
GLUCOSE BLD STRIP.AUTO-MCNC: 403 MG/DL (ref 65–117)
GLUCOSE SERPL-MCNC: 186 MG/DL (ref 65–100)
HCT VFR BLD AUTO: 37.7 % (ref 36.6–50.3)
HGB BLD-MCNC: 12 G/DL (ref 12.1–17)
MCH RBC QN AUTO: 27.1 PG (ref 26–34)
MCHC RBC AUTO-ENTMCNC: 31.8 G/DL (ref 30–36.5)
MCV RBC AUTO: 85.1 FL (ref 80–99)
NRBC # BLD: 0 K/UL (ref 0–0.01)
NRBC BLD-RTO: 0 PER 100 WBC
P-R INTERVAL, ECG05: 136 MS
PLATELET # BLD AUTO: 166 K/UL (ref 150–400)
PMV BLD AUTO: 10.1 FL (ref 8.9–12.9)
POTASSIUM SERPL-SCNC: 4 MMOL/L (ref 3.5–5.1)
PROT SERPL-MCNC: 6.1 G/DL (ref 6.4–8.2)
Q-T INTERVAL, ECG07: 384 MS
QRS DURATION, ECG06: 86 MS
QTC CALCULATION (BEZET), ECG08: 426 MS
RBC # BLD AUTO: 4.43 M/UL (ref 4.1–5.7)
SERVICE CMNT-IMP: ABNORMAL
SERVICE CMNT-IMP: NORMAL
SODIUM SERPL-SCNC: 135 MMOL/L (ref 136–145)
VENTRICULAR RATE, ECG03: 74 BPM
WBC # BLD AUTO: 4.1 K/UL (ref 4.1–11.1)

## 2022-08-15 PROCEDURE — 85027 COMPLETE CBC AUTOMATED: CPT

## 2022-08-15 PROCEDURE — 85379 FIBRIN DEGRADATION QUANT: CPT

## 2022-08-15 PROCEDURE — 65660000001 HC RM ICU INTERMED STEPDOWN

## 2022-08-15 PROCEDURE — 74011250636 HC RX REV CODE- 250/636: Performed by: FAMILY MEDICINE

## 2022-08-15 PROCEDURE — 74011250637 HC RX REV CODE- 250/637: Performed by: FAMILY MEDICINE

## 2022-08-15 PROCEDURE — 86140 C-REACTIVE PROTEIN: CPT

## 2022-08-15 PROCEDURE — 74011000250 HC RX REV CODE- 250: Performed by: FAMILY MEDICINE

## 2022-08-15 PROCEDURE — 80053 COMPREHEN METABOLIC PANEL: CPT

## 2022-08-15 PROCEDURE — 74011636637 HC RX REV CODE- 636/637: Performed by: FAMILY MEDICINE

## 2022-08-15 PROCEDURE — 36415 COLL VENOUS BLD VENIPUNCTURE: CPT

## 2022-08-15 PROCEDURE — 82962 GLUCOSE BLOOD TEST: CPT

## 2022-08-15 RX ORDER — PREDNISONE 20 MG/1
40 TABLET ORAL
Status: DISCONTINUED | OUTPATIENT
Start: 2022-08-15 | End: 2022-08-17 | Stop reason: HOSPADM

## 2022-08-15 RX ORDER — SODIUM CHLORIDE 9 MG/ML
75 INJECTION, SOLUTION INTRAVENOUS CONTINUOUS
Status: DISCONTINUED | OUTPATIENT
Start: 2022-08-15 | End: 2022-08-17

## 2022-08-15 RX ADMIN — Medication 8 UNITS: at 22:17

## 2022-08-15 RX ADMIN — AMLODIPINE BESYLATE 10 MG: 5 TABLET ORAL at 08:58

## 2022-08-15 RX ADMIN — INSULIN GLARGINE 10 UNITS: 100 INJECTION, SOLUTION SUBCUTANEOUS at 08:59

## 2022-08-15 RX ADMIN — SODIUM CHLORIDE, PRESERVATIVE FREE 10 ML: 5 INJECTION INTRAVENOUS at 21:44

## 2022-08-15 RX ADMIN — PREDNISONE 40 MG: 20 TABLET ORAL at 13:08

## 2022-08-15 RX ADMIN — ASPIRIN 81 MG CHEWABLE TABLET 81 MG: 81 TABLET CHEWABLE at 08:59

## 2022-08-15 RX ADMIN — SODIUM CHLORIDE 75 ML/HR: 9 INJECTION, SOLUTION INTRAVENOUS at 13:09

## 2022-08-15 RX ADMIN — GUAIFENESIN AND DEXTROMETHORPHAN 5 ML: 100; 10 SYRUP ORAL at 22:17

## 2022-08-15 RX ADMIN — ENOXAPARIN SODIUM 30 MG: 100 INJECTION SUBCUTANEOUS at 22:17

## 2022-08-15 RX ADMIN — SODIUM CHLORIDE, PRESERVATIVE FREE 10 ML: 5 INJECTION INTRAVENOUS at 13:09

## 2022-08-15 RX ADMIN — Medication 6 UNITS: at 19:11

## 2022-08-15 RX ADMIN — Medication 6 UNITS: at 13:08

## 2022-08-15 NOTE — PROGRESS NOTES
ITZEL:    RUR 9%    Disposition: Home with significant other. Patient has used [facility] in the past.    Transportation: Sindhu Katelyn    Follow up: PCP/Specialist    Primary contact: Benjamin Turner([phone])    Care Management Interventions  PCP Verified by CM: Yes  Palliative Care Criteria Met (RRAT>21 & CHF Dx)?: No  Mode of Transport at Discharge: Other (see comment) Benji Myers Signup: Yes  Discharge Durable Medical Equipment: No  Physical Therapy Consult: Yes  Occupational Therapy Consult: Yes  Speech Therapy Consult: No  Support Systems: Spouse/Significant Other  Confirm Follow Up Transport: Family  The Patient and/or Patient Representative was Provided with a Choice of Provider and Agrees with the Discharge Plan?: Yes  Discharge Location  Patient Expects to be Discharged to[de-identified] Home    Reason for Admission:  Abnormal labs and high blood sugar. RUR Score:     9%                Plan for utilizing home health:     No orders     PCP: First and Last name:  Bobby Srinivasan MD     Name of Practice:    Are you a current patient: Yes/No: Yes   Approximate date of last visit: 6/2022   Can you participate in a virtual visit with your PCP: Yes                    Current Advanced Directive/Advance Care Plan: Full Code      Healthcare Decision Maker:   Click here to complete [address] including selection of the Healthcare Decision Maker Relationship (ie \"Primary\")                             Transition of Care Plan:        CM met with patient to introduce CM role, verify demographics and begin discharge planning. Patient lives with friend Benjamin Olivera in a one story house. There are no steps to enter the house. Patient is independent with ADL's. He usually uses [employer] for transport. DME: [employer]  Pharmacy: [facility] [address]  HH: [facility]  No history of rehab. Insurance: [employer] primary and Medicare secondary.     Chuck Hartman RN/CRM  [phone]

## 2022-08-15 NOTE — PROGRESS NOTES
Bedside and Verbal shift change report given to Pk Atkins (oncoming nurse) by Sammie Hammond (offgoing nurse).  Report included the following information SBAR, Kardex, MAR, Accordion, and Cardiac Rhythm SR .

## 2022-08-16 LAB
ALBUMIN SERPL-MCNC: 1.9 G/DL (ref 3.5–5)
ALBUMIN/GLOB SERPL: 0.4 {RATIO} (ref 1.1–2.2)
ALP SERPL-CCNC: 106 U/L (ref 45–117)
ALT SERPL-CCNC: 63 U/L (ref 12–78)
ANION GAP SERPL CALC-SCNC: 7 MMOL/L (ref 5–15)
ANION GAP SERPL CALC-SCNC: 8 MMOL/L (ref 5–15)
APPEARANCE UR: CLEAR
AST SERPL-CCNC: 39 U/L (ref 15–37)
BACTERIA URNS QL MICRO: NEGATIVE /HPF
BILIRUB SERPL-MCNC: 0.2 MG/DL (ref 0.2–1)
BILIRUB UR QL: NEGATIVE
BUN SERPL-MCNC: 50 MG/DL (ref 6–20)
BUN SERPL-MCNC: 56 MG/DL (ref 6–20)
BUN/CREAT SERPL: 28 (ref 12–20)
BUN/CREAT SERPL: 29 (ref 12–20)
CALCIUM SERPL-MCNC: 7.9 MG/DL (ref 8.5–10.1)
CALCIUM SERPL-MCNC: 8.1 MG/DL (ref 8.5–10.1)
CHLORIDE SERPL-SCNC: 100 MMOL/L (ref 97–108)
CHLORIDE SERPL-SCNC: 98 MMOL/L (ref 97–108)
CO2 SERPL-SCNC: 22 MMOL/L (ref 21–32)
CO2 SERPL-SCNC: 24 MMOL/L (ref 21–32)
COLOR UR: ABNORMAL
COMMENT, HOLDF: NORMAL
CREAT SERPL-MCNC: 1.81 MG/DL (ref 0.7–1.3)
CREAT SERPL-MCNC: 1.92 MG/DL (ref 0.7–1.3)
EPITH CASTS URNS QL MICRO: ABNORMAL /LPF
ERYTHROCYTE [DISTWIDTH] IN BLOOD BY AUTOMATED COUNT: 13.1 % (ref 11.5–14.5)
GLOBULIN SER CALC-MCNC: 4.5 G/DL (ref 2–4)
GLUCOSE BLD STRIP.AUTO-MCNC: 333 MG/DL (ref 65–117)
GLUCOSE BLD STRIP.AUTO-MCNC: 408 MG/DL (ref 65–117)
GLUCOSE BLD STRIP.AUTO-MCNC: 469 MG/DL (ref 65–117)
GLUCOSE BLD STRIP.AUTO-MCNC: 471 MG/DL (ref 65–117)
GLUCOSE SERPL-MCNC: 488 MG/DL (ref 65–100)
GLUCOSE SERPL-MCNC: 510 MG/DL (ref 65–100)
GLUCOSE UR STRIP.AUTO-MCNC: >1000 MG/DL
HCT VFR BLD AUTO: 34.7 % (ref 36.6–50.3)
HGB BLD-MCNC: 11.2 G/DL (ref 12.1–17)
HGB UR QL STRIP: ABNORMAL
HYALINE CASTS URNS QL MICRO: ABNORMAL /LPF (ref 0–5)
KETONES UR QL STRIP.AUTO: NEGATIVE MG/DL
LEUKOCYTE ESTERASE UR QL STRIP.AUTO: NEGATIVE
MCH RBC QN AUTO: 27.5 PG (ref 26–34)
MCHC RBC AUTO-ENTMCNC: 32.3 G/DL (ref 30–36.5)
MCV RBC AUTO: 85.3 FL (ref 80–99)
NITRITE UR QL STRIP.AUTO: NEGATIVE
NRBC # BLD: 0 K/UL (ref 0–0.01)
NRBC BLD-RTO: 0 PER 100 WBC
PH UR STRIP: 5 [PH] (ref 5–8)
PLATELET # BLD AUTO: 176 K/UL (ref 150–400)
PMV BLD AUTO: 10.3 FL (ref 8.9–12.9)
POTASSIUM SERPL-SCNC: 4.8 MMOL/L (ref 3.5–5.1)
POTASSIUM SERPL-SCNC: 5.3 MMOL/L (ref 3.5–5.1)
PROCALCITONIN SERPL-MCNC: 0.42 NG/ML
PROT SERPL-MCNC: 6.4 G/DL (ref 6.4–8.2)
PROT UR STRIP-MCNC: 300 MG/DL
RBC # BLD AUTO: 4.07 M/UL (ref 4.1–5.7)
RBC #/AREA URNS HPF: ABNORMAL /HPF (ref 0–5)
SAMPLES BEING HELD,HOLD: NORMAL
SARS-COV-2, COV2: NORMAL
SARS-COV-2, XPLCVT: DETECTED
SERVICE CMNT-IMP: ABNORMAL
SODIUM SERPL-SCNC: 129 MMOL/L (ref 136–145)
SODIUM SERPL-SCNC: 130 MMOL/L (ref 136–145)
SOURCE, COVRS: ABNORMAL
SP GR UR REFRACTOMETRY: 1.02 (ref 1–1.03)
UR CULT HOLD, URHOLD: NORMAL
UROBILINOGEN UR QL STRIP.AUTO: 0.2 EU/DL (ref 0.2–1)
WBC # BLD AUTO: 3.5 K/UL (ref 4.1–11.1)
WBC URNS QL MICRO: ABNORMAL /HPF (ref 0–4)

## 2022-08-16 PROCEDURE — 74011250636 HC RX REV CODE- 250/636: Performed by: FAMILY MEDICINE

## 2022-08-16 PROCEDURE — 82962 GLUCOSE BLOOD TEST: CPT

## 2022-08-16 PROCEDURE — 74011636637 HC RX REV CODE- 636/637: Performed by: FAMILY MEDICINE

## 2022-08-16 PROCEDURE — 99233 SBSQ HOSP IP/OBS HIGH 50: CPT | Performed by: CLINICAL NURSE SPECIALIST

## 2022-08-16 PROCEDURE — 85027 COMPLETE CBC AUTOMATED: CPT

## 2022-08-16 PROCEDURE — U0005 INFEC AGEN DETEC AMPLI PROBE: HCPCS

## 2022-08-16 PROCEDURE — 74011000250 HC RX REV CODE- 250: Performed by: FAMILY MEDICINE

## 2022-08-16 PROCEDURE — 81001 URINALYSIS AUTO W/SCOPE: CPT

## 2022-08-16 PROCEDURE — 80053 COMPREHEN METABOLIC PANEL: CPT

## 2022-08-16 PROCEDURE — 74011250637 HC RX REV CODE- 250/637: Performed by: FAMILY MEDICINE

## 2022-08-16 PROCEDURE — 65660000001 HC RM ICU INTERMED STEPDOWN

## 2022-08-16 PROCEDURE — 36415 COLL VENOUS BLD VENIPUNCTURE: CPT

## 2022-08-16 PROCEDURE — 84145 PROCALCITONIN (PCT): CPT

## 2022-08-16 RX ORDER — INSULIN GLARGINE 100 [IU]/ML
20 INJECTION, SOLUTION SUBCUTANEOUS
Status: DISCONTINUED | OUTPATIENT
Start: 2022-08-16 | End: 2022-08-17

## 2022-08-16 RX ADMIN — ASPIRIN 81 MG CHEWABLE TABLET 81 MG: 81 TABLET CHEWABLE at 09:47

## 2022-08-16 RX ADMIN — INSULIN GLARGINE 10 UNITS: 100 INJECTION, SOLUTION SUBCUTANEOUS at 09:47

## 2022-08-16 RX ADMIN — ENOXAPARIN SODIUM 30 MG: 100 INJECTION SUBCUTANEOUS at 06:34

## 2022-08-16 RX ADMIN — PREDNISONE 40 MG: 20 TABLET ORAL at 09:47

## 2022-08-16 RX ADMIN — AMLODIPINE BESYLATE 10 MG: 5 TABLET ORAL at 09:47

## 2022-08-16 RX ADMIN — SODIUM CHLORIDE 75 ML/HR: 9 INJECTION, SOLUTION INTRAVENOUS at 20:48

## 2022-08-16 RX ADMIN — Medication 10 UNITS: at 16:59

## 2022-08-16 RX ADMIN — SODIUM CHLORIDE, PRESERVATIVE FREE 10 ML: 5 INJECTION INTRAVENOUS at 06:34

## 2022-08-16 RX ADMIN — GUAIFENESIN AND DEXTROMETHORPHAN 5 ML: 100; 10 SYRUP ORAL at 21:12

## 2022-08-16 RX ADMIN — ENOXAPARIN SODIUM 30 MG: 100 INJECTION SUBCUTANEOUS at 17:00

## 2022-08-16 RX ADMIN — Medication 8 UNITS: at 06:42

## 2022-08-16 RX ADMIN — Medication 8 UNITS: at 21:08

## 2022-08-16 RX ADMIN — Medication 8 UNITS: at 16:59

## 2022-08-16 RX ADMIN — SODIUM CHLORIDE, PRESERVATIVE FREE 10 ML: 5 INJECTION INTRAVENOUS at 21:08

## 2022-08-16 RX ADMIN — Medication 10 UNITS: at 12:46

## 2022-08-16 RX ADMIN — Medication 25 UNITS: at 09:48

## 2022-08-16 RX ADMIN — INSULIN GLARGINE 20 UNITS: 100 INJECTION, SOLUTION SUBCUTANEOUS at 23:43

## 2022-08-16 NOTE — PROGRESS NOTES
Patient has been receptive to teaching. Vital signs are stable. Patient reports no pain or discomfort with inspiration or expiration. Patient is remaining social through communication with staff and family.  Patient eating and drinking regulary

## 2022-08-16 NOTE — DIABETES MGMT
[address] [facility]    CLINICAL NURSE SPECIALIST CONSULT     Initial Presentation   Peri Ortega is a 74 y.o. male who presented to the ED 8/13/22 with a 1 week h/o  mild rhinorrhea as well as intermittent nonproductive cough and a 1 day report of feeling nauseous and had multiple episodes of watery, nonbloody diarrhea. His initial labs were significant for , AG 6, , GFR32, A1C over 14%. He was fluid resuscitated and started on an insulin gtt. HX:   Past Medical History:   Diagnosis Date    Abscess 12/2020    left side of abdomen    Diabetes ([facility] [address]     Hyperlipidemia     Hypertension     ICD (implantable cardioverter-defibrillator) in place     NSVT (nonsustained ventricular tachycardia) ([facility] [address] 11/21/2018    EPS 11/21/2018 VT/VF     Right groin pain 12/7/2020    Stroke ([facility] [address]     Syncope     Valvular heart disease         INITIAL DX:   Malaise and fatigue [R53.81, R53.83]  Elevated serum creatinine [R79.89]  Hyperglycemia [R73.9]     Current Treatment     TX: IVF, Insulin gtt    Consulted by   Odilon Taylor MD  for advanced diabetes nursing assessment and care for:   [x] Inpatient management strategy  [x] Home management assessment      Hospital Course   Clinical progress has been complicated by steroid exacerbated hyperglycemia. 8/14: Admission. Insulin gtt. COVID-19 positive.   8/15: Dexamethasone now with O2 requirement  Diabetes History   Type 2 Diabetes- Diagnosed 20+ years ago  Ambulatory BG management provided by: PCP Zachary Jama MD- Last visit 5/11/22      Diabetes-related Medical History  Acute complications  Steroid exacerbated hyperglycemia   Neurological complications  Peripheral neuropathy  Microvascular disease  Nephropathy  Macrovascular disease  Cerebral vascular accident      Diabetes Medication History  Key Antihyperglycemic Medications               metFORMIN (GLUCOPHAGE) 1,000 mg tablet TAKE 1 TABLET BY MOUTH IN THE EVENING BEFORE DINNER Trulicity 1.5 WB/8.6 mL sub-q pen INJECT 0.5 ML UNDER SKIN EVERY SEVEN (7) DAYS. Been on and off Trulicity this year- last dose about a month ago. High out of pocket cost  Levemir 36 units am and 42 units pm    Diabetes self-management practices:   Eating pattern  [x] Breakfast Coffe, oatmeal or eggs/toast  [x] Elvira Quitter  [x] Zulema Jacquelyn with mo sauce  [x] Bedtime None  [x] Snacks  Edna Jumper  [x] Beverages Water, coffee  Physical activity pattern   Works in a kitchen- active at work  Monitoring pattern   Fasting: over 280  Evening- 300-400  No Lows  Taking medications pattern  [x] In-consistent administration  [x] Not affordable  Social determinants of health impacting diabetes self-management practices   Worried that your food supply will run out before you have money to buy more and Concerned that you need to know more about how to stay healthy with diabetes  Overall evaluation:    [] Achieving A1c target with drug therapy & self-care practices    Subjective   I have high sugar all the time.      Was on levemir/trulicity in the past but wasn't able to afford it. Continues to work full time @ [employer] in 2015 [employer]   Physical exam  General Normal weight AA male in no acute distress/ill-appearing. Conversant and cooperative  Neuro  Alert, oriented   Vital Signs Visit Vitals  BP (!) 158/92   Pulse 63   Temp 98.6 °F (37 °C)   Resp 18   Ht 5' 7\" (1.702 m)   Wt 72.3 kg (159 lb 6.3 oz)   SpO2 93%   BMI 24.96 kg/m²     Skin  Warm and dry. Acanthosis noted along neckline. No lipohypertrophy or lipoatrophy noted at injection sites   Heart   Regular rate and rhythm.  No murmurs, rubs or gallops  Lungs  Clear to auscultation without rales or rhonchi  Extremities No foot wounds        Laboratory  Recent Labs     08/16/22  0217 08/15/22  0435 08/14/22  0329   * 186* 129*   AGAP 8 7 6   WBC 3.5* 4.1 4.4   CREA 1.92* 1.88* 1.30   GFRNA 35* 36* 55*   AST 39* 43* 50*   ALT 63 69 92*       Factors impacting BG management  Factor Dose Comments   Nutrition:  Standard meals     60 grams/meal      Drugs:  Steroids     Prednisone 40mg daily   Impairs insulin action     Pain     Infection COVID-19 viral infection    Other:   Kidney function GFR 42      Blood glucose pattern      Significant diabetes-related events over the past 24-72 hours  A1C over 14%  Fasting B  Pre-prandial: 262-403  Basal: None  Bolus: None  Correction: 28 in the last 24h      Assessment and Plan   Nursing Diagnosis Risk for unstable blood glucose pattern   Nursing Intervention Domain 5250 Decision-making Support   Nursing Interventions Examined current inpatient diabetes/blood glucose control   Explored factors facilitating and impeding inpatient management  Explored corrective strategies with patient and responsible inpatient provider   Informed patient of rational for insulin strategy while hospitalized     Nursing Diagnosis 16965 Ineffective Health Management   Nursing Intervention Domain 5250 Decision-makingSupport   Nursing Interventions Identified diabetes self-management practices impeding diabetes control  Discussed diabetes survival skills related to  Healthy Plate eating plan; given handouts  Role of physical activity in improving insulin sensitivity and action  Procedure for blood glucose monitoring & options for low-cost products available from [facility]   Medications plan at discharge     Evaluation   Sonya Betancourt is a 74year old gentleman,with Type 2 Diabetes under poor control, who is admitted with acute hypoxic respiratory failure s/t COVID-19 viral infection. A1C values are unfortunately chronically elevated at over 14%. In the past, he describes cost related non-adherence with insulin and GLP-1 and with last hospitalization was recommended to use Relion insulin (affordable). This admission, he presented with non-anion gap hyperglycemia and was given IVF to regulate glucose pattern.   He transitioned off the insulin gtt with very low dose basal/correctional insulin but prednisone was started the following day for increase in oxygen needs. BG michelle to nearly 400. Please adjust basal insulin to NPH with a higher dose scheduled in the am to offset glucose pattern with prednisone. Inpatient BG goal is 140-180mg/dl. Recommendations   POC glucose ACHS    Consistent carbohydrate diet (60 grams CHO/meal)    3. Adjust the subcutaneous Insulin Order set (9433)  Insulin Dosing Specific recommendation   Basal                                      (Based on weight, BMI & GFR) NPH: 28 units   NPH: 8 units pm    This covers 0.2 units/kg/day for diabetes plus 0.4 units/kg in the am for prednisone    Nutritional                                      (Based on CHO/dextrose load) [x] Normal sensitivity: 4 units Humalog/meal    Corrective                                       (Useful in adjusting insulin dosing) [x] Normal sensitivity ACHS            Insulin coverage recommendations with prednisone (in addition to basal insulin). 40 mg prednisone = 0.4 units/kg NPH to be given with prednisone   30 mg prednisone= 0.3 units/kg NPH to be given with prednisone   20 mg prednisone= 0.2 units/kg NPH to be given with prednisone   10 mg prednisone= 0.1 units/kg NPH to be given with prednisone       Discharge Recommendations   Will need a FUV with PCP within 1-2 weeks after hospital discharge for ongoing diabetes management     On Discharge, please place an outpatient order for \"diabetes education\" (enter as REF20). This will trigger a referral for the Program for Diabetes Health which includes outpatient diabetes self management training with a certified diabetes educator. Continue Metformin at PTA dosing. Stop Trulicity (can't afford it)    Stop Levemir. Start 70/30 Relion insulin ($42 for a box of 5 pens cash). More affordable and would benefit from mealtime coverage.  Dose TBD    [facility] carries Relion 70/30 PENS box of 5 pens is $42    Relion Pen needle, diabetic 32 gauge 5/32'    Billing Code(s)   [x] 86963     Before making these care recommendations, I personally reviewed the hospitalization record, including notes, laboratory & diagnostic data and current medications, and examined the patient at the bedside (circumstances permitting) before making care recommendations. More than fifty (50) percent of the time was spent in patient counseling and/or care coordination.   Total minutes: 54      MARTINE Cruz BC-ADM  Board Certified Advanced Diabetes Manager  Clinical Nurse Specialist  Program for Diabetes Health  Access via [facility]

## 2022-08-16 NOTE — CONSULTS
Assessment:  APARNA: Rising serum Cr 1.9mg/dl. Need UA but suspect this from relative pre-renal state from hyperglycemia causing osmotic diuresis vs COVID related APARNA. DM2: uncontrolled. Hgba1c >14. Now exacerbated by steroids. HTN:stable control    COVID-19    Diarrhea    Pseudohyponatremia: 2 to hyperglycemia      Plan/Recommendations:  Send off UA. If clinically stable can consider discharge with close follow up with us in the office in 2wks time  Ct efforts to control bld sugars  Hydrate well  Avoid nephrotoxins  Am labs if still here. Discussed with Dr. Audie Andre    Thanks for the consultation. Renal service will follow patient with you. Please contact me with any questions or concerns. Initial Consult note         Patient name: Jeannette Severino  MR no: [id number]  Date of admission: 8/13/2022  Date of consultation: 8/16/2022  Requested by: Dr. Ashish Wiggins  Reason for consult: APARNA    Patient seen and examined. History obtained from chart review. Relevant labs, data and notes reviewed. HPI: Jeannette Severino is a 74 y.o. male with PMH significant for DM2, HTN, CVA admitted on 8/13/22 with diarrhea. Thought it was secondary to eating aged [address] [place]. ED labs notable for Bld glucose >600 and +COVID-19. Hospital course complicated by worsening serum Cr to current level of 1.9mg/dl. Nephrology consulted to evaluate and manage APARNA. Seen remotely due to COVID status. Discussed case with Dr. Audie Andre.      PMH:  Past Medical History:   Diagnosis Date    Abscess 12/2020    left side of abdomen    Diabetes ([address]     Hyperlipidemia     Hypertension     ICD (implantable cardioverter-defibrillator) in place     NSVT (nonsustained ventricular tachycardia) ([address] 11/21/2018    EPS 11/21/2018 VT/VF     Right groin pain 12/7/2020    Stroke [facility])     Syncope     Valvular heart disease      PSH:  Past Surgical History:   Procedure Laterality Date    COLONOSCOPY  1/2/2015         HX HEART CATHETERIZATION      HX HEENT      l cararact removal    HX IMPLANTABLE CARDIOVERTER DEFIBRILLATOR         Social history:   Social History     Tobacco Use    Smoking status: Never    Smokeless tobacco: Never   Vaping Use    Vaping Use: Never used   Substance Use Topics    Alcohol use: No    Drug use: No       Family history:  UTO    No Known Allergies    Current Facility-Administered Medications   Medication Dose Route Frequency Last Admin    insulin NPH (NOVOLIN N, HUMULIN N) injection 25 Units  25 Units SubCUTAneous DAILY 25 Units at 08/16/22 0948    insulin NPH (NOVOLIN N, HUMULIN N) injection 8 Units  8 Units SubCUTAneous ACD      0.9% sodium chloride infusion  75 mL/hr IntraVENous CONTINUOUS 75 mL/hr at 08/15/22 1309    predniSONE (DELTASONE) tablet 40 mg  40 mg Oral DAILY WITH BREAKFAST 40 mg at 08/16/22 0947    insulin glargine (LANTUS) injection 10 Units  10 Units SubCUTAneous DAILY 10 Units at 08/16/22 0947    glucose chewable tablet 16 g  4 Tablet Oral PRN      glucagon (GLUCAGEN) injection 1 mg  1 mg IntraMUSCular PRN      dextrose 10 % infusion 0-250 mL  0-250 mL IntraVENous PRN      insulin lispro (HUMALOG) injection   SubCUTAneous AC&HS 10 Units at 08/16/22 1246    sodium chloride (NS) flush 5-40 mL  5-40 mL IntraVENous Q8H 10 mL at 08/16/22 0634    sodium chloride (NS) flush 5-40 mL  5-40 mL IntraVENous PRN      acetaminophen (TYLENOL) tablet 650 mg  650 mg Oral Q6H  mg at 08/14/22 3845    Or    acetaminophen (TYLENOL) suppository 650 mg  650 mg Rectal Q6H PRN      polyethylene glycol (MIRALAX) packet 17 g  17 g Oral DAILY PRN      ondansetron (ZOFRAN) injection 4 mg  4 mg IntraVENous Q6H PRN      amLODIPine (NORVASC) tablet 10 mg  10 mg Oral DAILY 10 mg at 08/16/22 0947    aspirin chewable tablet 81 mg  81 mg Oral DAILY 81 mg at 08/16/22 0947    enoxaparin (LOVENOX) injection 30 mg  30 mg SubCUTAneous Q12H 30 mg at 08/16/22 0634    guaiFENesin-dextromethorphan (ROBITUSSIN DM) 100-10 Merlin Dickey(Resident) mg/5 mL syrup 5 mL  5 mL Oral Q4H PRN 5 mL at 08/15/22 2217       ROS (besides HPI):  Deferred    Objective   Visit Vitals  BP (!) 164/84 (BP 1 Location: Left upper arm, BP Patient Position: At rest)   Pulse 66   Temp 98.2 °F (36.8 °C)   Resp 18   Ht 5' 7\" (1.702 m)   Wt 72.3 kg (159 lb 6.3 oz)   SpO2 95%   BMI 24.96 kg/m²       Physical Exam:    Exam deferred    Labs/Data:    Lab Results   Component Value Date/Time    Sodium 130 (L) 08/16/2022 02:17 AM    Potassium 4.8 08/16/2022 02:17 AM    Chloride 100 08/16/2022 02:17 AM    CO2 22 08/16/2022 02:17 AM    Anion gap 8 08/16/2022 02:17 AM    Glucose 488 (H) 08/16/2022 02:17 AM    BUN 56 (H) 08/16/2022 02:17 AM    Creatinine 1.92 (H) 08/16/2022 02:17 AM    BUN/Creatinine ratio 29 (H) 08/16/2022 02:17 AM    GFR est AA 42 (L) 08/16/2022 02:17 AM    GFR est non-AA 35 (L) 08/16/2022 02:17 AM    Calcium 7.9 (L) 08/16/2022 02:17 AM       Lab Results   Component Value Date/Time    WBC 3.5 (L) 08/16/2022 02:17 AM    HGB 11.2 (L) 08/16/2022 02:17 AM    HCT 34.7 (L) 08/16/2022 02:17 AM    PLATELET 037 23/15/8431 02:17 AM    MCV 85.3 08/16/2022 02:17 AM       Urine analysis: No results found for this or any previous visit.           Renal US:     No components found for: SPEP, UPEP  No results found for: PUQ, PROTU2, PROTU1, BJP1, CPE1, IMEL1, MET2  Lab Results   Component Value Date/Time    Microalb/Creat ratio (ug/mg creat.) 68 (H) 06/09/2020 04:19 PM         Intake/Output Summary (Last 24 hours) at 8/16/2022 1520  Last data filed at 8/16/2022 0640  Gross per 24 hour   Intake --   Output 2050 ml   Net -2050 ml       Wt Readings from Last 3 Encounters:   08/15/22 72.3 kg (159 lb 6.3 oz)   05/11/22 71.8 kg (158 lb 4.8 oz)   03/29/22 71.1 kg (156 lb 11.2 oz)       Signed by:  Kimi Mac MD  Nephrology and Hypertension  [facility]

## 2022-08-16 NOTE — PROGRESS NOTES
[facility]  Hospitalist Group                                                                                          Hospitalist Progress Note  Joan Lopez MD  Answering service: [phone] from in house phone        Date of Service:  8/15/2022  NAME:  John Koenig  :  1955  MRN:  [id number]      Admission Summary:   John Koenig is a 74 y.o. male who presents with abdominal discomfort nausea fatigue and malaise, generalized weakness and in the emergency room found to have abnormal labs with a very high blood sugar. Patient tells me his symptoms started approximately 4 to 5 days ago. He denies any symptoms that would be suggestive of an acute infection-he has not had any changes to his medications no known sick contacts-he does question possible mild case of food poisoning patient reports ingesting some [employer] food which she had purchased 4 to 6 days prior. Patient denies any diarrhea to me but does report one episode of diarrhea to the nurse on admission as per list were requested to admit the patient due to abnormal labs and blood sugar of 631. Interval history / Subjective:   Patient seen and examined  I reviewed his entire care plan with vitals labs and test results   he was placed on oxygen-overnight again  Started prednisone and working on hyperglycemia     Assessment & Plan:      1-type 2 diabetes -supposed to be on home insulin but patient telling nursing staff he no longer takes any insulin; you have patient's past hemoglobin A1c's are all above 14  Glucose levels on admission greater than 600  Patient now off insulin drip, start Accu-Cheks and sliding scale insulin  2-hypertension-improved after restarting patient's home medication discontinuing IV fluids  3. Elevated liver function tests-improving   likely due to infection and hypotension  Sinew to hold statin medication  4.   Hyperlipidemia hold home statin medication while LFTs are elevated  5. Elevated creatinine-likely secondary to dehydration from poor p.o. intake due to GI distress  Creatinine elevated again- restart IV fluids - hold metformin for now  6. Fatigue and malaise-due to cute COVID infection  7. Hyponatremia-corrected and resolved  8. History of nonsustained ventricular tachycardia status post AICD placement-stable heart rate and unchanged EKG on admission  9. Acute COVID-19 infection-normal D-dimer and mildly elevated CRP  Currently patient is with stable SaO2 on 1-2L via NC  Started prednisone     Code status: Full code  Prophylaxis: Lovenox  Care Plan discussed with: Patient  Anticipated Disposition: Likely home tomorrow     Hospital Problems  Date Reviewed: 5/15/2022            Codes Class Noted POA    Malaise and fatigue ICD-10-CM: R53.81, R53.83  ICD-9-CM: 780.79  8/13/2022 Unknown        Hyperglycemia ICD-10-CM: R73.9  ICD-9-CM: 790.29  8/13/2022 Unknown        Elevated serum creatinine ICD-10-CM: R79.89  ICD-9-CM: 790.99  8/13/2022 Unknown           Review of Systems:   A comprehensive review of systems was negative except for that written in the HPI. Vital Signs:    Last 24hrs VS reviewed since prior progress note.  Most recent are:  Visit Vitals  BP (!) 140/69 (BP 1 Location: Left upper arm, BP Patient Position: At rest)   Pulse 65   Temp 98.2 °F (36.8 °C)   Resp 18   Ht 5' 7\" (1.702 m)   Wt 72.3 kg (159 lb 6.3 oz)   SpO2 93%   BMI 24.96 kg/m²     Patient Vitals for the past 24 hrs:   Temp Pulse Resp BP SpO2   08/15/22 2205 -- 65 -- -- --   08/15/22 2140 98.2 °F (36.8 °C) 65 18 (!) 140/69 93 %   08/15/22 2001 -- 69 -- -- --   08/15/22 1435 99.1 °F (37.3 °C) 68 16 131/72 93 %   08/15/22 1414 -- 70 -- -- --   08/15/22 1203 -- 74 -- -- --   08/15/22 1146 98.4 °F (36.9 °C) 72 20 (!) 149/75 94 %   08/15/22 1019 -- 72 -- -- --   08/15/22 0900 -- -- -- -- 92 %   08/15/22 0745 99 °F (37.2 °C) 65 16 131/75 95 %   08/15/22 0600 -- 67 -- -- --   08/15/22 0403 99.9 °F (37.7 °C) 69 15 126/70 93 %   08/15/22 0400 -- 69 -- -- --   08/15/22 0200 -- 70 -- -- --   08/15/22 0013 99.6 °F (37.6 °C) 72 16 136/88 92 %          Intake/Output Summary (Last 24 hours) at 8/15/2022 2317  Last data filed at 8/15/2022 1800  Gross per 24 hour   Intake --   Output 1400 ml   Net -1400 ml          Physical Examination:     I had a face to face encounter with this patient and independently examined them on 8/15/2022 as outlined below:          Constitutional:  No acute distress, cooperative, pleasant    ENT:  Oral mucosa moist, oropharynx benign. Resp:  CTA bilaterally. No wheezing/rhonchi/rales. No accessory muscle use. CV:  Regular rhythm, normal rate, no murmurs, gallops, rubs    GI:  Soft, non distended, non tender. normoactive bowel sounds, no hepatosplenomegaly     Musculoskeletal:  No edema, warm, 2+ pulses throughout    Neurologic:  Moves all extremities. AAOx3, CN II-XII reviewed            Data Review:    Review and/or order of clinical lab test  Review and/or order of tests in the radiology section of CPT  Review and/or order of tests in the medicine section of CPT  CXR Results  (Last 48 hours)      None             Labs:     Recent Labs     08/15/22  0435 08/14/22  0329   WBC 4.1 4.4   HGB 12.0* 11.1*   HCT 37.7 33.9*    149*       Recent Labs     08/15/22  0435 08/14/22  0329 08/13/22  1740 08/13/22  1545   * 137 133*  --    K 4.0 3.9 4.3  --     105 99  --    CO2 25 26 26  --    BUN 46* 35* 42*  --    CREA 1.88* 1.30 1.64*  --    * 129* 402*  --    CA 8.2* 8.2* 8.3*  --    MG  --   --  2.5*  --    PHOS  --   --   --  3.9       Recent Labs     08/15/22  0435 08/14/22 0329 08/13/22  1358   ALT 69 92* 132*   AP 99 109 140*   TBILI 0.2 0.3 0.4   TP 6.1* 6.0* 7.0   ALB 2.0* 2.2* 2.9*   GLOB 4.1* 3.8 4.1*   LPSE  --   --  178       No results for input(s): INR, PTP, APTT, INREXT, INREXT in the last 72 hours.    No results for input(s): FE, TIBC, PSAT, FERR in the last 72 hours. No results found for: FOL, RBCF   No results for input(s): PH, PCO2, PO2 in the last 72 hours. No results for input(s): CPK, CKNDX, TROIQ in the last 72 hours.     No lab exists for component: CPKMB  Lab Results   Component Value Date/Time    Cholesterol, total 155 03/09/2022 03:55 AM    HDL Cholesterol 88 03/09/2022 03:55 AM    LDL, calculated 52.8 03/09/2022 03:55 AM    Triglyceride 71 03/09/2022 03:55 AM    CHOL/HDL Ratio 1.8 03/09/2022 03:55 AM     Lab Results   Component Value Date/Time    Glucose (POC) 403 (H) 08/15/2022 09:43 PM    Glucose (POC) 290 (H) 08/15/2022 04:36 PM    Glucose (POC) 262 (H) 08/15/2022 11:49 AM    Glucose (POC) 102 08/15/2022 07:44 AM    Glucose (POC) 447 (H) 08/14/2022 09:20 PM     Lab Results   Component Value Date/Time    Color YELLOW/STRAW 07/14/2020 10:27 AM    Appearance CLEAR 07/14/2020 10:27 AM    Specific gravity 1.014 07/14/2020 10:27 AM    pH (UA) 6.0 07/14/2020 10:27 AM    Protein 30 (A) 07/14/2020 10:27 AM    Glucose Negative 07/14/2020 10:27 AM    Ketone Negative 07/14/2020 10:27 AM    Bilirubin Negative 07/14/2020 10:27 AM    Urobilinogen 0.2 07/14/2020 10:27 AM    Nitrites Negative 07/14/2020 10:27 AM    Leukocyte Esterase Negative 07/14/2020 10:27 AM    Epithelial cells FEW 07/14/2020 10:27 AM    Bacteria Negative 07/14/2020 10:27 AM    WBC 0-4 07/14/2020 10:27 AM    RBC 0-5 07/14/2020 10:27 AM         Medications Reviewed:     Current Facility-Administered Medications   Medication Dose Route Frequency    0.9% sodium chloride infusion  75 mL/hr IntraVENous CONTINUOUS    predniSONE (DELTASONE) tablet 40 mg  40 mg Oral DAILY WITH BREAKFAST    insulin glargine (LANTUS) injection 10 Units  10 Units SubCUTAneous DAILY    glucose chewable tablet 16 g  4 Tablet Oral PRN    glucagon (GLUCAGEN) injection 1 mg  1 mg IntraMUSCular PRN    dextrose 10 % infusion 0-250 mL  0-250 mL IntraVENous PRN    insulin lispro (HUMALOG) injection   SubCUTAneous AC&HS sodium chloride (NS) flush 5-40 mL  5-40 mL IntraVENous Q8H    sodium chloride (NS) flush 5-40 mL  5-40 mL IntraVENous PRN    acetaminophen (TYLENOL) tablet 650 mg  650 mg Oral Q6H PRN    Or    acetaminophen (TYLENOL) suppository 650 mg  650 mg Rectal Q6H PRN    polyethylene glycol (MIRALAX) packet 17 g  17 g Oral DAILY PRN    ondansetron (ZOFRAN) injection 4 mg  4 mg IntraVENous Q6H PRN    amLODIPine (NORVASC) tablet 10 mg  10 mg Oral DAILY    aspirin chewable tablet 81 mg  81 mg Oral DAILY    enoxaparin (LOVENOX) injection 30 mg  30 mg SubCUTAneous Q12H    guaiFENesin-dextromethorphan (ROBITUSSIN DM) 100-10 mg/5 mL syrup 5 mL  5 mL Oral Q4H PRN     ______________________________________________________________________  EXPECTED LENGTH OF STAY: 3d 19h  ACTUAL LENGTH OF STAY:          2                 Deandre Alvarez MD

## 2022-08-16 NOTE — PROGRESS NOTES
Bedside and Verbal shift change report given to Ivett and Evelyn Graves Rn (oncoming nurse) by Eh Mcintosh (offgoing nurse). Report included the following information SBAR, Kardex, ED Summary, Procedure Summary, Intake/Output, MAR, Recent Results, and Cardiac Rhythm SR/ICD .

## 2022-08-16 NOTE — PROGRESS NOTES
Problem: Airway Clearance - Ineffective  Goal: Achieve or maintain patent airway  Outcome: Progressing Towards Goal     Problem: Gas Exchange - Impaired  Goal: Absence of hypoxia  Outcome: Progressing Towards Goal     Problem: Breathing Pattern - Ineffective  Goal: Ability to achieve and maintain a regular respiratory rate  Outcome: Progressing Towards Goal     Problem: Nutrition Deficits  Goal: Optimize nutrtional status  Outcome: Progressing Towards Goal     Problem: Risk for Fluid Volume Deficit  Goal: Maintain normal heart rhythm  Outcome: Progressing Towards Goal  Goal: Maintain absence of muscle cramping  Outcome: Progressing Towards Goal  Goal: Maintain normal serum potassium, sodium, calcium, phosphorus, and pH  Outcome: Progressing Towards Goal     Problem: Loneliness or Risk for Loneliness  Goal: Demonstrate positive use of time alone when socialization is not possible  Outcome: Progressing Towards Goal     Problem: Fatigue  Goal: Verbalize increase energy and improved vitality  Outcome: Progressing Towards Goal     Problem: Falls - Risk of  Goal: *Absence of Falls  Description: Document Trav Fall Risk and appropriate interventions in the flowsheet.   Outcome: Progressing Towards Goal  Note: Fall Risk Interventions:            Medication Interventions: Bed/chair exit alarm    Elimination Interventions: Urinal in reach, Call light in reach    History of Falls Interventions: Bed/chair exit alarm

## 2022-08-17 VITALS
OXYGEN SATURATION: 96 % | TEMPERATURE: 98.6 F | DIASTOLIC BLOOD PRESSURE: 74 MMHG | SYSTOLIC BLOOD PRESSURE: 147 MMHG | HEIGHT: 67 IN | BODY MASS INDEX: 25.67 KG/M2 | HEART RATE: 79 BPM | RESPIRATION RATE: 19 BRPM | WEIGHT: 163.58 LBS

## 2022-08-17 LAB
ANION GAP SERPL CALC-SCNC: 9 MMOL/L (ref 5–15)
BUN SERPL-MCNC: 54 MG/DL (ref 6–20)
BUN/CREAT SERPL: 30 (ref 12–20)
CALCIUM SERPL-MCNC: 7.7 MG/DL (ref 8.5–10.1)
CHLORIDE SERPL-SCNC: 104 MMOL/L (ref 97–108)
CO2 SERPL-SCNC: 23 MMOL/L (ref 21–32)
COMMENT, HOLDF: NORMAL
CREAT SERPL-MCNC: 1.82 MG/DL (ref 0.7–1.3)
CRP SERPL-MCNC: 1.59 MG/DL (ref 0–0.6)
ERYTHROCYTE [DISTWIDTH] IN BLOOD BY AUTOMATED COUNT: 13 % (ref 11.5–14.5)
GLUCOSE BLD STRIP.AUTO-MCNC: 278 MG/DL (ref 65–117)
GLUCOSE BLD STRIP.AUTO-MCNC: 309 MG/DL (ref 65–117)
GLUCOSE BLD STRIP.AUTO-MCNC: 338 MG/DL (ref 65–117)
GLUCOSE SERPL-MCNC: 349 MG/DL (ref 65–100)
HCT VFR BLD AUTO: 33.2 % (ref 36.6–50.3)
HGB BLD-MCNC: 10.7 G/DL (ref 12.1–17)
MCH RBC QN AUTO: 27.2 PG (ref 26–34)
MCHC RBC AUTO-ENTMCNC: 32.2 G/DL (ref 30–36.5)
MCV RBC AUTO: 84.5 FL (ref 80–99)
NRBC # BLD: 0 K/UL (ref 0–0.01)
NRBC BLD-RTO: 0 PER 100 WBC
PLATELET # BLD AUTO: 202 K/UL (ref 150–400)
PMV BLD AUTO: 9.9 FL (ref 8.9–12.9)
POTASSIUM SERPL-SCNC: 4.5 MMOL/L (ref 3.5–5.1)
RBC # BLD AUTO: 3.93 M/UL (ref 4.1–5.7)
SAMPLES BEING HELD,HOLD: NORMAL
SERVICE CMNT-IMP: ABNORMAL
SODIUM SERPL-SCNC: 136 MMOL/L (ref 136–145)
WBC # BLD AUTO: 4.8 K/UL (ref 4.1–11.1)

## 2022-08-17 PROCEDURE — 86140 C-REACTIVE PROTEIN: CPT

## 2022-08-17 PROCEDURE — 74011636637 HC RX REV CODE- 636/637: Performed by: FAMILY MEDICINE

## 2022-08-17 PROCEDURE — 74011250636 HC RX REV CODE- 250/636: Performed by: FAMILY MEDICINE

## 2022-08-17 PROCEDURE — 74011250637 HC RX REV CODE- 250/637: Performed by: FAMILY MEDICINE

## 2022-08-17 PROCEDURE — 80048 BASIC METABOLIC PNL TOTAL CA: CPT

## 2022-08-17 PROCEDURE — 85027 COMPLETE CBC AUTOMATED: CPT

## 2022-08-17 PROCEDURE — 36415 COLL VENOUS BLD VENIPUNCTURE: CPT

## 2022-08-17 PROCEDURE — 74011000250 HC RX REV CODE- 250: Performed by: FAMILY MEDICINE

## 2022-08-17 PROCEDURE — 99232 SBSQ HOSP IP/OBS MODERATE 35: CPT | Performed by: CLINICAL NURSE SPECIALIST

## 2022-08-17 PROCEDURE — 82962 GLUCOSE BLOOD TEST: CPT

## 2022-08-17 RX ORDER — INSULIN LISPRO 100 [IU]/ML
INJECTION, SOLUTION INTRAVENOUS; SUBCUTANEOUS
Status: DISCONTINUED | OUTPATIENT
Start: 2022-08-17 | End: 2022-08-17 | Stop reason: HOSPADM

## 2022-08-17 RX ORDER — INSULIN DETEMIR 100 [IU]/ML
15 INJECTION, SOLUTION SUBCUTANEOUS 2 TIMES DAILY
Qty: 9 ML | Refills: 3 | Status: SHIPPED | OUTPATIENT
Start: 2022-08-17 | End: 2022-09-16

## 2022-08-17 RX ORDER — INSULIN LISPRO 100 [IU]/ML
INJECTION, SOLUTION INTRAVENOUS; SUBCUTANEOUS
Status: CANCELLED | OUTPATIENT
Start: 2022-08-17

## 2022-08-17 RX ORDER — INSULIN LISPRO 100 [IU]/ML
8 INJECTION, SOLUTION INTRAVENOUS; SUBCUTANEOUS
Status: DISCONTINUED | OUTPATIENT
Start: 2022-08-17 | End: 2022-08-17

## 2022-08-17 RX ORDER — PREDNISONE 10 MG/1
TABLET ORAL
Qty: 12 TABLET | Refills: 0 | Status: SHIPPED | OUTPATIENT
Start: 2022-08-17 | End: 2022-08-23

## 2022-08-17 RX ORDER — INSULIN GLARGINE 100 [IU]/ML
30 INJECTION, SOLUTION SUBCUTANEOUS
Status: DISCONTINUED | OUTPATIENT
Start: 2022-08-17 | End: 2022-08-17 | Stop reason: HOSPADM

## 2022-08-17 RX ORDER — MAGNESIUM SULFATE 100 %
4 CRYSTALS MISCELLANEOUS AS NEEDED
Status: CANCELLED | OUTPATIENT
Start: 2022-08-17

## 2022-08-17 RX ADMIN — Medication 8 UNITS: at 07:20

## 2022-08-17 RX ADMIN — ASPIRIN 81 MG CHEWABLE TABLET 81 MG: 81 TABLET CHEWABLE at 08:28

## 2022-08-17 RX ADMIN — SODIUM CHLORIDE, PRESERVATIVE FREE 10 ML: 5 INJECTION INTRAVENOUS at 05:30

## 2022-08-17 RX ADMIN — ENOXAPARIN SODIUM 30 MG: 100 INJECTION SUBCUTANEOUS at 17:09

## 2022-08-17 RX ADMIN — Medication 25 UNITS: at 08:29

## 2022-08-17 RX ADMIN — GUAIFENESIN AND DEXTROMETHORPHAN 5 ML: 100; 10 SYRUP ORAL at 17:09

## 2022-08-17 RX ADMIN — PREDNISONE 40 MG: 20 TABLET ORAL at 08:28

## 2022-08-17 RX ADMIN — ENOXAPARIN SODIUM 30 MG: 100 INJECTION SUBCUTANEOUS at 07:07

## 2022-08-17 RX ADMIN — SODIUM CHLORIDE 75 ML/HR: 9 INJECTION, SOLUTION INTRAVENOUS at 16:10

## 2022-08-17 RX ADMIN — Medication 10 UNITS: at 16:06

## 2022-08-17 RX ADMIN — Medication 5 UNITS: at 09:32

## 2022-08-17 RX ADMIN — Medication 7 UNITS: at 12:47

## 2022-08-17 RX ADMIN — AMLODIPINE BESYLATE 10 MG: 5 TABLET ORAL at 08:28

## 2022-08-17 NOTE — PROGRESS NOTES
Patient continue to practice breathing techniques and use of incentive spirometer. Patient still positive for Covid 19 per PCR test performed 8/17/22. All visitors and staff using appropriate PPE when going into room and donning and doffing procedures.

## 2022-08-17 NOTE — PROGRESS NOTES
Patient name: Jonny Delgado  MRN: [id number]    Nephrology Progress note:    Assessment:  APARNA: Peak serum Cr 1.9mg/dl. Cr 1.8mg/dl today. UA shows some proteinuria. I still suspect this from relative pre-renal state from hyperglycemia causing osmotic diuresis vs COVID related APARNA. DM2: uncontrolled. Hgba1c >14. Now exacerbated by steroids. HTN:stable control     COVID-19     Diarrhea     Pseudohyponatremia: 2 to hyperglycemia    Plan/Recommendations:  Awaiting discharge  Will follow up in our office in approx 2wks  IVF  Control bld sugars  Avoid nephrotoxins (ie NSAIDS)      Subjective:  Seen remotely due to COVID status. Chart reviewed.      ROS:   UTO    Exam:  Visit Vitals  BP (!) 147/74 (BP 1 Location: Left upper arm, BP Patient Position: At rest;Sitting)   Pulse 67   Temp 98.6 °F (37 °C)   Resp 19   Ht 5' 7\" (1.702 m)   Wt 74.2 kg (163 lb 9.3 oz)   SpO2 96%   BMI 25.62 kg/m²     Wt Readings from Last 3 Encounters:   08/17/22 74.2 kg (163 lb 9.3 oz)   05/11/22 71.8 kg (158 lb 4.8 oz)   03/29/22 71.1 kg (156 lb 11.2 oz)       Intake/Output Summary (Last 24 hours) at 8/17/2022 1648  Last data filed at 8/17/2022 0800  Gross per 24 hour   Intake 275 ml   Output 950 ml   Net -675 ml       PE: deferred due to COVID status      Current Facility-Administered Medications   Medication Dose Route Frequency Last Admin    insulin glargine (LANTUS) injection 30 Units  30 Units SubCUTAneous QHS      dextrose 10 % infusion 0-250 mL  0-250 mL IntraVENous PRN      [START ON 8/18/2022] insulin NPH (NOVOLIN N, HUMULIN N) injection 30 Units  30 Units SubCUTAneous DAILY      insulin lispro (HUMALOG) injection   SubCUTAneous AC&HS 10 Units at 08/17/22 1606    dextrose 10 % infusion 0-250 mL  0-250 mL IntraVENous PRN      0.9% sodium chloride infusion  75 mL/hr IntraVENous CONTINUOUS 75 mL/hr at 08/17/22 1610    predniSONE (DELTASONE) tablet 40 mg  40 mg Oral DAILY WITH BREAKFAST 40 mg at 08/17/22 0828    glucose chewable tablet 16 g  4 Tablet Oral PRN      glucagon (GLUCAGEN) injection 1 mg  1 mg IntraMUSCular PRN      dextrose 10 % infusion 0-250 mL  0-250 mL IntraVENous PRN      sodium chloride (NS) flush 5-40 mL  5-40 mL IntraVENous Q8H 10 mL at 08/17/22 0530    sodium chloride (NS) flush 5-40 mL  5-40 mL IntraVENous PRN      acetaminophen (TYLENOL) tablet 650 mg  650 mg Oral Q6H  mg at 08/14/22 1157    Or    acetaminophen (TYLENOL) suppository 650 mg  650 mg Rectal Q6H PRN      polyethylene glycol (MIRALAX) packet 17 g  17 g Oral DAILY PRN      ondansetron (ZOFRAN) injection 4 mg  4 mg IntraVENous Q6H PRN      amLODIPine (NORVASC) tablet 10 mg  10 mg Oral DAILY 10 mg at 08/17/22 8999    aspirin chewable tablet 81 mg  81 mg Oral DAILY 81 mg at 08/17/22 0828    enoxaparin (LOVENOX) injection 30 mg  30 mg SubCUTAneous Q12H 30 mg at 08/17/22 0707    guaiFENesin-dextromethorphan (ROBITUSSIN DM) 100-10 mg/5 mL syrup 5 mL  5 mL Oral Q4H PRN 5 mL at 08/16/22 2112       Labs/Data:    Lab Results   Component Value Date/Time    WBC 4.8 08/17/2022 05:41 AM    HGB 10.7 (L) 08/17/2022 05:41 AM    HCT 33.2 (L) 08/17/2022 05:41 AM    PLATELET 331 15/14/2373 05:41 AM    MCV 84.5 08/17/2022 05:41 AM       Lab Results   Component Value Date/Time    Sodium 136 08/17/2022 05:41 AM    Potassium 4.5 08/17/2022 05:41 AM    Chloride 104 08/17/2022 05:41 AM    CO2 23 08/17/2022 05:41 AM    Anion gap 9 08/17/2022 05:41 AM    Glucose 349 (H) 08/17/2022 05:41 AM    BUN 54 (H) 08/17/2022 05:41 AM    Creatinine 1.82 (H) 08/17/2022 05:41 AM    BUN/Creatinine ratio 30 (H) 08/17/2022 05:41 AM    GFR est AA 45 (L) 08/17/2022 05:41 AM    GFR est non-AA 37 (L) 08/17/2022 05:41 AM    Calcium 7.7 (L) 08/17/2022 05:41 AM       Patient seen and examined. Chart reviewed. Labs, data and other pertinent notes reviewed in last 24 hrs.       Signed by:  Neal Ruano MD  [address]

## 2022-08-17 NOTE — PROGRESS NOTES
CM received call from Sindhu/CHELSEA  Requesting transportation for discharging patient  Verified Address  COVID19 - Unable to set up via [address] for positive COVID, only available other option stretcher transport.     Johanna Sweet RN, BSN, Former[facility] named [facility]  ED Care Management  [phone]

## 2022-08-17 NOTE — DISCHARGE INSTRUCTIONS
You were admitted and treated for low oxygen levels due to COVID pneumonia  Due to the steroids- your blood sugars are elevated and your kidney function is elevated/abnormal  Recommendations for discharge  1) monitor oxygen levels at home- your oxygen levels should be > 90% on room air  2) take steroid taper- prednisone 30mg daily x2 days, then 20mg daily x 2days, then 10mg daily x2 days  3) self isolate for 10 days    Recommendations regarding your insulin  Insulin taper with prednisone.     Thurday and Friday- 30mg prednisone = Levemir 40 units am and 15 units at bed  Saturday and Sunday- 20 mg prednisone = Levemir 30 units am and 15 units at bed  Monday and Tuesday-10mg prednisone = Levemir 22 units am and 15 units at bed  Once done with steroids- continue : 15 units Levemir in the am and at bedtime    Follow up with Dr Abbe Cano- kidney specialist in 2 weeks to get your kidney function labs rechecked  Avoid antiinflammatory meds- like advil aleve ibuprofen, motrin

## 2022-08-17 NOTE — PROGRESS NOTES
19:12 patient given discharge instructions; medications scripts sent [facility] on [address], follow up appointments discussed, patient has gotten his own umber.   Payal Weiner

## 2022-08-17 NOTE — PROGRESS NOTES
[facility]                                                                                          Hospitalist Progress Note  Yinka Winter MD  Answering service: [phone] OR 36 from in house phone        Date of Service:  2022  NAME:  Adrienne Mcgovern  :  1955  MRN:  [id number]      Admission Summary:   Adrienne Mcgovern is a 74 y.o. male who presents with abdominal discomfort nausea fatigue and malaise, generalized weakness and in the emergency room found to have abnormal labs with a very high blood sugar. Patient tells me his symptoms started approximately 4 to 5 days ago. He denies any symptoms that would be suggestive of an acute infection-he has not had any changes to his medications no known sick contacts-he does question possible mild case of food poisoning patient reports ingesting some [employer] food which she had purchased 4 to 6 days prior. Patient denies any diarrhea to me but does report one episode of diarrhea to the nurse on admission as per list were requested to admit the patient due to abnormal labs and blood sugar of 631. Interval history / Subjective:   Patient seen and examined  I reviewed his entire care plan with vitals labs and test results   he was placed on oxygen-overnight again  Started prednisone and working on hyperglycemia     Assessment & Plan:      1-type 2 diabetes -supposed to be on home insulin but patient telling nursing staff he no longer takes any insulin; you have patient's past hemoglobin A1c's are all above 14  Glucose levels on admission greater than 600  Patient now off insulin drip, start Accu-Cheks and sliding scale insulin  Hyperglycemia from steroids  2-hypertension-improved after restarting patient's home medication discontinuing IV fluids  3. Elevated liver function tests-improving   likely due to infection and hypotension  Sinew to hold statin medication  4.   Hyperlipidemia hold home statin medication while LFTs are elevated  5. Elevated creatinine-likely secondary to dehydration from poor p.o. intake due to GI distress  Creatinine elevated again- restart IV fluids - hold metformin for now  Nephrology consulted for eval and tx recs  6. Fatigue and malaise-due to cute COVID infection  7. Hyponatremia-corrected and resolved  8. History of nonsustained ventricular tachycardia status post AICD placement-stable heart rate and unchanged EKG on admission  9. Acute COVID-19 infection-normal D-dimer and mildly elevated CRP  Currently patient is with stable SaO2 80-93% on RA  Cont prednisone     Code status: Full code  Prophylaxis: Lovenox  Care Plan discussed with: Patient  Anticipated Disposition: Likely home tomorrow     Hospital Problems  Date Reviewed: 5/15/2022            Codes Class Noted POA    Malaise and fatigue ICD-10-CM: R53.81, R53.83  ICD-9-CM: 780.79  8/13/2022 Unknown        Hyperglycemia ICD-10-CM: R73.9  ICD-9-CM: 790.29  8/13/2022 Unknown        Elevated serum creatinine ICD-10-CM: R79.89  ICD-9-CM: 790.99  8/13/2022 Unknown         Review of Systems:   A comprehensive review of systems was negative except for that written in the HPI. Vital Signs:    Last 24hrs VS reviewed since prior progress note.  Most recent are:  Visit Vitals  BP (!) 149/83 (BP 1 Location: Left upper arm, BP Patient Position: At rest)   Pulse 68   Temp 98.5 °F (36.9 °C)   Resp 22   Ht 5' 7\" (1.702 m)   Wt 72.3 kg (159 lb 6.3 oz)   SpO2 97%   BMI 24.96 kg/m²     Patient Vitals for the past 24 hrs:   Temp Pulse Resp BP SpO2   08/16/22 2020 98.5 °F (36.9 °C) 68 22 (!) 149/83 97 %   08/16/22 2005 -- 63 -- -- --   08/16/22 1818 -- 74 -- -- --   08/16/22 1504 98.2 °F (36.8 °C) 66 18 (!) 164/84 95 %   08/16/22 1419 -- 64 -- -- --   08/16/22 1212 -- 69 -- -- --   08/16/22 1127 97.9 °F (36.6 °C) 64 20 138/70 92 %   08/16/22 1045 -- 77 -- -- --   08/16/22 0859 97.8 °F (36.6 °C) 60 18 (!) 166/89 94 %   08/16/22 0640 -- 63 -- (!) 158/92 --   08/16/22 0557 -- 62 -- -- --   08/16/22 0358 -- (!) 56 -- -- --   08/16/22 0300 98.6 °F (37 °C) 62 18 (!) 149/85 93 %   08/16/22 0153 -- 61 -- -- --   08/15/22 2338 98.2 °F (36.8 °C) 62 18 (!) 148/70 94 %   08/15/22 2205 -- 65 -- -- --            Intake/Output Summary (Last 24 hours) at 8/16/2022 2157  Last data filed at 8/16/2022 1532  Gross per 24 hour   Intake 700 ml   Output 2125 ml   Net -1425 ml          Physical Examination:     I had a face to face encounter with this patient and independently examined them on 8/16/2022 as outlined below:          Constitutional:  No acute distress, cooperative, pleasant    ENT:  Oral mucosa moist, oropharynx benign. Resp:  CTA bilaterally. No wheezing/rhonchi/rales. No accessory muscle use. CV:  Regular rhythm, normal rate, no murmurs, gallops, rubs    GI:  Soft, non distended, non tender. normoactive bowel sounds, no hepatosplenomegaly     Musculoskeletal:  No edema, warm, 2+ pulses throughout    Neurologic:  Moves all extremities. AAOx3, CN II-XII reviewed            Data Review:    Review and/or order of clinical lab test  Review and/or order of tests in the radiology section of CPT  Review and/or order of tests in the medicine section of CPT  CXR Results  (Last 48 hours)      None             Labs:     Recent Labs     08/16/22  0217 08/15/22  0435   WBC 3.5* 4.1   HGB 11.2* 12.0*   HCT 34.7* 37.7    166       Recent Labs     08/16/22  1532 08/16/22  0217 08/15/22  0435   * 130* 135*   K 5.3* 4.8 4.0   CL 98 100 103   CO2 24 22 25   BUN 50* 56* 46*   CREA 1.81* 1.92* 1.88*   * 488* 186*   CA 8.1* 7.9* 8.2*       Recent Labs     08/16/22  0217 08/15/22  0435 08/14/22  0329   ALT 63 69 92*    99 109   TBILI 0.2 0.2 0.3   TP 6.4 6.1* 6.0*   ALB 1.9* 2.0* 2.2*   GLOB 4.5* 4.1* 3.8       No results for input(s): INR, PTP, APTT, INREXT, INREXT in the last 72 hours.    No results for input(s): FE, TIBC, PSAT, FERR in the last 72 hours. No results found for: FOL, RBCF   No results for input(s): PH, PCO2, PO2 in the last 72 hours. No results for input(s): CPK, CKNDX, TROIQ in the last 72 hours.     No lab exists for component: CPKMB  Lab Results   Component Value Date/Time    Cholesterol, total 155 03/09/2022 03:55 AM    HDL Cholesterol 88 03/09/2022 03:55 AM    LDL, calculated 52.8 03/09/2022 03:55 AM    Triglyceride 71 03/09/2022 03:55 AM    CHOL/HDL Ratio 1.8 03/09/2022 03:55 AM     Lab Results   Component Value Date/Time    Glucose (POC) 471 (H) 08/16/2022 08:47 PM    Glucose (POC) 469 (H) 08/16/2022 04:13 PM    Glucose (POC) 408 (H) 08/16/2022 11:20 AM    Glucose (POC) 333 (H) 08/16/2022 06:38 AM    Glucose (POC) 403 (H) 08/15/2022 09:43 PM     Lab Results   Component Value Date/Time    Color YELLOW/STRAW 08/16/2022 03:25 PM    Appearance CLEAR 08/16/2022 03:25 PM    Specific gravity 1.017 08/16/2022 03:25 PM    pH (UA) 5.0 08/16/2022 03:25 PM    Protein 300 (A) 08/16/2022 03:25 PM    Glucose >1,000 (A) 08/16/2022 03:25 PM    Ketone Negative 08/16/2022 03:25 PM    Bilirubin Negative 08/16/2022 03:25 PM    Urobilinogen 0.2 08/16/2022 03:25 PM    Nitrites Negative 08/16/2022 03:25 PM    Leukocyte Esterase Negative 08/16/2022 03:25 PM    Epithelial cells FEW 08/16/2022 03:25 PM    Bacteria Negative 08/16/2022 03:25 PM    WBC 0-4 08/16/2022 03:25 PM    RBC 5-10 08/16/2022 03:25 PM         Medications Reviewed:     Current Facility-Administered Medications   Medication Dose Route Frequency    insulin NPH (NOVOLIN N, HUMULIN N) injection 25 Units  25 Units SubCUTAneous DAILY    insulin NPH (NOVOLIN N, HUMULIN N) injection 8 Units  8 Units SubCUTAneous ACD    0.9% sodium chloride infusion  75 mL/hr IntraVENous CONTINUOUS    predniSONE (DELTASONE) tablet 40 mg  40 mg Oral DAILY WITH BREAKFAST    insulin glargine (LANTUS) injection 10 Units  10 Units SubCUTAneous DAILY    glucose chewable tablet 16 g  4 Tablet Oral PRN    glucagon (GLUCAGEN) injection 1 mg  1 mg IntraMUSCular PRN    dextrose 10 % infusion 0-250 mL  0-250 mL IntraVENous PRN    insulin lispro (HUMALOG) injection   SubCUTAneous AC&HS    sodium chloride (NS) flush 5-40 mL  5-40 mL IntraVENous Q8H    sodium chloride (NS) flush 5-40 mL  5-40 mL IntraVENous PRN    acetaminophen (TYLENOL) tablet 650 mg  650 mg Oral Q6H PRN    Or    acetaminophen (TYLENOL) suppository 650 mg  650 mg Rectal Q6H PRN    polyethylene glycol (MIRALAX) packet 17 g  17 g Oral DAILY PRN    ondansetron (ZOFRAN) injection 4 mg  4 mg IntraVENous Q6H PRN    amLODIPine (NORVASC) tablet 10 mg  10 mg Oral DAILY    aspirin chewable tablet 81 mg  81 mg Oral DAILY    enoxaparin (LOVENOX) injection 30 mg  30 mg SubCUTAneous Q12H    guaiFENesin-dextromethorphan (ROBITUSSIN DM) 100-10 mg/5 mL syrup 5 mL  5 mL Oral Q4H PRN     ______________________________________________________________________  EXPECTED LENGTH OF STAY: 3d 19h  ACTUAL LENGTH OF STAY:          3                 Aristeo Marie MD

## 2022-08-17 NOTE — PROGRESS NOTES
Bedside and Verbal shift change report given to Torres Burns and Penny Rn (oncoming nurse) by Eh Mcintosh (offgoing nurse).  Report included the following information SBAR, Kardex, ED Summary, Procedure Summary, MAR, Recent Results, and Cardiac Rhythm SB .

## 2022-08-17 NOTE — DIABETES MGMT
[facility]    CLINICAL NURSE SPECIALIST CONSULT     Initial Presentation   Abhi Ng is a 74 y.o. male who presented to the ED 8/13/22 with a 1 week h/o  mild rhinorrhea as well as intermittent nonproductive cough and a 1 day report of feeling nauseous and had multiple episodes of watery, nonbloody diarrhea. His initial labs were significant for , AG 6, , GFR32, A1C over 14%. He was fluid resuscitated and started on an insulin gtt. HX:   Past Medical History:   Diagnosis Date    Abscess 12/2020    left side of abdomen    Diabetes ([facility] [address]     Hyperlipidemia     Hypertension     ICD (implantable cardioverter-defibrillator) in place     NSVT (nonsustained ventricular tachycardia) ([facility] [address] 11/21/2018    EPS 11/21/2018 VT/VF     Right groin pain 12/7/2020    Stroke ([facility] [address]     Syncope     Valvular heart disease         INITIAL DX:   Malaise and fatigue [R53.81, R53.83]  Elevated serum creatinine [R79.89]  Hyperglycemia [R73.9]     Current Treatment     TX: IVF, Insulin gtt    Consulted by   Rei Almanza MD  for advanced diabetes nursing assessment and care for:   [x] Inpatient management strategy  [x] Home management assessment      Hospital Course   Clinical progress has been complicated by steroid exacerbated hyperglycemia. 8/14: Admission. Insulin gtt. COVID-19 positive.   8/15: Dexamethasone now with O2 requirement  Diabetes History   Type 2 Diabetes- Diagnosed 20+ years ago  Ambulatory BG management provided by: PCP Robert Burk MD- Last visit 5/11/22      Diabetes-related Medical History  Acute complications  Steroid exacerbated hyperglycemia   Neurological complications  Peripheral neuropathy  Microvascular disease  Nephropathy  Macrovascular disease  Cerebral vascular accident      Diabetes Medication History  Key Antihyperglycemic Medications               metFORMIN (GLUCOPHAGE) 1,000 mg tablet TAKE 1 TABLET BY MOUTH IN THE EVENING BEFORE DINNER Trulicity 1.5 QP/8.6 mL sub-q pen INJECT 0.5 ML UNDER SKIN EVERY SEVEN (7) DAYS. Been on and off Trulicity this year- last dose about a month ago. High out of pocket cost  Levemir 36 units am and 42 units pm    Diabetes self-management practices:   Eating pattern  [x] Breakfast Coffe, oatmeal or eggs/toast  [x] Milinda Beans  [x] Lulú Spinner with mo sauce  [x] Bedtime None  [x] Snacks  Prabha   [x] Beverages Water, coffee  Physical activity pattern   Works in a kitchen- active at work  Monitoring pattern   Fasting: over 280  Evening- 300-400  No Lows  Taking medications pattern  [x] In-consistent administration  [x] Not affordable  Social determinants of health impacting diabetes self-management practices   Worried that your food supply will run out before you have money to buy more and Concerned that you need to know more about how to stay healthy with diabetes  Overall evaluation:    [x] Not achieving A1c target with drug therapy & self-care practices    Subjective   I have high sugar all the time.      Was on levemir/trulicity in the past but wasn't able to afford it. Continues to work full time @ [employer] [facility] [employer] [place]. Objective   Physical exam  General Normal weight AA male in no acute distress/ill-appearing. Conversant and cooperative  Neuro  Alert, oriented   Vital Signs Visit Vitals  BP (!) 156/84   Pulse 60   Temp 97.9 °F (36.6 °C)   Resp 18   Ht 5' 7\" (1.702 m)   Wt 74.2 kg (163 lb 9.3 oz)   SpO2 96%   BMI 25.62 kg/m²     Skin  Warm and dry. Acanthosis noted along neckline. No lipohypertrophy or lipoatrophy noted at injection sites   Heart   Regular rate and rhythm.  No murmurs, rubs or gallops  Lungs  Clear to auscultation without rales or rhonchi  Extremities No foot wounds        Laboratory  Recent Labs     08/17/22  0541 08/16/22  1532 08/16/22  0217 08/15/22  0435   * 510* 488* 186*   AGAP 9 7 8 7   WBC 4.8  --  3.5* 4.1   CREA 1.82* 1.81* 1.92* 1.88*   GFRNA 37* 37* 35* 36* AST  --   --  39* 43*   ALT  --   --  63 69         Factors impacting BG management  Factor Dose Comments   Nutrition:  Standard meals     60 grams/meal      Drugs:  Steroids     Prednisone 40mg daily   Impairs insulin action     Pain     Infection COVID-19 viral infection    Other:   Kidney function GFR 42      Blood glucose pattern      Significant diabetes-related events over the past 24-72 hours  A1C over 14%  Fasting B  Pre-prandial: 262-403  Basal: None  Bolus: None  Correction: 28 in the last 24h  Possible D/C today      Assessment and Plan   Nursing Diagnosis Risk for unstable blood glucose pattern   Nursing Intervention Domain 5250 Decision-making Support   Nursing Interventions Examined current inpatient diabetes/blood glucose control   Explored factors facilitating and impeding inpatient management  Explored corrective strategies with patient and responsible inpatient provider   Informed patient of rational for insulin strategy while hospitalized     Nursing Diagnosis [address]01 Ineffective Health Management   Nursing Intervention Domain 5250 Decision-makingSupport   Nursing Interventions Identified diabetes self-management practices impeding diabetes control  Discussed diabetes survival skills related to  Healthy Plate eating plan; given handouts  Role of physical activity in improving insulin sensitivity and action  Procedure for blood glucose monitoring & options for low-cost products available from [facility]   Medications plan at discharge     Evaluation   Nathaly Pinedo is a 74year old gentleman,with Type 2 Diabetes under poor control, who is admitted with acute hypoxic respiratory failure s/t COVID-19 viral infection. A1C values are unfortunately chronically elevated at over 14%. In the past, he describes cost related non-adherence with insulin and GLP-1 and with last hospitalization was recommended to use Relion insulin (affordable).       This admission, he presented with non-anion gap hyperglycemia and was given IVF to regulate glucose pattern. He transitioned off the insulin gtt with very low dose basal/correctional insulin but prednisone was started the following day for increase in oxygen needs. BG michelle to nearly 400. Basal insulin was adjusted to NPH at a higher dose scheduled in the am but not sufficient to offset glucose pattern with prednisone. Will adjust today prior to hospital discharge. Inpatient BG goal is 140-180mg/dl. Recommendations   POC glucose ACHS    Consistent carbohydrate diet (60 grams CHO/meal)    3. Adjust the subcutaneous Insulin Order set (0705)  Insulin Dosing Specific recommendation   Basal                                      (Based on weight, BMI & GFR) NPH: 30 units with prednisone  Lantus 30 units at bedtime (cover for underlying diabetes)      Nutritional                                      (Based on CHO/dextrose load) [x] Resistant sensitivity: 8 units Humalog/meal    Corrective                                       (Useful in adjusting insulin dosing) [x] Normal sensitivity ACHS          Discharge Recommendations   Will need a FUV with PCP within 1-2 weeks after hospital discharge for ongoing diabetes management. On Discharge, please place an outpatient order for \"diabetes education\" (enter as REF20). This will trigger a referral for the Program for Diabetes Health which includes outpatient diabetes self management training with a certified diabetes educator. Continue Metformin at PTA dosing. Stop Trulicity (can't afford it)    Prescription for glucometer kit (Meter, Lancets (100), Strips (100)). Patient to obtain a blood glucose reading four times daily. First thing in the morning prior to eating and drinking anything then before lunch, dinner and bedtime. Create a log and present to PCP for interpretation. Insulin per recs below: Insulin taper with prednisone.   Patient wishes to use the remainder of his PTA Levemir prior to switching to 70/30 as he just bought a box of insulin and doesn't have money this month to purchase 70/30.    30mg prednisone (x2 days)= Levemir 40 units am and 15 units at bed  20 mg prednisone (x2 days)= Levemir 30 units am and 15 units at bed  10mg prednisone (x2 days)= Levemir 22 units am and 15 units at bed  Off steroids: 15 units Levemir BID        Once Levemir Rx is complete, would recommend switching to [facility] 70/30 Relion insulin ($42 for a box of 5 pens cash). More affordable and would benefit from mealtime coverage. 28 units at breakfast and 14 units at dinner  Billing Code(s)   [x] 06351    Before making these care recommendations, I personally reviewed the hospitalization record, including notes, laboratory & diagnostic data and current medications, and examined the patient at the bedside (circumstances permitting) before making care recommendations. More than fifty (50) percent of the time was spent in patient counseling and/or care coordination.   Total minutes: 22      Hola Mosqueraelor, CNS BC-ADM  Board Certified Advanced Diabetes Manager  Clinical Nurse Specialist  Program for Diabetes [facility]  Access via Coreen Roman

## 2022-08-17 NOTE — DISCHARGE SUMMARY
Discharge Summary       PATIENT ID: Fredrick Loo  MRN: [id number]   YOB: 1955    DATE OF ADMISSION: 8/13/2022  2:40 PM    DATE OF DISCHARGE: 8/17/22 6pm   PRIMARY CARE PROVIDER: Bobyb Srinivasan MD     ATTENDING PHYSICIAN: Frank Jones  DISCHARGING PROVIDER: Fred Hernandez MD    To contact this individual call [phone] and ask the  to page. If unavailable ask to be transferred the Adult Hospitalist Department. CONSULTATIONS: IP CONSULT TO HOSPITALIST  IP CONSULT TO NEPHROLOGY    PROCEDURES/SURGERIES: * No surgery found *    ADMISSION SUMMARY AND HOSPITAL COURSE:   Fredrick Loo is a 74 y.o. male who presents with abdominal discomfort nausea fatigue and malaise, generalized weakness and in the emergency room found to have abnormal labs with a very high blood sugar. Patient tells me his symptoms started approximately 4 to 5 days ago. He denies any symptoms that would be suggestive of an acute infection-he has not had any changes to his medications no known sick contacts-he does question possible mild case of food poisoning patient reports ingesting some [employer] food which she had purchased 4 to 6 days prior. Patient denies any diarrhea to me but does report one episode of diarrhea to the nurse on admission as per list were requested to admit the patient due to abnormal labs and blood sugar of 631. DISCHARGE DIAGNOSES / PLAN:       1-type 2 diabetes -supposed to be on home insulin but patient telling nursing staff he no longer takes any insulin; you have patient's past hemoglobin A1c's are all above 14  Glucose levels on admission greater than 600  Patient now off insulin drip, start Accu-Cheks and sliding scale insulin  Hyperglycemia from steroids  2-hypertension-improved after restarting patient's home medication discontinuing IV fluids  3. Elevated liver function tests-improving   likely due to infection and hypotension  hold statin medication- resume on DC  4. Hyperlipidemia hold home statin medication while LFTs are elevated  5. Elevated creatinine-likely secondary to dehydration from poor p.o. intake due to GI distress  Creatinine elevated again- restart IV fluids - hold metformin for now  Nephrology consulted for eval and tx recs  6. Fatigue and malaise-due to cute COVID infection  7. Hyponatremia-corrected and resolved  8. History of nonsustained ventricular tachycardia status post AICD placement-stable heart rate and unchanged EKG on admission  9. Acute COVID-19 infection-normal D-dimer and mildly elevated CRP  Currently patient is with stable SaO2 97% on RA  Cont prednisone taper    PENDING TEST RESULTS:   At the time of discharge the following test results are still pending: none     ADDITIONAL CARE RECOMMENDATIONS:   You were admitted and treated for low oxygen levels due to COVID pneumonia  Due to the steroids- your blood sugars are elevated and your kidney function is elevated/abnormal  Recommendations for discharge  1) monitor oxygen levels at home- your oxygen levels should be > 90% on room air  2) take steroid taper- prednisone 30mg daily x2 days, then 20mg daily x 2days, then 10mg daily x2 days  3) self isolate for 10 days    Recommendations regarding your insulin  Insulin taper with prednisone. Thurday and Friday- 30mg prednisone = Levemir 40 units am and 15 units at bed  Saturday and Sunday- 20 mg prednisone = Levemir 30 units am and 15 units at bed  Monday and Tuesday-10mg prednisone = Levemir 22 units am and 15 units at bed  Once done with steroids- continue : 15 units Levemir in the am and at bedtime    Follow up with Dr Jorge Schuler- kidney specialist in 2 weeks to get your kidney function labs rechecked  Avoid antiinflammatory meds- like advil aleve ibuprofen, motrin     NOTIFY YOUR PHYSICIAN FOR ANY OF THE FOLLOWING:   Fever over 101 degrees for 24 hours.    Chest pain, shortness of breath, fever, chills, nausea, vomiting, diarrhea, change in mentation, falling, weakness, bleeding. Severe pain or pain not relieved by medications, as well as any other signs or symptoms that you may have questions about. FOLLOW UP APPOINTMENTS:    Follow-up Information       Follow up With Specialties Details Why Contact Info    Akila Robins MD Nephrology Follow up in 2 week(s) to get your kidney function labs rechecked [phone]      Jenn Bolivar MD Internal Medicine Physician   [id number] [address]  Eugenio Hassan [phone]             DIET:     ACTIVITY: Activity as tolerated    EQUIPMENT needed: none    DISCHARGE MEDICATIONS:  Current Discharge Medication List        START taking these medications    Details   predniSONE (DELTASONE) 10 mg tablet Take 30 mg by mouth daily (with breakfast) for 2 days, THEN 20 mg daily (with breakfast) for 2 days, THEN 10 mg daily (with breakfast) for 2 days. Qty: 12 Tablet, Refills: 0  Start date: 8/17/2022, End date: 8/23/2022      insulin detemir U-100 (Levemir FlexTouch U-100 Insuln) 100 unit/mL (3 mL) inpn 15 Units by SubCUTAneous route two (2) times a day for 30 days. In the am and at bedtime  Indications: type 2 diabetes mellitus  Qty: 9 mL, Refills: 3  Start date: 8/17/2022, End date: 9/16/2022           CONTINUE these medications which have NOT CHANGED    Details   atorvastatin (LIPITOR) 20 mg tablet TAKE ONE TABLET BY MOUTH DAILY  Qty: 90 Tablet, Refills: 3    Associated Diagnoses: Dyslipidemia      amLODIPine (NORVASC) 10 mg tablet TAKE 1 TABLET BY MOUTH EVERY DAY  Qty: 90 Tablet, Refills: 3      aspirin (ASPIRIN) 325 mg tablet Take 1 Tablet by mouth daily. Qty: 30 Tablet, Refills: 4      metFORMIN (GLUCOPHAGE) 1,000 mg tablet TAKE 1 TABLET BY MOUTH IN THE EVENING BEFORE DINNER  Qty: 90 Tablet, Refills: 2      Trulicity 1.5 VQ/2.3 mL sub-q pen INJECT 0.5 ML UNDER SKIN EVERY SEVEN (7) DAYS.   Qty: 4 Syringe, Refills: 11    Associated Diagnoses: Uncontrolled type 2 diabetes mellitus with complication, with long-term current use of insulin      Insulin Needles, Disposable, 31 gauge x 1/4\" ndle USE WITH INSULIN PENS THREE TIMES PER DAY  Qty: 100 Pen Needle, Refills: 11      glucose blood VI test strips (ASCENSIA AUTODISC VI, ONE TOUCH ULTRA TEST VI) strip 3 times daily before meals  Qty: 300 Strip, Refills: 3    Associated Diagnoses: Uncontrolled type 2 diabetes mellitus with complication, with long-term current use of insulin             DISPOSITION:    Home With:   OT  PT  HH  RN       Long term SNF/Inpatient Rehab   X Independent  living    Hospice    Other:       PATIENT CONDITION AT DISCHARGE:     Functional status    Poor     Deconditioned    X Independent      Cognition   X  Lucid     Forgetful     Dementia      Catheters/lines (plus indication)    Ramesh     PICC     PEG    X None      Code status   X  Full code     DNR      PHYSICAL EXAMINATION AT DISCHARGE:  Patient Vitals for the past 24 hrs:   Temp Pulse Resp BP SpO2   08/17/22 1809 -- 79 -- -- --   08/17/22 1604 98.6 °F (37 °C) 67 19 (!) 147/74 96 %   08/17/22 1414 -- 71 -- -- --   08/17/22 1215 -- 64 -- -- --   08/17/22 1206 98.1 °F (36.7 °C) 63 20 (!) 144/79 94 %   08/17/22 1029 -- 69 -- -- --   08/17/22 0800 -- -- -- -- 96 %   08/17/22 0720 97.9 °F (36.6 °C) 60 18 (!) 156/84 96 %   08/17/22 0600 -- (!) 55 -- -- --   08/17/22 0430 98.1 °F (36.7 °C) (!) 56 19 (!) 158/84 96 %   08/17/22 0400 -- (!) 57 -- -- --   08/17/22 0200 -- (!) 58 -- -- --   08/17/22 0020 -- 63 -- -- --   08/16/22 2339 98.7 °F (37.1 °C) 62 24 128/67 98 %   08/16/22 2200 -- 68 -- -- --   08/16/22 2020 98.5 °F (36.9 °C) 68 22 (!) 149/83 97 %   08/16/22 2005 -- 63 -- -- --      Constitutional:  No acute distress, cooperative, pleasant    ENT:  Oral mucosa moist, oropharynx benign. Resp:  CTA bilaterally. No wheezing/rhonchi/rales. No accessory muscle use.     CV:  Regular rhythm, normal rate, no murmurs, gallops, rubs    GI: Soft, non distended, non tender. normoactive bowel sounds, no hepatosplenomegaly     Musculoskeletal:  No edema, warm, 2+ pulses throughout    Neurologic:  Moves all extremities.   AAOx3, CN II-XII reviewed                                  Data Review:    Review and/or order of clinical lab test  Review and/or order of tests in the radiology section of CPT  Review and/or order of tests in the medicine section of CPT  CXR Results  (Last 48 hours)        None                Labs:      Recent Results (from the past 24 hour(s))   GLUCOSE, POC    Collection Time: 08/16/22  8:47 PM   Result Value Ref Range    Glucose (POC) 471 (H) 65 - 117 mg/dL    Performed by Ynes Axon    CBC W/O DIFF    Collection Time: 08/17/22  5:41 AM   Result Value Ref Range    WBC 4.8 4.1 - 11.1 K/uL    RBC 3.93 (L) 4.10 - 5.70 M/uL    HGB 10.7 (L) 12.1 - 17.0 g/dL    HCT 33.2 (L) 36.6 - 50.3 %    MCV 84.5 80.0 - 99.0 FL    MCH 27.2 26.0 - 34.0 PG    MCHC 32.2 30.0 - 36.5 g/dL    RDW 13.0 11.5 - 14.5 %    PLATELET 940 921 - 808 K/uL    MPV 9.9 8.9 - 12.9 FL    NRBC 0.0 0  WBC    ABSOLUTE NRBC 0.00 0.00 - 0.01 K/uL   C REACTIVE PROTEIN, QT    Collection Time: 08/17/22  5:41 AM   Result Value Ref Range    C-Reactive protein 1.59 (H) 0.00 - 2.69 mg/dL   METABOLIC PANEL, BASIC    Collection Time: 08/17/22  5:41 AM   Result Value Ref Range    Sodium 136 136 - 145 mmol/L    Potassium 4.5 3.5 - 5.1 mmol/L    Chloride 104 97 - 108 mmol/L    CO2 23 21 - 32 mmol/L    Anion gap 9 5 - 15 mmol/L    Glucose 349 (H) 65 - 100 mg/dL    BUN 54 (H) 6 - 20 MG/DL    Creatinine 1.82 (H) 0.70 - 1.30 MG/DL    BUN/Creatinine ratio 30 (H) 12 - 20      GFR est AA 45 (L) >60 ml/min/1.73m2    GFR est non-AA 37 (L) >60 ml/min/1.73m2    Calcium 7.7 (L) 8.5 - 10.1 MG/DL   SAMPLES BEING HELD    Collection Time: 08/17/22  5:41 AM   Result Value Ref Range    SAMPLES BEING HELD 1pst     COMMENT        Add-on orders for these samples will be processed based on acceptable specimen integrity and analyte stability, which may vary by analyte. GLUCOSE, POC    Collection Time: 08/17/22  7:08 AM   Result Value Ref Range    Glucose (POC) 338 (H) 65 - 117 mg/dL    Performed by Jessie Dorantes    GLUCOSE, POC    Collection Time: 08/17/22 12:05 PM   Result Value Ref Range    Glucose (POC) 278 (H) 65 - 117 mg/dL    Performed by [address], POC    Collection Time: 08/17/22  3:50 PM   Result Value Ref Range    Glucose (POC) 309 (H) 65 - 117 mg/dL    Performed by [address]     08/16/22  0217 08/15/22  0435   WBC 3.5* 4.1   HGB 11.2* 12.0*   HCT 34.7* 37.7    166               Recent Labs     08/16/22  1532 08/16/22 0217 08/15/22  0435   * 130* 135*   K 5.3* 4.8 4.0   CL 98 100 103   CO2 24 22 25   BUN 50* 56* 46*   CREA 1.81* 1.92* 1.88*   * 488* 186*   CA 8.1* 7.9* 8.2*               Recent Labs     08/16/22  0217 08/15/22  0435 08/14/22  0329   ALT 63 69 92*    99 109   TBILI 0.2 0.2 0.3   TP 6.4 6.1* 6.0*   ALB 1.9* 2.0* 2.2*   GLOB 4.5* 4.1* 3.8         No results for input(s): INR, PTP, APTT, INREXT, INREXT in the last 72 hours. No results for input(s): FE, TIBC, PSAT, FERR in the last 72 hours. No results found for: FOL, RBCF   No results for input(s): PH, PCO2, PO2 in the last 72 hours. No results for input(s): CPK, CKNDX, TROIQ in the last 72 hours.      No lab exists for component: CPKMB        Lab Results   Component Value Date/Time     Cholesterol, total 155 03/09/2022 03:55 AM     HDL Cholesterol 88 03/09/2022 03:55 AM     LDL, calculated 52.8 03/09/2022 03:55 AM     Triglyceride 71 03/09/2022 03:55 AM     CHOL/HDL Ratio 1.8 03/09/2022 03:55 AM            Lab Results   Component Value Date/Time     Glucose (POC) 471 (H) 08/16/2022 08:47 PM     Glucose (POC) 469 (H) 08/16/2022 04:13 PM     Glucose (POC) 408 (H) 08/16/2022 11:20 AM     Glucose (POC) 333 (H) 08/16/2022 06:38 AM     Glucose (POC) 403 (H) 08/15/2022 09:43 PM            Lab Results   Component Value Date/Time     Color YELLOW/STRAW 08/16/2022 03:25 PM     Appearance CLEAR 08/16/2022 03:25 PM     Specific gravity 1.017 08/16/2022 03:25 PM     pH (UA) 5.0 08/16/2022 03:25 PM     Protein 300 (A) 08/16/2022 03:25 PM     Glucose >1,000 (A) 08/16/2022 03:25 PM     Ketone Negative 08/16/2022 03:25 PM     Bilirubin Negative 08/16/2022 03:25 PM     Urobilinogen 0.2 08/16/2022 03:25 PM     Nitrites Negative 08/16/2022 03:25 PM     Leukocyte Esterase Negative 08/16/2022 03:25 PM     Epithelial cells FEW 08/16/2022 03:25 PM     Bacteria Negative 08/16/2022 03:25 PM     WBC 0-4 08/16/2022 03:25 PM     RBC 5-10 08/16/2022 03:25 PM            CHRONIC MEDICAL DIAGNOSES:  Problem List as of 8/17/2022 Date Reviewed: 5/15/2022            Codes Class Noted - Resolved    Malaise and fatigue ICD-10-CM: R53.81, R53.83  ICD-9-CM: 780.79  8/13/2022 - Present        Hyperglycemia ICD-10-CM: R73.9  ICD-9-CM: 790.29  8/13/2022 - Present        Elevated serum creatinine ICD-10-CM: R79.89  ICD-9-CM: 790.99  8/13/2022 - Present        Brainstem infarct, acute ([facility] ICD-10-CM: Q54.73  ICD-9-CM: 434.91  3/20/2022 - Present        Cerebrovascular accident (CVA) due to thrombosis of cerebral artery ([facility] ICD-10-CM: I63.30  ICD-9-CM: 434.01  3/10/2022 - Present        Ataxia ICD-10-CM: R27.0  ICD-9-CM: 781.3  3/8/2022 - Present        Chronic abdominal wound infection, sequela ICD-10-CM: S31.109S, L08.9  ICD-9-CM: 906.0  1/29/2021 - Present        ARF (acute renal failure) (HCC) ICD-10-CM: N17.9  ICD-9-CM: 584.9  12/12/2020 - Present        Phlegmon ICD-10-CM: L02.91  ICD-9-CM: 682.9  12/11/2020 - Present        Abdominal pain ICD-10-CM: R10.9  ICD-9-CM: 789.00  12/10/2020 - Present        Right groin pain ICD-10-CM: R10.31  ICD-9-CM: 789.03  12/7/2020 - Present        Vasovagal syncope ICD-10-CM: R55  ICD-9-CM: 780.2  7/21/2020 - Present        Bell's palsy ICD-10-CM: G51.0  ICD-9-CM: 351.0 4/28/2019 - Present    Overview Signed 4/28/2019 10:51 AM by Leonor Priest MD     R sided             ICD (implantable cardioverter-defibrillator) in place (Chronic) ICD-10-CM: Z95.810  ICD-9-CM: V45.02  12/21/2018 - Present        Hypertension (Chronic) ICD-10-CM: I10  ICD-9-CM: 401.9  12/21/2018 - Present        Gastritis ICD-10-CM: K29.70  ICD-9-CM: 535.50  12/6/2018 - Present        NSVT (nonsustained ventricular tachycardia) (HCC) (Chronic) ICD-10-CM: I47.2  ICD-9-CM: 427.1  11/21/2018 - Present    Overview Signed 11/21/2018 10:25 AM by Marybeth MONTANO 11/21/2018 VT/VF               Abnormal EKG ICD-10-CM: R94.31  ICD-9-CM: 794.31  11/18/2018 - Present        Brugada syndrome (Chronic) ICD-10-CM: I49.8  ICD-9-CM: 746.89  11/18/2018 - Present        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 ([facility] ICD-10-CM: E13.319  ICD-9-CM: 249.50, 362.01  2/18/2018 - Present        Type 2 diabetes mellitus with diabetic polyneuropathy, with long-term current use of insulin (HCC) ICD-10-CM: E11.42, Z79.4  ICD-9-CM: 250.60, 357.2, V58.67  2/18/2018 - Present        Uncontrolled type 2 diabetes mellitus with complication, with long-term current use of insulin ICD-10-CM: Neisha Polly  ICD-9-CM: 250.82, V58.67  2/18/2018 - Present        Dyslipidemia ICD-10-CM: E78.5  ICD-9-CM: 272.4  9/25/2014 - Present        Eczema ICD-10-CM: L30.9  ICD-9-CM: 692.9  9/25/2014 - Present        Umbilical hernia AXO-50-MC: K42.9  ICD-9-CM: 553.1  9/25/2014 - Present        Femoral bruit ICD-10-CM: R09.89  ICD-9-CM: 785.9  9/25/2014 - Present        Mitral valve prolapse ICD-10-CM: I34.1  ICD-9-CM: 424.0  9/25/2014 - Present        RESOLVED: Right-sided Bell's palsy ICD-10-CM: G51.0  ICD-9-CM: 351.0  3/12/2019 - 4/28/2019        RESOLVED: Syncope and collapse ICD-10-CM: R55  ICD-9-CM: 780.2  11/18/2018 - 8/3/2021           Greater than 30 minutes were spent with the patient on counseling and coordination of care    Signed:   Thuy Dover Austin Betancourt MD  8/17/2022  6:19 PM    .

## 2022-08-18 ENCOUNTER — PATIENT OUTREACH (OUTPATIENT)
Dept: CASE MANAGEMENT | Age: 67
End: 2022-08-18

## 2022-08-18 NOTE — PROGRESS NOTES
Care Transitions Initial Call    Call within 2 business days of discharge: Yes     Patient: Jeannette Severino Patient : 1955 MRN: [id number]    Last Discharge [address]       Date Complaint Diagnosis Description Type Department Provider    22 Diarrhea Nausea vomiting and diarrhea . .. ED to Hosp-Admission (Discharged) (ADMIT) Manoj Chen MD; Jud Rowan. .. Was this an external facility discharge? No     Challenges to be reviewed by the provider   Additional needs identified to be addressed with provider: yes  Component      Latest Ref Rng & Units 2022          10:37 AM   SARS-CoV-2      NOTD   Detected (A)      Component      Latest Ref Rng & Units 2022           3:45 PM   Hemoglobin A1c, (calculated)      4.0 - 5.6 % >14.0 (H)     Component      Latest Ref Rng & Units 2022           5:41 AM   BUN      6 - 20 MG/DL 54 (H)   Creatinine      0.70 - 1.30 MG/DL 1.82 (H)   BUN/Creatinine ratio      12 - 20   30 (H)   GFR est AA      >60 ml/min/1.73m2 45 (L)   GFR est non-AA      >60 ml/min/1.73m2 37 (L)     Component      Latest Ref Rng & Units 2022           4:47 PM   COVID-19 rapid test      NOTD   Detected (A)        Method of communication with provider : chart routing    Discussed COVID-19 related testing which was available at this time. Test results were positive. Patient informed of results, if available? yes     Advance Care Planning:   Does patient have an Advance Directive:  [address] on file . Inpatient Readmission Risk score: Unplanned Readmit Risk Score: 9    Was this a readmission?  no   Patient stated reason for the admission: abd pain N-V, weakness, low oxygen level    Patients top risk factors for readmission: medical condition-COVID-19, PNA, DM    Interventions to address risk factors: Scheduled appointment with PCP-pt.will schedule, Scheduled appointment with Specialist-pt.will schedule, Obtained and reviewed discharge summary and/or continuity of care documents, and Education of patient/family/caregiver/guardian to support self-management-COVID-19 PNA, DM    Care Transition Nurse (CTN) contacted the patient by telephone to perform post hospital discharge assessment. Verified name and  with patient as identifiers. Provided introduction to self, and explanation of the CTN role. CTN reviewed discharge instructions, medical action plan and red flags with patient who verbalized understanding. Were discharge instructions available to patient? yes. Reviewed appropriate site of care based on symptoms and resources available to patient including: PCP, Specialist, When to call 911, and [facility] . Patient given an opportunity to ask questions and does not have any further questions or concerns at this time. The patient agrees to contact the PCP office for questions related to their healthcare. Medication reconciliation was performed with patient, who verbalizes understanding of administration of home medications. Advised obtaining a 90-day supply of all daily and as-needed medications. Referral to Pharm D needed: no   START taking these medications     Details   predniSONE (DELTASONE) 10 mg tablet Take 30 mg by mouth daily (with breakfast) for 2 days, THEN 20 mg daily (with breakfast) for 2 days, THEN 10 mg daily (with breakfast) for 2 days. Qty: 12 Tablet, Refills: 0  Start date: 2022, End date: 2022       insulin detemir U-100 (Levemir FlexTouch U-100 Insuln) 100 unit/mL (3 mL) inpn 15 Units by SubCUTAneous route two (2) times a day for 30 days. In the am and at bedtime  Indications: type 2 diabetes mellitus  Qty: 9 mL, Refills: 3  Start date: 2022, End date: 2022         Recommendations regarding your insulin  Insulin taper with prednisone.      and Friday- 30mg prednisone = Levemir 40 units am and 15 units at bed  Saturday and - 20 mg prednisone = Levemir 30 units am and 15 units at bed  Monday and Tuesday-10mg prednisone = Levemir 22 units am and 15 units at bed  Once done with steroids- continue : 15 units Levemir in the am and at bedtime       Was patient discharged with a pulse oximeter? No, pt.reports he has monitor. Discussed follow-up appointments. If no appointment was previously scheduled, appointment scheduling offered: yes. Is follow up appointment scheduled within 7 days of discharge? Pending, pt.will schedule . [facility] follow up appointment(s):   Future Appointments   Date Time Provider Chalino Katty   3/27/2023  9:20 AM MD JANUSZ Howell BS AMB         Plan for follow-up call in 5-7 days based on severity of symptoms and risk factors. Plan for next call: self management-COVID-19,PNA, DM and follow up appointment-pcp, specialist       8/17 covid, sx cough, mild congeston, weakness,DM fbs,     CTN provided contact information for future needs. Goals Addressed                   This Visit's Progress     Prevent complications post hospitalization. 8/18   Pt.will attend all recommended follow ups with pcp and specialist.  -PCP, Erica Lim MD -pt. will schedule  -Nephrology, Gladis Bower MD -pt. will schedule  -CTN provided pt. information [facility], explain briefly type of service; contact information provided. CTN will follow up with pt.attendance to appts. or assist with rescheduling as needed, in 5-7 days. -MC    (Teach Back) Pt.reports symptoms mild congestion, cough and weakness. Pt.will report to pcp worsening sx, fatigue, increased fever, congestion, (return to ED) for increase SOB or difficulty breathing, pulse ox resting < 92%. Pt.will continue with self isolation as directed 10 days, wear a mask around others for 5 additional days; if sx not improved or resolved, pt.will discuss continue isolation with PCP for further instructions.  CTN will follow up with pt.in 5-7 days review any new symptoms or worsening symptoms and pulse ox, .- Pt.will check FBS prior to administering insulin, reviewed Hypoglycemia and hyperglycemia s/s and treatment; pt.will contact pcp with consistent low fbs < 70 or high readings > 250. CTN provided contact information to call with questions or concerns, follow up with pt.in 5-7 days. -[address]

## 2022-08-22 NOTE — PROGRESS NOTES
Physician Progress Note      Saeed Jiménez  CSN #:                  [id number]  :                       1955  ADMIT DATE:       2022 2:40 PM  [address] DATE:        2022 8:01 PM  RESPONDING  PROVIDER #:        Robert Carpenter MD          QUERY TEXT:    Pt admitted with nausea. Noted documentation of DM and dehydration in multiple notes since admission. Please document in progress notes and discharge summary if you are treating any of the following: The medical record reflects the following:  Risk Factors: 65yo with DM and telling nurses he does not take insulin anymore    Clinical Indicators:  Glucose: 631 -- 402 -- 129 -- 186  UA: no ketones     Hospitalist  type 2 diabetes -supposed to be on home insulin but patient telling nursing staff he no longer takes any insulin; you have patient's past hemoglobin A1c's are all above 14  Glucose levels on admission greater than 600  Patient now off insulin drip, start Accu-Cheks and sliding scale insulin    Elevated creatinine-likely secondary to dehydration from poor p.o. intake due to GI distress  Creatinine normal with IV fluids will discontinue continue to hold metformin for now    Treatment: insulin drip, AC&HS POC blood glucose checks, AC&HS Humalog, Lantus 10units    Thank you,  Ajit Peña  [phone]  I am also available via [employer]. Options provided:  -- Diabetic hyperglycemia hyperosmolar state  -- Uncontrolled DM with hyperglycemia  -- Other - I will add my own diagnosis  -- Disagree - Not applicable / Not valid  -- Disagree - Clinically unable to determine / Unknown  -- Refer to Clinical Documentation Reviewer    PROVIDER RESPONSE TEXT:    This patient is in diabetic hyperglycemic hyperosmolar state.     Query created by: Kenneth Heath on 8/15/2022 2:56 PM      Electronically signed by:  Robert Carpenter MD 2022 8:27 AM

## 2022-08-25 ENCOUNTER — OFFICE VISIT (OUTPATIENT)
Dept: INTERNAL MEDICINE CLINIC | Age: 67
End: 2022-08-25
Payer: COMMERCIAL

## 2022-08-25 VITALS
OXYGEN SATURATION: 99 % | HEART RATE: 78 BPM | HEIGHT: 67 IN | DIASTOLIC BLOOD PRESSURE: 90 MMHG | TEMPERATURE: 98.7 F | BODY MASS INDEX: 24.42 KG/M2 | SYSTOLIC BLOOD PRESSURE: 159 MMHG | RESPIRATION RATE: 18 BRPM | WEIGHT: 155.6 LBS

## 2022-08-25 DIAGNOSIS — R27.0 ATAXIA: ICD-10-CM

## 2022-08-25 DIAGNOSIS — I63.30 CEREBROVASCULAR ACCIDENT (CVA) DUE TO THROMBOSIS OF CEREBRAL ARTERY (HCC): ICD-10-CM

## 2022-08-25 DIAGNOSIS — E11.649 UNCONTROLLED TYPE 2 DIABETES MELLITUS WITH HYPOGLYCEMIA WITHOUT COMA (HCC): Primary | ICD-10-CM

## 2022-08-25 DIAGNOSIS — I10 PRIMARY HYPERTENSION: Chronic | ICD-10-CM

## 2022-08-25 DIAGNOSIS — E13.319 RETINOPATHY DUE TO SECONDARY DIABETES MELLITUS (HCC): ICD-10-CM

## 2022-08-25 DIAGNOSIS — Z91.199 HISTORY OF NONCOMPLIANCE WITH MEDICAL TREATMENT: ICD-10-CM

## 2022-08-25 DIAGNOSIS — E78.5 DYSLIPIDEMIA: ICD-10-CM

## 2022-08-25 PROCEDURE — 99215 OFFICE O/P EST HI 40 MIN: CPT | Performed by: INTERNAL MEDICINE

## 2022-08-25 PROCEDURE — 3046F HEMOGLOBIN A1C LEVEL >9.0%: CPT | Performed by: INTERNAL MEDICINE

## 2022-08-25 PROCEDURE — 1123F ACP DISCUSS/DSCN MKR DOCD: CPT | Performed by: INTERNAL MEDICINE

## 2022-08-25 NOTE — PROGRESS NOTES
Florinda Garcia is a 74 y.o. male  Chief Complaint   Patient presents with    Hospital Follow Up     Patient here for hospital follow up from 8/13/22 with diagnosis of COVID. Diabetes     Patient has concerns about elevated blood sugars. 1. Have you been to the ER, urgent care clinic since your last visit? Hospitalized since your last visit? Yes When: 8/13/22 Where: [facility] Reason for visit: Covid symptoms with positive test.     2. Have you seen or consulted any other health care providers outside of the [address] [place] [address] since your last visit? Include any pap smears or colon screening.  No

## 2022-08-26 ENCOUNTER — PATIENT OUTREACH (OUTPATIENT)
Dept: CASE MANAGEMENT | Age: 67
End: 2022-08-26

## 2022-08-26 NOTE — PROGRESS NOTES
Care Transitions Outreach Attempt    Call within 2 business days of discharge: Yes   Attempted to reach patient for transitions of care follow up. Unable to reach patient. Patient: Neli Lawrence Patient : 1955 MRN: [id number]    Last Discharge  Street       Date Complaint Diagnosis Description Type Department Provider    22 Diarrhea Nausea vomiting and diarrhea . .. ED to Hosp-Admission (Discharged) (ADMIT) Tarm Daniel MD; Ike Flatness. .. Was this an external facility discharge?  No     Noted following upcoming appointments from discharge chart review:   [facility] follow up appointment(s):   Future Appointments   Date Time Provider Chalino Alaniz   2022 12:30 PM Ernie Hill MD [facility] MAIN BS AMB   3/27/2023  9:20 AM Mariola Romero MD [facility] BS AMB

## 2022-08-27 PROBLEM — E11.649 UNCONTROLLED TYPE 2 DIABETES MELLITUS WITH HYPOGLYCEMIA WITHOUT COMA (HCC): Status: ACTIVE | Noted: 2018-02-18

## 2022-08-27 PROBLEM — Z91.199 HISTORY OF NONCOMPLIANCE WITH MEDICAL TREATMENT: Status: ACTIVE | Noted: 2022-08-27

## 2022-08-27 NOTE — PROGRESS NOTES
[address] [facility]  [place] [address]  [address] [facility] [address]  Phone:  [phone]  Fax: [phone]       Chief Complaint   Patient presents with    Hospital Follow Up     Patient here for hospital follow up from 8/13/22 with diagnosis of COVID. Diabetes     Patient has concerns about elevated blood sugars. .      SUBJECTIVE:    Florinda Garcia is a 74 y.o. male The patient comes in today having been hospitalized at [facility] on 08/13. He states that while at work he became very poor balanced. Apparently it was non-vertiginous. He went to the emergency room and he then says that he was evaluated and had a COVID infection and his O2 sats were low, therefore they kept him. He made no mention to anything else. When I reviewed his chart however, the reason that he most likely had a poor balance was because his blood sugar was in the 600 range and he was dehydrated. He told the physician that he stopped taking his insulin for unknown reasons. He was placed on an insulin drip and blood sugars improved significantly and he was discharged on insulin. He made no mention of this to me. I emphasize this because the patient's follow up is atrocious, which is the reason he has not been and will never be well controlled until he changes is behavior which I have discussed with him repetitively over years. His diabetic regimen was to be basal insulin with Trulicity. Because he does not come to the office I have no idea what he is taking, how much insulin he is taking, if he is taking it at all and if he is checking blood sugars which literally he never does. In any event, he states that he feels much better now and is ready to return to work. He tells me that he is now taking now 50 units of Lantus only and I explained to him that this itself will not get him controlled because it is never gotten him controlled by itself.   He really even does not understand or this does not appreciate the new onset of diabetes to the point where he can follow directions on a consistent basis. Once he loses contact with the office he literally forgets what he is supposed to do from a diabetic perspective. Current Outpatient Medications   Medication Sig Dispense Refill    insulin detemir U-100 (Levemir FlexTouch U-100 Insuln) 100 unit/mL (3 mL) inpn 15 Units by SubCUTAneous route two (2) times a day for 30 days. In the am and at bedtime  Indications: type 2 diabetes mellitus 9 mL 3    atorvastatin (LIPITOR) 20 mg tablet TAKE ONE TABLET BY MOUTH DAILY 90 Tablet 3    amLODIPine (NORVASC) 10 mg tablet TAKE 1 TABLET BY MOUTH EVERY DAY 90 Tablet 3    metFORMIN (GLUCOPHAGE) 1,000 mg tablet TAKE 1 TABLET BY MOUTH IN THE EVENING BEFORE DINNER 90 Tablet 2    Insulin Needles, Disposable, 31 gauge x 1/4\" ndle USE WITH INSULIN PENS THREE TIMES PER  Pen Needle 11    glucose blood VI test strips (ASCENSIA AUTODISC VI, ONE TOUCH ULTRA TEST VI) strip 3 times daily before meals 791 Strip 3    Trulicity 1.5 CP/2.6 mL sub-q pen INJECT 0.5 ML UNDER SKIN EVERY SEVEN (7) DAYS. 4 Each 11    aspirin (ASPIRIN) 325 mg tablet Take 1 Tablet by mouth daily.  (Patient not taking: No sig reported) 30 Tablet 4     Past Medical History:   Diagnosis Date    Abscess 12/2020    left side of abdomen    Diabetes ([address]     Hyperlipidemia     Hypertension     ICD (implantable cardioverter-defibrillator) in place     NSVT (nonsustained ventricular tachycardia) ([address] 11/21/2018    EPS 11/21/2018 VT/VF     Right groin pain 12/7/2020    Stroke ([address]     Syncope     Valvular heart disease      Past Surgical History:   Procedure Laterality Date    COLONOSCOPY  1/2/2015         HX HEART CATHETERIZATION      HX HEENT      l cararact removal    HX IMPLANTABLE CARDIOVERTER DEFIBRILLATOR       No Known Allergies      REVIEW OF SYSTEMS:  General: negative for - chills or fever  ENT: negative for - headaches, nasal congestion or tinnitus  Respiratory: negative for - cough, hemoptysis, shortness of breath or wheezing  Cardiovascular : negative for - chest pain, edema, palpitations or shortness of breath  Gastrointestinal: negative for - abdominal pain, blood in stools, heartburn or nausea/vomiting  Genito-Urinary: no dysuria, trouble voiding, or hematuria  Musculoskeletal: negative for - gait disturbance, joint pain, joint stiffness or joint swelling  Neurological: no TIA or stroke symptoms  Hematologic: no bruises, no bleeding, no swollen glands  Integument: no lumps, mole changes, nail changes or rash  Endocrine: no malaise/lethargy or unexpected weight changes      Social History     Socioeconomic History    Marital status: SINGLE    Number of children: 0   Occupational History    Occupation: [employer]   Tobacco Use    Smoking status: Never    Smokeless tobacco: Never   Vaping Use    Vaping Use: Never used   Substance and Sexual Activity    Alcohol use: No    Drug use: No    Sexual activity: Yes     Partners: Female     Birth control/protection: None     Family History   Problem Relation Age of Onset    Diabetes Mother     Hypertension Mother     Stroke Mother        OBJECTIVE:    Visit Vitals  BP (!) 159/90 (BP 1 Location: Left upper arm, BP Patient Position: Sitting, BP Cuff Size: Adult)   Pulse 78   Temp 98.7 °F (37.1 °C) (Oral)   Resp 18   Ht 5' 7\" (1.702 m)   Wt 155 lb 9.6 oz (70.6 kg)   SpO2 99%   BMI 24.37 kg/m²     CONSTITUTIONAL: well , well nourished, appears age appropriate  EYES: perrla, eom intact  ENMT:moist mucous membranes, pharynx clear  NECK: supple. Thyroid normal  RESPIRATORY: Chest: clear to ascultation and percussion   CARDIOVASCULAR: Heart: regular rate and rhythm  GASTROINTESTINAL: Abdomen: soft, bowel sounds active  HEMATOLOGIC: no pathological lymph nodes palpated  MUSCULOSKELETAL: Extremities: no edema, pulse 1+   INTEGUMENT: No unusual rashes or suspicious skin lesions noted.  Nails appear normal.  NEUROLOGIC: non-focal exam   MENTAL STATUS: alert and oriented, appropriate affect      ASSESSMENT:  1. Uncontrolled type 2 diabetes mellitus with hypoglycemia without coma ([address]    2. 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 Ataxia    4. Primary hypertension    5. Dyslipidemia    6. Cerebrovascular accident (CVA) due to thrombosis of cerebral artery ([address]    7. History of noncompliance with medical treatment        PLAN:  1. The patient's primary problem is uncontrolled diabetes. This is exclusively related to noncompliance. I suggest he continue the Lantus or resume is Trulicity. He was previously taking Lantus twice a day once in the morning and once in the evening, but this is because of the times that he ate during the day. A lot of the diabetic recommendations that I have given him are based on his lifestyle and work situations. He states he tried to have Trulicity in. I emphasized the importance of medical follow up also. 2. His COVID status is stable in that it is not an issue at the present time. 3. As far as the \"intensity of the occurrence\" it was more related to dehydration than a neurological phenomenon. 4. He did have questionable history of a CVA which is not radiographically confirmed. The otolaryngologist was following him because they thought his findings were quite compatible with acoustic neuroma which is the reason they wanted to do the MRI which was never done. The patient never followed up either. 5. He will continue his statin as prescribed. 6. He is also on antihypertensive medications which he was not taking when he went to the emergency room. 7. The most important thing I emphasized to the patient is follow up. This he had never done on a consistent basis and as a result he has poorly controlled diabetes and is gradually developing microvascular complications. 8. He will return to work on Monday.              Follow-up and Dispositions    Return in about 4 weeks (around 9/22/2022).            Claritza Trevizo MD

## 2022-08-28 NOTE — PROGRESS NOTES
comes in stating that on 07/13 he became dizzy, but he was not vertiginous. This was associated with mild nausea. He went to the emergency room where he was COVID positive. His O2 sats were a bit low, so he was kept for about three or four days and everything pretty much returned back to normal.  While in the hospital as usual multiple physicians are adjusting his insulin dose. Unfortunately the history of how he has reached this point is not available so it is not taken into account as what needs to be done. He is taking Metformin only and that is inadequate. He did best when he was on metformin and the GLP-1 agonist, which was Trulicity. He stopped taking Trulicity for unknown reasons, but realizes that was a mistake. He has a past history of primary hypertension, as well as dyslipidemia. One of his biggest problem is that he does not follow up. This is his first visit to me that he has had since he had his hospitalization about three months ago. He was hospitalized for gait disturbance and it was thought by otolaryngologist that there could be a HEENT component to this, but in order to confirm this a repeat MRI of his brain needed to be done and this never occurred and ne never followed up the [facility]. physicians. His lack of follow up is not uncommon. Interestingly, while he was in the hospital for four days he could have had an MRI at that particular time, but it was not done because of lack of continuity of care. He feels better now and has returned to work.

## 2022-09-02 ENCOUNTER — PATIENT OUTREACH (OUTPATIENT)
Dept: CASE MANAGEMENT | Age: 67
End: 2022-09-02

## 2022-09-02 NOTE — PROGRESS NOTES
Care Transitions Outreach Attempt    Call within 2 business days of discharge: Yes   Attempted to reach patient for transitions of care follow up. Unable to reach patient. Patient: Mary Jane Barnett Patient : 1955 MRN: [id number]    Last Discharge [address]       Date Complaint Diagnosis Description Type Department Provider    22 Diarrhea Nausea vomiting and diarrhea . .. ED to Hosp-Admission (Discharged) (ADMIT) Arlin Abdul MD; Bridget Espinosa. .. Was this an external facility discharge? No     Noted following upcoming appointments from discharge chart review:   [facility] follow up appointment(s):   Future Appointments   Date Time Provider Chalino Alaniz   2022 12:30 PM Erica Dsouza MD [facility] MAIN BS AMB   3/27/2023  9:20 AM MD JANUSZ Vasques BS AMB         Goals Addressed                   This Visit's Progress     Prevent complications post hospitalization.    Pt.will attend all recommended follow ups with pcp and specialist.  -PCP, Enma Almanza MD -pt. will schedule  -Nephrology, Mono Trevino MD -pt. will schedule  -CTN provided pt. information [facility], explain briefly type of service; contact information provided. CTN will follow up with pt.attendance to appts. or assist with rescheduling as needed, in 5-7 days. -MC    (Teach Back) Pt.reports symptoms mild congestion, cough and weakness. Pt.will report to pcp worsening sx, fatigue, increased fever, congestion, (return to ED) for increase SOB or difficulty breathing, pulse ox resting < 92%. Pt.will continue with self isolation as directed 10 days, wear a mask around others for 5 additional days; if sx not improved or resolved, pt.will discuss continue isolation with PCP for further instructions.  CTN will follow up with pt.in 5-7 days review any new symptoms or worsening symptoms and pulse ox, .-MC     Pt.will check FBS prior to administering insulin, reviewed Hypoglycemia and hyperglycemia s/s and treatment; pt.will contact pcp with consistent low fbs < 70 or high readings > 250. CTN provided contact information to call with questions or concerns, follow up with pt.in 5-7 days. -[address]    8/26 UTR.-MARTI    9/2 UTR.-MARTI

## 2022-09-14 ENCOUNTER — PATIENT OUTREACH (OUTPATIENT)
Dept: CASE MANAGEMENT | Age: 67
End: 2022-09-14

## 2022-09-14 NOTE — PROGRESS NOTES
Care Transitions Outreach Attempt    Call within 2 business days of discharge: Yes   Attempted to reach patient for transitions of care follow up. Unable to reach patient. Patient: Belinda  Patient : 1955 MRN: [id number]    Last Discharge [address]       Date Complaint Diagnosis Description Type Department Provider    22 Diarrhea Nausea vomiting and diarrhea . .. ED to Hosp-Admission (Discharged) (ADMIT) Ta Leyva MD; Milton Monzon. .. Was this an external facility discharge? No       Noted following upcoming appointments from discharge chart review:   [facility] follow up appointment(s):   Future Appointments   Date Time Provider Chalino Alaniz   2022 12:30 PM Manuela Rice MD [facility] MAIN BS AMB   3/27/2023  9:20 AM [facility]      Goals Addressed                   This Visit's Progress     Prevent complications post hospitalization.    Pt.will attend all recommended follow ups with pcp and specialist.  -PCP, Cleo Padilla MD -pt. will schedule  -Nephrology, Magalis Okeefe MD -pt. will schedule  -CTN provided pt. information [facility], explain briefly type of service; contact information provided. CTN will follow up with pt.attendance to appts. or assist with rescheduling as needed, in 5-7 days. -MC    (Teach Back) Pt.reports symptoms mild congestion, cough and weakness. Pt.will report to pcp worsening sx, fatigue, increased fever, congestion, (return to ED) for increase SOB or difficulty breathing, pulse ox resting < 92%. Pt.will continue with self isolation as directed 10 days, wear a mask around others for 5 additional days; if sx not improved or resolved, pt.will discuss continue isolation with PCP for further instructions.  CTN will follow up with pt.in 5-7 days review any new symptoms or worsening symptoms and pulse ox, .-MC     Pt.will check FBS prior to administering insulin, reviewed Hypoglycemia and hyperglycemia s/s and treatment; pt.will contact pcp with consistent low fbs < 70 or high readings > 250. CTN provided contact information to call with questions or concerns, follow up with pt.in 5-7 days. -[facility]    8/26 UTR.-    9/2 UTR.-    9/14 UTR.-

## 2022-09-20 ENCOUNTER — PATIENT OUTREACH (OUTPATIENT)
Dept: CASE MANAGEMENT | Age: 67
End: 2022-09-20

## 2022-09-20 NOTE — PROGRESS NOTES
Patient has graduated from the Transitions of Care Coordination  program on 9/20/22. Patient/family has the ability to self-manage at this time Care management goals have been completed. Patient was not referred to the [facility] team for further management. Patient has Care Transition Nurse's contact information for any further questions, concerns, or needs.   Patients upcoming visits:    Future Appointments   Date Time Provider Chalino Alaniz   9/26/2022 12:30 PM Jeanna Yepez MD [facility] MAIN BS AMB   3/27/2023  9:20 AM Susu Romero MD [facility] BS AMB

## 2022-09-26 ENCOUNTER — OFFICE VISIT (OUTPATIENT)
Dept: INTERNAL MEDICINE CLINIC | Age: 67
End: 2022-09-26
Payer: COMMERCIAL

## 2022-09-26 VITALS
TEMPERATURE: 98.3 F | SYSTOLIC BLOOD PRESSURE: 147 MMHG | HEIGHT: 67 IN | DIASTOLIC BLOOD PRESSURE: 80 MMHG | HEART RATE: 79 BPM | BODY MASS INDEX: 25.71 KG/M2 | OXYGEN SATURATION: 97 % | RESPIRATION RATE: 16 BRPM | WEIGHT: 163.8 LBS

## 2022-09-26 DIAGNOSIS — E78.5 DYSLIPIDEMIA: ICD-10-CM

## 2022-09-26 DIAGNOSIS — I10 PRIMARY HYPERTENSION: Chronic | ICD-10-CM

## 2022-09-26 DIAGNOSIS — I63.89 BRAINSTEM INFARCT, ACUTE (HCC): ICD-10-CM

## 2022-09-26 DIAGNOSIS — E11.649 UNCONTROLLED TYPE 2 DIABETES MELLITUS WITH HYPOGLYCEMIA WITHOUT COMA (HCC): Primary | ICD-10-CM

## 2022-09-26 PROCEDURE — 99214 OFFICE O/P EST MOD 30 MIN: CPT | Performed by: INTERNAL MEDICINE

## 2022-09-26 PROCEDURE — 1123F ACP DISCUSS/DSCN MKR DOCD: CPT | Performed by: INTERNAL MEDICINE

## 2022-09-26 PROCEDURE — 3046F HEMOGLOBIN A1C LEVEL >9.0%: CPT | Performed by: INTERNAL MEDICINE

## 2022-09-26 NOTE — PROGRESS NOTES
Nayla Bradford is a 74 y.o. male  Chief Complaint   Patient presents with    Follow-up    Hypertension    Diabetes     Patient here for follow up high blood pressure and diabetes. 1. Have you been to the ER, urgent care clinic since your last visit? Hospitalized since your last visit? No    2. Have you seen or consulted any other health care providers outside of the [address] [place] [address] since your last visit? Include any pap smears or colon screening.  No

## 2022-10-17 NOTE — PROGRESS NOTES
[address] [facility]  [place] [address]  [address] [address] [facility] [address]  Phone:  [phone]  Fax: [phone]       Chief Complaint   Patient presents with    Follow-up    Hypertension    Diabetes     Patient here for follow up high blood pressure and diabetes. .      SUBJECTIVE:    Estrellita Pack is a 69 y.o. male comes in for return visit after an absence. His biggest problem is the poorly controlled diabetes. He has not been taking his Trulicity and for a period of time has stopped taking his insulin because of financial reasons. This happens frequently. We talked at length about the importance of compliance with his entire diabetic program, not just one. He had not any problems with any new CNS symptoms. He has a past history of primary hypertension and dyslipidemia. He has not formally gone back to work, but plans to do so in the very near future. Current Outpatient Medications   Medication Sig Dispense Refill    insulin detemir U-100 (LEVEMIR FLEXTOUCH) 100 unit/mL (3 mL) inpn Inject 40 units qam and 50 units qhs 10 Pen 11    Trulicity 1.5 AS/5.2 mL sub-q pen INJECT 0.5 ML UNDER SKIN EVERY SEVEN (7) DAYS. 4 Each 11    atorvastatin (LIPITOR) 20 mg tablet TAKE ONE TABLET BY MOUTH DAILY 90 Tablet 3    amLODIPine (NORVASC) 10 mg tablet TAKE 1 TABLET BY MOUTH EVERY DAY 90 Tablet 3    metFORMIN (GLUCOPHAGE) 1,000 mg tablet TAKE 1 TABLET BY MOUTH IN THE EVENING BEFORE DINNER 90 Tablet 2    Insulin Needles, Disposable, 31 gauge x 1/4\" ndle USE WITH INSULIN PENS THREE TIMES PER  Pen Needle 11    glucose blood VI test strips (ASCENSIA AUTODISC VI, ONE TOUCH ULTRA TEST VI) strip 3 times daily before meals 300 Strip 3    aspirin (ASPIRIN) 325 mg tablet Take 1 Tablet by mouth daily.  (Patient not taking: Reported on 9/26/2022) 30 Tablet 4     Past Medical History:   Diagnosis Date    Abscess 12/2020    left side of abdomen    Diabetes ([address]     Hyperlipidemia Hypertension     ICD (implantable cardioverter-defibrillator) in place     NSVT (nonsustained ventricular tachycardia) ([facility]) 11/21/2018    EPS 11/21/2018 VT/VF     Right groin pain 12/7/2020    Stroke [facility])     Syncope     Valvular heart disease      Past Surgical History:   Procedure Laterality Date    COLONOSCOPY  1/2/2015         HX HEART CATHETERIZATION      HX HEENT      l cararact removal    HX IMPLANTABLE CARDIOVERTER DEFIBRILLATOR       No Known Allergies      REVIEW OF SYSTEMS:  General: negative for - chills or fever  ENT: negative for - headaches, nasal congestion or tinnitus  Respiratory: negative for - cough, hemoptysis, shortness of breath or wheezing  Cardiovascular : negative for - chest pain, edema, palpitations or shortness of breath  Gastrointestinal: negative for - abdominal pain, blood in stools, heartburn or nausea/vomiting  Genito-Urinary: no dysuria, trouble voiding, or hematuria  Musculoskeletal: negative for - gait disturbance, joint pain, joint stiffness or joint swelling  Neurological: no TIA or stroke symptoms  Hematologic: no bruises, no bleeding, no swollen glands  Integument: no lumps, mole changes, nail changes or rash  Endocrine: no malaise/lethargy or unexpected weight changes      Social History     Socioeconomic History    Marital status: SINGLE    Number of children: 0   Occupational History    Occupation: [employer]   Tobacco Use    Smoking status: Never    Smokeless tobacco: Never   Vaping Use    Vaping Use: Never used   Substance and Sexual Activity    Alcohol use: No    Drug use: No    Sexual activity: Yes     Partners: Female     Birth control/protection: None     Family History   Problem Relation Age of Onset    Diabetes Mother     Hypertension Mother     Stroke Mother        OBJECTIVE:    Visit Vitals  BP (!) 147/80   Pulse 79   Temp 98.3 °F (36.8 °C) (Oral)   Resp 16   Ht 5' 6.5\" (1.689 m)   Wt 163 lb 12.8 oz (74.3 kg)   SpO2 97%   BMI 26.04 kg/m²     CONSTITUTIONAL: well , well nourished, appears age appropriate  EYES: perrla, eom intact  ENMT:moist mucous membranes, pharynx clear  NECK: supple. Thyroid normal  RESPIRATORY: Chest: clear to ascultation and percussion   CARDIOVASCULAR: Heart: regular rate and rhythm  GASTROINTESTINAL: Abdomen: soft, bowel sounds active  HEMATOLOGIC: no pathological lymph nodes palpated  MUSCULOSKELETAL: Extremities: no edema, pulse 1+   INTEGUMENT: No unusual rashes or suspicious skin lesions noted. Nails appear normal.  NEUROLOGIC: non-focal exam   MENTAL STATUS: alert and oriented, appropriate affect      ASSESSMENT:  1. Uncontrolled type 2 diabetes mellitus with hypoglycemia without coma ([address]    2. Primary hypertension    3. Dyslipidemia    4. Brainstem infarct, acute ([address]        PLAN:  1. The patient's diabetes is poorly controlled. The patient will take his insulin 40 units in the morning and 50 units in the evening along with Trulicity. I reminded him the importance of attempting to eat at the same time everyday and minimize his consumption of processed carbohydrates. 2. His blood pressure is slightly elevated, no adjustments will be made because he has not been compliant with his medication. 3. He remains on statin. 4. In reality it is questionable whether or not he has a CNS infarct. MRI did not reveal anything, but the neurologist suggests that often times brain stem infarcts may not be seen on the MRI. There is also question by an otolaryngologist whether or not he had an occlusive neuroma, but a follow up MRI was never done. Fortunately he has not had any further problems with vertigo or gaze. Follow-up and Dispositions    Return in about 6 weeks (around 11/7/2022).            Félix Baker MD

## 2022-11-04 RX ORDER — METFORMIN HYDROCHLORIDE 1000 MG/1
TABLET ORAL
Qty: 90 TABLET | Refills: 2 | Status: SHIPPED | OUTPATIENT
Start: 2022-11-04

## 2022-12-28 RX ORDER — EXENATIDE 2 MG/.85ML
2 INJECTION, SUSPENSION, EXTENDED RELEASE SUBCUTANEOUS
Qty: 1 EACH | Refills: 11 | Status: SHIPPED | OUTPATIENT
Start: 2022-12-28

## 2023-05-07 DIAGNOSIS — E78.5 HYPERLIPIDEMIA, UNSPECIFIED: ICD-10-CM

## 2023-05-09 RX ORDER — ATORVASTATIN CALCIUM 20 MG/1
20 TABLET, FILM COATED ORAL DAILY
Qty: 30 TABLET | Refills: 11 | Status: SHIPPED | OUTPATIENT
Start: 2023-05-09

## 2023-06-22 RX ORDER — AMLODIPINE BESYLATE 10 MG/1
TABLET ORAL
Qty: 90 TABLET | Refills: 3 | Status: SHIPPED | OUTPATIENT
Start: 2023-06-22

## 2023-07-12 ENCOUNTER — HOSPITAL ENCOUNTER (EMERGENCY)
Facility: HOSPITAL | Age: 68
Discharge: HOME | End: 2023-07-12
Attending: STUDENT IN AN ORGANIZED HEALTH CARE EDUCATION/TRAINING PROGRAM
Payer: COMMERCIAL

## 2023-07-12 VITALS
BODY MASS INDEX: 25.5 KG/M2 | DIASTOLIC BLOOD PRESSURE: 95 MMHG | HEART RATE: 66 BPM | OXYGEN SATURATION: 97 % | SYSTOLIC BLOOD PRESSURE: 156 MMHG | HEIGHT: 67 IN | WEIGHT: 162.48 LBS | RESPIRATION RATE: 12 BRPM | TEMPERATURE: 97.9 F

## 2023-07-12 DIAGNOSIS — R73.9 HYPERGLYCEMIA: Primary | ICD-10-CM

## 2023-07-12 LAB
ALBUMIN SERPL-MCNC: 3.4 G/DL (ref 3.5–5)
ALBUMIN/GLOB SERPL: 0.8 (ref 1.1–2.2)
ALP SERPL-CCNC: 104 U/L (ref 45–117)
ALT SERPL-CCNC: 35 U/L (ref 12–78)
ANION GAP SERPL CALC-SCNC: 8 MMOL/L (ref 5–15)
AST SERPL-CCNC: 23 U/L (ref 15–37)
BASOPHILS # BLD: 0.1 K/UL (ref 0–0.1)
BASOPHILS NFR BLD: 1 % (ref 0–1)
BILIRUB SERPL-MCNC: 0.3 MG/DL (ref 0.2–1)
BUN SERPL-MCNC: 43 MG/DL (ref 6–20)
BUN/CREAT SERPL: 26 (ref 12–20)
CALCIUM SERPL-MCNC: 9.6 MG/DL (ref 8.5–10.1)
CHLORIDE SERPL-SCNC: 101 MMOL/L (ref 97–108)
CHP ED QC CHECK: 274
CO2 SERPL-SCNC: 25 MMOL/L (ref 21–32)
COMMENT:: NORMAL
CREAT SERPL-MCNC: 1.64 MG/DL (ref 0.7–1.3)
DIFFERENTIAL METHOD BLD: ABNORMAL
EOSINOPHIL # BLD: 0.1 K/UL (ref 0–0.4)
EOSINOPHIL NFR BLD: 1 % (ref 0–7)
ERYTHROCYTE [DISTWIDTH] IN BLOOD BY AUTOMATED COUNT: 13.2 % (ref 11.5–14.5)
GLOBULIN SER CALC-MCNC: 4.2 G/DL (ref 2–4)
GLUCOSE BLD STRIP.AUTO-MCNC: 222 MG/DL (ref 65–117)
GLUCOSE BLD STRIP.AUTO-MCNC: 276 MG/DL (ref 65–117)
GLUCOSE BLD STRIP.AUTO-MCNC: 432 MG/DL (ref 65–117)
GLUCOSE SERPL-MCNC: 426 MG/DL (ref 65–100)
HCT VFR BLD AUTO: 39.3 % (ref 36.6–50.3)
HGB BLD-MCNC: 12.5 G/DL (ref 12.1–17)
IMM GRANULOCYTES # BLD AUTO: 0 K/UL (ref 0–0.04)
IMM GRANULOCYTES NFR BLD AUTO: 0 % (ref 0–0.5)
LYMPHOCYTES # BLD: 0.5 K/UL (ref 0.8–3.5)
LYMPHOCYTES NFR BLD: 9 % (ref 12–49)
MCH RBC QN AUTO: 26.7 PG (ref 26–34)
MCHC RBC AUTO-ENTMCNC: 31.8 G/DL (ref 30–36.5)
MCV RBC AUTO: 84 FL (ref 80–99)
MONOCYTES # BLD: 0.3 K/UL (ref 0–1)
MONOCYTES NFR BLD: 5 % (ref 5–13)
NEUTS SEG # BLD: 5.1 K/UL (ref 1.8–8)
NEUTS SEG NFR BLD: 84 % (ref 32–75)
NRBC # BLD: 0 K/UL (ref 0–0.01)
NRBC BLD-RTO: 0 PER 100 WBC
PLATELET # BLD AUTO: 170 K/UL (ref 150–400)
PMV BLD AUTO: 10.2 FL (ref 8.9–12.9)
POTASSIUM SERPL-SCNC: 4.7 MMOL/L (ref 3.5–5.1)
PROT SERPL-MCNC: 7.6 G/DL (ref 6.4–8.2)
RBC # BLD AUTO: 4.68 M/UL (ref 4.1–5.7)
RBC MORPH BLD: ABNORMAL
SERVICE CMNT-IMP: ABNORMAL
SODIUM SERPL-SCNC: 134 MMOL/L (ref 136–145)
SPECIMEN HOLD: NORMAL
WBC # BLD AUTO: 6.1 K/UL (ref 4.1–11.1)

## 2023-07-12 PROCEDURE — 80053 COMPREHEN METABOLIC PANEL: CPT

## 2023-07-12 PROCEDURE — 6370000000 HC RX 637 (ALT 250 FOR IP): Performed by: STUDENT IN AN ORGANIZED HEALTH CARE EDUCATION/TRAINING PROGRAM

## 2023-07-12 PROCEDURE — 96360 HYDRATION IV INFUSION INIT: CPT

## 2023-07-12 PROCEDURE — 85025 COMPLETE CBC W/AUTO DIFF WBC: CPT

## 2023-07-12 PROCEDURE — 82962 GLUCOSE BLOOD TEST: CPT

## 2023-07-12 PROCEDURE — 2580000003 HC RX 258: Performed by: STUDENT IN AN ORGANIZED HEALTH CARE EDUCATION/TRAINING PROGRAM

## 2023-07-12 PROCEDURE — 99284 EMERGENCY DEPT VISIT MOD MDM: CPT

## 2023-07-12 PROCEDURE — 36415 COLL VENOUS BLD VENIPUNCTURE: CPT

## 2023-07-12 RX ORDER — 0.9 % SODIUM CHLORIDE 0.9 %
1000 INTRAVENOUS SOLUTION INTRAVENOUS ONCE
Status: COMPLETED | OUTPATIENT
Start: 2023-07-12 | End: 2023-07-12

## 2023-07-12 RX ADMIN — Medication 12 UNITS: at 17:50

## 2023-07-12 RX ADMIN — SODIUM CHLORIDE 1000 ML: 9 INJECTION, SOLUTION INTRAVENOUS at 16:02

## 2023-07-12 ASSESSMENT — PAIN - FUNCTIONAL ASSESSMENT: PAIN_FUNCTIONAL_ASSESSMENT: NONE - DENIES PAIN

## 2023-07-12 NOTE — ED TRIAGE NOTES
Patient arrives via EMS from work with c/o hyperglycemia. Patient has not taken insulin since Sunday but did take Metformin and Bydureon bcisde. Per EMS .

## 2023-07-12 NOTE — ED PROVIDER NOTES
[facility] EMERGENCY DEP  EMERGENCY DEPARTMENT ENCOUNTER      Pt Name: Mtich Zuluaga  MRN: [id number]  [address] [place] 1955  Date of evaluation: 7/12/2023  Provider: Bobby Muller MD    CHIEF COMPLAINT       Chief Complaint   Patient presents with    Hyperglycemia         HISTORY OF PRESENT ILLNESS   (Location/Symptom, Timing/Onset, Context/Setting, Quality, Duration, Modifying Factors, Severity)  Note limiting factors. Patient is a 78-year-old male present emergency department after he got off Dr. Dutch Hdz p.o. to work. Patient works at the [employer] as a cook and he felt like perhaps he was getting overheated when inside to work started to have nausea and vomiting. Patient has not chest pain or shortness of breath dizziness was diaphoretic. Patient remembers insulin on Monday and has not been taking it on arrival here patient was hypoglycemic blood sugar greater than 400. Patient [place] any increased thirst or increased urination. Review of External Medical Records:     Nursing Notes were reviewed. REVIEW OF SYSTEMS    (2-9 systems for level 4, 10 or more for level 5)     Review of Systems    Except as noted above the remainder of the review of systems was reviewed and negative.        PAST MEDICAL HISTORY     Past Medical History:   Diagnosis Date    Abscess 12/2020    left side of abdomen    Diabetes ([address])     Hyperlipidemia     Hypertension     ICD (implantable cardioverter-defibrillator) in place     NSVT (nonsustained ventricular tachycardia) 11/21/2018    EPS 11/21/2018 VT/VF     Right groin pain 12/7/2020    Stroke [facility])     Syncope     Valvular heart disease          SURGICAL HISTORY       Past Surgical History:   Procedure Laterality Date    CARDIAC CATHETERIZATION      CARDIAC DEFIBRILLATOR PLACEMENT      COLONOSCOPY  1/2/2015         HEENT      l cararact removal    US DRAIN SOFT TISSUE ABSCESS  12/22/2020    US DRAIN SOFT TISSUE ABSCESS 12/22/2020 [facility] RAD US         CURRENT

## 2023-07-12 NOTE — ED NOTES
Verbal shift change report given to Anthony WOOD (oncoming nurse) by Lupe Whittington (offgoing nurse). Report included the following information Nurse Handoff Report, ED SBAR, MAR, and Recent Results.         Dima Corey RN  07/12/23 5021

## 2023-07-12 NOTE — ED NOTES
Assumed pt care. AOX4. Respiration even and unlabored. RR14 O2 saturation 96% RA. Sinus rhythm in the monitor. Denied nausea or vomiting since he has been in ER. Last BM today. VS updated. NSS started.      Gilmer Funes RN  07/12/23 2988

## 2023-07-12 NOTE — ED NOTES
BS  down to 222. Discharge information reviewed. Pt left ambulating in no distress.       Nannette Sinclair RN  07/12/23 1843

## 2023-10-05 RX ORDER — INSULIN DETEMIR 100 [IU]/ML
INJECTION, SOLUTION SUBCUTANEOUS
Qty: 2 ADJUSTABLE DOSE PRE-FILLED PEN SYRINGE | Refills: 0 | Status: SHIPPED | OUTPATIENT
Start: 2023-10-05 | End: 2023-10-31

## 2023-10-31 RX ORDER — INSULIN DETEMIR 100 [IU]/ML
INJECTION, SOLUTION SUBCUTANEOUS
Qty: 9 ADJUSTABLE DOSE PRE-FILLED PEN SYRINGE | Refills: 0 | Status: SHIPPED | OUTPATIENT
Start: 2023-10-31 | End: 2023-11-12 | Stop reason: SDUPTHER

## 2023-11-07 ENCOUNTER — OFFICE VISIT (OUTPATIENT)
Facility: CLINIC | Age: 68
End: 2023-11-07
Payer: COMMERCIAL

## 2023-11-07 VITALS
WEIGHT: 168.9 LBS | HEIGHT: 67 IN | SYSTOLIC BLOOD PRESSURE: 148 MMHG | HEART RATE: 78 BPM | RESPIRATION RATE: 20 BRPM | DIASTOLIC BLOOD PRESSURE: 81 MMHG | BODY MASS INDEX: 26.51 KG/M2 | TEMPERATURE: 97.5 F | OXYGEN SATURATION: 93 %

## 2023-11-07 DIAGNOSIS — E13.319 RETINOPATHY DUE TO SECONDARY DIABETES MELLITUS (HCC): ICD-10-CM

## 2023-11-07 DIAGNOSIS — E78.5 DYSLIPIDEMIA: ICD-10-CM

## 2023-11-07 DIAGNOSIS — E11.42 DIABETIC POLYNEUROPATHY ASSOCIATED WITH TYPE 2 DIABETES MELLITUS (HCC): ICD-10-CM

## 2023-11-07 DIAGNOSIS — I63.89 BRAINSTEM INFARCT, ACUTE (HCC): ICD-10-CM

## 2023-11-07 DIAGNOSIS — E11.649 UNCONTROLLED TYPE 2 DIABETES MELLITUS WITH HYPOGLYCEMIA WITHOUT COMA (HCC): Primary | ICD-10-CM

## 2023-11-07 DIAGNOSIS — I49.8 BRUGADA SYNDROME: ICD-10-CM

## 2023-11-07 DIAGNOSIS — I10 PRIMARY HYPERTENSION: ICD-10-CM

## 2023-11-07 DIAGNOSIS — N18.31 CHRONIC RENAL FAILURE, STAGE 3A (HCC): ICD-10-CM

## 2023-11-07 DIAGNOSIS — Z00.00 PHYSICAL EXAM: ICD-10-CM

## 2023-11-07 DIAGNOSIS — I87.2 VENOUS INSUFFICIENCY: ICD-10-CM

## 2023-11-07 PROCEDURE — 3078F DIAST BP <80 MM HG: CPT | Performed by: INTERNAL MEDICINE

## 2023-11-07 PROCEDURE — 36415 COLL VENOUS BLD VENIPUNCTURE: CPT | Performed by: INTERNAL MEDICINE

## 2023-11-07 PROCEDURE — 3074F SYST BP LT 130 MM HG: CPT | Performed by: INTERNAL MEDICINE

## 2023-11-07 PROCEDURE — 3046F HEMOGLOBIN A1C LEVEL >9.0%: CPT | Performed by: INTERNAL MEDICINE

## 2023-11-07 PROCEDURE — 1123F ACP DISCUSS/DSCN MKR DOCD: CPT | Performed by: INTERNAL MEDICINE

## 2023-11-07 PROCEDURE — 99215 OFFICE O/P EST HI 40 MIN: CPT | Performed by: INTERNAL MEDICINE

## 2023-11-07 ASSESSMENT — PATIENT HEALTH QUESTIONNAIRE - PHQ9
SUM OF ALL RESPONSES TO PHQ9 QUESTIONS 1 & 2: 0
SUM OF ALL RESPONSES TO PHQ QUESTIONS 1-9: 0
SUM OF ALL RESPONSES TO PHQ QUESTIONS 1-9: 0
2. FEELING DOWN, DEPRESSED OR HOPELESS: 0
1. LITTLE INTEREST OR PLEASURE IN DOING THINGS: 0
SUM OF ALL RESPONSES TO PHQ QUESTIONS 1-9: 0
SUM OF ALL RESPONSES TO PHQ QUESTIONS 1-9: 0

## 2023-11-07 NOTE — PROGRESS NOTES
[facility]  [address] [facility] [address]  Phone:  [phone]  Fax: [phone]       Chief Complaint   Patient presents with    Diabetes    Cholesterol Problem    Hypertension   . SUBJECTIVE:    Samuel Pal is a 77 y.o. male  Dictation on: 11/07/2023 11:46 AM by: Taye Dennison [[id number]]          Current Outpatient Medications   Medication Sig Dispense Refill    LEVEMIR FLEXPEN 100 UNIT/ML injection pen INJECT 40 UNITS EVERY MORNING AND INJECT AND 50 UNITS SUBCUTANEOUSLY EVERY NIGHT AT BEDTIME 9 Adjustable Dose Pre-filled Pen Syringe 0    metFORMIN (GLUCOPHAGE) 1000 MG tablet TAKE 1 TABLET BY MOUTH IN THE EVENING BEFORE DINNER 90 tablet 2    amLODIPine (NORVASC) 10 MG tablet TAKE 1 TABLET BY MOUTH EVERY DAY 90 tablet 3    atorvastatin (LIPITOR) 20 MG tablet Take 1 tablet by mouth daily 30 tablet 11    aspirin 325 MG tablet Take 81 mg by mouth daily      Exenatide ER (BYDUREON BCISE) 2 MG/0.85ML injection Inject into the skin every 7 days       No current facility-administered medications for this visit.      Past Medical History:   Diagnosis Date    Abscess 12/2020    left side of abdomen    Chronic renal failure, stage 3a ([address]) 11/7/2023    Diabetes ([address])     Hyperlipidemia     Hypertension     ICD (implantable cardioverter-defibrillator) in place     NSVT (nonsustained ventricular tachycardia) ([address]) 11/21/2018    EPS 11/21/2018 VT/VF     Right groin pain 12/7/2020    Stroke [facility])     Syncope     Valvular heart disease      Past Surgical History:   Procedure Laterality Date    CARDIAC CATHETERIZATION      CARDIAC DEFIBRILLATOR PLACEMENT      COLONOSCOPY  1/2/2015         HEENT      l cararact removal    US DRAIN SOFT TISSUE ABSCESS  12/22/2020    US DRAIN SOFT TISSUE ABSCESS 12/22/2020 [facility] [place] [facility] US     No Known Allergies      REVIEW OF SYSTEMS:  General: negative for - chills or fever  ENT: negative for - headaches, nasal congestion or

## 2023-11-08 LAB
ALBUMIN SERPL-MCNC: 3.9 G/DL (ref 3.9–4.9)
ALBUMIN/GLOB SERPL: 1.5 {RATIO} (ref 1.2–2.2)
ALP SERPL-CCNC: 100 IU/L (ref 44–121)
ALT SERPL-CCNC: 25 IU/L (ref 0–44)
APO B SERPL-MCNC: 71 MG/DL
APPEARANCE UR: CLEAR
AST SERPL-CCNC: 29 IU/L (ref 0–40)
BACTERIA #/AREA URNS HPF: NORMAL /[HPF]
BASOPHILS # BLD AUTO: 0 X10E3/UL (ref 0–0.2)
BASOPHILS NFR BLD AUTO: 0 %
BILIRUB SERPL-MCNC: <0.2 MG/DL (ref 0–1.2)
BILIRUB UR QL STRIP: NEGATIVE
BUN SERPL-MCNC: 38 MG/DL (ref 8–27)
BUN/CREAT SERPL: 24 (ref 10–24)
CALCIUM SERPL-MCNC: 9 MG/DL (ref 8.6–10.2)
CASTS URNS QL MICRO: NORMAL /LPF
CHLORIDE SERPL-SCNC: 101 MMOL/L (ref 96–106)
CHOLEST SERPL-MCNC: 174 MG/DL (ref 100–199)
CO2 SERPL-SCNC: 24 MMOL/L (ref 20–29)
COLOR UR: YELLOW
CREAT SERPL-MCNC: 1.6 MG/DL (ref 0.76–1.27)
CRP SERPL HS-MCNC: 2.79 MG/L (ref 0–3)
EGFRCR SERPLBLD CKD-EPI 2021: 46 ML/MIN/1.73
EOSINOPHIL # BLD AUTO: 0.2 X10E3/UL (ref 0–0.4)
EOSINOPHIL NFR BLD AUTO: 4 %
EPI CELLS #/AREA URNS HPF: NORMAL /HPF (ref 0–10)
ERYTHROCYTE [DISTWIDTH] IN BLOOD BY AUTOMATED COUNT: 14.1 % (ref 11.6–15.4)
GLOBULIN SER CALC-MCNC: 2.6 G/DL (ref 1.5–4.5)
GLUCOSE SERPL-MCNC: 433 MG/DL (ref 70–99)
GLUCOSE UR QL STRIP: ABNORMAL
HBA1C MFR BLD: 12.6 % (ref 4.8–5.6)
HCT VFR BLD AUTO: 34.4 % (ref 37.5–51)
HDLC SERPL-MCNC: 69 MG/DL
HGB BLD-MCNC: 10.8 G/DL (ref 13–17.7)
HGB UR QL STRIP: NEGATIVE
IMM GRANULOCYTES # BLD AUTO: 0 X10E3/UL (ref 0–0.1)
IMM GRANULOCYTES NFR BLD AUTO: 0 %
KETONES UR QL STRIP: NEGATIVE
LDLC SERPL CALC-MCNC: 85 MG/DL (ref 0–99)
LEUKOCYTE ESTERASE UR QL STRIP: NEGATIVE
LYMPHOCYTES # BLD AUTO: 0.7 X10E3/UL (ref 0.7–3.1)
LYMPHOCYTES NFR BLD AUTO: 14 %
MCH RBC QN AUTO: 26.7 PG (ref 26.6–33)
MCHC RBC AUTO-ENTMCNC: 31.4 G/DL (ref 31.5–35.7)
MCV RBC AUTO: 85 FL (ref 79–97)
MICRO URNS: ABNORMAL
MONOCYTES # BLD AUTO: 0.4 X10E3/UL (ref 0.1–0.9)
MONOCYTES NFR BLD AUTO: 7 %
NEUTROPHILS # BLD AUTO: 3.9 X10E3/UL (ref 1.4–7)
NEUTROPHILS NFR BLD AUTO: 75 %
NITRITE UR QL STRIP: NEGATIVE
PH UR STRIP: 5.5 [PH] (ref 5–7.5)
PLATELET # BLD AUTO: 185 X10E3/UL (ref 150–450)
POTASSIUM SERPL-SCNC: 4.6 MMOL/L (ref 3.5–5.2)
PROT SERPL-MCNC: 6.5 G/DL (ref 6–8.5)
PROT UR QL STRIP: ABNORMAL
RBC # BLD AUTO: 4.04 X10E6/UL (ref 4.14–5.8)
RBC #/AREA URNS HPF: NORMAL /HPF (ref 0–2)
SODIUM SERPL-SCNC: 139 MMOL/L (ref 134–144)
SP GR UR STRIP: 1.02 (ref 1–1.03)
TRIGL SERPL-MCNC: 115 MG/DL (ref 0–149)
UROBILINOGEN UR STRIP-MCNC: 0.2 MG/DL (ref 0.2–1)
VLDLC SERPL CALC-MCNC: 20 MG/DL (ref 5–40)
WBC # BLD AUTO: 5.2 X10E3/UL (ref 3.4–10.8)
WBC #/AREA URNS HPF: NORMAL /HPF (ref 0–5)

## 2023-11-08 NOTE — PROGRESS NOTES
comes in after long absence. He states that his diabetes is not doing well, although he thinks that based on his blood sugars. There is a tremendous swing in his blood sugars as would be expected because he is not eating in a timely fashion when he should be. He takes Levemir 10 units a.c. breakfast and 50 units at bedtime. He also takes Canelo weekly which replaced the Trulicity. His diabetes historically has been poorly controlled and as a result he has developed microvascular complications consisting of diabetic retinopathy and nephropathy. Things have gotten progressively worse, but he just does not follow up on a consistent basis or do the things that are needed to maintain a relative euglycemic status. He has a past history of primary hypertension, as well as dyslipidemia, and allegedly he is taking medications consistent with these disease entities. He also has venous insufficiency leading to orthostatic edema predominantly at the right leg.

## 2023-11-09 LAB
ALBUMIN/CREAT UR: 1012 MG/G CREAT (ref 0–29)
CREAT UR-MCNC: 41.2 MG/DL
CYSTATIN C SERPL-MCNC: 1.22 MG/L (ref 0.72–1.16)
MICROALBUMIN UR-MCNC: 417.1 UG/ML
PROT UR-MCNC: 72.1 MG/DL
PROT/CREAT UR: 1750 MG/G CREAT (ref 0–200)

## 2023-11-12 RX ORDER — EXENATIDE 2 MG/.85ML
2 INJECTION, SUSPENSION, EXTENDED RELEASE SUBCUTANEOUS
Qty: 4 ADJUSTABLE DOSE PRE-FILLED PEN SYRINGE | Refills: 11 | Status: SHIPPED | OUTPATIENT
Start: 2023-11-12

## 2023-11-13 NOTE — PROGRESS NOTES
1.  The patient's diabetes is not well controlled. He does not come into the office for visits or check his blood sugars on a regular basis. His dietary compliance remains poor with the consumption of processed carbohydrates. In spite of being told repetitively the end result of noncompliance he continues to remain so. He has microvascular complications to date consisting of retinopathy, as well as neuropathy. Fortunately he does not have any demonstrable nephropathy at this point. I failed to mention that the patient was on a GLP-1 Trulicity, but his insurance stopped paying for this; therefore he is now on CENX and he is tolerating it well. 2. He has significant diabetic retinopathy and is under the care of an ophthalmologist.  3. 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 4. He had Brugada syndrome and as result has a defibrillator in place. He has not had any suggestion of recurrence of palpitations. He does follow up with his cardiologist quite sparingly. 5. He has an increased cardiovascular risk and remains on his statin. Efficacy and compliance will be assessed. 6. Blood pressure is slightly elevated. I will make no adjustments just yet. He has to take his medication on a more consistent basis. 7. He does have diabetic polyneuropathy which is a direct result of the poor diabetic control. 8. He has mild renal failure but it is unclear whether or not this is a pre renal component or actual intrinsic renal disease possibly representing diabetic damage. 9. He has chronic swelling of his lower extremities which has an orthostatic component. This is consistent with venous insufficiency. 10. I again emphasized to the patient the importance of eating at the same time everyday and minimizing his consumption of processed carbohydrates and eliminating all snacks other than his bedtime snack.   Obviously a snack will have to be consumed if there is more than

## 2023-11-24 ENCOUNTER — TELEPHONE (OUTPATIENT)
Facility: CLINIC | Age: 68
End: 2023-11-24

## 2023-11-24 NOTE — TELEPHONE ENCOUNTER
Patient notified by phone per Dr. Nori Telles regarding increase being made in his insulin. He states he takes 40 units qam and 50 units qhs.  Per Dr. Nori Telles patient to increase his insulin to 50 units qam and 50 units qhs

## 2023-11-24 NOTE — TELEPHONE ENCOUNTER
----- Message from Reji Johnston MD sent at 11/19/2023 11:23 AM EST -----  Tell patient to increase his a.m. insulin 10 units to 20 units-----continue at bedtime insulin dose

## 2023-11-24 NOTE — TELEPHONE ENCOUNTER
Patient notified and ask to increase insulin but his current dose does not match ours will speak to Dr. Shala Espinal

## 2023-12-05 ENCOUNTER — OFFICE VISIT (OUTPATIENT)
Facility: CLINIC | Age: 68
End: 2023-12-05
Payer: COMMERCIAL

## 2023-12-05 VITALS
WEIGHT: 172.2 LBS | OXYGEN SATURATION: 98 % | BODY MASS INDEX: 27.03 KG/M2 | RESPIRATION RATE: 18 BRPM | HEIGHT: 67 IN | SYSTOLIC BLOOD PRESSURE: 173 MMHG | DIASTOLIC BLOOD PRESSURE: 87 MMHG | TEMPERATURE: 98.7 F | HEART RATE: 78 BPM

## 2023-12-05 DIAGNOSIS — E78.5 DYSLIPIDEMIA: ICD-10-CM

## 2023-12-05 DIAGNOSIS — N18.31 CHRONIC RENAL FAILURE, STAGE 3A (HCC): ICD-10-CM

## 2023-12-05 DIAGNOSIS — I10 PRIMARY HYPERTENSION: ICD-10-CM

## 2023-12-05 DIAGNOSIS — I47.20 VENTRICULAR TACHYCARDIA (HCC): ICD-10-CM

## 2023-12-05 DIAGNOSIS — J06.9 VIRAL UPPER RESPIRATORY TRACT INFECTION: Primary | ICD-10-CM

## 2023-12-05 DIAGNOSIS — E13.319 RETINOPATHY DUE TO SECONDARY DIABETES MELLITUS (HCC): ICD-10-CM

## 2023-12-05 DIAGNOSIS — J45.40 MODERATE PERSISTENT REACTIVE AIRWAY DISEASE WITHOUT COMPLICATION: ICD-10-CM

## 2023-12-05 PROCEDURE — 99214 OFFICE O/P EST MOD 30 MIN: CPT | Performed by: INTERNAL MEDICINE

## 2023-12-05 PROCEDURE — 3074F SYST BP LT 130 MM HG: CPT | Performed by: INTERNAL MEDICINE

## 2023-12-05 PROCEDURE — 1123F ACP DISCUSS/DSCN MKR DOCD: CPT | Performed by: INTERNAL MEDICINE

## 2023-12-05 PROCEDURE — 3078F DIAST BP <80 MM HG: CPT | Performed by: INTERNAL MEDICINE

## 2023-12-05 ASSESSMENT — PATIENT HEALTH QUESTIONNAIRE - PHQ9
1. LITTLE INTEREST OR PLEASURE IN DOING THINGS: 0
SUM OF ALL RESPONSES TO PHQ QUESTIONS 1-9: 0
2. FEELING DOWN, DEPRESSED OR HOPELESS: 0
SUM OF ALL RESPONSES TO PHQ QUESTIONS 1-9: 0
SUM OF ALL RESPONSES TO PHQ9 QUESTIONS 1 & 2: 0

## 2023-12-06 DIAGNOSIS — E11.649 UNCONTROLLED TYPE 2 DIABETES MELLITUS WITH HYPOGLYCEMIA WITHOUT COMA (HCC): ICD-10-CM

## 2023-12-06 RX ORDER — EXENATIDE 2 MG/.85ML
INJECTION, SUSPENSION, EXTENDED RELEASE SUBCUTANEOUS
Qty: 12 ADJUSTABLE DOSE PRE-FILLED PEN SYRINGE | Refills: 3 | Status: SHIPPED | OUTPATIENT
Start: 2023-12-06

## 2023-12-07 PROBLEM — Z00.00 PHYSICAL EXAM: Status: RESOLVED | Noted: 2023-11-07 | Resolved: 2023-12-07

## 2024-02-10 ENCOUNTER — HOSPITAL ENCOUNTER (EMERGENCY)
Facility: HOSPITAL | Age: 69
Discharge: HOME | End: 2024-02-10
Attending: EMERGENCY MEDICINE
Payer: COMMERCIAL

## 2024-02-10 ENCOUNTER — APPOINTMENT (OUTPATIENT)
Facility: HOSPITAL | Age: 69
End: 2024-02-10
Payer: COMMERCIAL

## 2024-02-10 VITALS
HEART RATE: 84 BPM | HEIGHT: 67 IN | WEIGHT: 186 LBS | OXYGEN SATURATION: 95 % | TEMPERATURE: 98.7 F | SYSTOLIC BLOOD PRESSURE: 138 MMHG | BODY MASS INDEX: 29.19 KG/M2 | DIASTOLIC BLOOD PRESSURE: 80 MMHG | RESPIRATION RATE: 20 BRPM

## 2024-02-10 DIAGNOSIS — R60.0 HAND EDEMA: ICD-10-CM

## 2024-02-10 DIAGNOSIS — R79.89 ELEVATED LFTS: ICD-10-CM

## 2024-02-10 DIAGNOSIS — N17.9 AKI (ACUTE KIDNEY INJURY) (HCC): ICD-10-CM

## 2024-02-10 DIAGNOSIS — R79.89 ELEVATED BRAIN NATRIURETIC PEPTIDE (BNP) LEVEL: ICD-10-CM

## 2024-02-10 DIAGNOSIS — R60.0 PEDAL EDEMA: Primary | ICD-10-CM

## 2024-02-10 LAB
ALBUMIN SERPL-MCNC: 3.3 G/DL (ref 3.5–5)
ALBUMIN/GLOB SERPL: 0.7 (ref 1.1–2.2)
ALP SERPL-CCNC: 165 U/L (ref 45–117)
ALT SERPL-CCNC: 42 U/L (ref 12–78)
ANION GAP SERPL CALC-SCNC: 11 MMOL/L (ref 5–15)
APPEARANCE UR: CLEAR
AST SERPL-CCNC: 45 U/L (ref 15–37)
BACTERIA URNS QL MICRO: NEGATIVE /HPF
BASOPHILS # BLD: 0 K/UL (ref 0–0.1)
BASOPHILS NFR BLD: 0 % (ref 0–1)
BILIRUB SERPL-MCNC: 0.3 MG/DL (ref 0.2–1)
BILIRUB UR QL: NEGATIVE
BUN SERPL-MCNC: 40 MG/DL (ref 6–20)
BUN/CREAT SERPL: 21 (ref 12–20)
CALCIUM SERPL-MCNC: 9.1 MG/DL (ref 8.5–10.1)
CHLORIDE SERPL-SCNC: 102 MMOL/L (ref 97–108)
CO2 SERPL-SCNC: 31 MMOL/L (ref 21–32)
COLOR UR: ABNORMAL
CREAT SERPL-MCNC: 1.94 MG/DL (ref 0.7–1.3)
DIFFERENTIAL METHOD BLD: ABNORMAL
EKG ATRIAL RATE: 72 BPM
EKG DIAGNOSIS: NORMAL
EKG P AXIS: 51 DEGREES
EKG P-R INTERVAL: 138 MS
EKG Q-T INTERVAL: 360 MS
EKG QRS DURATION: 98 MS
EKG QTC CALCULATION (BAZETT): 394 MS
EKG R AXIS: -18 DEGREES
EKG T AXIS: 37 DEGREES
EKG VENTRICULAR RATE: 72 BPM
EOSINOPHIL # BLD: 0.2 K/UL (ref 0–0.4)
EOSINOPHIL NFR BLD: 3 % (ref 0–7)
EPITH CASTS URNS QL MICRO: ABNORMAL /LPF
ERYTHROCYTE [DISTWIDTH] IN BLOOD BY AUTOMATED COUNT: 14.3 % (ref 11.5–14.5)
GLOBULIN SER CALC-MCNC: 4.7 G/DL (ref 2–4)
GLUCOSE SERPL-MCNC: 165 MG/DL (ref 65–100)
GLUCOSE UR STRIP.AUTO-MCNC: 100 MG/DL
HCT VFR BLD AUTO: 36.8 % (ref 36.6–50.3)
HGB BLD-MCNC: 11.6 G/DL (ref 12.1–17)
HGB UR QL STRIP: ABNORMAL
IMM GRANULOCYTES # BLD AUTO: 0 K/UL (ref 0–0.04)
IMM GRANULOCYTES NFR BLD AUTO: 0 % (ref 0–0.5)
KETONES UR QL STRIP.AUTO: NEGATIVE MG/DL
LEUKOCYTE ESTERASE UR QL STRIP.AUTO: NEGATIVE
LYMPHOCYTES # BLD: 0.7 K/UL (ref 0.8–3.5)
LYMPHOCYTES NFR BLD: 10 % (ref 12–49)
MAGNESIUM SERPL-MCNC: 1.9 MG/DL (ref 1.6–2.4)
MCH RBC QN AUTO: 26.3 PG (ref 26–34)
MCHC RBC AUTO-ENTMCNC: 31.5 G/DL (ref 30–36.5)
MCV RBC AUTO: 83.4 FL (ref 80–99)
MONOCYTES # BLD: 0.4 K/UL (ref 0–1)
MONOCYTES NFR BLD: 6 % (ref 5–13)
NEUTS SEG # BLD: 5.6 K/UL (ref 1.8–8)
NEUTS SEG NFR BLD: 81 % (ref 32–75)
NITRITE UR QL STRIP.AUTO: NEGATIVE
NRBC # BLD: 0 K/UL (ref 0–0.01)
NRBC BLD-RTO: 0 PER 100 WBC
NT PRO BNP: 285 PG/ML (ref 0–125)
PH UR STRIP: 5.5 (ref 5–8)
PLATELET # BLD AUTO: 233 K/UL (ref 150–400)
PMV BLD AUTO: 10 FL (ref 8.9–12.9)
POTASSIUM SERPL-SCNC: 4.5 MMOL/L (ref 3.5–5.1)
PROT SERPL-MCNC: 8 G/DL (ref 6.4–8.2)
PROT UR STRIP-MCNC: 100 MG/DL
RBC # BLD AUTO: 4.41 M/UL (ref 4.1–5.7)
RBC #/AREA URNS HPF: ABNORMAL /HPF (ref 0–5)
RBC MORPH BLD: ABNORMAL
SODIUM SERPL-SCNC: 144 MMOL/L (ref 136–145)
SP GR UR REFRACTOMETRY: 1.01
TROPONIN I SERPL HS-MCNC: 15 NG/L (ref 0–76)
UROBILINOGEN UR QL STRIP.AUTO: 0.2 EU/DL (ref 0.2–1)
WBC # BLD AUTO: 6.9 K/UL (ref 4.1–11.1)
WBC URNS QL MICRO: ABNORMAL /HPF (ref 0–4)

## 2024-02-10 PROCEDURE — 6360000002 HC RX W HCPCS: Performed by: EMERGENCY MEDICINE

## 2024-02-10 PROCEDURE — 83880 ASSAY OF NATRIURETIC PEPTIDE: CPT

## 2024-02-10 PROCEDURE — 96374 THER/PROPH/DIAG INJ IV PUSH: CPT

## 2024-02-10 PROCEDURE — 71046 X-RAY EXAM CHEST 2 VIEWS: CPT

## 2024-02-10 PROCEDURE — 93005 ELECTROCARDIOGRAM TRACING: CPT | Performed by: EMERGENCY MEDICINE

## 2024-02-10 PROCEDURE — 80053 COMPREHEN METABOLIC PANEL: CPT

## 2024-02-10 PROCEDURE — 99285 EMERGENCY DEPT VISIT HI MDM: CPT

## 2024-02-10 PROCEDURE — 84484 ASSAY OF TROPONIN QUANT: CPT

## 2024-02-10 PROCEDURE — 83735 ASSAY OF MAGNESIUM: CPT

## 2024-02-10 PROCEDURE — 36415 COLL VENOUS BLD VENIPUNCTURE: CPT

## 2024-02-10 PROCEDURE — 85025 COMPLETE CBC W/AUTO DIFF WBC: CPT

## 2024-02-10 PROCEDURE — 81001 URINALYSIS AUTO W/SCOPE: CPT

## 2024-02-10 RX ORDER — FUROSEMIDE 10 MG/ML
20 INJECTION INTRAMUSCULAR; INTRAVENOUS
Status: COMPLETED | OUTPATIENT
Start: 2024-02-10 | End: 2024-02-10

## 2024-02-10 RX ADMIN — FUROSEMIDE 20 MG: 10 INJECTION, SOLUTION INTRAMUSCULAR; INTRAVENOUS at 11:02

## 2024-02-10 ASSESSMENT — PAIN - FUNCTIONAL ASSESSMENT: PAIN_FUNCTIONAL_ASSESSMENT: 0-10

## 2024-02-10 ASSESSMENT — PAIN SCALES - GENERAL: PAINLEVEL_OUTOF10: 0

## 2024-02-10 NOTE — ED NOTES
Patient here with c/o cough and edema.  Patient states noticed symptoms 3-4 days ago.  Patient states initial swelling to lower extremities but then noticed it in his hands and face.  Patient states cough has not been productive, states that when he coughs \"it'll keep going for like a full minute!\"   Patient denies fevers, denies contact with anyone with known illness.          Emergency Department Nursing Plan of Care       The Nursing Plan of Care is developed from the Nursing assessment and Emergency Department Attending provider initial evaluation.  The plan of care may be reviewed in the “ED Provider note”.      The Plan of Care was developed with the following considerations:  Patient / Family readiness to learn indicated by:verbalized understanding  Persons(s) to be included in education: patient  Barriers to Learning/Limitations:None      Signed     Stephanie Kendrick RN    2/10/2024   11:00 AM

## 2024-02-10 NOTE — ED NOTES
Patient (s)  given copy of dc instructions and 0 script(s). Patient (s)  verbalized understanding of instructions and script (s). Patient given a current medication reconciliation form and verbalized understanding of their medications. Patient (s) verbalized understanding of the importance of discussing medications with his or her physician or clinic they will be following up with. Patient alert and oriented and in no acute distress. Patient discharged home, patient offered wheelchair, patient declines wheelchair.

## 2024-02-10 NOTE — ED PROVIDER NOTES
[facility] EMERGENCY DEPT  EMERGENCY DEPARTMENT ENCOUNTER       Pt Name: John Paul Juarez  MRN: [id number]  Birthdate 1955  Date of evaluation: 2/10/2024  Provider: Roselyn Estrada MD   PCP: Junior Deras MD  Note Started: 10:39 AM 2/10/24     (Please note that parts of this dictation were completed with voice recognition software. Quite often unanticipated grammatical, syntax, homophones, and other interpretive errors are inadvertently transcribed by the computer software. Please disregards these errors. Please excuse any errors that have escaped final proofreading.)    CHIEF COMPLAINT       Chief Complaint   Patient presents with    Swelling    Cough        HISTORY OF PRESENT ILLNESS: 1 or more elements      History From: ***, History limited by: ***none     John Paul Juarez is a 68 y.o. male who presents ambulatory with bilateral hand and feet swelling.  Patient explains that he has off-and-on leg swelling but it has been worse last few days and now he has developed hand swelling.  Yesterday he states it was actually worse and his face was swollen.  That has subsided some.  He was seen at [facility] first yesterday and they could not get blood work on him.  Patient states overnight he noticed cough in the middle night.  He also states that he has been having some dyspnea on exertion.  Denies fever, congestion, mucus production with cough.  Patient states he has a history of diabetes, neuropathy, retinopathy and he has a defibrillator which was placed 2 years ago.     Nursing Notes were all reviewed and agreed with or any disagreements were addressed in the HPI.     REVIEW OF SYSTEMS        Positives and Pertinent negatives as per HPI.    PAST HISTORY     Past Medical History:  Past Medical History:   Diagnosis Date    Abscess 12/2020    left side of abdomen    Chronic renal failure, stage 3a (HCC) 11/7/2023    Diabetes (HCC)     Hyperlipidemia     Hypertension     ICD (implantable cardioverter-defibrillator) in place

## 2024-02-12 LAB
EKG ATRIAL RATE: 72 BPM
EKG DIAGNOSIS: NORMAL
EKG P AXIS: 51 DEGREES
EKG P-R INTERVAL: 138 MS
EKG Q-T INTERVAL: 360 MS
EKG QRS DURATION: 98 MS
EKG QTC CALCULATION (BAZETT): 394 MS
EKG R AXIS: -18 DEGREES
EKG T AXIS: 37 DEGREES
EKG VENTRICULAR RATE: 72 BPM

## 2024-02-12 PROCEDURE — 93010 ELECTROCARDIOGRAM REPORT: CPT | Performed by: SPECIALIST

## 2024-02-20 ENCOUNTER — OFFICE VISIT (OUTPATIENT)
Facility: CLINIC | Age: 69
End: 2024-02-20
Payer: COMMERCIAL

## 2024-02-20 VITALS
DIASTOLIC BLOOD PRESSURE: 71 MMHG | OXYGEN SATURATION: 90 % | HEART RATE: 90 BPM | WEIGHT: 182.9 LBS | TEMPERATURE: 98.7 F | HEIGHT: 66 IN | RESPIRATION RATE: 16 BRPM | SYSTOLIC BLOOD PRESSURE: 154 MMHG | BODY MASS INDEX: 29.39 KG/M2

## 2024-02-20 DIAGNOSIS — J45.20 MILD INTERMITTENT REACTIVE AIRWAY DISEASE WITHOUT COMPLICATION: ICD-10-CM

## 2024-02-20 DIAGNOSIS — I10 PRIMARY HYPERTENSION: ICD-10-CM

## 2024-02-20 DIAGNOSIS — I87.2 VENOUS INSUFFICIENCY: ICD-10-CM

## 2024-02-20 DIAGNOSIS — Z12.5 SPECIAL SCREENING FOR MALIGNANT NEOPLASM OF PROSTATE: ICD-10-CM

## 2024-02-20 DIAGNOSIS — E78.5 DYSLIPIDEMIA: ICD-10-CM

## 2024-02-20 DIAGNOSIS — E11.649 UNCONTROLLED TYPE 2 DIABETES MELLITUS WITH HYPOGLYCEMIA WITHOUT COMA (HCC): ICD-10-CM

## 2024-02-20 DIAGNOSIS — R06.09 DYSPNEA ON EXERTION: Primary | ICD-10-CM

## 2024-02-20 DIAGNOSIS — Z91.199 HISTORY OF NONCOMPLIANCE WITH MEDICAL TREATMENT: ICD-10-CM

## 2024-02-20 DIAGNOSIS — N18.31 CHRONIC RENAL FAILURE, STAGE 3A (HCC): ICD-10-CM

## 2024-02-20 PROCEDURE — 99214 OFFICE O/P EST MOD 30 MIN: CPT | Performed by: INTERNAL MEDICINE

## 2024-02-20 PROCEDURE — 3077F SYST BP >= 140 MM HG: CPT | Performed by: INTERNAL MEDICINE

## 2024-02-20 PROCEDURE — 3078F DIAST BP <80 MM HG: CPT | Performed by: INTERNAL MEDICINE

## 2024-02-20 PROCEDURE — 1123F ACP DISCUSS/DSCN MKR DOCD: CPT | Performed by: INTERNAL MEDICINE

## 2024-02-20 PROCEDURE — 93971 EXTREMITY STUDY: CPT | Performed by: INTERNAL MEDICINE

## 2024-02-20 PROCEDURE — 3046F HEMOGLOBIN A1C LEVEL >9.0%: CPT | Performed by: INTERNAL MEDICINE

## 2024-02-20 SDOH — ECONOMIC STABILITY: INCOME INSECURITY: HOW HARD IS IT FOR YOU TO PAY FOR THE VERY BASICS LIKE FOOD, HOUSING, MEDICAL CARE, AND HEATING?: NOT HARD AT ALL

## 2024-02-20 SDOH — ECONOMIC STABILITY: FOOD INSECURITY: WITHIN THE PAST 12 MONTHS, THE FOOD YOU BOUGHT JUST DIDN'T LAST AND YOU DIDN'T HAVE MONEY TO GET MORE.: NEVER TRUE

## 2024-02-20 SDOH — ECONOMIC STABILITY: HOUSING INSECURITY
IN THE LAST 12 MONTHS, WAS THERE A TIME WHEN YOU DID NOT HAVE A STEADY PLACE TO SLEEP OR SLEPT IN A SHELTER (INCLUDING NOW)?: NO

## 2024-02-20 SDOH — ECONOMIC STABILITY: FOOD INSECURITY: WITHIN THE PAST 12 MONTHS, YOU WORRIED THAT YOUR FOOD WOULD RUN OUT BEFORE YOU GOT MONEY TO BUY MORE.: NEVER TRUE

## 2024-02-20 ASSESSMENT — PATIENT HEALTH QUESTIONNAIRE - PHQ9
2. FEELING DOWN, DEPRESSED OR HOPELESS: 0
SUM OF ALL RESPONSES TO PHQ QUESTIONS 1-9: 0
1. LITTLE INTEREST OR PLEASURE IN DOING THINGS: 0
SUM OF ALL RESPONSES TO PHQ QUESTIONS 1-9: 0
SUM OF ALL RESPONSES TO PHQ9 QUESTIONS 1 & 2: 0

## 2024-02-20 ASSESSMENT — ANXIETY QUESTIONNAIRES
GAD7 TOTAL SCORE: 0
4. TROUBLE RELAXING: 0
6. BECOMING EASILY ANNOYED OR IRRITABLE: 0
3. WORRYING TOO MUCH ABOUT DIFFERENT THINGS: 0
7. FEELING AFRAID AS IF SOMETHING AWFUL MIGHT HAPPEN: 0
IF YOU CHECKED OFF ANY PROBLEMS ON THIS QUESTIONNAIRE, HOW DIFFICULT HAVE THESE PROBLEMS MADE IT FOR YOU TO DO YOUR WORK, TAKE CARE OF THINGS AT HOME, OR GET ALONG WITH OTHER PEOPLE: NOT DIFFICULT AT ALL
1. FEELING NERVOUS, ANXIOUS, OR ON EDGE: 0
2. NOT BEING ABLE TO STOP OR CONTROL WORRYING: 0
5. BEING SO RESTLESS THAT IT IS HARD TO SIT STILL: 0

## 2024-02-20 NOTE — PROGRESS NOTES
Chief Complaint   Patient presents with    Follow-up    Diabetes     \"Have you been to the ER, urgent care clinic since your last visit?  Hospitalized since your last visit?\"    YES - When: approximately 2  weeks ago.  Where and Why: [facility] pedal edema.    “Have you seen or consulted any other health care providers outside of [facility] since your last visit?”    NO

## 2024-02-21 ENCOUNTER — APPOINTMENT (OUTPATIENT)
Facility: HOSPITAL | Age: 69
DRG: 193 | End: 2024-02-21
Payer: COMMERCIAL

## 2024-02-21 ENCOUNTER — HOSPITAL ENCOUNTER (INPATIENT)
Facility: HOSPITAL | Age: 69
LOS: 5 days | Discharge: HOME HEALTH | DRG: 193 | End: 2024-02-26
Attending: STUDENT IN AN ORGANIZED HEALTH CARE EDUCATION/TRAINING PROGRAM | Admitting: FAMILY MEDICINE
Payer: COMMERCIAL

## 2024-02-21 DIAGNOSIS — R09.02 HYPOXIA: ICD-10-CM

## 2024-02-21 DIAGNOSIS — J96.01 ACUTE RESPIRATORY FAILURE WITH HYPOXIA (HCC): ICD-10-CM

## 2024-02-21 DIAGNOSIS — E11.649 UNCONTROLLED TYPE 2 DIABETES MELLITUS WITH HYPOGLYCEMIA WITHOUT COMA (HCC): ICD-10-CM

## 2024-02-21 DIAGNOSIS — E11.65 POORLY CONTROLLED DIABETES MELLITUS (HCC): ICD-10-CM

## 2024-02-21 DIAGNOSIS — J18.9 PNEUMONIA OF BOTH LUNGS DUE TO INFECTIOUS ORGANISM, UNSPECIFIED PART OF LUNG: Primary | ICD-10-CM

## 2024-02-21 LAB
ALBUMIN SERPL-MCNC: 3 G/DL (ref 3.5–5)
ALBUMIN/GLOB SERPL: 0.7 (ref 1.1–2.2)
ALP SERPL-CCNC: 135 U/L (ref 45–117)
ALT SERPL-CCNC: 39 U/L (ref 12–78)
ANION GAP SERPL CALC-SCNC: 6 MMOL/L (ref 5–15)
APO B SERPL-MCNC: 65 MG/DL
ARTERIAL PATENCY WRIST A: ABNORMAL
AST SERPL-CCNC: 44 U/L (ref 15–37)
BASE EXCESS BLD CALC-SCNC: 1.7 MMOL/L
BASOPHILS # BLD: 0 K/UL (ref 0–0.1)
BASOPHILS NFR BLD: 0 % (ref 0–1)
BDY SITE: ABNORMAL
BILIRUB SERPL-MCNC: 0.4 MG/DL (ref 0.2–1)
BUN SERPL-MCNC: 31 MG/DL (ref 6–20)
BUN/CREAT SERPL: 20 (ref 12–20)
CALCIUM SERPL-MCNC: 8.9 MG/DL (ref 8.5–10.1)
CHLORIDE SERPL-SCNC: 107 MMOL/L (ref 97–108)
CO2 SERPL-SCNC: 26 MMOL/L (ref 21–32)
COMMENT:: NORMAL
CREAT SERPL-MCNC: 1.58 MG/DL (ref 0.7–1.3)
DIFFERENTIAL METHOD BLD: ABNORMAL
EKG ATRIAL RATE: 95 BPM
EKG DIAGNOSIS: NORMAL
EKG P AXIS: 57 DEGREES
EKG P-R INTERVAL: 128 MS
EKG Q-T INTERVAL: 334 MS
EKG QRS DURATION: 88 MS
EKG QTC CALCULATION (BAZETT): 419 MS
EKG R AXIS: -2 DEGREES
EKG T AXIS: 73 DEGREES
EKG VENTRICULAR RATE: 95 BPM
EOSINOPHIL # BLD: 0.1 K/UL (ref 0–0.4)
EOSINOPHIL NFR BLD: 1 % (ref 0–7)
ERYTHROCYTE [DISTWIDTH] IN BLOOD BY AUTOMATED COUNT: 14.6 % (ref 11.5–14.5)
EST. AVERAGE GLUCOSE BLD GHB EST-MCNC: 235 MG/DL
GAS FLOW.O2 O2 DELIVERY SYS: ABNORMAL
GLOBULIN SER CALC-MCNC: 4.2 G/DL (ref 2–4)
GLUCOSE BLD STRIP.AUTO-MCNC: 77 MG/DL (ref 65–117)
GLUCOSE BLD STRIP.AUTO-MCNC: 94 MG/DL (ref 65–117)
GLUCOSE SERPL-MCNC: 142 MG/DL (ref 65–100)
HBA1C MFR BLD: 10.8 % (ref 4.8–5.6)
HBA1C MFR BLD: 9.8 % (ref 4–5.6)
HCO3 BLD-SCNC: 26.6 MMOL/L (ref 22–26)
HCT VFR BLD AUTO: 34.3 % (ref 36.6–50.3)
HGB BLD-MCNC: 10.4 G/DL (ref 12.1–17)
IMM GRANULOCYTES # BLD AUTO: 0 K/UL (ref 0–0.04)
IMM GRANULOCYTES NFR BLD AUTO: 0 % (ref 0–0.5)
LACTATE BLD-SCNC: 0.54 MMOL/L (ref 0.4–2)
LYMPHOCYTES # BLD: 0.3 K/UL (ref 0.8–3.5)
LYMPHOCYTES NFR BLD: 3 % (ref 12–49)
MCH RBC QN AUTO: 25.9 PG (ref 26–34)
MCHC RBC AUTO-ENTMCNC: 30.3 G/DL (ref 30–36.5)
MCV RBC AUTO: 85.5 FL (ref 80–99)
MONOCYTES # BLD: 0.7 K/UL (ref 0–1)
MONOCYTES NFR BLD: 9 % (ref 5–13)
NEUTS SEG # BLD: 7.2 K/UL (ref 1.8–8)
NEUTS SEG NFR BLD: 87 % (ref 32–75)
NRBC # BLD: 0 K/UL (ref 0–0.01)
NRBC BLD-RTO: 0 PER 100 WBC
NT PRO BNP: 519 PG/ML
O2/TOTAL GAS SETTING VFR VENT: 4 %
PCO2 BLD: 42.2 MMHG (ref 35–45)
PH BLD: 7.41 (ref 7.35–7.45)
PLATELET # BLD AUTO: 261 K/UL (ref 150–400)
PMV BLD AUTO: 9.5 FL (ref 8.9–12.9)
PO2 BLD: 62 MMHG (ref 80–100)
POTASSIUM SERPL-SCNC: 4.4 MMOL/L (ref 3.5–5.1)
PROCALCITONIN SERPL-MCNC: 0.1 NG/ML
PROT SERPL-MCNC: 7.2 G/DL (ref 6.4–8.2)
RBC # BLD AUTO: 4.01 M/UL (ref 4.1–5.7)
RBC MORPH BLD: ABNORMAL
SAO2 % BLD: 91.3 % (ref 92–97)
SARS-COV-2 RDRP RESP QL NAA+PROBE: NOT DETECTED
SERVICE CMNT-IMP: NORMAL
SERVICE CMNT-IMP: NORMAL
SODIUM SERPL-SCNC: 139 MMOL/L (ref 136–145)
SOURCE: NORMAL
SPECIMEN HOLD: NORMAL
SPECIMEN TYPE: ABNORMAL
TROPONIN I SERPL HS-MCNC: 8 NG/L (ref 0–76)
WBC # BLD AUTO: 8.3 K/UL (ref 4.1–11.1)

## 2024-02-21 PROCEDURE — 96374 THER/PROPH/DIAG INJ IV PUSH: CPT

## 2024-02-21 PROCEDURE — 1100000000 HC RM PRIVATE

## 2024-02-21 PROCEDURE — 71045 X-RAY EXAM CHEST 1 VIEW: CPT

## 2024-02-21 PROCEDURE — 84484 ASSAY OF TROPONIN QUANT: CPT

## 2024-02-21 PROCEDURE — 96375 TX/PRO/DX INJ NEW DRUG ADDON: CPT

## 2024-02-21 PROCEDURE — 36415 COLL VENOUS BLD VENIPUNCTURE: CPT

## 2024-02-21 PROCEDURE — 87449 NOS EACH ORGANISM AG IA: CPT

## 2024-02-21 PROCEDURE — 82962 GLUCOSE BLOOD TEST: CPT

## 2024-02-21 PROCEDURE — 2580000003 HC RX 258: Performed by: STUDENT IN AN ORGANIZED HEALTH CARE EDUCATION/TRAINING PROGRAM

## 2024-02-21 PROCEDURE — 87040 BLOOD CULTURE FOR BACTERIA: CPT

## 2024-02-21 PROCEDURE — 85025 COMPLETE CBC W/AUTO DIFF WBC: CPT

## 2024-02-21 PROCEDURE — 80053 COMPREHEN METABOLIC PANEL: CPT

## 2024-02-21 PROCEDURE — 6360000002 HC RX W HCPCS: Performed by: NURSE PRACTITIONER

## 2024-02-21 PROCEDURE — 87635 SARS-COV-2 COVID-19 AMP PRB: CPT

## 2024-02-21 PROCEDURE — 93005 ELECTROCARDIOGRAM TRACING: CPT | Performed by: STUDENT IN AN ORGANIZED HEALTH CARE EDUCATION/TRAINING PROGRAM

## 2024-02-21 PROCEDURE — 2580000003 HC RX 258: Performed by: NURSE PRACTITIONER

## 2024-02-21 PROCEDURE — 6360000002 HC RX W HCPCS: Performed by: STUDENT IN AN ORGANIZED HEALTH CARE EDUCATION/TRAINING PROGRAM

## 2024-02-21 PROCEDURE — 83036 HEMOGLOBIN GLYCOSYLATED A1C: CPT

## 2024-02-21 PROCEDURE — 84145 PROCALCITONIN (PCT): CPT

## 2024-02-21 PROCEDURE — 93010 ELECTROCARDIOGRAM REPORT: CPT | Performed by: SPECIALIST

## 2024-02-21 PROCEDURE — 99285 EMERGENCY DEPT VISIT HI MDM: CPT

## 2024-02-21 PROCEDURE — 36600 WITHDRAWAL OF ARTERIAL BLOOD: CPT

## 2024-02-21 PROCEDURE — 82803 BLOOD GASES ANY COMBINATION: CPT

## 2024-02-21 PROCEDURE — 83880 ASSAY OF NATRIURETIC PEPTIDE: CPT

## 2024-02-21 PROCEDURE — 6370000000 HC RX 637 (ALT 250 FOR IP): Performed by: NURSE PRACTITIONER

## 2024-02-21 PROCEDURE — 83605 ASSAY OF LACTIC ACID: CPT

## 2024-02-21 RX ORDER — ENOXAPARIN SODIUM 100 MG/ML
40 INJECTION SUBCUTANEOUS DAILY
Status: DISCONTINUED | OUTPATIENT
Start: 2024-02-21 | End: 2024-02-26 | Stop reason: HOSPADM

## 2024-02-21 RX ORDER — SODIUM CHLORIDE 0.9 % (FLUSH) 0.9 %
5-40 SYRINGE (ML) INJECTION PRN
Status: DISCONTINUED | OUTPATIENT
Start: 2024-02-21 | End: 2024-02-26 | Stop reason: HOSPADM

## 2024-02-21 RX ORDER — INSULIN LISPRO 100 [IU]/ML
0-8 INJECTION, SOLUTION INTRAVENOUS; SUBCUTANEOUS
Status: DISCONTINUED | OUTPATIENT
Start: 2024-02-21 | End: 2024-02-26

## 2024-02-21 RX ORDER — SODIUM CHLORIDE 0.9 % (FLUSH) 0.9 %
5-40 SYRINGE (ML) INJECTION EVERY 12 HOURS SCHEDULED
Status: DISCONTINUED | OUTPATIENT
Start: 2024-02-21 | End: 2024-02-26 | Stop reason: HOSPADM

## 2024-02-21 RX ORDER — AZITHROMYCIN 250 MG/1
500 TABLET, FILM COATED ORAL EVERY 24 HOURS
Status: COMPLETED | OUTPATIENT
Start: 2024-02-22 | End: 2024-02-24

## 2024-02-21 RX ORDER — INSULIN LISPRO 100 [IU]/ML
0-4 INJECTION, SOLUTION INTRAVENOUS; SUBCUTANEOUS NIGHTLY
Status: DISCONTINUED | OUTPATIENT
Start: 2024-02-21 | End: 2024-02-26

## 2024-02-21 RX ORDER — GUAIFENESIN 600 MG/1
600 TABLET, EXTENDED RELEASE ORAL 2 TIMES DAILY
Status: DISCONTINUED | OUTPATIENT
Start: 2024-02-21 | End: 2024-02-26 | Stop reason: HOSPADM

## 2024-02-21 RX ORDER — ATORVASTATIN CALCIUM 20 MG/1
20 TABLET, FILM COATED ORAL DAILY
Status: DISCONTINUED | OUTPATIENT
Start: 2024-02-22 | End: 2024-02-26 | Stop reason: HOSPADM

## 2024-02-21 RX ORDER — ONDANSETRON 2 MG/ML
4 INJECTION INTRAMUSCULAR; INTRAVENOUS EVERY 6 HOURS PRN
Status: DISCONTINUED | OUTPATIENT
Start: 2024-02-21 | End: 2024-02-26 | Stop reason: HOSPADM

## 2024-02-21 RX ORDER — FUROSEMIDE 10 MG/ML
20 INJECTION INTRAMUSCULAR; INTRAVENOUS ONCE
Status: COMPLETED | OUTPATIENT
Start: 2024-02-21 | End: 2024-02-21

## 2024-02-21 RX ORDER — DEXTROSE MONOHYDRATE 100 MG/ML
INJECTION, SOLUTION INTRAVENOUS CONTINUOUS PRN
Status: DISCONTINUED | OUTPATIENT
Start: 2024-02-21 | End: 2024-02-26 | Stop reason: HOSPADM

## 2024-02-21 RX ORDER — ASPIRIN 81 MG/1
81 TABLET, CHEWABLE ORAL DAILY
Status: DISCONTINUED | OUTPATIENT
Start: 2024-02-22 | End: 2024-02-26 | Stop reason: HOSPADM

## 2024-02-21 RX ORDER — ONDANSETRON 4 MG/1
4 TABLET, ORALLY DISINTEGRATING ORAL EVERY 8 HOURS PRN
Status: DISCONTINUED | OUTPATIENT
Start: 2024-02-21 | End: 2024-02-26 | Stop reason: HOSPADM

## 2024-02-21 RX ORDER — ACETAMINOPHEN 325 MG/1
650 TABLET ORAL EVERY 6 HOURS PRN
Status: DISCONTINUED | OUTPATIENT
Start: 2024-02-21 | End: 2024-02-26 | Stop reason: HOSPADM

## 2024-02-21 RX ORDER — SODIUM CHLORIDE 9 MG/ML
INJECTION, SOLUTION INTRAVENOUS PRN
Status: DISCONTINUED | OUTPATIENT
Start: 2024-02-21 | End: 2024-02-26 | Stop reason: HOSPADM

## 2024-02-21 RX ORDER — ACETAMINOPHEN 650 MG/1
650 SUPPOSITORY RECTAL EVERY 6 HOURS PRN
Status: DISCONTINUED | OUTPATIENT
Start: 2024-02-21 | End: 2024-02-26 | Stop reason: HOSPADM

## 2024-02-21 RX ORDER — POLYETHYLENE GLYCOL 3350 17 G/17G
17 POWDER, FOR SOLUTION ORAL DAILY PRN
Status: DISCONTINUED | OUTPATIENT
Start: 2024-02-21 | End: 2024-02-26 | Stop reason: HOSPADM

## 2024-02-21 RX ORDER — IPRATROPIUM BROMIDE AND ALBUTEROL SULFATE 2.5; .5 MG/3ML; MG/3ML
1 SOLUTION RESPIRATORY (INHALATION) EVERY 4 HOURS PRN
Status: DISCONTINUED | OUTPATIENT
Start: 2024-02-21 | End: 2024-02-26 | Stop reason: HOSPADM

## 2024-02-21 RX ORDER — AMLODIPINE BESYLATE 5 MG/1
10 TABLET ORAL DAILY
Status: DISCONTINUED | OUTPATIENT
Start: 2024-02-22 | End: 2024-02-26 | Stop reason: HOSPADM

## 2024-02-21 RX ADMIN — SODIUM CHLORIDE, PRESERVATIVE FREE 10 ML: 5 INJECTION INTRAVENOUS at 21:10

## 2024-02-21 RX ADMIN — ENOXAPARIN SODIUM 40 MG: 100 INJECTION SUBCUTANEOUS at 16:48

## 2024-02-21 RX ADMIN — WATER 1000 MG: 1 INJECTION INTRAMUSCULAR; INTRAVENOUS; SUBCUTANEOUS at 16:07

## 2024-02-21 RX ADMIN — FUROSEMIDE 20 MG: 10 INJECTION, SOLUTION INTRAMUSCULAR; INTRAVENOUS at 16:48

## 2024-02-21 RX ADMIN — GUAIFENESIN 600 MG: 600 TABLET, EXTENDED RELEASE ORAL at 21:09

## 2024-02-21 RX ADMIN — AZITHROMYCIN MONOHYDRATE 500 MG: 500 INJECTION, POWDER, LYOPHILIZED, FOR SOLUTION INTRAVENOUS at 16:10

## 2024-02-21 ASSESSMENT — PAIN - FUNCTIONAL ASSESSMENT: PAIN_FUNCTIONAL_ASSESSMENT: NONE - DENIES PAIN

## 2024-02-21 NOTE — H&P
History and Physical    Date of Service:  2/21/2024  Primary Care Provider: Junior Deras MD  Source of information: patient, electronic medical record    Chief Complaint: Shortness of Breath      History of Presenting Illness:   John Paul Juarez is a 68 y.o. male with a past medical history significant for diabetes, hyperlipidemia, previous CVA presenting with 1 week history of progressively worsening shortness of breath.  Patient had a chest radiograph roughly 11 days prior with no acute process however further workup in the emergency department revealed oxygen saturations below 88% on room air, was placed on 2 L of supplemental oxygen via nasal cannula.  Chest radiograph with new bilateral areas of consolidation right greater than left consistent with pneumonia.  Patient was started on antibiotics in the form of ceftriaxone and azithromycin and referred to the hospitalist service for further management.  At the moment of the initial interview he was cooperative with the exam.  He denies fever, chills however does endorse productive cough, dyspnea, orthopnea           REVIEW OF SYSTEMS:  A comprehensive review of systems was negative except for that written in the History of Present Illness.     Past Medical History:   Diagnosis Date    Abscess 12/2020    left side of abdomen    Chronic renal failure, stage 3a ([facility]) 11/7/2023    Diabetes ([facility])     Hyperlipidemia     Hypertension     ICD (implantable cardioverter-defibrillator) in place     NSVT (nonsustained ventricular tachycardia) ([facility]) 11/21/2018    EPS 11/21/2018 VT/VF     Right groin pain 12/7/2020    Stroke ([facility])     Syncope     Valvular heart disease       Past Surgical History:   Procedure Laterality Date    CARDIAC CATHETERIZATION      CARDIAC DEFIBRILLATOR PLACEMENT      COLONOSCOPY  1/2/2015         HEENT      l cararact removal    US DRAIN SOFT TISSUE ABSCESS  12/22/2020    US DRAIN SOFT TISSUE ABSCESS 12/22/2020 [facility] RAD US     Prior to

## 2024-02-21 NOTE — ED TRIAGE NOTES
Pt arrives via EMS from work, pt has had sob for 4 weeks, +leg edema, denies cp, +cough, denies fever, CALDERON, +n/v, sats were 80% per EMS,  ,

## 2024-02-21 NOTE — ED NOTES
Bedside and Verbal shift change report given to NINA Miller (oncoming nurse) by NINA Stauffer (offgoing nurse). Report included the following information Nurse Handoff Report, ED Encounter Summary, Intake/Output, MAR, Recent Results, and Cardiac Rhythm NSR .

## 2024-02-21 NOTE — ED PROVIDER NOTES
[facility] EMERGENCY DEP  EMERGENCY DEPARTMENT ENCOUNTER      Pt Name: John Paul Juarez  MRN: [id number]  Birthdate 1955  Date of evaluation: 2/21/2024  Provider: Сергей Birch DO    CHIEF COMPLAINT       Chief Complaint   Patient presents with    Shortness of Breath         HISTORY OF PRESENT ILLNESS   (Location/Symptom, Timing/Onset, Context/Setting, Quality, Duration, Modifying Factors, Severity)  Note limiting factors.   The patient is a 68-year-old male prior history of diabetes, hyperlipidemia, and SVT, prior stroke presenting today with shortness of breath.  Patient reports 8 days of progressive worsening shortness of breath.  He typically walks trails outside and usually does not have any issues with them however over the past 2 days unable to do so without getting markedly short of breath.  He has had a cough.  No fever.  Has chronic swelling in the legs but this is actually improving.  Had nausea and vomiting earlier today.  Denies any lung history such as COPD or asthma.  He is not a smoker.            Review of External Medical Records:     Nursing Notes were reviewed.    REVIEW OF SYSTEMS    (2-9 systems for level 4, 10 or more for level 5)     Review of Systems   Constitutional:  Negative for fever.   Respiratory:  Positive for cough and shortness of breath.    Gastrointestinal:  Positive for nausea and vomiting.       Except as noted above the remainder of the review of systems was reviewed and negative.       PAST MEDICAL HISTORY     Past Medical History:   Diagnosis Date    Abscess 12/2020    left side of abdomen    Chronic renal failure, stage 3a (HCC) 11/7/2023    Diabetes (HCC)     Hyperlipidemia     Hypertension     ICD (implantable cardioverter-defibrillator) in place     NSVT (nonsustained ventricular tachycardia) ([facility]) 11/21/2018    EPS 11/21/2018 VT/VF     Right groin pain 12/7/2020    Stroke ([facility])     Syncope     Valvular heart disease          SURGICAL HISTORY       Past

## 2024-02-22 ENCOUNTER — APPOINTMENT (OUTPATIENT)
Facility: HOSPITAL | Age: 69
DRG: 193 | End: 2024-02-22
Payer: COMMERCIAL

## 2024-02-22 LAB
ALBUMIN SERPL-MCNC: 2.5 G/DL (ref 3.5–5)
ALBUMIN/GLOB SERPL: 0.6 (ref 1.1–2.2)
ALP SERPL-CCNC: 124 U/L (ref 45–117)
ALT SERPL-CCNC: 32 U/L (ref 12–78)
ANION GAP SERPL CALC-SCNC: 6 MMOL/L (ref 5–15)
AST SERPL-CCNC: 33 U/L (ref 15–37)
BASOPHILS # BLD: 0 K/UL (ref 0–0.1)
BASOPHILS NFR BLD: 0 % (ref 0–1)
BILIRUB SERPL-MCNC: 0.4 MG/DL (ref 0.2–1)
BUN SERPL-MCNC: 30 MG/DL (ref 6–20)
BUN/CREAT SERPL: 19 (ref 12–20)
CALCIUM SERPL-MCNC: 8.6 MG/DL (ref 8.5–10.1)
CHLORIDE SERPL-SCNC: 107 MMOL/L (ref 97–108)
CO2 SERPL-SCNC: 29 MMOL/L (ref 21–32)
CREAT SERPL-MCNC: 1.58 MG/DL (ref 0.7–1.3)
CREAT UR-MCNC: 81.1 MG/DL
DIFFERENTIAL METHOD BLD: ABNORMAL
ECHO AO ROOT DIAM: 3.8 CM
ECHO AO ROOT INDEX: 1.96 CM/M2
ECHO AV AREA PEAK VELOCITY: 1.6 CM2
ECHO AV AREA/BSA PEAK VELOCITY: 0.8 CM2/M2
ECHO AV PEAK GRADIENT: 12 MMHG
ECHO AV PEAK VELOCITY: 1.7 M/S
ECHO AV VELOCITY RATIO: 0.71
ECHO BSA: 1.98 M2
ECHO LA DIAMETER INDEX: 2.73 CM/M2
ECHO LA DIAMETER: 5.3 CM
ECHO LA TO AORTIC ROOT RATIO: 1.39
ECHO LA VOL A-L A2C: 184 ML (ref 18–58)
ECHO LA VOL A-L A4C: 124 ML (ref 18–58)
ECHO LA VOL MOD A2C: 171 ML (ref 18–58)
ECHO LA VOL MOD A4C: 119 ML (ref 18–58)
ECHO LA VOLUME AREA LENGTH: 157 ML
ECHO LA VOLUME INDEX A-L A2C: 95 ML/M2 (ref 16–34)
ECHO LA VOLUME INDEX A-L A4C: 64 ML/M2 (ref 16–34)
ECHO LA VOLUME INDEX AREA LENGTH: 81 ML/M2 (ref 16–34)
ECHO LA VOLUME INDEX MOD A2C: 88 ML/M2 (ref 16–34)
ECHO LA VOLUME INDEX MOD A4C: 61 ML/M2 (ref 16–34)
ECHO LV E' LATERAL VELOCITY: 7 CM/S
ECHO LV E' SEPTAL VELOCITY: 6 CM/S
ECHO LV FRACTIONAL SHORTENING: 32 % (ref 28–44)
ECHO LV INTERNAL DIMENSION DIASTOLE INDEX: 2.42 CM/M2
ECHO LV INTERNAL DIMENSION DIASTOLIC: 4.7 CM (ref 4.2–5.9)
ECHO LV INTERNAL DIMENSION SYSTOLIC INDEX: 1.65 CM/M2
ECHO LV INTERNAL DIMENSION SYSTOLIC: 3.2 CM
ECHO LV IVSD: 1.1 CM (ref 0.6–1)
ECHO LV MASS 2D: 164.5 G (ref 88–224)
ECHO LV MASS INDEX 2D: 84.8 G/M2 (ref 49–115)
ECHO LV POSTERIOR WALL DIASTOLIC: 0.9 CM (ref 0.6–1)
ECHO LV RELATIVE WALL THICKNESS RATIO: 0.38
ECHO LVOT AREA: 2.3 CM2
ECHO LVOT DIAM: 1.7 CM
ECHO LVOT PEAK GRADIENT: 6 MMHG
ECHO LVOT PEAK VELOCITY: 1.2 M/S
ECHO MV A VELOCITY: 0.86 M/S
ECHO MV AREA PHT: 5 CM2
ECHO MV E DECELERATION TIME (DT): 152.6 MS
ECHO MV E VELOCITY: 1.19 M/S
ECHO MV E/A RATIO: 1.38
ECHO MV E/E' LATERAL: 17
ECHO MV E/E' RATIO (AVERAGED): 18.42
ECHO MV PRESSURE HALF TIME (PHT): 44.3 MS
ECHO PV MAX VELOCITY: 0.8 M/S
ECHO PV PEAK GRADIENT: 2 MMHG
ECHO RV FREE WALL PEAK S': 16 CM/S
ECHO RV INTERNAL DIMENSION: 4.1 CM
ECHO RV TAPSE: 2.1 CM (ref 1.7–?)
ECHO TV REGURGITANT MAX VELOCITY: 3.91 M/S
ECHO TV REGURGITANT PEAK GRADIENT: 61 MMHG
EOSINOPHIL # BLD: 0 K/UL (ref 0–0.4)
EOSINOPHIL NFR BLD: 1 % (ref 0–7)
ERYTHROCYTE [DISTWIDTH] IN BLOOD BY AUTOMATED COUNT: 14.8 % (ref 11.5–14.5)
GLOBULIN SER CALC-MCNC: 3.9 G/DL (ref 2–4)
GLUCOSE BLD STRIP.AUTO-MCNC: 138 MG/DL (ref 65–117)
GLUCOSE BLD STRIP.AUTO-MCNC: 139 MG/DL (ref 65–117)
GLUCOSE BLD STRIP.AUTO-MCNC: 152 MG/DL (ref 65–117)
GLUCOSE BLD STRIP.AUTO-MCNC: 71 MG/DL (ref 65–117)
GLUCOSE SERPL-MCNC: 64 MG/DL (ref 65–100)
HCT VFR BLD AUTO: 29.5 % (ref 36.6–50.3)
HGB BLD-MCNC: 9.4 G/DL (ref 12.1–17)
IMM GRANULOCYTES # BLD AUTO: 0 K/UL (ref 0–0.04)
IMM GRANULOCYTES NFR BLD AUTO: 0 % (ref 0–0.5)
LYMPHOCYTES # BLD: 0.4 K/UL (ref 0.8–3.5)
LYMPHOCYTES NFR BLD: 8 % (ref 12–49)
MCH RBC QN AUTO: 26.8 PG (ref 26–34)
MCHC RBC AUTO-ENTMCNC: 31.9 G/DL (ref 30–36.5)
MCV RBC AUTO: 84 FL (ref 80–99)
MONOCYTES # BLD: 0.9 K/UL (ref 0–1)
MONOCYTES NFR BLD: 19 % (ref 5–13)
NEUTS SEG # BLD: 3.5 K/UL (ref 1.8–8)
NEUTS SEG NFR BLD: 72 % (ref 32–75)
NRBC # BLD: 0 K/UL (ref 0–0.01)
NRBC BLD-RTO: 0 PER 100 WBC
PLATELET # BLD AUTO: 255 K/UL (ref 150–400)
PMV BLD AUTO: 9.4 FL (ref 8.9–12.9)
POTASSIUM SERPL-SCNC: 4.1 MMOL/L (ref 3.5–5.1)
PROT SERPL-MCNC: 6.4 G/DL (ref 6.4–8.2)
PROT UR-MCNC: 229.3 MG/DL
PROT/CREAT UR: 2827 MG/G CREAT (ref 0–200)
PSA SERPL-MCNC: 1.5 NG/ML (ref 0–4)
RBC # BLD AUTO: 3.51 M/UL (ref 4.1–5.7)
RBC MORPH BLD: ABNORMAL
SERVICE CMNT-IMP: ABNORMAL
SERVICE CMNT-IMP: NORMAL
SODIUM SERPL-SCNC: 142 MMOL/L (ref 136–145)
WBC # BLD AUTO: 4.8 K/UL (ref 4.1–11.1)

## 2024-02-22 PROCEDURE — 82962 GLUCOSE BLOOD TEST: CPT

## 2024-02-22 PROCEDURE — 2700000000 HC OXYGEN THERAPY PER DAY

## 2024-02-22 PROCEDURE — 1100000000 HC RM PRIVATE

## 2024-02-22 PROCEDURE — 93306 TTE W/DOPPLER COMPLETE: CPT

## 2024-02-22 PROCEDURE — 97116 GAIT TRAINING THERAPY: CPT

## 2024-02-22 PROCEDURE — 36415 COLL VENOUS BLD VENIPUNCTURE: CPT

## 2024-02-22 PROCEDURE — 97161 PT EVAL LOW COMPLEX 20 MIN: CPT

## 2024-02-22 PROCEDURE — 94760 N-INVAS EAR/PLS OXIMETRY 1: CPT

## 2024-02-22 PROCEDURE — 97530 THERAPEUTIC ACTIVITIES: CPT

## 2024-02-22 PROCEDURE — 6370000000 HC RX 637 (ALT 250 FOR IP): Performed by: NURSE PRACTITIONER

## 2024-02-22 PROCEDURE — 97535 SELF CARE MNGMENT TRAINING: CPT

## 2024-02-22 PROCEDURE — 6370000000 HC RX 637 (ALT 250 FOR IP): Performed by: HOSPITALIST

## 2024-02-22 PROCEDURE — 2580000003 HC RX 258: Performed by: NURSE PRACTITIONER

## 2024-02-22 PROCEDURE — 97165 OT EVAL LOW COMPLEX 30 MIN: CPT

## 2024-02-22 PROCEDURE — 6360000002 HC RX W HCPCS: Performed by: HOSPITALIST

## 2024-02-22 PROCEDURE — 80053 COMPREHEN METABOLIC PANEL: CPT

## 2024-02-22 PROCEDURE — 71250 CT THORAX DX C-: CPT

## 2024-02-22 PROCEDURE — 85025 COMPLETE CBC W/AUTO DIFF WBC: CPT

## 2024-02-22 PROCEDURE — 6360000002 HC RX W HCPCS: Performed by: NURSE PRACTITIONER

## 2024-02-22 RX ORDER — FUROSEMIDE 10 MG/ML
20 INJECTION INTRAMUSCULAR; INTRAVENOUS DAILY
Status: DISCONTINUED | OUTPATIENT
Start: 2024-02-22 | End: 2024-02-23

## 2024-02-22 RX ORDER — HYDRALAZINE HYDROCHLORIDE 20 MG/ML
10 INJECTION INTRAMUSCULAR; INTRAVENOUS EVERY 6 HOURS PRN
Status: DISCONTINUED | OUTPATIENT
Start: 2024-02-22 | End: 2024-02-26 | Stop reason: HOSPADM

## 2024-02-22 RX ORDER — CARVEDILOL 6.25 MG/1
6.25 TABLET ORAL 2 TIMES DAILY WITH MEALS
Status: DISCONTINUED | OUTPATIENT
Start: 2024-02-22 | End: 2024-02-25

## 2024-02-22 RX ADMIN — AMLODIPINE BESYLATE 10 MG: 5 TABLET ORAL at 10:43

## 2024-02-22 RX ADMIN — CARVEDILOL 6.25 MG: 6.25 TABLET, FILM COATED ORAL at 16:47

## 2024-02-22 RX ADMIN — GUAIFENESIN 600 MG: 600 TABLET, EXTENDED RELEASE ORAL at 21:46

## 2024-02-22 RX ADMIN — ENOXAPARIN SODIUM 40 MG: 100 INJECTION SUBCUTANEOUS at 10:41

## 2024-02-22 RX ADMIN — WATER 1000 MG: 1 INJECTION INTRAMUSCULAR; INTRAVENOUS; SUBCUTANEOUS at 16:48

## 2024-02-22 RX ADMIN — ASPIRIN 81 MG CHEWABLE TABLET 81 MG: 81 TABLET CHEWABLE at 10:44

## 2024-02-22 RX ADMIN — SODIUM CHLORIDE, PRESERVATIVE FREE 10 ML: 5 INJECTION INTRAVENOUS at 10:44

## 2024-02-22 RX ADMIN — SODIUM CHLORIDE, PRESERVATIVE FREE 10 ML: 5 INJECTION INTRAVENOUS at 22:00

## 2024-02-22 RX ADMIN — FUROSEMIDE 20 MG: 10 INJECTION, SOLUTION INTRAMUSCULAR; INTRAVENOUS at 16:44

## 2024-02-22 RX ADMIN — GUAIFENESIN 600 MG: 600 TABLET, EXTENDED RELEASE ORAL at 10:42

## 2024-02-22 RX ADMIN — ATORVASTATIN CALCIUM 20 MG: 20 TABLET, FILM COATED ORAL at 10:42

## 2024-02-22 RX ADMIN — AZITHROMYCIN DIHYDRATE 500 MG: 250 TABLET ORAL at 12:30

## 2024-02-22 NOTE — WOUND CARE
WOCN Note:     New consult for toe wound     John Paul Juarez is a 68 y.o. y/o male who presented for Hypoxia [R09.02]  Acute respiratory failure with hypoxia ([facility]) [J96.01]  Pneumonia of both lungs due to infectious organism, unspecified part of lung [J18.9]  Admitted on 2/21/2024    Past Medical History:   Diagnosis Date    Abscess 12/2020    left side of abdomen    Chronic renal failure, stage 3a ([facility]) 11/7/2023    Diabetes ([facility])     Hyperlipidemia     Hypertension     ICD (implantable cardioverter-defibrillator) in place     NSVT (nonsustained ventricular tachycardia) ([facility]) 11/21/2018    EPS 11/21/2018 VT/VF     Right groin pain 12/7/2020    Stroke ([facility])     Syncope     Valvular heart disease        Lab Results   Component Value Date/Time    WBC 4.8 02/22/2024 05:00 AM    LABA1C 9.8 (H) 02/21/2024 01:02 PM    POCGLU 71 02/22/2024 06:52 AM    POCGLU 94 02/21/2024 10:02 PM    HGB 9.4 (L) 02/22/2024 05:00 AM    HCT 29.5 (L) 02/22/2024 05:00 AM     02/22/2024 05:00 AM        Tobacco Use      Smoking status: Never      Smokeless tobacco: Never     ADULT DIET; Regular; 4 carb choices (60 gm/meal); Low Fat/Low Chol/High Fiber/SUMI     Assessment:   Seen today in room 557/01   Alert and appropriately communicative. Denies pain. Met sitting up in chair  Mobile and stands independently for visual assessment of sacrum  Continent   Surface: foam mattress  Bilateral heels intact and without erythema.    Sacrum intact without erythema    1. POA Callus to Left Great Toe  Medial-plantar aspect of great toe with intact dark callus  No drainage or open skin noted  Patient with neuropathy and onychomycosis with hypertrophic nails  Recommended patient make outpatient podiatry appointment and check feet daily     PI Prevention:  Turn/reposition approximately every 2 hours  Offload heels with heels hanging off end of pillow at all times while in bed.  Sacral Foam

## 2024-02-23 ENCOUNTER — APPOINTMENT (OUTPATIENT)
Facility: HOSPITAL | Age: 69
DRG: 193 | End: 2024-02-23
Payer: COMMERCIAL

## 2024-02-23 ENCOUNTER — APPOINTMENT (OUTPATIENT)
Facility: HOSPITAL | Age: 69
DRG: 193 | End: 2024-02-23
Attending: INTERNAL MEDICINE
Payer: COMMERCIAL

## 2024-02-23 LAB
ALBUMIN SERPL-MCNC: 2.1 G/DL (ref 3.5–5)
ALBUMIN/GLOB SERPL: 0.5 (ref 1.1–2.2)
ALP SERPL-CCNC: 109 U/L (ref 45–117)
ALT SERPL-CCNC: 26 U/L (ref 12–78)
ANION GAP SERPL CALC-SCNC: 1 MMOL/L (ref 5–15)
AST SERPL-CCNC: 41 U/L (ref 15–37)
BILIRUB SERPL-MCNC: 0.4 MG/DL (ref 0.2–1)
BUN SERPL-MCNC: 38 MG/DL (ref 6–20)
BUN/CREAT SERPL: 20 (ref 12–20)
CALCIUM SERPL-MCNC: 8.8 MG/DL (ref 8.5–10.1)
CHLORIDE SERPL-SCNC: 105 MMOL/L (ref 97–108)
CO2 SERPL-SCNC: 29 MMOL/L (ref 21–32)
CREAT SERPL-MCNC: 1.86 MG/DL (ref 0.7–1.3)
D DIMER PPP FEU-MCNC: 1.8 MG/L FEU (ref 0–0.65)
ERYTHROCYTE [DISTWIDTH] IN BLOOD BY AUTOMATED COUNT: 14.6 % (ref 11.5–14.5)
GLOBULIN SER CALC-MCNC: 4.6 G/DL (ref 2–4)
GLUCOSE BLD STRIP.AUTO-MCNC: 107 MG/DL (ref 65–117)
GLUCOSE BLD STRIP.AUTO-MCNC: 175 MG/DL (ref 65–117)
GLUCOSE BLD STRIP.AUTO-MCNC: 205 MG/DL (ref 65–117)
GLUCOSE BLD STRIP.AUTO-MCNC: 244 MG/DL (ref 65–117)
GLUCOSE BLD STRIP.AUTO-MCNC: 71 MG/DL (ref 65–117)
GLUCOSE SERPL-MCNC: 121 MG/DL (ref 65–100)
HCT VFR BLD AUTO: 29.2 % (ref 36.6–50.3)
HGB BLD-MCNC: 9.3 G/DL (ref 12.1–17)
MCH RBC QN AUTO: 26.5 PG (ref 26–34)
MCHC RBC AUTO-ENTMCNC: 31.8 G/DL (ref 30–36.5)
MCV RBC AUTO: 83.2 FL (ref 80–99)
NRBC # BLD: 0 K/UL (ref 0–0.01)
NRBC BLD-RTO: 0 PER 100 WBC
PLATELET # BLD AUTO: 236 K/UL (ref 150–400)
PMV BLD AUTO: 9.5 FL (ref 8.9–12.9)
POTASSIUM SERPL-SCNC: 4.8 MMOL/L (ref 3.5–5.1)
PROT SERPL-MCNC: 6.7 G/DL (ref 6.4–8.2)
RBC # BLD AUTO: 3.51 M/UL (ref 4.1–5.7)
SERVICE CMNT-IMP: ABNORMAL
SERVICE CMNT-IMP: NORMAL
SERVICE CMNT-IMP: NORMAL
SODIUM SERPL-SCNC: 135 MMOL/L (ref 136–145)
WBC # BLD AUTO: 4.4 K/UL (ref 4.1–11.1)

## 2024-02-23 PROCEDURE — 6370000000 HC RX 637 (ALT 250 FOR IP): Performed by: NURSE PRACTITIONER

## 2024-02-23 PROCEDURE — 97116 GAIT TRAINING THERAPY: CPT

## 2024-02-23 PROCEDURE — 6360000002 HC RX W HCPCS: Performed by: HOSPITALIST

## 2024-02-23 PROCEDURE — 6360000002 HC RX W HCPCS: Performed by: INTERNAL MEDICINE

## 2024-02-23 PROCEDURE — 2580000003 HC RX 258: Performed by: NURSE PRACTITIONER

## 2024-02-23 PROCEDURE — 80053 COMPREHEN METABOLIC PANEL: CPT

## 2024-02-23 PROCEDURE — 82962 GLUCOSE BLOOD TEST: CPT

## 2024-02-23 PROCEDURE — 85379 FIBRIN DEGRADATION QUANT: CPT

## 2024-02-23 PROCEDURE — 6360000002 HC RX W HCPCS: Performed by: NURSE PRACTITIONER

## 2024-02-23 PROCEDURE — 36415 COLL VENOUS BLD VENIPUNCTURE: CPT

## 2024-02-23 PROCEDURE — 76770 US EXAM ABDO BACK WALL COMP: CPT

## 2024-02-23 PROCEDURE — 94760 N-INVAS EAR/PLS OXIMETRY 1: CPT

## 2024-02-23 PROCEDURE — 1100000000 HC RM PRIVATE

## 2024-02-23 PROCEDURE — 76937 US GUIDE VASCULAR ACCESS: CPT

## 2024-02-23 PROCEDURE — 2700000000 HC OXYGEN THERAPY PER DAY

## 2024-02-23 PROCEDURE — 6370000000 HC RX 637 (ALT 250 FOR IP): Performed by: HOSPITALIST

## 2024-02-23 PROCEDURE — 93970 EXTREMITY STUDY: CPT

## 2024-02-23 PROCEDURE — 85027 COMPLETE CBC AUTOMATED: CPT

## 2024-02-23 RX ORDER — FUROSEMIDE 10 MG/ML
40 INJECTION INTRAMUSCULAR; INTRAVENOUS 2 TIMES DAILY
Status: DISCONTINUED | OUTPATIENT
Start: 2024-02-23 | End: 2024-02-26 | Stop reason: HOSPADM

## 2024-02-23 RX ADMIN — Medication 2 UNITS: at 10:14

## 2024-02-23 RX ADMIN — AZITHROMYCIN DIHYDRATE 500 MG: 250 TABLET ORAL at 11:59

## 2024-02-23 RX ADMIN — GUAIFENESIN 600 MG: 600 TABLET, EXTENDED RELEASE ORAL at 09:22

## 2024-02-23 RX ADMIN — CARVEDILOL 6.25 MG: 6.25 TABLET, FILM COATED ORAL at 09:21

## 2024-02-23 RX ADMIN — FUROSEMIDE 40 MG: 10 INJECTION, SOLUTION INTRAMUSCULAR; INTRAVENOUS at 19:00

## 2024-02-23 RX ADMIN — CARVEDILOL 6.25 MG: 6.25 TABLET, FILM COATED ORAL at 17:56

## 2024-02-23 RX ADMIN — ASPIRIN 81 MG CHEWABLE TABLET 81 MG: 81 TABLET CHEWABLE at 09:20

## 2024-02-23 RX ADMIN — ATORVASTATIN CALCIUM 20 MG: 20 TABLET, FILM COATED ORAL at 09:21

## 2024-02-23 RX ADMIN — GUAIFENESIN 600 MG: 600 TABLET, EXTENDED RELEASE ORAL at 21:43

## 2024-02-23 RX ADMIN — SODIUM CHLORIDE, PRESERVATIVE FREE 10 ML: 5 INJECTION INTRAVENOUS at 09:24

## 2024-02-23 RX ADMIN — SODIUM CHLORIDE, PRESERVATIVE FREE 10 ML: 5 INJECTION INTRAVENOUS at 21:44

## 2024-02-23 RX ADMIN — AMLODIPINE BESYLATE 10 MG: 5 TABLET ORAL at 09:19

## 2024-02-23 RX ADMIN — FUROSEMIDE 20 MG: 10 INJECTION, SOLUTION INTRAMUSCULAR; INTRAVENOUS at 09:25

## 2024-02-23 RX ADMIN — WATER 1000 MG: 1 INJECTION INTRAMUSCULAR; INTRAVENOUS; SUBCUTANEOUS at 17:57

## 2024-02-23 RX ADMIN — ENOXAPARIN SODIUM 40 MG: 100 INJECTION SUBCUTANEOUS at 09:22

## 2024-02-23 NOTE — NURSE NAVIGATOR
HEART FAILURE NURSE NAVIGATOR NOTE  [facility]    Patient chart was reviewed by Heart Failure (HF) Nurse Navigators for compliance of prescribed treatment with guidelines directed medical therapy (GDMT) and HF database completed.     Please, review beneath recommendations for symptomatic patients with HF with Preserved Ejection Fraction ? 50% (HFpEF) for your consideration when taking care of this patient.    Current HF Medical Therapy:      Name John Paul Juarez    1955   LVEF  65/70   NYHA Functional Class   Documentation needed   ARNi/ACEi/ARB    Aldosterone Antagonist    SGLT2 inhibitor    Consulting Cardiologist      Recommendations for HF Management:    For patients with HFpEF ? 50%, consider adding the following GDMT, if appropriate:  SGLT2 inhibitor [Class 2a]  ARNi or ARB [Class 2b]  Aldosterone antagonist [Class 2b]  Adjust antihypertensive therapy [Class 1]  Adjust diuretic dose at discharge if hospitalized for volume overload [Class 1]  For patient with hyperkalemia while on RAASi > 5.5, consider adding potassium binders (patiromer, sodium zirconium cycosilicate) [Class 2a]    Patients with suspected cardiac amyloidosis (older > 60 years old with LVH > 1.2cm and/or any other signs of amyloidosis) should be offered screening labs and imaging [Class 1]: (a) serum gammopathy profile and UPEP with immunofixation, and (b) PYP test. If PYP test is positive patient should have genetic testing done for inherited ATTR amyloidosis. If any findings are positive or you need genetic testing ordered, please, consider in-patient consultation or referral to [facility].      Note that the following medications may be potentially harmful in heart failure [Class 3].  Calcium channel blockers (doxazosin, diltiazem, verapamil, nifedipine)  Antiarrhythmics (flecanide, disopyrimide, sotalol, dronedarone)  Diabetes medications (thiasolidinediones, saxagliptin,

## 2024-02-24 LAB
ECHO BSA: 1.98 M2
GLUCOSE BLD STRIP.AUTO-MCNC: 147 MG/DL (ref 65–117)
GLUCOSE BLD STRIP.AUTO-MCNC: 160 MG/DL (ref 65–117)
GLUCOSE BLD STRIP.AUTO-MCNC: 232 MG/DL (ref 65–117)
GLUCOSE BLD STRIP.AUTO-MCNC: 71 MG/DL (ref 65–117)
GLUCOSE BLD STRIP.AUTO-MCNC: 90 MG/DL (ref 65–117)
L PNEUMO1 AG UR QL IA: NEGATIVE
SERVICE CMNT-IMP: ABNORMAL
SERVICE CMNT-IMP: NORMAL
SERVICE CMNT-IMP: NORMAL
SPECIMEN SOURCE: NORMAL

## 2024-02-24 PROCEDURE — 6370000000 HC RX 637 (ALT 250 FOR IP): Performed by: HOSPITALIST

## 2024-02-24 PROCEDURE — 82962 GLUCOSE BLOOD TEST: CPT

## 2024-02-24 PROCEDURE — 6370000000 HC RX 637 (ALT 250 FOR IP): Performed by: NURSE PRACTITIONER

## 2024-02-24 PROCEDURE — 2700000000 HC OXYGEN THERAPY PER DAY

## 2024-02-24 PROCEDURE — 6360000002 HC RX W HCPCS: Performed by: INTERNAL MEDICINE

## 2024-02-24 PROCEDURE — 1100000000 HC RM PRIVATE

## 2024-02-24 PROCEDURE — 6360000002 HC RX W HCPCS: Performed by: NURSE PRACTITIONER

## 2024-02-24 PROCEDURE — 2580000003 HC RX 258: Performed by: NURSE PRACTITIONER

## 2024-02-24 RX ADMIN — Medication 2 UNITS: at 12:20

## 2024-02-24 RX ADMIN — ASPIRIN 81 MG CHEWABLE TABLET 81 MG: 81 TABLET CHEWABLE at 08:31

## 2024-02-24 RX ADMIN — GUAIFENESIN 600 MG: 600 TABLET, EXTENDED RELEASE ORAL at 21:11

## 2024-02-24 RX ADMIN — ENOXAPARIN SODIUM 40 MG: 100 INJECTION SUBCUTANEOUS at 08:32

## 2024-02-24 RX ADMIN — CARVEDILOL 6.25 MG: 6.25 TABLET, FILM COATED ORAL at 08:31

## 2024-02-24 RX ADMIN — AMLODIPINE BESYLATE 10 MG: 5 TABLET ORAL at 08:32

## 2024-02-24 RX ADMIN — GUAIFENESIN 600 MG: 600 TABLET, EXTENDED RELEASE ORAL at 08:32

## 2024-02-24 RX ADMIN — FUROSEMIDE 40 MG: 10 INJECTION, SOLUTION INTRAMUSCULAR; INTRAVENOUS at 17:05

## 2024-02-24 RX ADMIN — SODIUM CHLORIDE, PRESERVATIVE FREE 10 ML: 5 INJECTION INTRAVENOUS at 21:11

## 2024-02-24 RX ADMIN — ATORVASTATIN CALCIUM 20 MG: 20 TABLET, FILM COATED ORAL at 08:31

## 2024-02-24 RX ADMIN — WATER 1000 MG: 1 INJECTION INTRAMUSCULAR; INTRAVENOUS; SUBCUTANEOUS at 17:05

## 2024-02-24 RX ADMIN — AZITHROMYCIN DIHYDRATE 500 MG: 250 TABLET ORAL at 12:25

## 2024-02-24 RX ADMIN — FUROSEMIDE 40 MG: 10 INJECTION, SOLUTION INTRAMUSCULAR; INTRAVENOUS at 08:32

## 2024-02-24 RX ADMIN — CARVEDILOL 6.25 MG: 6.25 TABLET, FILM COATED ORAL at 17:06

## 2024-02-24 NOTE — CONSULTS
Seen and examined  Thanks for the consult  A/P:  CKD-3b with a Hx of HTN/DM, proteinuria 2.8 grams;need work up  DM  Diastolic dysfuntion,pulm HTN  CHF  PNA    SPEP,FLC  Renal U.S ordered  KUNAL  IV diuretics  Discussed with him   
Diabetes.  4. Lower extremity edema.  5. Pneumonia.  6. Pulmonary hypertension.  7. Suspicion of pneumonia.    RECOMMENDATIONS:    1. Renal ultrasound ordered.  2. Check SPEP, free light chain tomorrow.  3. Check KUNAL.  4. So far stable kidney function.  5. IV diuretics.  6. Discussed in length with him.        MD SOFYA HARRISON/AQS  D:  02/23/2024 16:40:27  T:  02/23/2024 18:50:31  JOB #:  [id number]

## 2024-02-25 LAB
ALBUMIN SERPL-MCNC: 2.1 G/DL (ref 3.5–5)
ALBUMIN/GLOB SERPL: 0.5 (ref 1.1–2.2)
ALP SERPL-CCNC: 102 U/L (ref 45–117)
ALT SERPL-CCNC: 34 U/L (ref 12–78)
ANION GAP SERPL CALC-SCNC: 2 MMOL/L (ref 5–15)
AST SERPL-CCNC: 33 U/L (ref 15–37)
BILIRUB SERPL-MCNC: 0.2 MG/DL (ref 0.2–1)
BUN SERPL-MCNC: 48 MG/DL (ref 6–20)
BUN/CREAT SERPL: 24 (ref 12–20)
CALCIUM SERPL-MCNC: 7.9 MG/DL (ref 8.5–10.1)
CHLORIDE SERPL-SCNC: 103 MMOL/L (ref 97–108)
CO2 SERPL-SCNC: 30 MMOL/L (ref 21–32)
CREAT SERPL-MCNC: 1.97 MG/DL (ref 0.7–1.3)
ERYTHROCYTE [DISTWIDTH] IN BLOOD BY AUTOMATED COUNT: 14 % (ref 11.5–14.5)
GLOBULIN SER CALC-MCNC: 4.3 G/DL (ref 2–4)
GLUCOSE BLD STRIP.AUTO-MCNC: 104 MG/DL (ref 65–117)
GLUCOSE BLD STRIP.AUTO-MCNC: 243 MG/DL (ref 65–117)
GLUCOSE BLD STRIP.AUTO-MCNC: 267 MG/DL (ref 65–117)
GLUCOSE BLD STRIP.AUTO-MCNC: 349 MG/DL (ref 65–117)
GLUCOSE SERPL-MCNC: 146 MG/DL (ref 65–100)
HCT VFR BLD AUTO: 29 % (ref 36.6–50.3)
HGB BLD-MCNC: 9.1 G/DL (ref 12.1–17)
MCH RBC QN AUTO: 26.1 PG (ref 26–34)
MCHC RBC AUTO-ENTMCNC: 31.4 G/DL (ref 30–36.5)
MCV RBC AUTO: 83.3 FL (ref 80–99)
NRBC # BLD: 0 K/UL (ref 0–0.01)
NRBC BLD-RTO: 0 PER 100 WBC
PLATELET # BLD AUTO: 255 K/UL (ref 150–400)
PMV BLD AUTO: 9.4 FL (ref 8.9–12.9)
POTASSIUM SERPL-SCNC: 3.9 MMOL/L (ref 3.5–5.1)
PROT SERPL-MCNC: 6.4 G/DL (ref 6.4–8.2)
RBC # BLD AUTO: 3.48 M/UL (ref 4.1–5.7)
SERVICE CMNT-IMP: ABNORMAL
SERVICE CMNT-IMP: NORMAL
SODIUM SERPL-SCNC: 135 MMOL/L (ref 136–145)
WBC # BLD AUTO: 3.1 K/UL (ref 4.1–11.1)

## 2024-02-25 PROCEDURE — 6360000002 HC RX W HCPCS: Performed by: INTERNAL MEDICINE

## 2024-02-25 PROCEDURE — 82962 GLUCOSE BLOOD TEST: CPT

## 2024-02-25 PROCEDURE — 6360000002 HC RX W HCPCS: Performed by: NURSE PRACTITIONER

## 2024-02-25 PROCEDURE — 6370000000 HC RX 637 (ALT 250 FOR IP): Performed by: INTERNAL MEDICINE

## 2024-02-25 PROCEDURE — 2580000003 HC RX 258: Performed by: NURSE PRACTITIONER

## 2024-02-25 PROCEDURE — 80053 COMPREHEN METABOLIC PANEL: CPT

## 2024-02-25 PROCEDURE — 1100000000 HC RM PRIVATE

## 2024-02-25 PROCEDURE — 36415 COLL VENOUS BLD VENIPUNCTURE: CPT

## 2024-02-25 PROCEDURE — 85027 COMPLETE CBC AUTOMATED: CPT

## 2024-02-25 PROCEDURE — 6370000000 HC RX 637 (ALT 250 FOR IP): Performed by: NURSE PRACTITIONER

## 2024-02-25 PROCEDURE — 6370000000 HC RX 637 (ALT 250 FOR IP): Performed by: HOSPITALIST

## 2024-02-25 RX ORDER — INSULIN GLARGINE 100 [IU]/ML
25 INJECTION, SOLUTION SUBCUTANEOUS NIGHTLY
Status: DISCONTINUED | OUTPATIENT
Start: 2024-02-25 | End: 2024-02-26

## 2024-02-25 RX ORDER — CARVEDILOL 12.5 MG/1
12.5 TABLET ORAL 2 TIMES DAILY WITH MEALS
Status: DISCONTINUED | OUTPATIENT
Start: 2024-02-25 | End: 2024-02-26 | Stop reason: HOSPADM

## 2024-02-25 RX ADMIN — ASPIRIN 81 MG CHEWABLE TABLET 81 MG: 81 TABLET CHEWABLE at 08:51

## 2024-02-25 RX ADMIN — GUAIFENESIN 600 MG: 600 TABLET, EXTENDED RELEASE ORAL at 21:04

## 2024-02-25 RX ADMIN — CARVEDILOL 12.5 MG: 12.5 TABLET, FILM COATED ORAL at 16:45

## 2024-02-25 RX ADMIN — ENOXAPARIN SODIUM 40 MG: 100 INJECTION SUBCUTANEOUS at 08:52

## 2024-02-25 RX ADMIN — CARVEDILOL 6.25 MG: 6.25 TABLET, FILM COATED ORAL at 08:51

## 2024-02-25 RX ADMIN — ATORVASTATIN CALCIUM 20 MG: 20 TABLET, FILM COATED ORAL at 08:51

## 2024-02-25 RX ADMIN — Medication 4 UNITS: at 16:53

## 2024-02-25 RX ADMIN — INSULIN GLARGINE 25 UNITS: 100 INJECTION, SOLUTION SUBCUTANEOUS at 21:04

## 2024-02-25 RX ADMIN — Medication 2 UNITS: at 11:31

## 2024-02-25 RX ADMIN — INSULIN LISPRO 4 UNITS: 100 INJECTION, SOLUTION INTRAVENOUS; SUBCUTANEOUS at 21:27

## 2024-02-25 RX ADMIN — AMLODIPINE BESYLATE 10 MG: 5 TABLET ORAL at 08:52

## 2024-02-25 RX ADMIN — FUROSEMIDE 40 MG: 10 INJECTION, SOLUTION INTRAMUSCULAR; INTRAVENOUS at 08:52

## 2024-02-25 RX ADMIN — FUROSEMIDE 40 MG: 10 INJECTION, SOLUTION INTRAMUSCULAR; INTRAVENOUS at 16:45

## 2024-02-25 RX ADMIN — GUAIFENESIN 600 MG: 600 TABLET, EXTENDED RELEASE ORAL at 08:51

## 2024-02-25 RX ADMIN — WATER 1000 MG: 1 INJECTION INTRAMUSCULAR; INTRAVENOUS; SUBCUTANEOUS at 16:45

## 2024-02-26 VITALS
HEART RATE: 73 BPM | RESPIRATION RATE: 16 BRPM | DIASTOLIC BLOOD PRESSURE: 70 MMHG | TEMPERATURE: 97.8 F | BODY MASS INDEX: 27.58 KG/M2 | WEIGHT: 175.71 LBS | HEIGHT: 67 IN | SYSTOLIC BLOOD PRESSURE: 117 MMHG | OXYGEN SATURATION: 97 %

## 2024-02-26 DIAGNOSIS — E11.649 UNCONTROLLED TYPE 2 DIABETES MELLITUS WITH HYPOGLYCEMIA WITHOUT COMA (HCC): ICD-10-CM

## 2024-02-26 PROBLEM — J18.9 PNEUMONIA OF BOTH LUNGS DUE TO INFECTIOUS ORGANISM: Status: ACTIVE | Noted: 2024-02-26

## 2024-02-26 PROBLEM — R06.09 DYSPNEA ON EXERTION: Status: ACTIVE | Noted: 2024-02-26

## 2024-02-26 PROBLEM — N17.9 AKI (ACUTE KIDNEY INJURY) (HCC): Status: ACTIVE | Noted: 2024-02-26

## 2024-02-26 PROBLEM — J45.909 REACTIVE AIRWAY DISEASE: Status: ACTIVE | Noted: 2024-02-26

## 2024-02-26 PROBLEM — E11.65 POORLY CONTROLLED DIABETES MELLITUS (HCC): Status: ACTIVE | Noted: 2024-02-26

## 2024-02-26 LAB
ANION GAP SERPL CALC-SCNC: 7 MMOL/L (ref 5–15)
BACTERIA SPEC CULT: NORMAL
BACTERIA SPEC CULT: NORMAL
BUN SERPL-MCNC: 43 MG/DL (ref 6–20)
BUN/CREAT SERPL: 24 (ref 12–20)
CALCIUM SERPL-MCNC: 8.5 MG/DL (ref 8.5–10.1)
CHLORIDE SERPL-SCNC: 102 MMOL/L (ref 97–108)
CO2 SERPL-SCNC: 31 MMOL/L (ref 21–32)
CREAT SERPL-MCNC: 1.8 MG/DL (ref 0.7–1.3)
GLUCOSE BLD STRIP.AUTO-MCNC: 134 MG/DL (ref 65–117)
GLUCOSE BLD STRIP.AUTO-MCNC: 271 MG/DL (ref 65–117)
GLUCOSE BLD STRIP.AUTO-MCNC: 63 MG/DL (ref 65–117)
GLUCOSE SERPL-MCNC: 153 MG/DL (ref 65–100)
MAGNESIUM SERPL-MCNC: 2.3 MG/DL (ref 1.6–2.4)
PHOSPHATE SERPL-MCNC: 3.9 MG/DL (ref 2.6–4.7)
POTASSIUM SERPL-SCNC: 4.3 MMOL/L (ref 3.5–5.1)
SERVICE CMNT-IMP: ABNORMAL
SERVICE CMNT-IMP: NORMAL
SERVICE CMNT-IMP: NORMAL
SODIUM SERPL-SCNC: 140 MMOL/L (ref 136–145)

## 2024-02-26 PROCEDURE — 83735 ASSAY OF MAGNESIUM: CPT

## 2024-02-26 PROCEDURE — 99221 1ST HOSP IP/OBS SF/LOW 40: CPT | Performed by: CLINICAL NURSE SPECIALIST

## 2024-02-26 PROCEDURE — 97530 THERAPEUTIC ACTIVITIES: CPT

## 2024-02-26 PROCEDURE — 6370000000 HC RX 637 (ALT 250 FOR IP): Performed by: INTERNAL MEDICINE

## 2024-02-26 PROCEDURE — 36415 COLL VENOUS BLD VENIPUNCTURE: CPT

## 2024-02-26 PROCEDURE — 6360000002 HC RX W HCPCS: Performed by: INTERNAL MEDICINE

## 2024-02-26 PROCEDURE — 6360000002 HC RX W HCPCS: Performed by: NURSE PRACTITIONER

## 2024-02-26 PROCEDURE — 82962 GLUCOSE BLOOD TEST: CPT

## 2024-02-26 PROCEDURE — 97116 GAIT TRAINING THERAPY: CPT

## 2024-02-26 PROCEDURE — 84100 ASSAY OF PHOSPHORUS: CPT

## 2024-02-26 PROCEDURE — 6370000000 HC RX 637 (ALT 250 FOR IP): Performed by: NURSE PRACTITIONER

## 2024-02-26 PROCEDURE — 80048 BASIC METABOLIC PNL TOTAL CA: CPT

## 2024-02-26 RX ORDER — CEFDINIR 300 MG/1
300 CAPSULE ORAL 2 TIMES DAILY
Qty: 12 CAPSULE | Refills: 0 | Status: SHIPPED | OUTPATIENT
Start: 2024-02-26 | End: 2024-03-03

## 2024-02-26 RX ORDER — FUROSEMIDE 20 MG/1
20 TABLET ORAL DAILY
Qty: 60 TABLET | Refills: 0 | Status: SHIPPED | OUTPATIENT
Start: 2024-02-26 | End: 2024-02-26

## 2024-02-26 RX ORDER — PEN NEEDLE, DIABETIC 31 GX5/16"
1 NEEDLE, DISPOSABLE MISCELLANEOUS DAILY
Qty: 100 EACH | Refills: 1 | Status: SHIPPED | OUTPATIENT
Start: 2024-02-26 | End: 2024-05-26

## 2024-02-26 RX ORDER — GUAIFENESIN 600 MG/1
600 TABLET, EXTENDED RELEASE ORAL 2 TIMES DAILY
Qty: 14 TABLET | Refills: 0 | Status: SHIPPED | OUTPATIENT
Start: 2024-02-26 | End: 2024-03-04

## 2024-02-26 RX ORDER — INSULIN GLARGINE 100 [IU]/ML
20 INJECTION, SOLUTION SUBCUTANEOUS NIGHTLY
Status: DISCONTINUED | OUTPATIENT
Start: 2024-02-26 | End: 2024-02-26 | Stop reason: HOSPADM

## 2024-02-26 RX ORDER — CARVEDILOL 12.5 MG/1
12.5 TABLET ORAL 2 TIMES DAILY WITH MEALS
Qty: 60 TABLET | Refills: 0 | Status: SHIPPED | OUTPATIENT
Start: 2024-02-26

## 2024-02-26 RX ORDER — GLUCOSAMINE HCL/CHONDROITIN SU 500-400 MG
CAPSULE ORAL
Qty: 200 STRIP | Refills: 1 | Status: SHIPPED | OUTPATIENT
Start: 2024-02-26 | End: 2024-05-26

## 2024-02-26 RX ORDER — FUROSEMIDE 20 MG/1
40 TABLET ORAL DAILY
Qty: 60 TABLET | Refills: 0 | Status: SHIPPED | OUTPATIENT
Start: 2024-02-26

## 2024-02-26 RX ORDER — INSULIN LISPRO 100 [IU]/ML
6 INJECTION, SOLUTION INTRAVENOUS; SUBCUTANEOUS
Status: DISCONTINUED | OUTPATIENT
Start: 2024-02-26 | End: 2024-02-26 | Stop reason: HOSPADM

## 2024-02-26 RX ORDER — INSULIN LISPRO 100 [IU]/ML
6 INJECTION, SOLUTION INTRAVENOUS; SUBCUTANEOUS 3 TIMES DAILY
Qty: 5 ADJUSTABLE DOSE PRE-FILLED PEN SYRINGE | Refills: 1 | Status: SHIPPED | OUTPATIENT
Start: 2024-02-26

## 2024-02-26 RX ADMIN — ATORVASTATIN CALCIUM 20 MG: 20 TABLET, FILM COATED ORAL at 10:50

## 2024-02-26 RX ADMIN — GUAIFENESIN 600 MG: 600 TABLET, EXTENDED RELEASE ORAL at 10:49

## 2024-02-26 RX ADMIN — AMLODIPINE BESYLATE 10 MG: 5 TABLET ORAL at 10:49

## 2024-02-26 RX ADMIN — FUROSEMIDE 40 MG: 10 INJECTION, SOLUTION INTRAMUSCULAR; INTRAVENOUS at 10:50

## 2024-02-26 RX ADMIN — ENOXAPARIN SODIUM 40 MG: 100 INJECTION SUBCUTANEOUS at 10:56

## 2024-02-26 RX ADMIN — CARVEDILOL 12.5 MG: 12.5 TABLET, FILM COATED ORAL at 11:14

## 2024-02-26 RX ADMIN — ASPIRIN 81 MG CHEWABLE TABLET 81 MG: 81 TABLET CHEWABLE at 10:50

## 2024-02-26 ASSESSMENT — PAIN SCALES - GENERAL: PAINLEVEL_OUTOF10: 0

## 2024-02-26 NOTE — PROGRESS NOTES
Name: John Paul Juarez   MRN: [id number]  : 1955    Assessment:  CKD-3b with a Hx of HTN/DM, proteinuria 2.8 grams; likely due to DN and HTN  DM-2, uncontrolled. Suspect DN  Diastolic dysfuntion,pulm HTN  CHF  PNA  HTN    Diuresed well overnight. Cr stable and slight better. BP still high. No hydro on imaging. For d/c today     Plan/Recommendations:  Recommend at least lasix 40 mg QD  Ct Coreg/Norvasc  Will consider Cardura as next choice  Avoid ACE-I or ARB for now but we can also consider once Cr is more stable  F/u pending serologies  Labs in AM if pt still here    D/W pt and Dr Mcgraw    Subjective:  Oob in chair. Feels better. Improved CONNIE    ROS:   No nausea, no vomiting  No chest pain, +improved shortness of breath    Exam:  BP (!) 174/79   Pulse 72   Temp 97.9 °F (36.6 °C) (Oral)   Resp 18   Ht 1.702 m (5' 7\")   Wt 79.7 kg (175 lb 11.3 oz)   SpO2 97%   BMI 27.52 kg/m²     WB/WN in NAD  +Leg edema  Aox3    Labs/Data:    Lab Results   Component Value Date    WBC 3.1 (L) 2024    HGB 9.1 (L) 2024    HCT 29.0 (L) 2024    MCV 83.3 2024     2024       Lab Results   Component Value Date/Time     2024 07:13 AM    K 4.3 2024 07:13 AM     2024 07:13 AM    CO2 31 2024 07:13 AM    BUN 43 2024 07:13 AM    CREATININE 1.80 2024 07:13 AM    GLUCOSE 153 2024 07:13 AM    GLUCOSE 433 2023 11:38 AM    CALCIUM 8.5 2024 07:13 AM    LABGLOM 40 2024 07:13 AM    LABGLOM 46 2023 11:38 AM        Wt Readings from Last 3 Encounters:   24 79.7 kg (175 lb 11.3 oz)   24 83 kg (182 lb 14.4 oz)   02/10/24 84.4 kg (186 lb)         Intake/Output Summary (Last 24 hours) at 2024 1532  Last data filed at 2024 1900  Gross per 24 hour   Intake --   Output 700 ml 
                                                                                                Hospitalist Progress Note  Rosa Mcgraw MD  Answering service: [phone]        Date of Service:  2024  NAME:  John Paul Juarez  :  1955  MRN:  [id number]      Admission Summary:   John Paul Juarez is a 68 y.o. male with a past medical history significant for diabetes, hyperlipidemia, previous CVA presenting with 1 week history of progressively worsening shortness of breath.  Patient had a chest radiograph roughly 11 days prior with no acute process however further workup in the emergency department revealed oxygen saturations below 88% on room air, was placed on 2 L of supplemental oxygen via nasal cannula.  Chest radiograph with new bilateral areas of consolidation right greater than left consistent with pneumonia.  Patient was started on antibiotics in the form of ceftriaxone and azithromycin and referred to the hospitalist service for further management.  At the moment of the initial interview he was cooperative with the exam.  He denies fever, chills however does endorse productive cough, dyspnea, orthopne       Interval history / Subjective:   Patient seen and examined.   He is sitting up in a chair.    No new complaints.  Swelling of lower extremities is improving.  Renal indices stable.     Assessment & Plan:      Acute hypoxic resp failure:  -probably multifactorial: Bilateral pneumonia, Acute on Chronic CHF with diastolic dysfunction, CKD with volume overload, Pulmonary HTN;      Community-acquired pneumonia  -CT CHEST WO CONTRAST 2024  1. Bilateral airspace disease as described above is consistent with bilateral pneumonia follow-up to resolution is suggested.. 2. Main pulmonary artery segment is enlarged which can be seen with pulmonary hypertension.    -XR CHEST PORTABLE 2024  1. New bilateral areas of consolidation right greater than left most likely related to pneumonia. Follow-up 
                                                                                                Hospitalist Progress Note  Rosa Mcgraw MD  Answering service: [phone]        Date of Service:  2024  NAME:  John Paul Juarez  :  1955  MRN:  [id number]      Admission Summary:   John Paul Juarez is a 68 y.o. male with a past medical history significant for diabetes, hyperlipidemia, previous CVA presenting with 1 week history of progressively worsening shortness of breath.  Patient had a chest radiograph roughly 11 days prior with no acute process however further workup in the emergency department revealed oxygen saturations below 88% on room air, was placed on 2 L of supplemental oxygen via nasal cannula.  Chest radiograph with new bilateral areas of consolidation right greater than left consistent with pneumonia.  Patient was started on antibiotics in the form of ceftriaxone and azithromycin and referred to the hospitalist service for further management.  At the moment of the initial interview he was cooperative with the exam.  He denies fever, chills however does endorse productive cough, dyspnea, orthopne       Interval history / Subjective:   Patient seen and examined.   Has a NC on, but not in his nose.   He appears comfortable, sitting up in a chair, in no acute resp distress.   He had a coughing spell this morning, but otherwise denies any complaints.   Appreciate Nephrology consult and input.   Patient has any questions, all answered to the best of my abilities.  Patient is motivated to remain in good health.     Assessment & Plan:      Acute hypoxic resp failure:  -probably multifactorial: Bilateral pneumonia, Acute on Chronic CHF with diastolic dysfunction, CKD with volume overload, Pulmonary HTN;      Community-acquired pneumonia  -CT CHEST WO CONTRAST 2024  1. Bilateral airspace disease as described above is consistent with bilateral pneumonia follow-up to resolution is suggested.. 2. 
                                                                                                Hospitalist Progress Note  Stephanie Walker MD  Answering service: [phone]        Date of Service:  2024  NAME:  John Paul Juarez  :  1955  MRN:  [id number]      Admission Summary:   John Paul Juarez is a 68 y.o. male with a past medical history significant for diabetes, hyperlipidemia, previous CVA presenting with 1 week history of progressively worsening shortness of breath.  Patient had a chest radiograph roughly 11 days prior with no acute process however further workup in the emergency department revealed oxygen saturations below 88% on room air, was placed on 2 L of supplemental oxygen via nasal cannula.  Chest radiograph with new bilateral areas of consolidation right greater than left consistent with pneumonia.  Patient was started on antibiotics in the form of ceftriaxone and azithromycin and referred to the hospitalist service for further management.  At the moment of the initial interview he was cooperative with the exam.  He denies fever, chills however does endorse productive cough, dyspnea, orthopne       Interval history / Subjective:   BREATHING BETTER  MILD COUGH    CT CHEST WO CONTRAST    Result Date: 2024  1. Bilateral airspace disease as described above is consistent with bilateral pneumonia follow-up to resolution is suggested.. 2. Main pulmonary artery segment is enlarged which can be seen with pulmonary hypertension.    XR CHEST PORTABLE    Result Date: 2024  1. New bilateral areas of consolidation right greater than left most likely related to pneumonia. Follow-up to resolution is suggested.    Assessment & Plan:      Acute hypoxia resp failure    Community-acquired pneumonia  -Chest CT ordered and reviewed   Will check urine Legionella and mycoplasma  Continuing antibiotics in the form of ceftriaxone and azithromycin  Mucolytic  Wean O2        Chronic diastolic congestive heart 
Admission Medication Reconciliation:    Information obtained from:  Patient/RxQuery  RxQuery data available¹:  Yes    Comments/Recommendations: Updated PTA meds/reviewed patient's allergies.    1)  Met with Mr. Juarez in Bed 20 to discuss home medications. Patient provided reliable medication information that matched dispense history    Of note, patient says he has his Symbicort inhaler however he never uses it    2)  Medication changes (since last review):  Added  - N/A    Adjusted  - Insulin detemir - Adjusted to 50 units BID (previously 40 in AM 50 in PM)  - BYDUREON 2 mg/0.8 mL inj - adjusted to every     Removed  - N/A         ¹RxQuery pharmacy benefit data reflects medications filled and processed through the patient's insurance, however   this data does NOT capture whether the medication was picked up or is currently being taken by the patient.    Allergies:  Patient has no known allergies.    Significant PMH/Disease States:   Past Medical History:   Diagnosis Date    Abscess 2020    left side of abdomen    Chronic renal failure, stage 3a ([facility]) 2023    Diabetes ([facility])     Hyperlipidemia     Hypertension     ICD (implantable cardioverter-defibrillator) in place     NSVT (nonsustained ventricular tachycardia) ([facility]) 2018    EPS 2018 VT/VF     Right groin pain 2020    Stroke ([facility])     Syncope     Valvular heart disease      Chief Complaint for this Admission:    Chief Complaint   Patient presents with    Shortness of Breath     Prior to Admission Medications:   Prior to Admission Medications   Prescriptions Last Dose Informant   Aspirin 81 MG CAPS 2024    Sig: Take 81 mg by mouth daily   Exenatide ER (BYDUREON BCISE) 2 MG/0.85ML injection Past Week    Si MG BY SUBCUTANEOUS ROUTE EVERY SEVEN (7) DAYS.   Patient taking differently: 0.85 mLs every 7 days Takes every    amLODIPine (NORVASC) 10 MG tablet 2024    Sig: TAKE 1 TABLET BY MOUTH EVERY DAY   atorvastatin 
End of Shift Note    Bedside shift change report given to NINA Mcdowell (oncoming nurse) by Pia Saunders LPN (offgoing nurse).  Report included the following information SBAR    Shift worked:  0730-2000     Shift summary and any significant changes:    Nasal Cannula  Urinal  Up to chair  Tolerated diet  Tolerated medication     Concerns for physician to address:  None     Zone phone for oncoming shift:   None       Activity:     Number times ambulated in hallways past shift: 1  Number of times OOB to chair past shift: 3    Cardiac:   Cardiac Monitoring: No           Access:  Current line(s): PIV     Genitourinary:   Urinary status: voiding    Respiratory:      Chronic home O2 use?: NO  Incentive spirometer at bedside: YES       GI:     Current diet:  ADULT DIET; Regular; 4 carb choices (60 gm/meal); Low Fat/Low Chol/High Fiber/SUMI  Passing flatus: YES  Tolerating current diet: YES       Pain Management:   Patient states pain is manageable on current regimen: YES    Skin:     Interventions: turn team and increase time out of bed    Patient Safety:  Fall Score:    Interventions: bed/chair alarm, gripper socks, and pt to call before getting OOB       Length of Stay:  Expected LOS: 3  Actual LOS: 1      Pai Saunders LPN                           
End of Shift Note    Bedside shift change report given to NINA Mcdowell (oncoming nurse) by Pia Suanders LPN (offgoing nurse).  Report included the following information SBAR    Shift worked:  0730-2000     Shift summary and any significant changes:    Nasal Cannula  Urinal  Up to chair  Tolerated diet  Tolerated medication     Concerns for physician to address:  None     Zone phone for oncoming shift:   None       Activity:     Number times ambulated in hallways past shift: 1  Number of times OOB to chair past shift: 2    Cardiac:   Cardiac Monitoring: No           Access:  Current line(s): PIV     Genitourinary:   Urinary status: voiding    Respiratory:      Chronic home O2 use?: NO  Incentive spirometer at bedside: YES       GI:     Current diet:  ADULT DIET; Regular; 4 carb choices (60 gm/meal); Low Fat/Low Chol/High Fiber/SUMI  Passing flatus: YES  Tolerating current diet: YES       Pain Management:   Patient states pain is manageable on current regimen: YES    Skin:     Interventions: increase time out of bed    Patient Safety:  Fall Score:    Interventions: assistive device (walker, cane. etc), gripper socks, and pt to call before getting OOB       Length of Stay:  Expected LOS: 3  Actual LOS: 2      Pia Saunders LPN                           
RENAL  PROGRESS NOTE        Subjective:   Seen earlier  resting  Objective:   VITALS SIGNS:    BP (!) 143/74   Pulse 72   Temp 98.6 °F (37 °C) (Oral)   Resp 18   Ht 1.702 m (5' 7\")   Wt 79.7 kg (175 lb 11.3 oz)   SpO2 96%   BMI 27.52 kg/m²             Temp (24hrs), Av.2 °F (36.8 °C), Min:97.7 °F (36.5 °C), Max:98.6 °F (37 °C)         PHYSICAL EXAM:  NAD    DATA REVIEW:     INTAKE / OUTPUT:   Last shift:       07 - 1900  In: -   Out: 700 [Urine:700]  Last 3 shifts: 1901 -  0700  In: 640 [P.O.:640]  Out: 700 [Urine:700]    Intake/Output Summary (Last 24 hours) at 2024 1518  Last data filed at 2024 1106  Gross per 24 hour   Intake 340 ml   Output 1400 ml   Net -1060 ml         LABS:   LABS:  Recent Labs     24  0205 24  0500 24  1302 02/10/24  1055   * 142 139 144   K 4.8 4.1 4.4 4.5    107 107 102   CO2 29 29 26 31   BUN 38* 30* 31* 40*   CREATININE 1.86* 1.58* 1.58* 1.94*   CALCIUM 8.8 8.6 8.9 9.1   LABALBU 2.1* 2.5* 3.0* 3.3*   MG  --   --   --  1.9     Recent Labs     24  0205 24  0500 24  1302   WBC 4.4 4.8 8.3   HGB 9.3* 9.4* 10.4*   HCT 29.2* 29.5* 34.3*    255 261     No results for input(s): \"EVON\", \"KU\", \"CLU\", \"CREAU\" in the last 720 hours.    Invalid input(s): \"PROU\"      Assessment:        A/P:  CKD-3b with a Hx of HTN/DM, proteinuria 2.8 grams;need work up  DM  Diastolic dysfuntion,pulm HTN  CHF  PNA     SPEP,FLC  No hydro  KUNAL  IV diuretics  Labs in AM    Emely Best MD                 
RENAL  PROGRESS NOTE        Subjective:   weak  Objective:   VITALS SIGNS:    BP (!) 143/71   Pulse 70   Temp 97.7 °F (36.5 °C) (Oral)   Resp 16   Ht 1.702 m (5' 7\")   Wt 79.7 kg (175 lb 11.3 oz)   SpO2 97%   BMI 27.52 kg/m²             Temp (24hrs), Av.5 °F (36.9 °C), Min:97.7 °F (36.5 °C), Max:99 °F (37.2 °C)         PHYSICAL EXAM:  NAD    DATA REVIEW:     INTAKE / OUTPUT:   Last shift:       07 - 1900  In: -   Out: 700 [Urine:700]  Last 3 shifts: 1901 -  0700  In: 240 [P.O.:240]  Out: 3500 [Urine:3500]    Intake/Output Summary (Last 24 hours) at 2024 1400  Last data filed at 2024 1124  Gross per 24 hour   Intake --   Output 2800 ml   Net -2800 ml           LABS:   LABS:  Recent Labs     24  04424  0205 24  0500 24  1302 02/10/24  1055   * 135* 142   < > 144   K 3.9 4.8 4.1   < > 4.5    105 107   < > 102   CO2 30 29 29   < > 31   BUN 48* 38* 30*   < > 40*   CREATININE 1.97* 1.86* 1.58*   < > 1.94*   CALCIUM 7.9* 8.8 8.6   < > 9.1   LABALBU 2.1* 2.1* 2.5*   < > 3.3*   MG  --   --   --   --  1.9    < > = values in this interval not displayed.       Recent Labs     24  0205 24  0500   WBC 3.1* 4.4 4.8   HGB 9.1* 9.3* 9.4*   HCT 29.0* 29.2* 29.5*    236 255       No results for input(s): \"EVON\", \"KU\", \"CLU\", \"CREAU\" in the last 720 hours.    Invalid input(s): \"PROU\"      Assessment:        A/P:  CKD-3b with a Hx of HTN/DM, proteinuria 2.8 grams;need work up  DM  Diastolic dysfuntion,pulm HTN  CHF  PNA  leukopenia     SPEP,FLC  No hydro  KUNAL    Diuretics  Creat slightly up but close to baseline  Follow up as OP       Emely Best MD                 
This nurse agrees with the charting of this patient today.   
Ultrasound guided blood work obtained.    
Unable to receive sputum lab due to unproductive cough. Attempted for collection at the beginning, middle, and end of shift.  
  Output 700 ml   Net -200 ml          Physical Examination:     I had a face to face encounter with this patient and independently examined them on 2/23/2024 as outlined below:          General : alert x 3, awake, no acute distress,   HEENT: PEERL, EOMI, moist mucus membrane, TM clear  Neck: supple, no JVD, no meningeal signs  Chest: bilat crackles. Rhonchi. No wheezing   CVS: S1 S2 heard, Capillary refill less than 2 seconds  Abd: soft/ non tender, non distended, BS physiological,   Ext: no clubbing, no cyanosis, trace edema, brisk 2+ DP pulses  Neuro/Psych: pleasant mood and affect, CN 2-12 grossly intact, sensory grossly within normal limit, Strength 5/5 in all extremities, DTR 1+ x 4  Skin: warm            Data Review:    Review and/or order of clinical lab test    I have independently reviewed and interpreted patient's lab and all other diagnostic data    Notes reviewed from all clinical/nonclinical/nursing services involved in patient's clinical care. Care coordination discussions were held with appropriate clinical/nonclinical/ nursing providers based on care coordination needs.     Labs:     Recent Labs     02/22/24  0500 02/23/24  0205   WBC 4.8 4.4   HGB 9.4* 9.3*   HCT 29.5* 29.2*    236       Recent Labs     02/21/24  1302 02/22/24  0500 02/23/24  0205    142 135*   K 4.4 4.1 4.8    107 105   CO2 26 29 29   BUN 31* 30* 38*       Recent Labs     02/21/24  1302 02/22/24  0500 02/23/24  0205   ALT 39 32 26   GLOB 4.2* 3.9 4.6*       No results for input(s): \"INR\", \"APTT\" in the last 72 hours.    Invalid input(s): \"PTP\"   No results for input(s): \"TIBC\", \"FERR\" in the last 72 hours.    Invalid input(s): \"FE\", \"PSAT\"   No results found for: \"FOL\", \"RBCF\"   No results for input(s): \"PH\", \"PCO2\", \"PO2\" in the last 72 hours.  No results for input(s): \"CPK\" in the last 72 hours.    Invalid input(s): \"CPKMB\", \"CKNDX\", \"TROIQ\"  Lab Results   Component Value Date/Time    CHOL 174 11/07/2023 11:38 
none

## 2024-02-26 NOTE — PLAN OF CARE
Problem: Chronic Conditions and Co-morbidities  Goal: Patient's chronic conditions and co-morbidity symptoms are monitored and maintained or improved  Outcome: Progressing     Problem: Safety - Adult  Goal: Free from fall injury  Outcome: Progressing     
  Problem: Occupational Therapy - Adult  Goal: By Discharge: Performs self-care activities at highest level of function for planned discharge setting.  See evaluation for individualized goals.  Description: Occupational Therapy Goals  Initiated: 2/22/2024   1.  Patient will perform grooming with supervision/set-upstanding at the sink within 7 day(s).  2.  Patient will perform bathing with setup/supervision from chair within 7 day(s).  3.  Patient will perform upper body dressing and lower body dressing with supervision within 7 day(s).  4.  Patient will perform toilet transfers with supervision within 7 day(s).  5.  Patient will perform all aspects of toileting with mod I within 7 day(s).  6.  Patient will verbalize 3 energy conservation techniques with independence for ADL activities within 7 days.     FUNCTIONAL STATUS PRIOR TO ADMISSION:     , ADL Assistance: Independent,  ,  ,  ,  ,  , Homemaking Assistance: Independent, Ambulation Assistance: Independent, Transfer Assistance: Independent, Active : Yes     HOME SUPPORT: lives with her significant other.      Outcome: Progressing     OCCUPATIONAL THERAPY EVALUATION    Patient: John Paul Juarez (68 y.o. male)  Date: 2/22/2024  Primary Diagnosis: Hypoxia [R09.02]  Acute respiratory failure with hypoxia (HCC) [J96.01]  Pneumonia of both lungs due to infectious organism, unspecified part of lung [J18.9]         Precautions: Isolation, Fall Risk                  ASSESSMENT :  The patient is limited by decreased functional mobility, independence in ADLs, activity tolerance, endurance, safety awareness, desaturations following admission for hypoxia.  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  He progressed with dressing activities at supervision to min A level overall.  He needs education for energy conservation during ADL activity due to SOB.  Saturations stable with ADL activities remained above 90% with 2L O2.  PT 
  Problem: Physical Therapy - Adult  Goal: By Discharge: Performs mobility at highest level of function for planned discharge setting.  See evaluation for individualized goals.  Description: FUNCTIONAL STATUS PRIOR TO ADMISSION: Patient was independent and active without use of DME. Ambulating community distances. Works, Drives, regularly walking trails. Has fall hx (3x tripping over loose bricks on sidewalk at job).    HOME SUPPORT PRIOR TO ADMISSION: The patient lived with significant other but did not require assistance.    Physical Therapy Goals  Initiated 2/22/2024  1.  Patient will move from supine to sit and sit to supine in bed with independence within 7 day(s).    2.  Patient will perform sit to stand with independence within 7 day(s).  3.  Patient will transfer from bed to chair and chair to bed with independence using the least restrictive device within 7 day(s).  4.  Patient will ambulate with independence for 300 feet with the least restrictive device within 7 day(s).   Patient has no stairs in home thus no stair goal.       Outcome: Progressing       PHYSICAL THERAPY TREATMENT    Patient: John Paul Juarez (68 y.o. male)  Date: 2/26/2024  Diagnosis: Hypoxia [R09.02]  Acute respiratory failure with hypoxia (HCC) [J96.01]  Pneumonia of both lungs due to infectious organism, unspecified part of lung [J18.9] Hypoxia      Precautions: Isolation, Fall Risk                      ASSESSMENT:  Patient continues to benefit from skilled PT services and is progressing towards goals. Pt generally mobilized at a Modified Independent to Independent level during today's session. Pt received standing in bathroom performing ADLs, on RA, and agreeable to work with therapy. Pt performed transfers, gait training, and home oxygen assessment this session. Pt performed mobility without over LOB but with tendency to reach out for furniture/rails for support. Pt returned to room and remained up in chair where he was educated on 
  Problem: Physical Therapy - Adult  Goal: By Discharge: Performs mobility at highest level of function for planned discharge setting.  See evaluation for individualized goals.  Description: FUNCTIONAL STATUS PRIOR TO ADMISSION: Patient was independent and active without use of DME. Ambulating community distances. Works, Drives, regularly walking trails. Has fall hx (3x tripping over loose bricks on sidewalk at job).    HOME SUPPORT PRIOR TO ADMISSION: The patient lived with significant other but did not require assistance.    Physical Therapy Goals  Initiated 2/22/2024  1.  Patient will move from supine to sit and sit to supine in bed with independence within 7 day(s).    2.  Patient will perform sit to stand with independence within 7 day(s).  3.  Patient will transfer from bed to chair and chair to bed with independence using the least restrictive device within 7 day(s).  4.  Patient will ambulate with independence for 300 feet with the least restrictive device within 7 day(s).   Patient has no stairs in home thus no stair goal.       Outcome: Progressing   PHYSICAL THERAPY TREATMENT    Patient: John Paul Juarez (68 y.o. male)  Date: 2/23/2024  Diagnosis: Hypoxia [R09.02]  Acute respiratory failure with hypoxia (HCC) [J96.01]  Pneumonia of both lungs due to infectious organism, unspecified part of lung [J18.9] Hypoxia      Precautions: Isolation, Fall Risk                      ASSESSMENT:  Patient continues to benefit from skilled PT services and is progressing towards goals. He was up in the chair on arrival, with 2L O2 nasal cannula and no complaints.  Weaned him to room air at rest, and SpO2 remained 95%.  However, when he walked a short distance on room air, SpO2 dropped to 85%.  Replaced the 2L and waited for SpO2 to return to 98% at rest and then ambulated 200' on 2L.  SpO2 96% walking on 2L.    His gait and balance are good overall, though he reports his legs feel somewhat stiff from not getting up and 
  Problem: Safety - Adult  Goal: Free from fall injury  Outcome: Progressing     Problem: Chronic Conditions and Co-morbidities  Goal: Patient's chronic conditions and co-morbidity symptoms are monitored and maintained or improved  Outcome: Progressing     
  Problem: Safety - Adult  Goal: Free from fall injury  Outcome: Progressing     Problem: Chronic Conditions and Co-morbidities  Goal: Patient's chronic conditions and co-morbidity symptoms are monitored and maintained or improved  Outcome: Progressing     
intervention to address the above impairments.  Continue treatment per established plan of care to address goals.    Recommend with staff: OOB meals, Active ADL engagement    Recommend next OT session: POC progression    Recommendation for discharge: (in order for the patient to meet his/her long term goals): No skilled occupational therapy    Other factors to consider for discharge: no additional factors    IF patient discharges home will need the following DME: none       SUBJECTIVE:   Patient stated “Thanks for the information.”    OBJECTIVE DATA SUMMARY:   Cognitive/Behavioral Status:  Orientation  Overall Orientation Status: Within Normal Limits  Orientation Level: Oriented X4  Cognition  Overall Cognitive Status: WNL    Patient Education  Education Given To: Patient  Education Provided: Energy Conservation  Education Provided Comments: ADL/IADL energy conservation handout provided, as well as, tub-transfer handout  Education Method: Verbal  Barriers to Learning: None  Education Outcome: Verbalized understanding;Demonstrated understanding    Provided verbal cuing for education for breathing techniques including pursed lip breathing techniques (PLB) and circulatory breathing.  Additionally, patient was educated on energy conservation techniques, including how they specifically apply to functional activities; This includes pacing, rest breaks, and planning. Patient with good verbal understanding however needing additional cuing through out tasks to use techniques. Additional time needed during tasks.      Activity Tolerance:   Good, Fair , and requires rest breaks  Please refer to the flowsheet for vital signs taken during this treatment.    After treatment:   Patient left in no apparent distress sitting up in chair, Call bell within reach, and Bed/ chair alarm activated    COMMUNICATION/EDUCATION:   The patient's plan of care was discussed with: physical therapist, registered nurse, and     Thank you 
belt  Base of Support: Center of gravity altered;Widened  Speed/Geraldine: Slow  Stance: Weight shift  Gait Abnormalities: Decreased step clearance                                                                                                                                                                                                                                     [facility]-PAC®      Basic Mobility Inpatient Short Form (6-Clicks) Version 2    How much help is needed turning from your back to your side while in a flat bed without using bedrails?: None  How much help is needed moving from lying on your back to sitting on the side of a flat bed without using bedrails?: None  How much help is needed moving to and from a bed to a chair?: None  How much help is needed standing up from a chair using your arms?: None  How much help is needed walking in hospital room?: None  How much help is needed climbing 3-5 steps with a railing?: None    AM-PAC Inpatient Mobility Raw Score : 24  AM-PAC Inpatient T-Scale Score : 61.14     Cutoff score ?171,2,3 had higher odds of discharging home with home health or need of SNF/IPR.    1. Galina Coulter, Estela Workman, Sohan Gambino, Roselyn Lee, Angelito Dixon, Oscar Coulter.  Validity of the -PAC “6-Clicks” Inpatient Daily Activity and Basic Mobility Short Forms. Physical Therapy Mar 2014, 94 (3) 379-391; DOI: 10.2522/ptj.67231364  2. Zack IQBAL, Katherine MARTINEZ, Ambar J, Jose MARTINEZ. Association of AM-PAC \"6-Clicks\" Basic Mobility and Daily Activity Scores With Discharge Destination. Phys Ther. 2021 Apr 4;101(4):hwyi349. doi: 10.1093/ptj/pafs397. PMID: 72881103.  3. Arlene MARTINEZ, Lidia LY, Nicky S, Nury K, Fabian S. Activity Measure for Post-Acute Care \"6-Clicks\" Basic Mobility Scores Predict Discharge Destination After Acute Care Hospitalization in Select Patient Groups: A Retrospective, Observational Study. Arch Rehabil Res Clin Transl. 2022 Jul

## 2024-02-26 NOTE — CARE COORDINATION
Transition of Care: Plan for discharge home with HH, referrals are pending. Patient is independent and does not use DME. Family will transport patient home     [facility] has accepted patient.       02/26/24 1835   Condition of Participation: Discharge Planning   The Plan for Transition of Care is related to the following treatment goals: home health   The Patient and/or Patient Representative was provided with a Choice of Provider? Patient   The Patient and/Or Patient Representative agree with the Discharge Plan? Yes   Freedom of Choice list was provided with basic dialogue that supports the patient's individualized plan of care/goals, treatment preferences, and shares the quality data associated with the providers?  Yes       BLACK GR/CRM

## 2024-02-26 NOTE — DIABETES MGMT
rate.      Heart sounds: No murmur heard.  Pulmonary:      Effort: Tachypnea and respiratory distress (mild) present.      Breath sounds: Examination of the right-upper field reveals wheezing. Examination of the left-upper field reveals wheezing. Examination of the right-middle field reveals rhonchi. Examination of the left-middle field reveals rhonchi. Examination of the right-lower field reveals rhonchi. Examination of the left-lower field reveals rhonchi. Wheezing and rhonchi present.      Comments: On [place] oxygen  Abdominal:      Palpations: Abdomen is soft.      Tenderness: There is no abdominal tenderness.   LAB: . AST 44. Albumin 3. TIERRA. NT pro-. COVID negative  CXR:   New bilateral areas of consolidation right greater than left most likely   related to pneumonia.     HX:  Past Medical History:   Diagnosis Date    Abscess 12/2020    left side of abdomen    Chronic renal failure, stage 3a ([facility]) 11/7/2023    Diabetes ([facility])     Hyperlipidemia     Hypertension     ICD (implantable cardioverter-defibrillator) in place     NSVT (nonsustained ventricular tachycardia) ([facility]) 11/21/2018    EPS 11/21/2018 VT/VF     Right groin pain 12/7/2020    Stroke ([facility])     Syncope     Valvular heart disease       INITIAL DX: Hypoxia [R09.02]  Acute respiratory failure with hypoxia ([facility]) [J96.01]  Pneumonia of both lungs due to infectious organism, unspecified part of lung [J18.9]     Current Treatment     TX: BP & BG management. Lipitor. ASA.     Hospital Course   Clinical progress has been uncomplicated.     Diabetes History   Type 2 diabetes X20 years, originally treated with metformin. Insulin & a GLP-1 added later  Positive family history of diabetes in mother and brothers  Sees PCP      Diabetes-related Medical History  Microvascular disease  Retinopathy and Nephropathy  Macrovascular disease  cerebral vascular accident    Diabetes Medication History  Drug class Currently in use   Biguanide [x] Metformin

## 2024-02-26 NOTE — ADT AUTH CERT
Progress Notes by Rosa Mcgraw MD at 2024  1:30 PM    Author: Rosa Mcgraw MD Service: Internal Medicine Author Type: Physician   Filed: 2024  1:42 PM Date of Service: 2024  1:30 PM Status: Signed   : Rosa Mcgraw MD (Physician)                                                                                                                                                              Hospitalist Progress Note  Rosa Mcgraw MD  Answering service: [phone]         Date of Service:  2024  NAME:  John Paul Juarez  :  1955  MRN:  [id number]        Admission Summary:   John Paul Juarez is a 68 y.o. male with a past medical history significant for diabetes, hyperlipidemia, previous CVA presenting with 1 week history of progressively worsening shortness of breath.  Patient had a chest radiograph roughly 11 days prior with no acute process however further workup in the emergency department revealed oxygen saturations below 88% on room air, was placed on 2 L of supplemental oxygen via nasal cannula.  Chest radiograph with new bilateral areas of consolidation right greater than left consistent with pneumonia.  Patient was started on antibiotics in the form of ceftriaxone and azithromycin and referred to the hospitalist service for further management.  At the moment of the initial interview he was cooperative with the exam.  He denies fever, chills however does endorse productive cough, dyspnea, orthopne        Interval history / Subjective:   Patient seen and examined.   He is sitting up in a chair.    No new complaints.  Swelling of lower extremities is improving.  Renal indices stable.      Assessment & Plan:       Acute hypoxic resp failure:  -probably multifactorial: Bilateral pneumonia, Acute on Chronic CHF with diastolic dysfunction, CKD with volume overload, Pulmonary HTN;       Community-acquired pneumonia  -CT CHEST WO CONTRAST 2024  1. Bilateral airspace disease as

## 2024-02-26 NOTE — DISCHARGE SUMMARY
Discharge Summary       PATIENT ID: John Paul Juarez  MRN: [id number]   YOB: 1955    DATE OF ADMISSION: 2/21/2024 12:42 PM    DATE OF DISCHARGE: ***   PRIMARY CARE PROVIDER: Junior Deras MD     ATTENDING PHYSICIAN: ***  DISCHARGING PROVIDER: Rosa Mcgraw MD    To contact this individual call [phone] and ask the  to page.  If unavailable ask to be transferred the Adult Hospitalist Department.    CONSULTATIONS: IP WOUND CARE NURSE CONSULT TO EVAL  IP CONSULT TO NEPHROLOGY  IP CONSULT TO DIABETES MANAGEMENT  IP CONSULT TO DIETITIAN    PROCEDURES/SURGERIES: * No surgery found *     ADMITTING DIAGNOSES & HOSPITAL COURSE:   ***        DISCHARGE DIAGNOSES / PLAN:       ***       PENDING TEST RESULTS:   At the time of discharge the following test results are still pending: ***    FOLLOW UP APPOINTMENTS:    Follow-up Information       Follow up With Specialties Details Why Contact Info    Junior Deras MD Internal Medicine Schedule an appointment as soon as possible for a visit in 2 week(s) Follow-up for general medical follow-up, medications refills, repeat labs: CBC, BMP, Mg, Phos; Repeat Chest X-ray in 2 weeks to follow-up on pneumonia; [address]  [place] [address]  [phone]      Junior Deras MD Internal Medicine   [address] [place] [address] [address]  [place] [id number] [phone]      Emely Best MD Nephrology Schedule an appointment as soon as possible for a visit in 2 week(s) Follow-up for Chronic Kidney Disease; [address] [place] [id number] [phone]                ADDITIONAL CARE RECOMMENDATIONS: ***    DIET: {diet:62179}  Oral Nutritional Supplements: {NUTRITION SUPPLEMENTS:232165}{Frequencies; times a day:13053}    ACTIVITY: {discharge activity:26727}    WOUND CARE: ***    EQUIPMENT needed: ***      DISCHARGE MEDICATIONS:     Medication List        START taking these medications      carvedilol 12.5 MG tablet  Commonly known

## 2024-02-26 NOTE — DISCHARGE INSTRUCTIONS
With:   OT  PT    RN       SNF/Inpatient Rehab/LTAC    Independent/assisted living    Hospice    Other:     CDMP Checked:   Yes IK     PROBLEM LIST Updated:  Yes IK       Signed:   Rosa Mcgraw MD  2/26/2024  10:06 AM

## 2024-02-27 ENCOUNTER — TELEPHONE (OUTPATIENT)
Facility: HOSPITAL | Age: 69
End: 2024-02-27

## 2024-02-27 NOTE — PROGRESS NOTES
Comes in for return visit after a long absence.  He complains of swelling of his legs, abdomen and arms.  It is predominantly on the left side.    He has had a cough for the last three weeks and accompanying this has been shortness of breath.  His cough is nonproductive.    From a diabetic perspective, he has been taking Levemir 50 units twice a day, along with Bydureon Bcise and prandial insulin.  His diabetic control has been extremely poor because he never follows up and never follows directions as far as the things he should do to maintain relatively euglycemia.    As a direct result of his noncompliance, he has developed significant microvascular damage consisting of proliferative diabetic retinopathy, peripheral neuropathy and thus far his renal status has been stable.

## 2024-02-27 NOTE — PROGRESS NOTES
[facility]  [facility]  [address]  [facility] [address]  Phone:  [phone]  Fax: [phone]       Chief Complaint   Patient presents with    Follow-up    Diabetes   .      SUBJECTIVE:    John Paul Juarez is a 68 y.o. male  Dictation on: 02/26/2024 11:16 PM by: OWEN MEJIAS [[id number]]          Current Outpatient Medications   Medication Sig Dispense Refill    Exenatide ER (BYDUREON BCISE) 2 MG/0.85ML injection 2 MG BY SUBCUTANEOUS ROUTE EVERY SEVEN (7) DAYS. (Patient taking differently: 0.85 mLs every 7 days Takes every Sunday) 12 Adjustable Dose Pre-filled Pen Syringe 3    metFORMIN (GLUCOPHAGE) 1000 MG tablet TAKE 1 TABLET BY MOUTH IN THE EVENING BEFORE DINNER 90 tablet 2    amLODIPine (NORVASC) 10 MG tablet TAKE 1 TABLET BY MOUTH EVERY DAY 90 tablet 3    atorvastatin (LIPITOR) 20 MG tablet Take 1 tablet by mouth daily 30 tablet 11    Aspirin 81 MG CAPS Take 81 mg by mouth daily      guaiFENesin (MUCINEX) 600 MG extended release tablet Take 1 tablet by mouth 2 times daily for 7 days 14 tablet 0    carvedilol (COREG) 12.5 MG tablet Take 1 tablet by mouth 2 times daily (with meals) 60 tablet 0    cefdinir (OMNICEF) 300 MG capsule Take 1 capsule by mouth 2 times daily for 6 days 12 capsule 0    blood glucose monitor strips by Other route 3 times daily (before meals) Test 3 times a day & as needed for symptoms of irregular blood glucose. Dispense sufficient amount for indicated testing frequency plus additional to accommodate PRN testing needs. 200 strip 1    Lancets 30G MISC 1 each by Does not apply route 3 times daily Test 3 times a day & as needed for symptoms of irregular blood glucose. Dispense sufficient amount for indicated testing frequency plus additional to accommodate PRN testing needs. 200 each 1    insulin glargine (LANTUS;BASAGLAR) 100 UNIT/ML injection pen Inject 20 Units into the skin nightly Okay to substitute Basaglar or Semglee or Levemir or Rezvoglar. 5

## 2024-02-27 NOTE — PROGRESS NOTES
1. I am not entirely sure of the etiology of the patient's pulmonary symptoms.  His exam is for all practical purposes benign.  For that reason, CT scan of the chest will be obtained.    2. Blood pressure is reasonable.  3. He has chronic venous insufficiency as the etiology of the orthostatic swelling of his lower extremities.  4. He has mild chronic renal failure, probably related to microvascular complications, contrary to what I stated earlier.  5. He has mild reactive airway disease, but this is not the source of his shortness of breath.  6. His diabetes remains poorly controlled because of his noncompliance with follow up and doing the things that he should do to maintain a euglycemic status.  7. He has an increased cardiovascular risk and remains on a statin.  8. I had a long discussion with him about his noncompliance and the reason he is developing complications currently.  I will see him back in the office in two weeks, which will be somewhat of a test to determine if he is going to make a difference as far as his medical status is concerned.

## 2024-02-28 RX ORDER — INSULIN DEGLUDEC INJECTION 100 U/ML
40 INJECTION, SOLUTION SUBCUTANEOUS 2 TIMES DAILY
Qty: 36 ADJUSTABLE DOSE PRE-FILLED PEN SYRINGE | Refills: 3 | Status: SHIPPED | OUTPATIENT
Start: 2024-02-28 | End: 2024-02-28

## 2024-03-07 ENCOUNTER — OFFICE VISIT (OUTPATIENT)
Facility: CLINIC | Age: 69
End: 2024-03-07
Payer: COMMERCIAL

## 2024-03-07 VITALS
OXYGEN SATURATION: 99 % | SYSTOLIC BLOOD PRESSURE: 134 MMHG | TEMPERATURE: 97.9 F | HEART RATE: 68 BPM | WEIGHT: 170.2 LBS | RESPIRATION RATE: 16 BRPM | DIASTOLIC BLOOD PRESSURE: 68 MMHG | BODY MASS INDEX: 26.71 KG/M2 | HEIGHT: 67 IN

## 2024-03-07 DIAGNOSIS — I47.20 VENTRICULAR TACHYCARDIA (HCC): ICD-10-CM

## 2024-03-07 DIAGNOSIS — Z91.199 HISTORY OF NONCOMPLIANCE WITH MEDICAL TREATMENT: ICD-10-CM

## 2024-03-07 DIAGNOSIS — N17.9 AKI (ACUTE KIDNEY INJURY) (HCC): Primary | ICD-10-CM

## 2024-03-07 DIAGNOSIS — E11.65 POORLY CONTROLLED DIABETES MELLITUS (HCC): ICD-10-CM

## 2024-03-07 DIAGNOSIS — E13.319 RETINOPATHY DUE TO SECONDARY DIABETES MELLITUS (HCC): ICD-10-CM

## 2024-03-07 DIAGNOSIS — E78.5 DYSLIPIDEMIA: ICD-10-CM

## 2024-03-07 DIAGNOSIS — N18.31 CHRONIC RENAL FAILURE, STAGE 3A (HCC): ICD-10-CM

## 2024-03-07 DIAGNOSIS — I10 PRIMARY HYPERTENSION: ICD-10-CM

## 2024-03-07 PROCEDURE — 99214 OFFICE O/P EST MOD 30 MIN: CPT | Performed by: INTERNAL MEDICINE

## 2024-03-07 PROCEDURE — 3075F SYST BP GE 130 - 139MM HG: CPT | Performed by: INTERNAL MEDICINE

## 2024-03-07 PROCEDURE — 3078F DIAST BP <80 MM HG: CPT | Performed by: INTERNAL MEDICINE

## 2024-03-07 PROCEDURE — 3046F HEMOGLOBIN A1C LEVEL >9.0%: CPT | Performed by: INTERNAL MEDICINE

## 2024-03-07 PROCEDURE — 1123F ACP DISCUSS/DSCN MKR DOCD: CPT | Performed by: INTERNAL MEDICINE

## 2024-03-07 RX ORDER — INSULIN DEGLUDEC INJECTION 100 U/ML
INJECTION, SOLUTION SUBCUTANEOUS
COMMUNITY
Start: 2024-02-28

## 2024-03-07 SDOH — HEALTH STABILITY: PHYSICAL HEALTH: ON AVERAGE, HOW MANY MINUTES DO YOU ENGAGE IN EXERCISE AT THIS LEVEL?: 0 MIN

## 2024-03-07 SDOH — ECONOMIC STABILITY: TRANSPORTATION INSECURITY
IN THE PAST 12 MONTHS, HAS THE LACK OF TRANSPORTATION KEPT YOU FROM MEDICAL APPOINTMENTS OR FROM GETTING MEDICATIONS?: NO

## 2024-03-07 SDOH — HEALTH STABILITY: PHYSICAL HEALTH: ON AVERAGE, HOW MANY DAYS PER WEEK DO YOU ENGAGE IN MODERATE TO STRENUOUS EXERCISE (LIKE A BRISK WALK)?: 0 DAYS

## 2024-03-07 SDOH — ECONOMIC STABILITY: INCOME INSECURITY: IN THE LAST 12 MONTHS, WAS THERE A TIME WHEN YOU WERE NOT ABLE TO PAY THE MORTGAGE OR RENT ON TIME?: NO

## 2024-03-07 SDOH — ECONOMIC STABILITY: TRANSPORTATION INSECURITY
IN THE PAST 12 MONTHS, HAS LACK OF TRANSPORTATION KEPT YOU FROM MEETINGS, WORK, OR FROM GETTING THINGS NEEDED FOR DAILY LIVING?: NO

## 2024-03-07 SDOH — ECONOMIC STABILITY: HOUSING INSECURITY: IN THE LAST 12 MONTHS, HOW MANY PLACES HAVE YOU LIVED?: 1

## 2024-03-07 ASSESSMENT — PATIENT HEALTH QUESTIONNAIRE - PHQ9
SUM OF ALL RESPONSES TO PHQ QUESTIONS 1-9: 0
SUM OF ALL RESPONSES TO PHQ QUESTIONS 1-9: 0
1. LITTLE INTEREST OR PLEASURE IN DOING THINGS: 0
SUM OF ALL RESPONSES TO PHQ QUESTIONS 1-9: 0
SUM OF ALL RESPONSES TO PHQ QUESTIONS 1-9: 0
2. FEELING DOWN, DEPRESSED OR HOPELESS: 0
SUM OF ALL RESPONSES TO PHQ9 QUESTIONS 1 & 2: 0

## 2024-03-07 ASSESSMENT — ANXIETY QUESTIONNAIRES
IF YOU CHECKED OFF ANY PROBLEMS ON THIS QUESTIONNAIRE, HOW DIFFICULT HAVE THESE PROBLEMS MADE IT FOR YOU TO DO YOUR WORK, TAKE CARE OF THINGS AT HOME, OR GET ALONG WITH OTHER PEOPLE: NOT DIFFICULT AT ALL
2. NOT BEING ABLE TO STOP OR CONTROL WORRYING: 0
5. BEING SO RESTLESS THAT IT IS HARD TO SIT STILL: 0
6. BECOMING EASILY ANNOYED OR IRRITABLE: 0
3. WORRYING TOO MUCH ABOUT DIFFERENT THINGS: 0
1. FEELING NERVOUS, ANXIOUS, OR ON EDGE: 0
GAD7 TOTAL SCORE: 0
4. TROUBLE RELAXING: 0
7. FEELING AFRAID AS IF SOMETHING AWFUL MIGHT HAPPEN: 0

## 2024-03-07 ASSESSMENT — LIFESTYLE VARIABLES
HOW OFTEN DO YOU HAVE A DRINK CONTAINING ALCOHOL: NEVER
HOW MANY STANDARD DRINKS CONTAINING ALCOHOL DO YOU HAVE ON A TYPICAL DAY: PATIENT DOES NOT DRINK

## 2024-03-07 ASSESSMENT — SOCIAL DETERMINANTS OF HEALTH (SDOH)
WITHIN THE LAST YEAR, HAVE YOU BEEN KICKED, HIT, SLAPPED, OR OTHERWISE PHYSICALLY HURT BY YOUR PARTNER OR EX-PARTNER?: NO
WITHIN THE LAST YEAR, HAVE YOU BEEN HUMILIATED OR EMOTIONALLY ABUSED IN OTHER WAYS BY YOUR PARTNER OR EX-PARTNER?: NO
WITHIN THE LAST YEAR, HAVE YOU BEEN AFRAID OF YOUR PARTNER OR EX-PARTNER?: NO

## 2024-03-07 NOTE — PROGRESS NOTES
Chief Complaint   Patient presents with    Follow-up    Hypertension    Diabetes     Patient here for follow up high blood pressure and diabetes.      \"Have you been to the ER, urgent care clinic since your last visit?  Hospitalized since your last visit?\"    NO    “Have you seen or consulted any other health care providers outside of [facility] since your last visit?”    NO

## 2024-03-08 LAB
BUN SERPL-MCNC: 42 MG/DL (ref 8–27)
BUN/CREAT SERPL: 22 (ref 10–24)
CALCIUM SERPL-MCNC: 8.7 MG/DL (ref 8.6–10.2)
CHLORIDE SERPL-SCNC: 107 MMOL/L (ref 96–106)
CO2 SERPL-SCNC: 21 MMOL/L (ref 20–29)
CREAT SERPL-MCNC: 1.91 MG/DL (ref 0.76–1.27)
EGFRCR SERPLBLD CKD-EPI 2021: 37 ML/MIN/1.73
GLUCOSE SERPL-MCNC: 128 MG/DL (ref 70–99)
POTASSIUM SERPL-SCNC: 4.7 MMOL/L (ref 3.5–5.2)
SODIUM SERPL-SCNC: 145 MMOL/L (ref 134–144)

## 2024-03-14 RX ORDER — BLOOD-GLUCOSE SENSOR
EACH MISCELLANEOUS
Qty: 2 EACH | Refills: 11 | OUTPATIENT
Start: 2024-03-14

## 2024-03-14 RX ORDER — BLOOD-GLUCOSE,RECEIVER,CONT
EACH MISCELLANEOUS
Qty: 1 EACH | Refills: 0 | OUTPATIENT
Start: 2024-03-14

## 2024-03-15 NOTE — TELEPHONE ENCOUNTER
Pt called stating that he does not need the reader, just the freestyle sensors are needed.  molly SESAY

## 2024-03-18 NOTE — TELEPHONE ENCOUNTER
Please call Jody Monzon from [facility] regarding the scan pt had. Stated that pt is in serious pain and wanted to know if Dr Edith Lopez wanted to due another scan. Postop Progress Note    Subjective    Shirley Tomlinson presents to the office 4 weeks  following foot sx . The patient is not having any pain.      Objective    Vitals:    03/18/24 1358   Temp: 98.2 °F (36.8 °C)     General: alert, cooperative, and no distress  Incision: healing well, no drainage, no erythema, no hernia, no seroma, no swelling, well approximated    Assessment    Doing well postoperatively.     Plan   1. Continue any current medications  2. Wound care discussed  3. Wound/Incision: healing well  4. Disposition:   Limited activities.  No heavy lifting.  No strenuous exercise.  Should not return to gym class or sports until cleared by a physician.  5. Diet: regular diet  6. Follow up: 2 week.           Electronically signed by Bebo Lovelace DPM on 3/18/2024 at 2:04 PM

## 2024-03-23 DIAGNOSIS — E11.649 UNCONTROLLED TYPE 2 DIABETES MELLITUS WITH HYPOGLYCEMIA WITHOUT COMA (HCC): Primary | ICD-10-CM

## 2024-03-23 RX ORDER — BLOOD-GLUCOSE SENSOR
EACH MISCELLANEOUS
Qty: 2 EACH | Refills: 11 | OUTPATIENT
Start: 2024-03-23

## 2024-03-27 DIAGNOSIS — E11.649 UNCONTROLLED TYPE 2 DIABETES MELLITUS WITH HYPOGLYCEMIA WITHOUT COMA (HCC): ICD-10-CM

## 2024-04-10 ENCOUNTER — TELEPHONE (OUTPATIENT)
Facility: CLINIC | Age: 69
End: 2024-04-10

## 2024-04-10 NOTE — TELEPHONE ENCOUNTER
Received another FMLA form for patient through [employer] I will place them in the basket on Dr. Deras desk. Thank you

## 2024-05-13 ENCOUNTER — HOSPITAL ENCOUNTER (EMERGENCY)
Facility: HOSPITAL | Age: 69
Discharge: HOME | End: 2024-05-13
Attending: EMERGENCY MEDICINE
Payer: COMMERCIAL

## 2024-05-13 ENCOUNTER — APPOINTMENT (OUTPATIENT)
Facility: HOSPITAL | Age: 69
End: 2024-05-13
Attending: EMERGENCY MEDICINE
Payer: COMMERCIAL

## 2024-05-13 VITALS
TEMPERATURE: 98 F | DIASTOLIC BLOOD PRESSURE: 91 MMHG | OXYGEN SATURATION: 98 % | SYSTOLIC BLOOD PRESSURE: 142 MMHG | RESPIRATION RATE: 20 BRPM | HEART RATE: 88 BPM

## 2024-05-13 DIAGNOSIS — L03.115 CELLULITIS OF RIGHT LOWER EXTREMITY: ICD-10-CM

## 2024-05-13 DIAGNOSIS — Z76.0 ENCOUNTER FOR MEDICATION REFILL: ICD-10-CM

## 2024-05-13 DIAGNOSIS — R60.0 BILATERAL LOWER EXTREMITY EDEMA: Primary | ICD-10-CM

## 2024-05-13 LAB
ANION GAP SERPL CALC-SCNC: 7 MMOL/L (ref 5–15)
APPEARANCE UR: CLEAR
BACTERIA URNS QL MICRO: NEGATIVE /HPF
BASOPHILS # BLD: 0.1 K/UL (ref 0–0.1)
BASOPHILS NFR BLD: 1 % (ref 0–1)
BILIRUB UR QL: NEGATIVE
BUN SERPL-MCNC: 53 MG/DL (ref 6–20)
BUN/CREAT SERPL: 24 (ref 12–20)
CALCIUM SERPL-MCNC: 8.6 MG/DL (ref 8.5–10.1)
CHLORIDE SERPL-SCNC: 102 MMOL/L (ref 97–108)
CO2 SERPL-SCNC: 31 MMOL/L (ref 21–32)
COLOR UR: ABNORMAL
CREAT SERPL-MCNC: 2.18 MG/DL (ref 0.7–1.3)
DIFFERENTIAL METHOD BLD: ABNORMAL
EOSINOPHIL # BLD: 0.1 K/UL (ref 0–0.4)
EOSINOPHIL NFR BLD: 2 % (ref 0–7)
EPITH CASTS URNS QL MICRO: ABNORMAL /LPF
ERYTHROCYTE [DISTWIDTH] IN BLOOD BY AUTOMATED COUNT: 15.3 % (ref 11.5–14.5)
GLUCOSE SERPL-MCNC: 193 MG/DL (ref 65–100)
GLUCOSE UR STRIP.AUTO-MCNC: 100 MG/DL
HCT VFR BLD AUTO: 31.9 % (ref 36.6–50.3)
HGB BLD-MCNC: 10 G/DL (ref 12.1–17)
HGB UR QL STRIP: ABNORMAL
IMM GRANULOCYTES # BLD AUTO: 0 K/UL (ref 0–0.04)
IMM GRANULOCYTES NFR BLD AUTO: 0 % (ref 0–0.5)
KETONES UR QL STRIP.AUTO: NEGATIVE MG/DL
LEUKOCYTE ESTERASE UR QL STRIP.AUTO: NEGATIVE
LYMPHOCYTES # BLD: 0.6 K/UL (ref 0.8–3.5)
LYMPHOCYTES NFR BLD: 9 % (ref 12–49)
MCH RBC QN AUTO: 25.7 PG (ref 26–34)
MCHC RBC AUTO-ENTMCNC: 31.3 G/DL (ref 30–36.5)
MCV RBC AUTO: 82 FL (ref 80–99)
MONOCYTES # BLD: 0.5 K/UL (ref 0–1)
MONOCYTES NFR BLD: 7 % (ref 5–13)
NEUTS SEG # BLD: 5.2 K/UL (ref 1.8–8)
NEUTS SEG NFR BLD: 81 % (ref 32–75)
NITRITE UR QL STRIP.AUTO: NEGATIVE
NRBC # BLD: 0 K/UL (ref 0–0.01)
NRBC BLD-RTO: 0 PER 100 WBC
PH UR STRIP: 5 (ref 5–8)
PLATELET # BLD AUTO: 215 K/UL (ref 150–400)
PMV BLD AUTO: 10 FL (ref 8.9–12.9)
POTASSIUM SERPL-SCNC: 3.6 MMOL/L (ref 3.5–5.1)
PROT UR STRIP-MCNC: 100 MG/DL
RBC # BLD AUTO: 3.89 M/UL (ref 4.1–5.7)
RBC #/AREA URNS HPF: ABNORMAL /HPF (ref 0–5)
RBC MORPH BLD: ABNORMAL
SODIUM SERPL-SCNC: 140 MMOL/L (ref 136–145)
SP GR UR REFRACTOMETRY: 1.01
URINE CULTURE IF INDICATED: ABNORMAL
UROBILINOGEN UR QL STRIP.AUTO: 0.2 EU/DL (ref 0.2–1)
WBC # BLD AUTO: 6.5 K/UL (ref 4.1–11.1)
WBC URNS QL MICRO: ABNORMAL /HPF (ref 0–4)

## 2024-05-13 PROCEDURE — 81001 URINALYSIS AUTO W/SCOPE: CPT

## 2024-05-13 PROCEDURE — 85025 COMPLETE CBC W/AUTO DIFF WBC: CPT

## 2024-05-13 PROCEDURE — 99284 EMERGENCY DEPT VISIT MOD MDM: CPT

## 2024-05-13 PROCEDURE — 93970 EXTREMITY STUDY: CPT | Performed by: INTERNAL MEDICINE

## 2024-05-13 PROCEDURE — 36415 COLL VENOUS BLD VENIPUNCTURE: CPT

## 2024-05-13 PROCEDURE — 80048 BASIC METABOLIC PNL TOTAL CA: CPT

## 2024-05-13 PROCEDURE — 93970 EXTREMITY STUDY: CPT

## 2024-05-13 RX ORDER — CEPHALEXIN 500 MG/1
500 CAPSULE ORAL 4 TIMES DAILY
Qty: 28 CAPSULE | Refills: 0 | Status: SHIPPED | OUTPATIENT
Start: 2024-05-13

## 2024-05-13 RX ORDER — FUROSEMIDE 40 MG/1
40 TABLET ORAL DAILY
Qty: 30 TABLET | Refills: 0 | Status: SHIPPED | OUTPATIENT
Start: 2024-05-13

## 2024-05-13 NOTE — ED NOTES
Discharge instructions were given to the patient by Catalino Milton RN  .  The patient left the Emergency Department alert and oriented and in no acute distress with 2 prescription(s). The patient was encouraged to call or return to the ED for worsening issues or problems and was encouraged to schedule a follow up appointment for continuing care.  The patient verbalized understanding of discharge instructions and prescriptions; all questions were answered. The patient has no further concerns at this time.

## 2024-05-13 NOTE — ED NOTES
Pt presents to ED complaining of bilateral leg swelling with sores on the lower right leg. Pt states he has been feeling sharp pain on his mid tibial shank that radiates up his leg with palpation since yesterday. Pt has hx of diabetes. Pt is alert and oriented x 4, RR even and unlabored, skin is warm and dry. Pt appears in NAD at this time. Assessment completed and pt updated on plan of care.  Call bell in reach.   Emergency Department Nursing Plan of Care  The Nursing Plan of Care is developed from the Nursing assessment and Emergency Department Attending provider initial evaluation.  The plan of care may be reviewed in the “ED Provider note”.  The Plan of Care was developed with the following considerations:  Patient / Family readiness to learn indicated by:Refer to Medical chart in [facility]  Persons(s) to be included in education: Refer to Medical chart in [facility]  Barriers to Learning/Limitations:Normal

## 2024-05-13 NOTE — ED PROVIDER NOTES
SOFT TISSUE ABSCESS  12/22/2020    US DRAIN SOFT TISSUE ABSCESS 12/22/2020 [facility] RAD US       Family History:  Family History   Problem Relation Age of Onset    Diabetes Mother     Hypertension Mother     Stroke Mother        Social History:  Social History     Tobacco Use    Smoking status: Never    Smokeless tobacco: Never   Vaping Use    Vaping Use: Never used   Substance Use Topics    Alcohol use: No    Drug use: No       Allergies:  No Known Allergies    CURRENT MEDICATIONS      Discharge Medication List as of 5/13/2024  3:14 PM        CONTINUE these medications which have NOT CHANGED    Details   Continuous Blood Gluc Sensor (FREESTYLE AVA 2 SENSOR) MISC Blood sugar checks before meals and at bedtime and as needed, Disp-2 each, R-2Normal      Continuous Blood Gluc  (FREESTYLE AVA 2 READER) DARION Blood sugar checks before meals and at bedtime and as needed, Disp-1 each, R-0Normal      TRESIBA FLEXTOUCH 100 UNIT/ML SOPN INJECT 40 UNITS INTO THE SKIN 2 TIMES DAILY, DAWHistorical Med      Insulin Degludec 200 UNIT/ML SOPN INJECT 50 UNITS TWICE A DAY. DX.E11.9, Disp-5 Adjustable Dose Pre-filled Pen Syringe, R-11Normal      carvedilol (COREG) 12.5 MG tablet Take 1 tablet by mouth 2 times daily (with meals), Disp-60 tablet, R-0Normal      blood glucose monitor strips by Other route 3 times daily (before meals) Test 3 times a day & as needed for symptoms of irregular blood glucose. Dispense sufficient amount for indicated testing frequency plus additional to accommodate PRN testing needs., Other, 3 TIMES DAILY BEFORE  MEALS Starting Mon 2/26/2024, Until Sun 5/26/2024, For 90 days, Disp-200 strip, R-1, Normal      Lancets 30G MISC 3 TIMES DAILY Starting Mon 2/26/2024, Until Sun 5/26/2024, For 90 days, Disp-200 each, R-1, NormalTest 3 times a day & as needed for symptoms of irregular blood glucose. Dispense sufficient amount for indicated testing frequency plus additional to accom modate PRN testing needs.

## 2024-05-13 NOTE — DISCHARGE INSTRUCTIONS
Please take the antibiotics and resume your lasix. Follow-up with your primary care doctor. Return for new or worsening symptoms at any time.

## 2024-05-13 NOTE — ED TRIAGE NOTES
Patient is ambulatory to triage with cc of right leg swelling x5 weeks after being without lasix. He states as of yesterday this same leg has been sore.

## 2024-07-07 DIAGNOSIS — E78.5 HYPERLIPIDEMIA, UNSPECIFIED: ICD-10-CM

## 2024-07-09 RX ORDER — AMLODIPINE BESYLATE 10 MG/1
TABLET ORAL
Qty: 90 TABLET | Refills: 3 | Status: SHIPPED | OUTPATIENT
Start: 2024-07-09

## 2024-07-09 RX ORDER — ATORVASTATIN CALCIUM 20 MG/1
20 TABLET, FILM COATED ORAL DAILY
Qty: 90 TABLET | Refills: 3 | Status: SHIPPED | OUTPATIENT
Start: 2024-07-09

## 2024-07-12 ENCOUNTER — OFFICE VISIT (OUTPATIENT)
Facility: CLINIC | Age: 69
End: 2024-07-12
Payer: COMMERCIAL

## 2024-07-12 VITALS
WEIGHT: 175.3 LBS | HEIGHT: 67 IN | BODY MASS INDEX: 27.51 KG/M2 | DIASTOLIC BLOOD PRESSURE: 79 MMHG | TEMPERATURE: 97.5 F | RESPIRATION RATE: 18 BRPM | HEART RATE: 74 BPM | OXYGEN SATURATION: 98 % | SYSTOLIC BLOOD PRESSURE: 146 MMHG

## 2024-07-12 DIAGNOSIS — I73.9 PERIPHERAL VASCULAR OCCLUSIVE DISEASE (HCC): ICD-10-CM

## 2024-07-12 DIAGNOSIS — E11.42 DIABETIC POLYNEUROPATHY ASSOCIATED WITH TYPE 2 DIABETES MELLITUS (HCC): ICD-10-CM

## 2024-07-12 DIAGNOSIS — B35.1 ONYCHOMYCOSIS: ICD-10-CM

## 2024-07-12 DIAGNOSIS — N18.31 CHRONIC RENAL FAILURE, STAGE 3A (HCC): ICD-10-CM

## 2024-07-12 DIAGNOSIS — B35.3 TINEA PEDIS OF BOTH FEET: ICD-10-CM

## 2024-07-12 DIAGNOSIS — L97.921 ULCER OF LEFT LOWER LEG, LIMITED TO BREAKDOWN OF SKIN (HCC): Primary | ICD-10-CM

## 2024-07-12 DIAGNOSIS — E78.5 DYSLIPIDEMIA: ICD-10-CM

## 2024-07-12 PROCEDURE — 3077F SYST BP >= 140 MM HG: CPT | Performed by: INTERNAL MEDICINE

## 2024-07-12 PROCEDURE — 99214 OFFICE O/P EST MOD 30 MIN: CPT | Performed by: INTERNAL MEDICINE

## 2024-07-12 PROCEDURE — 3078F DIAST BP <80 MM HG: CPT | Performed by: INTERNAL MEDICINE

## 2024-07-12 PROCEDURE — 1123F ACP DISCUSS/DSCN MKR DOCD: CPT | Performed by: INTERNAL MEDICINE

## 2024-07-12 PROCEDURE — 3046F HEMOGLOBIN A1C LEVEL >9.0%: CPT | Performed by: INTERNAL MEDICINE

## 2024-07-13 LAB
ALBUMIN/CREAT UR: 1159 MG/G CREAT (ref 0–29)
APO B SERPL-MCNC: 88 MG/DL
BUN SERPL-MCNC: 46 MG/DL (ref 8–27)
BUN/CREAT SERPL: 21 (ref 10–24)
CALCIUM SERPL-MCNC: 8.5 MG/DL (ref 8.6–10.2)
CHLORIDE SERPL-SCNC: 102 MMOL/L (ref 96–106)
CO2 SERPL-SCNC: 25 MMOL/L (ref 20–29)
CREAT SERPL-MCNC: 2.15 MG/DL (ref 0.76–1.27)
CREAT UR-MCNC: 53.2 MG/DL
EGFRCR SERPLBLD CKD-EPI 2021: 32 ML/MIN/1.73
GLUCOSE SERPL-MCNC: 155 MG/DL (ref 70–99)
MICROALBUMIN UR-MCNC: 616.7 UG/ML
POTASSIUM SERPL-SCNC: 4.8 MMOL/L (ref 3.5–5.2)
SODIUM SERPL-SCNC: 141 MMOL/L (ref 134–144)

## 2024-07-13 RX ORDER — KETOCONAZOLE 20 MG/G
CREAM TOPICAL
Qty: 30 G | Refills: 2 | Status: SHIPPED | OUTPATIENT
Start: 2024-07-13

## 2024-07-13 NOTE — PROGRESS NOTES
Chief Complaint   Patient presents with    Leg Swelling     Patient states that he has swelling in both legs and lesions on both leg. Patient states that his left is more sensitive.       \"Have you been to the ER, urgent care clinic since your last visit?  Hospitalized since your last visit?\"    NO    “Have you seen or consulted any other health care providers outside of [facility] since your last visit?”    NO            Click Here for Release of Records Request  
times daily (before meals) Test 3 times a day & as needed for symptoms of irregular blood glucose. Dispense sufficient amount for indicated testing frequency plus additional to accommodate PRN testing needs. 200 strip 1    Lancets 30G MISC 1 each by Does not apply route 3 times daily Test 3 times a day & as needed for symptoms of irregular blood glucose. Dispense sufficient amount for indicated testing frequency plus additional to accommodate PRN testing needs. 200 each 1    insulin lispro, 1 Unit Dial, (HUMALOG KWIKPEN) 100 UNIT/ML SOPN Inject 6 Units into the skin 3 times daily Okay to substitute Novolog or Admelog or Lyumjev or Fiasp or Apidra. (Patient not taking: Reported on 3/7/2024) 5 Adjustable Dose Pre-filled Pen Syringe 1    Insulin Pen Needle (B-D ULTRAFINE III SHORT PEN) 31G X 8 MM MISC 1 each by Does not apply route daily Pharmacist to identify & substitute with preferred needle for insulin pen device. 100 each 1    furosemide (LASIX) 20 MG tablet Take 2 tablets by mouth daily (Patient not taking: Reported on 7/12/2024) 60 tablet 0     No current facility-administered medications for this visit.     Past Medical History:   Diagnosis Date    Abscess 12/2020    left side of abdomen    Chronic renal failure, stage 3a ([facility]) 11/7/2023    Diabetes ([facility])     Hyperlipidemia     Hypertension     ICD (implantable cardioverter-defibrillator) in place     NSVT (nonsustained ventricular tachycardia) ([facility]) 11/21/2018    EPS 11/21/2018 VT/VF     Reactive airway disease 2/26/2024    Right groin pain 12/7/2020    Stroke ([facility])     Syncope     Valvular heart disease      Past Surgical History:   Procedure Laterality Date    CARDIAC CATHETERIZATION      CARDIAC DEFIBRILLATOR PLACEMENT      COLONOSCOPY  1/2/2015         HEENT      l cararact removal    US DRAIN SOFT TISSUE ABSCESS  12/22/2020    US DRAIN SOFT TISSUE ABSCESS 12/22/2020 [facility] RAD US     No Known Allergies      REVIEW OF SYSTEMS:  General: negative for - chills

## 2024-07-15 LAB
BACTERIA SPEC AEROBE CULT: ABNORMAL
HBA1C MFR BLD: 10.3 % (ref 4.8–5.6)

## 2024-07-17 LAB
BACTERIA SPEC AEROBE CULT: ABNORMAL

## 2024-07-18 LAB
BACTERIA SPEC AEROBE CULT: ABNORMAL
BACTERIA SPEC ANAEROBE CULT: ABNORMAL
BACTERIA SPEC ANAEROBE CULT: ABNORMAL

## 2024-07-21 DIAGNOSIS — L98.491 SKIN ULCER, LIMITED TO BREAKDOWN OF SKIN (HCC): ICD-10-CM

## 2024-07-21 DIAGNOSIS — B99.9 INFECTION: Primary | ICD-10-CM

## 2024-07-21 LAB
BACTERIA SPEC AEROBE CULT: ABNORMAL
BACTERIA SPEC ANAEROBE CULT: ABNORMAL

## 2024-07-21 RX ORDER — CIPROFLOXACIN 250 MG/1
250 TABLET, FILM COATED ORAL 2 TIMES DAILY
Qty: 20 TABLET | Refills: 0 | Status: SHIPPED | OUTPATIENT
Start: 2024-07-21 | End: 2024-07-31

## 2024-07-23 ENCOUNTER — TELEPHONE (OUTPATIENT)
Facility: CLINIC | Age: 69
End: 2024-07-23

## 2024-07-23 NOTE — TELEPHONE ENCOUNTER
----- Message from Junior Deras MD sent at 7/21/2024 11:35 PM EDT -----  Patient is to be on Cipro 500 mg twice daily for 10 days

## 2024-08-09 ENCOUNTER — TELEPHONE (OUTPATIENT)
Facility: CLINIC | Age: 69
End: 2024-08-09

## 2024-08-09 RX ORDER — CIPROFLOXACIN 500 MG/1
500 TABLET, FILM COATED ORAL 2 TIMES DAILY
Qty: 20 TABLET | Refills: 0 | Status: SHIPPED | OUTPATIENT
Start: 2024-08-09 | End: 2024-08-19

## 2024-08-09 NOTE — TELEPHONE ENCOUNTER
----- Message from Junior Deras MD sent at 8/6/2024 12:55 PM EDT -----  Patient should not receive antibiotics but he may not have-----send Cipro 500 mg BID for 10 days

## 2024-08-22 ENCOUNTER — TRANSCRIBE ORDERS (OUTPATIENT)
Facility: HOSPITAL | Age: 69
End: 2024-08-22

## 2024-08-22 DIAGNOSIS — M86.172 ACUTE OSTEOMYELITIS OF LEFT ANKLE OR FOOT (HCC): Primary | ICD-10-CM

## 2024-08-26 DIAGNOSIS — E11.649 UNCONTROLLED TYPE 2 DIABETES MELLITUS WITH HYPOGLYCEMIA WITHOUT COMA (HCC): ICD-10-CM

## 2024-08-29 ENCOUNTER — TELEPHONE (OUTPATIENT)
Facility: CLINIC | Age: 69
End: 2024-08-29

## 2024-08-29 ENCOUNTER — TRANSCRIBE ORDERS (OUTPATIENT)
Facility: HOSPITAL | Age: 69
End: 2024-08-29

## 2024-08-29 DIAGNOSIS — M86.172 ACUTE OSTEOMYELITIS OF LEFT ANKLE OR FOOT (HCC): Primary | ICD-10-CM

## 2024-08-29 NOTE — TELEPHONE ENCOUNTER
Patient called stating low BS 35 and had to call rescue squad. He states he takes 70 units tresiba and I ask if he is eating bedtime snack and he states no, we again stress to patient per Dr. Deras to eat same time daily and bedtime snack and we decrease the tresiba to 40 units qam and 20 units qhs and call me on Friday with his FBS, we  also ask him to keep his appts

## 2024-08-31 ENCOUNTER — HOSPITAL ENCOUNTER (OUTPATIENT)
Facility: HOSPITAL | Age: 69
End: 2024-08-31
Payer: COMMERCIAL

## 2024-08-31 DIAGNOSIS — M86.172 ACUTE OSTEOMYELITIS OF LEFT ANKLE OR FOOT (HCC): ICD-10-CM

## 2024-08-31 PROCEDURE — 73700 CT LOWER EXTREMITY W/O DYE: CPT

## 2024-09-06 ENCOUNTER — OFFICE VISIT (OUTPATIENT)
Age: 69
End: 2024-09-06

## 2024-09-06 VITALS
HEIGHT: 67 IN | WEIGHT: 177 LBS | HEART RATE: 83 BPM | SYSTOLIC BLOOD PRESSURE: 126 MMHG | DIASTOLIC BLOOD PRESSURE: 72 MMHG | BODY MASS INDEX: 27.78 KG/M2

## 2024-09-06 DIAGNOSIS — E11.65 TYPE 2 DIABETES MELLITUS WITH HYPERGLYCEMIA, WITH LONG-TERM CURRENT USE OF INSULIN (HCC): Primary | ICD-10-CM

## 2024-09-06 DIAGNOSIS — I10 PRIMARY HYPERTENSION: ICD-10-CM

## 2024-09-06 DIAGNOSIS — N18.32 DIABETES MELLITUS DUE TO UNDERLYING CONDITION WITH STAGE 3B CHRONIC KIDNEY DISEASE, WITH LONG-TERM CURRENT USE OF INSULIN (HCC): ICD-10-CM

## 2024-09-06 DIAGNOSIS — Z79.4 TYPE 2 DIABETES MELLITUS WITH HYPERGLYCEMIA, WITH LONG-TERM CURRENT USE OF INSULIN (HCC): Primary | ICD-10-CM

## 2024-09-06 DIAGNOSIS — E78.2 MIXED HYPERLIPIDEMIA: ICD-10-CM

## 2024-09-06 DIAGNOSIS — E08.22 DIABETES MELLITUS DUE TO UNDERLYING CONDITION WITH STAGE 3B CHRONIC KIDNEY DISEASE, WITH LONG-TERM CURRENT USE OF INSULIN (HCC): ICD-10-CM

## 2024-09-06 DIAGNOSIS — Z79.4 DIABETES MELLITUS DUE TO UNDERLYING CONDITION WITH STAGE 3B CHRONIC KIDNEY DISEASE, WITH LONG-TERM CURRENT USE OF INSULIN (HCC): ICD-10-CM

## 2024-09-06 RX ORDER — BLOOD-GLUCOSE SENSOR
EACH MISCELLANEOUS
Qty: 2 EACH | Refills: 5 | Status: SHIPPED | OUTPATIENT
Start: 2024-09-06

## 2024-09-06 RX ORDER — DOXYCYCLINE HYCLATE 100 MG
100 TABLET ORAL EVERY 12 HOURS
COMMUNITY
Start: 2024-08-27

## 2024-09-06 NOTE — PATIENT INSTRUCTIONS
Zulay 3 sent to pharmacy    Referral was made to [facility] ([phone]) for updated education    Lower Tresiba to 45 units once a day.  Contact me in 1-2 weeks to review sugars and make further adjustments

## 2024-09-06 NOTE — PROGRESS NOTES
Chief Complaint   Patient presents with    Diabetes       Patient was last seen: New Patient Visit     General:   DM since 1999  On insulin ~14 years   On metformin in past --> stopped when insulin changed   ZHOU in past --> not clear when stopped  No TZD, no DDP4, no SGLT  On bydureon (trulicity in past)    A1c: recent a1c was 10.3 7/2024    DM Medications:    Tresiba 40/20 units BID   Bydrueon bcise 2mg/wk    Last Changes: : no changes at last visit    Sugar Checks: checks more than 4 times a day and uses Compliance 11 CGM     AM: reports:      PM: reports:        LOWs:  has low sugars - see CGM report     DIET: has received diabetic meal training, 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[employer])    EXERCISE: is active on the job ([employer]); at home helping sick wife    HTN: on B-Blk, on Ca-Blk, HTN followed by PCP     LIPIDS: on statin, lipids followed by PCP    RENAL: **has moderate renal insufficiency**, is followed by Nephrology     EYES: overdue for eye exam, **has retinopathy**     DENTAL:  overdue for dentist     FEET: sees podiatry blister burst - forever to heal     HEART:  no chest pain, shortness of breath or claudication, is followed by Cardiology, has an arrhythmia, s/p defibrillator     ASA:  is on aspirin     SYMPTOMS: no polyuria, thirst or blurred vision     THYROID: no known thyroid issue    DIABETES HISTORY: see above      LABS/STUDIES:     No results found for: \"[id number]\"    Lab Results   Component Value Date/Time    LABA1C 10.3 07/12/2024 12:00 AM    LABA1C 9.8 02/21/2024 01:02 PM    LABA1C 10.8 02/20/2024 12:00 AM    CREATININE 2.15 07/12/2024 12:00 AM    LABGLOM 32 07/12/2024 12:00 AM    LABGLOM 37 03/07/2024 12:00 AM    MALBCR 1159 07/12/2024 12:00 AM       Lab Results   Component Value Date/Time    CHOL 174 11/07/2023 11:38 AM    CHOL 155 03/09/2022 03:55 AM    TRIG 115 11/07/2023 11:38 AM    HDL 69 11/07/2023 11:38 AM    LDL 85 11/07/2023 11:38 AM       Lab Results   Component Value Date/Time    TSH

## 2024-09-26 ENCOUNTER — OFFICE VISIT (OUTPATIENT)
Facility: CLINIC | Age: 69
End: 2024-09-26

## 2024-09-26 VITALS
HEART RATE: 76 BPM | WEIGHT: 171.7 LBS | BODY MASS INDEX: 26.95 KG/M2 | TEMPERATURE: 97.5 F | HEIGHT: 67 IN | DIASTOLIC BLOOD PRESSURE: 75 MMHG | OXYGEN SATURATION: 98 % | RESPIRATION RATE: 16 BRPM | SYSTOLIC BLOOD PRESSURE: 149 MMHG

## 2024-09-26 DIAGNOSIS — Z95.810 ICD (IMPLANTABLE CARDIOVERTER-DEFIBRILLATOR) IN PLACE: ICD-10-CM

## 2024-09-26 DIAGNOSIS — E11.42 DIABETIC POLYNEUROPATHY ASSOCIATED WITH TYPE 2 DIABETES MELLITUS (HCC): ICD-10-CM

## 2024-09-26 DIAGNOSIS — E78.5 DYSLIPIDEMIA: ICD-10-CM

## 2024-09-26 DIAGNOSIS — E11.649 UNCONTROLLED TYPE 2 DIABETES MELLITUS WITH HYPOGLYCEMIA WITHOUT COMA (HCC): Primary | ICD-10-CM

## 2024-09-26 DIAGNOSIS — I10 PRIMARY HYPERTENSION: ICD-10-CM

## 2024-09-26 DIAGNOSIS — N18.31 CHRONIC RENAL FAILURE, STAGE 3A (HCC): ICD-10-CM

## 2024-09-26 DIAGNOSIS — E13.319 RETINOPATHY DUE TO SECONDARY DIABETES MELLITUS (HCC): ICD-10-CM

## 2024-09-28 LAB
BUN SERPL-MCNC: 49 MG/DL (ref 8–27)
BUN/CREAT SERPL: 24 (ref 10–24)
CALCIUM SERPL-MCNC: 8.7 MG/DL (ref 8.6–10.2)
CHLORIDE SERPL-SCNC: 106 MMOL/L (ref 96–106)
CO2 SERPL-SCNC: 23 MMOL/L (ref 20–29)
CREAT SERPL-MCNC: 2.07 MG/DL (ref 0.76–1.27)
EGFRCR SERPLBLD CKD-EPI 2021: 34 ML/MIN/1.73
GLUCOSE SERPL-MCNC: 342 MG/DL (ref 70–99)
HBA1C MFR BLD: 8 % (ref 4.8–5.6)
POTASSIUM SERPL-SCNC: 4.8 MMOL/L (ref 3.5–5.2)
SODIUM SERPL-SCNC: 144 MMOL/L (ref 134–144)

## 2024-10-31 DIAGNOSIS — E11.42 DIABETIC POLYNEUROPATHY ASSOCIATED WITH TYPE 2 DIABETES MELLITUS (HCC): Primary | ICD-10-CM

## 2024-10-31 RX ORDER — INSULIN DEGLUDEC 100 U/ML
INJECTION, SOLUTION SUBCUTANEOUS
Refills: 3 | OUTPATIENT
Start: 2024-10-31

## 2024-11-01 DIAGNOSIS — E11.42 DIABETIC POLYNEUROPATHY ASSOCIATED WITH TYPE 2 DIABETES MELLITUS (HCC): ICD-10-CM

## 2024-11-01 RX ORDER — INSULIN GLARGINE-YFGN 100 [IU]/ML
INJECTION, SOLUTION SUBCUTANEOUS
Refills: 0 | OUTPATIENT
Start: 2024-11-01

## 2024-12-24 DIAGNOSIS — E11.649 UNCONTROLLED TYPE 2 DIABETES MELLITUS WITH HYPOGLYCEMIA WITHOUT COMA (HCC): ICD-10-CM

## 2024-12-24 RX ORDER — EXENATIDE 2 MG/.85ML
INJECTION, SUSPENSION, EXTENDED RELEASE SUBCUTANEOUS
Qty: 3.4 ML | Refills: 3 | Status: SHIPPED | OUTPATIENT
Start: 2024-12-24

## 2025-02-14 ENCOUNTER — APPOINTMENT (OUTPATIENT)
Facility: HOSPITAL | Age: 70
DRG: 637 | End: 2025-02-14
Payer: COMMERCIAL

## 2025-02-14 ENCOUNTER — HOSPITAL ENCOUNTER (INPATIENT)
Facility: HOSPITAL | Age: 70
LOS: 4 days | Discharge: HOME HEALTH | DRG: 637 | End: 2025-02-18
Attending: EMERGENCY MEDICINE | Admitting: INTERNAL MEDICINE
Payer: COMMERCIAL

## 2025-02-14 DIAGNOSIS — J96.01 ACUTE RESPIRATORY FAILURE WITH HYPOXIA (HCC): ICD-10-CM

## 2025-02-14 DIAGNOSIS — J18.9 PNEUMONIA OF RIGHT UPPER LOBE DUE TO INFECTIOUS ORGANISM: ICD-10-CM

## 2025-02-14 DIAGNOSIS — E11.649 TYPE 2 DIABETES MELLITUS WITH HYPOGLYCEMIA WITHOUT COMA, WITH LONG-TERM CURRENT USE OF INSULIN (HCC): ICD-10-CM

## 2025-02-14 DIAGNOSIS — R33.9 URINARY RETENTION: ICD-10-CM

## 2025-02-14 DIAGNOSIS — E16.2 HYPOGLYCEMIA: ICD-10-CM

## 2025-02-14 DIAGNOSIS — E11.42 DIABETIC POLYNEUROPATHY ASSOCIATED WITH TYPE 2 DIABETES MELLITUS (HCC): ICD-10-CM

## 2025-02-14 DIAGNOSIS — Z79.4 TYPE 2 DIABETES MELLITUS WITH HYPOGLYCEMIA WITHOUT COMA, WITH LONG-TERM CURRENT USE OF INSULIN (HCC): ICD-10-CM

## 2025-02-14 DIAGNOSIS — G93.40 ACUTE ENCEPHALOPATHY: Primary | ICD-10-CM

## 2025-02-14 PROBLEM — R41.82 AMS (ALTERED MENTAL STATUS): Status: ACTIVE | Noted: 2025-02-14

## 2025-02-14 LAB
ALBUMIN SERPL-MCNC: 3.3 G/DL (ref 3.5–5)
ALBUMIN/GLOB SERPL: 0.8 (ref 1.1–2.2)
ALP SERPL-CCNC: 107 U/L (ref 45–117)
ALT SERPL-CCNC: 35 U/L (ref 12–78)
ANION GAP BLD CALC-SCNC: 7 (ref 10–20)
ANION GAP SERPL CALC-SCNC: 8 MMOL/L (ref 2–12)
APPEARANCE UR: CLEAR
APPEARANCE UR: CLEAR
AST SERPL-CCNC: 36 U/L (ref 15–37)
BACTERIA URNS QL MICRO: NEGATIVE /HPF
BACTERIA URNS QL MICRO: NEGATIVE /HPF
BASE EXCESS BLD CALC-SCNC: 5.2 MMOL/L
BASOPHILS # BLD: 0.02 K/UL (ref 0–0.1)
BASOPHILS NFR BLD: 0.2 % (ref 0–1)
BILIRUB SERPL-MCNC: 0.3 MG/DL (ref 0.2–1)
BILIRUB UR QL: NEGATIVE
BILIRUB UR QL: NEGATIVE
BUN SERPL-MCNC: 63 MG/DL (ref 6–20)
BUN/CREAT SERPL: 21 (ref 12–20)
CA-I BLD-MCNC: 1.14 MMOL/L (ref 1.15–1.33)
CALCIUM SERPL-MCNC: 8.3 MG/DL (ref 8.5–10.1)
CHLORIDE BLD-SCNC: 106 MMOL/L (ref 100–111)
CHLORIDE SERPL-SCNC: 106 MMOL/L (ref 97–108)
CK SERPL-CCNC: 587 U/L (ref 39–308)
CO2 BLD-SCNC: 33 MMOL/L (ref 22–29)
CO2 SERPL-SCNC: 28 MMOL/L (ref 21–32)
COLOR UR: ABNORMAL
COLOR UR: ABNORMAL
COMMENT:: NORMAL
CREAT SERPL-MCNC: 3.04 MG/DL (ref 0.7–1.3)
CREAT UR-MCNC: 3 MG/DL (ref 0.6–1.3)
CREAT UR-MCNC: 88.5 MG/DL
DIFFERENTIAL METHOD BLD: ABNORMAL
EOSINOPHIL # BLD: 0.05 K/UL (ref 0–0.4)
EOSINOPHIL NFR BLD: 0.6 % (ref 0–7)
EPITH CASTS URNS QL MICRO: ABNORMAL /LPF
EPITH CASTS URNS QL MICRO: ABNORMAL /LPF
ERYTHROCYTE [DISTWIDTH] IN BLOOD BY AUTOMATED COUNT: 16.3 % (ref 11.5–14.5)
FLUAV RNA SPEC QL NAA+PROBE: NOT DETECTED
FLUBV RNA SPEC QL NAA+PROBE: NOT DETECTED
GLOBULIN SER CALC-MCNC: 4.1 G/DL (ref 2–4)
GLUCOSE BLD STRIP.AUTO-MCNC: 130 MG/DL (ref 65–117)
GLUCOSE BLD STRIP.AUTO-MCNC: 132 MG/DL (ref 65–117)
GLUCOSE BLD STRIP.AUTO-MCNC: 175 MG/DL (ref 65–117)
GLUCOSE BLD STRIP.AUTO-MCNC: 380 MG/DL (ref 65–117)
GLUCOSE BLD STRIP.AUTO-MCNC: 46 MG/DL (ref 65–117)
GLUCOSE BLD STRIP.AUTO-MCNC: 51 MG/DL (ref 65–117)
GLUCOSE BLD STRIP.AUTO-MCNC: 61 MG/DL (ref 65–117)
GLUCOSE BLD STRIP.AUTO-MCNC: 64 MG/DL (ref 65–117)
GLUCOSE BLD STRIP.AUTO-MCNC: 76 MG/DL (ref 65–117)
GLUCOSE BLD STRIP.AUTO-MCNC: 81 MG/DL (ref 74–99)
GLUCOSE BLD STRIP.AUTO-MCNC: 87 MG/DL (ref 65–117)
GLUCOSE BLD STRIP.AUTO-MCNC: 88 MG/DL (ref 65–117)
GLUCOSE BLD STRIP.AUTO-MCNC: 99 MG/DL (ref 65–117)
GLUCOSE SERPL-MCNC: 78 MG/DL (ref 65–100)
GLUCOSE UR STRIP.AUTO-MCNC: 100 MG/DL
GLUCOSE UR STRIP.AUTO-MCNC: 100 MG/DL
HCO3 BLDA-SCNC: 33 MMOL/L
HCT VFR BLD AUTO: 32.6 % (ref 36.6–50.3)
HGB BLD-MCNC: 10.2 G/DL (ref 12.1–17)
HGB UR QL STRIP: ABNORMAL
HGB UR QL STRIP: ABNORMAL
HYALINE CASTS URNS QL MICRO: ABNORMAL /LPF (ref 0–2)
HYALINE CASTS URNS QL MICRO: ABNORMAL /LPF (ref 0–2)
IMM GRANULOCYTES # BLD AUTO: 0.01 K/UL (ref 0–0.04)
IMM GRANULOCYTES NFR BLD AUTO: 0.1 % (ref 0–0.5)
KETONES UR QL STRIP.AUTO: NEGATIVE MG/DL
KETONES UR QL STRIP.AUTO: NEGATIVE MG/DL
LACTATE BLD-SCNC: 0.97 MMOL/L (ref 0.4–2)
LEUKOCYTE ESTERASE UR QL STRIP.AUTO: NEGATIVE
LEUKOCYTE ESTERASE UR QL STRIP.AUTO: NEGATIVE
LYMPHOCYTES # BLD: 0.25 K/UL (ref 0.8–3.5)
LYMPHOCYTES NFR BLD: 2.9 % (ref 12–49)
MAGNESIUM SERPL-MCNC: 2.1 MG/DL (ref 1.6–2.4)
MAGNESIUM SERPL-MCNC: 2.2 MG/DL (ref 1.6–2.4)
MCH RBC QN AUTO: 26.2 PG (ref 26–34)
MCHC RBC AUTO-ENTMCNC: 31.3 G/DL (ref 30–36.5)
MCV RBC AUTO: 83.8 FL (ref 80–99)
MONOCYTES # BLD: 0.42 K/UL (ref 0–1)
MONOCYTES NFR BLD: 4.8 % (ref 5–13)
NEUTS SEG # BLD: 7.95 K/UL (ref 1.8–8)
NEUTS SEG NFR BLD: 91.4 % (ref 32–75)
NITRITE UR QL STRIP.AUTO: NEGATIVE
NITRITE UR QL STRIP.AUTO: NEGATIVE
NRBC # BLD: 0 K/UL (ref 0–0.01)
NRBC BLD-RTO: 0 PER 100 WBC
NT PRO BNP: 449 PG/ML
PCO2 BLDV: 65.6 MMHG (ref 41–51)
PH BLDV: 7.31 (ref 7.32–7.42)
PH UR STRIP: 5.5 (ref 5–8)
PH UR STRIP: 5.5 (ref 5–8)
PLATELET # BLD AUTO: 183 K/UL (ref 150–400)
PMV BLD AUTO: 10.4 FL (ref 8.9–12.9)
PO2 BLDV: 51 MMHG (ref 25–40)
POTASSIUM BLD-SCNC: 3.9 MMOL/L (ref 3.5–5.5)
POTASSIUM SERPL-SCNC: 3.9 MMOL/L (ref 3.5–5.1)
PROCALCITONIN SERPL-MCNC: 0.62 NG/ML
PROT SERPL-MCNC: 7.4 G/DL (ref 6.4–8.2)
PROT UR STRIP-MCNC: 300 MG/DL
PROT UR STRIP-MCNC: 300 MG/DL
RBC # BLD AUTO: 3.89 M/UL (ref 4.1–5.7)
RBC #/AREA URNS HPF: >100 /HPF (ref 0–5)
RBC #/AREA URNS HPF: ABNORMAL /HPF (ref 0–5)
RBC MORPH BLD: ABNORMAL
SAO2 % BLD: 81 % (ref 94–98)
SARS-COV-2 RNA RESP QL NAA+PROBE: NOT DETECTED
SERVICE CMNT-IMP: ABNORMAL
SERVICE CMNT-IMP: NORMAL
SODIUM BLD-SCNC: 146 MMOL/L (ref 136–145)
SODIUM SERPL-SCNC: 142 MMOL/L (ref 136–145)
SODIUM UR-SCNC: 56 MMOL/L
SOURCE: NORMAL
SP GR UR REFRACTOMETRY: 1.02
SP GR UR REFRACTOMETRY: 1.02
SPECIMEN HOLD: NORMAL
SPECIMEN SITE: ABNORMAL
TROPONIN I SERPL HS-MCNC: 11 NG/L (ref 0–76)
TROPONIN I SERPL HS-MCNC: 13 NG/L (ref 0–76)
TROPONIN I SERPL HS-MCNC: 13 NG/L (ref 0–76)
URATE SERPL-MCNC: 7.5 MG/DL (ref 3.5–7.2)
URINE CULTURE IF INDICATED: ABNORMAL
URINE CULTURE IF INDICATED: ABNORMAL
UROBILINOGEN UR QL STRIP.AUTO: 0.2 EU/DL (ref 0.2–1)
UROBILINOGEN UR QL STRIP.AUTO: 0.2 EU/DL (ref 0.2–1)
WBC # BLD AUTO: 8.7 K/UL (ref 4.1–11.1)
WBC URNS QL MICRO: ABNORMAL /HPF (ref 0–4)
WBC URNS QL MICRO: ABNORMAL /HPF (ref 0–4)

## 2025-02-14 PROCEDURE — 82550 ASSAY OF CK (CPK): CPT

## 2025-02-14 PROCEDURE — 82570 ASSAY OF URINE CREATININE: CPT

## 2025-02-14 PROCEDURE — 84295 ASSAY OF SERUM SODIUM: CPT

## 2025-02-14 PROCEDURE — 82330 ASSAY OF CALCIUM: CPT

## 2025-02-14 PROCEDURE — 6360000002 HC RX W HCPCS: Performed by: INTERNAL MEDICINE

## 2025-02-14 PROCEDURE — 84145 PROCALCITONIN (PCT): CPT

## 2025-02-14 PROCEDURE — 2500000003 HC RX 250 WO HCPCS

## 2025-02-14 PROCEDURE — 84300 ASSAY OF URINE SODIUM: CPT

## 2025-02-14 PROCEDURE — 83880 ASSAY OF NATRIURETIC PEPTIDE: CPT

## 2025-02-14 PROCEDURE — 51702 INSERT TEMP BLADDER CATH: CPT

## 2025-02-14 PROCEDURE — 80053 COMPREHEN METABOLIC PANEL: CPT

## 2025-02-14 PROCEDURE — 2580000003 HC RX 258: Performed by: INTERNAL MEDICINE

## 2025-02-14 PROCEDURE — 84484 ASSAY OF TROPONIN QUANT: CPT

## 2025-02-14 PROCEDURE — 87205 SMEAR GRAM STAIN: CPT

## 2025-02-14 PROCEDURE — 81001 URINALYSIS AUTO W/SCOPE: CPT

## 2025-02-14 PROCEDURE — 85025 COMPLETE CBC W/AUTO DIFF WBC: CPT

## 2025-02-14 PROCEDURE — 82803 BLOOD GASES ANY COMBINATION: CPT

## 2025-02-14 PROCEDURE — 99285 EMERGENCY DEPT VISIT HI MDM: CPT

## 2025-02-14 PROCEDURE — 87636 SARSCOV2 & INF A&B AMP PRB: CPT

## 2025-02-14 PROCEDURE — 84550 ASSAY OF BLOOD/URIC ACID: CPT

## 2025-02-14 PROCEDURE — 96374 THER/PROPH/DIAG INJ IV PUSH: CPT

## 2025-02-14 PROCEDURE — 84132 ASSAY OF SERUM POTASSIUM: CPT

## 2025-02-14 PROCEDURE — 94762 N-INVAS EAR/PLS OXIMTRY CONT: CPT

## 2025-02-14 PROCEDURE — 36415 COLL VENOUS BLD VENIPUNCTURE: CPT

## 2025-02-14 PROCEDURE — 83735 ASSAY OF MAGNESIUM: CPT

## 2025-02-14 PROCEDURE — 70450 CT HEAD/BRAIN W/O DYE: CPT

## 2025-02-14 PROCEDURE — 93005 ELECTROCARDIOGRAM TRACING: CPT | Performed by: EMERGENCY MEDICINE

## 2025-02-14 PROCEDURE — 82947 ASSAY GLUCOSE BLOOD QUANT: CPT

## 2025-02-14 PROCEDURE — 2060000000 HC ICU INTERMEDIATE R&B

## 2025-02-14 PROCEDURE — 82962 GLUCOSE BLOOD TEST: CPT

## 2025-02-14 PROCEDURE — 71250 CT THORAX DX C-: CPT

## 2025-02-14 PROCEDURE — 71045 X-RAY EXAM CHEST 1 VIEW: CPT

## 2025-02-14 PROCEDURE — 76770 US EXAM ABDO BACK WALL COMP: CPT

## 2025-02-14 PROCEDURE — 2500000003 HC RX 250 WO HCPCS: Performed by: INTERNAL MEDICINE

## 2025-02-14 PROCEDURE — 87040 BLOOD CULTURE FOR BACTERIA: CPT

## 2025-02-14 RX ORDER — CARVEDILOL 12.5 MG/1
12.5 TABLET ORAL 2 TIMES DAILY WITH MEALS
Status: DISCONTINUED | OUTPATIENT
Start: 2025-02-14 | End: 2025-02-16

## 2025-02-14 RX ORDER — POLYETHYLENE GLYCOL 3350 17 G/17G
17 POWDER, FOR SOLUTION ORAL DAILY PRN
Status: DISCONTINUED | OUTPATIENT
Start: 2025-02-14 | End: 2025-02-18 | Stop reason: HOSPADM

## 2025-02-14 RX ORDER — ONDANSETRON 2 MG/ML
4 INJECTION INTRAMUSCULAR; INTRAVENOUS EVERY 6 HOURS PRN
Status: DISCONTINUED | OUTPATIENT
Start: 2025-02-14 | End: 2025-02-18 | Stop reason: HOSPADM

## 2025-02-14 RX ORDER — DEXTROSE MONOHYDRATE 25 G/50ML
25 INJECTION, SOLUTION INTRAVENOUS PRN
Status: DISCONTINUED | OUTPATIENT
Start: 2025-02-14 | End: 2025-02-18 | Stop reason: HOSPADM

## 2025-02-14 RX ORDER — ATORVASTATIN CALCIUM 20 MG/1
20 TABLET, FILM COATED ORAL DAILY
Status: DISCONTINUED | OUTPATIENT
Start: 2025-02-14 | End: 2025-02-18 | Stop reason: HOSPADM

## 2025-02-14 RX ORDER — DEXTROSE MONOHYDRATE 25 G/50ML
INJECTION, SOLUTION INTRAVENOUS
Status: COMPLETED
Start: 2025-02-14 | End: 2025-02-14

## 2025-02-14 RX ORDER — ONDANSETRON 4 MG/1
4 TABLET, ORALLY DISINTEGRATING ORAL EVERY 8 HOURS PRN
Status: DISCONTINUED | OUTPATIENT
Start: 2025-02-14 | End: 2025-02-18 | Stop reason: HOSPADM

## 2025-02-14 RX ORDER — AMLODIPINE BESYLATE 5 MG/1
10 TABLET ORAL DAILY
Status: DISCONTINUED | OUTPATIENT
Start: 2025-02-14 | End: 2025-02-18 | Stop reason: HOSPADM

## 2025-02-14 RX ORDER — ACETAMINOPHEN 650 MG/1
650 SUPPOSITORY RECTAL EVERY 6 HOURS PRN
Status: DISCONTINUED | OUTPATIENT
Start: 2025-02-14 | End: 2025-02-18 | Stop reason: HOSPADM

## 2025-02-14 RX ORDER — DEXTROSE MONOHYDRATE 50 MG/ML
INJECTION, SOLUTION INTRAVENOUS CONTINUOUS
Status: ACTIVE | OUTPATIENT
Start: 2025-02-14 | End: 2025-02-15

## 2025-02-14 RX ORDER — SODIUM CHLORIDE 0.9 % (FLUSH) 0.9 %
5-40 SYRINGE (ML) INJECTION PRN
Status: DISCONTINUED | OUTPATIENT
Start: 2025-02-14 | End: 2025-02-18 | Stop reason: HOSPADM

## 2025-02-14 RX ORDER — SODIUM CHLORIDE 0.9 % (FLUSH) 0.9 %
5-40 SYRINGE (ML) INJECTION EVERY 12 HOURS SCHEDULED
Status: DISCONTINUED | OUTPATIENT
Start: 2025-02-14 | End: 2025-02-18 | Stop reason: HOSPADM

## 2025-02-14 RX ORDER — ASPIRIN 81 MG/1
81 TABLET, CHEWABLE ORAL DAILY
Status: DISCONTINUED | OUTPATIENT
Start: 2025-02-14 | End: 2025-02-18 | Stop reason: HOSPADM

## 2025-02-14 RX ORDER — SODIUM CHLORIDE 9 MG/ML
INJECTION, SOLUTION INTRAVENOUS PRN
Status: DISCONTINUED | OUTPATIENT
Start: 2025-02-14 | End: 2025-02-18 | Stop reason: HOSPADM

## 2025-02-14 RX ORDER — HYDRALAZINE HYDROCHLORIDE 20 MG/ML
10 INJECTION INTRAMUSCULAR; INTRAVENOUS EVERY 6 HOURS PRN
Status: DISCONTINUED | OUTPATIENT
Start: 2025-02-14 | End: 2025-02-18 | Stop reason: HOSPADM

## 2025-02-14 RX ORDER — ACETAMINOPHEN 325 MG/1
650 TABLET ORAL EVERY 6 HOURS PRN
Status: DISCONTINUED | OUTPATIENT
Start: 2025-02-14 | End: 2025-02-18 | Stop reason: HOSPADM

## 2025-02-14 RX ADMIN — HYDRALAZINE HYDROCHLORIDE 10 MG: 20 INJECTION INTRAMUSCULAR; INTRAVENOUS at 19:17

## 2025-02-14 RX ADMIN — DEXTROSE MONOHYDRATE 25 G: 25 INJECTION, SOLUTION INTRAVENOUS at 16:34

## 2025-02-14 RX ADMIN — WATER 1000 MG: 1 INJECTION INTRAMUSCULAR; INTRAVENOUS; SUBCUTANEOUS at 14:58

## 2025-02-14 RX ADMIN — DEXTROSE MONOHYDRATE: 50 INJECTION, SOLUTION INTRAVENOUS at 16:32

## 2025-02-14 RX ADMIN — DEXTROSE MONOHYDRATE 50 ML: 25 INJECTION, SOLUTION INTRAVENOUS at 10:14

## 2025-02-14 RX ADMIN — AZITHROMYCIN MONOHYDRATE 500 MG: 500 INJECTION, POWDER, LYOPHILIZED, FOR SOLUTION INTRAVENOUS at 15:12

## 2025-02-14 NOTE — CONSULTS
Gas & Electrolytes    Collection Time: 02/14/25  9:27 AM   Result Value Ref Range    PH, VENOUS (POC) 7.31 (L) 7.32 - 7.42      PCO2, [place], POC 65.6 (H) 41 - 51 MMHG    PO2, VENOUS (POC) 51 (H) 25 - 40 mmHg    HCO3, Arterial 33 mmol/L    Base Excess 5.2 mmol/L    POC O2 SAT 81 (L) 94 - 98 %    POC Sodium 146 (H) 136 - 145 MMOL/L    POC Potassium 3.9 3.5 - 5.5 MMOL/L    POC Chloride 106 100 - 111 MMOL/L    POC TCO2 33 (H) 22 - 29 MMOL/L    Anion Gap, POC 7 (L) 10 - 20      POC Glucose 81 74 - 99 MG/DL    POC Creatinine 3.0 (H) 0.6 - 1.3 MG/DL    eGFR, POC 22 (L) >60 ml/min/1.73m2    POC Ionized Calcium 1.14 (L) 1.15 - 1.33 mmol/L    POC Lactic Acid 0.97 0.40 - 2.00 mmol/L    Source VENOUS BLOOD     POCT Glucose    Collection Time: 02/14/25 10:08 AM   Result Value Ref Range    POC Glucose 61 (L) 65 - 117 mg/dL    Performed by: EMILEE Hemphill    POCT Glucose    Collection Time: 02/14/25 10:10 AM   Result Value Ref Range    POC Glucose 64 (L) 65 - 117 mg/dL    Performed by: EMILEE Hemphill    POCT Glucose    Collection Time: 02/14/25 10:34 AM   Result Value Ref Range    POC Glucose 175 (H) 65 - 117 mg/dL    Performed by: EMILEE Hemphill    POCT Glucose    Collection Time: 02/14/25 11:11 AM   Result Value Ref Range    POC Glucose 130 (H) 65 - 117 mg/dL    Performed by: Telly MARTINEZ RN    Urinalysis with Reflex to Culture    Collection Time: 02/14/25 12:13 PM    Specimen: Urine   Result Value Ref Range    Color, UA YELLOW/STRAW      Appearance CLEAR CLEAR      Specific Gravity, UA 1.016      pH, Urine 5.5 5.0 - 8.0      Protein,  (A) NEG mg/dL    Glucose, Ur 100 (A) NEG mg/dL    Ketones, Urine Negative NEG mg/dL    Bilirubin, Urine Negative NEG      Blood, Urine SMALL (A) NEG      Urobilinogen, Urine 0.2 0.2 - 1.0 EU/dL    Nitrite, Urine Negative NEG      Leukocyte Esterase, Urine Negative NEG      WBC, UA 0-4 0 - 4 /hpf    RBC, UA 0-5 0 - 5 /hpf    Epithelial Cells, UA FEW FEW /lpf    BACTERIA, URINE Negative NEG

## 2025-02-14 NOTE — ED NOTES
[facility] Provided pt with warm blankets at this time.

## 2025-02-14 NOTE — H&P
CONTRAST: FINDINGS: MEDIASTINUM: No mass or lymphadenopathy. KHANH: No gross pathology. THORACIC AORTA: No dissection or aneurysm. MAIN PULMONARY ARTERY: Main pulmonary artery is enlarged measuring 3.4 cm TRACHEA/BRONCHI: Patent. ESOPHAGUS: No wall thickening or dilatation. HEART: Mild cardiomegaly coronary artery calcification is present PLEURA: No effusion or pneumothorax. LUNGS: There is airspace disease in the right upper lobe and small focus in the superior segment right lower lobe suspicious for pneumonia. There is minor basilar atelectasis. LIVER: No mass or biliary dilatation. GALLBLADDER: Unremarkable. SPLEEN: No mass. PANCREAS: No gross pathology. ADRENALS: Unremarkable. KIDNEYS: No nephrolithiasis or hydronephrosis. There is slight fullness of the ureters most likely related to distended urinary bladder. There is a tiny nonobstructing right renal calculus STOMACH: Unremarkable. SMALL BOWEL: There is question of slight wall thickening terminal ileum COLON: No dilatation or wall thickening. APPENDIX: Unremarkable. PERITONEUM: No ascites or pneumoperitoneum. RETROPERITONEUM: No lymphadenopathy or aortic aneurysm. REPRODUCTIVE ORGANS: Prostate is slightly enlarged URINARY BLADDER: Moderately distended BONES: No destructive bone lesion. ADDITIONAL COMMENTS: N/A     1. Right upper lobe pneumonia. 2. There is prominence of the main pulmonary artery may represent pulmonary artery hypertension. 3. CT abdomen pelvis demonstrates moderate distention of the urinary bladder with slight fullness of the ureters. There is question of slight thickening of the wall of the terminal ileum and correlation would be helpful Electronically signed by Jose Marinelli    CT Head W/O Contrast    Result Date: 2/14/2025  INDICATION: AMS Exam: Noncontrast CT of the brain is performed with 5 mm collimation. CT dose reduction was achieved with the use of the standardized protocol tailored for this examination and automatic exposure control

## 2025-02-14 NOTE — ED NOTES
Pt PROSPER to CT with this RN att. PT on the monitor x3 and under multiple heated blankets at this time.

## 2025-02-14 NOTE — CARE COORDINATION
/Care Management Initial Assessment       RUR: 16% moderate risk for readmission  Readmission? No  1st IM letter given? Patient Access to provide  1st  letter given: No      Initial note: Chart review completed prior to assessment. CM completed assessment with pt's significant other, Cristina Morales, via phone as pt presented with altered mental status. CM introduced self, role of CM, verified demographics to ensure accuracy, and discussed transition of care planning. Pt and Ms. Morales reside together in 1 level apartment with level entry at address on file. She reports that pt is independent with ADLs at baseline. Pt is not an active , has support from brother(s) with transportation, and brother(s) are anticipated to transport pt home at time of d/c. Pt owns a cane, no other DME available at home. Per chart review, previous HH hx with [facility] and [facility]. Pt last seen by PCP, Dr. Deras, on 9/26/24 per chart review. Pharmacy preference is [facility] [address]    Care management will continue to be available to assist as transition of care planning needs arise. Full assessment below:       02/14/25 8081   Service Assessment   Patient Orientation Other (see comment)  (Pt presented w/ altered mental status; Assessment completed with pt's significant other, Cristina Morales)   Cognition Other (see comment)  (Pt presented w/ altered mental status; Assessment completed with pt's significant other, Cristina Morales)   History Provided By Significant Other   Primary Caregiver Self   Accompanied By/Relationship N/A - no one at bedside currently   Support Systems Spouse/Significant Other;Family Members  (Significant other, Cristina Morales, and two brothers)   Patient's Healthcare Decision Maker is: Legal Next of Kin  (AMD on file is NOT completed. Per Ms. Morales no known completed AMD. Pt is not , no children or living parents; two brothers, Carlos and Anish Juarez are legal NOK - requested Ms. Morales to

## 2025-02-14 NOTE — ED PROVIDER NOTES
[facility] EMERGENCY DEPARTMENT  EMERGENCY DEPARTMENT ENCOUNTER       Pt Name: John Paul Juarez  MRN: [id number]  Birthdate 1955  Date of evaluation: 2/14/2025  Provider: Micah Campo MD   PCP: Junior Deras MD  Note Started: 9:29 AM EST 2/14/25     CHIEF COMPLAINT       Chief Complaint   Patient presents with    Altered Mental Status     Pt bibems from home for altered mental status. Pt was solment with wife at home GCS 3 at home BG 50s per EMS. 250 bolus of d10 by EMS en route. Questionable EKG by EMG called in by EMS MD aware and at bedside        HISTORY OF PRESENT ILLNESS: 1 or more elements      History From: Patient and EMS  HPI Limitations: Altered Mental Status     John Paul Juarez is a 69 y.o. male who presents with history of diabetes, hypertension, NSVT status post defibrillator placement, stage III kidney disease, presenting with complaints of altered mental status.  Patient reports wife found him unresponsive, blood sugar of 42 per EMS on arrival.  He initially had a GCS of 3 was unresponsive but improved after D10 was started at with a 250 cc bolus.  There was an EKG that was questionable for ST elevations in V2.  However, patient does decline or deny any chest pain, trouble breathing, or abdominal discomfort.  He denies any headache although he reports only feeling \"sleepy\".  He reports taking his medications as prescribed.  [facility] and was prescribed an antibiotic for a \"lung infection\".  He is not on home oxygen.  He does not remember having any clear history of congestive heart failure.     Nursing Notes were all reviewed and agreed with or any disagreements were addressed in the HPI.     REVIEW OF SYSTEMS      Review of Systems     Positives and Pertinent negatives as per HPI.    PAST HISTORY     Past Medical History:  Past Medical History:   Diagnosis Date    Abscess 12/2020    left side of abdomen    Chronic

## 2025-02-15 LAB
ALBUMIN SERPL-MCNC: 2.4 G/DL (ref 3.5–5)
ANION GAP SERPL CALC-SCNC: 5 MMOL/L (ref 2–12)
BASOPHILS # BLD: 0.01 K/UL (ref 0–0.1)
BASOPHILS NFR BLD: 0.2 % (ref 0–1)
BUN SERPL-MCNC: 51 MG/DL (ref 6–20)
BUN/CREAT SERPL: 21 (ref 12–20)
CALCIUM SERPL-MCNC: 8.2 MG/DL (ref 8.5–10.1)
CHLORIDE SERPL-SCNC: 107 MMOL/L (ref 97–108)
CO2 SERPL-SCNC: 28 MMOL/L (ref 21–32)
COMMENT:: NORMAL
CORTIS AM PEAK SERPL-MCNC: 20.7 UG/DL (ref 4.3–22.45)
CREAT SERPL-MCNC: 2.42 MG/DL (ref 0.7–1.3)
CREAT UR-MCNC: 52.4 MG/DL
DIFFERENTIAL METHOD BLD: ABNORMAL
EKG ATRIAL RATE: 68 BPM
EKG DIAGNOSIS: NORMAL
EKG P AXIS: 51 DEGREES
EKG P-R INTERVAL: 140 MS
EKG Q-T INTERVAL: 396 MS
EKG QRS DURATION: 90 MS
EKG QTC CALCULATION (BAZETT): 421 MS
EKG R AXIS: -15 DEGREES
EKG T AXIS: 39 DEGREES
EKG VENTRICULAR RATE: 68 BPM
EOSINOPHIL # BLD: 0.16 K/UL (ref 0–0.4)
EOSINOPHIL #/AREA URNS HPF: NEGATIVE
EOSINOPHIL NFR BLD: 2.5 % (ref 0–7)
ERYTHROCYTE [DISTWIDTH] IN BLOOD BY AUTOMATED COUNT: 16.3 % (ref 11.5–14.5)
EST. AVERAGE GLUCOSE BLD GHB EST-MCNC: 197 MG/DL
FERRITIN SERPL-MCNC: 82 NG/ML (ref 26–388)
GLUCOSE BLD STRIP.AUTO-MCNC: 137 MG/DL (ref 65–117)
GLUCOSE BLD STRIP.AUTO-MCNC: 152 MG/DL (ref 65–117)
GLUCOSE BLD STRIP.AUTO-MCNC: 174 MG/DL (ref 65–117)
GLUCOSE BLD STRIP.AUTO-MCNC: 187 MG/DL (ref 65–117)
GLUCOSE BLD STRIP.AUTO-MCNC: 235 MG/DL (ref 65–117)
GLUCOSE BLD STRIP.AUTO-MCNC: 68 MG/DL (ref 65–117)
GLUCOSE SERPL-MCNC: 88 MG/DL (ref 65–100)
HBA1C MFR BLD: 8.5 % (ref 4–5.6)
HCT VFR BLD AUTO: 28.5 % (ref 36.6–50.3)
HGB BLD-MCNC: 8.9 G/DL (ref 12.1–17)
IMM GRANULOCYTES # BLD AUTO: 0.03 K/UL (ref 0–0.04)
IMM GRANULOCYTES NFR BLD AUTO: 0.5 % (ref 0–0.5)
IRON SATN MFR SERPL: 8 % (ref 20–50)
IRON SERPL-MCNC: 18 UG/DL (ref 35–150)
LYMPHOCYTES # BLD: 0.53 K/UL (ref 0.8–3.5)
LYMPHOCYTES NFR BLD: 8.1 % (ref 12–49)
MCH RBC QN AUTO: 25.6 PG (ref 26–34)
MCHC RBC AUTO-ENTMCNC: 31.2 G/DL (ref 30–36.5)
MCV RBC AUTO: 82.1 FL (ref 80–99)
MONOCYTES # BLD: 0.75 K/UL (ref 0–1)
MONOCYTES NFR BLD: 11.5 % (ref 5–13)
NEUTS SEG # BLD: 5.05 K/UL (ref 1.8–8)
NEUTS SEG NFR BLD: 77.2 % (ref 32–75)
NRBC # BLD: 0 K/UL (ref 0–0.01)
NRBC BLD-RTO: 0 PER 100 WBC
PHOSPHATE SERPL-MCNC: 3.7 MG/DL (ref 2.6–4.7)
PLATELET # BLD AUTO: 152 K/UL (ref 150–400)
PMV BLD AUTO: 10.7 FL (ref 8.9–12.9)
POTASSIUM SERPL-SCNC: 4 MMOL/L (ref 3.5–5.1)
PROT UR-MCNC: 131 MG/DL (ref 0–11.9)
PROT/CREAT UR-RTO: 2.5
RBC # BLD AUTO: 3.47 M/UL (ref 4.1–5.7)
SERVICE CMNT-IMP: ABNORMAL
SERVICE CMNT-IMP: NORMAL
SODIUM SERPL-SCNC: 140 MMOL/L (ref 136–145)
SPECIMEN HOLD: NORMAL
T4 FREE SERPL-MCNC: 1 NG/DL (ref 0.8–1.5)
TIBC SERPL-MCNC: 231 UG/DL (ref 250–450)
TROPONIN I SERPL HS-MCNC: 14 NG/L (ref 0–76)
TSH SERPL DL<=0.05 MIU/L-ACNC: 1.26 UIU/ML (ref 0.36–3.74)
WBC # BLD AUTO: 6.5 K/UL (ref 4.1–11.1)

## 2025-02-15 PROCEDURE — 97162 PT EVAL MOD COMPLEX 30 MIN: CPT

## 2025-02-15 PROCEDURE — 84439 ASSAY OF FREE THYROXINE: CPT

## 2025-02-15 PROCEDURE — 97116 GAIT TRAINING THERAPY: CPT

## 2025-02-15 PROCEDURE — 82533 TOTAL CORTISOL: CPT

## 2025-02-15 PROCEDURE — 80069 RENAL FUNCTION PANEL: CPT

## 2025-02-15 PROCEDURE — 6360000002 HC RX W HCPCS: Performed by: STUDENT IN AN ORGANIZED HEALTH CARE EDUCATION/TRAINING PROGRAM

## 2025-02-15 PROCEDURE — 82962 GLUCOSE BLOOD TEST: CPT

## 2025-02-15 PROCEDURE — 82728 ASSAY OF FERRITIN: CPT

## 2025-02-15 PROCEDURE — 6360000002 HC RX W HCPCS: Performed by: INTERNAL MEDICINE

## 2025-02-15 PROCEDURE — 82570 ASSAY OF URINE CREATININE: CPT

## 2025-02-15 PROCEDURE — 83550 IRON BINDING TEST: CPT

## 2025-02-15 PROCEDURE — 2700000000 HC OXYGEN THERAPY PER DAY

## 2025-02-15 PROCEDURE — 83540 ASSAY OF IRON: CPT

## 2025-02-15 PROCEDURE — 97535 SELF CARE MNGMENT TRAINING: CPT

## 2025-02-15 PROCEDURE — 2060000000 HC ICU INTERMEDIATE R&B

## 2025-02-15 PROCEDURE — 84156 ASSAY OF PROTEIN URINE: CPT

## 2025-02-15 PROCEDURE — 36415 COLL VENOUS BLD VENIPUNCTURE: CPT

## 2025-02-15 PROCEDURE — 83036 HEMOGLOBIN GLYCOSYLATED A1C: CPT

## 2025-02-15 PROCEDURE — 97166 OT EVAL MOD COMPLEX 45 MIN: CPT

## 2025-02-15 PROCEDURE — 85025 COMPLETE CBC W/AUTO DIFF WBC: CPT

## 2025-02-15 PROCEDURE — 2500000003 HC RX 250 WO HCPCS: Performed by: INTERNAL MEDICINE

## 2025-02-15 PROCEDURE — 6370000000 HC RX 637 (ALT 250 FOR IP): Performed by: INTERNAL MEDICINE

## 2025-02-15 PROCEDURE — 51702 INSERT TEMP BLADDER CATH: CPT

## 2025-02-15 PROCEDURE — 84484 ASSAY OF TROPONIN QUANT: CPT

## 2025-02-15 PROCEDURE — 2580000003 HC RX 258: Performed by: INTERNAL MEDICINE

## 2025-02-15 PROCEDURE — 84443 ASSAY THYROID STIM HORMONE: CPT

## 2025-02-15 RX ORDER — FUROSEMIDE 10 MG/ML
40 INJECTION INTRAMUSCULAR; INTRAVENOUS 2 TIMES DAILY
Status: DISCONTINUED | OUTPATIENT
Start: 2025-02-15 | End: 2025-02-18 | Stop reason: HOSPADM

## 2025-02-15 RX ADMIN — WATER 1000 MG: 1 INJECTION INTRAMUSCULAR; INTRAVENOUS; SUBCUTANEOUS at 15:41

## 2025-02-15 RX ADMIN — SODIUM CHLORIDE 20 ML/HR: 9 INJECTION, SOLUTION INTRAVENOUS at 16:17

## 2025-02-15 RX ADMIN — AMLODIPINE BESYLATE 10 MG: 5 TABLET ORAL at 09:44

## 2025-02-15 RX ADMIN — CARVEDILOL 12.5 MG: 12.5 TABLET, FILM COATED ORAL at 09:42

## 2025-02-15 RX ADMIN — FUROSEMIDE 40 MG: 10 INJECTION, SOLUTION INTRAMUSCULAR; INTRAVENOUS at 17:40

## 2025-02-15 RX ADMIN — CARVEDILOL 12.5 MG: 12.5 TABLET, FILM COATED ORAL at 17:40

## 2025-02-15 RX ADMIN — DEXTROSE MONOHYDRATE: 50 INJECTION, SOLUTION INTRAVENOUS at 06:33

## 2025-02-15 RX ADMIN — ASPIRIN 81 MG: 81 TABLET, CHEWABLE ORAL at 09:44

## 2025-02-15 RX ADMIN — SODIUM CHLORIDE, PRESERVATIVE FREE 10 ML: 5 INJECTION INTRAVENOUS at 09:45

## 2025-02-15 RX ADMIN — AZITHROMYCIN MONOHYDRATE 500 MG: 500 INJECTION, POWDER, LYOPHILIZED, FOR SOLUTION INTRAVENOUS at 16:28

## 2025-02-15 RX ADMIN — ATORVASTATIN CALCIUM 20 MG: 20 TABLET, FILM COATED ORAL at 09:44

## 2025-02-15 NOTE — ED NOTES
Report given to NINA Virk. Nurse was informed of reason for arrival, vitals, labs, medications, orders, procedures, results, anything left pending and current plan of action. Questions were asked and received prior to departure from the patient.

## 2025-02-15 NOTE — ED NOTES
BG of 562 resulted in chart inaccurate. This RN took BG 2 more times and received results of 99 and 87. All three results were taken from this patient at this time.

## 2025-02-16 LAB
ALBUMIN SERPL-MCNC: 2.5 G/DL (ref 3.5–5)
ANION GAP SERPL CALC-SCNC: 4 MMOL/L (ref 2–12)
BUN SERPL-MCNC: 52 MG/DL (ref 6–20)
BUN/CREAT SERPL: 19 (ref 12–20)
CALCIUM SERPL-MCNC: 8.3 MG/DL (ref 8.5–10.1)
CHLORIDE SERPL-SCNC: 102 MMOL/L (ref 97–108)
CO2 SERPL-SCNC: 30 MMOL/L (ref 21–32)
CREAT SERPL-MCNC: 2.74 MG/DL (ref 0.7–1.3)
CREAT UR-MCNC: 48.4 MG/DL
GLUCOSE BLD STRIP.AUTO-MCNC: 236 MG/DL (ref 65–117)
GLUCOSE BLD STRIP.AUTO-MCNC: 238 MG/DL (ref 65–117)
GLUCOSE BLD STRIP.AUTO-MCNC: 247 MG/DL (ref 65–117)
GLUCOSE BLD STRIP.AUTO-MCNC: 90 MG/DL (ref 65–117)
GLUCOSE SERPL-MCNC: 145 MG/DL (ref 65–100)
PHOSPHATE SERPL-MCNC: 4 MG/DL (ref 2.6–4.7)
POTASSIUM SERPL-SCNC: 4.1 MMOL/L (ref 3.5–5.1)
PROT UR-MCNC: 87 MG/DL (ref 0–11.9)
PROT/CREAT UR-RTO: 1.8
SERVICE CMNT-IMP: ABNORMAL
SERVICE CMNT-IMP: NORMAL
SODIUM SERPL-SCNC: 136 MMOL/L (ref 136–145)

## 2025-02-16 PROCEDURE — 6370000000 HC RX 637 (ALT 250 FOR IP): Performed by: STUDENT IN AN ORGANIZED HEALTH CARE EDUCATION/TRAINING PROGRAM

## 2025-02-16 PROCEDURE — 2500000003 HC RX 250 WO HCPCS: Performed by: INTERNAL MEDICINE

## 2025-02-16 PROCEDURE — 36415 COLL VENOUS BLD VENIPUNCTURE: CPT

## 2025-02-16 PROCEDURE — 82962 GLUCOSE BLOOD TEST: CPT

## 2025-02-16 PROCEDURE — 6360000002 HC RX W HCPCS: Performed by: INTERNAL MEDICINE

## 2025-02-16 PROCEDURE — 2580000003 HC RX 258: Performed by: INTERNAL MEDICINE

## 2025-02-16 PROCEDURE — 84156 ASSAY OF PROTEIN URINE: CPT

## 2025-02-16 PROCEDURE — 6370000000 HC RX 637 (ALT 250 FOR IP): Performed by: INTERNAL MEDICINE

## 2025-02-16 PROCEDURE — 2060000000 HC ICU INTERMEDIATE R&B

## 2025-02-16 PROCEDURE — 82570 ASSAY OF URINE CREATININE: CPT

## 2025-02-16 PROCEDURE — 80069 RENAL FUNCTION PANEL: CPT

## 2025-02-16 RX ORDER — GUAIFENESIN 600 MG/1
600 TABLET, EXTENDED RELEASE ORAL 2 TIMES DAILY
Status: DISCONTINUED | OUTPATIENT
Start: 2025-02-16 | End: 2025-02-18 | Stop reason: HOSPADM

## 2025-02-16 RX ORDER — CARVEDILOL 12.5 MG/1
25 TABLET ORAL 2 TIMES DAILY WITH MEALS
Status: DISCONTINUED | OUTPATIENT
Start: 2025-02-16 | End: 2025-02-18 | Stop reason: HOSPADM

## 2025-02-16 RX ORDER — INSULIN LISPRO 100 [IU]/ML
0-8 INJECTION, SOLUTION INTRAVENOUS; SUBCUTANEOUS
Status: DISCONTINUED | OUTPATIENT
Start: 2025-02-16 | End: 2025-02-18 | Stop reason: HOSPADM

## 2025-02-16 RX ORDER — DEXTROSE MONOHYDRATE 100 MG/ML
INJECTION, SOLUTION INTRAVENOUS CONTINUOUS PRN
Status: DISCONTINUED | OUTPATIENT
Start: 2025-02-16 | End: 2025-02-18 | Stop reason: HOSPADM

## 2025-02-16 RX ORDER — GLUCAGON 1 MG/ML
1 KIT INJECTION PRN
Status: DISCONTINUED | OUTPATIENT
Start: 2025-02-16 | End: 2025-02-18 | Stop reason: HOSPADM

## 2025-02-16 RX ORDER — INSULIN GLARGINE 100 [IU]/ML
35 INJECTION, SOLUTION SUBCUTANEOUS NIGHTLY
Status: DISCONTINUED | OUTPATIENT
Start: 2025-02-16 | End: 2025-02-18

## 2025-02-16 RX ORDER — FERROUS SULFATE 325(65) MG
325 TABLET ORAL 2 TIMES DAILY WITH MEALS
Status: DISCONTINUED | OUTPATIENT
Start: 2025-02-16 | End: 2025-02-18 | Stop reason: HOSPADM

## 2025-02-16 RX ORDER — CARVEDILOL 12.5 MG/1
25 TABLET ORAL ONCE
Status: COMPLETED | OUTPATIENT
Start: 2025-02-16 | End: 2025-02-16

## 2025-02-16 RX ADMIN — INSULIN LISPRO 2 UNITS: 100 INJECTION, SOLUTION INTRAVENOUS; SUBCUTANEOUS at 18:17

## 2025-02-16 RX ADMIN — INSULIN LISPRO 2 UNITS: 100 INJECTION, SOLUTION INTRAVENOUS; SUBCUTANEOUS at 21:04

## 2025-02-16 RX ADMIN — CARVEDILOL 25 MG: 12.5 TABLET, FILM COATED ORAL at 10:48

## 2025-02-16 RX ADMIN — SODIUM CHLORIDE, PRESERVATIVE FREE 10 ML: 5 INJECTION INTRAVENOUS at 10:07

## 2025-02-16 RX ADMIN — WATER 1000 MG: 1 INJECTION INTRAMUSCULAR; INTRAVENOUS; SUBCUTANEOUS at 17:04

## 2025-02-16 RX ADMIN — GUAIFENESIN 600 MG: 600 TABLET ORAL at 21:03

## 2025-02-16 RX ADMIN — CARVEDILOL 25 MG: 12.5 TABLET, FILM COATED ORAL at 17:05

## 2025-02-16 RX ADMIN — FERROUS SULFATE TAB 325 MG (65 MG ELEMENTAL FE) 325 MG: 325 (65 FE) TAB at 17:05

## 2025-02-16 RX ADMIN — AZITHROMYCIN MONOHYDRATE 500 MG: 500 INJECTION, POWDER, LYOPHILIZED, FOR SOLUTION INTRAVENOUS at 15:59

## 2025-02-16 RX ADMIN — ASPIRIN 81 MG: 81 TABLET, CHEWABLE ORAL at 10:06

## 2025-02-16 RX ADMIN — GUAIFENESIN 600 MG: 600 TABLET ORAL at 12:26

## 2025-02-16 RX ADMIN — POLYETHYLENE GLYCOL 3350 17 G: 17 POWDER, FOR SOLUTION ORAL at 10:21

## 2025-02-16 RX ADMIN — INSULIN LISPRO 2 UNITS: 100 INJECTION, SOLUTION INTRAVENOUS; SUBCUTANEOUS at 12:25

## 2025-02-16 RX ADMIN — SODIUM CHLORIDE, PRESERVATIVE FREE 10 ML: 5 INJECTION INTRAVENOUS at 21:04

## 2025-02-16 RX ADMIN — INSULIN GLARGINE 35 UNITS: 100 INJECTION, SOLUTION SUBCUTANEOUS at 21:04

## 2025-02-16 RX ADMIN — AMLODIPINE BESYLATE 10 MG: 5 TABLET ORAL at 10:06

## 2025-02-16 RX ADMIN — FERROUS SULFATE TAB 325 MG (65 MG ELEMENTAL FE) 325 MG: 325 (65 FE) TAB at 10:21

## 2025-02-16 RX ADMIN — ATORVASTATIN CALCIUM 20 MG: 20 TABLET, FILM COATED ORAL at 10:06

## 2025-02-17 PROBLEM — Z79.4 TYPE 2 DIABETES MELLITUS WITH HYPOGLYCEMIA WITHOUT COMA, WITH LONG-TERM CURRENT USE OF INSULIN (HCC): Status: ACTIVE | Noted: 2018-02-18

## 2025-02-17 PROBLEM — E11.649 TYPE 2 DIABETES MELLITUS WITH HYPOGLYCEMIA WITHOUT COMA, WITH LONG-TERM CURRENT USE OF INSULIN (HCC): Status: ACTIVE | Noted: 2018-02-18

## 2025-02-17 LAB
ALBUMIN SERPL-MCNC: 2.5 G/DL (ref 3.5–5)
ANION GAP SERPL CALC-SCNC: 8 MMOL/L (ref 2–12)
BASOPHILS # BLD: 0.02 K/UL (ref 0–0.1)
BASOPHILS NFR BLD: 0.4 % (ref 0–1)
BUN SERPL-MCNC: 50 MG/DL (ref 6–20)
BUN/CREAT SERPL: 19 (ref 12–20)
CALCIUM SERPL-MCNC: 8.9 MG/DL (ref 8.5–10.1)
CHLORIDE SERPL-SCNC: 105 MMOL/L (ref 97–108)
CO2 SERPL-SCNC: 27 MMOL/L (ref 21–32)
CREAT SERPL-MCNC: 2.64 MG/DL (ref 0.7–1.3)
CREAT UR-MCNC: 78.7 MG/DL
DIFFERENTIAL METHOD BLD: ABNORMAL
EOSINOPHIL # BLD: 0.2 K/UL (ref 0–0.4)
EOSINOPHIL NFR BLD: 3.5 % (ref 0–7)
ERYTHROCYTE [DISTWIDTH] IN BLOOD BY AUTOMATED COUNT: 15.2 % (ref 11.5–14.5)
GLUCOSE BLD STRIP.AUTO-MCNC: 127 MG/DL (ref 65–117)
GLUCOSE BLD STRIP.AUTO-MCNC: 153 MG/DL (ref 65–117)
GLUCOSE BLD STRIP.AUTO-MCNC: 153 MG/DL (ref 65–117)
GLUCOSE BLD STRIP.AUTO-MCNC: 171 MG/DL (ref 65–117)
GLUCOSE BLD STRIP.AUTO-MCNC: 318 MG/DL (ref 65–117)
GLUCOSE BLD STRIP.AUTO-MCNC: 342 MG/DL (ref 65–117)
GLUCOSE BLD STRIP.AUTO-MCNC: 35 MG/DL (ref 65–117)
GLUCOSE BLD STRIP.AUTO-MCNC: 36 MG/DL (ref 65–117)
GLUCOSE BLD STRIP.AUTO-MCNC: 37 MG/DL (ref 65–117)
GLUCOSE BLD STRIP.AUTO-MCNC: 44 MG/DL (ref 65–117)
GLUCOSE BLD STRIP.AUTO-MCNC: NORMAL MG/DL (ref 65–117)
GLUCOSE SERPL-MCNC: 43 MG/DL (ref 65–100)
HCT VFR BLD AUTO: 29.5 % (ref 36.6–50.3)
HGB BLD-MCNC: 9.3 G/DL (ref 12.1–17)
IMM GRANULOCYTES # BLD AUTO: 0.03 K/UL (ref 0–0.04)
IMM GRANULOCYTES NFR BLD AUTO: 0.5 % (ref 0–0.5)
LYMPHOCYTES # BLD: 0.69 K/UL (ref 0.8–3.5)
LYMPHOCYTES NFR BLD: 12.1 % (ref 12–49)
MCH RBC QN AUTO: 25.7 PG (ref 26–34)
MCHC RBC AUTO-ENTMCNC: 31.5 G/DL (ref 30–36.5)
MCV RBC AUTO: 81.5 FL (ref 80–99)
MONOCYTES # BLD: 0.8 K/UL (ref 0–1)
MONOCYTES NFR BLD: 14.1 % (ref 5–13)
NEUTS SEG # BLD: 3.94 K/UL (ref 1.8–8)
NEUTS SEG NFR BLD: 69.4 % (ref 32–75)
NRBC # BLD: 0 K/UL (ref 0–0.01)
NRBC BLD-RTO: 0 PER 100 WBC
PHOSPHATE SERPL-MCNC: 3.8 MG/DL (ref 2.6–4.7)
PLATELET # BLD AUTO: 207 K/UL (ref 150–400)
PMV BLD AUTO: 9.5 FL (ref 8.9–12.9)
POTASSIUM SERPL-SCNC: 4.2 MMOL/L (ref 3.5–5.1)
PROT UR-MCNC: 138 MG/DL (ref 0–11.9)
PROT/CREAT UR-RTO: 1.8
RBC # BLD AUTO: 3.62 M/UL (ref 4.1–5.7)
SERVICE CMNT-IMP: ABNORMAL
SERVICE CMNT-IMP: NORMAL
SODIUM SERPL-SCNC: 140 MMOL/L (ref 136–145)
WBC # BLD AUTO: 5.7 K/UL (ref 4.1–11.1)

## 2025-02-17 PROCEDURE — 6370000000 HC RX 637 (ALT 250 FOR IP): Performed by: STUDENT IN AN ORGANIZED HEALTH CARE EDUCATION/TRAINING PROGRAM

## 2025-02-17 PROCEDURE — 36415 COLL VENOUS BLD VENIPUNCTURE: CPT

## 2025-02-17 PROCEDURE — 2580000003 HC RX 258: Performed by: STUDENT IN AN ORGANIZED HEALTH CARE EDUCATION/TRAINING PROGRAM

## 2025-02-17 PROCEDURE — 80069 RENAL FUNCTION PANEL: CPT

## 2025-02-17 PROCEDURE — 84156 ASSAY OF PROTEIN URINE: CPT

## 2025-02-17 PROCEDURE — 6360000002 HC RX W HCPCS: Performed by: INTERNAL MEDICINE

## 2025-02-17 PROCEDURE — 2500000003 HC RX 250 WO HCPCS: Performed by: INTERNAL MEDICINE

## 2025-02-17 PROCEDURE — 2580000003 HC RX 258: Performed by: INTERNAL MEDICINE

## 2025-02-17 PROCEDURE — 82962 GLUCOSE BLOOD TEST: CPT

## 2025-02-17 PROCEDURE — 6370000000 HC RX 637 (ALT 250 FOR IP): Performed by: INTERNAL MEDICINE

## 2025-02-17 PROCEDURE — 2060000000 HC ICU INTERMEDIATE R&B

## 2025-02-17 PROCEDURE — 82570 ASSAY OF URINE CREATININE: CPT

## 2025-02-17 PROCEDURE — 85025 COMPLETE CBC W/AUTO DIFF WBC: CPT

## 2025-02-17 PROCEDURE — 99221 1ST HOSP IP/OBS SF/LOW 40: CPT

## 2025-02-17 RX ORDER — HYDRALAZINE HYDROCHLORIDE 50 MG/1
50 TABLET, FILM COATED ORAL EVERY 8 HOURS SCHEDULED
Status: DISCONTINUED | OUTPATIENT
Start: 2025-02-17 | End: 2025-02-18

## 2025-02-17 RX ADMIN — GUAIFENESIN 600 MG: 600 TABLET ORAL at 09:19

## 2025-02-17 RX ADMIN — GUAIFENESIN 600 MG: 600 TABLET ORAL at 21:36

## 2025-02-17 RX ADMIN — ASPIRIN 81 MG: 81 TABLET, CHEWABLE ORAL at 09:19

## 2025-02-17 RX ADMIN — WATER 1000 MG: 1 INJECTION INTRAMUSCULAR; INTRAVENOUS; SUBCUTANEOUS at 16:26

## 2025-02-17 RX ADMIN — DEXTROSE 250 ML: 10 SOLUTION INTRAVENOUS at 06:10

## 2025-02-17 RX ADMIN — HYDRALAZINE HYDROCHLORIDE 50 MG: 50 TABLET ORAL at 21:36

## 2025-02-17 RX ADMIN — SODIUM CHLORIDE, PRESERVATIVE FREE 10 ML: 5 INJECTION INTRAVENOUS at 21:36

## 2025-02-17 RX ADMIN — AZITHROMYCIN MONOHYDRATE 500 MG: 500 INJECTION, POWDER, LYOPHILIZED, FOR SOLUTION INTRAVENOUS at 16:34

## 2025-02-17 RX ADMIN — INSULIN LISPRO 6 UNITS: 100 INJECTION, SOLUTION INTRAVENOUS; SUBCUTANEOUS at 21:36

## 2025-02-17 RX ADMIN — CARVEDILOL 25 MG: 12.5 TABLET, FILM COATED ORAL at 09:18

## 2025-02-17 RX ADMIN — ATORVASTATIN CALCIUM 20 MG: 20 TABLET, FILM COATED ORAL at 09:19

## 2025-02-17 RX ADMIN — FERROUS SULFATE TAB 325 MG (65 MG ELEMENTAL FE) 325 MG: 325 (65 FE) TAB at 16:26

## 2025-02-17 RX ADMIN — SODIUM CHLORIDE, PRESERVATIVE FREE 10 ML: 5 INJECTION INTRAVENOUS at 09:19

## 2025-02-17 RX ADMIN — DEXTROSE MONOHYDRATE 25 G: 25 INJECTION, SOLUTION INTRAVENOUS at 08:47

## 2025-02-17 RX ADMIN — HYDRALAZINE HYDROCHLORIDE 50 MG: 50 TABLET ORAL at 13:54

## 2025-02-17 RX ADMIN — INSULIN LISPRO 6 UNITS: 100 INJECTION, SOLUTION INTRAVENOUS; SUBCUTANEOUS at 18:23

## 2025-02-17 RX ADMIN — CARVEDILOL 25 MG: 12.5 TABLET, FILM COATED ORAL at 16:26

## 2025-02-17 RX ADMIN — AMLODIPINE BESYLATE 10 MG: 5 TABLET ORAL at 09:19

## 2025-02-17 RX ADMIN — HYDRALAZINE HYDROCHLORIDE 10 MG: 20 INJECTION INTRAMUSCULAR; INTRAVENOUS at 12:24

## 2025-02-17 RX ADMIN — FERROUS SULFATE TAB 325 MG (65 MG ELEMENTAL FE) 325 MG: 325 (65 FE) TAB at 09:19

## 2025-02-17 NOTE — WOUND CARE
Wound care nurse consult for BLE.    68 y/o male admitted for AMS. Hypothermia, hypoglycemia and acute metabolic encephalopathy  Past Medical History:   Diagnosis Date    Abscess 12/2020    left side of abdomen    Chronic renal failure, stage 3a (HCC) 11/7/2023    Diabetes (HCC)     Hyperlipidemia     Hypertension     ICD (implantable cardioverter-defibrillator) in place     NSVT (nonsustained ventricular tachycardia) (HCC) 11/21/2018    EPS 11/21/2018 VT/VF     Reactive airway disease 2/26/2024    Right groin pain 12/7/2020    Stroke ([facility])     Syncope     Valvular heart disease      Past Surgical History:   Procedure Laterality Date    CARDIAC CATHETERIZATION      CARDIAC DEFIBRILLATOR PLACEMENT      COLONOSCOPY  1/2/2015         HEENT      l cararact removal    US SOFT TISSUE ABSCESS DRAINAGE PERC  12/22/2020    US DRAIN SOFT TISSUE ABSCESS 12/22/2020 [facility] RAD US     Patient has partial thickness wounds to LLE. No wounds found to RLE.    LLE lower medial:venous stasis      LLE shin healing wounds:      WOUND POA CONDITIONS    Wound 02/15/25 Pretibial Left (Active)   Wound Image    02/17/25 1657   Wound Etiology Venous 02/17/25 1657   Dressing Status New dressing applied 02/17/25 1657   Wound Cleansed Wound cleanser 02/17/25 1657   Dressing/Treatment Xeroform;Foam 02/17/25 1657   Wound Assessment Pink/red;Epithelialization 02/17/25 1657   Drainage Amount Scant (moist but unmeasurable) 02/17/25 1657   Drainage Description Serosanguinous 02/17/25 1657   Odor None 02/17/25 1657   Wound Thickness Description not for Pressure Injury Partial thickness 02/17/25 1657   Number of days: 1       Recommend:    LLE venous stasis wounds: cleanse with wound cleanser, wipe clean, cover with a piece of Xeroform gauze and then a small foam dressing. Date and time dressing. Float heels and elevate legs in and out of bed.    CHYNA ESCOBAR RN, CWON

## 2025-02-17 NOTE — CARE COORDINATION
Transition of Care Plan:    RUR:  19%  Prior Level of Functioning: independent  Disposition: Home with home health and PCP follow-up  LEDY: 2/17/2025  If SNF or IPR: Date FOC offered:   Date FOC received:   Accepting facility:   Date authorization started with reference number:   Date authorization received and expires:   Follow up appointments: PCP/Specialist  DME needed: RW ordered - waiting for [facility] to approve  Transportation at discharge: brothers vs may need a ride  IM/IMM Medicare/ letter given:   Is patient a [place] and connected with [facility]?    If yes, was  transfer form completed and VA notified?   Caregiver Contact: Cristina Morales, significant other [phone]  however 2 brothers are LNOK  Discharge Caregiver contacted prior to discharge? Per patient  Care Conference needed?   Barriers to discharge: Diabetes management    Met with patient - agreeable to skilled home health services SN/PT/OT - [facility]  and [facility] in the past and would like to use again if possible -referrals made via [facility]port- awaiting response -  sent referral to Vicki Lindo for approval for a RW via [facility] as well.  RUTH OAKLEY, MSW      [facility] to provide HH OT and [facility] -additional referrals sent out and [facility] [phone] can accept patient - waiting on RW approval at this time. RUTH OAKLEY, MSW  [phone]

## 2025-02-17 NOTE — DISCHARGE INSTRUCTIONS
You have been given a copy of the American Heart Association's Heart Failure Educational Booklet.  Please read over this booklet and take it with you to your next physician appointment with any questions you may have.     For additional resources and to access the American Heart Association's Interactive Workbook \"Healthier Living with Heart Failure - Managing Symptoms and Reducing Risk,\" please scan the QR code below.               Download the Heart Failure McCamey Halle: Search in your Google Play Store (Newser) or [employer] Halle Store (DISKOVRe): Search for- HF McCamey Halle.    https://www.heart.org/en/health-topics/heart-failure/heart-failure-tools-resources/iv-pbcihq-elk    HF McCamey is a brand-new phone halle that helps you track daily symptoms, vitals, mood, energy level and more. You can even add your heart failure care team members to view your data and monitor your condition at home.    HF McCamey Lets You:  Track symptoms, medications and more  Share health information with your health care team  Connect with others living with heart failure

## 2025-02-18 VITALS
SYSTOLIC BLOOD PRESSURE: 159 MMHG | TEMPERATURE: 97.8 F | WEIGHT: 179.45 LBS | HEART RATE: 77 BPM | BODY MASS INDEX: 28.17 KG/M2 | HEIGHT: 67 IN | RESPIRATION RATE: 18 BRPM | OXYGEN SATURATION: 94 % | DIASTOLIC BLOOD PRESSURE: 70 MMHG

## 2025-02-18 LAB
ANION GAP SERPL CALC-SCNC: 8 MMOL/L (ref 2–12)
BASOPHILS # BLD: 0.02 K/UL (ref 0–0.1)
BASOPHILS NFR BLD: 0.3 % (ref 0–1)
BUN SERPL-MCNC: 53 MG/DL (ref 6–20)
BUN/CREAT SERPL: 17 (ref 12–20)
CALCIUM SERPL-MCNC: 8.6 MG/DL (ref 8.5–10.1)
CHLORIDE SERPL-SCNC: 105 MMOL/L (ref 97–108)
CO2 SERPL-SCNC: 26 MMOL/L (ref 21–32)
CREAT SERPL-MCNC: 3.05 MG/DL (ref 0.7–1.3)
DIFFERENTIAL METHOD BLD: ABNORMAL
EOSINOPHIL # BLD: 0.19 K/UL (ref 0–0.4)
EOSINOPHIL NFR BLD: 3.2 % (ref 0–7)
ERYTHROCYTE [DISTWIDTH] IN BLOOD BY AUTOMATED COUNT: 15.6 % (ref 11.5–14.5)
GLUCOSE BLD STRIP.AUTO-MCNC: 113 MG/DL (ref 65–117)
GLUCOSE BLD STRIP.AUTO-MCNC: 183 MG/DL (ref 65–117)
GLUCOSE BLD STRIP.AUTO-MCNC: 279 MG/DL (ref 65–117)
GLUCOSE SERPL-MCNC: 197 MG/DL (ref 65–100)
HCT VFR BLD AUTO: 28.1 % (ref 36.6–50.3)
HGB BLD-MCNC: 8.7 G/DL (ref 12.1–17)
IMM GRANULOCYTES # BLD AUTO: 0.03 K/UL (ref 0–0.04)
IMM GRANULOCYTES NFR BLD AUTO: 0.5 % (ref 0–0.5)
LYMPHOCYTES # BLD: 0.74 K/UL (ref 0.8–3.5)
LYMPHOCYTES NFR BLD: 12.5 % (ref 12–49)
MCH RBC QN AUTO: 25.9 PG (ref 26–34)
MCHC RBC AUTO-ENTMCNC: 31 G/DL (ref 30–36.5)
MCV RBC AUTO: 83.6 FL (ref 80–99)
MONOCYTES # BLD: 0.67 K/UL (ref 0–1)
MONOCYTES NFR BLD: 11.4 % (ref 5–13)
NEUTS SEG # BLD: 4.25 K/UL (ref 1.8–8)
NEUTS SEG NFR BLD: 72.1 % (ref 32–75)
NRBC # BLD: 0 K/UL (ref 0–0.01)
NRBC BLD-RTO: 0 PER 100 WBC
PLATELET # BLD AUTO: 189 K/UL (ref 150–400)
PMV BLD AUTO: 9.9 FL (ref 8.9–12.9)
POTASSIUM SERPL-SCNC: 4.5 MMOL/L (ref 3.5–5.1)
RBC # BLD AUTO: 3.36 M/UL (ref 4.1–5.7)
RBC MORPH BLD: ABNORMAL
SERVICE CMNT-IMP: ABNORMAL
SERVICE CMNT-IMP: ABNORMAL
SERVICE CMNT-IMP: NORMAL
SODIUM SERPL-SCNC: 139 MMOL/L (ref 136–145)
WBC # BLD AUTO: 5.9 K/UL (ref 4.1–11.1)

## 2025-02-18 PROCEDURE — 6370000000 HC RX 637 (ALT 250 FOR IP): Performed by: STUDENT IN AN ORGANIZED HEALTH CARE EDUCATION/TRAINING PROGRAM

## 2025-02-18 PROCEDURE — 2500000003 HC RX 250 WO HCPCS: Performed by: INTERNAL MEDICINE

## 2025-02-18 PROCEDURE — 85025 COMPLETE CBC W/AUTO DIFF WBC: CPT

## 2025-02-18 PROCEDURE — 2580000003 HC RX 258: Performed by: INTERNAL MEDICINE

## 2025-02-18 PROCEDURE — 6360000002 HC RX W HCPCS: Performed by: STUDENT IN AN ORGANIZED HEALTH CARE EDUCATION/TRAINING PROGRAM

## 2025-02-18 PROCEDURE — 80048 BASIC METABOLIC PNL TOTAL CA: CPT

## 2025-02-18 PROCEDURE — 36415 COLL VENOUS BLD VENIPUNCTURE: CPT

## 2025-02-18 PROCEDURE — 99231 SBSQ HOSP IP/OBS SF/LOW 25: CPT

## 2025-02-18 PROCEDURE — 82962 GLUCOSE BLOOD TEST: CPT

## 2025-02-18 PROCEDURE — 6370000000 HC RX 637 (ALT 250 FOR IP): Performed by: INTERNAL MEDICINE

## 2025-02-18 PROCEDURE — 6360000002 HC RX W HCPCS: Performed by: INTERNAL MEDICINE

## 2025-02-18 RX ORDER — INSULIN GLARGINE 100 [IU]/ML
14 INJECTION, SOLUTION SUBCUTANEOUS NIGHTLY
Status: DISCONTINUED | OUTPATIENT
Start: 2025-02-18 | End: 2025-02-18 | Stop reason: HOSPADM

## 2025-02-18 RX ORDER — FERROUS SULFATE 325(65) MG
325 TABLET ORAL 2 TIMES DAILY WITH MEALS
Qty: 30 TABLET | Refills: 3 | Status: SHIPPED | OUTPATIENT
Start: 2025-02-18

## 2025-02-18 RX ORDER — INSULIN DEGLUDEC 200 U/ML
14 INJECTION, SOLUTION SUBCUTANEOUS DAILY
Qty: 5 ADJUSTABLE DOSE PRE-FILLED PEN SYRINGE | Refills: 3 | Status: SHIPPED | OUTPATIENT
Start: 2025-02-18

## 2025-02-18 RX ORDER — ACYCLOVIR 800 MG/1
1 TABLET ORAL
Qty: 2 EACH | Refills: 3 | Status: SHIPPED | OUTPATIENT
Start: 2025-02-18

## 2025-02-18 RX ORDER — HEPARIN SODIUM 5000 [USP'U]/ML
5000 INJECTION, SOLUTION INTRAVENOUS; SUBCUTANEOUS EVERY 8 HOURS SCHEDULED
Status: DISCONTINUED | OUTPATIENT
Start: 2025-02-18 | End: 2025-02-18 | Stop reason: HOSPADM

## 2025-02-18 RX ORDER — HYDRALAZINE HYDROCHLORIDE 50 MG/1
100 TABLET, FILM COATED ORAL EVERY 8 HOURS SCHEDULED
Status: DISCONTINUED | OUTPATIENT
Start: 2025-02-18 | End: 2025-02-18 | Stop reason: HOSPADM

## 2025-02-18 RX ORDER — HYDRALAZINE HYDROCHLORIDE 100 MG/1
100 TABLET, FILM COATED ORAL EVERY 8 HOURS SCHEDULED
Qty: 90 TABLET | Refills: 3 | Status: SHIPPED | OUTPATIENT
Start: 2025-02-18

## 2025-02-18 RX ADMIN — HEPARIN SODIUM 5000 UNITS: 5000 INJECTION INTRAVENOUS; SUBCUTANEOUS at 14:17

## 2025-02-18 RX ADMIN — FERROUS SULFATE TAB 325 MG (65 MG ELEMENTAL FE) 325 MG: 325 (65 FE) TAB at 08:32

## 2025-02-18 RX ADMIN — POLYETHYLENE GLYCOL 3350 17 G: 17 POWDER, FOR SOLUTION ORAL at 08:45

## 2025-02-18 RX ADMIN — HYDRALAZINE HYDROCHLORIDE 50 MG: 50 TABLET ORAL at 06:46

## 2025-02-18 RX ADMIN — AZITHROMYCIN MONOHYDRATE 500 MG: 500 INJECTION, POWDER, LYOPHILIZED, FOR SOLUTION INTRAVENOUS at 16:10

## 2025-02-18 RX ADMIN — ATORVASTATIN CALCIUM 20 MG: 20 TABLET, FILM COATED ORAL at 08:31

## 2025-02-18 RX ADMIN — WATER 1000 MG: 1 INJECTION INTRAMUSCULAR; INTRAVENOUS; SUBCUTANEOUS at 16:04

## 2025-02-18 RX ADMIN — ASPIRIN 81 MG: 81 TABLET, CHEWABLE ORAL at 08:31

## 2025-02-18 RX ADMIN — GUAIFENESIN 600 MG: 600 TABLET ORAL at 08:32

## 2025-02-18 RX ADMIN — CARVEDILOL 25 MG: 12.5 TABLET, FILM COATED ORAL at 08:31

## 2025-02-18 RX ADMIN — AMLODIPINE BESYLATE 10 MG: 5 TABLET ORAL at 08:32

## 2025-02-18 RX ADMIN — FERROUS SULFATE TAB 325 MG (65 MG ELEMENTAL FE) 325 MG: 325 (65 FE) TAB at 16:04

## 2025-02-18 RX ADMIN — INSULIN LISPRO 4 UNITS: 100 INJECTION, SOLUTION INTRAVENOUS; SUBCUTANEOUS at 16:03

## 2025-02-18 RX ADMIN — SODIUM CHLORIDE, PRESERVATIVE FREE 10 ML: 5 INJECTION INTRAVENOUS at 08:32

## 2025-02-18 RX ADMIN — CARVEDILOL 25 MG: 12.5 TABLET, FILM COATED ORAL at 16:04

## 2025-02-18 RX ADMIN — HYDRALAZINE HYDROCHLORIDE 100 MG: 50 TABLET ORAL at 14:18

## 2025-02-18 RX ADMIN — INSULIN LISPRO 2 UNITS: 100 INJECTION, SOLUTION INTRAVENOUS; SUBCUTANEOUS at 11:43

## 2025-02-18 NOTE — PLAN OF CARE
Problem: Chronic Conditions and Co-morbidities  Goal: Patient's chronic conditions and co-morbidity symptoms are monitored and maintained or improved  Outcome: Progressing     Problem: Discharge Planning  Goal: Discharge to home or other facility with appropriate resources  Outcome: Progressing     Problem: Safety - Adult  Goal: Free from fall injury  Outcome: Progressing     
  Problem: Chronic Conditions and Co-morbidities  Goal: Patient's chronic conditions and co-morbidity symptoms are monitored and maintained or improved  Outcome: Progressing     Problem: Discharge Planning  Goal: Discharge to home or other facility with appropriate resources  Outcome: Progressing     Problem: Safety - Adult  Goal: Free from fall injury  Outcome: Progressing     Problem: Skin/Tissue Integrity  Goal: Skin integrity remains intact  Description: 1.  Monitor for areas of redness and/or skin breakdown  2.  Assess vascular access sites hourly  3.  Every 4-6 hours minimum:  Change oxygen saturation probe site  4.  Every 4-6 hours:  If on nasal continuous positive airway pressure, respiratory therapy assess nares and determine need for appliance change or resting period  Outcome: Progressing     
  Problem: Chronic Conditions and Co-morbidities  Goal: Patient's chronic conditions and co-morbidity symptoms are monitored and maintained or improved  Outcome: Progressing     Problem: Discharge Planning  Goal: Discharge to home or other facility with appropriate resources  Outcome: Progressing     Problem: Safety - Adult  Goal: Free from fall injury  Outcome: Progressing     Problem: Skin/Tissue Integrity  Goal: Skin integrity remains intact  Description: 1.  Monitor for areas of redness and/or skin breakdown  2.  Assess vascular access sites hourly  3.  Every 4-6 hours minimum:  Change oxygen saturation probe site  4.  Every 4-6 hours:  If on nasal continuous positive airway pressure, respiratory therapy assess nares and determine need for appliance change or resting period  Outcome: Progressing     
  Problem: Occupational Therapy - Adult  Goal: By Discharge: Performs self-care activities at highest level of function for planned discharge setting.  See evaluation for individualized goals.  Description: FUNCTIONAL STATUS PRIOR TO ADMISSION:  Patient was independent to mod I for ADLs and functional mobility using SPC. Patient reported he occasionally has to physically assist his significant other. He works as a cook at [employer].   Receives Help From: Family, Prior Level of Assist for ADLs: Independent,  ,  ,  ,  ,  , Prior Level of Assist for Homemaking: Independent, Ambulation Assistance: Independent, Prior Level of Assist for Transfers: Independent, Active : No     HOME SUPPORT: Patient lived with his significant other.    Occupational Therapy Goals:  Initiated 2/15/2025  1.  Patient will perform grooming standing at sink with Modified [place] within 7 day(s).  2.  Patient will perform upper body dressing with Modified [place] within 7 day(s).  3.  Patient will perform lower body dressing using AE PRN with Modified [place] within 7 day(s).  4.  Patient will perform toilet transfers with Modified [place]  within 7 day(s).  5.  Patient will perform all aspects of toileting with Modified [place] within 7 day(s).  6.  Patient will participate in upper extremity therapeutic exercise/activities with Modified [place] for 5 minutes within 7 day(s).    Outcome: Progressing   OCCUPATIONAL THERAPY EVALUATION    Patient: John Paul Juarez (69 y.o. male)  Date: 2/15/2025  Primary Diagnosis: Urinary retention [R33.9]  Hypoglycemia [E16.2]  Acute respiratory failure with hypoxia [J96.01]  Acute encephalopathy [G93.40]  Pneumonia of right upper lobe due to infectious organism [J18.9]  AMS (altered mental status) [R41.82]         Precautions: Bed Alarm, Fall Risk                  ASSESSMENT :  The patient is limited by decreased functional mobility, independence in ADLs, high-level IADLs, ROM, 
Therapy;Plan of Care;Transfer Training;Mobility Training;Fall Prevention Strategies  Education Provided Comments: gait with RW  Education Method: Demonstration;Verbal;Teach Back  Barriers to Learning: None  Education Outcome: Verbalized understanding;Continued education needed    Thank you for this referral.  Keturah Beth, PT  Minutes: 28      Physical Therapy Evaluation Charge Determination   History Examination Presentation Decision-Making   HIGH Complexity :3+ comorbidities / personal factors will impact the outcome/ POC  HIGH Complexity : 4+ Standardized tests and measures addressing body structure, function, activity limitation and / or participation in recreation  MEDIUM Complexity : Evolving with changing characteristics  AM-PAC  MEDIUM   Based on the above components, the patient evaluation is determined to be of the following complexity level: Medium

## 2025-02-18 NOTE — DISCHARGE SUMMARY
Discharge Summary    Name: John Paul Juarez  [id number]  YOB: 1955 (Age: 69 y.o.)   Date of Admission: 2/14/2025  Date of Discharge: 2/18/2025  Attending Physician: Yara Holloway MD    Discharge Diagnosis:   Hypothermia  Hypoglycemia  Acute metabolic encephalopathy due to above  Community-acquired pneumonia  Acute metabolic encephalopathy likely multifactorial from pneumonia, hypoglycemia and also hypothermia  Possible pulmonary arterial hypertension  Acute on chronic kidney disease stage III  Hypertensive urgency  History of ICD implantation due to Brugada syndrome  Abnormal EKG and EMS  Mild anemia-appears to be chronic  Toro for retention   Bilateral leg swelling - chronic with some wounds on legs     Abnormal CT abdomen with multiple incidental findings including  Possible pulmonary arterial hypertension: Will need outpatient follow-up  Moderate distention of the urinary bladder: UA is within normal limits.  Monitor urine output.  Fullness of the ureter: Outpatient follow-up with PCP  Question of slight thickening of the wall of the terminal ileum: Patient did not report any abdominal pain.  Reevaluate once mental status is improved    Consultations:  IP CONSULT TO NEPHROLOGY  IP CONSULT TO CARDIOLOGY  IP CONSULT TO FAMILY MEDICINE  IP CONSULT TO DIABETES MANAGEMENT  IP CONSULT HOME HEALTH      Brief Admission History/Reason for Admission Per Zac Zhu MD:   John Paul Juarez is a 69 y.o.  male with PMHx significant for chronic kidney disease, diabetes mellitus, hypertension, status post ICD implantation, cerebrovascular accident is coming the hospital chief complaints of altered mental status.  Patient is a poor historian 7.  10 by talking to wife, review of records and also ER physician.  According to them, wife found patient to be unresponsive and was only waking up to painful stimuli initially.  He was also noted to have low blood sugar and received

## 2025-02-18 NOTE — DIABETES MGMT
[facility]  [facility]  DIABETES MANAGEMENT CONSULT    Consulted by Provider for advanced nursing evaluation and care for inpatient blood glucose management.    Evaluation and Action Plan   John Paul Juarez is a 69 year old male patient with a hx of Type 2 diabetes. He was admitted with hypoglycemia He wears a CGM sensor at home and reports that lately it alarms for hypoglycemia in the morning. The patient follows with an endocrinologist for diabetes mangement. The patient has been hypoglycemic this morning. He takes Tresiba daily and Bydureon weekly at home. He was given 35 units Lantus last evening and had 2 episodes of hypoglycemia this morning, that required treatment with dextrose. The patient's renal function is impaired. I suspect this playing a role. He follows with a nephrologist outpatient. I have held the basal insulin  for now. Medium dose corrective scale in place. Diabetes management will monitor Bg's and make adjustments as needed.     Blood glucose pattern        Management Rationale Action Plan   Medication   Corrective insulin Using medium dose sensitivity based on weight    Additional orders  Carb consistent diet (60g CHO/meal)       Diabetes Discharge Plan   Medication  TBD   Referral  []        Outpatient diabetes education   Additional orders            Initial Presentation   John Paul Juarez is a 69 y.o. male who presented to the ED on 2/17/2025  LAB: Glucose 78. Creatine 63. A1c 8.5%      HX:   Past Medical History:   Diagnosis Date    Abscess 12/2020    left side of abdomen    Chronic renal failure, stage 3a ([facility]) 11/7/2023    Diabetes ([facility])     Hyperlipidemia     Hypertension     ICD (implantable cardioverter-defibrillator) in place     NSVT (nonsustained ventricular tachycardia) ([facility]) 11/21/2018    EPS 11/21/2018 VT/VF     Reactive airway disease 2/26/2024    Right groin pain 12/7/2020    Stroke ([facility])     Syncope     Valvular heart disease         INITIAL DX: Urinary 
subsequent hospital care - 50 minutes   [] 62530 IP subsequent hospital care - 35 minutes   [x] 91187 IP subsequent hospital care - 25 minutes   [] 20546 IP initial hospital care - 40 minutes     Before making these care recommendations, I personally reviewed the hospitalization record, including notes, laboratory & diagnostic data and current medications, and examined the patient at the bedside.  Total minutes: 25 minutes    TAMIKA Claros - CNS   Diabetes Clinical Nurse Specialist   [facility]  Access via [employer]

## 2025-02-18 NOTE — PROGRESS NOTES
Name: John Paul Juarez   MRN: [id number]  : 1955                                                           Assessment:            Acute kidney injury grade 2  CKD stage III at baseline  Type 2 diabetes mellitus  Chronic edema  Hypoglycemia  Right upper lobe pneumonia  Metabolic encephalopathy  HTN Discussion:      Baseline creatinine outpatient is 1.8-1.9.  Follows with Dr. Jared Mendieta at our office    Differential diagnosis TIERRA-prerenal, or sepsis. AMY negative for obstruction     Plan:     Creatinine about the same today.    He reports good UO.      IV Lasix held today with bump in Cr  Ct home BP meds            Subjective:      No complaints.    ROS:   No nausea, no vomiting  No chest pain, Improved shortness of breath    Exam:  BP (!) 173/84   Pulse 75   Temp 97.6 °F (36.4 °C) (Oral)   Resp 20   Ht 1.702 m (5' 7\")   SpO2 91%   BMI 26.89 kg/m²     WB/WN in NAD  +edema  AOx3    Labs/Data:    Lab Results   Component Value Date    WBC 5.7 2025    HGB 9.3 (L) 2025    HCT 29.5 (L) 2025    MCV 81.5 2025     2025       Lab Results   Component Value Date/Time     2025 04:54 AM    K 4.2 2025 04:54 AM     2025 04:54 AM    CO2 27 2025 04:54 AM    BUN 50 2025 04:54 AM    CREATININE 2.64 2025 04:54 AM    GLUCOSE 43 2025 04:54 AM    GLUCOSE 342 2024 12:00 AM    CALCIUM 8.9 2025 04:54 AM    LABGLOM 25 2025 04:54 AM    LABGLOM 37 2024 12:00 AM        Wt Readings from Last 3 Encounters:   24 77.9 kg (171 lb 11.2 oz)   24 80.3 kg (177 lb)   24 79.5 kg (175 lb 4.8 oz)         Intake/Output Summary (Last 24 hours) at 2025 1047  Last data filed at 2025 0445  Gross per 24 hour   Intake 420 ml   Output 1150 ml   Net -730 ml       Patient seen 
                                                                                                                                              Name: John Paul Juarez   MRN: [id number]  : 1955                                                           Assessment:            Acute kidney injury grade 2  CKD stage III at baseline  Type 2 diabetes mellitus  Chronic edema  Hypoglycemia  Right upper lobe pneumonia  Metabolic encephalopathy  HTN Discussion:      Baseline creatinine outpatient is 1.8-1.9.  Follows with Dr. Jared Mendieta at our office    Differential diagnosis TIERRA-prerenal, or sepsis. AMY negative for obstruction     Plan:     Creatinine creeping up.  I encouraged po fluids.    Hold lasix.    He reports good UO.                Subjective:      No complaints.    ROS:   No nausea, no vomiting  No chest pain, Improved shortness of breath    Exam:  BP (!) 159/90   Pulse 72   Temp 97.9 °F (36.6 °C) (Oral)   Resp 16   Ht 1.702 m (5' 7\")   Wt 81.4 kg (179 lb 7.3 oz)   SpO2 98%   BMI 28.11 kg/m²     WB/WN in NAD  +edema  AOx3    Labs/Data:    Lab Results   Component Value Date    WBC 5.9 2025    HGB 8.7 (L) 2025    HCT 28.1 (L) 2025    MCV 83.6 2025     2025       Lab Results   Component Value Date/Time     2025 04:15 AM    K 4.5 2025 04:15 AM     2025 04:15 AM    CO2 26 2025 04:15 AM    BUN 53 2025 04:15 AM    CREATININE 3.05 2025 04:15 AM    GLUCOSE 197 2025 04:15 AM    GLUCOSE 342 2024 12:00 AM    CALCIUM 8.6 2025 04:15 AM    LABGLOM 21 2025 04:15 AM    LABGLOM 37 2024 12:00 AM        Wt Readings from Last 3 Encounters:   25 81.4 kg (179 lb 7.3 oz)   24 77.9 kg (171 lb 11.2 oz)   24 80.3 kg (177 lb)         Intake/Output Summary (Last 24 hours) at 2025 1034  Last data filed at 2025 0430  Gross per 24 hour   Intake 420 ml   Output 1750 ml   Net -1330 ml 
                                                                                                                                              Name: John Paul Juarez   MRN: [id number]  : 1955                                                           Assessment:            Acute kidney injury grade 2  CKD stage III at baseline  Type 2 diabetes mellitus  Chronic edema  Hypoglycemia  Right upper lobe pneumonia  Metabolic encephalopathy  HTN Discussion:      Baseline creatinine outpatient is 1.8-1.9.  Follows with Dr. Jared Mendieta at our office  Cr on admit at 3>>down to 2.4  Urine output excellent -2.3L/24 hrs  Differential diagnosis TIERRA-prerenal, or sepsis. AMY negative for obstruction     Plan:   Continue to track renal panel daily.  Treat for pneumonia as you are doing  IV Lasix  Ct home BP meds        Subjective:  Resting. Feels little better    ROS:   No nausea, no vomiting  No chest pain, Improved shortness of breath    Exam:  BP (!) 175/89   Pulse 72   Temp 98.1 °F (36.7 °C) (Oral)   Resp 15   Ht 1.702 m (5' 7\")   SpO2 100%   BMI 26.89 kg/m²     WB/WN in NAD  No resp distress  +edema  AOx3    Labs/Data:    Lab Results   Component Value Date    WBC 6.5 02/15/2025    HGB 8.9 (L) 02/15/2025    HCT 28.5 (L) 02/15/2025    MCV 82.1 02/15/2025     02/15/2025       Lab Results   Component Value Date/Time     02/15/2025 04:29 AM    K 4.0 02/15/2025 04:29 AM     02/15/2025 04:29 AM    CO2 28 02/15/2025 04:29 AM    BUN 51 02/15/2025 04:29 AM    CREATININE 2.42 02/15/2025 04:29 AM    GLUCOSE 88 02/15/2025 04:29 AM    GLUCOSE 342 2024 12:00 AM    CALCIUM 8.2 02/15/2025 04:29 AM    LABGLOM 28 02/15/2025 04:29 AM    LABGLOM 37 2024 12:00 AM        Wt Readings from Last 3 Encounters:   24 77.9 kg (171 lb 11.2 oz)   24 80.3 kg (177 lb)   24 79.5 kg (175 lb 4.8 oz)         Intake/Output Summary (Last 24 hours) at 2/15/2025 1802  Last data filed at 2/15/2025 1742  Gross per 
                                                                                                                                              Name: John Paul Juarez   MRN: [id number]  : 1955                                                           Assessment:            Acute kidney injury grade 2  CKD stage III at baseline  Type 2 diabetes mellitus  Chronic edema  Hypoglycemia  Right upper lobe pneumonia  Metabolic encephalopathy  HTN Discussion:      Baseline creatinine outpatient is 1.8-1.9.  Follows with Dr. Jared Mendieta at our office  Cr on admit at 3>>down to 2.4 to 2.7 today  Urine output excellent -2.3L/24 hrs  Differential diagnosis TIERRA-prerenal, or sepsis. AMY negative for obstruction     Plan:   Continue to track renal panel daily.  Treat for pneumonia as you are doing  IV Lasix held today with bump in Cr  Ct home BP meds    D/w pt, RN        Subjective:  OOB in chair. Feels the same.   No acute events overnight  +Constipation    ROS:   No nausea, no vomiting  No chest pain, Improved shortness of breath    Exam:  BP (!) 148/78   Pulse 78   Temp 98 °F (36.7 °C) (Oral)   Resp 18   Ht 1.702 m (5' 7\")   SpO2 99%   BMI 26.89 kg/m²     WB/WN in NAD  +edema  AOx3    Labs/Data:    Lab Results   Component Value Date    WBC 6.5 02/15/2025    HGB 8.9 (L) 02/15/2025    HCT 28.5 (L) 02/15/2025    MCV 82.1 02/15/2025     02/15/2025       Lab Results   Component Value Date/Time     2025 04:07 AM    K 4.1 2025 04:07 AM     2025 04:07 AM    CO2 30 2025 04:07 AM    BUN 52 2025 04:07 AM    CREATININE 2.74 2025 04:07 AM    GLUCOSE 145 2025 04:07 AM    GLUCOSE 342 2024 12:00 AM    CALCIUM 8.3 2025 04:07 AM    LABGLOM 24 2025 04:07 AM    LABGLOM 37 2024 12:00 AM        Wt Readings from Last 3 Encounters:   24 77.9 kg (171 lb 11.2 oz)   24 80.3 kg (177 lb)   07/12/24 79.5 kg (175 lb 4.8 oz)         Intake/Output Summary 
      Hospitalist Progress Note    NAME:   John Paul Juarez   : 1955   MRN: [id number]     Date/Time: 2025 7:59 AM  Patient PCP: Junior Deras MD    Estimated discharge date: 2025  Barriers:  repeat bmp tomorrow , wound care follow up , stable blood glucose diabetes management follow-up    Assessment / Plan:  Hypothermia  Hypoglycemia  Acute metabolic encephalopathy due to above  He was noted to have blood sugar in the 50s and 60s and on arrival to ED, he was noted to have blood sugar of 61 and also was hypothermic as well  His blood sugars improved with dextrose and also temperature improved with Merlyn hugger  Patient's temperature has improved up to 98.3  A1c 8.5  CT head done on 2025-no acute intracranial hemorrhage mass or infarct  Will continue D5 for now and check blood sugars every 2 hourly.  If blood sugars are consistently more than 250 on 2 occasions, stop dextrose drip  Tsh - 1.26, t4 - 1.0  Cortisol 20.7 normal  Mental status has improved and back to baseline  Temperature normalized  Started insulin-Lantus 35 units and sliding scale insulin- on    Continue hypoglycemia treatment protocol if blood sugar less than 70  Diabetic diet    Episodes of hypoglycemia in the am of  - sugar low upto 35- needing d 10 infusion   Dm management consulted   Insulin held     Community-acquired pneumonia  Acute metabolic encephalopathy likely multifactorial from pneumonia, hypoglycemia and also hypothermia  Possible pulmonary arterial hypertension  Chest CT abdomen and pelvis done on 2025-right upper lobe pneumonia prominence of right pulmonary artery  Procalcitonin 0.60  COVID and flu negative  Blood cultures negative  Most recent echo on 2024-ejection fraction up to 70%, diastolic dysfunction, moderately elevated RVSP consistent with moderate pulmonary arterial hypertension  Continue ceftriaxone and Zithromax.  Currently on room air    Acute on chronic kidney disease stage 
      Hospitalist Progress Note    NAME:   John Paul Juarez   : 1955   MRN: [id number]     Date/Time: 2025 8:17 AM  Patient PCP: Junior Deras MD    Estimated discharge date: 2025  Barriers:  repeat bmp tomorrow , wound care follow up     Assessment / Plan:  Hypothermia  Hypoglycemia  Acute metabolic encephalopathy due to above  He was noted to have blood sugar in the 50s and 60s and on arrival to ED, he was noted to have blood sugar of 61 and also was hypothermic as well  His blood sugars improved with dextrose and also temperature improved with Merlyn hugger  Patient's temperature has improved up to 98.3  A1c 8.5  CT head done on 2025-no acute intracranial hemorrhage mass or infarct  Will continue D5 for now and check blood sugars every 2 hourly.  If blood sugars are consistently more than 250 on 2 occasions, stop dextrose drip  Tsh - 1.26, t4 - 1.0  Cortisol 20.7 normal  Mental status has improved and back to baseline  Temperature normalized  Started insulin-Lantus 35 units and sliding scale insulin.  Continue hypoglycemia treatment protocol if blood sugar less than 70  Diabetic diet     Community-acquired pneumonia  Acute metabolic encephalopathy likely multifactorial from pneumonia, hypoglycemia and also hypothermia  Possible pulmonary arterial hypertension  Chest CT abdomen and pelvis done on 2025-right upper lobe pneumonia prominence of right pulmonary artery  Procalcitonin 0.60  COVID and flu negative  Blood cultures negative  Most recent echo on 2024-ejection fraction up to 70%, diastolic dysfunction, moderately elevated RVSP consistent with moderate pulmonary arterial hypertension  Continue ceftriaxone and Zithromax.  Currently on room air    Acute on chronic kidney disease stage III  Baseline creatinine is around 1.5-2.  On arrival creatinine is 3.0.  Creatinine trend: 3.04>> 2.42>>2.74  Urinalysis is within normal limits  Renal ultrasound done on 2025-no significant 
      Hospitalist Progress Note    NAME:   John Paul Juarez   : 1955   MRN: [id number]     Date/Time: 2025 8:08 AM  Patient PCP: Junior Deras MD    Estimated discharge date: 2025  Barriers:  needs stable bp     Assessment / Plan:  Hypothermia  Hypoglycemia  Acute metabolic encephalopathy due to above  He was noted to have blood sugar in the 50s and 60s and on arrival to ED, he was noted to have blood sugar of 61 and also was hypothermic as well  His blood sugars improved with dextrose and also temperature improved with Merlyn hugger  Patient's temperature has improved up to 98.3  A1c 8.5  CT head done on 2025-no acute intracranial hemorrhage mass or infarct  Will continue D5 for now and check blood sugars every 2 hourly.  If blood sugars are consistently more than 250 on 2 occasions, stop dextrose drip  Tsh - 1.26, t4 - 1.0  Cortisol 20.7 normal  Mental status has improved and back to baseline  Temperature normalized  Started insulin-Lantus 35 units and sliding scale insulin- on    Continue hypoglycemia treatment protocol if blood sugar less than 70  Diabetic diet    Episodes of hypoglycemia in the am of  - sugar low upto 35- needing d 10 infusion   Dm management consulted   Insulin lantus  held- now restarted by dm management at 14 units   Blood glucose better now      Community-acquired pneumonia  Acute metabolic encephalopathy likely multifactorial from pneumonia, hypoglycemia and also hypothermia  Possible pulmonary arterial hypertension  Chest CT abdomen and pelvis done on 2025-right upper lobe pneumonia prominence of right pulmonary artery  Procalcitonin 0.60  COVID and flu negative  Blood cultures negative  Most recent echo on 2024-ejection fraction up to 70%, diastolic dysfunction, moderately elevated RVSP consistent with moderate pulmonary arterial hypertension  Continue ceftriaxone and Zithromax.  Currently on room air    Acute on chronic kidney disease stage 
 - Uneventful day today . Patient got discharge @ 1830.  
0839: BG= 37, 50% dextrose given as patient was sleepy, MD Dr. Livingston aware.    0909: BG this time =127.    1220: Patient's BS=666/83, PRN IV hydralazine given. Patient said he felt a little bit drowsy, Blood glucose checked , it was 153.    1339: Patient's BP still running high. Messaged Dr. Livingston, Ordered PO hydralazine and given.    End of Shift Note    Bedside shift change report given to Betorrey RN (oncoming nurse) by Tayler Du RN (offgoing nurse).  Report included the following information SBAR, MAR, and Cardiac Rhythm sinus rhythm    Shift worked:  7a-7.30p     Shift summary and any significant changes:     -see notes above  -    Concerns for physician to address:       Zone phone for oncoming shift:          Activity:  Level of Assistance: Minimal assist, patient does 75% or more  Number times ambulated in hallways past shift: 0  Number of times OOB to chair past shift: 0    Cardiac:   Cardiac Monitoring: Yes      Cardiac Rhythm: Sinus rhythm    Access:  Current line(s): PIV     Genitourinary:   Urinary Status: Voiding    Respiratory:   O2 Device: None (Room air)  Chronic home O2 use?: NO  Incentive spirometer at bedside: NO    GI:  Last BM (including prior to admit): 02/13/25  Current diet:  ADULT DIET; Regular; 5 carb choices (75 gm/meal)  Passing flatus: YES    Pain Management:   Patient states pain is manageable on current regimen: YES    Skin:  Marty Scale Score: 19  Interventions: Wound Offloading (Prevention Methods): Elevate heels, Pillows, Repositioning, Turning    Patient Safety:  Fall Risk: Nursing Judgement-Fall Risk High(Add Comments): Yes  Fall Risk Interventions  Nursing Judgement-Fall Risk High(Add Comments): Yes  Toilet Every 2 Hours-In Advance of Need: Yes  Hourly Visual Checks: Awake, In bed  Fall Visual Posted: Fall sign posted, Socks  Room Door Open: Yes  Alarm On: Bed  Patient Moved Closer to Nursing Station: No    Active Consults:   IP CONSULT TO NEPHROLOGY  IP CONSULT TO 
1900: Bedside shift change report given to Nanette WOOD (oncoming nurse) by Cristina RN (offgoing nurse). Report included the following information Nurse Handoff Report, Intake/Output, MAR, Recent Results, and Cardiac Rhythm NSR .     End of Shift Note    Bedside shift change report given to Cristina RN (oncoming nurse) by Nanette Reynaga RN (offgoing nurse).  Report included the following information SBAR, Kardex, Intake/Output, Accordion, Recent Results, and Cardiac Rhythm NSR    Shift worked:  [id number]     Shift summary and any significant changes:     Patient placed on 2L overnight due to saturations going to 88% when asleep. May have undiagnosed sleep apnea?    Blisters throughout the BLE, one open and picture taken and added to LDA. Covered with xerofoam and foam.     Edema on all extremities.     Creatinine continues to increased but great output throughout shift ~ 1500ml.     Concerns for physician to address:  Edema    Plan of care     Zone phone for oncoming shift:   =       Activity:  Level of Assistance: Moderate assist, patient does 50-74%  Number times ambulated in hallways past shift: 0  Number of times OOB to chair past shift: 0    Cardiac:   Cardiac Monitoring: Yes      Cardiac Rhythm: Sinus rhythm    Access:  Current line(s): PIV     Genitourinary:   Urinary Status: Voiding    Respiratory:   O2 Device: Nasal cannula  Chronic home O2 use?: NO  Incentive spirometer at bedside: NO    GI:  Last BM (including prior to admit): 02/15/25  Current diet:  ADULT DIET; Dysphagia - Soft and Bite Sized  Passing flatus: YES    Pain Management:   Patient states pain is manageable on current regimen: YES    Skin:  Marty Scale Score: 19  Interventions: Wound Offloading (Prevention Methods): Elevate heels, Pillows, Repositioning, Turning    Patient Safety:  Fall Risk: Nursing Judgement-Fall Risk High(Add Comments): Yes  Fall Risk Interventions  Nursing Judgement-Fall Risk High(Add Comments): Yes  Toilet Every 2 Hours-In 
1900: Bedside shift change report given to Nanette WOOD (oncoming nurse) by Cristina WOOD (offgoing nurse). Report included the following information Nurse Handoff Report, Intake/Output, MAR, Recent Results, and Cardiac Rhythm NSR .     End of Shift Note    Bedside shift change report given to Tayler WOOD (oncoming nurse) by Nanette Reynaga RN (offgoing nurse).  Report included the following information SBAR, Kardex, ED Summary, Intake/Output, MAR, Recent Results, and Cardiac Rhythm NSR    Shift worked:  [id number]     Shift summary and any significant changes:     Patient had episode of hypoglycemia with AMS, D10 infused per order, mentation better and back to baseline. Notified [place] NP.      Concerns for physician to address: Diabetes management?     Zone phone for oncoming shift:          Activity:  Level of Assistance: Minimal assist, patient does 75% or more  Number times ambulated in hallways past shift: 0  Number of times OOB to chair past shift: 0    Cardiac:   Cardiac Monitoring: Yes      Cardiac Rhythm: Sinus rhythm    Access:  Current line(s): PIV     Genitourinary:   Urinary Status: Voiding    Respiratory:   O2 Device: None (Room air)  Chronic home O2 use?: NO  Incentive spirometer at bedside: YES    GI:  Last BM (including prior to admit): 02/13/25  Current diet:  ADULT DIET; Regular; 5 carb choices (75 gm/meal)  Passing flatus: YES    Pain Management:   Patient states pain is manageable on current regimen: YES    Skin:  Marty Scale Score: 18  Interventions: Wound Offloading (Prevention Methods): Elevate heels, Pillows, Repositioning, Turning    Patient Safety:  Fall Risk: Nursing Judgement-Fall Risk High(Add Comments): Yes  Fall Risk Interventions  Nursing Judgement-Fall Risk High(Add Comments): Yes  Toilet Every 2 Hours-In Advance of Need: Yes  Hourly Visual Checks: Awake, In chair  Fall Visual Posted: Fall sign posted, Socks  Room Door Open: Yes  Alarm On: Bed, Chair  Patient Moved Closer to Nursing 
1900: Bedside shift change report given to Nanette WOOD (oncoming nurse) by Tayler RN (offgoing nurse). Report included the following information Nurse Handoff Report, Intake/Output, MAR, Recent Results, and Cardiac Rhythm NSR .     End of Shift Note    Bedside shift change report given to Tayler WOOD (oncoming nurse) by Nanette Reynaga RN (offgoing nurse).  Report included the following information SBAR, Kardex, ED Summary, Intake/Output, MAR, Recent Results, and Cardiac Rhythm NSR    Shift worked:  [id number]     Shift summary and any significant changes:    Patient's glucose stabilize overnight, coverage given    Creatinine increased       Concerns for physician to address:  Increased creatinine?     Zone phone for oncoming shift:          Activity:  Level of Assistance: Minimal assist, patient does 75% or more  Number times ambulated in hallways past shift: 0  Number of times OOB to chair past shift: 0    Cardiac:   Cardiac Monitoring: Yes      Cardiac Rhythm: Sinus rhythm    Access:  Current line(s): PIV     Genitourinary:   Urinary Status: Voiding    Respiratory:   O2 Device: None (Room air)  Chronic home O2 use?: NO  Incentive spirometer at bedside: NO    GI:  Last BM (including prior to admit): 02/13/25  Current diet:  ADULT DIET; Regular; 5 carb choices (75 gm/meal)  Passing flatus: YES    Pain Management:   Patient states pain is manageable on current regimen: YES    Skin:  Marty Scale Score: 17  Interventions: Wound Offloading (Prevention Methods): Elevate heels, Pillows, Repositioning, Turning    Patient Safety:  Fall Risk: Nursing Judgement-Fall Risk High(Add Comments): Yes  Fall Risk Interventions  Nursing Judgement-Fall Risk High(Add Comments): Yes  Toilet Every 2 Hours-In Advance of Need: Yes  Hourly Visual Checks: Awake, In bed  Fall Visual Posted: Fall sign posted, Socks  Room Door Open: Yes  Alarm On: Bed  Patient Moved Closer to Nursing Station: No    Active Consults:   IP CONSULT TO 
Consult order noted for cardiac device check. [employer] local device rep paged.     Thank you for allowing us to participate in the care of this patient.  Feel free to reach back out if we could be of any further assistance.  
End of Shift Note    Bedside shift change report given to Cristina WOOD (oncoming nurse) by Radha Hemphill RN (offgoing nurse).  Report included the following information SBAR  Past Surgical History:   Procedure Laterality Date    CARDIAC CATHETERIZATION      CARDIAC DEFIBRILLATOR PLACEMENT      COLONOSCOPY  1/2/2015         HEENT      l cararact removal    US SOFT TISSUE ABSCESS DRAINAGE PERC  12/22/2020    US DRAIN SOFT TISSUE ABSCESS 12/22/2020 [facility] RAD US      Past Medical History:   Diagnosis Date    Abscess 12/2020    left side of abdomen    Chronic renal failure, stage 3a (HCC) 11/7/2023    Diabetes (HCC)     Hyperlipidemia     Hypertension     ICD (implantable cardioverter-defibrillator) in place     NSVT (nonsustained ventricular tachycardia) ([facility]) 11/21/2018    EPS 11/21/2018 VT/VF     Reactive airway disease 2/26/2024    Right groin pain 12/7/2020    Stroke ([facility])     Syncope     Valvular heart disease       CBC with Differential:    Lab Results   Component Value Date/Time    WBC 6.5 02/15/2025 04:29 AM    RBC 3.47 02/15/2025 04:29 AM    HGB 8.9 02/15/2025 04:29 AM    HCT 28.5 02/15/2025 04:29 AM     02/15/2025 04:29 AM    MCV 82.1 02/15/2025 04:29 AM    MCH 25.6 02/15/2025 04:29 AM    MCHC 31.2 02/15/2025 04:29 AM    RDW 16.3 02/15/2025 04:29 AM    NRBC 0.0 02/15/2025 04:29 AM    NRBC 0.00 02/15/2025 04:29 AM    LYMPHOPCT 8.1 02/15/2025 04:29 AM    MONOPCT 11.5 02/15/2025 04:29 AM    EOSPCT 2.5 02/15/2025 04:29 AM    BASOPCT 0.2 02/15/2025 04:29 AM    MONOSABS 0.75 02/15/2025 04:29 AM    LYMPHSABS 0.53 02/15/2025 04:29 AM    EOSABS 0.16 02/15/2025 04:29 AM    BASOSABS 0.01 02/15/2025 04:29 AM    DIFFTYPE AUTOMATED 02/15/2025 04:29 AM     BMP:    Lab Results   Component Value Date/Time     02/15/2025 04:29 AM    K 4.0 02/15/2025 04:29 AM     02/15/2025 04:29 AM    CO2 28 02/15/2025 04:29 AM    BUN 51 02/15/2025 04:29 AM    LABALBU 616.7 07/12/2024 12:00 AM    CREATININE 2.42 02/15/2025 
End of Shift Note    Bedside shift change report given to MARY GRACERN (oncoming nurse) by REY RHODES RN (offgoing nurse).  Report included the following information SBAR, Kardex, ED Summary, Intake/Output, MAR, Recent Results, Cardiac Rhythm NSR, and Alarm Parameters     Shift worked:  7-7     Shift summary and any significant changes:     Pt stronger moving well, de-sats when he sleeps. Constipated despite miralax.voiding well. Blood sugars running higher stat insulin     Concerns for physician to address:  No BM yet. Gave incentive to help him breathe deeper, de-sat when asleep       Zone phone for oncoming shift:          Activity:  Level of Assistance: Moderate assist, patient does 50-74%  Number times ambulated in hallways past shift: 1  Number of times OOB to chair past shift: 3    Cardiac:   Cardiac Monitoring: Yes      Cardiac Rhythm: Sinus rhythm    Access:  Current line(s): PIV     Genitourinary:   Urinary Status: Voiding    Respiratory:   O2 Device: None (Room air)  Chronic home O2 use?: NO  Incentive spirometer at bedside: YES    GI:  Last BM (including prior to admit): 02/13/25  Current diet:  ADULT DIET; Regular; 5 carb choices (75 gm/meal)  Passing flatus: NO    Pain Management:   Patient states pain is manageable on current regimen: N/A    Skin:  Marty Scale Score: 19  Interventions: Wound Offloading (Prevention Methods): Bed, pressure reduction mattress, Elevate heels, Pillows, Repositioning, Turning, Walker    Patient Safety:  Fall Risk: Nursing Judgement-Fall Risk High(Add Comments): Yes  Fall Risk Interventions  Nursing Judgement-Fall Risk High(Add Comments): Yes  Toilet Every 2 Hours-In Advance of Need: Yes  Hourly Visual Checks: In chair  Fall Visual Posted: Fall sign posted, Socks  Room Door Open: Yes  Alarm On: Bed  Patient Moved Closer to Nursing Station: No    Active Consults:   IP CONSULT TO NEPHROLOGY  IP CONSULT TO CARDIOLOGY  IP CONSULT TO FAMILY MEDICINE    Length of Stay:  Expected 
End of Shift Note    Bedside shift change report given to NINA Jon (oncoming nurse) by REY RHODES RN (offgoing nurse).  Report included the following information:KARDEX, ED REPORT , LABS, REPORTS  ,RHYTHM:NSR  Shift worked:  7-7     Shift summary and any significant changes:     Pt had good day OOB with PT and OT,  Ate well and blood sugars did not drop . Voiding Trial started -pt HNV yet    Concerns for physician to address:  No BM x 2 days     Zone phone for oncoming shift:   1149         Activity:  Level of Assistance: Moderate assist, patient does 50-74%  Number times ambulated inroom past shift: 1  Number of times OOB to chair past shift: 1    Cardiac:   Cardiac Monitoring: Yes      Cardiac Rhythm: Sinus rhythm    Access:  Current line(s): PIV     Genitourinary:   Urinary Status:  (was d/shane)    Respiratory:   O2 Device: None (Room air)  Chronic home O2 use?: NO  Incentive spirometer at bedside: NO    GI:  Last BM (including prior to admit): 02/13/25  Current diet:  ADULT DIET; Dysphagia - Soft and Bite Sized  Passing flatus: NO    Pain Management:   Patient states pain is manageable on current regimen: N/A    Skin:  Marty Scale Score: 19  Interventions: Wound Offloading (Prevention Methods): Bed, pressure reduction mattress, Elevate heels, Pillows, Repositioning, Turning    Patient Safety:  Fall Risk: Nursing Judgement-Fall Risk High(Add Comments): Yes  Fall Risk Interventions  Nursing Judgement-Fall Risk High(Add Comments): Yes  Toilet Every 2 Hours-In Advance of Need: Yes  Hourly Visual Checks: Awake  Fall Visual Posted: Armband, Fall sign posted, Socks  Room Door Open: Yes  Alarm On: Bed, Chair  Patient Moved Closer to Nursing Station: No    Active Consults:   IP CONSULT TO NEPHROLOGY  IP CONSULT TO CARDIOLOGY  IP CONSULT TO FAMILY MEDICINE    Length of Stay:  Expected LOS: 3  Actual LOS: 1    REY RHODES RN                            
Hospital follow-up PCP transitional care appointment has been scheduled with Dr. Junior Deras on 2/19/2025 at 0830. This is a previously scheduled appt. [facility] placed [facility] information AVS for patient resource. Pending patient discharge.  Susan Vences, Care Management Assistant   
Nurse Fitzpatrick contacted Nocturnist/cross cover provider via non-urgent messaging system [employer] and notified patient glucose lab 43, recheck bedside accucheck 35 and 33, states has d10 running per hypoglycemia protocol at present, received 35 units scheduled lantus last night and also 2 units ssi for glucose that was 236. Nurse reported pt was initially not able to respond aside from \"ok\", now that 1/2 way thru the dextrose is becoming more verbal told nurse \"I am feeling better\". No other concerns reported. No acute distress reported. No other information provided by nurse. VSS. Patient denies any further complaints or concerns. See prior hospitalist group notes for complete details of course of treatment. Recent lab work and documented vs reviewed.    Ordered lantus on hold until nurse d/w dayshift team further dosing, accuchecks daily 0200, nurse to instruct dayshift nurse to not give any ssi with breakfast unless d/w dayshift team provider first to avoid recurrent hypoglycemia given long acting insulin lantus 35 units was given last night. Asked nurse to update me with the response from the dextrose. No cva like sx reported. And appears pt already responding to the glucose with mentation improvement. Appreciate Nursing assistance in the care of this patient.    Continue remaining plan/orders as per dayshift team. Will defer further evaluation/management and timing of discontinuation of regimens to the day shift primary attending care team. Nursing to notify dayshift Hospitalist team in the AM of overnight events and for further/continued concerns. Will remain available overnight cross coverage for further concerns if nursing/patient needs. Please note, there are RRT systems in this hospital in place that if nursing has acute or critical patient condition change or concern, this is to help facilitate and notify that patient needs immediate bedside evaluation by a provider.    Update  Nurse inform me glucose 
Physical Therapy    Orders received, chart reviewed and patient evaluated by physical therapy. Pending progression with skilled acute physical therapy, recommend:    Intermittent physical therapy up to 2-3x/week in previous living setting with additional support needed for pacing, energy conservation    Recommend with nursing OOB to chair 3x/day and walking daily with 1 assist and rolling walker. Thank you for completing as able in order to maintain patient strength, endurance and independence.     Recommend Rolling walker for d/c.  John Paul Juarez was assessed today and a DME order was entered for a rolling walker because he requires this to successfully complete daily living tasks of ambulating. The patient has a mobility limitation that significantly impairs their ability to participate in one or more mobility-related activities of daily living in the home. The patient is able to safely use the walker. The functional mobility deficit can be sufficiently resolved by use of a walker. The need for this equipment was discussed with the patient and he understands and is in agreement.        Full evaluation to follow.    
Report received from ED patient to transport with ED RN soon.     2330. End of Shift Note    Bedside shift change report given to Radha WOOD (oncoming nurse) by Jayesh Faust RN (offgoing nurse).  Report included the following information SBAR, Kardex, Intake/Output, MAR, Recent Results, and Cardiac Rhythm NSR    Shift worked:  [id number]     Shift summary and any significant changes:     Patient received and care assumed vss     Concerns for physician to address:       Zone phone for oncoming shift:          Activity:     Number times ambulated in hallways past shift: 0  Number of times OOB to chair past shift: 1    Cardiac:   Cardiac Monitoring: Yes           Access:  Current line(s): PIV     Genitourinary:   Urinary Status: [place]o, Draining    Respiratory:   O2 Device: Nasal cannula  Chronic home O2 use?: NO  Incentive spirometer at bedside: YES    GI:     Current diet:  ADULT DIET; Dysphagia - Soft and Bite Sized  Passing flatus: YES    Pain Management:   Patient states pain is manageable on current regimen: YES    Skin:  Marty Scale Score: 20  Interventions:      Patient Safety:  Fall Risk:         Active Consults:   IP CONSULT TO NEPHROLOGY  IP CONSULT TO CARDIOLOGY  IP CONSULT TO FAMILY MEDICINE    Length of Stay:  Expected LOS: 3  Actual LOS: 0    Jayesh Faust, RN                            
97.7 °F (36.5 °C) -- 68 11 97 % --   02/14/25 1430 (!) 144/69 97.4 °F (36.3 °C) -- 71 13 99 % --   02/14/25 1415 (!) 143/74 97.2 °F (36.2 °C) -- 69 10 98 % --   02/14/25 1400 (!) 155/71 97 °F (36.1 °C) -- 69 11 99 % --   02/14/25 1345 135/78 (!) 96.7 °F (35.9 °C) -- 68 11 99 % --   02/14/25 1330 (!) 163/82 (!) 96.4 °F (35.8 °C) -- 68 10 99 % --   02/14/25 1315 (!) 157/95 (!) 96.1 °F (35.6 °C) -- 67 10 99 % --   02/14/25 1300 (!) 199/97 (!) 95.8 °F (35.4 °C) -- 67 15 97 % --   02/14/25 1245 (!) 211/100 (!) 95.5 °F (35.3 °C) -- 72 13 97 % --   02/14/25 1230 (!) 181/98 (!) 95.2 °F (35.1 °C) -- 62 11 97 % --   02/14/25 1207 -- (!) 93.2 °F (34 °C) -- 73 15 96 % --   02/14/25 1200 (!) 212/108 -- -- 62 10 96 % --   02/14/25 1145 (!) 187/80 -- -- 65 13 99 % --   02/14/25 1130 (!) 169/74 -- -- 65 13 99 % --   02/14/25 1115 (!) 211/85 -- -- 68 14 (!) 84 % --   02/14/25 1100 (!) 201/93 -- -- -- -- -- --   02/14/25 1030 (!) 208/83 -- -- -- -- -- --   02/14/25 1026 -- (!) 95.5 °F (35.3 °C) Rectal -- -- -- --   02/14/25 0858 (!) 225/109 -- -- 72 20 94 % 1.702 m (5' 7\")         Intake/Output Summary (Last 24 hours) at 2/15/2025 0842  Last data filed at 2/15/2025 0757  Gross per 24 hour   Intake 1542.17 ml   Output 2350 ml   Net -807.83 ml        I had a face to face encounter and independently examined this patient on 2/15/2025, as outlined below:  PHYSICAL EXAM:  General: Alert, cooperative  EENT:  EOMI. Anicteric sclerae.  Resp:  CTA bilaterally, no wheezing or rales.  No accessory muscle use  CV:  Regular  rhythm, edema bilateral lower extremity and hands  GI:  Soft, Non distended, Non tender.  +Bowel sounds  Neurologic:  Alert and oriented X 3, normal speech  Psych:   Good insight. Not anxious nor agitated  Skin:  No rashes.  No jaundice.  Multiple scab-in the bilateral lower extremity with few open wounds  Gu: bruce for retention - ordered a voiding trial     Reviewed most current lab test results and cultures  YES  Reviewed

## 2025-02-20 LAB
BACTERIA SPEC CULT: NORMAL
BACTERIA SPEC CULT: NORMAL
SERVICE CMNT-IMP: NORMAL
SERVICE CMNT-IMP: NORMAL

## 2025-02-21 ENCOUNTER — TELEPHONE (OUTPATIENT)
Facility: CLINIC | Age: 70
End: 2025-02-21

## 2025-02-21 NOTE — TELEPHONE ENCOUNTER
Care Transitions Initial Follow Up Call    Outreach made within 2 business days of discharge: No    Patient: John Paul Juarez Patient : 1955   MRN: [id number]  Reason for Admission: AMS (Altered mental status)  Discharge Date: 25       Spoke with: No answer, VM not set up    Discharge department/facility: [facility]     Follow Up  Future Appointments   Date Time Provider Department Center   3/4/2025  1:30 PM Junior Deras MD [place] MAIN [facility] ECC DEP       Tiffanie Carter

## 2025-02-26 ENCOUNTER — APPOINTMENT (OUTPATIENT)
Facility: HOSPITAL | Age: 70
DRG: 291 | End: 2025-02-26
Attending: INTERNAL MEDICINE
Payer: COMMERCIAL

## 2025-02-26 ENCOUNTER — HOSPITAL ENCOUNTER (INPATIENT)
Facility: HOSPITAL | Age: 70
LOS: 4 days | Discharge: HOME | DRG: 291 | End: 2025-03-02
Admitting: INTERNAL MEDICINE
Payer: COMMERCIAL

## 2025-02-26 ENCOUNTER — APPOINTMENT (OUTPATIENT)
Facility: HOSPITAL | Age: 70
DRG: 291 | End: 2025-02-26
Payer: COMMERCIAL

## 2025-02-26 DIAGNOSIS — R06.09 DYSPNEA ON EXERTION: ICD-10-CM

## 2025-02-26 DIAGNOSIS — R60.0 BILATERAL LEG EDEMA: ICD-10-CM

## 2025-02-26 DIAGNOSIS — J18.9 PNEUMONIA OF RIGHT UPPER LOBE DUE TO INFECTIOUS ORGANISM: ICD-10-CM

## 2025-02-26 DIAGNOSIS — I50.31 ACUTE DIASTOLIC CHF (CONGESTIVE HEART FAILURE) (HCC): ICD-10-CM

## 2025-02-26 DIAGNOSIS — E87.70 HYPERVOLEMIA, UNSPECIFIED HYPERVOLEMIA TYPE: ICD-10-CM

## 2025-02-26 DIAGNOSIS — R60.0 EDEMA OF LEFT UPPER EXTREMITY: Primary | ICD-10-CM

## 2025-02-26 LAB
ALBUMIN SERPL-MCNC: 2.5 G/DL (ref 3.5–5)
ALBUMIN SERPL-MCNC: 2.6 G/DL (ref 3.5–5)
ALBUMIN/GLOB SERPL: 0.6 (ref 1.1–2.2)
ALBUMIN/GLOB SERPL: 0.6 (ref 1.1–2.2)
ALP SERPL-CCNC: 109 U/L (ref 45–117)
ALP SERPL-CCNC: 110 U/L (ref 45–117)
ALT SERPL-CCNC: 37 U/L (ref 12–78)
ALT SERPL-CCNC: 41 U/L (ref 12–78)
ANION GAP SERPL CALC-SCNC: 8 MMOL/L (ref 2–12)
ANION GAP SERPL CALC-SCNC: 8 MMOL/L (ref 2–12)
ARTERIAL PATENCY WRIST A: YES
AST SERPL-CCNC: 27 U/L (ref 15–37)
AST SERPL-CCNC: 32 U/L (ref 15–37)
BASE DEFICIT BLDA-SCNC: 1.7 MMOL/L
BASOPHILS # BLD: 0.02 K/UL (ref 0–0.1)
BASOPHILS NFR BLD: 0.2 % (ref 0–1)
BDY SITE: ABNORMAL
BILIRUB SERPL-MCNC: 0.3 MG/DL (ref 0.2–1)
BILIRUB SERPL-MCNC: 0.3 MG/DL (ref 0.2–1)
BUN SERPL-MCNC: 47 MG/DL (ref 6–20)
BUN SERPL-MCNC: 49 MG/DL (ref 6–20)
BUN/CREAT SERPL: 17 (ref 12–20)
BUN/CREAT SERPL: 18 (ref 12–20)
CALCIUM SERPL-MCNC: 8.4 MG/DL (ref 8.5–10.1)
CALCIUM SERPL-MCNC: 8.5 MG/DL (ref 8.5–10.1)
CHLORIDE SERPL-SCNC: 107 MMOL/L (ref 97–108)
CHLORIDE SERPL-SCNC: 108 MMOL/L (ref 97–108)
CO2 SERPL-SCNC: 28 MMOL/L (ref 21–32)
CO2 SERPL-SCNC: 28 MMOL/L (ref 21–32)
CREAT SERPL-MCNC: 2.68 MG/DL (ref 0.7–1.3)
CREAT SERPL-MCNC: 2.87 MG/DL (ref 0.7–1.3)
DIFFERENTIAL METHOD BLD: ABNORMAL
EOSINOPHIL # BLD: 0.12 K/UL (ref 0–0.4)
EOSINOPHIL NFR BLD: 1.1 % (ref 0–7)
ERYTHROCYTE [DISTWIDTH] IN BLOOD BY AUTOMATED COUNT: 15.9 % (ref 11.5–14.5)
FERRITIN SERPL-MCNC: 84 NG/ML (ref 26–388)
FLUAV RNA SPEC QL NAA+PROBE: NOT DETECTED
FLUBV RNA SPEC QL NAA+PROBE: NOT DETECTED
GAS FLOW.O2 O2 DELIVERY SYS: 2 L/MIN
GLOBULIN SER CALC-MCNC: 4.1 G/DL (ref 2–4)
GLOBULIN SER CALC-MCNC: 4.3 G/DL (ref 2–4)
GLUCOSE BLD STRIP.AUTO-MCNC: 131 MG/DL (ref 65–117)
GLUCOSE BLD STRIP.AUTO-MCNC: 188 MG/DL (ref 65–117)
GLUCOSE SERPL-MCNC: 116 MG/DL (ref 65–100)
GLUCOSE SERPL-MCNC: 186 MG/DL (ref 65–100)
HCO3 BLDA-SCNC: 23 MMOL/L (ref 22–26)
HCT VFR BLD AUTO: 25.9 % (ref 36.6–50.3)
HGB BLD-MCNC: 8 G/DL (ref 12.1–17)
IMM GRANULOCYTES # BLD AUTO: 0.03 K/UL (ref 0–0.04)
IMM GRANULOCYTES NFR BLD AUTO: 0.3 % (ref 0–0.5)
IRON SATN MFR SERPL: 9 % (ref 20–50)
IRON SERPL-MCNC: 20 UG/DL (ref 35–150)
LYMPHOCYTES # BLD: 0.49 K/UL (ref 0.8–3.5)
LYMPHOCYTES NFR BLD: 4.5 % (ref 12–49)
MCH RBC QN AUTO: 26.5 PG (ref 26–34)
MCHC RBC AUTO-ENTMCNC: 30.9 G/DL (ref 30–36.5)
MCV RBC AUTO: 85.8 FL (ref 80–99)
MONOCYTES # BLD: 0.53 K/UL (ref 0–1)
MONOCYTES NFR BLD: 4.9 % (ref 5–13)
NEUTS SEG # BLD: 9.61 K/UL (ref 1.8–8)
NEUTS SEG NFR BLD: 89 % (ref 32–75)
NRBC # BLD: 0 K/UL (ref 0–0.01)
NRBC BLD-RTO: 0 PER 100 WBC
NT PRO BNP: 1275 PG/ML (ref 0–125)
PCO2 BLDA: 41 MMHG (ref 35–45)
PH BLDA: 7.38 (ref 7.35–7.45)
PLATELET # BLD AUTO: 202 K/UL (ref 150–400)
PMV BLD AUTO: 10.1 FL (ref 8.9–12.9)
PO2 BLDA: 53 MMHG (ref 80–100)
POTASSIUM SERPL-SCNC: 4.3 MMOL/L (ref 3.5–5.1)
POTASSIUM SERPL-SCNC: 4.6 MMOL/L (ref 3.5–5.1)
PROT SERPL-MCNC: 6.6 G/DL (ref 6.4–8.2)
PROT SERPL-MCNC: 6.9 G/DL (ref 6.4–8.2)
RBC # BLD AUTO: 3.02 M/UL (ref 4.1–5.7)
RBC MORPH BLD: ABNORMAL
SAO2 % BLD: 87 % (ref 92–97)
SAO2% DEVICE SAO2% SENSOR NAME: ABNORMAL
SARS-COV-2 RNA RESP QL NAA+PROBE: NOT DETECTED
SERVICE CMNT-IMP: ABNORMAL
SERVICE CMNT-IMP: ABNORMAL
SODIUM SERPL-SCNC: 143 MMOL/L (ref 136–145)
SODIUM SERPL-SCNC: 144 MMOL/L (ref 136–145)
SOURCE: NORMAL
SPECIMEN SITE: ABNORMAL
T4 FREE SERPL-MCNC: 1.1 NG/DL (ref 0.8–1.5)
TIBC SERPL-MCNC: 229 UG/DL (ref 250–450)
TROPONIN I SERPL HS-MCNC: 13 NG/L (ref 0–76)
TSH SERPL DL<=0.05 MIU/L-ACNC: 1.55 UIU/ML (ref 0.36–3.74)
WBC # BLD AUTO: 10.8 K/UL (ref 4.1–11.1)

## 2025-02-26 PROCEDURE — 96375 TX/PRO/DX INJ NEW DRUG ADDON: CPT

## 2025-02-26 PROCEDURE — 84443 ASSAY THYROID STIM HORMONE: CPT

## 2025-02-26 PROCEDURE — 36415 COLL VENOUS BLD VENIPUNCTURE: CPT

## 2025-02-26 PROCEDURE — 2500000003 HC RX 250 WO HCPCS: Performed by: INTERNAL MEDICINE

## 2025-02-26 PROCEDURE — 84439 ASSAY OF FREE THYROXINE: CPT

## 2025-02-26 PROCEDURE — 93005 ELECTROCARDIOGRAM TRACING: CPT

## 2025-02-26 PROCEDURE — 71046 X-RAY EXAM CHEST 2 VIEWS: CPT

## 2025-02-26 PROCEDURE — 85025 COMPLETE CBC W/AUTO DIFF WBC: CPT

## 2025-02-26 PROCEDURE — 6360000002 HC RX W HCPCS: Performed by: INTERNAL MEDICINE

## 2025-02-26 PROCEDURE — 83880 ASSAY OF NATRIURETIC PEPTIDE: CPT

## 2025-02-26 PROCEDURE — 6370000000 HC RX 637 (ALT 250 FOR IP): Performed by: INTERNAL MEDICINE

## 2025-02-26 PROCEDURE — 83540 ASSAY OF IRON: CPT

## 2025-02-26 PROCEDURE — 84484 ASSAY OF TROPONIN QUANT: CPT

## 2025-02-26 PROCEDURE — 87899 AGENT NOS ASSAY W/OPTIC: CPT

## 2025-02-26 PROCEDURE — 1200000000 HC SEMI PRIVATE

## 2025-02-26 PROCEDURE — 80053 COMPREHEN METABOLIC PANEL: CPT

## 2025-02-26 PROCEDURE — 36600 WITHDRAWAL OF ARTERIAL BLOOD: CPT

## 2025-02-26 PROCEDURE — 82962 GLUCOSE BLOOD TEST: CPT

## 2025-02-26 PROCEDURE — 6360000002 HC RX W HCPCS

## 2025-02-26 PROCEDURE — 99285 EMERGENCY DEPT VISIT HI MDM: CPT

## 2025-02-26 PROCEDURE — 87449 NOS EACH ORGANISM AG IA: CPT

## 2025-02-26 PROCEDURE — 82803 BLOOD GASES ANY COMBINATION: CPT

## 2025-02-26 PROCEDURE — 87636 SARSCOV2 & INF A&B AMP PRB: CPT

## 2025-02-26 PROCEDURE — 2580000003 HC RX 258

## 2025-02-26 PROCEDURE — 96365 THER/PROPH/DIAG IV INF INIT: CPT

## 2025-02-26 PROCEDURE — 82728 ASSAY OF FERRITIN: CPT

## 2025-02-26 PROCEDURE — 93970 EXTREMITY STUDY: CPT

## 2025-02-26 PROCEDURE — 83550 IRON BINDING TEST: CPT

## 2025-02-26 RX ORDER — SODIUM CHLORIDE 0.9 % (FLUSH) 0.9 %
5-40 SYRINGE (ML) INJECTION PRN
Status: DISCONTINUED | OUTPATIENT
Start: 2025-02-26 | End: 2025-03-02 | Stop reason: HOSPADM

## 2025-02-26 RX ORDER — ATORVASTATIN CALCIUM 10 MG/1
20 TABLET, FILM COATED ORAL DAILY
Status: DISCONTINUED | OUTPATIENT
Start: 2025-02-27 | End: 2025-03-02 | Stop reason: HOSPADM

## 2025-02-26 RX ORDER — ASPIRIN 81 MG/1
81 TABLET ORAL DAILY
Status: DISCONTINUED | OUTPATIENT
Start: 2025-02-27 | End: 2025-03-02 | Stop reason: HOSPADM

## 2025-02-26 RX ORDER — FUROSEMIDE 10 MG/ML
40 INJECTION INTRAMUSCULAR; INTRAVENOUS
Status: COMPLETED | OUTPATIENT
Start: 2025-02-26 | End: 2025-02-26

## 2025-02-26 RX ORDER — SODIUM CHLORIDE 0.9 % (FLUSH) 0.9 %
5-40 SYRINGE (ML) INJECTION EVERY 12 HOURS SCHEDULED
Status: DISCONTINUED | OUTPATIENT
Start: 2025-02-26 | End: 2025-03-02 | Stop reason: HOSPADM

## 2025-02-26 RX ORDER — ONDANSETRON 2 MG/ML
4 INJECTION INTRAMUSCULAR; INTRAVENOUS EVERY 6 HOURS PRN
Status: DISCONTINUED | OUTPATIENT
Start: 2025-02-26 | End: 2025-03-02 | Stop reason: HOSPADM

## 2025-02-26 RX ORDER — FUROSEMIDE 10 MG/ML
20 INJECTION INTRAMUSCULAR; INTRAVENOUS 2 TIMES DAILY
Status: DISCONTINUED | OUTPATIENT
Start: 2025-02-26 | End: 2025-02-27

## 2025-02-26 RX ORDER — ENOXAPARIN SODIUM 100 MG/ML
30 INJECTION SUBCUTANEOUS DAILY
Status: DISCONTINUED | OUTPATIENT
Start: 2025-02-26 | End: 2025-03-02 | Stop reason: HOSPADM

## 2025-02-26 RX ORDER — FERROUS SULFATE 325(65) MG
325 TABLET ORAL 2 TIMES DAILY WITH MEALS
Status: DISCONTINUED | OUTPATIENT
Start: 2025-02-26 | End: 2025-03-02 | Stop reason: HOSPADM

## 2025-02-26 RX ORDER — CARVEDILOL 3.12 MG/1
3.12 TABLET ORAL 2 TIMES DAILY WITH MEALS
Status: DISCONTINUED | OUTPATIENT
Start: 2025-02-26 | End: 2025-03-02 | Stop reason: HOSPADM

## 2025-02-26 RX ORDER — POLYETHYLENE GLYCOL 3350 17 G/17G
17 POWDER, FOR SOLUTION ORAL DAILY PRN
Status: DISCONTINUED | OUTPATIENT
Start: 2025-02-26 | End: 2025-03-02 | Stop reason: HOSPADM

## 2025-02-26 RX ORDER — AMLODIPINE BESYLATE 5 MG/1
10 TABLET ORAL DAILY
Status: DISCONTINUED | OUTPATIENT
Start: 2025-02-27 | End: 2025-03-02 | Stop reason: HOSPADM

## 2025-02-26 RX ORDER — ACETAMINOPHEN 650 MG/1
650 SUPPOSITORY RECTAL EVERY 6 HOURS PRN
Status: DISCONTINUED | OUTPATIENT
Start: 2025-02-26 | End: 2025-03-02 | Stop reason: HOSPADM

## 2025-02-26 RX ORDER — ACETAMINOPHEN 325 MG/1
650 TABLET ORAL EVERY 6 HOURS PRN
Status: DISCONTINUED | OUTPATIENT
Start: 2025-02-26 | End: 2025-03-02 | Stop reason: HOSPADM

## 2025-02-26 RX ORDER — ONDANSETRON 4 MG/1
4 TABLET, ORALLY DISINTEGRATING ORAL EVERY 8 HOURS PRN
Status: DISCONTINUED | OUTPATIENT
Start: 2025-02-26 | End: 2025-03-02 | Stop reason: HOSPADM

## 2025-02-26 RX ORDER — SODIUM CHLORIDE 9 MG/ML
INJECTION, SOLUTION INTRAVENOUS PRN
Status: DISCONTINUED | OUTPATIENT
Start: 2025-02-26 | End: 2025-03-02 | Stop reason: HOSPADM

## 2025-02-26 RX ADMIN — ENOXAPARIN SODIUM 30 MG: 100 INJECTION SUBCUTANEOUS at 13:08

## 2025-02-26 RX ADMIN — SODIUM CHLORIDE, PRESERVATIVE FREE 10 ML: 5 INJECTION INTRAVENOUS at 20:07

## 2025-02-26 RX ADMIN — CARVEDILOL 3.12 MG: 3.12 TABLET, FILM COATED ORAL at 17:31

## 2025-02-26 RX ADMIN — CEFEPIME 2000 MG: 2 INJECTION, POWDER, FOR SOLUTION INTRAVENOUS at 10:56

## 2025-02-26 RX ADMIN — FUROSEMIDE 20 MG: 10 INJECTION, SOLUTION INTRAMUSCULAR; INTRAVENOUS at 13:07

## 2025-02-26 RX ADMIN — FUROSEMIDE 40 MG: 10 INJECTION, SOLUTION INTRAMUSCULAR; INTRAVENOUS at 10:48

## 2025-02-26 RX ADMIN — FUROSEMIDE 20 MG: 10 INJECTION, SOLUTION INTRAMUSCULAR; INTRAVENOUS at 17:39

## 2025-02-26 ASSESSMENT — PAIN - FUNCTIONAL ASSESSMENT: PAIN_FUNCTIONAL_ASSESSMENT: NONE - DENIES PAIN

## 2025-02-26 NOTE — ED TRIAGE NOTES
Pt arrives via wheelchair to triage complaining of SOB and chest tightness x 3 days. Pt reports hx of diabetes and reports he is out of his test strips. Pt endorses hx of kidney problems but denies being on dialysis. Pt also has ICD.

## 2025-02-26 NOTE — ED NOTES
Pt presents to ED via home complaining of SOB and chest tightness x 3 days. Pt also presents with bilateral lower extremity pitting edema. Pt reports hx of diabetes, kidney problems, and has an ICD. Pt is alert and oriented x 4, RR even and unlabored, skin is warm and dry. Pt appears in NAD at this time. Assessment completed and pt updated on plan of care.  Call bell in reach.     Emergency Department Nursing Plan of Care  The Nursing Plan of Care is developed from the Nursing assessment and Emergency Department Attending provider initial evaluation.  The plan of care may be reviewed in the “ED Provider note”.  The Plan of Care was developed with the following considerations:  Patient / Family readiness to learn indicated by:Refer to Medical chart in [facility]  Persons(s) to be included in education: Refer to Medical chart in [facility]  Barriers to Learning/Limitations:Normal

## 2025-02-26 NOTE — H&P
completed with Dragon, the computer voice recognition software.  Quite often unanticipated grammatical, syntax, homophones, and other interpretive errors are inadvertently transcribed by the computer software.  Please disregard these errors.  Please excuse any errors that have escaped final proofreading.

## 2025-02-26 NOTE — CONSULTS
Sent a perfect serve to [facility] Cardiology Office at 1422, pending response to confirm  provider.   
   NT Pro-BNP 1,275 (H) 0 - 125 PG/ML   POCT Glucose    Collection Time: 02/26/25  9:27 AM   Result Value Ref Range    POC Glucose 188 (H) 65 - 117 mg/dL    Performed by: Marin moura    Blood Gas, Arterial    Collection Time: 02/26/25 10:03 AM   Result Value Ref Range    pH, Arterial 7.38 7.35 - 7.45      pCO2, Arterial 41 35 - 45 mmHg    pO2, Arterial 53 (L) 80 - 100 mmHg    POC O2 SAT 87 (L) 92 - 97 %    HCO3, Arterial 23 22 - 26 mmol/L    Base deficit, arterial blood 1.7 mmol/L    O2 Method NASAL CANNULA      O2 Flow Rate 2.00 L/min    Source ARTERIAL      Site LEFT RADIAL      Antwon Test YES               Imaging:    XR CHEST (2 VW)   Final Result   Increased right upper lobe airspace disease.          Electronically signed by Nadeem Barrientos      Vascular duplex lower extremity venous bilateral    (Results Pending)         Assessment     The patient is a 69/M who we are seeing in consultation at the request of Dr. Crane for pneumonia.     Dyspnea - patients current symptoms are more likely due to acute pulmonary edema due to heart failure. His symptoms, clinical exam, labs and imaging are all consistent with that.     Recommendations  - Hold off on antibiotics, he recently had antibiotics when admitted at [facility] and discharged on 2/18. CT chest during that admission showed evidence of pulmonary edema as well.  - Cont. Diuresis. Net negative liter in 24 hours should be goal.   - ECHO 2/22/24 shows diastolic heart failure with elevated RSVP due to pulmonary edema.     Thank you for involving us in the care of the patient. We will cont. To follow along.     Wilfred Johnson MD, LAST, FCCP, FCCM, ATSF, FACP, DAABIP  Interventional Pulmonology/Critical Care Medicine  [facility]

## 2025-02-26 NOTE — ED PROVIDER NOTES
Activity: Inactive (3/7/2024)    Exercise Vital Sign     Days of Exercise per Week: 0 days     Minutes of Exercise per Session: 0 min   Stress: No Stress Concern Present (3/7/2024)    [employer] [place] [employer] - Occupational Stress Questionnaire     Feeling of Stress : Not at all   Social Connections: Not on file   Intimate Partner Violence: Not At Risk (3/7/2024)    Humiliation, Afraid, Rape, and Kick questionnaire     Fear of Current or Ex-Partner: No     Emotionally Abused: No     Physically Abused: No     Sexually Abused: No   Depression: Not at risk (3/7/2024)    PHQ-2     PHQ-2 Score: 0   Housing Stability: Low Risk  (3/7/2024)    Housing Stability Vital Sign     Unable to Pay for Housing in the Last Year: No     Number of Places Lived in the Last Year: 1     Unstable Housing in the Last Year: No   Interpersonal Safety: Not At Risk (2/14/2025)    Interpersonal Safety Domain Source: IP Abuse Screening     Physical abuse: Denies     Verbal abuse: Denies     Emotional abuse: Denies     Financial abuse: Denies     Sexual abuse: Denies   Utilities: Not At Risk (3/7/2024)    [facility] Utilities     Threatened with loss of utilities: No       Review of Systems     Negative except as listed above in HPI.    Physical Exam     Vitals:    02/26/25 0918 02/26/25 1000 02/26/25 1045   BP: 139/87 136/72 139/71   Pulse: 68 64 66   Resp: 22 16 15   Temp: 97.9 °F (36.6 °C)     TempSrc: Oral     SpO2: 94% 94% 97%   Weight: 71.7 kg (158 lb)     Height: 1.702 m (5' 7\")         Physical Exam  Vitals and nursing note reviewed.   Constitutional:       General: He is not in acute distress.     Appearance: Normal appearance. He is ill-appearing.   HENT:      Head: Normocephalic and atraumatic.      Right Ear: External ear normal.      Left Ear: External ear normal.      Nose: Nose normal.      Mouth/Throat:      Mouth: Mucous membranes are moist.   Eyes:      Conjunctiva/sclera: Conjunctivae normal.      Pupils: Pupils are

## 2025-02-26 NOTE — ED NOTES
TRANSFER - OUT REPORT:    Verbal report given to NINA Moreno on John Paul Juarez  being transferred to PCU4 for routine progression of patient care       Report consisted of patient's Situation, Background, Assessment and   Recommendations(SBAR).     Information from the following report(s) Nurse Handoff Report, Index, ED Encounter Summary, ED SBAR, Adult Overview, Intake/Output, MAR, Recent Results, Med Rec Status, Quality Measures, and Neuro Assessment was reviewed with the receiving nurse.    [place] Fall Assessment:    Presents to emergency department  because of falls (Syncope, seizure, or loss of consciousness): No  Age > 70: Yes  Altered Mental Status, Intoxication with alcohol or substance confusion (Disorientation, impaired judgment, poor safety awaremess, or inability to follow instructions): No  Impaired Mobility: Ambulates or transfers with assistive devices or assistance; Unable to ambulate or transer.: Yes (pt SOB and uses cane at baseline)  Nursing Judgement: Yes          Lines:   Peripheral IV 02/26/25 Distal;Right;Anterior Cephalic (Active)        Opportunity for questions and clarification was provided.      Patient transported with:  O2 @ 2llpm and Registered Nurse and Monitor

## 2025-02-27 LAB
ALBUMIN SERPL-MCNC: 2.4 G/DL (ref 3.5–5)
ALBUMIN/GLOB SERPL: 0.6 (ref 1.1–2.2)
ALP SERPL-CCNC: 106 U/L (ref 45–117)
ALT SERPL-CCNC: 34 U/L (ref 12–78)
ANION GAP SERPL CALC-SCNC: 8 MMOL/L (ref 2–12)
AST SERPL-CCNC: 24 U/L (ref 15–37)
BASOPHILS # BLD: 0.03 K/UL (ref 0–0.1)
BASOPHILS NFR BLD: 0.5 % (ref 0–1)
BILIRUB SERPL-MCNC: 0.3 MG/DL (ref 0.2–1)
BUN SERPL-MCNC: 57 MG/DL (ref 6–20)
BUN/CREAT SERPL: 18 (ref 12–20)
CALCIUM SERPL-MCNC: 8.7 MG/DL (ref 8.5–10.1)
CHLORIDE SERPL-SCNC: 108 MMOL/L (ref 97–108)
CO2 SERPL-SCNC: 28 MMOL/L (ref 21–32)
CREAT SERPL-MCNC: 3.13 MG/DL (ref 0.7–1.3)
DIFFERENTIAL METHOD BLD: ABNORMAL
ECHO BSA: 1.84 M2
EKG ATRIAL RATE: 66 BPM
EKG DIAGNOSIS: NORMAL
EKG P AXIS: 51 DEGREES
EKG P-R INTERVAL: 138 MS
EKG Q-T INTERVAL: 388 MS
EKG QRS DURATION: 92 MS
EKG QTC CALCULATION (BAZETT): 406 MS
EKG R AXIS: -9 DEGREES
EKG T AXIS: 43 DEGREES
EKG VENTRICULAR RATE: 66 BPM
EOSINOPHIL # BLD: 0.13 K/UL (ref 0–0.4)
EOSINOPHIL NFR BLD: 2 % (ref 0–7)
ERYTHROCYTE [DISTWIDTH] IN BLOOD BY AUTOMATED COUNT: 16 % (ref 11.5–14.5)
GLOBULIN SER CALC-MCNC: 4.3 G/DL (ref 2–4)
GLUCOSE BLD STRIP.AUTO-MCNC: 117 MG/DL (ref 65–117)
GLUCOSE BLD STRIP.AUTO-MCNC: 212 MG/DL (ref 65–117)
GLUCOSE BLD STRIP.AUTO-MCNC: 254 MG/DL (ref 65–117)
GLUCOSE SERPL-MCNC: 193 MG/DL (ref 65–100)
HCT VFR BLD AUTO: 26.3 % (ref 36.6–50.3)
HGB BLD-MCNC: 8.2 G/DL (ref 12.1–17)
IMM GRANULOCYTES # BLD AUTO: 0.02 K/UL (ref 0–0.04)
IMM GRANULOCYTES NFR BLD AUTO: 0.3 % (ref 0–0.5)
LYMPHOCYTES # BLD: 0.42 K/UL (ref 0.8–3.5)
LYMPHOCYTES NFR BLD: 6.3 % (ref 12–49)
MCH RBC QN AUTO: 26.5 PG (ref 26–34)
MCHC RBC AUTO-ENTMCNC: 31.2 G/DL (ref 30–36.5)
MCV RBC AUTO: 84.8 FL (ref 80–99)
MONOCYTES # BLD: 0.53 K/UL (ref 0–1)
MONOCYTES NFR BLD: 8 % (ref 5–13)
NEUTS SEG # BLD: 5.47 K/UL (ref 1.8–8)
NEUTS SEG NFR BLD: 82.9 % (ref 32–75)
NRBC # BLD: 0 K/UL (ref 0–0.01)
NRBC BLD-RTO: 0 PER 100 WBC
NT PRO BNP: 868 PG/ML (ref 0–125)
PLATELET # BLD AUTO: 219 K/UL (ref 150–400)
PMV BLD AUTO: 9.8 FL (ref 8.9–12.9)
POTASSIUM SERPL-SCNC: 4.7 MMOL/L (ref 3.5–5.1)
PROT SERPL-MCNC: 6.7 G/DL (ref 6.4–8.2)
RBC # BLD AUTO: 3.1 M/UL (ref 4.1–5.7)
RBC MORPH BLD: ABNORMAL
SERVICE CMNT-IMP: ABNORMAL
SERVICE CMNT-IMP: ABNORMAL
SERVICE CMNT-IMP: NORMAL
SODIUM SERPL-SCNC: 144 MMOL/L (ref 136–145)
WBC # BLD AUTO: 6.6 K/UL (ref 4.1–11.1)

## 2025-02-27 PROCEDURE — 36415 COLL VENOUS BLD VENIPUNCTURE: CPT

## 2025-02-27 PROCEDURE — 83880 ASSAY OF NATRIURETIC PEPTIDE: CPT

## 2025-02-27 PROCEDURE — 2700000000 HC OXYGEN THERAPY PER DAY

## 2025-02-27 PROCEDURE — 6360000002 HC RX W HCPCS: Performed by: INTERNAL MEDICINE

## 2025-02-27 PROCEDURE — 6370000000 HC RX 637 (ALT 250 FOR IP): Performed by: INTERNAL MEDICINE

## 2025-02-27 PROCEDURE — 1200000000 HC SEMI PRIVATE

## 2025-02-27 PROCEDURE — 2500000003 HC RX 250 WO HCPCS: Performed by: INTERNAL MEDICINE

## 2025-02-27 PROCEDURE — 80053 COMPREHEN METABOLIC PANEL: CPT

## 2025-02-27 PROCEDURE — 93010 ELECTROCARDIOGRAM REPORT: CPT | Performed by: SPECIALIST

## 2025-02-27 PROCEDURE — 82962 GLUCOSE BLOOD TEST: CPT

## 2025-02-27 PROCEDURE — 93970 EXTREMITY STUDY: CPT | Performed by: INTERNAL MEDICINE

## 2025-02-27 PROCEDURE — 85025 COMPLETE CBC W/AUTO DIFF WBC: CPT

## 2025-02-27 RX ORDER — DEXTROSE MONOHYDRATE 100 MG/ML
INJECTION, SOLUTION INTRAVENOUS CONTINUOUS PRN
Status: DISCONTINUED | OUTPATIENT
Start: 2025-02-27 | End: 2025-03-02 | Stop reason: HOSPADM

## 2025-02-27 RX ORDER — FUROSEMIDE 10 MG/ML
20 INJECTION INTRAMUSCULAR; INTRAVENOUS DAILY
Status: DISCONTINUED | OUTPATIENT
Start: 2025-02-28 | End: 2025-03-02 | Stop reason: HOSPADM

## 2025-02-27 RX ORDER — INSULIN LISPRO 100 [IU]/ML
0-8 INJECTION, SOLUTION INTRAVENOUS; SUBCUTANEOUS
Status: DISCONTINUED | OUTPATIENT
Start: 2025-02-27 | End: 2025-03-02 | Stop reason: HOSPADM

## 2025-02-27 RX ADMIN — ATORVASTATIN CALCIUM 20 MG: 10 TABLET, FILM COATED ORAL at 08:43

## 2025-02-27 RX ADMIN — SODIUM CHLORIDE, PRESERVATIVE FREE 10 ML: 5 INJECTION INTRAVENOUS at 22:30

## 2025-02-27 RX ADMIN — FUROSEMIDE 20 MG: 10 INJECTION, SOLUTION INTRAMUSCULAR; INTRAVENOUS at 08:43

## 2025-02-27 RX ADMIN — CARVEDILOL 3.12 MG: 3.12 TABLET, FILM COATED ORAL at 08:43

## 2025-02-27 RX ADMIN — AMLODIPINE BESYLATE 10 MG: 5 TABLET ORAL at 08:43

## 2025-02-27 RX ADMIN — CARVEDILOL 3.12 MG: 3.12 TABLET, FILM COATED ORAL at 16:53

## 2025-02-27 RX ADMIN — SODIUM CHLORIDE, PRESERVATIVE FREE 20 ML: 5 INJECTION INTRAVENOUS at 08:43

## 2025-02-27 RX ADMIN — INSULIN LISPRO 2 UNITS: 100 INJECTION, SOLUTION INTRAVENOUS; SUBCUTANEOUS at 22:30

## 2025-02-27 RX ADMIN — FERROUS SULFATE TAB 325 MG (65 MG ELEMENTAL FE) 325 MG: 325 (65 FE) TAB at 16:53

## 2025-02-27 RX ADMIN — ENOXAPARIN SODIUM 30 MG: 100 INJECTION SUBCUTANEOUS at 13:44

## 2025-02-27 RX ADMIN — POLYETHYLENE GLYCOL 3350 17 G: 17 POWDER, FOR SOLUTION ORAL at 10:24

## 2025-02-27 RX ADMIN — INSULIN LISPRO 4 UNITS: 100 INJECTION, SOLUTION INTRAVENOUS; SUBCUTANEOUS at 17:01

## 2025-02-27 RX ADMIN — FERROUS SULFATE TAB 325 MG (65 MG ELEMENTAL FE) 325 MG: 325 (65 FE) TAB at 08:43

## 2025-02-27 RX ADMIN — ASPIRIN 81 MG: 81 TABLET, COATED ORAL at 08:43

## 2025-02-27 NOTE — ACP (ADVANCE CARE PLANNING)
Visited with patient in response to a consult that patient wanted to complete an ACP.  provided education, and discussed Advance Care Planning.  Rev. Alyson Stover MDiv,

## 2025-02-27 NOTE — ACP (ADVANCE CARE PLANNING)
Advance Care Planning     Advance Care Planning Inpatient Note  [facility] Department    Today's Date: 2/27/2025  Unit: [facility] 2 PROGRESSIVE CARE    Received request from admission screening.  Upon review of chart and communication with care team, patient's decision making abilities are not in question.. Patient was/were present in the room during visit.    Goals of ACP Conversation:  Discuss advance care planning documents    Health Care Decision Makers:     No healthcare decision makers have been documented.    Summary:  No Decision Maker named by patient at this time    Advance Care Planning Documents (Patient Wishes):  None     Assessment:   provided education and discussed Advance Care Planning at length with patient. Patient did not want to complete an ACP right now. He wanted to discuss with his significant other and one of his brothers before putting their names on a document.   Interventions:  Provided education on documents for clarity and greater understanding  Discussed and provided education on state decision maker hierarchy  Encouraged ongoing ACP conversation with future decision makers and loved ones  Reviewed but did not complete ACP document    Care Preferences Communicated:   No    Outcomes/Plan:  ACP Discussion: Postponed    Electronically signed by TAWANA Downs on 2/27/2025 at 1:03 PM

## 2025-02-27 NOTE — WOUND CARE
New consult for wound on LLE    Patient notes gets 'Blisters' on lower extremities due to edema. Has lower leg compression wraps with velcro and notes he wears them while OOB.   Patient has small healed wound that was a ruptured bullae. It measures approx. 1.5 x 2.0 x 0.0 cm  No exudate or erythema. Area is healed and dry.    Covered with foam dressing.    No treatment needed.          No follow up needed unless there are changes.

## 2025-02-27 NOTE — CARE COORDINATION
02/27/25 1519   Service Assessment   Patient Orientation Alert and Oriented;Person;Place;Situation;Self   Cognition Alert   History Provided By Patient   Primary Caregiver Self   Accompanied By/Relationship N/A   Support Systems Spouse/Significant Other;Family Members   Patient's Healthcare Decision Maker is: Legal Next of Kin   PCP Verified by CM Yes   Last Visit to PCP Within last 3 months   Prior Functional Level Independent in ADLs/IADLs   Current Functional Level Independent in ADLs/IADLs   Can patient return to prior living arrangement Yes   Ability to make needs known: Good   Family able to assist with home care needs: Yes   Would you like for me to discuss the discharge plan with any other family members/significant others, and if so, who? Yes  (may discuss if needed with brother or Cristina aponteh)   Financial Resources Medicare  (commercial insurance)   Discharge Planning   Living Arrangements Spouse/Significant Other   Current DME Prior to Arrival Cane;Glucometer   Services At/After Discharge   Confirm Follow Up Transport Family   Condition of Participation: Discharge Planning   The Plan for Transition of Care is related to the following treatment goals: PCP, Dr. Deras   [place] of Choice list was provided with basic dialogue that supports the patient's individualized plan of care/goals, treatment preferences, and shares the quality data associated with the providers?  Yes

## 2025-02-28 ENCOUNTER — APPOINTMENT (OUTPATIENT)
Facility: HOSPITAL | Age: 70
DRG: 291 | End: 2025-02-28
Payer: COMMERCIAL

## 2025-02-28 LAB
ANION GAP SERPL CALC-SCNC: 9 MMOL/L (ref 2–12)
BASOPHILS # BLD: 0.01 K/UL (ref 0–0.1)
BASOPHILS NFR BLD: 0.2 % (ref 0–1)
BUN SERPL-MCNC: 65 MG/DL (ref 6–20)
BUN/CREAT SERPL: 20 (ref 12–20)
CALCIUM SERPL-MCNC: 8.4 MG/DL (ref 8.5–10.1)
CHLORIDE SERPL-SCNC: 108 MMOL/L (ref 97–108)
CO2 SERPL-SCNC: 27 MMOL/L (ref 21–32)
CREAT SERPL-MCNC: 3.23 MG/DL (ref 0.7–1.3)
DIFFERENTIAL METHOD BLD: ABNORMAL
ECHO AO ROOT DIAM: 2.4 CM
ECHO AO ROOT INDEX: 1.31 CM/M2
ECHO AV AREA PEAK VELOCITY: 1.8 CM2
ECHO AV AREA PLAN/BSA: 1.15 CM2/M2
ECHO AV AREA PLAN: 2.1 CM2
ECHO AV AREA/BSA PEAK VELOCITY: 1 CM2/M2
ECHO AV PEAK GRADIENT: 12 MMHG
ECHO AV PEAK VELOCITY: 1.8 M/S
ECHO AV VELOCITY RATIO: 0.67
ECHO BSA: 1.84 M2
ECHO EST RA PRESSURE: 5 MMHG
ECHO LA DIAMETER INDEX: 2.13 CM/M2
ECHO LA DIAMETER: 3.9 CM
ECHO LA TO AORTIC ROOT RATIO: 1.63
ECHO LA VOL A-L A4C: 130 ML (ref 18–58)
ECHO LA VOL MOD A4C: 121 ML (ref 18–58)
ECHO LA VOLUME INDEX A-L A4C: 71 ML/M2 (ref 16–34)
ECHO LA VOLUME INDEX MOD A4C: 66 ML/M2 (ref 16–34)
ECHO LV EDV A4C: 208 ML
ECHO LV EDV INDEX A4C: 114 ML/M2
ECHO LV EF PHYSICIAN: 60 %
ECHO LV EJECTION FRACTION A4C: 70 %
ECHO LV ESV A4C: 62 ML
ECHO LV ESV INDEX A4C: 34 ML/M2
ECHO LV FRACTIONAL SHORTENING: 39 % (ref 28–44)
ECHO LV INTERNAL DIMENSION DIASTOLE INDEX: 2.68 CM/M2
ECHO LV INTERNAL DIMENSION DIASTOLIC: 4.9 CM (ref 4.2–5.9)
ECHO LV INTERNAL DIMENSION SYSTOLIC INDEX: 1.64 CM/M2
ECHO LV INTERNAL DIMENSION SYSTOLIC: 3 CM
ECHO LV IVSD: 1 CM (ref 0.6–1)
ECHO LV MASS 2D: 200.5 G (ref 88–224)
ECHO LV MASS INDEX 2D: 109.6 G/M2 (ref 49–115)
ECHO LV POSTERIOR WALL DIASTOLIC: 1.2 CM (ref 0.6–1)
ECHO LV RELATIVE WALL THICKNESS RATIO: 0.49
ECHO LVOT AREA: 2.5 CM2
ECHO LVOT DIAM: 1.8 CM
ECHO LVOT PEAK GRADIENT: 6 MMHG
ECHO LVOT PEAK VELOCITY: 1.2 M/S
ECHO MV A VELOCITY: 0.81 M/S
ECHO MV E DECELERATION TIME (DT): 146.3 MS
ECHO MV E VELOCITY: 0.61 M/S
ECHO MV E/A RATIO: 0.75
ECHO MV MAX VELOCITY: 1.1 M/S
ECHO MV MEAN GRADIENT: 2 MMHG
ECHO MV MEAN VELOCITY: 0.7 M/S
ECHO MV PEAK GRADIENT: 5 MMHG
ECHO MV VTI: 45.1 CM
ECHO PV MAX VELOCITY: 0.9 M/S
ECHO PV PEAK GRADIENT: 3 MMHG
ECHO RIGHT VENTRICULAR SYSTOLIC PRESSURE (RVSP): 62 MMHG
ECHO TV REGURGITANT MAX VELOCITY: 3.78 M/S
ECHO TV REGURGITANT PEAK GRADIENT: 58 MMHG
EOSINOPHIL # BLD: 0.15 K/UL (ref 0–0.4)
EOSINOPHIL NFR BLD: 3 % (ref 0–7)
ERYTHROCYTE [DISTWIDTH] IN BLOOD BY AUTOMATED COUNT: 15.9 % (ref 11.5–14.5)
FLUID CULTURE: NORMAL
GLUCOSE BLD STRIP.AUTO-MCNC: 154 MG/DL (ref 65–117)
GLUCOSE BLD STRIP.AUTO-MCNC: 270 MG/DL (ref 65–117)
GLUCOSE BLD STRIP.AUTO-MCNC: 345 MG/DL (ref 65–117)
GLUCOSE BLD STRIP.AUTO-MCNC: 357 MG/DL (ref 65–117)
GLUCOSE SERPL-MCNC: 170 MG/DL (ref 65–100)
HCT VFR BLD AUTO: 24.9 % (ref 36.6–50.3)
HGB BLD-MCNC: 7.6 G/DL (ref 12.1–17)
IMM GRANULOCYTES # BLD AUTO: 0.02 K/UL (ref 0–0.04)
IMM GRANULOCYTES NFR BLD AUTO: 0.4 % (ref 0–0.5)
LYMPHOCYTES # BLD: 0.59 K/UL (ref 0.8–3.5)
LYMPHOCYTES NFR BLD: 11.7 % (ref 12–49)
Lab: NORMAL
MCH RBC QN AUTO: 25.9 PG (ref 26–34)
MCHC RBC AUTO-ENTMCNC: 30.5 G/DL (ref 30–36.5)
MCV RBC AUTO: 85 FL (ref 80–99)
MONOCYTES # BLD: 0.54 K/UL (ref 0–1)
MONOCYTES NFR BLD: 10.7 % (ref 5–13)
NEUTS SEG # BLD: 3.7 K/UL (ref 1.8–8)
NEUTS SEG NFR BLD: 74 % (ref 32–75)
NRBC # BLD: 0 K/UL (ref 0–0.01)
NRBC BLD-RTO: 0 PER 100 WBC
NT PRO BNP: 607 PG/ML (ref 0–125)
ORGANISM ID: NORMAL
PLATELET # BLD AUTO: 219 K/UL (ref 150–400)
PMV BLD AUTO: 9.1 FL (ref 8.9–12.9)
POTASSIUM SERPL-SCNC: 4.6 MMOL/L (ref 3.5–5.1)
RBC # BLD AUTO: 2.93 M/UL (ref 4.1–5.7)
RBC MORPH BLD: ABNORMAL
S PNEUM AG SPEC QL LA: NEGATIVE
SERVICE CMNT-IMP: ABNORMAL
SODIUM SERPL-SCNC: 144 MMOL/L (ref 136–145)
SPECIMEN SOURCE: NORMAL
SPECIMEN: NORMAL
WBC # BLD AUTO: 5 K/UL (ref 4.1–11.1)

## 2025-02-28 PROCEDURE — 6360000002 HC RX W HCPCS: Performed by: INTERNAL MEDICINE

## 2025-02-28 PROCEDURE — 93306 TTE W/DOPPLER COMPLETE: CPT | Performed by: INTERNAL MEDICINE

## 2025-02-28 PROCEDURE — 82962 GLUCOSE BLOOD TEST: CPT

## 2025-02-28 PROCEDURE — 85025 COMPLETE CBC W/AUTO DIFF WBC: CPT

## 2025-02-28 PROCEDURE — 93306 TTE W/DOPPLER COMPLETE: CPT

## 2025-02-28 PROCEDURE — 94618 PULMONARY STRESS TESTING: CPT

## 2025-02-28 PROCEDURE — 2700000000 HC OXYGEN THERAPY PER DAY

## 2025-02-28 PROCEDURE — 80048 BASIC METABOLIC PNL TOTAL CA: CPT

## 2025-02-28 PROCEDURE — 1200000000 HC SEMI PRIVATE

## 2025-02-28 PROCEDURE — 36415 COLL VENOUS BLD VENIPUNCTURE: CPT

## 2025-02-28 PROCEDURE — 83880 ASSAY OF NATRIURETIC PEPTIDE: CPT

## 2025-02-28 PROCEDURE — 6370000000 HC RX 637 (ALT 250 FOR IP): Performed by: INTERNAL MEDICINE

## 2025-02-28 PROCEDURE — 2500000003 HC RX 250 WO HCPCS: Performed by: INTERNAL MEDICINE

## 2025-02-28 RX ADMIN — FUROSEMIDE 20 MG: 10 INJECTION, SOLUTION INTRAMUSCULAR; INTRAVENOUS at 10:17

## 2025-02-28 RX ADMIN — FERROUS SULFATE TAB 325 MG (65 MG ELEMENTAL FE) 325 MG: 325 (65 FE) TAB at 17:08

## 2025-02-28 RX ADMIN — INSULIN LISPRO 4 UNITS: 100 INJECTION, SOLUTION INTRAVENOUS; SUBCUTANEOUS at 12:03

## 2025-02-28 RX ADMIN — SODIUM CHLORIDE, PRESERVATIVE FREE 10 ML: 5 INJECTION INTRAVENOUS at 09:09

## 2025-02-28 RX ADMIN — ASPIRIN 81 MG: 81 TABLET, COATED ORAL at 09:09

## 2025-02-28 RX ADMIN — ENOXAPARIN SODIUM 30 MG: 100 INJECTION SUBCUTANEOUS at 14:12

## 2025-02-28 RX ADMIN — ATORVASTATIN CALCIUM 20 MG: 10 TABLET, FILM COATED ORAL at 09:09

## 2025-02-28 RX ADMIN — CARVEDILOL 3.12 MG: 3.12 TABLET, FILM COATED ORAL at 17:08

## 2025-02-28 RX ADMIN — AMLODIPINE BESYLATE 10 MG: 5 TABLET ORAL at 09:09

## 2025-02-28 RX ADMIN — CARVEDILOL 3.12 MG: 3.12 TABLET, FILM COATED ORAL at 09:09

## 2025-02-28 RX ADMIN — INSULIN LISPRO 8 UNITS: 100 INJECTION, SOLUTION INTRAVENOUS; SUBCUTANEOUS at 20:47

## 2025-02-28 RX ADMIN — INSULIN LISPRO 8 UNITS: 100 INJECTION, SOLUTION INTRAVENOUS; SUBCUTANEOUS at 17:10

## 2025-02-28 RX ADMIN — FERROUS SULFATE TAB 325 MG (65 MG ELEMENTAL FE) 325 MG: 325 (65 FE) TAB at 09:09

## 2025-02-28 ASSESSMENT — 6 MINUTE WALK TEST (6MWT)
O2 FLOW RATE 4 (L/MIN): 2
HEART RATE: 65
BORG DYSPNEA SCALE SCORE: VERY SLIGHT
BORG FATIGUE SCALE SCORE: VERY, VERY SLIGHT (JUST NOTICEABLE)
HEART RATE: 65
OXYGEN DEVICE: ROOM AIR
BORG FATIGUE SCALE SCORE: SLIGHT (LIGHT)
% PREDICTED: 11.88
DID PATIENT STOP OR PAUSE BEFORE 6 MINUTES?: YES
HEART RATE: 89
O2 FLOW RATE 5 (L/MIN): 2
BORG DYSPNEA SCALE SCORE: VERY SLIGHT
SYMPTOMS: SOB;FATIGUE
ADDTIONAL O2 FLOW RATE (L/MIN): YES
BORG DYSPNEA SCALE SCORE: VERY, VERY SLIGHT (JUST NOTICEABLE)
O2 FLOW RATE 6 (L/MIN): 2
BORG FATIGUE SCALE SCORE: MODERATE
O2 FLOW RATE 2 (L/MIN): 2
TOTAL DISTANCE WALKED (M): 59.51
O2 FLOW RATE 3 (L/MIN): 2
O2 FLOW RATE (L/MIN): 2
HEART RATE: 75

## 2025-02-28 ASSESSMENT — PAIN SCALES - GENERAL
PAINLEVEL_OUTOF10: 0
PAINLEVEL_OUTOF10: 0

## 2025-02-28 NOTE — CARE COORDINATION
02/27/25 1500   Readmission Assessment   Number of Days since last admission? 1-7 days   Previous Disposition Home with Family   Who is being Interviewed Patient   What was the patient's/caregiver's perception as to why they think they needed to return back to the hospital? Other (Comment)  (I had different symptoms)   Did you visit your Primary Care Physician after you left the hospital, before you returned this time? No   Why weren't you able to visit your PCP? Other (Comment)  (was not enough time in between hospitals)   Did you see a specialist, such as Cardiac, Pulmonary, Orthopedic Physician, etc. after you left the hospital? No   Who advised the patient to return to the hospital? Other (Comment)  (significant other found me on the floor could not hardly breathe)   In our efforts to provide the best possible care to you and others like you, can you think of anything that we could have done to help you after you left the hospital the first time, so that you might not have needed to return so soon? Other (Comment)  (No becaus I had different symptoms)     SCAR MERIDA RN CM

## 2025-02-28 NOTE — CARE COORDINATION
TRANSITION OF CARE    RUR-21%    PLAN    2ND IMM     PCP  UROLOGY  NEPHROLOGY      CM INFORMED PATIENT HE WILL NEED OXYGEN AND HE STATED IT DID  NOT MATTER AND HE DID NOT NEED A LIST.    CM PUT ORDER IN AND [employer] WILL BE DELIVERING WHEN NEEDED.      SCAR MERIDA RN CM

## 2025-03-01 LAB
ANION GAP SERPL CALC-SCNC: 7 MMOL/L (ref 2–12)
BUN SERPL-MCNC: 62 MG/DL (ref 6–20)
BUN/CREAT SERPL: 21 (ref 12–20)
CALCIUM SERPL-MCNC: 8.2 MG/DL (ref 8.5–10.1)
CHLORIDE SERPL-SCNC: 107 MMOL/L (ref 97–108)
CO2 SERPL-SCNC: 29 MMOL/L (ref 21–32)
CREAT SERPL-MCNC: 2.89 MG/DL (ref 0.7–1.3)
GLUCOSE BLD STRIP.AUTO-MCNC: 157 MG/DL (ref 65–117)
GLUCOSE BLD STRIP.AUTO-MCNC: 303 MG/DL (ref 65–117)
GLUCOSE BLD STRIP.AUTO-MCNC: 350 MG/DL (ref 65–117)
GLUCOSE BLD STRIP.AUTO-MCNC: 386 MG/DL (ref 65–117)
GLUCOSE BLD STRIP.AUTO-MCNC: 402 MG/DL (ref 65–117)
GLUCOSE BLD STRIP.AUTO-MCNC: 67 MG/DL (ref 65–117)
GLUCOSE SERPL-MCNC: 156 MG/DL (ref 65–100)
L PNEUMO1 AG UR QL IA: NEGATIVE
POTASSIUM SERPL-SCNC: 5.1 MMOL/L (ref 3.5–5.1)
SERVICE CMNT-IMP: ABNORMAL
SERVICE CMNT-IMP: NORMAL
SODIUM SERPL-SCNC: 143 MMOL/L (ref 136–145)
SPECIMEN SOURCE: NORMAL

## 2025-03-01 PROCEDURE — 1200000000 HC SEMI PRIVATE

## 2025-03-01 PROCEDURE — 82962 GLUCOSE BLOOD TEST: CPT

## 2025-03-01 PROCEDURE — 2500000003 HC RX 250 WO HCPCS: Performed by: INTERNAL MEDICINE

## 2025-03-01 PROCEDURE — 2700000000 HC OXYGEN THERAPY PER DAY

## 2025-03-01 PROCEDURE — 80048 BASIC METABOLIC PNL TOTAL CA: CPT

## 2025-03-01 PROCEDURE — 36415 COLL VENOUS BLD VENIPUNCTURE: CPT

## 2025-03-01 PROCEDURE — 6370000000 HC RX 637 (ALT 250 FOR IP): Performed by: INTERNAL MEDICINE

## 2025-03-01 PROCEDURE — 6370000000 HC RX 637 (ALT 250 FOR IP): Performed by: HOSPITALIST

## 2025-03-01 PROCEDURE — 6360000002 HC RX W HCPCS: Performed by: INTERNAL MEDICINE

## 2025-03-01 RX ORDER — INSULIN LISPRO 100 [IU]/ML
3 INJECTION, SOLUTION INTRAVENOUS; SUBCUTANEOUS ONCE
Status: COMPLETED | OUTPATIENT
Start: 2025-03-01 | End: 2025-03-01

## 2025-03-01 RX ADMIN — INSULIN LISPRO 6 UNITS: 100 INJECTION, SOLUTION INTRAVENOUS; SUBCUTANEOUS at 12:52

## 2025-03-01 RX ADMIN — CARVEDILOL 3.12 MG: 3.12 TABLET, FILM COATED ORAL at 08:28

## 2025-03-01 RX ADMIN — ATORVASTATIN CALCIUM 20 MG: 10 TABLET, FILM COATED ORAL at 08:28

## 2025-03-01 RX ADMIN — SODIUM CHLORIDE, PRESERVATIVE FREE 10 ML: 5 INJECTION INTRAVENOUS at 08:29

## 2025-03-01 RX ADMIN — INSULIN LISPRO 3 UNITS: 100 INJECTION, SOLUTION INTRAVENOUS; SUBCUTANEOUS at 23:44

## 2025-03-01 RX ADMIN — AMLODIPINE BESYLATE 10 MG: 5 TABLET ORAL at 08:28

## 2025-03-01 RX ADMIN — INSULIN LISPRO 8 UNITS: 100 INJECTION, SOLUTION INTRAVENOUS; SUBCUTANEOUS at 16:42

## 2025-03-01 RX ADMIN — ENOXAPARIN SODIUM 30 MG: 100 INJECTION SUBCUTANEOUS at 14:13

## 2025-03-01 RX ADMIN — CARVEDILOL 3.12 MG: 3.12 TABLET, FILM COATED ORAL at 16:44

## 2025-03-01 RX ADMIN — FERROUS SULFATE TAB 325 MG (65 MG ELEMENTAL FE) 325 MG: 325 (65 FE) TAB at 16:44

## 2025-03-01 RX ADMIN — INSULIN LISPRO 8 UNITS: 100 INJECTION, SOLUTION INTRAVENOUS; SUBCUTANEOUS at 21:58

## 2025-03-01 RX ADMIN — Medication 16 G: at 08:29

## 2025-03-01 RX ADMIN — ASPIRIN 81 MG: 81 TABLET, COATED ORAL at 08:29

## 2025-03-01 RX ADMIN — FUROSEMIDE 20 MG: 10 INJECTION, SOLUTION INTRAMUSCULAR; INTRAVENOUS at 08:29

## 2025-03-01 RX ADMIN — FERROUS SULFATE TAB 325 MG (65 MG ELEMENTAL FE) 325 MG: 325 (65 FE) TAB at 08:28

## 2025-03-01 RX ADMIN — SODIUM CHLORIDE, PRESERVATIVE FREE 10 ML: 5 INJECTION INTRAVENOUS at 21:59

## 2025-03-01 RX ADMIN — POLYETHYLENE GLYCOL 3350 17 G: 17 POWDER, FOR SOLUTION ORAL at 10:07

## 2025-03-01 ASSESSMENT — PAIN SCALES - GENERAL
PAINLEVEL_OUTOF10: 0

## 2025-03-01 NOTE — CARE COORDINATION
RUR: 20%    CM inform by provider oxygen was ordered for patient and possible discharge over the weekend.   CM review chart, no clear notes on what the plan is for patient from CM notes per note \" CM PUT ORDER IN AND [employer] WILL BE DELIVERING WHEN NEEDED.\"  CM review careport per company note\"Comments: Hi, the testing will need to be signed by the md so we can start processing this order. Also when is the pt dc?\"  CM to confirm the document that needs signing possibility they need the F2F and CM will update.  CM spoke with [employer] states they need the 6-minute walk test sign by the provider and also F2F. CM upload requested document via careport.     CM met with patient at bedside explain the need for the oxygen, Discuss FOC patient in agreement with [employer]. CM inform patient [employer] will deliver a portable for patient to take home on discharge, once patient is home to please call [employer] for concentrator delivery at home. Patient states he is in agreement. Discuss follow up with [place] [facility] once discharge patient will need a follow up. No additional question or concern at this time.      On discharge patient states his brother will be able to provide transportation home if not patient in agreement to get a LYFT himself.      * Disposition- Home  * Transportation at Discharge: TBD  * IM/MOONS/OBS/FOC letter: TBD  * Appointment with PCP: Dr. Deras 3/4/25 @ 1:30  * Appointment with specialist: SHEELA  * DME needed: Oxygen  * CM please follow-up on: Oxygen order.      Additional Resources:  * [facility] [phone]

## 2025-03-02 ENCOUNTER — APPOINTMENT (OUTPATIENT)
Facility: HOSPITAL | Age: 70
DRG: 291 | End: 2025-03-02
Attending: INTERNAL MEDICINE
Payer: COMMERCIAL

## 2025-03-02 VITALS
TEMPERATURE: 98.7 F | HEART RATE: 71 BPM | OXYGEN SATURATION: 95 % | SYSTOLIC BLOOD PRESSURE: 126 MMHG | RESPIRATION RATE: 16 BRPM | WEIGHT: 183.6 LBS | BODY MASS INDEX: 28.82 KG/M2 | HEIGHT: 67 IN | DIASTOLIC BLOOD PRESSURE: 80 MMHG

## 2025-03-02 PROBLEM — R60.0 EDEMA OF LEFT UPPER EXTREMITY: Status: ACTIVE | Noted: 2025-03-02

## 2025-03-02 LAB
GLUCOSE BLD STRIP.AUTO-MCNC: 111 MG/DL (ref 65–117)
GLUCOSE BLD STRIP.AUTO-MCNC: 248 MG/DL (ref 65–117)
GLUCOSE BLD STRIP.AUTO-MCNC: 328 MG/DL (ref 65–117)
SERVICE CMNT-IMP: ABNORMAL
SERVICE CMNT-IMP: ABNORMAL
SERVICE CMNT-IMP: NORMAL

## 2025-03-02 PROCEDURE — 93971 EXTREMITY STUDY: CPT

## 2025-03-02 PROCEDURE — 2500000003 HC RX 250 WO HCPCS: Performed by: INTERNAL MEDICINE

## 2025-03-02 PROCEDURE — 82962 GLUCOSE BLOOD TEST: CPT

## 2025-03-02 PROCEDURE — 6360000002 HC RX W HCPCS: Performed by: INTERNAL MEDICINE

## 2025-03-02 PROCEDURE — 6370000000 HC RX 637 (ALT 250 FOR IP): Performed by: INTERNAL MEDICINE

## 2025-03-02 RX ORDER — FUROSEMIDE 20 MG/1
20 TABLET ORAL DAILY
Qty: 15 TABLET | Refills: 0 | Status: SHIPPED | OUTPATIENT
Start: 2025-03-02

## 2025-03-02 RX ADMIN — CARVEDILOL 3.12 MG: 3.12 TABLET, FILM COATED ORAL at 16:25

## 2025-03-02 RX ADMIN — ASPIRIN 81 MG: 81 TABLET, COATED ORAL at 08:52

## 2025-03-02 RX ADMIN — SODIUM CHLORIDE, PRESERVATIVE FREE 10 ML: 5 INJECTION INTRAVENOUS at 08:53

## 2025-03-02 RX ADMIN — FUROSEMIDE 20 MG: 10 INJECTION, SOLUTION INTRAMUSCULAR; INTRAVENOUS at 08:52

## 2025-03-02 RX ADMIN — FERROUS SULFATE TAB 325 MG (65 MG ELEMENTAL FE) 325 MG: 325 (65 FE) TAB at 08:52

## 2025-03-02 RX ADMIN — ATORVASTATIN CALCIUM 20 MG: 10 TABLET, FILM COATED ORAL at 08:52

## 2025-03-02 RX ADMIN — AMLODIPINE BESYLATE 10 MG: 5 TABLET ORAL at 08:53

## 2025-03-02 RX ADMIN — ENOXAPARIN SODIUM 30 MG: 100 INJECTION SUBCUTANEOUS at 12:10

## 2025-03-02 RX ADMIN — FERROUS SULFATE TAB 325 MG (65 MG ELEMENTAL FE) 325 MG: 325 (65 FE) TAB at 16:25

## 2025-03-02 RX ADMIN — CARVEDILOL 3.12 MG: 3.12 TABLET, FILM COATED ORAL at 08:52

## 2025-03-02 RX ADMIN — INSULIN LISPRO 2 UNITS: 100 INJECTION, SOLUTION INTRAVENOUS; SUBCUTANEOUS at 12:10

## 2025-03-02 RX ADMIN — INSULIN LISPRO 6 UNITS: 100 INJECTION, SOLUTION INTRAVENOUS; SUBCUTANEOUS at 16:24

## 2025-03-02 ASSESSMENT — PAIN SCALES - GENERAL
PAINLEVEL_OUTOF10: 0
PAINLEVEL_OUTOF10: 0

## 2025-03-02 NOTE — DISCHARGE INSTRUCTIONS
including  Possible pulmonary arterial hypertension: Will need outpatient follow-up  Moderate distention of the urinary bladder: UA is within normal limits.  Monitor urine output.  Fullness of the ureter: Outpatient follow-up with PCP  Question of slight thickening of the wall of the terminal ileum

## 2025-03-02 NOTE — PROGRESS NOTES
[facility] CARDIOLOGY  Cardiology Care Note                  []Initial visit     []Established visit     Patient Name: John Paul Juarez - :1955 - MRN:[id number]  Primary Cardiologist: Dr. Vincent  Consulting Cardiologist: Zhou Trevino NP; Adan Hunt III     Reason for consult: CHF exacerbation    HPI:     Mr. Juarez is a 69-year-old male with a past medical history of Brugada syndrome s/p ICD implantation, diastolic heart failure, pulmonary hypertension, CKD IIIb, hypertension, poorly controlled DM, and CVA who is currently hospitalized for heart failure exacerbation.    Patient was recently hospitalized at [facility] for multiple reasons, including altered mental status, pneumonia, and acute kidney injury.    He was discharged home feeling better but was advised to stop his home diuretics (40 mg of Lasix), and shortly afterwards started to experience some leg swelling, orthopnea, abdominal distention, and marked dyspnea with exertion.    He also reports subacute to chronic hypersomnolence and falling asleep easy. States wife won't sleep in the same room as him due to loud snoring. He does have intermittent PND.    When he was evaluated in the emergency room he was found to have hypoxia and has been on supplemental oxygen since.  His proBNP was only mildly elevated (out of proportion to his degree of dyspnea) and troponin was within normal limits.  EKG revealed incomplete RBBB with no changes from his prior tracing a few weeks ago.  Blood pressure has been fairly well-controlled.    He also has a markedly low albumin and macroscopic proteinuria and anasarca.     He has been diuresed with 20 mg of IV Lasix twice daily for a few doses and has had improvement in his swelling and is feeling better overall.  His oxygen requirements have lowered but he has not been up ambulating to see if his breathing has 
   02/28/25 0947   Data Measured Before Walk   Height 1.702 m (5' 7.01\")   Weight - Scale 71.7 kg (158 lb 1.1 oz)   HR 65   O2 Saturation 91%, HR- 65 on RA sitting in a chair. 93% sat and HR-70 on RA standing.   O2 Device Room air   Kiersten Dyspnea Scale 0.5   Kiersten Fatigue Scale 0.5   Data Measured During the Walk   Heart Rate 75   O2 Saturation 87%, HR- 75 on RA. added 2L NC   Need Additional O2 Flow Rate Rows Yes   O2 Flow Rate (l/min) 2 l/min   O2 Saturation 93%, HR- 75. tolerating well. walking with a cane.   O2 Flow Rate (l/min) 2 l/min   O2 Saturation 92%, HR- 79 slow but steady pace.   O2 Flow Rate (l/min) 2 l/min   O2 Saturation 90%, HR-91. Tolerating well walking with cane.   O2 Flow Rate (l/min) 2 l/min   O2 Saturation 93%, HR- 81. walking well with cane.   O2 Flow Rate (l/min) 2 l/min   O2 Saturation 94%, HR- 80 on 2L NC ding well.   Symptoms SOB;Fatigue   Any Problems While Exercising geting alittle tired, some SOB   Kiersten Dyspnea Scale 1   Kiersten Fatigue Scale 3   Data Measured Immediately After Walk   Did Patient Stop or Pause Before 6 Minutes Yes   Why Patient Stopped or Paused to rest   Predicted Distance (m) 501 Meters   Total Distance Walked (m) 59.51 Meters   % Predicted 11.88   Heart Rate 89   O2 Flow Rate (l/min) 2 l/min   O2 Saturation 94%, HR-89   Kiersten Dyspnea Scale 1   Kiersten Fatigue Scale 2   Data Measured 5 Minutes After Walk   Heart Rate 65   O2 Saturation 99%, HR-65   Comments tolerated well walking with a cane on 2L NC, decreased oxygen to 1L, Sat- 95%, HR- 67 sitting in chair       
 Face to Face Order for portable home oxygen          Pt Name  John Paul Juarez   Date of Birth 1955   Age  69 y.o.   Medical Record Number  [id number]   Room Number  PCU4/01   Admit date:  2/26/2025   Date of Face to Face: 02/28/2025     Admission Diagnosis:  Acute diastolic CHF (congestive heart failure) ([facility])     Diagnoses   Present on Admission:   Acute diastolic CHF (congestive heart failure) ([facility])   Bilateral leg edema     Past Medical History:   Diagnosis Date    Abscess 12/2020    left side of abdomen    Chronic renal failure, stage 3a ([facility]) 11/7/2023    Diabetes ([facility])     Hyperlipidemia     Hypertension     ICD (implantable cardioverter-defibrillator) in place     NSVT (nonsustained ventricular tachycardia) ([facility]) 11/21/2018    EPS 11/21/2018 VT/VF     Reactive airway disease 2/26/2024    Right groin pain 12/7/2020    Stroke ([facility])     Syncope     Valvular heart disease             No Known Allergies         I certify that I evaluated patient face-to-face.  Patient had hypoxia and was put on oxygen support.  6-minute walk test was done. Patient's oxygen saturation was 87% on room air and patient will benefit/needs portable home oxygen at 2 L/min via nasal cannula at all times. I will not be following this patient in the Community.     Junior Vogt MD will be responsible for signing the Plan of Care and will follow/coordinate ongoing care.    Initial Orders for Care:  Portable home oxygen with concentrator/tubing at 2 L/min via nasal cannula at all times.  Report to Junior Omer MD    I certify that I am taking care of the patient today (Please see hospital Discharge Records for details of clinical issues pertaining to this order).      ________________________________________________________________________  Deepak Crane MD              
0700.Bedside and verbal shift report given to Bernadine RN (oncoming nurse) from ..... (offgoing nurse). Information discussed includes adult overview, MAR and recent lab results. Patient received ...     1000) Pt continues on bgl checks, lasix therapy vascular duplex done to upper extremities.   1200) Duplex results received by dr Crane and pt disc paper work was started  1500) Pt discharge orders written  1515.) Patient was discharged from the unit at this time in no acute signs of distress with paperwork and discharge education. Patient was provided with opportunities to ask questions regarding paperwork. Patient was informed of medications ordered, where they could be picked up, and follow up appointments. IV was removed at this time, patient verbalized understanding of discharge paperwork and has left the department.;  
7:25 AM Bedside and Verbal shift change report given to NINA Arana (oncoming nurse) by NINA Hemphill (offgoing nurse). Report included the following information Nurse Handoff Report, Intake/Output, MAR, Recent Results, Med Rec Status, Cardiac Rhythm NSR, and Neuro Assessment.     10:00 AM Reviewed assessment findings and AM labs with Dr. Crane. Dr. Crane states OK to give lasix, as ordered, see MAR.    4:30 PM Notified Dr. Crane of patient's POC glucose 357. MD states to administer 8 units lispro per SSI, no additional coverage ordered at this time. See MAR.    7:20 PM Bedside and Verbal shift change report given to NINA Carter (oncoming nurse) by NINA Arana (offgoing nurse). Report included the following information Nurse Handoff Report, Intake/Output, MAR, Recent Results, Med Rec Status, Cardiac Rhythm NSR, and Neuro Assessment.     
8438) Message to Dr. Crane, portable oxygen is delivered.  [Reply- possible discharge tomorrow]  
Bedside shift change report given to NINA Colindres (oncoming nurse) by NINA Armendariz (offgoing nurse). Report included the following information Nurse Handoff Report, Index, Adult Overview, Intake/Output, MAR, Recent Results, Cardiac Rhythm SR, Quality Measures, Neuro Assessment, and Event Log.    
Comprehensive Nutrition Assessment    Type and Reason for Visit: Initial    Nutrition Recommendations/Plan:   CC diet as tolerated   Nepro on lunch trays       Malnutrition Assessment:  Malnutrition Status:  At risk for malnutrition (med hx, skin, po intake) (02/27/25 0736)           Nutrition Assessment:    Pt admitted with acute diastolic CHF; hx notable for DM, CKD-3a, HLD, HTN, BLE blisters 2' edema, pulm HTN; has had an ICD implant for Brugada syndrome and sudden cardiac death.    Nutrition Related Findings:    Pt tolerating diet.  Meds: lip, coreg, lazix Wound Type: Multiple (blisters, skin tears BLE)       Lab Results   Component Value Date/Time     02/26/2025 05:10 PM    K 4.3 02/26/2025 05:10 PM     02/26/2025 05:10 PM    CO2 28 02/26/2025 05:10 PM    BUN 49 02/26/2025 05:10 PM    CREATININE 2.87 02/26/2025 05:10 PM    GLUCOSE 116 02/26/2025 05:10 PM    GLUCOSE 342 09/26/2024 12:00 AM    CALCIUM 8.5 02/26/2025 05:10 PM    PHOS 3.8 02/17/2025 04:54 AM    MG 2.1 02/14/2025 06:16 PM          Estimated Daily Nutrient Needs:  Energy Requirements Based On: Kcal/kg  Weight Used for Energy Requirements: Current  Energy (kcal/day): 1950  Weight Used for Protein Requirements: Current  Protein (g/day): 86  Method Used for Fluid Requirements: Other (MD order)  Fluid (ml/day): 1500    Nutrition Related Findings:   Edema: Right upper extremity, Left upper extremity, Right lower extremity, Left lower extremity      RUE Edema: Non-pitting  LUE Edema: +2, Pitting  RLE Edema: +2, Pitting  LLE Edema: +2, Pitting    Last BM:  (PTA)    Wounds:   Wound Type: Multiple (blisters, skin tears BLE)      Current Nutrition Intake & Therapies:    Average Meal Intake: %  Average Supplements Intake: None Ordered  ADULT DIET; Regular; 4 carb choices (60 gm/meal); 1500 ml  Meal Intake:   Patient Vitals for the past 168 hrs:   PO Meals Eaten (%)   02/26/25 2007 51 - 75%     Supplement Intake:  No data found.  Nutrition 
Patient admitted to progressive care unit from emergency department. Oxygen titrated from 2 LPM to 1.5 LPM. Patient see by wound care, no treatment needed. Patient seen by pulmonology. Cardiology consulted, see note. Plan to continue strict intake and output. 20 mg IV Lasix administered, see MAR.  
Patient seen by pulmonology today. Patient seen by cardiology today. Strict intake and output in accordance with 1500 mL fluid restriction. PRN Glycolax x 1 for constipation, patient had a bowel movement. Diuresing with IV Lasix, see MAR.   
Spiritual Health History and Assessment/Progress Note  [facility]    Initial Encounter, Spiritual/Emotional Needs, Advance Care Planning,  ,  ,      Name: John Paul Juarez MRN: [id number]    Age: 69 y.o.     Sex: male   Language: English   Christianity: Orthodox   Acute diastolic CHF (congestive heart failure) ([facility])     Date: 2/27/2025                           Spiritual Assessment began in [facility] 2 PROGRESSIVE CARE            Encounter Overview/Reason: Initial Encounter, Spiritual/Emotional Needs, Advance Care Planning  Service Provided For: Patient    Domi, Belief, Meaning:   Patient is connected with a domi tradition or spiritual practice and has beliefs or practices that help with coping during difficult times  Family/Friends No family/friends present      Importance and Influence:  Patient has no beliefs influential to healthcare decision-making identified during this visit  Family/Friends No family/friends present    Community:  Patient feels well-supported. Support system includes: Spouse/Partner and Extended family  Family/Friends No family/friends present    Assessment and Plan of Care:     Patient Interventions include: Facilitated expression of thoughts and feelings, Explored spiritual coping/struggle/distress, Affirmed coping skills/support systems, and Assisted in Advance Care Planning conversation  Family/Friends Interventions include: No family/friends present    Patient Plan of Care: Spiritual Care available upon further referral  Family/Friends Plan of Care: No family/friends present    Electronically signed by TAWANA Downs on 2/27/2025 at 1:08 PM  
  Sig: Inject 14 Units into the skin daily   Insulin Pen Needle (B-D ULTRAFINE III SHORT PEN) 31G X 8 MM MISC     Si each by Does not apply route daily Pharmacist to identify & substitute with preferred needle for insulin pen device.   Lancets 30G MISC     Si each by Does not apply route 3 times daily Test 3 times a day & as needed for symptoms of irregular blood glucose. Dispense sufficient amount for indicated testing frequency plus additional to accommodate PRN testing needs.   amLODIPine (NORVASC) 10 MG tablet 2025 Self   Sig: TAKE 1 TABLET BY MOUTH EVERY DAY   atorvastatin (LIPITOR) 20 MG tablet 2025 Self   Sig: TAKE 1 TABLET BY MOUTH EVERY DAY   carvedilol (COREG) 12.5 MG tablet 2025 Self   Sig: Take 1 tablet by mouth 2 times daily (with meals)   ferrous sulfate (IRON 325) 325 (65 Fe) MG tablet Unknown Self   Sig: Take 1 tablet by mouth 2 times daily (with meals)   hydrALAZINE (APRESOLINE) 100 MG tablet 2025 Self   Sig: Take 1 tablet by mouth every 8 hours      Facility-Administered Medications: None          Thank you,  La Nena Chin [facility]   
bolus 10% 250 mL  250 mL IntraVENous PRN Heber Crane MD        glucagon injection 1 mg  1 mg SubCUTAneous PRN Heber Crane MD        dextrose 10 % infusion   IntraVENous Continuous PRN Heber Crane MD        sodium chloride flush 0.9 % injection 5-40 mL  5-40 mL IntraVENous 2 times per day Heber Crane MD   20 mL at 02/27/25 0843    sodium chloride flush 0.9 % injection 5-40 mL  5-40 mL IntraVENous PRN Heber Crane MD        0.9 % sodium chloride infusion   IntraVENous PRN Heber Crane MD        enoxaparin Sodium (LOVENOX) injection 30 mg  30 mg SubCUTAneous Daily Heber Crane MD   30 mg at 02/27/25 1344    ondansetron (ZOFRAN-ODT) disintegrating tablet 4 mg  4 mg Oral Q8H PRN Heber Crane MD        Or    ondansetron (ZOFRAN) injection 4 mg  4 mg IntraVENous Q6H PRN Heber Crane MD        polyethylene glycol (GLYCOLAX) packet 17 g  17 g Oral Daily PRN Heber Crane MD   17 g at 02/27/25 1024    acetaminophen (TYLENOL) tablet 650 mg  650 mg Oral Q6H PRN Heber Crane MD        Or    acetaminophen (TYLENOL) suppository 650 mg  650 mg Rectal Q6H PRN Heber Crane MD        carvedilol (COREG) tablet 3.125 mg  3.125 mg Oral BID  Heber Crane MD   3.125 mg at 02/27/25 1653    amLODIPine (NORVASC) tablet 10 mg  10 mg Oral Daily Heber Crane MD   10 mg at 02/27/25 0843    aspirin EC tablet 81 mg  81 mg Oral Daily Heber Crane MD   81 mg at 02/27/25 0843    atorvastatin (LIPITOR) tablet 20 mg  20 mg Oral Daily Heber Crane MD   20 mg at 02/27/25 0843    ferrous sulfate (IRON 325) tablet 325 mg  325 mg Oral BID  Heber Crane MD   325 mg at 02/27/25 1653       Vital Signs:  BP (!) 142/81   Pulse 66   Temp 99.2 °F (37.3 °C) (Oral)   Resp 13   Ht 1.702 m (5' 7\")   Wt 71.7 kg (158 lb)   SpO2 93%   BMI 24.75 kg/m²  O2 Device: Nasal cannula      Physical exam:   GEN: Sitting comfortably on the bed, not in acute distress.   CV: S1S2   LUNGS: Bilateral air entry  EXT: No 
cardiac testing at this time.  
   PHQ-2     PHQ-2 Score: 0   Housing Stability: High Risk (2/26/2025)    Housing Stability Vital Sign     Unable to Pay for Housing in the Last Year: Yes     Number of Times Moved in the Last Year: 0     Homeless in the Last Year: No   Interpersonal Safety: Not At Risk (2/26/2025)    Interpersonal Safety Domain Source: IP Abuse Screening     Physical abuse: Denies     Verbal abuse: Denies     Emotional abuse: Denies     Financial abuse: Denies     Sexual abuse: Denies   Utilities: Not At Risk (2/26/2025)    [facility] Utilities     Threatened with loss of utilities: No       Review of Systems:   Per subjective      Vital Signs:    Last 24hrs VS reviewed since prior progress note. Most recent are:  Vitals:    02/28/25 0800   BP: (!) 140/79   Pulse: 63   Resp: 18   Temp: 97.7 °F (36.5 °C)   SpO2: 94%         Intake/Output Summary (Last 24 hours) at 2/28/2025 0810  Last data filed at 2/28/2025 0546  Gross per 24 hour   Intake 908 ml   Output 850 ml   Net 58 ml        Physical Examination:     I had a face to face encounter with this patient and independently examined them on 2/28/2025 as outlined below:          General: Alert x oriented x 3, awake, no acute distress,   HEENT: PEERL, EOMI, moist mucus membranes  Neck: Supple, no JVD, no meningeal signs  Chest: Clear to auscultation bilaterally   CVS: RRR, S1 S2 heard, no murmurs/rubs/gallops  Abd: Soft, non-tender, non-distended, +bowel sounds   Ext: No clubbing, no cyanosis, 1+ edema of both lower extremities, left leg superficial wound.  Neuro/Psych: Pleasant mood and affect, no facial asymmetry.  Nonfocal  Cap refill: Brisk, less than 3 seconds  Pulses: 2+, symmetric in all extremities  Skin: Warm, dry, without rashes or lesions    Data Review:   I have personally and independently reviewed all pertinent labs, diagnostic studies, imaging, and have provided independent interpretation of the same.     Labs:     Recent Labs     02/27/25  0934 02/28/25  0540   WBC 6.6 5.0 
Labs     02/26/25 0927 02/26/25 1710    143   K 4.6 4.3    107   CO2 28 28   BUN 47* 49*     Recent Labs     02/26/25 0927 02/26/25 1710   ALT 41 37   GLOB 4.1* 4.3*     No results for input(s): \"INR\", \"APTT\" in the last 72 hours.    Invalid input(s): \"PTP\"   Recent Labs     02/26/25 1710   TIBC 229*      No results found for: \"RBCF\"   No results for input(s): \"PH\", \"PCO2\", \"PO2\" in the last 72 hours.  No results for input(s): \"CPK\" in the last 72 hours.    Invalid input(s): \"CPKMB\", \"CKNDX\", \"TROIQ\"  Lab Results   Component Value Date/Time    CHOL 174 11/07/2023 11:38 AM    CHOL 155 03/09/2022 03:55 AM    HDL 69 11/07/2023 11:38 AM    LDL 85 11/07/2023 11:38 AM     No results found for: \"GLUCPOC\"  [unfilled]    Notes reviewed from all clinical/nonclinical/nursing services involved in patient's clinical care. Care coordination discussions were held with appropriate clinical/nonclinical/ nursing providers based on care coordination needs.         Patients current active Medications were reviewed, considered, added and adjusted based on the clinical condition today.      Home Medications were reconciled to the best of my ability given all available resources at the time of admission. Route is PO if not otherwise noted.      Admission Status:[id number]:::1}      Medications Reviewed:     Current Facility-Administered Medications   Medication Dose Route Frequency    sodium chloride flush 0.9 % injection 5-40 mL  5-40 mL IntraVENous 2 times per day    sodium chloride flush 0.9 % injection 5-40 mL  5-40 mL IntraVENous PRN    0.9 % sodium chloride infusion   IntraVENous PRN    enoxaparin Sodium (LOVENOX) injection 30 mg  30 mg SubCUTAneous Daily    ondansetron (ZOFRAN-ODT) disintegrating tablet 4 mg  4 mg Oral Q8H PRN    Or    ondansetron (ZOFRAN) injection 4 mg  4 mg IntraVENous Q6H PRN    polyethylene glycol (GLYCOLAX) packet 17 g  17 g Oral Daily PRN    acetaminophen (TYLENOL) tablet 650 mg  650 mg Oral Q6H 
IntraVENous Daily    insulin lispro (HUMALOG,ADMELOG) injection vial 0-8 Units  0-8 Units SubCUTAneous 4x Daily AC & HS    glucose chewable tablet 16 g  4 tablet Oral PRN    dextrose bolus 10% 125 mL  125 mL IntraVENous PRN    Or    dextrose bolus 10% 250 mL  250 mL IntraVENous PRN    glucagon injection 1 mg  1 mg SubCUTAneous PRN    dextrose 10 % infusion   IntraVENous Continuous PRN    sodium chloride flush 0.9 % injection 5-40 mL  5-40 mL IntraVENous 2 times per day    sodium chloride flush 0.9 % injection 5-40 mL  5-40 mL IntraVENous PRN    0.9 % sodium chloride infusion   IntraVENous PRN    enoxaparin Sodium (LOVENOX) injection 30 mg  30 mg SubCUTAneous Daily    ondansetron (ZOFRAN-ODT) disintegrating tablet 4 mg  4 mg Oral Q8H PRN    Or    ondansetron (ZOFRAN) injection 4 mg  4 mg IntraVENous Q6H PRN    polyethylene glycol (GLYCOLAX) packet 17 g  17 g Oral Daily PRN    acetaminophen (TYLENOL) tablet 650 mg  650 mg Oral Q6H PRN    Or    acetaminophen (TYLENOL) suppository 650 mg  650 mg Rectal Q6H PRN    carvedilol (COREG) tablet 3.125 mg  3.125 mg Oral BID     amLODIPine (NORVASC) tablet 10 mg  10 mg Oral Daily    aspirin EC tablet 81 mg  81 mg Oral Daily    atorvastatin (LIPITOR) tablet 20 mg  20 mg Oral Daily    ferrous sulfate (IRON 325) tablet 325 mg  325 mg Oral BID      ______________________________________________________________________  EXPECTED LENGTH OF STAY: 3  ACTUAL LENGTH OF STAY:          4                 Deepak Crane MD   
SubCUTAneous PRN    dextrose 10 % infusion   IntraVENous Continuous PRN    sodium chloride flush 0.9 % injection 5-40 mL  5-40 mL IntraVENous 2 times per day    sodium chloride flush 0.9 % injection 5-40 mL  5-40 mL IntraVENous PRN    0.9 % sodium chloride infusion   IntraVENous PRN    enoxaparin Sodium (LOVENOX) injection 30 mg  30 mg SubCUTAneous Daily    ondansetron (ZOFRAN-ODT) disintegrating tablet 4 mg  4 mg Oral Q8H PRN    Or    ondansetron (ZOFRAN) injection 4 mg  4 mg IntraVENous Q6H PRN    polyethylene glycol (GLYCOLAX) packet 17 g  17 g Oral Daily PRN    acetaminophen (TYLENOL) tablet 650 mg  650 mg Oral Q6H PRN    Or    acetaminophen (TYLENOL) suppository 650 mg  650 mg Rectal Q6H PRN    carvedilol (COREG) tablet 3.125 mg  3.125 mg Oral BID     amLODIPine (NORVASC) tablet 10 mg  10 mg Oral Daily    aspirin EC tablet 81 mg  81 mg Oral Daily    atorvastatin (LIPITOR) tablet 20 mg  20 mg Oral Daily    ferrous sulfate (IRON 325) tablet 325 mg  325 mg Oral BID      ______________________________________________________________________  EXPECTED LENGTH OF STAY: 3  ACTUAL LENGTH OF STAY:          3                 Deepak Crane MD

## 2025-03-02 NOTE — PLAN OF CARE
Problem: Chronic Conditions and Co-morbidities  Goal: Patient's chronic conditions and co-morbidity symptoms are monitored and maintained or improved  3/2/2025 1113 by Bernadine Urrutia RN  Outcome: Progressing  Flowsheets (Taken 3/2/2025 0736)  Care Plan - Patient's Chronic Conditions and Co-Morbidity Symptoms are Monitored and Maintained or Improved: Monitor and assess patient's chronic conditions and comorbid symptoms for stability, deterioration, or improvement  3/2/2025 0233 by Marcello Alvarado RN  Outcome: Progressing  3/2/2025 0232 by Marcello Alvarado RN  Outcome: Progressing  Flowsheets (Taken 3/1/2025 2100)  Care Plan - Patient's Chronic Conditions and Co-Morbidity Symptoms are Monitored and Maintained or Improved:   Monitor and assess patient's chronic conditions and comorbid symptoms for stability, deterioration, or improvement   Collaborate with multidisciplinary team to address chronic and comorbid conditions and prevent exacerbation or deterioration   Update acute care plan with appropriate goals if chronic or comorbid symptoms are exacerbated and prevent overall improvement and discharge     Problem: Discharge Planning  Goal: Discharge to home or other facility with appropriate resources  3/2/2025 1113 by Bernadine Urrutia RN  Outcome: Progressing  Flowsheets (Taken 3/2/2025 0736)  Discharge to home or other facility with appropriate resources: Identify barriers to discharge with patient and caregiver  3/2/2025 0233 by Marcello Alvarado RN  Outcome: Progressing  3/2/2025 0232 by Marcello Alvarado RN  Outcome: Progressing  Flowsheets (Taken 3/1/2025 2100)  Discharge to home or other facility with appropriate resources:   Identify barriers to discharge with patient and caregiver   Identify discharge learning needs (meds, wound care, etc)     Problem: Respiratory - Adult  Goal: Achieves optimal ventilation and oxygenation  3/2/2025 1113 by Bernadine Urrutia RN  Outcome: Progressing  3/2/2025 0233 by Christiano 
  Problem: Chronic Conditions and Co-morbidities  Goal: Patient's chronic conditions and co-morbidity symptoms are monitored and maintained or improved  Outcome: Progressing     Problem: Discharge Planning  Goal: Discharge to home or other facility with appropriate resources  Outcome: Progressing     Problem: Respiratory - Adult  Goal: Achieves optimal ventilation and oxygenation  Outcome: Progressing     
Care plan reviewed   Problem: Chronic Conditions and Co-morbidities  Goal: Patient's chronic conditions and co-morbidity symptoms are monitored and maintained or improved  2/28/2025 2159 by Emma Valdivia RN  Outcome: Progressing  2/28/2025 1253 by Yasmeen Chambers RN  Outcome: Progressing  Flowsheets (Taken 2/28/2025 0800)  Care Plan - Patient's Chronic Conditions and Co-Morbidity Symptoms are Monitored and Maintained or Improved: Monitor and assess patient's chronic conditions and comorbid symptoms for stability, deterioration, or improvement     Problem: Discharge Planning  Goal: Discharge to home or other facility with appropriate resources  2/28/2025 2159 by Emma Valdivia RN  Outcome: Progressing  2/28/2025 1253 by Yasmeen Chambers RN  Outcome: Progressing  Flowsheets (Taken 2/28/2025 0800)  Discharge to home or other facility with appropriate resources: Identify barriers to discharge with patient and caregiver     Problem: Respiratory - Adult  Goal: Achieves optimal ventilation and oxygenation  2/28/2025 2159 by Emma Valdivia RN  Outcome: Progressing  2/28/2025 1253 by Yasmeen Chambers RN  Outcome: Progressing     Problem: Infection - Adult  Goal: Absence of infection at discharge  Outcome: Progressing  Flowsheets (Taken 2/28/2025 0800 by Yasmeen Chambers RN)  Absence of infection at discharge: Assess and monitor for signs and symptoms of infection     Problem: Safety - Adult  Goal: Free from fall injury  Outcome: Progressing     Problem: Spiritual Struggle  Goal: Verbalizes spiritual struggle  Outcome: Progressing  Goal: Gains new understanding/perception  Outcome: Progressing  Goal: Growing sense of peace/hope  Outcome: Progressing  Goal: Explore resources for additional follow up, post discharge  Outcome: Progressing     Problem: Emotional Distress  Goal: Verbalization of thoughts and feelings  Outcome: Progressing  Goal: Reduce evidence of 
Care plan reviewed.    Problem: Chronic Conditions and Co-morbidities  Goal: Patient's chronic conditions and co-morbidity symptoms are monitored and maintained or improved  2/27/2025 0340 by Joby Mendes RN  Outcome: Progressing  2/26/2025 1513 by Ela Emmanuel RN  Outcome: Progressing     Problem: Discharge Planning  Goal: Discharge to home or other facility with appropriate resources  2/27/2025 0340 by Joby Mendes RN  Outcome: Progressing  2/26/2025 1513 by Ela Emmanuel RN  Outcome: Progressing     Problem: Respiratory - Adult  Goal: Achieves optimal ventilation and oxygenation  2/27/2025 0340 by Joby Mendes RN  Outcome: Progressing  2/26/2025 1513 by Ela Emmanuel RN  Outcome: Progressing     Problem: Infection - Adult  Goal: Absence of infection at discharge  2/27/2025 0340 by Joby Mendes RN  Outcome: Progressing  2/26/2025 1513 by Ela Emmanuel RN  Reactivated     Problem: Safety - Adult  Goal: Free from fall injury  Outcome: Progressing     Problem: Spiritual Struggle  Goal: Verbalizes spiritual struggle  Outcome: Progressing  Goal: Gains new understanding/perception  Outcome: Progressing  Goal: Growing sense of peace/hope  Outcome: Progressing  Goal: Explore resources for additional follow up, post discharge  Outcome: Progressing     Problem: Emotional Distress  Goal: Verbalization of thoughts and feelings  Outcome: Progressing  Goal: Reduce evidence of anxiety/worry/anger  Outcome: Progressing  Goal: Identifies/restores coping resources/skills  Outcome: Progressing  Goal: Growing sense of peace/hope  Outcome: Progressing  Goal: Explore resources for additional follow up, post discharge  Outcome: Progressing     Problem: Life Adjustment  Goal: Verbalization of feelings regarding change/loss  Outcome: Progressing  Goal: Finding meaning/purpose in the midst of illness/suffering  Outcome: Progressing  Goal: Identifies/restores coping resources/skills  Outcome: 
Care plan reviewed.    Problem: Chronic Conditions and Co-morbidities  Goal: Patient's chronic conditions and co-morbidity symptoms are monitored and maintained or improved  Outcome: Progressing     Problem: Discharge Planning  Goal: Discharge to home or other facility with appropriate resources  Outcome: Progressing     Problem: Respiratory - Adult  Goal: Achieves optimal ventilation and oxygenation  Outcome: Progressing     Problem: Infection - Adult  Goal: Absence of infection at discharge  Outcome: Progressing     Problem: Safety - Adult  Goal: Free from fall injury  Outcome: Progressing     Problem: Spiritual Struggle  Goal: Verbalizes spiritual struggle  Outcome: Progressing  Goal: Gains new understanding/perception  Outcome: Progressing  Goal: Growing sense of peace/hope  Outcome: Progressing  Goal: Explore resources for additional follow up, post discharge  Outcome: Progressing     Problem: Emotional Distress  Goal: Verbalization of thoughts and feelings  Outcome: Progressing  Goal: Reduce evidence of anxiety/worry/anger  Outcome: Progressing  Goal: Identifies/restores coping resources/skills  Outcome: Progressing  Goal: Growing sense of peace/hope  Outcome: Progressing  Goal: Explore resources for additional follow up, post discharge  Outcome: Progressing     Problem: Life Adjustment  Goal: Verbalization of feelings regarding change/loss  Outcome: Progressing  Goal: Finding meaning/purpose in the midst of illness/suffering  Outcome: Progressing  Goal: Identifies/restores coping resources/skills  Outcome: Progressing  Goal: Growing sense of peace/hope  Outcome: Progressing  Goal: Explore resources for additional follow up, post discharge  Outcome: Progressing     Problem: Support with Decision-Making  Goal: Verbalization of thoughts/feelings regarding decision  Outcome: Progressing  Goal: Movement towards resolution of decision  Outcome: Progressing  Goal: Identifies/restores coping 
resources/skills  Outcome: Progressing  Goal: Explore resources for additional follow up, post discharge  Outcome: Progressing      Problem: Unresolved Conflict/Relationships  Goal: Verbalization of thoughts and feelings  Outcome: Progressing  Goal: Movement towards resolution of conflict/strained relationship  Outcome: Progressing  Goal: Explore resources for additional follow up, post discharge  Outcome: Progressing    Problem: Grief  Goal: Verbalizaton of thoughts and feelings regarding loss  Outcome: Progressing  Goal: Establishing/maintaining support systems  Outcome: Progressing  Goal: Growing sense of grief as a process and identify ways of coping  Outcome: Progressing  Goal: Explore resources for additional follow up, post discharge  Outcome: Progressing       Problem: Pain  Goal: Verbalizes/displays adequate comfort level or baseline comfort level  Outcome: Progressing  Verbalizes/displays adequate comfort level or baseline comfort level:   Encourage patient to monitor pain and request assistance   Assess pain using appropriate pain scale      
conflict/strained relationship  2/28/2025 0424 by Joby Mendes RN  Outcome: Progressing  Goal: Explore resources for additional follow up, post discharge  2/28/2025 0424 by Joby Mendes RN  Outcome: Progressing     Problem: Sacramental/Rastafarian Needs  Goal: Provide for sacramental/Bahai needs as appropriate,per patient/family request  2/28/2025 0424 by Joby Mendes RN  Outcome: Progressing     Problem: Grief  Goal: Verbalizaton of thoughts and feelings regarding loss  2/28/2025 0424 by Joby Mendes RN  Outcome: Progressing  Goal: Establishing/maintaining support systems  2/28/2025 0424 by Joby Mendes RN  Outcome: Progressing  Goal: Growing sense of grief as a process and identify ways of coping  2/28/2025 0424 by Joby Mendes RN  Outcome: Progressing  Goal: Explore resources for additional follow up, post discharge  2/28/2025 0424 by Joby Mendes RN  Outcome: Progressing     Problem: End of Life  Goal: Verbalization of thoughts and feelings regarding death/dying  2/28/2025 0424 by Joby Mendes RN  Outcome: Progressing  Goal: Address spiritual needs  2/28/2025 0424 by Joby Mendes RN  Outcome: Progressing  Goal: Acknowledge a sense of peace  2/28/2025 0424 by Joby Mendes RN  Outcome: Progressing  Goal: Identifies/restores coping resources/skills  2/28/2025 0424 by Joby Mendes RN  Outcome: Progressing  Goal: Death with dignity  2/28/2025 0424 by Joby Mendes RN  Outcome: Progressing  Goal: Explore resources for additional follow up, post discharge  2/28/2025 0424 by Joby Mendes RN  Outcome: Progressing     
up, post discharge  2/27/2025 0937 by Ela Emmanuel RN  Outcome: Progressing  2/27/2025 0340 by Joby Mendes RN  Outcome: Progressing     Problem: End of Life  Goal: Verbalization of thoughts and feelings regarding death/dying  2/27/2025 0937 by Ela Emmanuel RN  Outcome: Progressing  2/27/2025 0340 by Joby Mendes RN  Outcome: Progressing  Goal: Address spiritual needs  2/27/2025 0937 by Ela Emmanuel RN  Outcome: Progressing  2/27/2025 0340 by Joby Mendes RN  Outcome: Progressing  Goal: Acknowledge a sense of peace  2/27/2025 0937 by Ela Emmanuel RN  Outcome: Progressing  2/27/2025 0340 by Joby Mendes RN  Outcome: Progressing  Goal: Identifies/restores coping resources/skills  2/27/2025 0937 by Ela Emmanuel RN  Outcome: Progressing  2/27/2025 0340 by Joby Mendes RN  Outcome: Progressing  Goal: Death with dignity  2/27/2025 0937 by Ela Emmanuel RN  Outcome: Progressing  2/27/2025 0340 by Joby Mendes RN  Outcome: Progressing  Goal: Explore resources for additional follow up, post discharge  2/27/2025 0937 by Ela Emmanuel RN  Outcome: Progressing  2/27/2025 0340 by Joby Mendes RN  Outcome: Progressing

## 2025-03-02 NOTE — CARE COORDINATION
RUR: 20%      Late entry for 3/1/25 discuss 2nd IMM with patient no question or concern.      CM receive call from provider ready to discharge patient, some follow up patient needs are missing from AVS and needs to be added.   CM added [facility] information on AVS for patient to follow up.     Julianne RODRIGUEZ

## 2025-03-03 LAB — ECHO BSA: 1.84 M2

## 2025-03-03 PROCEDURE — 93971 EXTREMITY STUDY: CPT | Performed by: INTERNAL MEDICINE

## 2025-03-04 ENCOUNTER — OFFICE VISIT (OUTPATIENT)
Facility: CLINIC | Age: 70
End: 2025-03-04
Payer: COMMERCIAL

## 2025-03-04 VITALS
TEMPERATURE: 98.2 F | SYSTOLIC BLOOD PRESSURE: 165 MMHG | BODY MASS INDEX: 29.44 KG/M2 | HEIGHT: 67 IN | OXYGEN SATURATION: 95 % | DIASTOLIC BLOOD PRESSURE: 63 MMHG | HEART RATE: 66 BPM | RESPIRATION RATE: 16 BRPM | WEIGHT: 187.6 LBS

## 2025-03-04 DIAGNOSIS — E11.65 TYPE 2 DIABETES MELLITUS WITH HYPERGLYCEMIA, WITH LONG-TERM CURRENT USE OF INSULIN (HCC): ICD-10-CM

## 2025-03-04 DIAGNOSIS — Z91.199 HISTORY OF NONCOMPLIANCE WITH MEDICAL TREATMENT: ICD-10-CM

## 2025-03-04 DIAGNOSIS — I63.89 BRAINSTEM INFARCT, ACUTE (HCC): ICD-10-CM

## 2025-03-04 DIAGNOSIS — I27.21 PULMONARY ARTERY HYPERTENSION (HCC): ICD-10-CM

## 2025-03-04 DIAGNOSIS — Z79.4 TYPE 2 DIABETES MELLITUS WITH HYPERGLYCEMIA, WITH LONG-TERM CURRENT USE OF INSULIN (HCC): ICD-10-CM

## 2025-03-04 DIAGNOSIS — N18.31 CHRONIC RENAL FAILURE, STAGE 3A (HCC): ICD-10-CM

## 2025-03-04 DIAGNOSIS — E11.65 POORLY CONTROLLED DIABETES MELLITUS (HCC): ICD-10-CM

## 2025-03-04 DIAGNOSIS — E11.42 DIABETIC POLYNEUROPATHY ASSOCIATED WITH TYPE 2 DIABETES MELLITUS (HCC): ICD-10-CM

## 2025-03-04 DIAGNOSIS — I50.31 ACUTE HEART FAILURE WITH PRESERVED EJECTION FRACTION (HCC): Primary | ICD-10-CM

## 2025-03-04 DIAGNOSIS — I89.0 LYMPHEDEMA: ICD-10-CM

## 2025-03-04 PROCEDURE — 3078F DIAST BP <80 MM HG: CPT | Performed by: INTERNAL MEDICINE

## 2025-03-04 PROCEDURE — 3077F SYST BP >= 140 MM HG: CPT | Performed by: INTERNAL MEDICINE

## 2025-03-04 PROCEDURE — 99214 OFFICE O/P EST MOD 30 MIN: CPT | Performed by: INTERNAL MEDICINE

## 2025-03-04 PROCEDURE — 3052F HG A1C>EQUAL 8.0%<EQUAL 9.0%: CPT | Performed by: INTERNAL MEDICINE

## 2025-03-04 PROCEDURE — 1123F ACP DISCUSS/DSCN MKR DOCD: CPT | Performed by: INTERNAL MEDICINE

## 2025-03-04 RX ORDER — PEN NEEDLE, DIABETIC 31 GX5/16"
1 NEEDLE, DISPOSABLE MISCELLANEOUS DAILY
Qty: 100 EACH | Refills: 1 | Status: SHIPPED | OUTPATIENT
Start: 2025-03-04 | End: 2025-06-02

## 2025-03-04 RX ORDER — ALCOHOL ANTISEPTIC PADS
1 PADS, MEDICATED (EA) TOPICAL 3 TIMES DAILY
Qty: 200 EACH | Refills: 1 | Status: SHIPPED | OUTPATIENT
Start: 2025-03-04 | End: 2025-06-02

## 2025-03-04 SDOH — ECONOMIC STABILITY: FOOD INSECURITY: WITHIN THE PAST 12 MONTHS, YOU WORRIED THAT YOUR FOOD WOULD RUN OUT BEFORE YOU GOT MONEY TO BUY MORE.: NEVER TRUE

## 2025-03-04 SDOH — ECONOMIC STABILITY: FOOD INSECURITY: WITHIN THE PAST 12 MONTHS, THE FOOD YOU BOUGHT JUST DIDN'T LAST AND YOU DIDN'T HAVE MONEY TO GET MORE.: NEVER TRUE

## 2025-03-04 ASSESSMENT — ANXIETY QUESTIONNAIRES
6. BECOMING EASILY ANNOYED OR IRRITABLE: NOT AT ALL
4. TROUBLE RELAXING: NOT AT ALL
3. WORRYING TOO MUCH ABOUT DIFFERENT THINGS: NOT AT ALL
GAD7 TOTAL SCORE: 0
7. FEELING AFRAID AS IF SOMETHING AWFUL MIGHT HAPPEN: NOT AT ALL
1. FEELING NERVOUS, ANXIOUS, OR ON EDGE: NOT AT ALL
2. NOT BEING ABLE TO STOP OR CONTROL WORRYING: NOT AT ALL
IF YOU CHECKED OFF ANY PROBLEMS ON THIS QUESTIONNAIRE, HOW DIFFICULT HAVE THESE PROBLEMS MADE IT FOR YOU TO DO YOUR WORK, TAKE CARE OF THINGS AT HOME, OR GET ALONG WITH OTHER PEOPLE: NOT DIFFICULT AT ALL
5. BEING SO RESTLESS THAT IT IS HARD TO SIT STILL: NOT AT ALL

## 2025-03-04 ASSESSMENT — PATIENT HEALTH QUESTIONNAIRE - PHQ9
SUM OF ALL RESPONSES TO PHQ QUESTIONS 1-9: 0
1. LITTLE INTEREST OR PLEASURE IN DOING THINGS: NOT AT ALL
SUM OF ALL RESPONSES TO PHQ QUESTIONS 1-9: 0
SUM OF ALL RESPONSES TO PHQ QUESTIONS 1-9: 0
2. FEELING DOWN, DEPRESSED OR HOPELESS: NOT AT ALL
SUM OF ALL RESPONSES TO PHQ QUESTIONS 1-9: 0

## 2025-03-04 NOTE — PROGRESS NOTES
Chief Complaint   Patient presents with    Diabetes     Patient states \" he is present for a follow-up.\"    \"Have you been to the ER, urgent care clinic since your last visit?  Hospitalized since your last visit?\"    YES - When: approximately 1 months ago.  Where and Why: [facility].    “Have you seen or consulted any other health care providers outside our system since your last visit?”    No      “Have you had a colorectal cancer screening such as a colonoscopy/FIT/Cologuard?    NO    Date of last Colonoscopy: 1/2/2015  No cologuard on file  No FIT/FOBT on file   No flexible sigmoidoscopy on file     “Have you had a diabetic eye exam?”    NO     Date of last diabetic eye exam: 3/16/2018

## 2025-03-05 ENCOUNTER — FOLLOWUP TELEPHONE ENCOUNTER (OUTPATIENT)
Facility: HOSPITAL | Age: 70
End: 2025-03-05

## 2025-03-05 NOTE — TELEPHONE ENCOUNTER
CM called patient by telephone to perform post discharge assessment and for the purpose of follow up call from inpatient discharge to check on environmental challenges/medications/appointment follow up/and questions/concerns.      The call was answered by patient; he confirmed his . CM introduced self and reason for call.  Pt stated he was able to see his PCP and Nephrologist this week. The Nephrologist wants to adjust medication and see him again on Friday to re-evaluate. Pt stated he has been doing well w/ new O2 concentrator, but hopes the treatment time will be temporary so he can return to work. He had no new symptoms to report and has not had any shortness of breath since discharge.     Pt will  medication from [facility] on Friday (has enough at home for the time being). Pt stated understanding of his d/c instructions. He had no other questions or concerns at this time.    Chel Cagle, [place], CM

## 2025-03-09 RX ORDER — ACYCLOVIR 800 MG/1
TABLET ORAL
Qty: 2 EACH | Refills: 11 | Status: SHIPPED | OUTPATIENT
Start: 2025-03-09

## 2025-03-19 ENCOUNTER — OFFICE VISIT (OUTPATIENT)
Facility: CLINIC | Age: 70
End: 2025-03-19
Payer: COMMERCIAL

## 2025-03-19 VITALS
TEMPERATURE: 97.6 F | RESPIRATION RATE: 16 BRPM | HEIGHT: 67 IN | DIASTOLIC BLOOD PRESSURE: 48 MMHG | HEART RATE: 77 BPM | SYSTOLIC BLOOD PRESSURE: 84 MMHG | WEIGHT: 171 LBS | OXYGEN SATURATION: 98 % | BODY MASS INDEX: 26.84 KG/M2

## 2025-03-19 DIAGNOSIS — E11.649 TYPE 2 DIABETES MELLITUS WITH HYPOGLYCEMIA WITHOUT COMA, WITH LONG-TERM CURRENT USE OF INSULIN (HCC): ICD-10-CM

## 2025-03-19 DIAGNOSIS — Z79.4 TYPE 2 DIABETES MELLITUS WITH HYPOGLYCEMIA WITHOUT COMA, WITH LONG-TERM CURRENT USE OF INSULIN (HCC): ICD-10-CM

## 2025-03-19 DIAGNOSIS — N18.31 CHRONIC RENAL FAILURE, STAGE 3A (HCC): ICD-10-CM

## 2025-03-19 DIAGNOSIS — I50.31 ACUTE DIASTOLIC CHF (CONGESTIVE HEART FAILURE) (HCC): Primary | ICD-10-CM

## 2025-03-19 DIAGNOSIS — I10 PRIMARY HYPERTENSION: ICD-10-CM

## 2025-03-19 DIAGNOSIS — E78.5 DYSLIPIDEMIA: ICD-10-CM

## 2025-03-19 PROCEDURE — 1123F ACP DISCUSS/DSCN MKR DOCD: CPT | Performed by: INTERNAL MEDICINE

## 2025-03-19 PROCEDURE — 3074F SYST BP LT 130 MM HG: CPT | Performed by: INTERNAL MEDICINE

## 2025-03-19 PROCEDURE — 3078F DIAST BP <80 MM HG: CPT | Performed by: INTERNAL MEDICINE

## 2025-03-19 PROCEDURE — 99214 OFFICE O/P EST MOD 30 MIN: CPT | Performed by: INTERNAL MEDICINE

## 2025-03-19 PROCEDURE — 3052F HG A1C>EQUAL 8.0%<EQUAL 9.0%: CPT | Performed by: INTERNAL MEDICINE

## 2025-03-19 SDOH — ECONOMIC STABILITY: FOOD INSECURITY: WITHIN THE PAST 12 MONTHS, THE FOOD YOU BOUGHT JUST DIDN'T LAST AND YOU DIDN'T HAVE MONEY TO GET MORE.: NEVER TRUE

## 2025-03-19 SDOH — ECONOMIC STABILITY: FOOD INSECURITY: WITHIN THE PAST 12 MONTHS, YOU WORRIED THAT YOUR FOOD WOULD RUN OUT BEFORE YOU GOT MONEY TO BUY MORE.: NEVER TRUE

## 2025-03-19 ASSESSMENT — ANXIETY QUESTIONNAIRES
5. BEING SO RESTLESS THAT IT IS HARD TO SIT STILL: NOT AT ALL
1. FEELING NERVOUS, ANXIOUS, OR ON EDGE: NOT AT ALL
4. TROUBLE RELAXING: NOT AT ALL
2. NOT BEING ABLE TO STOP OR CONTROL WORRYING: NOT AT ALL
3. WORRYING TOO MUCH ABOUT DIFFERENT THINGS: NOT AT ALL
IF YOU CHECKED OFF ANY PROBLEMS ON THIS QUESTIONNAIRE, HOW DIFFICULT HAVE THESE PROBLEMS MADE IT FOR YOU TO DO YOUR WORK, TAKE CARE OF THINGS AT HOME, OR GET ALONG WITH OTHER PEOPLE: NOT DIFFICULT AT ALL
GAD7 TOTAL SCORE: 0
7. FEELING AFRAID AS IF SOMETHING AWFUL MIGHT HAPPEN: NOT AT ALL
6. BECOMING EASILY ANNOYED OR IRRITABLE: NOT AT ALL

## 2025-03-19 ASSESSMENT — PATIENT HEALTH QUESTIONNAIRE - PHQ9
1. LITTLE INTEREST OR PLEASURE IN DOING THINGS: NOT AT ALL
SUM OF ALL RESPONSES TO PHQ QUESTIONS 1-9: 0
SUM OF ALL RESPONSES TO PHQ QUESTIONS 1-9: 0
2. FEELING DOWN, DEPRESSED OR HOPELESS: NOT AT ALL
SUM OF ALL RESPONSES TO PHQ QUESTIONS 1-9: 0
SUM OF ALL RESPONSES TO PHQ QUESTIONS 1-9: 0

## 2025-03-19 NOTE — PROGRESS NOTES
.  Chief Complaint   Patient presents with    Diabetes     Patient states \" he is present for a follow-up.'    \"Have you been to the ER, urgent care clinic since your last visit?  Hospitalized since your last visit?\"    NO    “Have you seen or consulted any other health care providers outside our system since your last visit?”    NO      “Have you had a colorectal cancer screening such as a colonoscopy/FIT/Cologuard?    NO    Date of last Colonoscopy: 1/2/2015  No cologuard on file  No FIT/FOBT on file   No flexible sigmoidoscopy on file     “Have you had a diabetic eye exam?”    NO     Date of last diabetic eye exam: 3/16/2018

## 2025-03-20 LAB
BUN SERPL-MCNC: 47 MG/DL (ref 8–27)
BUN/CREAT SERPL: 18 (ref 10–24)
CALCIUM SERPL-MCNC: 8.5 MG/DL (ref 8.6–10.2)
CHLORIDE SERPL-SCNC: 106 MMOL/L (ref 96–106)
CO2 SERPL-SCNC: 25 MMOL/L (ref 20–29)
CREAT SERPL-MCNC: 2.56 MG/DL (ref 0.76–1.27)
EGFRCR SERPLBLD CKD-EPI 2021: 26 ML/MIN/1.73
GLUCOSE SERPL-MCNC: 178 MG/DL (ref 70–99)
POTASSIUM SERPL-SCNC: 4.9 MMOL/L (ref 3.5–5.2)
SODIUM SERPL-SCNC: 145 MMOL/L (ref 134–144)

## 2025-03-25 NOTE — PROGRESS NOTES
[facility]  [facility]   [address]  [facility] [address]  Phone:  [phone]  Fax: [phone]       Chief Complaint   Patient presents with    Diabetes   .      SUBJECTIVE:      History of Present Illness  The patient presents for evaluation of congestive heart failure, diabetes mellitus, and hypertension.    He reports a significant improvement in his condition, with a gradual increase in oxygen levels and a corresponding decrease in oxygen dependency. He has been engaging in walking exercises within his home environment without the aid of oxygen. He does not possess an oximeter for home use. His oxygen saturation was measured at 98 percent during this visit. He has been utilizing 1 liter of oxygen, a reduction from his initial usage of 2 liters. He is considering resuming work due to issues with his short-term disability payments. He has a history of smoking.    He acknowledges a recent elevation in his blood glucose levels, which he attributes to a two-week period without Bydureon injections due to insurance-related payment issues. He has observed a decrease in his morning blood glucose levels since resuming the Bydureon injections, with readings of 137 yesterday and 131 today. He has not yet returned to work. He maintains a consistent meal schedule, including a bedtime snack of Ritz crackers with butter and half a bottle of water. He uses a Zulay device to monitor his blood glucose levels. He is currently on Tresiba 14 units daily.    He is currently on a regimen of furosemide 40 mg twice daily, as prescribed by his nephrologist, Dr. Grande. He takes the first dose at 7:30 AM and the second dose around 3:30 or 4:00 PM. He has noticed a reduction in leg swelling. He has an upcoming appointment with his nephrologist scheduled for 04/12/2025.    SOCIAL HISTORY  He has a history of smoking.    MEDICATIONS  Current: Furosemide, Tresiba, Bydureon         Current Outpatient

## 2025-04-06 ENCOUNTER — RESULTS FOLLOW-UP (OUTPATIENT)
Facility: CLINIC | Age: 70
End: 2025-04-06

## 2025-06-26 DIAGNOSIS — E78.5 HYPERLIPIDEMIA, UNSPECIFIED: ICD-10-CM

## 2025-06-26 RX ORDER — AMLODIPINE BESYLATE 10 MG/1
10 TABLET ORAL DAILY
Qty: 90 TABLET | Refills: 3 | Status: SHIPPED | OUTPATIENT
Start: 2025-06-26

## 2025-06-26 RX ORDER — HYDRALAZINE HYDROCHLORIDE 100 MG/1
100 TABLET, FILM COATED ORAL EVERY 8 HOURS
Qty: 90 TABLET | Refills: 3 | Status: SHIPPED | OUTPATIENT
Start: 2025-06-26

## 2025-06-26 RX ORDER — ATORVASTATIN CALCIUM 20 MG/1
20 TABLET, FILM COATED ORAL DAILY
Qty: 90 TABLET | Refills: 3 | Status: SHIPPED | OUTPATIENT
Start: 2025-06-26

## 2025-07-01 ENCOUNTER — OFFICE VISIT (OUTPATIENT)
Facility: CLINIC | Age: 70
End: 2025-07-01
Payer: COMMERCIAL

## 2025-07-01 VITALS
RESPIRATION RATE: 18 BRPM | SYSTOLIC BLOOD PRESSURE: 145 MMHG | TEMPERATURE: 98.3 F | OXYGEN SATURATION: 94 % | HEIGHT: 67 IN | DIASTOLIC BLOOD PRESSURE: 78 MMHG | HEART RATE: 66 BPM | WEIGHT: 186.8 LBS | BODY MASS INDEX: 29.32 KG/M2

## 2025-07-01 DIAGNOSIS — I47.20 VENTRICULAR TACHYCARDIA (HCC): ICD-10-CM

## 2025-07-01 DIAGNOSIS — Z79.4 TYPE 2 DIABETES MELLITUS WITH HYPOGLYCEMIA WITHOUT COMA, WITH LONG-TERM CURRENT USE OF INSULIN (HCC): ICD-10-CM

## 2025-07-01 DIAGNOSIS — I50.31 ACUTE HEART FAILURE WITH PRESERVED EJECTION FRACTION (HCC): ICD-10-CM

## 2025-07-01 DIAGNOSIS — D64.9 ANEMIA, UNSPECIFIED TYPE: ICD-10-CM

## 2025-07-01 DIAGNOSIS — N18.32 DIABETES MELLITUS DUE TO UNDERLYING CONDITION WITH STAGE 3B CHRONIC KIDNEY DISEASE, WITH LONG-TERM CURRENT USE OF INSULIN (HCC): ICD-10-CM

## 2025-07-01 DIAGNOSIS — E08.22 DIABETES MELLITUS DUE TO UNDERLYING CONDITION WITH STAGE 3B CHRONIC KIDNEY DISEASE, WITH LONG-TERM CURRENT USE OF INSULIN (HCC): ICD-10-CM

## 2025-07-01 DIAGNOSIS — Z79.4 DIABETES MELLITUS DUE TO UNDERLYING CONDITION WITH STAGE 3B CHRONIC KIDNEY DISEASE, WITH LONG-TERM CURRENT USE OF INSULIN (HCC): ICD-10-CM

## 2025-07-01 DIAGNOSIS — L97.921 ULCER OF LEFT LOWER LEG, LIMITED TO BREAKDOWN OF SKIN (HCC): ICD-10-CM

## 2025-07-01 DIAGNOSIS — N18.31 CHRONIC RENAL FAILURE, STAGE 3A (HCC): Primary | ICD-10-CM

## 2025-07-01 DIAGNOSIS — E11.649 TYPE 2 DIABETES MELLITUS WITH HYPOGLYCEMIA WITHOUT COMA, WITH LONG-TERM CURRENT USE OF INSULIN (HCC): ICD-10-CM

## 2025-07-01 PROCEDURE — 3077F SYST BP >= 140 MM HG: CPT | Performed by: INTERNAL MEDICINE

## 2025-07-01 PROCEDURE — 1126F AMNT PAIN NOTED NONE PRSNT: CPT | Performed by: INTERNAL MEDICINE

## 2025-07-01 PROCEDURE — 3046F HEMOGLOBIN A1C LEVEL >9.0%: CPT | Performed by: INTERNAL MEDICINE

## 2025-07-01 PROCEDURE — 1123F ACP DISCUSS/DSCN MKR DOCD: CPT | Performed by: INTERNAL MEDICINE

## 2025-07-01 PROCEDURE — 1159F MED LIST DOCD IN RCRD: CPT | Performed by: INTERNAL MEDICINE

## 2025-07-01 PROCEDURE — 99214 OFFICE O/P EST MOD 30 MIN: CPT | Performed by: INTERNAL MEDICINE

## 2025-07-01 PROCEDURE — 3078F DIAST BP <80 MM HG: CPT | Performed by: INTERNAL MEDICINE

## 2025-07-01 SDOH — ECONOMIC STABILITY: FOOD INSECURITY: WITHIN THE PAST 12 MONTHS, THE FOOD YOU BOUGHT JUST DIDN'T LAST AND YOU DIDN'T HAVE MONEY TO GET MORE.: NEVER TRUE

## 2025-07-01 SDOH — ECONOMIC STABILITY: FOOD INSECURITY: WITHIN THE PAST 12 MONTHS, YOU WORRIED THAT YOUR FOOD WOULD RUN OUT BEFORE YOU GOT MONEY TO BUY MORE.: NEVER TRUE

## 2025-07-01 ASSESSMENT — PATIENT HEALTH QUESTIONNAIRE - PHQ9
2. FEELING DOWN, DEPRESSED OR HOPELESS: NOT AT ALL
SUM OF ALL RESPONSES TO PHQ QUESTIONS 1-9: 0
1. LITTLE INTEREST OR PLEASURE IN DOING THINGS: NOT AT ALL
SUM OF ALL RESPONSES TO PHQ QUESTIONS 1-9: 0

## 2025-07-01 NOTE — PROGRESS NOTES
Chief Complaint   Patient presents with    Diabetes    Cholesterol Problem    Hypertension     Have you been to the ER, urgent care clinic since your last visit?  Hospitalized since your last visit?   YES - When: approximately 2  weeks ago.  Where and Why: [email or web address]    Have you seen or consulted any other health care providers outside our system since your last visit?   NO      Have you had a colorectal cancer screening such as a colonoscopy/FIT/Cologuard?    NO    Date of last Colonoscopy: 1/2/2015  No cologuard on file  No FIT/FOBT on file   No flexible sigmoidoscopy on file     Have you had a diabetic eye exam?”   NO     Date of last diabetic eye exam: 3/16/2018

## 2025-07-02 RX ORDER — HYDROCHLOROTHIAZIDE 12.5 MG/1
CAPSULE ORAL
Qty: 2 EACH | Refills: 11 | Status: SHIPPED | OUTPATIENT
Start: 2025-07-02

## 2025-07-03 LAB
ANION GAP SERPL CALC-SCNC: 8 MMOL/L (ref 2–12)
BASOPHILS # BLD: 0.02 K/UL (ref 0–0.1)
BASOPHILS NFR BLD: 0.4 % (ref 0–1)
BUN SERPL-MCNC: 74 MG/DL (ref 6–20)
BUN/CREAT SERPL: 21 (ref 12–20)
CALCIUM SERPL-MCNC: 8.5 MG/DL (ref 8.5–10.1)
CHLORIDE SERPL-SCNC: 107 MMOL/L (ref 97–108)
CO2 SERPL-SCNC: 24 MMOL/L (ref 21–32)
CREAT SERPL-MCNC: 3.47 MG/DL (ref 0.7–1.3)
DIFFERENTIAL METHOD BLD: ABNORMAL
EOSINOPHIL # BLD: 0.17 K/UL (ref 0–0.4)
EOSINOPHIL NFR BLD: 3 % (ref 0–7)
ERYTHROCYTE [DISTWIDTH] IN BLOOD BY AUTOMATED COUNT: 14.8 % (ref 11.5–14.5)
EST. AVERAGE GLUCOSE BLD GHB EST-MCNC: 214 MG/DL
GLUCOSE SERPL-MCNC: 237 MG/DL (ref 65–100)
HBA1C MFR BLD: 9.1 % (ref 4–5.6)
HCT VFR BLD AUTO: 29.3 % (ref 36.6–50.3)
HGB BLD-MCNC: 9.4 G/DL (ref 12.1–17)
IMM GRANULOCYTES # BLD AUTO: 0.03 K/UL (ref 0–0.04)
IMM GRANULOCYTES NFR BLD AUTO: 0.5 % (ref 0–0.5)
LYMPHOCYTES # BLD: 0.6 K/UL (ref 0.8–3.5)
LYMPHOCYTES NFR BLD: 10.5 % (ref 12–49)
MCH RBC QN AUTO: 26.6 PG (ref 26–34)
MCHC RBC AUTO-ENTMCNC: 32.1 G/DL (ref 30–36.5)
MCV RBC AUTO: 82.8 FL (ref 80–99)
MONOCYTES # BLD: 0.48 K/UL (ref 0–1)
MONOCYTES NFR BLD: 8.4 % (ref 5–13)
NEUTS SEG # BLD: 4.4 K/UL (ref 1.8–8)
NEUTS SEG NFR BLD: 77.2 % (ref 32–75)
NRBC # BLD: 0 K/UL (ref 0–0.01)
NRBC BLD-RTO: 0 PER 100 WBC
PLATELET # BLD AUTO: 210 K/UL (ref 150–400)
PMV BLD AUTO: 10.7 FL (ref 8.9–12.9)
POTASSIUM SERPL-SCNC: 4.6 MMOL/L (ref 3.5–5.1)
RBC # BLD AUTO: 3.54 M/UL (ref 4.1–5.7)
RBC MORPH BLD: ABNORMAL
SODIUM SERPL-SCNC: 139 MMOL/L (ref 136–145)
WBC # BLD AUTO: 5.7 K/UL (ref 4.1–11.1)

## 2025-07-15 DIAGNOSIS — Z79.4 TYPE 2 DIABETES MELLITUS WITH HYPOGLYCEMIA WITHOUT COMA, WITH LONG-TERM CURRENT USE OF INSULIN (HCC): ICD-10-CM

## 2025-07-15 DIAGNOSIS — E11.649 TYPE 2 DIABETES MELLITUS WITH HYPOGLYCEMIA WITHOUT COMA, WITH LONG-TERM CURRENT USE OF INSULIN (HCC): ICD-10-CM

## 2025-07-15 RX ORDER — ACYCLOVIR 800 MG/1
1 TABLET ORAL
Qty: 2 EACH | Refills: 11 | Status: SHIPPED | OUTPATIENT
Start: 2025-07-15

## 2025-07-24 PROBLEM — Z86.73 HISTORY OF STROKE: Status: ACTIVE | Noted: 2022-03-20

## 2025-08-05 DIAGNOSIS — E78.5 HYPERLIPIDEMIA, UNSPECIFIED: ICD-10-CM

## 2025-08-06 RX ORDER — AMLODIPINE BESYLATE 10 MG/1
10 TABLET ORAL DAILY
Qty: 90 TABLET | Refills: 3 | Status: SHIPPED | OUTPATIENT
Start: 2025-08-06

## 2025-08-06 RX ORDER — ATORVASTATIN CALCIUM 20 MG/1
20 TABLET, FILM COATED ORAL DAILY
Qty: 90 TABLET | Refills: 3 | Status: SHIPPED | OUTPATIENT
Start: 2025-08-06

## (undated) DEVICE — REM POLYHESIVE ADULT PATIENT RETURN ELECTRODE: Brand: VALLEYLAB

## (undated) DEVICE — INTENDED FOR TISSUE SEPARATION, AND OTHER PROCEDURES THAT REQUIRE A SHARP SURGICAL BLADE TO PUNCTURE OR CUT.: Brand: BARD-PARKER ® CARBON RIB-BACK BLADES

## (undated) DEVICE — SOLUTION IV 1000ML 0.9% SOD CHL

## (undated) DEVICE — PAD,NON-ADHERENT,W/AD,3X4,ST,LF,1/PK: Brand: MEDLINE

## (undated) DEVICE — SURGICAL PROCEDURE PACK BASIN MAJ SET CUST NO CAUT

## (undated) DEVICE — TOWEL SURG W17XL27IN STD BLU COT NONFENESTRATED PREWASHED

## (undated) DEVICE — SPONGE GZ W4XL4IN COT 12 PLY TYP VII WVN C FLD DSGN

## (undated) DEVICE — SUTURE VCRL SZ 3-0 L27IN ABSRB UD L26MM SH 1/2 CIR J416H

## (undated) DEVICE — STRAP,POSITIONING,KNEE/BODY,FOAM,4X60": Brand: MEDLINE

## (undated) DEVICE — SUTURE MCRYL SZ 4-0 L27IN ABSRB UD L19MM PS-2 1/2 CIR PRIM Y426H

## (undated) DEVICE — ROCKER SWITCH PENCIL BLADE ELECTRODE, HOLSTER: Brand: EDGE

## (undated) DEVICE — PACK,BASIC,SIRUS,V: Brand: MEDLINE

## (undated) DEVICE — INFECTION CONTROL KIT SYS